# Patient Record
Sex: FEMALE | Race: WHITE | NOT HISPANIC OR LATINO | Employment: FULL TIME | URBAN - METROPOLITAN AREA
[De-identification: names, ages, dates, MRNs, and addresses within clinical notes are randomized per-mention and may not be internally consistent; named-entity substitution may affect disease eponyms.]

---

## 2017-01-05 ENCOUNTER — OFFICE VISIT (OUTPATIENT)
Dept: FAMILY MEDICINE CLINIC | Facility: CLINIC | Age: 37
End: 2017-01-05

## 2017-01-05 VITALS
BODY MASS INDEX: 24.48 KG/M2 | WEIGHT: 133 LBS | SYSTOLIC BLOOD PRESSURE: 144 MMHG | HEART RATE: 104 BPM | HEIGHT: 62 IN | RESPIRATION RATE: 20 BRPM | TEMPERATURE: 98.3 F | DIASTOLIC BLOOD PRESSURE: 99 MMHG

## 2017-01-05 DIAGNOSIS — F98.8 ADD (ATTENTION DEFICIT DISORDER): ICD-10-CM

## 2017-01-05 DIAGNOSIS — I10 BENIGN ESSENTIAL HYPERTENSION: ICD-10-CM

## 2017-01-05 DIAGNOSIS — E03.9 ACQUIRED HYPOTHYROIDISM: Primary | ICD-10-CM

## 2017-01-05 PROCEDURE — 99214 OFFICE O/P EST MOD 30 MIN: CPT | Performed by: FAMILY MEDICINE

## 2017-01-05 RX ORDER — LEVOTHYROXINE SODIUM 0.1 MG/1
100 TABLET ORAL DAILY
Qty: 90 TABLET | Refills: 1 | Status: SHIPPED | OUTPATIENT
Start: 2017-01-05 | End: 2017-04-20 | Stop reason: SDUPTHER

## 2017-01-05 RX ORDER — LOSARTAN POTASSIUM 25 MG/1
25 TABLET ORAL DAILY
Qty: 90 TABLET | Refills: 1 | Status: SHIPPED | OUTPATIENT
Start: 2017-01-05 | End: 2017-04-20 | Stop reason: SDUPTHER

## 2017-01-05 NOTE — MR AVS SNAPSHOT
Radha LOPEZ Rizwan   1/5/2017 1:00 PM   Office Visit    Provider:  Fadi Goodman MD   Department:  [facility]   Dept Phone:  [phone]                Your Full Care Plan              Today's Medication Changes          These changes are accurate as of: 1/5/17  3:21 PM.  If you have any questions, ask your nurse or doctor.               New Medication(s)Ordered:     losartan 25 MG tablet   Commonly known as:  COZAAR   Take 1 tablet by mouth Daily.   Started by:  Fadi Goodman MD         Medication(s)that have changed:     levothyroxine 100 MCG tablet   Commonly known as:  SYNTHROID   Take 1 tablet by mouth Daily.   What changed:    - medication strength  - how much to take  - additional instructions   Changed by:  Fadi Goodman MD         Stop taking medication(s)listed here:     HYDROcodone-acetaminophen 5-325 MG per tablet   Commonly known as:  NORCO   Stopped by:  Fadi Goodman MD           promethazine 25 MG suppository   Commonly known as:  PHENERGAN   Stopped by:  Fadi Goodman MD                Where to Get Your Medications      These medications were sent to [facility] [address] [place] [address] [address] AT [facility] - [phone]  - [phone] [place] [address] [facility] [address]     Phone:  [phone]     levothyroxine 100 MCG tablet    losartan 25 MG tablet         You can get these medications from any pharmacy     Bring a paper prescription for each of these medications     lisdexamfetamine 60 MG capsule                  Your Updated Medication List          This list is accurate as of: 1/5/17  3:21 PM.  Always use your most recent med list.                levothyroxine 100 MCG tablet   Commonly known as:  SYNTHROID   Take 1 tablet by mouth Daily.       lisdexamfetamine 60 MG capsule   Commonly known as:  VYVANSE   Take 1 capsule by mouth Every Morning.       losartan 25 MG tablet  "  Commonly known as:  COZAAR   Take 1 tablet by mouth Daily.               You Were Diagnosed With        Codes Comments    Acquired hypothyroidism    -  Primary ICD-10-CM: E03.9  ICD-9-CM: 244.9     ADD (attention deficit disorder)     ICD-10-CM: F98.8  ICD-9-CM: 314.00     Benign essential hypertension     ICD-10-CM: I10  ICD-9-CM: 401.1       Instructions     None    Patient Instructions History      [employer] Signup     Our records indicate that you have an active [employer] account.    You can view your After Visit Summary by going to [employer] and logging in with your [employer] username and password.  If you don't have a [employer] username and password but a parent or guardian has access to your record, the parent or guardian should login with their own [employer] username and password and access your record to view the After Visit Summary.    If you have questions, you can email [email or web address] or call [phone] to talk to our [employer] staff.  Remember, [employer] is NOT to be used for urgent needs.  For medical emergencies, dial 911.               Other Info from Your Visit           Allergies     Sulfa Antibiotics        Reason for Visit     ADD MED REFILL - CHRISTOPHE JMS    Hypothyroidism     Hypertension     LAB RESULTS           Vital Signs     Blood Pressure Pulse Temperature Respirations Height Weight    144/99 104 98.3 °F (36.8 °C) (Oral) 20 62\" (157.5 cm) 133 lb (60.3 kg)    Body Mass Index Smoking Status                24.33 kg/m2 Former Smoker          Problems and Diagnoses Noted     ADD (attention deficit disorder)    Benign essential hypertension    Underactive thyroid      "

## 2017-01-05 NOTE — PROGRESS NOTES
"Subjective   Radha sAtorga is a 36 y.o. female.     History of Present Illness     Chief Complaint:   Chief Complaint   Patient presents with   • ADD     MED REFILL - CHRISTOPHE BOWER   • Hypothyroidism   • Hypertension   • LAB RESULTS       Radha Astorga 36 y.o. female who presents today for Medical Management of the below listed issues and medication refills.  she has a history of   Patient Active Problem List   Diagnosis   • ADD (attention deficit disorder)   • Family history of breast cancer   • Benign essential hypertension   • Hypothyroidism   • Acute stress reaction   • Major depressive disorder, recurrent episode, moderate with anxious distress   .  Since the last visit, she has overall felt well.  she has been compliant with   Current Outpatient Prescriptions:   •  levothyroxine (SYNTHROID) 100 MCG tablet, Take 1 tablet by mouth Daily., Disp: 90 tablet, Rfl: 1  •  lisdexamfetamine (VYVANSE) 60 MG capsule, Take 1 capsule by mouth Every Morning., Disp: 30 capsule, Rfl: 0  •  losartan (COZAAR) 25 MG tablet, Take 1 tablet by mouth Daily., Disp: 90 tablet, Rfl: 1.  she denies medication side effects.    She has had resolution of her cough since stopping the Lisinopril yet needs to replace this now.    All of the chronic condition(s) listed above are stable w/o issues.    Visit Vitals   • /99   • Pulse 104   • Temp 98.3 °F (36.8 °C) (Oral)   • Resp 20   • Ht 62\" (157.5 cm)   • Wt 133 lb (60.3 kg)   • BMI 24.33 kg/m2       Results for orders placed or performed during the hospital encounter of 12/25/16   Comprehensive Metabolic Panel   Result Value Ref Range    Glucose 107 (H) 70 - 100 mg/dL    BUN 14 5 - 21 mg/dL    Creatinine 0.76 0.50 - 1.40 mg/dL    Sodium 140 135 - 145 mmol/L    Potassium 4.0 3.5 - 5.3 mmol/L    Chloride 100 98 - 110 mmol/L    CO2 27.0 24.0 - 31.0 mmol/L    Calcium 9.6 8.4 - 10.4 mg/dL    Total Protein 7.4 6.3 - 8.7 g/dL    Albumin 4.50 3.50 - 5.00 g/dL    ALT (SGPT) 29 0 - 54 U/L    " AST (SGOT) 20 7 - 45 U/L    Alkaline Phosphatase 65 24 - 120 U/L    Total Bilirubin 0.4 0.1 - 1.0 mg/dL    eGFR Non African Amer 85 >60 mL/min/1.73    Globulin 2.9 gm/dL    A/G Ratio 1.6 1.1 - 2.5 g/dL    BUN/Creatinine Ratio 18.4 7.0 - 25.0    Anion Gap 13.0 4.0 - 13.0 mmol/L   Urinalysis With / Culture If Indicated   Result Value Ref Range    Color, UA Yellow Yellow, Straw    Appearance, UA Clear Clear    pH, UA 7.0 5.0 - 8.0    Specific Gravity, UA 1.015 1.005 - 1.030    Glucose, UA Negative Negative    Ketones, UA Negative Negative    Bilirubin, UA Negative Negative    Blood, UA Moderate (2+) (A) Negative    Protein, UA Negative Negative    Leuk Esterase, UA Negative Negative    Nitrite, UA Negative Negative    Urobilinogen, UA 0.2 E.U./dL 0.2 - 1.0 E.U./dL   CBC Auto Differential   Result Value Ref Range    WBC 13.69 (H) 4.80 - 10.80 10*3/mm3    RBC 4.48 4.20 - 5.40 10*6/mm3    Hemoglobin 12.8 12.0 - 16.0 g/dL    Hematocrit 38.0 37.0 - 47.0 %    MCV 84.8 82.0 - 98.0 fL    MCH 28.6 28.0 - 32.0 pg    MCHC 33.7 33.0 - 36.0 g/dL    RDW 12.7 12.0 - 15.0 %    RDW-SD 38.9 (L) 40.0 - 54.0 fl    MPV 10.2 6.0 - 12.0 fL    Platelets 293 130 - 400 10*3/mm3    Neutrophil % 71.4 39.0 - 78.0 %    Lymphocyte % 18.8 15.0 - 45.0 %    Monocyte % 7.1 4.0 - 12.0 %    Eosinophil % 1.7 0.0 - 4.0 %    Basophil % 0.7 0.0 - 2.0 %    Immature Grans % 0.3 0.0 - 5.0 %    Neutrophils, Absolute 9.78 (H) 1.87 - 8.40 10*3/mm3    Lymphocytes, Absolute 2.58 0.72 - 4.86 10*3/mm3    Monocytes, Absolute 0.97 0.19 - 1.30 10*3/mm3    Eosinophils, Absolute 0.23 0.00 - 0.70 10*3/mm3    Basophils, Absolute 0.09 0.00 - 0.20 10*3/mm3    Immature Grans, Absolute 0.04 (H) 0.00 - 0.03 10*3/mm3   Urinalysis, Microscopic Only   Result Value Ref Range    RBC, UA 13-20 (A) None Seen /HPF    WBC, UA 3-5 (A) None Seen /HPF    Bacteria, UA None Seen None Seen /HPF    Squamous Epithelial Cells, UA 0-2 None Seen, 0-2 /HPF    Hyaline Casts, UA 0-2 None Seen /LPF     Methodology Automated Microscopy    POCT pregnancy, urine   Result Value Ref Range    HCG, Urine, QL Negative Negative    Lot Number [id number]     Internal Positive Control Positive     Internal Negative Control Negative    Light Blue Top   Result Value Ref Range    Extra Tube hold for add-on    Green Top (Gel)   Result Value Ref Range    Extra Tube Hold for add-ons.    Lavender Top   Result Value Ref Range    Extra Tube hold for add-on    Red Top   Result Value Ref Range    Extra Tube Hold for add-ons.          The following portions of the patient's history were reviewed and updated as appropriate: allergies, current medications, past family history, past medical history, past social history, past surgical history and problem list.    Review of Systems   Constitutional: Negative for activity change, chills, fatigue and fever.   HENT: Negative for ear pain, postnasal drip, rhinorrhea and sore throat.    Respiratory: Positive for cough. Negative for chest tightness, shortness of breath and wheezing.    Cardiovascular: Negative for chest pain and palpitations.   Gastrointestinal: Negative for abdominal pain and nausea.   Endocrine: Negative for cold intolerance.   Musculoskeletal: Negative for myalgias.   Skin: Negative for rash.   Neurological: Negative for headaches.   Psychiatric/Behavioral: Negative for behavioral problems and dysphoric mood.       Objective   Physical Exam   Constitutional: She appears well-developed and well-nourished.   Neck: Neck supple. No thyromegaly present.   Cardiovascular: Normal rate and regular rhythm.    No murmur heard.  Pulmonary/Chest: Effort normal and breath sounds normal.   Abdominal: Bowel sounds are normal.   Psychiatric: She has a normal mood and affect. Her behavior is normal.   Nursing note and vitals reviewed.    The patient has read and signed the [facility] Controlled Substance Contract.  I will continue to see patient for regular follow up appointments.  They are  well controlled on their medication.  CHRISTOPHE has been reviewed by me and is updated every 3 months. The patient is aware of the potential for addiction and dependence.      Assessment/Plan   Radha was seen today for add, hypothyroidism, hypertension and lab results.    Diagnoses and all orders for this visit:    Acquired hypothyroidism  -     levothyroxine (SYNTHROID) 100 MCG tablet; Take 1 tablet by mouth Daily.    ADD (attention deficit disorder)  -     lisdexamfetamine (VYVANSE) 60 MG capsule; Take 1 capsule by mouth Every Morning.    Benign essential hypertension  -     losartan (COZAAR) 25 MG tablet; Take 1 tablet by mouth Daily.

## 2017-02-09 DIAGNOSIS — F98.8 ADD (ATTENTION DEFICIT DISORDER): ICD-10-CM

## 2017-03-14 DIAGNOSIS — F98.8 ADD (ATTENTION DEFICIT DISORDER): ICD-10-CM

## 2017-03-21 ENCOUNTER — OFFICE VISIT (OUTPATIENT)
Dept: FAMILY MEDICINE CLINIC | Facility: CLINIC | Age: 37
End: 2017-03-21

## 2017-03-21 VITALS
TEMPERATURE: 98.7 F | WEIGHT: 132 LBS | RESPIRATION RATE: 16 BRPM | BODY MASS INDEX: 24.29 KG/M2 | SYSTOLIC BLOOD PRESSURE: 128 MMHG | DIASTOLIC BLOOD PRESSURE: 87 MMHG | HEIGHT: 62 IN | HEART RATE: 94 BPM

## 2017-03-21 DIAGNOSIS — F98.8 ADD (ATTENTION DEFICIT DISORDER): Primary | ICD-10-CM

## 2017-03-21 DIAGNOSIS — I10 BENIGN ESSENTIAL HYPERTENSION: ICD-10-CM

## 2017-03-21 PROCEDURE — 99213 OFFICE O/P EST LOW 20 MIN: CPT | Performed by: FAMILY MEDICINE

## 2017-03-21 NOTE — PROGRESS NOTES
"Subjective   Radha Astorga is a 36 y.o. female.     History of Present Illness     Chief Complaint:   Chief Complaint   Patient presents with   • Hypertension     MED REFILL - NON SMOKER - NO DAILY ASA  PHQ  2 PHYQ 9 DONE   - CHRISTOPHE BOWER   • Hypothyroidism   • ADD       Radha Astorga 36 y.o. female who presents today for Medical Management of the below listed issues and medication refills.  she has a history of   Patient Active Problem List   Diagnosis   • ADD (attention deficit disorder)   • Family history of breast cancer   • Benign essential hypertension   • Hypothyroidism   • Acute stress reaction   • Major depressive disorder, recurrent episode, moderate with anxious distress   .  Since the last visit, she has overall felt well.  she has been compliant with   Current Outpatient Prescriptions:   •  levothyroxine (SYNTHROID) 100 MCG tablet, Take 1 tablet by mouth Daily., Disp: 90 tablet, Rfl: 1  •  lisdexamfetamine (VYVANSE) 70 MG capsule, Take 1 capsule by mouth Every Morning., Disp: 30 capsule, Rfl: 0  •  losartan (COZAAR) 25 MG tablet, Take 1 tablet by mouth Daily., Disp: 90 tablet, Rfl: 1.  she denies medication side effects.    All of the chronic condition(s) listed above are stable w/o issues.    Visit Vitals   • /87   • Pulse 94   • Temp 98.7 °F (37.1 °C) (Oral)   • Resp 16   • Ht 62\" (157.5 cm)   • Wt 132 lb (59.9 kg)   • BMI 24.14 kg/m2       Results for orders placed or performed during the hospital encounter of 12/25/16   Comprehensive Metabolic Panel   Result Value Ref Range    Glucose 107 (H) 70 - 100 mg/dL    BUN 14 5 - 21 mg/dL    Creatinine 0.76 0.50 - 1.40 mg/dL    Sodium 140 135 - 145 mmol/L    Potassium 4.0 3.5 - 5.3 mmol/L    Chloride 100 98 - 110 mmol/L    CO2 27.0 24.0 - 31.0 mmol/L    Calcium 9.6 8.4 - 10.4 mg/dL    Total Protein 7.4 6.3 - 8.7 g/dL    Albumin 4.50 3.50 - 5.00 g/dL    ALT (SGPT) 29 0 - 54 U/L    AST (SGOT) 20 7 - 45 U/L    Alkaline Phosphatase 65 24 - 120 U/L    " Total Bilirubin 0.4 0.1 - 1.0 mg/dL    eGFR Non African Amer 85 >60 mL/min/1.73    Globulin 2.9 gm/dL    A/G Ratio 1.6 1.1 - 2.5 g/dL    BUN/Creatinine Ratio 18.4 7.0 - 25.0    Anion Gap 13.0 4.0 - 13.0 mmol/L   Urinalysis With / Culture If Indicated   Result Value Ref Range    Color, UA Yellow Yellow, Straw    Appearance, UA Clear Clear    pH, UA 7.0 5.0 - 8.0    Specific Gravity, UA 1.015 1.005 - 1.030    Glucose, UA Negative Negative    Ketones, UA Negative Negative    Bilirubin, UA Negative Negative    Blood, UA Moderate (2+) (A) Negative    Protein, UA Negative Negative    Leuk Esterase, UA Negative Negative    Nitrite, UA Negative Negative    Urobilinogen, UA 0.2 E.U./dL 0.2 - 1.0 E.U./dL   CBC Auto Differential   Result Value Ref Range    WBC 13.69 (H) 4.80 - 10.80 10*3/mm3    RBC 4.48 4.20 - 5.40 10*6/mm3    Hemoglobin 12.8 12.0 - 16.0 g/dL    Hematocrit 38.0 37.0 - 47.0 %    MCV 84.8 82.0 - 98.0 fL    MCH 28.6 28.0 - 32.0 pg    MCHC 33.7 33.0 - 36.0 g/dL    RDW 12.7 12.0 - 15.0 %    RDW-SD 38.9 (L) 40.0 - 54.0 fl    MPV 10.2 6.0 - 12.0 fL    Platelets 293 130 - 400 10*3/mm3    Neutrophil % 71.4 39.0 - 78.0 %    Lymphocyte % 18.8 15.0 - 45.0 %    Monocyte % 7.1 4.0 - 12.0 %    Eosinophil % 1.7 0.0 - 4.0 %    Basophil % 0.7 0.0 - 2.0 %    Immature Grans % 0.3 0.0 - 5.0 %    Neutrophils, Absolute 9.78 (H) 1.87 - 8.40 10*3/mm3    Lymphocytes, Absolute 2.58 0.72 - 4.86 10*3/mm3    Monocytes, Absolute 0.97 0.19 - 1.30 10*3/mm3    Eosinophils, Absolute 0.23 0.00 - 0.70 10*3/mm3    Basophils, Absolute 0.09 0.00 - 0.20 10*3/mm3    Immature Grans, Absolute 0.04 (H) 0.00 - 0.03 10*3/mm3   Urinalysis, Microscopic Only   Result Value Ref Range    RBC, UA 13-20 (A) None Seen /HPF    WBC, UA 3-5 (A) None Seen /HPF    Bacteria, UA None Seen None Seen /HPF    Squamous Epithelial Cells, UA 0-2 None Seen, 0-2 /HPF    Hyaline Casts, UA 0-2 None Seen /LPF    Methodology Automated Microscopy    POCT pregnancy, urine   Result  Value Ref Range    HCG, Urine, QL Negative Negative    Lot Number [id number]     Internal Positive Control Positive     Internal Negative Control Negative    Light Blue Top   Result Value Ref Range    Extra Tube hold for add-on    Green Top (Gel)   Result Value Ref Range    Extra Tube Hold for add-ons.    Lavender Top   Result Value Ref Range    Extra Tube hold for add-on    Red Top   Result Value Ref Range    Extra Tube Hold for add-ons.          The following portions of the patient's history were reviewed and updated as appropriate: allergies, current medications, past family history, past medical history, past social history, past surgical history and problem list.    Review of Systems   Constitutional: Negative for activity change, chills, fatigue and fever.   Respiratory: Negative for cough and chest tightness.    Cardiovascular: Negative for chest pain and palpitations.   Gastrointestinal: Negative for abdominal pain and nausea.   Endocrine: Negative for cold intolerance.   Psychiatric/Behavioral: Negative for behavioral problems and dysphoric mood.       Objective   Physical Exam   Constitutional: She appears well-developed and well-nourished.   Neck: Neck supple. No thyromegaly present.   Cardiovascular: Normal rate and regular rhythm.    No murmur heard.  Pulmonary/Chest: Effort normal and breath sounds normal.   Abdominal: Bowel sounds are normal.   Psychiatric: She has a normal mood and affect. Her behavior is normal.   Nursing note and vitals reviewed.    The patient has read and signed the [facility] Controlled Substance Contract.  I will continue to see patient for regular follow up appointments.  They are well controlled on their medication.  CHRISTOPHE has been reviewed by me and is updated every 3 months. The patient is aware of the potential for addiction and dependence.    Assessment/Plan   Radha was seen today for hypertension, hypothyroidism and add.    Diagnoses and all orders for this  visit:    ADD (attention deficit disorder)    Benign essential hypertension    Pt likes her higher dose of Vyvanse and is going to continue at current dose.

## 2017-04-10 DIAGNOSIS — F98.8 ADD (ATTENTION DEFICIT DISORDER): ICD-10-CM

## 2017-04-20 ENCOUNTER — OFFICE VISIT (OUTPATIENT)
Dept: FAMILY MEDICINE CLINIC | Facility: CLINIC | Age: 37
End: 2017-04-20

## 2017-04-20 VITALS
SYSTOLIC BLOOD PRESSURE: 117 MMHG | RESPIRATION RATE: 20 BRPM | TEMPERATURE: 98.9 F | HEART RATE: 116 BPM | WEIGHT: 128 LBS | DIASTOLIC BLOOD PRESSURE: 78 MMHG | HEIGHT: 62 IN | BODY MASS INDEX: 23.55 KG/M2

## 2017-04-20 DIAGNOSIS — E03.9 ACQUIRED HYPOTHYROIDISM: ICD-10-CM

## 2017-04-20 DIAGNOSIS — F33.1 MAJOR DEPRESSIVE DISORDER, RECURRENT EPISODE, MODERATE WITH ANXIOUS DISTRESS (HCC): Primary | ICD-10-CM

## 2017-04-20 DIAGNOSIS — F98.8 ADD (ATTENTION DEFICIT DISORDER): ICD-10-CM

## 2017-04-20 DIAGNOSIS — I10 BENIGN ESSENTIAL HYPERTENSION: ICD-10-CM

## 2017-04-20 PROCEDURE — 99214 OFFICE O/P EST MOD 30 MIN: CPT | Performed by: FAMILY MEDICINE

## 2017-04-20 RX ORDER — LEVOTHYROXINE SODIUM 0.1 MG/1
100 TABLET ORAL DAILY
Qty: 90 TABLET | Refills: 1 | Status: SHIPPED | OUTPATIENT
Start: 2017-04-20 | End: 2017-10-09 | Stop reason: SDUPTHER

## 2017-04-20 RX ORDER — LOSARTAN POTASSIUM 25 MG/1
25 TABLET ORAL DAILY
Qty: 90 TABLET | Refills: 1 | Status: SHIPPED | OUTPATIENT
Start: 2017-04-20 | End: 2017-10-09 | Stop reason: SDUPTHER

## 2017-04-20 RX ORDER — ESCITALOPRAM OXALATE 10 MG/1
10 TABLET ORAL DAILY
Qty: 30 TABLET | Refills: 5 | Status: SHIPPED | OUTPATIENT
Start: 2017-04-20 | End: 2017-07-12

## 2017-04-20 NOTE — PROGRESS NOTES
"Subjective   Radha Astorga is a 37 y.o. female.     History of Present Illness     Chief Complaint:   Chief Complaint   Patient presents with   • ADD     MED REFILL / CHRISTOPHE    • Hypothyroidism   • Hypertension       Radha Astorga 37 y.o. female who presents today for Medical Management of the below listed issues and medication refills.  she has a history of   Patient Active Problem List   Diagnosis   • ADD (attention deficit disorder)   • Family history of breast cancer   • Benign essential hypertension   • Hypothyroidism   • Acute stress reaction   • Major depressive disorder, recurrent episode, moderate with anxious distress   .  Since the last visit, she has had three deaths in the family and has been quite anxious and some depression and desires help with this.  she has been compliant with   Current Outpatient Prescriptions:   •  levothyroxine (SYNTHROID) 100 MCG tablet, Take 1 tablet by mouth Daily., Disp: 90 tablet, Rfl: 1  •  lisdexamfetamine (VYVANSE) 70 MG capsule, Take 1 capsule by mouth Every Morning., Disp: 30 capsule, Rfl: 0  •  losartan (COZAAR) 25 MG tablet, Take 1 tablet by mouth Daily., Disp: 90 tablet, Rfl: 1  •  escitalopram (LEXAPRO) 10 MG tablet, Take 1 tablet by mouth Daily., Disp: 30 tablet, Rfl: 5.  she denies medication side effects.    All of the chronic condition(s) listed above are stable w/o issues.    /78  Pulse 116  Temp 98.9 °F (37.2 °C)  Resp 20  Ht 62\" (157.5 cm)  Wt 128 lb (58.1 kg)  BMI 23.41 kg/m2    Results for orders placed or performed during the hospital encounter of 12/25/16   Comprehensive Metabolic Panel   Result Value Ref Range    Glucose 107 (H) 70 - 100 mg/dL    BUN 14 5 - 21 mg/dL    Creatinine 0.76 0.50 - 1.40 mg/dL    Sodium 140 135 - 145 mmol/L    Potassium 4.0 3.5 - 5.3 mmol/L    Chloride 100 98 - 110 mmol/L    CO2 27.0 24.0 - 31.0 mmol/L    Calcium 9.6 8.4 - 10.4 mg/dL    Total Protein 7.4 6.3 - 8.7 g/dL    Albumin 4.50 3.50 - 5.00 g/dL    ALT " (SGPT) 29 0 - 54 U/L    AST (SGOT) 20 7 - 45 U/L    Alkaline Phosphatase 65 24 - 120 U/L    Total Bilirubin 0.4 0.1 - 1.0 mg/dL    eGFR Non African Amer 85 >60 mL/min/1.73    Globulin 2.9 gm/dL    A/G Ratio 1.6 1.1 - 2.5 g/dL    BUN/Creatinine Ratio 18.4 7.0 - 25.0    Anion Gap 13.0 4.0 - 13.0 mmol/L   Urinalysis With / Culture If Indicated   Result Value Ref Range    Color, UA Yellow Yellow, Straw    Appearance, UA Clear Clear    pH, UA 7.0 5.0 - 8.0    Specific Gravity, UA 1.015 1.005 - 1.030    Glucose, UA Negative Negative    Ketones, UA Negative Negative    Bilirubin, UA Negative Negative    Blood, UA Moderate (2+) (A) Negative    Protein, UA Negative Negative    Leuk Esterase, UA Negative Negative    Nitrite, UA Negative Negative    Urobilinogen, UA 0.2 E.U./dL 0.2 - 1.0 E.U./dL   CBC Auto Differential   Result Value Ref Range    WBC 13.69 (H) 4.80 - 10.80 10*3/mm3    RBC 4.48 4.20 - 5.40 10*6/mm3    Hemoglobin 12.8 12.0 - 16.0 g/dL    Hematocrit 38.0 37.0 - 47.0 %    MCV 84.8 82.0 - 98.0 fL    MCH 28.6 28.0 - 32.0 pg    MCHC 33.7 33.0 - 36.0 g/dL    RDW 12.7 12.0 - 15.0 %    RDW-SD 38.9 (L) 40.0 - 54.0 fl    MPV 10.2 6.0 - 12.0 fL    Platelets 293 130 - 400 10*3/mm3    Neutrophil % 71.4 39.0 - 78.0 %    Lymphocyte % 18.8 15.0 - 45.0 %    Monocyte % 7.1 4.0 - 12.0 %    Eosinophil % 1.7 0.0 - 4.0 %    Basophil % 0.7 0.0 - 2.0 %    Immature Grans % 0.3 0.0 - 5.0 %    Neutrophils, Absolute 9.78 (H) 1.87 - 8.40 10*3/mm3    Lymphocytes, Absolute 2.58 0.72 - 4.86 10*3/mm3    Monocytes, Absolute 0.97 0.19 - 1.30 10*3/mm3    Eosinophils, Absolute 0.23 0.00 - 0.70 10*3/mm3    Basophils, Absolute 0.09 0.00 - 0.20 10*3/mm3    Immature Grans, Absolute 0.04 (H) 0.00 - 0.03 10*3/mm3   Urinalysis, Microscopic Only   Result Value Ref Range    RBC, UA 13-20 (A) None Seen /HPF    WBC, UA 3-5 (A) None Seen /HPF    Bacteria, UA None Seen None Seen /HPF    Squamous Epithelial Cells, UA 0-2 None Seen, 0-2 /HPF    Hyaline Casts, UA  0-2 None Seen /LPF    Methodology Automated Microscopy    POCT pregnancy, urine   Result Value Ref Range    HCG, Urine, QL Negative Negative    Lot Number [id number]     Internal Positive Control Positive     Internal Negative Control Negative    Light Blue Top   Result Value Ref Range    Extra Tube hold for add-on    Green Top (Gel)   Result Value Ref Range    Extra Tube Hold for add-ons.    Lavender Top   Result Value Ref Range    Extra Tube hold for add-on    Red Top   Result Value Ref Range    Extra Tube Hold for add-ons.          The following portions of the patient's history were reviewed and updated as appropriate: allergies, current medications, past family history, past medical history, past social history, past surgical history and problem list.    Review of Systems   Constitutional: Negative for activity change, chills, fatigue and fever.   Respiratory: Negative for cough and chest tightness.    Cardiovascular: Negative for chest pain and palpitations.   Gastrointestinal: Negative for abdominal pain and nausea.   Endocrine: Negative for cold intolerance.   Psychiatric/Behavioral: Negative for behavioral problems and dysphoric mood.       Objective   Physical Exam   Constitutional: She appears well-developed and well-nourished.   Neck: Neck supple. No thyromegaly present.   Cardiovascular: Normal rate and regular rhythm.    No murmur heard.  Pulmonary/Chest: Effort normal and breath sounds normal.   Abdominal: Bowel sounds are normal.   Psychiatric: She has a normal mood and affect. Her behavior is normal.   Nursing note and vitals reviewed.    The patient has read and signed the [facility] Controlled Substance Contract.  I will continue to see patient for regular follow up appointments.  They are well controlled on their medication.  CHRISTOPHE has been reviewed by me and is updated every 3 months. The patient is aware of the potential for addiction and dependence.    Assessment/Plan   Radha was seen  today for add, hypothyroidism and hypertension.    Diagnoses and all orders for this visit:    Major depressive disorder, recurrent episode, moderate with anxious distress  -     escitalopram (LEXAPRO) 10 MG tablet; Take 1 tablet by mouth Daily.    ADD (attention deficit disorder)  -     lisdexamfetamine (VYVANSE) 70 MG capsule; Take 1 capsule by mouth Every Morning.    Benign essential hypertension  -     losartan (COZAAR) 25 MG tablet; Take 1 tablet by mouth Daily.    Acquired hypothyroidism  -     levothyroxine (SYNTHROID) 100 MCG tablet; Take 1 tablet by mouth Daily.

## 2017-06-07 DIAGNOSIS — F98.8 ADD (ATTENTION DEFICIT DISORDER): ICD-10-CM

## 2017-06-07 NOTE — TELEPHONE ENCOUNTER
----- Message from Radha Astorga sent at 6/6/2017  5:14 PM EDT -----  Regarding: Prescription Question  Contact: [phone]  I need a refill on my Vyvanse, but I'd like to move back down to the 60mg please.       Thanks!  Radha Astorga  [phone]

## 2017-07-12 ENCOUNTER — OFFICE VISIT (OUTPATIENT)
Dept: FAMILY MEDICINE CLINIC | Facility: CLINIC | Age: 37
End: 2017-07-12

## 2017-07-12 VITALS
DIASTOLIC BLOOD PRESSURE: 84 MMHG | HEIGHT: 62 IN | RESPIRATION RATE: 16 BRPM | TEMPERATURE: 98 F | SYSTOLIC BLOOD PRESSURE: 123 MMHG | HEART RATE: 100 BPM | WEIGHT: 132 LBS | OXYGEN SATURATION: 98 % | BODY MASS INDEX: 24.29 KG/M2

## 2017-07-12 DIAGNOSIS — I10 BENIGN ESSENTIAL HYPERTENSION: Primary | ICD-10-CM

## 2017-07-12 DIAGNOSIS — F98.8 ADD (ATTENTION DEFICIT DISORDER): ICD-10-CM

## 2017-07-12 PROCEDURE — 99213 OFFICE O/P EST LOW 20 MIN: CPT | Performed by: FAMILY MEDICINE

## 2017-07-12 NOTE — PROGRESS NOTES
"Subjective   Radha Astorga is a 37 y.o. female.     History of Present Illness     Chief Complaint:   Chief Complaint   Patient presents with   • ADD   • Hypertension       Radha Astorga 37 y.o. female who presents today for Medical Management of the below listed issues and medication refills.  she has a history of   Patient Active Problem List   Diagnosis   • ADD (attention deficit disorder)   • Family history of breast cancer   • Benign essential hypertension   • Hypothyroidism   • Acute stress reaction   • Major depressive disorder, recurrent episode, moderate with anxious distress   .  Since the last visit, she has overall felt well.  she has been compliant with   Current Outpatient Prescriptions:   •  lisdexamfetamine (VYVANSE) 60 MG capsule, Take 1 capsule by mouth Every Morning., Disp: 30 capsule, Rfl: 0  •  levothyroxine (SYNTHROID) 100 MCG tablet, Take 1 tablet by mouth Daily., Disp: 90 tablet, Rfl: 1  •  losartan (COZAAR) 25 MG tablet, Take 1 tablet by mouth Daily., Disp: 90 tablet, Rfl: 1.  she denies medication side effects.    All of the chronic condition(s) listed above are stable w/o issues.    /84  Pulse 100  Temp 98 °F (36.7 °C) (Oral)   Resp 16  Ht 62\" (157.5 cm)  Wt 132 lb (59.9 kg)  SpO2 98%  BMI 24.14 kg/m2    Results for orders placed or performed during the hospital encounter of 12/25/16   Comprehensive Metabolic Panel   Result Value Ref Range    Glucose 107 (H) 70 - 100 mg/dL    BUN 14 5 - 21 mg/dL    Creatinine 0.76 0.50 - 1.40 mg/dL    Sodium 140 135 - 145 mmol/L    Potassium 4.0 3.5 - 5.3 mmol/L    Chloride 100 98 - 110 mmol/L    CO2 27.0 24.0 - 31.0 mmol/L    Calcium 9.6 8.4 - 10.4 mg/dL    Total Protein 7.4 6.3 - 8.7 g/dL    Albumin 4.50 3.50 - 5.00 g/dL    ALT (SGPT) 29 0 - 54 U/L    AST (SGOT) 20 7 - 45 U/L    Alkaline Phosphatase 65 24 - 120 U/L    Total Bilirubin 0.4 0.1 - 1.0 mg/dL    eGFR Non African Amer 85 >60 mL/min/1.73    Globulin 2.9 gm/dL    A/G Ratio " 1.6 1.1 - 2.5 g/dL    BUN/Creatinine Ratio 18.4 7.0 - 25.0    Anion Gap 13.0 4.0 - 13.0 mmol/L   Urinalysis With / Culture If Indicated   Result Value Ref Range    Color, UA Yellow Yellow, Straw    Appearance, UA Clear Clear    pH, UA 7.0 5.0 - 8.0    Specific Gravity, UA 1.015 1.005 - 1.030    Glucose, UA Negative Negative    Ketones, UA Negative Negative    Bilirubin, UA Negative Negative    Blood, UA Moderate (2+) (A) Negative    Protein, UA Negative Negative    Leuk Esterase, UA Negative Negative    Nitrite, UA Negative Negative    Urobilinogen, UA 0.2 E.U./dL 0.2 - 1.0 E.U./dL   CBC Auto Differential   Result Value Ref Range    WBC 13.69 (H) 4.80 - 10.80 10*3/mm3    RBC 4.48 4.20 - 5.40 10*6/mm3    Hemoglobin 12.8 12.0 - 16.0 g/dL    Hematocrit 38.0 37.0 - 47.0 %    MCV 84.8 82.0 - 98.0 fL    MCH 28.6 28.0 - 32.0 pg    MCHC 33.7 33.0 - 36.0 g/dL    RDW 12.7 12.0 - 15.0 %    RDW-SD 38.9 (L) 40.0 - 54.0 fl    MPV 10.2 6.0 - 12.0 fL    Platelets 293 130 - 400 10*3/mm3    Neutrophil % 71.4 39.0 - 78.0 %    Lymphocyte % 18.8 15.0 - 45.0 %    Monocyte % 7.1 4.0 - 12.0 %    Eosinophil % 1.7 0.0 - 4.0 %    Basophil % 0.7 0.0 - 2.0 %    Immature Grans % 0.3 0.0 - 5.0 %    Neutrophils, Absolute 9.78 (H) 1.87 - 8.40 10*3/mm3    Lymphocytes, Absolute 2.58 0.72 - 4.86 10*3/mm3    Monocytes, Absolute 0.97 0.19 - 1.30 10*3/mm3    Eosinophils, Absolute 0.23 0.00 - 0.70 10*3/mm3    Basophils, Absolute 0.09 0.00 - 0.20 10*3/mm3    Immature Grans, Absolute 0.04 (H) 0.00 - 0.03 10*3/mm3   Urinalysis, Microscopic Only   Result Value Ref Range    RBC, UA 13-20 (A) None Seen /HPF    WBC, UA 3-5 (A) None Seen /HPF    Bacteria, UA None Seen None Seen /HPF    Squamous Epithelial Cells, UA 0-2 None Seen, 0-2 /HPF    Hyaline Casts, UA 0-2 None Seen /LPF    Methodology Automated Microscopy    POCT pregnancy, urine   Result Value Ref Range    HCG, Urine, QL Negative Negative    Lot Number [id number]     Internal Positive Control Positive      Internal Negative Control Negative    Light Blue Top   Result Value Ref Range    Extra Tube hold for add-on    Green Top (Gel)   Result Value Ref Range    Extra Tube Hold for add-ons.    Lavender Top   Result Value Ref Range    Extra Tube hold for add-on    Red Top   Result Value Ref Range    Extra Tube Hold for add-ons.          The following portions of the patient's history were reviewed and updated as appropriate: allergies, current medications, past family history, past medical history, past social history, past surgical history and problem list.    Review of Systems   Constitutional: Negative for activity change, chills, fatigue and fever.   Respiratory: Negative for cough and chest tightness.    Cardiovascular: Negative for chest pain and palpitations.   Gastrointestinal: Negative for abdominal pain and nausea.   Endocrine: Negative for cold intolerance.   Psychiatric/Behavioral: Negative for behavioral problems and dysphoric mood.       Objective   Physical Exam   Constitutional: She appears well-developed and well-nourished.   Neck: Neck supple. No thyromegaly present.   Cardiovascular: Normal rate and regular rhythm.    No murmur heard.  Pulmonary/Chest: Effort normal and breath sounds normal.   Abdominal: Bowel sounds are normal.   Psychiatric: She has a normal mood and affect. Her behavior is normal.   Nursing note and vitals reviewed.    The patient has read and signed the [facility] Controlled Substance Contract.  I will continue to see patient for regular follow up appointments.  They are well controlled on their medication.  CHRISTOPHE has been reviewed by me and is updated every 3 months. The patient is aware of the potential for addiction and dependence.    Assessment/Plan   Radha was seen today for add and hypertension.    Diagnoses and all orders for this visit:    Benign essential hypertension    ADD (attention deficit disorder)  -     lisdexamfetamine (VYVANSE) 60 MG capsule; Take 1  capsule by mouth Every Morning.    Continue with good diet and exercise and Losartan for the BP.

## 2017-08-22 DIAGNOSIS — F98.8 ADD (ATTENTION DEFICIT DISORDER): ICD-10-CM

## 2017-09-26 DIAGNOSIS — F98.8 ADD (ATTENTION DEFICIT DISORDER): ICD-10-CM

## 2017-10-09 ENCOUNTER — OFFICE VISIT (OUTPATIENT)
Dept: FAMILY MEDICINE CLINIC | Facility: CLINIC | Age: 37
End: 2017-10-09

## 2017-10-09 VITALS
TEMPERATURE: 98.8 F | SYSTOLIC BLOOD PRESSURE: 136 MMHG | DIASTOLIC BLOOD PRESSURE: 89 MMHG | BODY MASS INDEX: 24.66 KG/M2 | RESPIRATION RATE: 16 BRPM | HEIGHT: 62 IN | HEART RATE: 84 BPM | WEIGHT: 134 LBS

## 2017-10-09 DIAGNOSIS — F98.8 ATTENTION DEFICIT DISORDER (ADD) WITHOUT HYPERACTIVITY: ICD-10-CM

## 2017-10-09 DIAGNOSIS — E03.9 ACQUIRED HYPOTHYROIDISM: ICD-10-CM

## 2017-10-09 DIAGNOSIS — I10 BENIGN ESSENTIAL HYPERTENSION: ICD-10-CM

## 2017-10-09 PROCEDURE — 99214 OFFICE O/P EST MOD 30 MIN: CPT | Performed by: FAMILY MEDICINE

## 2017-10-09 RX ORDER — LOSARTAN POTASSIUM 25 MG/1
25 TABLET ORAL DAILY
Qty: 90 TABLET | Refills: 1 | Status: SHIPPED | OUTPATIENT
Start: 2017-10-09 | End: 2018-02-26 | Stop reason: SINTOL

## 2017-10-09 RX ORDER — LEVOTHYROXINE SODIUM 0.1 MG/1
100 TABLET ORAL DAILY
Qty: 90 TABLET | Refills: 1 | Status: SHIPPED | OUTPATIENT
Start: 2017-10-09 | End: 2018-04-30 | Stop reason: DRUGHIGH

## 2017-10-09 NOTE — PROGRESS NOTES
"Subjective   Radha Astorga is a 37 y.o. female.     History of Present Illness     Chief Complaint:   Chief Complaint   Patient presents with   • ADD     med refill - tabitha [facility]sa udated today js   • Hypothyroidism   • Hypertension       Radha Astorga 37 y.o. female who presents today for Medical Management of the below listed issues and medication refills.  she has a problem list of   Patient Active Problem List   Diagnosis   • ADD (attention deficit disorder)   • Family history of breast cancer   • Benign essential hypertension   • Hypothyroidism   • Acute stress reaction   • Major depressive disorder, recurrent episode, moderate with anxious distress   .  Since the last visit, she has overall felt well.  she has been compliant with   Current Outpatient Prescriptions:   •  levothyroxine (SYNTHROID) 100 MCG tablet, Take 1 tablet by mouth Daily., Disp: 90 tablet, Rfl: 1  •  lisdexamfetamine (VYVANSE) 60 MG capsule, Take 1 capsule by mouth Every Morning., Disp: 30 capsule, Rfl: 0  •  losartan (COZAAR) 25 MG tablet, Take 1 tablet by mouth Daily., Disp: 90 tablet, Rfl: 1.  she denies medication side effects.    All of the chronic condition(s) listed above are stable w/o issues.    /89  Pulse 84  Temp 98.8 °F (37.1 °C) (Oral)   Resp 16  Ht 62\" (157.5 cm)  Wt 134 lb (60.8 kg)  BMI 24.51 kg/m2    Results for orders placed or performed during the hospital encounter of 12/25/16   Comprehensive Metabolic Panel   Result Value Ref Range    Glucose 107 (H) 70 - 100 mg/dL    BUN 14 5 - 21 mg/dL    Creatinine 0.76 0.50 - 1.40 mg/dL    Sodium 140 135 - 145 mmol/L    Potassium 4.0 3.5 - 5.3 mmol/L    Chloride 100 98 - 110 mmol/L    CO2 27.0 24.0 - 31.0 mmol/L    Calcium 9.6 8.4 - 10.4 mg/dL    Total Protein 7.4 6.3 - 8.7 g/dL    Albumin 4.50 3.50 - 5.00 g/dL    ALT (SGPT) 29 0 - 54 U/L    AST (SGOT) 20 7 - 45 U/L    Alkaline Phosphatase 65 24 - 120 U/L    Total Bilirubin 0.4 0.1 - 1.0 mg/dL    eGFR Non African " Amer 85 >60 mL/min/1.73    Globulin 2.9 gm/dL    A/G Ratio 1.6 1.1 - 2.5 g/dL    BUN/Creatinine Ratio 18.4 7.0 - 25.0    Anion Gap 13.0 4.0 - 13.0 mmol/L   Urinalysis With / Culture If Indicated   Result Value Ref Range    Color, UA Yellow Yellow, Straw    Appearance, UA Clear Clear    pH, UA 7.0 5.0 - 8.0    Specific Gravity, UA 1.015 1.005 - 1.030    Glucose, UA Negative Negative    Ketones, UA Negative Negative    Bilirubin, UA Negative Negative    Blood, UA Moderate (2+) (A) Negative    Protein, UA Negative Negative    Leuk Esterase, UA Negative Negative    Nitrite, UA Negative Negative    Urobilinogen, UA 0.2 E.U./dL 0.2 - 1.0 E.U./dL   CBC Auto Differential   Result Value Ref Range    WBC 13.69 (H) 4.80 - 10.80 10*3/mm3    RBC 4.48 4.20 - 5.40 10*6/mm3    Hemoglobin 12.8 12.0 - 16.0 g/dL    Hematocrit 38.0 37.0 - 47.0 %    MCV 84.8 82.0 - 98.0 fL    MCH 28.6 28.0 - 32.0 pg    MCHC 33.7 33.0 - 36.0 g/dL    RDW 12.7 12.0 - 15.0 %    RDW-SD 38.9 (L) 40.0 - 54.0 fl    MPV 10.2 6.0 - 12.0 fL    Platelets 293 130 - 400 10*3/mm3    Neutrophil % 71.4 39.0 - 78.0 %    Lymphocyte % 18.8 15.0 - 45.0 %    Monocyte % 7.1 4.0 - 12.0 %    Eosinophil % 1.7 0.0 - 4.0 %    Basophil % 0.7 0.0 - 2.0 %    Immature Grans % 0.3 0.0 - 5.0 %    Neutrophils, Absolute 9.78 (H) 1.87 - 8.40 10*3/mm3    Lymphocytes, Absolute 2.58 0.72 - 4.86 10*3/mm3    Monocytes, Absolute 0.97 0.19 - 1.30 10*3/mm3    Eosinophils, Absolute 0.23 0.00 - 0.70 10*3/mm3    Basophils, Absolute 0.09 0.00 - 0.20 10*3/mm3    Immature Grans, Absolute 0.04 (H) 0.00 - 0.03 10*3/mm3   Urinalysis, Microscopic Only   Result Value Ref Range    RBC, UA 13-20 (A) None Seen /HPF    WBC, UA 3-5 (A) None Seen /HPF    Bacteria, UA None Seen None Seen /HPF    Squamous Epithelial Cells, UA 0-2 None Seen, 0-2 /HPF    Hyaline Casts, UA 0-2 None Seen /LPF    Methodology Automated Microscopy    POCT pregnancy, urine   Result Value Ref Range    HCG, Urine, QL Negative Negative    Lot  Number [id number]     Internal Positive Control Positive     Internal Negative Control Negative    Light Blue Top   Result Value Ref Range    Extra Tube hold for add-on    Green Top (Gel)   Result Value Ref Range    Extra Tube Hold for add-ons.    Lavender Top   Result Value Ref Range    Extra Tube hold for add-on    Red Top   Result Value Ref Range    Extra Tube Hold for add-ons.          The following portions of the patient's history were reviewed and updated as appropriate: allergies, current medications, past family history, past medical history, past social history, past surgical history and problem list.    Review of Systems   Constitutional: Negative for activity change, chills, fatigue and fever.   Respiratory: Negative for cough and chest tightness.    Cardiovascular: Negative for chest pain and palpitations.   Gastrointestinal: Negative for abdominal pain and nausea.   Endocrine: Negative for cold intolerance.   Psychiatric/Behavioral: Negative for behavioral problems and dysphoric mood.       Objective   Physical Exam   Constitutional: She appears well-developed and well-nourished.   Neck: Neck supple. No thyromegaly present.   Cardiovascular: Normal rate and regular rhythm.    No murmur heard.  Pulmonary/Chest: Effort normal and breath sounds normal.   Abdominal: Bowel sounds are normal.   Psychiatric: She has a normal mood and affect. Her behavior is normal.   Nursing note and vitals reviewed.    The patient has read and signed the [facility] Controlled Substance Contract.  I will continue to see patient for regular follow up appointments.  They are well controlled on their medication.  CHRISTOPHE has been reviewed by me and is updated every 3 months. The patient is aware of the potential for addiction and dependence.    Assessment/Plan   Radha was seen today for add, hypothyroidism and hypertension.    Diagnoses and all orders for this visit:    Attention deficit disorder (ADD) without  hyperactivity  -     lisdexamfetamine (VYVANSE) 60 MG capsule; Take 1 capsule by mouth Every Morning.    Benign essential hypertension  -     losartan (COZAAR) 25 MG tablet; Take 1 tablet by mouth Daily.  -     Comprehensive metabolic panel  -     Lipid panel  -     CBC and Differential    Acquired hypothyroidism  -     levothyroxine (SYNTHROID) 100 MCG tablet; Take 1 tablet by mouth Daily.  -     T4, Free  -     TSH    Other orders  -     Cancel: TSH  -     Cancel: CBC & Differential  -     Cancel: Comprehensive Metabolic Panel  -     Cancel: Lipid Panel             d

## 2017-12-05 DIAGNOSIS — F98.8 ATTENTION DEFICIT DISORDER (ADD) WITHOUT HYPERACTIVITY: ICD-10-CM

## 2018-01-03 DIAGNOSIS — F98.8 ATTENTION DEFICIT DISORDER (ADD) WITHOUT HYPERACTIVITY: ICD-10-CM

## 2018-02-09 ENCOUNTER — OFFICE VISIT (OUTPATIENT)
Dept: FAMILY MEDICINE CLINIC | Facility: CLINIC | Age: 38
End: 2018-02-09

## 2018-02-09 VITALS
SYSTOLIC BLOOD PRESSURE: 145 MMHG | OXYGEN SATURATION: 100 % | HEIGHT: 62 IN | TEMPERATURE: 97 F | RESPIRATION RATE: 16 BRPM | WEIGHT: 132 LBS | HEART RATE: 91 BPM | BODY MASS INDEX: 24.29 KG/M2 | DIASTOLIC BLOOD PRESSURE: 102 MMHG

## 2018-02-09 DIAGNOSIS — F98.8 ATTENTION DEFICIT DISORDER (ADD) WITHOUT HYPERACTIVITY: ICD-10-CM

## 2018-02-09 DIAGNOSIS — M54.2 NECK PAIN: ICD-10-CM

## 2018-02-09 DIAGNOSIS — F98.8 ATTENTION DEFICIT DISORDER, UNSPECIFIED HYPERACTIVITY PRESENCE: ICD-10-CM

## 2018-02-09 DIAGNOSIS — I10 BENIGN ESSENTIAL HYPERTENSION: Primary | ICD-10-CM

## 2018-02-09 PROCEDURE — 99214 OFFICE O/P EST MOD 30 MIN: CPT | Performed by: NURSE PRACTITIONER

## 2018-02-09 NOTE — PROGRESS NOTES
Subjective   Radha Astorga is a 37 y.o. female.     History of Present Illness   Patient presents to office with CC of hypertension, headache and neck pain for a couple of weeks.  Patient was prescribed losartan 25 mg daily but has not been taking it as prescribed due to drowsiness.  She was not taking medication at all for months and then noticed elevated BP when she checked it at the pharmacy a few weeks ago. She started taking 1/2 tablet instead which still caused some drowsiness and did not decrease her blood pressure much.  She is currently still taking 1/2 tablet.  Patient does not have hx of headaches and believes it is attributed to high blood pressure.  She has had some aching in her neck for same duration and is unsure if this is related or due to prior neck injury years ago. Denies radiculopathy, weakness or limited ROM.     Patient is due for vyvanse. She has been on same dose for some time. Reports medication is working well. Denies side effects.      The following portions of the patient's history were reviewed and updated as appropriate: allergies, current medications, past family history, past medical history, past social history, past surgical history and problem list.    Review of Systems   Constitutional: Negative for unexpected weight change.   Eyes: Negative for visual disturbance.   Respiratory: Negative for shortness of breath.    Cardiovascular: Negative for chest pain, palpitations and leg swelling.   Musculoskeletal: Positive for neck pain. Negative for back pain.   Neurological: Positive for headaches. Negative for dizziness, facial asymmetry, weakness and light-headedness.   Psychiatric/Behavioral: Negative for behavioral problems.       Objective   Physical Exam   Constitutional: She is oriented to person, place, and time. She appears well-developed and well-nourished.   Neck: No spinous process tenderness present. Normal range of motion present.       Cardiovascular: Normal rate and  regular rhythm.    Pulmonary/Chest: Effort normal and breath sounds normal.   Neurological: She is alert and oriented to person, place, and time.   Psychiatric: She has a normal mood and affect. Judgment normal.   Nursing note and vitals reviewed.      Assessment/Plan   Radha was seen today for headache, neck pain and hypertension.    Diagnoses and all orders for this visit:    Benign essential hypertension    Neck pain    Attention deficit disorder, unspecified hyperactivity presence        Patient will take whole tablet of losartan at bedtime and f/u in 1-2 weeks for BP recheck.  She will  RX for vyvanse Monday.

## 2018-02-26 ENCOUNTER — OFFICE VISIT (OUTPATIENT)
Dept: FAMILY MEDICINE CLINIC | Facility: CLINIC | Age: 38
End: 2018-02-26

## 2018-02-26 VITALS
HEIGHT: 62 IN | TEMPERATURE: 99.3 F | SYSTOLIC BLOOD PRESSURE: 130 MMHG | RESPIRATION RATE: 20 BRPM | BODY MASS INDEX: 24.48 KG/M2 | HEART RATE: 108 BPM | DIASTOLIC BLOOD PRESSURE: 91 MMHG | WEIGHT: 133 LBS

## 2018-02-26 DIAGNOSIS — I10 BENIGN ESSENTIAL HYPERTENSION: Primary | ICD-10-CM

## 2018-02-26 DIAGNOSIS — F98.8 ATTENTION DEFICIT DISORDER, UNSPECIFIED HYPERACTIVITY PRESENCE: ICD-10-CM

## 2018-02-26 PROCEDURE — 99214 OFFICE O/P EST MOD 30 MIN: CPT | Performed by: FAMILY MEDICINE

## 2018-02-26 RX ORDER — RAMIPRIL 5 MG/1
5 CAPSULE ORAL DAILY
Qty: 30 CAPSULE | Refills: 5 | Status: SHIPPED | OUTPATIENT
Start: 2018-02-26 | End: 2018-06-13 | Stop reason: SDUPTHER

## 2018-02-26 NOTE — PROGRESS NOTES
"Subjective   Radha Astorga is a 37 y.o. female.     History of Present Illness     Chief Complaint:   Chief Complaint   Patient presents with   • Hypertension   • ADD       Radha Astorga 37 y.o. female who presents today for Medical Management of the below listed issues and medication refills.  she has a problem list of   Patient Active Problem List   Diagnosis   • ADD (attention deficit disorder)   • Family history of breast cancer   • Benign essential hypertension   • Hypothyroidism   • Acute stress reaction   • Major depressive disorder, recurrent episode, moderate with anxious distress   .  Since the last visit, she has not liked the Cozaar as much as the Lisinopril and reports some fatigue with that. She did have an ACEI cough specifically with the Lisinopril.  she has been compliant with   Current Outpatient Prescriptions:   •  levothyroxine (SYNTHROID) 100 MCG tablet, Take 1 tablet by mouth Daily., Disp: 90 tablet, Rfl: 1  •  lisdexamfetamine (VYVANSE) 60 MG capsule, Take 1 capsule by mouth Every Morning for 30 days Earliest Fill Date: 2/26/18, Disp: 30 capsule, Rfl: 0  •  [START ON 3/29/2018] lisdexamfetamine (VYVANSE) 60 MG capsule, Take 1 capsule by mouth Every Morning for 30 days Earliest Fill Date: 3/28/18, Disp: 30 capsule, Rfl: 0  •  [START ON 4/28/2018] lisdexamfetamine (VYVANSE) 60 MG capsule, Take 1 capsule by mouth Every Morning for 30 days Earliest Fill Date: 4/27/18, Disp: 30 capsule, Rfl: 0  •  ramipril (ALTACE) 5 MG capsule, Take 1 capsule by mouth Daily., Disp: 30 capsule, Rfl: 5.  she denies medication side effects.    All of the chronic condition(s) listed above are stable w/o issues.    /91  Pulse 108  Temp 99.3 °F (37.4 °C) (Oral)   Resp 20  Ht 157.5 cm (62\")  Wt 60.3 kg (133 lb)  BMI 24.33 kg/m2    Results for orders placed or performed during the hospital encounter of 12/25/16   Comprehensive Metabolic Panel   Result Value Ref Range    Glucose 107 (H) 70 - 100 mg/dL "    BUN 14 5 - 21 mg/dL    Creatinine 0.76 0.50 - 1.40 mg/dL    Sodium 140 135 - 145 mmol/L    Potassium 4.0 3.5 - 5.3 mmol/L    Chloride 100 98 - 110 mmol/L    CO2 27.0 24.0 - 31.0 mmol/L    Calcium 9.6 8.4 - 10.4 mg/dL    Total Protein 7.4 6.3 - 8.7 g/dL    Albumin 4.50 3.50 - 5.00 g/dL    ALT (SGPT) 29 0 - 54 U/L    AST (SGOT) 20 7 - 45 U/L    Alkaline Phosphatase 65 24 - 120 U/L    Total Bilirubin 0.4 0.1 - 1.0 mg/dL    eGFR Non African Amer 85 >60 mL/min/1.73    Globulin 2.9 gm/dL    A/G Ratio 1.6 1.1 - 2.5 g/dL    BUN/Creatinine Ratio 18.4 7.0 - 25.0    Anion Gap 13.0 4.0 - 13.0 mmol/L   Urinalysis With / Culture If Indicated   Result Value Ref Range    Color, UA Yellow Yellow, Straw    Appearance, UA Clear Clear    pH, UA 7.0 5.0 - 8.0    Specific Gravity, UA 1.015 1.005 - 1.030    Glucose, UA Negative Negative    Ketones, UA Negative Negative    Bilirubin, UA Negative Negative    Blood, UA Moderate (2+) (A) Negative    Protein, UA Negative Negative    Leuk Esterase, UA Negative Negative    Nitrite, UA Negative Negative    Urobilinogen, UA 0.2 E.U./dL 0.2 - 1.0 E.U./dL   CBC Auto Differential   Result Value Ref Range    WBC 13.69 (H) 4.80 - 10.80 10*3/mm3    RBC 4.48 4.20 - 5.40 10*6/mm3    Hemoglobin 12.8 12.0 - 16.0 g/dL    Hematocrit 38.0 37.0 - 47.0 %    MCV 84.8 82.0 - 98.0 fL    MCH 28.6 28.0 - 32.0 pg    MCHC 33.7 33.0 - 36.0 g/dL    RDW 12.7 12.0 - 15.0 %    RDW-SD 38.9 (L) 40.0 - 54.0 fl    MPV 10.2 6.0 - 12.0 fL    Platelets 293 130 - 400 10*3/mm3    Neutrophil % 71.4 39.0 - 78.0 %    Lymphocyte % 18.8 15.0 - 45.0 %    Monocyte % 7.1 4.0 - 12.0 %    Eosinophil % 1.7 0.0 - 4.0 %    Basophil % 0.7 0.0 - 2.0 %    Immature Grans % 0.3 0.0 - 5.0 %    Neutrophils, Absolute 9.78 (H) 1.87 - 8.40 10*3/mm3    Lymphocytes, Absolute 2.58 0.72 - 4.86 10*3/mm3    Monocytes, Absolute 0.97 0.19 - 1.30 10*3/mm3    Eosinophils, Absolute 0.23 0.00 - 0.70 10*3/mm3    Basophils, Absolute 0.09 0.00 - 0.20 10*3/mm3     Immature Grans, Absolute 0.04 (H) 0.00 - 0.03 10*3/mm3   Urinalysis, Microscopic Only   Result Value Ref Range    RBC, UA 13-20 (A) None Seen /HPF    WBC, UA 3-5 (A) None Seen /HPF    Bacteria, UA None Seen None Seen /HPF    Squamous Epithelial Cells, UA 0-2 None Seen, 0-2 /HPF    Hyaline Casts, UA 0-2 None Seen /LPF    Methodology Automated Microscopy    POCT pregnancy, urine   Result Value Ref Range    HCG, Urine, QL Negative Negative    Lot Number [id number]     Internal Positive Control Positive     Internal Negative Control Negative    Light Blue Top   Result Value Ref Range    Extra Tube hold for add-on    Green Top (Gel)   Result Value Ref Range    Extra Tube Hold for add-ons.    Lavender Top   Result Value Ref Range    Extra Tube hold for add-on    Red Top   Result Value Ref Range    Extra Tube Hold for add-ons.            The following portions of the patient's history were reviewed and updated as appropriate: allergies, current medications, past family history, past medical history, past social history, past surgical history and problem list.    Review of Systems   Constitutional: Negative for activity change, chills, fatigue and fever.   Respiratory: Negative for cough and chest tightness.    Cardiovascular: Negative for chest pain and palpitations.   Gastrointestinal: Negative for abdominal pain and nausea.   Endocrine: Negative for cold intolerance.   Psychiatric/Behavioral: Negative for behavioral problems and dysphoric mood.       Objective   Physical Exam   Constitutional: She appears well-developed and well-nourished.   Neck: Neck supple. No thyromegaly present.   Cardiovascular: Normal rate and regular rhythm.    No murmur heard.  Pulmonary/Chest: Effort normal and breath sounds normal.   Abdominal: Bowel sounds are normal.   Psychiatric: She has a normal mood and affect. Her behavior is normal.   Nursing note and vitals reviewed.    The patient has read and signed the [facility]  Substance Contract.  I will continue to see patient for regular follow up appointments.  They are well controlled on their medication.  CHRISTOPHE has been reviewed by me and is updated every 3 months. The patient is aware of the potential for addiction and dependence.    Assessment/Plan   Radha was seen today for hypertension and add.    Diagnoses and all orders for this visit:    Benign essential hypertension  Comments:  uncontrolled  Orders:  -     ramipril (ALTACE) 5 MG capsule; Take 1 capsule by mouth Daily.    Attention deficit disorder, unspecified hyperactivity presence  -     lisdexamfetamine (VYVANSE) 60 MG capsule; Take 1 capsule by mouth Every Morning for 30 days Earliest Fill Date: 2/26/18  -     lisdexamfetamine (VYVANSE) 60 MG capsule; Take 1 capsule by mouth Every Morning for 30 days Earliest Fill Date: 3/28/18  -     lisdexamfetamine (VYVANSE) 60 MG capsule; Take 1 capsule by mouth Every Morning for 30 days Earliest Fill Date: 4/27/18

## 2018-04-09 ENCOUNTER — TELEPHONE (OUTPATIENT)
Dept: FAMILY MEDICINE CLINIC | Facility: CLINIC | Age: 38
End: 2018-04-09

## 2018-04-09 DIAGNOSIS — F98.8 ATTENTION DEFICIT DISORDER, UNSPECIFIED HYPERACTIVITY PRESENCE: ICD-10-CM

## 2018-04-11 DIAGNOSIS — F98.8 ATTENTION DEFICIT DISORDER, UNSPECIFIED HYPERACTIVITY PRESENCE: ICD-10-CM

## 2018-04-21 DIAGNOSIS — E03.9 ACQUIRED HYPOTHYROIDISM: ICD-10-CM

## 2018-04-23 RX ORDER — LEVOTHYROXINE SODIUM 0.1 MG/1
100 TABLET ORAL DAILY
Qty: 90 TABLET | Refills: 0 | Status: SHIPPED | OUTPATIENT
Start: 2018-04-23 | End: 2018-04-30 | Stop reason: DRUGHIGH

## 2018-04-29 LAB
ALBUMIN SERPL-MCNC: 4.5 G/DL (ref 3.5–5.5)
ALBUMIN/GLOB SERPL: 2 {RATIO} (ref 1.2–2.2)
ALP SERPL-CCNC: 55 IU/L (ref 39–117)
ALT SERPL-CCNC: 12 IU/L (ref 0–32)
AST SERPL-CCNC: 11 IU/L (ref 0–40)
BASOPHILS # BLD AUTO: 0.1 X10E3/UL (ref 0–0.2)
BASOPHILS NFR BLD AUTO: 1 %
BILIRUB SERPL-MCNC: <0.2 MG/DL (ref 0–1.2)
BUN SERPL-MCNC: 12 MG/DL (ref 6–24)
BUN/CREAT SERPL: 14 (ref 9–23)
CALCIUM SERPL-MCNC: 9.7 MG/DL (ref 8.7–10.2)
CHLORIDE SERPL-SCNC: 103 MMOL/L (ref 96–106)
CHOLEST SERPL-MCNC: 207 MG/DL (ref 100–199)
CO2 SERPL-SCNC: 25 MMOL/L (ref 18–29)
CREAT SERPL-MCNC: 0.85 MG/DL (ref 0.57–1)
EOSINOPHIL # BLD AUTO: 0.2 X10E3/UL (ref 0–0.4)
EOSINOPHIL NFR BLD AUTO: 2 %
ERYTHROCYTE [DISTWIDTH] IN BLOOD BY AUTOMATED COUNT: 13.7 % (ref 12.3–15.4)
GFR SERPLBLD CREATININE-BSD FMLA CKD-EPI: 86 ML/MIN/1.73
GFR SERPLBLD CREATININE-BSD FMLA CKD-EPI: 99 ML/MIN/1.73
GLOBULIN SER CALC-MCNC: 2.2 G/DL (ref 1.5–4.5)
GLUCOSE SERPL-MCNC: 96 MG/DL (ref 65–99)
HCT VFR BLD AUTO: 41.1 % (ref 34–46.6)
HDLC SERPL-MCNC: 60 MG/DL
HGB BLD-MCNC: 13.9 G/DL (ref 11.1–15.9)
IMM GRANULOCYTES # BLD: 0 X10E3/UL (ref 0–0.1)
IMM GRANULOCYTES NFR BLD: 0 %
LDLC SERPL CALC-MCNC: 136 MG/DL (ref 0–99)
LYMPHOCYTES # BLD AUTO: 2.7 X10E3/UL (ref 0.7–3.1)
LYMPHOCYTES NFR BLD AUTO: 28 %
MCH RBC QN AUTO: 28.5 PG (ref 26.6–33)
MCHC RBC AUTO-ENTMCNC: 33.8 G/DL (ref 31.5–35.7)
MCV RBC AUTO: 84 FL (ref 79–97)
MONOCYTES # BLD AUTO: 0.7 X10E3/UL (ref 0.1–0.9)
MONOCYTES NFR BLD AUTO: 8 %
NEUTROPHILS # BLD AUTO: 5.9 X10E3/UL (ref 1.4–7)
NEUTROPHILS NFR BLD AUTO: 61 %
PLATELET # BLD AUTO: 290 X10E3/UL (ref 150–379)
POTASSIUM SERPL-SCNC: 4.7 MMOL/L (ref 3.5–5.2)
PROT SERPL-MCNC: 6.7 G/DL (ref 6–8.5)
RBC # BLD AUTO: 4.88 X10E6/UL (ref 3.77–5.28)
SODIUM SERPL-SCNC: 142 MMOL/L (ref 134–144)
T4 FREE SERPL-MCNC: 1.33 NG/DL (ref 0.82–1.77)
TRIGL SERPL-MCNC: 57 MG/DL (ref 0–149)
TSH SERPL DL<=0.005 MIU/L-ACNC: 15.38 UIU/ML (ref 0.45–4.5)
VLDLC SERPL CALC-MCNC: 11 MG/DL (ref 5–40)
WBC # BLD AUTO: 9.6 X10E3/UL (ref 3.4–10.8)

## 2018-04-30 ENCOUNTER — TELEPHONE (OUTPATIENT)
Dept: FAMILY MEDICINE CLINIC | Facility: CLINIC | Age: 38
End: 2018-04-30

## 2018-04-30 DIAGNOSIS — E03.9 ACQUIRED HYPOTHYROIDISM: Primary | ICD-10-CM

## 2018-04-30 RX ORDER — LEVOTHYROXINE SODIUM 112 UG/1
112 TABLET ORAL DAILY
Qty: 90 TABLET | Refills: 1 | Status: SHIPPED | OUTPATIENT
Start: 2018-04-30 | End: 2018-09-24 | Stop reason: SDUPTHER

## 2018-04-30 NOTE — TELEPHONE ENCOUNTER
----- Message from Fadi Goodman MD sent at 4/29/2018  1:36 PM EDT -----  Please call the patient regarding her abnormal result. INCREASE Synthroid TO 112mcg QD and recheck TSH, FT4, T3 in 6 weeks.

## 2018-05-09 DIAGNOSIS — F98.8 ATTENTION DEFICIT DISORDER, UNSPECIFIED HYPERACTIVITY PRESENCE: ICD-10-CM

## 2018-05-30 ENCOUNTER — RESULTS ENCOUNTER (OUTPATIENT)
Dept: FAMILY MEDICINE CLINIC | Facility: CLINIC | Age: 38
End: 2018-05-30

## 2018-05-30 DIAGNOSIS — E03.9 ACQUIRED HYPOTHYROIDISM: ICD-10-CM

## 2018-06-13 ENCOUNTER — OFFICE VISIT (OUTPATIENT)
Dept: FAMILY MEDICINE CLINIC | Facility: CLINIC | Age: 38
End: 2018-06-13

## 2018-06-13 VITALS
BODY MASS INDEX: 23.37 KG/M2 | TEMPERATURE: 99 F | SYSTOLIC BLOOD PRESSURE: 128 MMHG | RESPIRATION RATE: 16 BRPM | DIASTOLIC BLOOD PRESSURE: 89 MMHG | WEIGHT: 127 LBS | HEIGHT: 62 IN | HEART RATE: 94 BPM

## 2018-06-13 DIAGNOSIS — I10 BENIGN ESSENTIAL HYPERTENSION: ICD-10-CM

## 2018-06-13 DIAGNOSIS — E03.9 ACQUIRED HYPOTHYROIDISM: ICD-10-CM

## 2018-06-13 DIAGNOSIS — F90.0 ATTENTION DEFICIT HYPERACTIVITY DISORDER (ADHD), PREDOMINANTLY INATTENTIVE TYPE: Primary | ICD-10-CM

## 2018-06-13 PROCEDURE — 99214 OFFICE O/P EST MOD 30 MIN: CPT | Performed by: FAMILY MEDICINE

## 2018-06-13 RX ORDER — RAMIPRIL 5 MG/1
5 CAPSULE ORAL DAILY
Qty: 30 CAPSULE | Refills: 5 | Status: SHIPPED | OUTPATIENT
Start: 2018-06-13 | End: 2018-09-24 | Stop reason: SDUPTHER

## 2018-06-13 NOTE — PROGRESS NOTES
"Subjective   Radha Astorga is a 38 y.o. female.     History of Present Illness     Chief Complaint:   Chief Complaint   Patient presents with   • ADD     med refill - tabitha   • Hypertension   • Hypothyroidism       Radha Astorga 38 y.o. female who presents today for Medical Management of the below listed issues and medication refills.  she has a problem list of   Patient Active Problem List   Diagnosis   • Family history of breast cancer   • Benign essential hypertension   • Hypothyroidism   • Acute stress reaction   • Major depressive disorder, recurrent episode, moderate with anxious distress   • Attention deficit hyperactivity disorder (ADHD), predominantly inattentive type   .  Since the last visit, she has overall felt well. Her dose of Synthroid was increased in April and she is due to recheck this. she has been compliant with   Current Outpatient Prescriptions:   •  levothyroxine (SYNTHROID, LEVOTHROID) 112 MCG tablet, Take 1 tablet by mouth Daily., Disp: 90 tablet, Rfl: 1  •  lisdexamfetamine (VYVANSE) 60 MG capsule, Take 1 capsule by mouth Every Morning, Disp: 30 capsule, Rfl: 0  •  ramipril (ALTACE) 5 MG capsule, Take 1 capsule by mouth Daily., Disp: 30 capsule, Rfl: 5.  she denies medication side effects.    All of the chronic condition(s) listed above are stable w/o issues.    /89   Pulse 94   Temp 99 °F (37.2 °C) (Oral)   Resp 16   Ht 157.5 cm (62\")   Wt 57.6 kg (127 lb)   BMI 23.23 kg/m²     Results for orders placed or performed in visit on 10/09/17   T4, Free   Result Value Ref Range    Free T4 1.33 0.82 - 1.77 ng/dL   Comprehensive metabolic panel   Result Value Ref Range    Glucose 96 65 - 99 mg/dL    BUN 12 6 - 24 mg/dL    Creatinine 0.85 0.57 - 1.00 mg/dL    eGFR Non African Am 86 >59 mL/min/1.73    eGFR African Am 99 >59 mL/min/1.73    BUN/Creatinine Ratio 14 9 - 23    Sodium 142 134 - 144 mmol/L    Potassium 4.7 3.5 - 5.2 mmol/L    Chloride 103 96 - 106 mmol/L    Total CO2 " 25 18 - 29 mmol/L    Calcium 9.7 8.7 - 10.2 mg/dL    Total Protein 6.7 6.0 - 8.5 g/dL    Albumin 4.5 3.5 - 5.5 g/dL    Globulin 2.2 1.5 - 4.5 g/dL    A/G Ratio 2.0 1.2 - 2.2    Total Bilirubin <0.2 0.0 - 1.2 mg/dL    Alkaline Phosphatase 55 39 - 117 IU/L    AST (SGOT) 11 0 - 40 IU/L    ALT (SGPT) 12 0 - 32 IU/L   Lipid panel   Result Value Ref Range    Total Cholesterol 207 (H) 100 - 199 mg/dL    Triglycerides 57 0 - 149 mg/dL    HDL Cholesterol 60 >39 mg/dL    VLDL Cholesterol 11 5 - 40 mg/dL    LDL Cholesterol  136 (H) 0 - 99 mg/dL   TSH   Result Value Ref Range    TSH 15.380 (H) 0.450 - 4.500 uIU/mL   CBC and Differential   Result Value Ref Range    WBC 9.6 3.4 - 10.8 x10E3/uL    RBC 4.88 3.77 - 5.28 x10E6/uL    Hemoglobin 13.9 11.1 - 15.9 g/dL    Hematocrit 41.1 34.0 - 46.6 %    MCV 84 79 - 97 fL    MCH 28.5 26.6 - 33.0 pg    MCHC 33.8 31.5 - 35.7 g/dL    RDW 13.7 12.3 - 15.4 %    Platelets 290 150 - 379 x10E3/uL    Neutrophil Rel % 61 Not Estab. %    Lymphocyte Rel % 28 Not Estab. %    Monocyte Rel % 8 Not Estab. %    Eosinophil Rel % 2 Not Estab. %    Basophil Rel % 1 Not Estab. %    Neutrophils Absolute 5.9 1.4 - 7.0 x10E3/uL    Lymphocytes Absolute 2.7 0.7 - 3.1 x10E3/uL    Monocytes Absolute 0.7 0.1 - 0.9 x10E3/uL    Eosinophils Absolute 0.2 0.0 - 0.4 x10E3/uL    Basophils Absolute 0.1 0.0 - 0.2 x10E3/uL    Immature Granulocyte Rel % 0 Not Estab. %    Immature Grans Absolute 0.0 0.0 - 0.1 x10E3/uL           The following portions of the patient's history were reviewed and updated as appropriate: allergies, current medications, past family history, past medical history, past social history, past surgical history and problem list.    Review of Systems   Constitutional: Negative for activity change, chills, fatigue and fever.   Respiratory: Negative for cough and chest tightness.    Cardiovascular: Negative for chest pain and palpitations.   Gastrointestinal: Negative for abdominal pain and nausea.    Endocrine: Negative for cold intolerance.   Psychiatric/Behavioral: Negative for behavioral problems and dysphoric mood.       Objective   Physical Exam   Constitutional: She appears well-developed and well-nourished.   Neck: Neck supple. No thyromegaly present.   Cardiovascular: Normal rate and regular rhythm.    No murmur heard.  Pulmonary/Chest: Effort normal and breath sounds normal.   Abdominal: Bowel sounds are normal. There is no tenderness.   Neurological: She is alert.   Psychiatric: She has a normal mood and affect. Her behavior is normal.   Nursing note and vitals reviewed.  Labs ordered and pt to complete.    The patient has read and signed the [facility] Controlled Substance Contract.  I will continue to see patient for regular follow up appointments.  They are well controlled on their medication.  CHRISTOPHE has been reviewed by me and is updated every 3 months. The patient is aware of the potential for addiction and dependence.    Assessment/Plan   Radha was seen today for add, hypertension and hypothyroidism.    Diagnoses and all orders for this visit:    Attention deficit hyperactivity disorder (ADHD), predominantly inattentive type  -     lisdexamfetamine (VYVANSE) 60 MG capsule; Take 1 capsule by mouth Every Morning    Acquired hypothyroidism    Benign essential hypertension  -     ramipril (ALTACE) 5 MG capsule; Take 1 capsule by mouth Daily.

## 2018-06-14 LAB
T3FREE SERPL-MCNC: 3.4 PG/ML (ref 2–4.4)
T4 FREE SERPL-MCNC: 1.98 NG/DL (ref 0.93–1.7)
TSH SERPL DL<=0.005 MIU/L-ACNC: 0.57 MIU/ML (ref 0.27–4.2)

## 2018-07-16 DIAGNOSIS — F90.0 ATTENTION DEFICIT HYPERACTIVITY DISORDER (ADHD), PREDOMINANTLY INATTENTIVE TYPE: ICD-10-CM

## 2018-07-31 DIAGNOSIS — F90.0 ATTENTION DEFICIT HYPERACTIVITY DISORDER (ADHD), PREDOMINANTLY INATTENTIVE TYPE: ICD-10-CM

## 2018-09-24 ENCOUNTER — OFFICE VISIT (OUTPATIENT)
Dept: FAMILY MEDICINE CLINIC | Facility: CLINIC | Age: 38
End: 2018-09-24

## 2018-09-24 VITALS
DIASTOLIC BLOOD PRESSURE: 83 MMHG | HEIGHT: 62 IN | HEART RATE: 90 BPM | TEMPERATURE: 98 F | WEIGHT: 132 LBS | SYSTOLIC BLOOD PRESSURE: 119 MMHG | RESPIRATION RATE: 16 BRPM | BODY MASS INDEX: 24.29 KG/M2

## 2018-09-24 DIAGNOSIS — F90.0 ATTENTION DEFICIT HYPERACTIVITY DISORDER (ADHD), PREDOMINANTLY INATTENTIVE TYPE: Primary | ICD-10-CM

## 2018-09-24 DIAGNOSIS — Z12.31 ENCOUNTER FOR SCREENING MAMMOGRAM FOR BREAST CANCER: ICD-10-CM

## 2018-09-24 DIAGNOSIS — E03.9 ACQUIRED HYPOTHYROIDISM: ICD-10-CM

## 2018-09-24 DIAGNOSIS — I10 BENIGN ESSENTIAL HYPERTENSION: ICD-10-CM

## 2018-09-24 PROCEDURE — 99214 OFFICE O/P EST MOD 30 MIN: CPT | Performed by: FAMILY MEDICINE

## 2018-09-24 RX ORDER — LEVOTHYROXINE SODIUM 112 UG/1
112 TABLET ORAL DAILY
Qty: 90 TABLET | Refills: 1 | Status: SHIPPED | OUTPATIENT
Start: 2018-09-24 | End: 2019-03-07 | Stop reason: SDUPTHER

## 2018-09-24 RX ORDER — RAMIPRIL 5 MG/1
5 CAPSULE ORAL DAILY
Qty: 90 CAPSULE | Refills: 1 | Status: SHIPPED | OUTPATIENT
Start: 2018-09-24 | End: 2019-03-07 | Stop reason: SDUPTHER

## 2018-09-24 NOTE — PROGRESS NOTES
"Subjective   Radha Astorga is a 38 y.o. female.     History of Present Illness     Chief Complaint:   Chief Complaint   Patient presents with   • ADHD     med refill - tabitha    • Hypertension     mammogram due    • Hypothyroidism       Radha Astorga 38 y.o. female who presents today for Medical Management of the below listed issues and medication refills.  she has a problem list of   Patient Active Problem List   Diagnosis   • Family history of breast cancer   • Benign essential hypertension   • Hypothyroidism   • Acute stress reaction   • Major depressive disorder, recurrent episode, moderate with anxious distress (CMS/HCC)   • Attention deficit hyperactivity disorder (ADHD), predominantly inattentive type   .  Since the last visit, she has overall felt well.  she has been compliant with   Current Outpatient Prescriptions:   •  levothyroxine (SYNTHROID, LEVOTHROID) 112 MCG tablet, Take 1 tablet by mouth Daily., Disp: 90 tablet, Rfl: 1  •  lisdexamfetamine (VYVANSE) 60 MG capsule, Take 1 capsule by mouth Every Morning, Disp: 30 capsule, Rfl: 0  •  ramipril (ALTACE) 5 MG capsule, Take 1 capsule by mouth Daily., Disp: 90 capsule, Rfl: 1.  she denies medication side effects.    All of the chronic condition(s) listed above are stable w/o issues.    /83   Pulse 90   Temp 98 °F (36.7 °C) (Oral)   Resp 16   Ht 157.5 cm (62\")   Wt 59.9 kg (132 lb)   BMI 24.14 kg/m²     Results for orders placed or performed in visit on 05/30/18   TSH   Result Value Ref Range    TSH 0.572 0.270 - 4.200 mIU/mL   T4, free   Result Value Ref Range    Free T4 1.98 (H) 0.93 - 1.70 ng/dL   T3, Free   Result Value Ref Range    T3, Free 3.4 2.0 - 4.4 pg/mL           The following portions of the patient's history were reviewed and updated as appropriate: allergies, current medications, past family history, past medical history, past social history, past surgical history and problem list.    Review of Systems   Constitutional: " Negative for activity change, chills, fatigue and fever.   Respiratory: Negative for cough and chest tightness.    Cardiovascular: Negative for chest pain and palpitations.   Gastrointestinal: Negative for abdominal pain and nausea.   Endocrine: Negative for cold intolerance.   Psychiatric/Behavioral: Negative for behavioral problems and dysphoric mood.       Objective   Physical Exam   Constitutional: She appears well-developed and well-nourished.   Neck: Neck supple. No thyromegaly present.   Cardiovascular: Normal rate and regular rhythm.    No murmur heard.  Pulmonary/Chest: Effort normal and breath sounds normal.   Abdominal: Bowel sounds are normal. There is no tenderness.   Neurological: She is alert.   Psychiatric: She has a normal mood and affect. Her behavior is normal.   Nursing note and vitals reviewed.    The patient has read and signed the [facility] Controlled Substance Contract.  I will continue to see patient for regular follow up appointments.  They are well controlled on their medication.  CHRISTOPHE has been reviewed by me and is updated every 3 months. The patient is aware of the potential for addiction and dependence.    Assessment/Plan   Radha was seen today for adhd, hypertension and hypothyroidism.    Diagnoses and all orders for this visit:    Attention deficit hyperactivity disorder (ADHD), predominantly inattentive type  -     lisdexamfetamine (VYVANSE) 60 MG capsule; Take 1 capsule by mouth Every Morning    Acquired hypothyroidism  -     levothyroxine (SYNTHROID, LEVOTHROID) 112 MCG tablet; Take 1 tablet by mouth Daily.    Benign essential hypertension  -     ramipril (ALTACE) 5 MG capsule; Take 1 capsule by mouth Daily.    Encounter for screening mammogram for breast cancer  -     Mammo Screening Bilateral With CAD; Future

## 2018-10-18 ENCOUNTER — HOSPITAL ENCOUNTER (OUTPATIENT)
Dept: MAMMOGRAPHY | Facility: HOSPITAL | Age: 38
Discharge: HOME | End: 2018-10-18
Admitting: FAMILY MEDICINE

## 2018-10-18 DIAGNOSIS — Z12.31 ENCOUNTER FOR SCREENING MAMMOGRAM FOR BREAST CANCER: ICD-10-CM

## 2018-10-18 PROCEDURE — 77067 SCR MAMMO BI INCL CAD: CPT

## 2018-10-25 DIAGNOSIS — F90.0 ATTENTION DEFICIT HYPERACTIVITY DISORDER (ADHD), PREDOMINANTLY INATTENTIVE TYPE: ICD-10-CM

## 2018-11-26 DIAGNOSIS — F90.0 ATTENTION DEFICIT HYPERACTIVITY DISORDER (ADHD), PREDOMINANTLY INATTENTIVE TYPE: ICD-10-CM

## 2018-12-09 NOTE — TELEPHONE ENCOUNTER
PATIENT TOLD TO  RX AT  [facility]   Hyponatremia, initial improvement with IVF, suspected due to poor nutritional intake; urine lytes obtained during IVF administration  IVF DC, start fluid restriction, add ensure to diet  Nephrology consult, recommendations appreciated    Hypokalemia; likely renal loss with IVF, urine K 31  Improved, continue to monitor BMP  Nephrology recommendations appreciated

## 2018-12-20 ENCOUNTER — OFFICE VISIT (OUTPATIENT)
Dept: FAMILY MEDICINE CLINIC | Facility: CLINIC | Age: 38
End: 2018-12-20

## 2018-12-20 VITALS
DIASTOLIC BLOOD PRESSURE: 81 MMHG | TEMPERATURE: 98.5 F | HEART RATE: 114 BPM | SYSTOLIC BLOOD PRESSURE: 128 MMHG | HEIGHT: 62 IN | BODY MASS INDEX: 24.66 KG/M2 | WEIGHT: 134 LBS | RESPIRATION RATE: 20 BRPM

## 2018-12-20 DIAGNOSIS — F90.0 ATTENTION DEFICIT HYPERACTIVITY DISORDER (ADHD), PREDOMINANTLY INATTENTIVE TYPE: Primary | ICD-10-CM

## 2018-12-20 DIAGNOSIS — I10 BENIGN ESSENTIAL HYPERTENSION: ICD-10-CM

## 2018-12-20 PROCEDURE — 99213 OFFICE O/P EST LOW 20 MIN: CPT | Performed by: FAMILY MEDICINE

## 2018-12-20 NOTE — PROGRESS NOTES
"Subjective   Radha Astorga is a 38 y.o. female.     History of Present Illness     Chief Complaint:   Chief Complaint   Patient presents with   • ADHD     med refill - tabitha        Radha Astorga 38 y.o. female who presents today for Medical Management of the below listed issues and medication refills.  she has a problem list of   Patient Active Problem List   Diagnosis   • Family history of breast cancer   • Benign essential hypertension   • Hypothyroidism   • Acute stress reaction   • Major depressive disorder, recurrent episode, moderate with anxious distress (CMS/HCC)   • Attention deficit hyperactivity disorder (ADHD), predominantly inattentive type   .  Since the last visit, she has overall felt well.  she has been compliant with   Current Outpatient Medications:   •  lisdexamfetamine (VYVANSE) 60 MG capsule, Take 1 capsule by mouth Every Morning, Disp: 30 capsule, Rfl: 0  •  levothyroxine (SYNTHROID, LEVOTHROID) 112 MCG tablet, Take 1 tablet by mouth Daily., Disp: 90 tablet, Rfl: 1  •  ramipril (ALTACE) 5 MG capsule, Take 1 capsule by mouth Daily., Disp: 90 capsule, Rfl: 1.  she denies medication side effects.    All of the chronic condition(s) listed above are stable w/o issues.    /81   Pulse 114   Temp 98.5 °F (36.9 °C) (Oral)   Resp 20   Ht 157.5 cm (62\")   Wt 60.8 kg (134 lb)   BMI 24.51 kg/m²     Results for orders placed or performed in visit on 05/30/18   TSH   Result Value Ref Range    TSH 0.572 0.270 - 4.200 mIU/mL   T4, free   Result Value Ref Range    Free T4 1.98 (H) 0.93 - 1.70 ng/dL   T3, Free   Result Value Ref Range    T3, Free 3.4 2.0 - 4.4 pg/mL           The following portions of the patient's history were reviewed and updated as appropriate: allergies, current medications, past family history, past medical history, past social history, past surgical history and problem list.    Review of Systems   Constitutional: Negative for activity change, chills, fatigue and fever. "   Respiratory: Negative for cough and chest tightness.    Cardiovascular: Negative for chest pain and palpitations.   Gastrointestinal: Negative for abdominal pain and nausea.   Endocrine: Negative for cold intolerance.   Psychiatric/Behavioral: Negative for behavioral problems and dysphoric mood.       Objective   Physical Exam   Constitutional: She appears well-developed and well-nourished.   Neck: Neck supple. No thyromegaly present.   Cardiovascular: Normal rate and regular rhythm.   No murmur heard.  Pulmonary/Chest: Effort normal and breath sounds normal.   Abdominal: Bowel sounds are normal. There is no tenderness.   Neurological: She is alert.   Psychiatric: She has a normal mood and affect. Her behavior is normal.   Nursing note and vitals reviewed.    The patient has read and signed the [facility] Controlled Substance Contract.  I will continue to see patient for regular follow up appointments.  They are well controlled on their medication.  CHRSITOPHE has been reviewed by me and is updated every 3 months. The patient is aware of the potential for addiction and dependence.    Assessment/Plan   Radha was seen today for adhd.    Diagnoses and all orders for this visit:    Attention deficit hyperactivity disorder (ADHD), predominantly inattentive type  -     lisdexamfetamine (VYVANSE) 60 MG capsule; Take 1 capsule by mouth Every Morning    Benign essential hypertension    Pt to continue with current medication for BPs, as well as healthy diet and exercise.

## 2019-01-30 DIAGNOSIS — F90.0 ATTENTION DEFICIT HYPERACTIVITY DISORDER (ADHD), PREDOMINANTLY INATTENTIVE TYPE: ICD-10-CM

## 2019-03-07 ENCOUNTER — OFFICE VISIT (OUTPATIENT)
Dept: FAMILY MEDICINE CLINIC | Facility: CLINIC | Age: 39
End: 2019-03-07

## 2019-03-07 VITALS
BODY MASS INDEX: 24.84 KG/M2 | RESPIRATION RATE: 16 BRPM | WEIGHT: 135 LBS | HEIGHT: 62 IN | DIASTOLIC BLOOD PRESSURE: 94 MMHG | HEART RATE: 88 BPM | TEMPERATURE: 98.2 F | SYSTOLIC BLOOD PRESSURE: 128 MMHG

## 2019-03-07 DIAGNOSIS — F90.0 ATTENTION DEFICIT HYPERACTIVITY DISORDER (ADHD), PREDOMINANTLY INATTENTIVE TYPE: ICD-10-CM

## 2019-03-07 DIAGNOSIS — E03.9 ACQUIRED HYPOTHYROIDISM: ICD-10-CM

## 2019-03-07 DIAGNOSIS — I10 BENIGN ESSENTIAL HYPERTENSION: ICD-10-CM

## 2019-03-07 PROCEDURE — 99214 OFFICE O/P EST MOD 30 MIN: CPT | Performed by: FAMILY MEDICINE

## 2019-03-07 RX ORDER — RAMIPRIL 5 MG/1
5 CAPSULE ORAL DAILY
Qty: 90 CAPSULE | Refills: 1 | Status: SHIPPED | OUTPATIENT
Start: 2019-03-07 | End: 2019-09-30 | Stop reason: SDUPTHER

## 2019-03-07 RX ORDER — LEVOTHYROXINE SODIUM 112 UG/1
112 TABLET ORAL DAILY
Qty: 90 TABLET | Refills: 1 | Status: SHIPPED | OUTPATIENT
Start: 2019-03-07 | End: 2019-09-09 | Stop reason: DRUGHIGH

## 2019-03-07 NOTE — PROGRESS NOTES
"Subjective   Radha Astorga is a 38 y.o. female.     History of Present Illness     Chief Complaint:   Chief Complaint   Patient presents with   • ADHD     med refill - tabitha    • Hypertension   • Hypothyroidism   • Fatigue       Radha Astorga 38 y.o. female who presents today for Medical Management of the below listed issues and medication refills.  she has a problem list of   Patient Active Problem List   Diagnosis   • Family history of breast cancer   • Benign essential hypertension   • Hypothyroidism   • Acute stress reaction   • Major depressive disorder, recurrent episode, moderate with anxious distress (CMS/HCC)   • Attention deficit hyperactivity disorder (ADHD), predominantly inattentive type   .  Since the last visit, she has overall felt well.  she has been compliant with   Current Outpatient Medications:   •  levothyroxine (SYNTHROID, LEVOTHROID) 112 MCG tablet, Take 1 tablet by mouth Daily., Disp: 90 tablet, Rfl: 1  •  ramipril (ALTACE) 5 MG capsule, Take 1 capsule by mouth Daily., Disp: 90 capsule, Rfl: 1  •  lisdexamfetamine (VYVANSE) 50 MG capsule, Take 1 capsule by mouth Every Morning, Disp: 30 capsule, Rfl: 0.  she denies medication side effects.    All of the chronic condition(s) listed above are stable w/o issues.    /94   Pulse 88   Temp 98.2 °F (36.8 °C) (Oral)   Resp 16   Ht 157.5 cm (62\")   Wt 61.2 kg (135 lb)   BMI 24.69 kg/m²     Results for orders placed or performed in visit on 05/30/18   TSH   Result Value Ref Range    TSH 0.572 0.270 - 4.200 mIU/mL   T4, free   Result Value Ref Range    Free T4 1.98 (H) 0.93 - 1.70 ng/dL   T3, Free   Result Value Ref Range    T3, Free 3.4 2.0 - 4.4 pg/mL           The following portions of the patient's history were reviewed and updated as appropriate: allergies, current medications, past family history, past medical history, past social history, past surgical history and problem list.    Review of Systems   Constitutional: Negative " for activity change, chills, fatigue and fever.   Respiratory: Negative for cough and chest tightness.    Cardiovascular: Negative for chest pain and palpitations.   Gastrointestinal: Negative for abdominal pain and nausea.   Endocrine: Negative for cold intolerance.   Psychiatric/Behavioral: Negative for behavioral problems and dysphoric mood.       Objective   Physical Exam   Constitutional: She appears well-developed and well-nourished.   Neck: Neck supple. No thyromegaly present.   Cardiovascular: Normal rate and regular rhythm.   No murmur heard.  Pulmonary/Chest: Effort normal and breath sounds normal.   Abdominal: Bowel sounds are normal. There is no tenderness.   Neurological: She is alert.   Psychiatric: She has a normal mood and affect. Her behavior is normal.   Nursing note and vitals reviewed.    The patient has read and signed the [facility] Controlled Substance Contract.  I will continue to see patient for regular follow up appointments.  They are well controlled on their medication.  CHRISTOPHE has been reviewed by me and is updated every 3 months. The patient is aware of the potential for addiction and dependence.    Assessment/Plan   Radha was seen today for adhd, hypertension, hypothyroidism and fatigue.    Diagnoses and all orders for this visit:    Benign essential hypertension  -     ramipril (ALTACE) 5 MG capsule; Take 1 capsule by mouth Daily.  -     Comprehensive metabolic panel  -     Lipid panel  -     CBC and Differential    Acquired hypothyroidism  -     levothyroxine (SYNTHROID, LEVOTHROID) 112 MCG tablet; Take 1 tablet by mouth Daily.  -     TSH  -     T3  -     T4, Free    Attention deficit hyperactivity disorder (ADHD), predominantly inattentive type  -     lisdexamfetamine (VYVANSE) 50 MG capsule; Take 1 capsule by mouth Every Morning    Other orders  -     Cancel: lisdexamfetamine (VYVANSE) 60 MG capsule; Take 1 capsule by mouth Every Morning

## 2019-04-25 DIAGNOSIS — F90.0 ATTENTION DEFICIT HYPERACTIVITY DISORDER (ADHD), PREDOMINANTLY INATTENTIVE TYPE: ICD-10-CM

## 2019-06-10 DIAGNOSIS — F90.0 ATTENTION DEFICIT HYPERACTIVITY DISORDER (ADHD), PREDOMINANTLY INATTENTIVE TYPE: ICD-10-CM

## 2019-06-12 ENCOUNTER — OFFICE VISIT (OUTPATIENT)
Dept: FAMILY MEDICINE CLINIC | Facility: CLINIC | Age: 39
End: 2019-06-12

## 2019-06-12 VITALS
HEIGHT: 62 IN | RESPIRATION RATE: 18 BRPM | DIASTOLIC BLOOD PRESSURE: 91 MMHG | SYSTOLIC BLOOD PRESSURE: 134 MMHG | HEART RATE: 94 BPM | BODY MASS INDEX: 25.58 KG/M2 | TEMPERATURE: 98.6 F | WEIGHT: 139 LBS

## 2019-06-12 DIAGNOSIS — F90.0 ATTENTION DEFICIT HYPERACTIVITY DISORDER (ADHD), PREDOMINANTLY INATTENTIVE TYPE: Primary | ICD-10-CM

## 2019-06-12 DIAGNOSIS — F43.21 GRIEF REACTION: ICD-10-CM

## 2019-06-12 PROBLEM — F43.20 GRIEF REACTION: Status: ACTIVE | Noted: 2019-06-12

## 2019-06-12 PROCEDURE — 99213 OFFICE O/P EST LOW 20 MIN: CPT | Performed by: FAMILY MEDICINE

## 2019-06-12 RX ORDER — LORAZEPAM 0.5 MG/1
0.5 TABLET ORAL EVERY 8 HOURS PRN
Qty: 20 TABLET | Refills: 0 | Status: SHIPPED | OUTPATIENT
Start: 2019-06-12 | End: 2020-09-16 | Stop reason: SDUPTHER

## 2019-06-12 NOTE — PROGRESS NOTES
"Subjective   Radha Astorga is a 39 y.o. female.     History of Present Illness     Chief Complaint:   Chief Complaint   Patient presents with   • ADHD     med refill - tabitha        Radha Astorga 39 y.o. female who presents today for Medical Management of the below listed issues and medication refills.  she has a problem list of   Patient Active Problem List   Diagnosis   • Family history of breast cancer   • Benign essential hypertension   • Hypothyroidism   • Acute stress reaction   • Major depressive disorder, recurrent episode, moderate with anxious distress (CMS/HCC)   • Attention deficit hyperactivity disorder (ADHD), predominantly inattentive type   • Grief reaction   .  Since the last visit, she has overall felt well with her ADD medication yet is having occasional days where her sadness and anxiety from her mother's death will overwhelm her and cause increased nervousness and tearfulness. Had 20 of her mom's Ativan 0.5mg that has lasted until now (only rarely uses). Really helps her when needed.  she has been compliant with   Current Outpatient Medications:   •  lisdexamfetamine (VYVANSE) 50 MG capsule, Take 1 capsule by mouth Every Morning, Disp: 30 capsule, Rfl: 0  •  levothyroxine (SYNTHROID, LEVOTHROID) 112 MCG tablet, Take 1 tablet by mouth Daily., Disp: 90 tablet, Rfl: 1  •  LORazepam (ATIVAN) 0.5 MG tablet, Take 1 tablet by mouth Every 8 (Eight) Hours As Needed for Anxiety., Disp: 20 tablet, Rfl: 0  •  ramipril (ALTACE) 5 MG capsule, Take 1 capsule by mouth Daily., Disp: 90 capsule, Rfl: 1.  she denies medication side effects.    All of the chronic condition(s) listed above are stable w/o issues.    /91   Pulse 94   Temp 98.6 °F (37 °C) (Oral)   Resp 18   Ht 157.5 cm (62\")   Wt 63 kg (139 lb)   BMI 25.42 kg/m²     Results for orders placed or performed in visit on 05/30/18   TSH   Result Value Ref Range    TSH 0.572 0.270 - 4.200 mIU/mL   T4, free   Result Value Ref Range    Free " T4 1.98 (H) 0.93 - 1.70 ng/dL   T3, Free   Result Value Ref Range    T3, Free 3.4 2.0 - 4.4 pg/mL           The following portions of the patient's history were reviewed and updated as appropriate: allergies, current medications, past family history, past medical history, past social history, past surgical history and problem list.    Review of Systems   Constitutional: Negative for activity change, chills, fatigue and fever.   Respiratory: Negative for cough and chest tightness.    Cardiovascular: Negative for chest pain and palpitations.   Gastrointestinal: Negative for abdominal pain and nausea.   Endocrine: Negative for cold intolerance.   Psychiatric/Behavioral: Negative for behavioral problems and dysphoric mood.       Objective   Physical Exam   Constitutional: She appears well-developed and well-nourished.   Neck: Neck supple. No thyromegaly present.   Cardiovascular: Normal rate and regular rhythm.   No murmur heard.  Pulmonary/Chest: Effort normal and breath sounds normal.   Abdominal: Bowel sounds are normal. There is no tenderness.   Neurological: She is alert.   Psychiatric: She has a normal mood and affect. Her behavior is normal.   Nursing note and vitals reviewed.    The patient has read and signed the [facility] Controlled Substance Contract.  I will continue to see patient for regular follow up appointments.  They are well controlled on their medication.  CHRISTOPHE has been reviewed by me and is updated every 3 months. The patient is aware of the potential for addiction and dependence.    Assessment/Plan   Radha was seen today for adhd.    Diagnoses and all orders for this visit:    Attention deficit hyperactivity disorder (ADHD), predominantly inattentive type  -     lisdexamfetamine (VYVANSE) 50 MG capsule; Take 1 capsule by mouth Every Morning    Grief reaction  -     LORazepam (ATIVAN) 0.5 MG tablet; Take 1 tablet by mouth Every 8 (Eight) Hours As Needed for Anxiety.

## 2019-07-11 DIAGNOSIS — F90.0 ATTENTION DEFICIT HYPERACTIVITY DISORDER (ADHD), PREDOMINANTLY INATTENTIVE TYPE: ICD-10-CM

## 2019-08-14 DIAGNOSIS — F90.0 ATTENTION DEFICIT HYPERACTIVITY DISORDER (ADHD), PREDOMINANTLY INATTENTIVE TYPE: ICD-10-CM

## 2019-09-05 LAB
ALBUMIN SERPL-MCNC: 4.5 G/DL (ref 3.5–5.5)
ALBUMIN/GLOB SERPL: 1.7 {RATIO} (ref 1.2–2.2)
ALP SERPL-CCNC: 60 IU/L (ref 39–117)
ALT SERPL-CCNC: 14 IU/L (ref 0–32)
AST SERPL-CCNC: 12 IU/L (ref 0–40)
BASOPHILS # BLD AUTO: 0.1 X10E3/UL (ref 0–0.2)
BASOPHILS NFR BLD AUTO: 1 %
BILIRUB SERPL-MCNC: 0.3 MG/DL (ref 0–1.2)
BUN SERPL-MCNC: 15 MG/DL (ref 6–24)
BUN/CREAT SERPL: 16 (ref 9–23)
CALCIUM SERPL-MCNC: 9.6 MG/DL (ref 8.7–10.2)
CHLORIDE SERPL-SCNC: 104 MMOL/L (ref 96–106)
CHOLEST SERPL-MCNC: 217 MG/DL (ref 100–199)
CO2 SERPL-SCNC: 23 MMOL/L (ref 20–29)
CREAT SERPL-MCNC: 0.96 MG/DL (ref 0.57–1)
EOSINOPHIL # BLD AUTO: 0.2 X10E3/UL (ref 0–0.4)
EOSINOPHIL NFR BLD AUTO: 3 %
ERYTHROCYTE [DISTWIDTH] IN BLOOD BY AUTOMATED COUNT: 13.5 % (ref 12.3–15.4)
GLOBULIN SER CALC-MCNC: 2.7 G/DL (ref 1.5–4.5)
GLUCOSE SERPL-MCNC: 97 MG/DL (ref 65–99)
HCT VFR BLD AUTO: 41.4 % (ref 34–46.6)
HDLC SERPL-MCNC: 55 MG/DL
HGB BLD-MCNC: 13.9 G/DL (ref 11.1–15.9)
IMM GRANULOCYTES # BLD AUTO: 0 X10E3/UL (ref 0–0.1)
IMM GRANULOCYTES NFR BLD AUTO: 0 %
LDLC SERPL CALC-MCNC: 142 MG/DL (ref 0–99)
LYMPHOCYTES # BLD AUTO: 2.9 X10E3/UL (ref 0.7–3.1)
LYMPHOCYTES NFR BLD AUTO: 37 %
MCH RBC QN AUTO: 28.7 PG (ref 26.6–33)
MCHC RBC AUTO-ENTMCNC: 33.6 G/DL (ref 31.5–35.7)
MCV RBC AUTO: 85 FL (ref 79–97)
MONOCYTES # BLD AUTO: 0.7 X10E3/UL (ref 0.1–0.9)
MONOCYTES NFR BLD AUTO: 9 %
NEUTROPHILS # BLD AUTO: 3.9 X10E3/UL (ref 1.4–7)
NEUTROPHILS NFR BLD AUTO: 50 %
PLATELET # BLD AUTO: 292 X10E3/UL (ref 150–450)
POTASSIUM SERPL-SCNC: 4.6 MMOL/L (ref 3.5–5.2)
PROT SERPL-MCNC: 7.2 G/DL (ref 6–8.5)
RBC # BLD AUTO: 4.85 X10E6/UL (ref 3.77–5.28)
SODIUM SERPL-SCNC: 142 MMOL/L (ref 134–144)
T3 SERPL-MCNC: 92 NG/DL (ref 71–180)
T4 FREE SERPL-MCNC: 1.24 NG/DL (ref 0.82–1.77)
TRIGL SERPL-MCNC: 102 MG/DL (ref 0–149)
TSH SERPL DL<=0.005 MIU/L-ACNC: 10.23 UIU/ML (ref 0.45–4.5)
VLDLC SERPL CALC-MCNC: 20 MG/DL (ref 5–40)
WBC # BLD AUTO: 7.9 X10E3/UL (ref 3.4–10.8)

## 2019-09-09 DIAGNOSIS — E03.9 HYPOTHYROIDISM, UNSPECIFIED TYPE: Primary | ICD-10-CM

## 2019-09-09 RX ORDER — LEVOTHYROXINE SODIUM 0.12 MG/1
125 TABLET ORAL DAILY
Qty: 30 TABLET | Refills: 5 | Status: SHIPPED | OUTPATIENT
Start: 2019-09-09 | End: 2020-04-27 | Stop reason: SDUPTHER

## 2019-09-30 ENCOUNTER — OFFICE VISIT (OUTPATIENT)
Dept: FAMILY MEDICINE CLINIC | Facility: CLINIC | Age: 39
End: 2019-09-30

## 2019-09-30 VITALS
TEMPERATURE: 98.4 F | HEIGHT: 62 IN | WEIGHT: 142 LBS | BODY MASS INDEX: 26.13 KG/M2 | DIASTOLIC BLOOD PRESSURE: 85 MMHG | HEART RATE: 92 BPM | RESPIRATION RATE: 18 BRPM | SYSTOLIC BLOOD PRESSURE: 125 MMHG

## 2019-09-30 DIAGNOSIS — I10 BENIGN ESSENTIAL HYPERTENSION: ICD-10-CM

## 2019-09-30 DIAGNOSIS — F90.0 ATTENTION DEFICIT HYPERACTIVITY DISORDER (ADHD), PREDOMINANTLY INATTENTIVE TYPE: ICD-10-CM

## 2019-09-30 PROCEDURE — 99213 OFFICE O/P EST LOW 20 MIN: CPT | Performed by: FAMILY MEDICINE

## 2019-09-30 RX ORDER — RAMIPRIL 5 MG/1
5 CAPSULE ORAL DAILY
Qty: 90 CAPSULE | Refills: 1 | Status: SHIPPED | OUTPATIENT
Start: 2019-09-30 | End: 2020-01-13

## 2019-09-30 NOTE — PROGRESS NOTES
"Subjective   Radha Astorga is a 39 y.o. female.     History of Present Illness     Chief Complaint:   Chief Complaint   Patient presents with   • ADHD     med refill - tabitha    • Hypertension       Radha Astorga 39 y.o. female who presents today for Medical Management of the below listed issues and medication refills.  she has a problem list of   Patient Active Problem List   Diagnosis   • Family history of breast cancer   • Benign essential hypertension   • Hypothyroidism   • Acute stress reaction   • Major depressive disorder, recurrent episode, moderate with anxious distress (CMS/HCC)   • Attention deficit hyperactivity disorder (ADHD), predominantly inattentive type   • Grief reaction   .  Since the last visit, she has overall felt well.  she has been compliant with   Current Outpatient Medications:   •  lisdexamfetamine (VYVANSE) 50 MG capsule, Take 1 capsule by mouth Every Morning, Disp: 30 capsule, Rfl: 0  •  levothyroxine (SYNTHROID) 125 MCG tablet, Take 1 tablet by mouth Daily., Disp: 30 tablet, Rfl: 5  •  LORazepam (ATIVAN) 0.5 MG tablet, Take 1 tablet by mouth Every 8 (Eight) Hours As Needed for Anxiety., Disp: 20 tablet, Rfl: 0  •  ramipril (ALTACE) 5 MG capsule, Take 1 capsule by mouth Daily., Disp: 90 capsule, Rfl: 1.  she denies medication side effects.    All of the other chronic condition(s) listed above are stable w/o issues.    /85   Pulse 92   Temp 98.4 °F (36.9 °C) (Oral)   Resp 18   Ht 157.5 cm (62\")   Wt 64.4 kg (142 lb)   BMI 25.97 kg/m²     Results for orders placed or performed in visit on 03/07/19   Comprehensive metabolic panel   Result Value Ref Range    Glucose 97 65 - 99 mg/dL    BUN 15 6 - 24 mg/dL    Creatinine 0.96 0.57 - 1.00 mg/dL    eGFR Non African Am 74 >59 mL/min/1.73    eGFR African Am 85 >59 mL/min/1.73    BUN/Creatinine Ratio 16 9 - 23    Sodium 142 134 - 144 mmol/L    Potassium 4.6 3.5 - 5.2 mmol/L    Chloride 104 96 - 106 mmol/L    Total CO2 23 20 - " 29 mmol/L    Calcium 9.6 8.7 - 10.2 mg/dL    Total Protein 7.2 6.0 - 8.5 g/dL    Albumin 4.5 3.5 - 5.5 g/dL    Globulin 2.7 1.5 - 4.5 g/dL    A/G Ratio 1.7 1.2 - 2.2    Total Bilirubin 0.3 0.0 - 1.2 mg/dL    Alkaline Phosphatase 60 39 - 117 IU/L    AST (SGOT) 12 0 - 40 IU/L    ALT (SGPT) 14 0 - 32 IU/L   Lipid panel   Result Value Ref Range    Total Cholesterol 217 (H) 100 - 199 mg/dL    Triglycerides 102 0 - 149 mg/dL    HDL Cholesterol 55 >39 mg/dL    VLDL Cholesterol 20 5 - 40 mg/dL    LDL Cholesterol  142 (H) 0 - 99 mg/dL   TSH   Result Value Ref Range    TSH 10.230 (H) 0.450 - 4.500 uIU/mL   T3   Result Value Ref Range    T3, Total 92 71 - 180 ng/dL   T4, Free   Result Value Ref Range    Free T4 1.24 0.82 - 1.77 ng/dL   CBC and Differential   Result Value Ref Range    WBC 7.9 3.4 - 10.8 x10E3/uL    RBC 4.85 3.77 - 5.28 x10E6/uL    Hemoglobin 13.9 11.1 - 15.9 g/dL    Hematocrit 41.4 34.0 - 46.6 %    MCV 85 79 - 97 fL    MCH 28.7 26.6 - 33.0 pg    MCHC 33.6 31.5 - 35.7 g/dL    RDW 13.5 12.3 - 15.4 %    Platelets 292 150 - 450 x10E3/uL    Neutrophil Rel % 50 Not Estab. %    Lymphocyte Rel % 37 Not Estab. %    Monocyte Rel % 9 Not Estab. %    Eosinophil Rel % 3 Not Estab. %    Basophil Rel % 1 Not Estab. %    Neutrophils Absolute 3.9 1.4 - 7.0 x10E3/uL    Lymphocytes Absolute 2.9 0.7 - 3.1 x10E3/uL    Monocytes Absolute 0.7 0.1 - 0.9 x10E3/uL    Eosinophils Absolute 0.2 0.0 - 0.4 x10E3/uL    Basophils Absolute 0.1 0.0 - 0.2 x10E3/uL    Immature Granulocyte Rel % 0 Not Estab. %    Immature Grans Absolute 0.0 0.0 - 0.1 x10E3/uL           The following portions of the patient's history were reviewed and updated as appropriate: allergies, current medications, past family history, past medical history, past social history, past surgical history and problem list.    Review of Systems   Constitutional: Negative for activity change, chills, fatigue and fever.   Respiratory: Negative for cough and chest tightness.     Cardiovascular: Negative for chest pain and palpitations.   Gastrointestinal: Negative for abdominal pain and nausea.   Endocrine: Negative for cold intolerance.   Psychiatric/Behavioral: Negative for behavioral problems and dysphoric mood.       Objective   Physical Exam   Constitutional: She appears well-developed and well-nourished.   Neck: Neck supple. No thyromegaly present.   Cardiovascular: Normal rate and regular rhythm.   No murmur heard.  Pulmonary/Chest: Effort normal and breath sounds normal.   Abdominal: Bowel sounds are normal. There is no tenderness.   Neurological: She is alert.   Psychiatric: She has a normal mood and affect. Her behavior is normal.   Nursing note and vitals reviewed.    The patient has read and signed the [facility] Controlled Substance Contract.  I will continue to see patient for regular follow up appointments.  They are well controlled on their medication.  CHRISTOPHE has been reviewed by me and is updated every 3 months. The patient is aware of the potential for addiction and dependence.    Assessment/Plan   Radha was seen today for adhd and hypertension.    Diagnoses and all orders for this visit:    Attention deficit hyperactivity disorder (ADHD), predominantly inattentive type  -     lisdexamfetamine (VYVANSE) 50 MG capsule; Take 1 capsule by mouth Every Morning    Benign essential hypertension  -     ramipril (ALTACE) 5 MG capsule; Take 1 capsule by mouth Daily.

## 2019-10-02 DIAGNOSIS — I10 BENIGN ESSENTIAL HYPERTENSION: ICD-10-CM

## 2019-10-02 DIAGNOSIS — E03.9 ACQUIRED HYPOTHYROIDISM: ICD-10-CM

## 2019-10-02 RX ORDER — LEVOTHYROXINE SODIUM 0.1 MG/1
100 TABLET ORAL DAILY
Qty: 90 TABLET | Refills: 0 | OUTPATIENT
Start: 2019-10-02

## 2019-10-02 RX ORDER — LEVOTHYROXINE SODIUM 112 UG/1
112 TABLET ORAL DAILY
Qty: 90 TABLET | Refills: 0 | OUTPATIENT
Start: 2019-10-02

## 2019-10-02 RX ORDER — RAMIPRIL 5 MG/1
5 CAPSULE ORAL DAILY
Qty: 90 CAPSULE | Refills: 0 | Status: SHIPPED | OUTPATIENT
Start: 2019-10-02 | End: 2020-01-13

## 2019-10-23 ENCOUNTER — RESULTS ENCOUNTER (OUTPATIENT)
Dept: FAMILY MEDICINE CLINIC | Facility: CLINIC | Age: 39
End: 2019-10-23

## 2019-10-23 DIAGNOSIS — E03.9 HYPOTHYROIDISM, UNSPECIFIED TYPE: ICD-10-CM

## 2019-11-06 DIAGNOSIS — E03.9 ACQUIRED HYPOTHYROIDISM: ICD-10-CM

## 2019-11-06 RX ORDER — LEVOTHYROXINE SODIUM 112 UG/1
112 TABLET ORAL DAILY
Qty: 90 TABLET | Refills: 0 | Status: SHIPPED | OUTPATIENT
Start: 2019-11-06 | End: 2020-04-27 | Stop reason: SDUPTHER

## 2019-11-07 DIAGNOSIS — F90.0 ATTENTION DEFICIT HYPERACTIVITY DISORDER (ADHD), PREDOMINANTLY INATTENTIVE TYPE: ICD-10-CM

## 2019-11-26 ENCOUNTER — TRANSCRIBE ORDERS (OUTPATIENT)
Dept: ADMINISTRATIVE | Facility: HOSPITAL | Age: 39
End: 2019-11-26

## 2019-11-26 DIAGNOSIS — Z12.31 VISIT FOR SCREENING MAMMOGRAM: Primary | ICD-10-CM

## 2019-12-02 RX ORDER — VALACYCLOVIR HYDROCHLORIDE 1 G/1
1000 TABLET, FILM COATED ORAL 3 TIMES DAILY
Qty: 21 TABLET | Refills: 0 | Status: SHIPPED | OUTPATIENT
Start: 2019-12-02 | End: 2019-12-09

## 2019-12-09 DIAGNOSIS — F90.0 ATTENTION DEFICIT HYPERACTIVITY DISORDER (ADHD), PREDOMINANTLY INATTENTIVE TYPE: ICD-10-CM

## 2019-12-11 ENCOUNTER — HOSPITAL ENCOUNTER (OUTPATIENT)
Dept: MAMMOGRAPHY | Facility: HOSPITAL | Age: 39
Discharge: HOME | End: 2019-12-11
Admitting: FAMILY MEDICINE

## 2019-12-11 DIAGNOSIS — Z12.31 VISIT FOR SCREENING MAMMOGRAM: ICD-10-CM

## 2019-12-11 PROCEDURE — 77067 SCR MAMMO BI INCL CAD: CPT

## 2019-12-11 PROCEDURE — 77063 BREAST TOMOSYNTHESIS BI: CPT

## 2020-01-13 ENCOUNTER — OFFICE VISIT (OUTPATIENT)
Dept: FAMILY MEDICINE CLINIC | Facility: CLINIC | Age: 40
End: 2020-01-13

## 2020-01-13 VITALS
BODY MASS INDEX: 25.76 KG/M2 | DIASTOLIC BLOOD PRESSURE: 80 MMHG | TEMPERATURE: 98.3 F | OXYGEN SATURATION: 99 % | HEIGHT: 62 IN | SYSTOLIC BLOOD PRESSURE: 120 MMHG | WEIGHT: 140 LBS | HEART RATE: 102 BPM | RESPIRATION RATE: 16 BRPM

## 2020-01-13 DIAGNOSIS — F90.0 ATTENTION DEFICIT HYPERACTIVITY DISORDER (ADHD), PREDOMINANTLY INATTENTIVE TYPE: ICD-10-CM

## 2020-01-13 DIAGNOSIS — I10 ESSENTIAL HYPERTENSION: Primary | ICD-10-CM

## 2020-01-13 PROCEDURE — 99213 OFFICE O/P EST LOW 20 MIN: CPT | Performed by: NURSE PRACTITIONER

## 2020-01-13 RX ORDER — VALSARTAN 40 MG/1
40 TABLET ORAL DAILY
Qty: 30 TABLET | Refills: 0 | Status: CANCELLED | OUTPATIENT
Start: 2020-01-13

## 2020-01-13 RX ORDER — VALSARTAN 160 MG/1
160 TABLET ORAL DAILY
Qty: 30 TABLET | Refills: 0 | Status: SHIPPED | OUTPATIENT
Start: 2020-01-13 | End: 2020-02-04

## 2020-01-13 NOTE — PROGRESS NOTES
Subjective   Radha Astorga is a 39 y.o. female.     History of Present Illness   Radha Astorga 39 y.o. female who presents for ADD/ADHD follow up. They are well controlled on their current Rx.  No new problems with insomnia, tachycardia,or weight loss. The patient has read and signed the [facility] Controlled Substance Contract.  I will continue to see patient for regular follow up appointments and give the lowest effective dose.  They are well controlled on their medication at the lowest effective dose.  She has a previous history of a diagnosis of  ADD/ADHD.   CHRISTOPHE has been reviewed by me and is updated every 3 months. The patient is aware of the potential for addiction and dependence.  The Rx continues to help them concentrate, finish tasks in timely manor, and succeed.  She denies current suicidal and homicidal ideation.  Reviewed causes for potential of discontinuation of stimulant medication,  including but not limited to consideration for diversion, obtaining other controlled substances from other license practitioners, or  inappropriate office behavior.  Patient understands to use a controlled substance as prescribed by physician and to avoid improper use of controlled substances.  Patient will also verbalized understanding that prescriptions to be filled at the same pharmacy  unless office staff is made aware of this.  Patient understands they can be submitted to random urine drug screens and/or random pill counts on any request.  Patient understands they are not to receive early refills.  The patient must produce an official police report for any effort to replace controlled substances that are lost or stolen.  No use of illegal street drugs while receiving controlled substances from this prescribing provider.  The patient is to take medication as prescribed  and not deviate from the normal schedule without consultation from the provider.  Patient is not to share the medication with others or  to take medication with alcohol or other sedatives.  Use caution when driving or operating machinery.  Must always keep the prescription in the original container.  Patient is to store controlled substances in a locked cabinet or other secure storage unit that is cool, dry and out of sunlight.  Patient must immediately notify office if this controlled substances is  improperly taken by another individual.  Reviewed risk for physical dependence, tolerance and addiction.    There is no evidence of aberrant behavior or any red flags.  .       Patient reports cough for 3 weeks. Denies shortness of breath or wheezing.  Sometimes coughs up clear sputum but reports cough is mostly a tickle in her throat.  States she had cough with lisinopril in the past.  She tried losartan but changed to ramipril as BP was not well controlled.  Patient denies sinus pressure or congestion.   The following portions of the patient's history were reviewed and updated as appropriate: allergies, current medications, past family history, past medical history, past social history, past surgical history and problem list.    Review of Systems   Constitutional: Negative for unexpected weight change.   HENT: Negative for congestion, sinus pressure and sinus pain.    Respiratory: Positive for cough. Negative for chest tightness, shortness of breath and wheezing.    Cardiovascular: Negative for chest pain and palpitations.   Psychiatric/Behavioral: Negative for behavioral problems.       Objective   Physical Exam   Constitutional: She is oriented to person, place, and time. She appears well-developed and well-nourished.   Cardiovascular: Normal rate and regular rhythm.   Pulmonary/Chest: Effort normal and breath sounds normal.   Neurological: She is alert and oriented to person, place, and time.   Psychiatric: She has a normal mood and affect. Judgment normal.   Nursing note and vitals reviewed.      Assessment/Plan   Radha was seen today for add and  cough.    Diagnoses and all orders for this visit:    Essential hypertension  -     valsartan (DIOVAN) 160 MG tablet; Take 1 tablet by mouth Daily. For blood pressure    Attention deficit hyperactivity disorder (ADHD), predominantly inattentive type        Vyvanse refilled per Dr. Goodman. CHRISTOPHE reviewed.   Changed ramipril to equivalent dose of valsartan. Patient will f/u in 2 weeks for recheck.

## 2020-02-03 DIAGNOSIS — I10 ESSENTIAL HYPERTENSION: ICD-10-CM

## 2020-02-04 RX ORDER — VALSARTAN 160 MG/1
160 TABLET ORAL DAILY
Qty: 30 TABLET | Refills: 0 | Status: SHIPPED | OUTPATIENT
Start: 2020-02-04 | End: 2020-02-10 | Stop reason: SDUPTHER

## 2020-02-09 NOTE — PROGRESS NOTES
Subjective   Radha Astorga is a 39 y.o. female.     History of Present Illness     Chief Complaint:   Chief Complaint   Patient presents with   • ADHD     med refill = tabitha  - meds reviewed with pt today    • Hypertension     late   • left ear pain       Radha Astorga 39 y.o. female who presents today for Medical Management of the below listed issues and medication refills.  she has a problem list of   Patient Active Problem List   Diagnosis   • Family history of breast cancer   • Benign essential hypertension   • Hypothyroidism   • Acute stress reaction   • Major depressive disorder, recurrent episode, moderate with anxious distress (CMS/HCC)   • Attention deficit hyperactivity disorder (ADHD), predominantly inattentive type   • Grief reaction   .  Since the last visit, she has overall felt well and likes the new BP med. She has developed a 1 month h/o left ear pain x 1 month. [facility] Amoxil with a little relief but not fully. Mainly pressure sensation. No d/c.   she has been compliant with   Current Outpatient Medications:   •  lisdexamfetamine (VYVANSE) 50 MG capsule, Take 1 capsule by mouth Every Morning, Disp: 30 capsule, Rfl: 0  •  valsartan (DIOVAN) 160 MG tablet, Take 1 tablet by mouth Daily. For blood pressure, Disp: 30 tablet, Rfl: 5  •  cefdinir (OMNICEF) 300 MG capsule, Take 1 capsule by mouth 2 (Two) Times a Day for 10 days., Disp: 20 capsule, Rfl: 0  •  levothyroxine (SYNTHROID) 125 MCG tablet, Take 1 tablet by mouth Daily., Disp: 30 tablet, Rfl: 5  •  levothyroxine (SYNTHROID, LEVOTHROID) 112 MCG tablet, TAKE 1 TABLET BY MOUTH DAILY, Disp: 90 tablet, Rfl: 0  •  LORazepam (ATIVAN) 0.5 MG tablet, Take 1 tablet by mouth Every 8 (Eight) Hours As Needed for Anxiety., Disp: 20 tablet, Rfl: 0.  she denies medication side effects.    All of the other chronic condition(s) listed above are stable w/o issues.    /72   Pulse 106   Temp 98.5 °F (36.9 °C) (Oral)    "Resp 20   Ht 157.5 cm (62\")   Wt 67.1 kg (148 lb)   BMI 27.07 kg/m²     Results for orders placed or performed in visit on 03/07/19   Comprehensive metabolic panel   Result Value Ref Range    Glucose 97 65 - 99 mg/dL    BUN 15 6 - 24 mg/dL    Creatinine 0.96 0.57 - 1.00 mg/dL    eGFR Non African Am 74 >59 mL/min/1.73    eGFR African Am 85 >59 mL/min/1.73    BUN/Creatinine Ratio 16 9 - 23    Sodium 142 134 - 144 mmol/L    Potassium 4.6 3.5 - 5.2 mmol/L    Chloride 104 96 - 106 mmol/L    Total CO2 23 20 - 29 mmol/L    Calcium 9.6 8.7 - 10.2 mg/dL    Total Protein 7.2 6.0 - 8.5 g/dL    Albumin 4.5 3.5 - 5.5 g/dL    Globulin 2.7 1.5 - 4.5 g/dL    A/G Ratio 1.7 1.2 - 2.2    Total Bilirubin 0.3 0.0 - 1.2 mg/dL    Alkaline Phosphatase 60 39 - 117 IU/L    AST (SGOT) 12 0 - 40 IU/L    ALT (SGPT) 14 0 - 32 IU/L   Lipid panel   Result Value Ref Range    Total Cholesterol 217 (H) 100 - 199 mg/dL    Triglycerides 102 0 - 149 mg/dL    HDL Cholesterol 55 >39 mg/dL    VLDL Cholesterol 20 5 - 40 mg/dL    LDL Cholesterol  142 (H) 0 - 99 mg/dL   TSH   Result Value Ref Range    TSH 10.230 (H) 0.450 - 4.500 uIU/mL   T3   Result Value Ref Range    T3, Total 92 71 - 180 ng/dL   T4, Free   Result Value Ref Range    Free T4 1.24 0.82 - 1.77 ng/dL   CBC and Differential   Result Value Ref Range    WBC 7.9 3.4 - 10.8 x10E3/uL    RBC 4.85 3.77 - 5.28 x10E6/uL    Hemoglobin 13.9 11.1 - 15.9 g/dL    Hematocrit 41.4 34.0 - 46.6 %    MCV 85 79 - 97 fL    MCH 28.7 26.6 - 33.0 pg    MCHC 33.6 31.5 - 35.7 g/dL    RDW 13.5 12.3 - 15.4 %    Platelets 292 150 - 450 x10E3/uL    Neutrophil Rel % 50 Not Estab. %    Lymphocyte Rel % 37 Not Estab. %    Monocyte Rel % 9 Not Estab. %    Eosinophil Rel % 3 Not Estab. %    Basophil Rel % 1 Not Estab. %    Neutrophils Absolute 3.9 1.4 - 7.0 x10E3/uL    Lymphocytes Absolute 2.9 0.7 - 3.1 x10E3/uL    Monocytes Absolute 0.7 0.1 - 0.9 x10E3/uL    Eosinophils Absolute 0.2 0.0 - 0.4 x10E3/uL    Basophils Absolute " 0.1 0.0 - 0.2 x10E3/uL    Immature Granulocyte Rel % 0 Not Estab. %    Immature Grans Absolute 0.0 0.0 - 0.1 x10E3/uL           The following portions of the patient's history were reviewed and updated as appropriate: allergies, current medications, past family history, past medical history, past social history, past surgical history and problem list.    Review of Systems   Constitutional: Negative for activity change, chills, fatigue and fever.   HENT: Positive for ear pain and hearing loss. Negative for ear discharge, rhinorrhea and sore throat.    Respiratory: Negative for cough and chest tightness.    Cardiovascular: Negative for chest pain and palpitations.   Gastrointestinal: Negative for abdominal pain, diarrhea, nausea and vomiting.   Endocrine: Negative for cold intolerance.   Musculoskeletal: Negative for neck pain.   Skin: Negative for rash.   Neurological: Negative for headaches.   Psychiatric/Behavioral: Negative for behavioral problems and dysphoric mood.       Objective   Physical Exam   Constitutional: She appears well-developed and well-nourished.   HENT:   Right Ear: Tympanic membrane and ear canal normal.   Left Ear: Ear canal normal. Tympanic membrane is retracted.   Neck: Neck supple. No thyromegaly present.   Cardiovascular: Normal rate and regular rhythm.   No murmur heard.  Pulmonary/Chest: Effort normal and breath sounds normal.   Abdominal: Bowel sounds are normal. There is no tenderness.   Neurological: She is alert.   Psychiatric: She has a normal mood and affect. Her behavior is normal.   Nursing note and vitals reviewed.    The patient has read and signed the [facility] Controlled Substance Contract.  I will continue to see patient for regular follow up appointments.  They are well controlled on their medication.  CHRISTOPHE has been reviewed by me and is updated every 3 months. The patient is aware of the potential for addiction and dependence.    Assessment/Plan   Radha was seen  today for adhd, hypertension and left ear pain.    Diagnoses and all orders for this visit:    Essential hypertension  -     valsartan (DIOVAN) 160 MG tablet; Take 1 tablet by mouth Daily. For blood pressure    Attention deficit hyperactivity disorder (ADHD), predominantly inattentive type  -     lisdexamfetamine (VYVANSE) 50 MG capsule; Take 1 capsule by mouth Every Morning    Subacute otitis media, unspecified otitis media type  -     cefdinir (OMNICEF) 300 MG capsule; Take 1 capsule by mouth 2 (Two) Times a Day for 10 days.

## 2020-02-10 ENCOUNTER — OFFICE VISIT (OUTPATIENT)
Dept: FAMILY MEDICINE CLINIC | Facility: CLINIC | Age: 40
End: 2020-02-10

## 2020-02-10 VITALS
HEIGHT: 62 IN | DIASTOLIC BLOOD PRESSURE: 72 MMHG | BODY MASS INDEX: 27.23 KG/M2 | TEMPERATURE: 98.5 F | RESPIRATION RATE: 20 BRPM | WEIGHT: 148 LBS | HEART RATE: 106 BPM | SYSTOLIC BLOOD PRESSURE: 128 MMHG

## 2020-02-10 DIAGNOSIS — F90.0 ATTENTION DEFICIT HYPERACTIVITY DISORDER (ADHD), PREDOMINANTLY INATTENTIVE TYPE: ICD-10-CM

## 2020-02-10 DIAGNOSIS — I10 ESSENTIAL HYPERTENSION: Primary | ICD-10-CM

## 2020-02-10 DIAGNOSIS — H66.90 SUBACUTE OTITIS MEDIA, UNSPECIFIED OTITIS MEDIA TYPE: ICD-10-CM

## 2020-02-10 PROCEDURE — 99214 OFFICE O/P EST MOD 30 MIN: CPT | Performed by: FAMILY MEDICINE

## 2020-02-10 RX ORDER — VALSARTAN 160 MG/1
160 TABLET ORAL DAILY
Qty: 30 TABLET | Refills: 5 | Status: SHIPPED | OUTPATIENT
Start: 2020-02-10 | End: 2020-09-16 | Stop reason: SDUPTHER

## 2020-02-10 RX ORDER — CEFDINIR 300 MG/1
300 CAPSULE ORAL 2 TIMES DAILY
Qty: 20 CAPSULE | Refills: 0 | Status: SHIPPED | OUTPATIENT
Start: 2020-02-10 | End: 2020-02-20

## 2020-02-19 DIAGNOSIS — H66.90 SUBACUTE OTITIS MEDIA, UNSPECIFIED OTITIS MEDIA TYPE: Primary | ICD-10-CM

## 2020-03-02 DIAGNOSIS — I10 ESSENTIAL HYPERTENSION: ICD-10-CM

## 2020-03-02 RX ORDER — VALSARTAN 160 MG/1
160 TABLET ORAL DAILY
Qty: 30 TABLET | Refills: 5 | OUTPATIENT
Start: 2020-03-02

## 2020-03-17 DIAGNOSIS — F90.0 ATTENTION DEFICIT HYPERACTIVITY DISORDER (ADHD), PREDOMINANTLY INATTENTIVE TYPE: ICD-10-CM

## 2020-04-23 DIAGNOSIS — F43.21 GRIEF REACTION: ICD-10-CM

## 2020-04-23 DIAGNOSIS — F90.0 ATTENTION DEFICIT HYPERACTIVITY DISORDER (ADHD), PREDOMINANTLY INATTENTIVE TYPE: ICD-10-CM

## 2020-04-23 RX ORDER — LORAZEPAM 0.5 MG/1
0.5 TABLET ORAL EVERY 8 HOURS PRN
Qty: 20 TABLET | Refills: 0 | OUTPATIENT
Start: 2020-04-23

## 2020-04-27 ENCOUNTER — TELEMEDICINE (OUTPATIENT)
Dept: FAMILY MEDICINE CLINIC | Facility: CLINIC | Age: 40
End: 2020-04-27

## 2020-04-27 DIAGNOSIS — E03.9 HYPOTHYROIDISM, UNSPECIFIED TYPE: ICD-10-CM

## 2020-04-27 DIAGNOSIS — F90.0 ATTENTION DEFICIT HYPERACTIVITY DISORDER (ADHD), PREDOMINANTLY INATTENTIVE TYPE: ICD-10-CM

## 2020-04-27 DIAGNOSIS — F43.21 GRIEF REACTION: ICD-10-CM

## 2020-04-27 PROCEDURE — 99213 OFFICE O/P EST LOW 20 MIN: CPT | Performed by: FAMILY MEDICINE

## 2020-04-27 RX ORDER — LEVOTHYROXINE SODIUM 0.12 MG/1
125 TABLET ORAL DAILY
Qty: 30 TABLET | Refills: 5 | Status: SHIPPED | OUTPATIENT
Start: 2020-04-27 | End: 2020-09-16 | Stop reason: SDUPTHER

## 2020-04-27 RX ORDER — LORAZEPAM 0.5 MG/1
TABLET ORAL
Qty: 20 TABLET | OUTPATIENT
Start: 2020-04-27

## 2020-04-27 NOTE — PROGRESS NOTES
Subjective   Radha Astorga is a 40 y.o. female.     CC: Video Visit for Medical Management    History of Present Illness     Chief Complaint:   Chief Complaint   Patient presents with   • ADD   • Hypothyroidism       Radha Astorga 40 y.o. female who presents today for Medical Management of the below listed issues and medication refills.  she has a problem list of   Patient Active Problem List   Diagnosis   • Family history of breast cancer   • Benign essential hypertension   • Hypothyroidism   • Acute stress reaction   • Major depressive disorder, recurrent episode, moderate with anxious distress (CMS/HCC)   • Attention deficit hyperactivity disorder (ADHD), predominantly inattentive type   • Grief reaction   .  Since the last visit, she has overall felt well.  she has been compliant with   Current Outpatient Medications:   •  levothyroxine (Synthroid) 125 MCG tablet, Take 1 tablet by mouth Daily., Disp: 30 tablet, Rfl: 5  •  LORazepam (ATIVAN) 0.5 MG tablet, Take 1 tablet by mouth Every 8 (Eight) Hours As Needed for Anxiety., Disp: 20 tablet, Rfl: 0  •  valsartan (DIOVAN) 160 MG tablet, Take 1 tablet by mouth Daily. For blood pressure, Disp: 30 tablet, Rfl: 5  •  lisdexamfetamine (Vyvanse) 50 MG capsule, Take 1 capsule by mouth Every Morning, Disp: 30 capsule, Rfl: 0.  she denies medication side effects.    All of the other chronic condition(s) listed above are stable w/o issues.    There were no vitals taken for this visit.    Results for orders placed or performed in visit on 03/07/19   Comprehensive metabolic panel   Result Value Ref Range    Glucose 97 65 - 99 mg/dL    BUN 15 6 - 24 mg/dL    Creatinine 0.96 0.57 - 1.00 mg/dL    eGFR Non African Am 74 >59 mL/min/1.73    eGFR African Am 85 >59 mL/min/1.73    BUN/Creatinine Ratio 16 9 - 23    Sodium 142 134 - 144 mmol/L    Potassium 4.6 3.5 - 5.2 mmol/L    Chloride 104 96 - 106 mmol/L    Total CO2 23 20 - 29 mmol/L    Calcium 9.6 8.7 - 10.2 mg/dL    Total  Protein 7.2 6.0 - 8.5 g/dL    Albumin 4.5 3.5 - 5.5 g/dL    Globulin 2.7 1.5 - 4.5 g/dL    A/G Ratio 1.7 1.2 - 2.2    Total Bilirubin 0.3 0.0 - 1.2 mg/dL    Alkaline Phosphatase 60 39 - 117 IU/L    AST (SGOT) 12 0 - 40 IU/L    ALT (SGPT) 14 0 - 32 IU/L   Lipid panel   Result Value Ref Range    Total Cholesterol 217 (H) 100 - 199 mg/dL    Triglycerides 102 0 - 149 mg/dL    HDL Cholesterol 55 >39 mg/dL    VLDL Cholesterol 20 5 - 40 mg/dL    LDL Cholesterol  142 (H) 0 - 99 mg/dL   TSH   Result Value Ref Range    TSH 10.230 (H) 0.450 - 4.500 uIU/mL   T3   Result Value Ref Range    T3, Total 92 71 - 180 ng/dL   T4, Free   Result Value Ref Range    Free T4 1.24 0.82 - 1.77 ng/dL   CBC and Differential   Result Value Ref Range    WBC 7.9 3.4 - 10.8 x10E3/uL    RBC 4.85 3.77 - 5.28 x10E6/uL    Hemoglobin 13.9 11.1 - 15.9 g/dL    Hematocrit 41.4 34.0 - 46.6 %    MCV 85 79 - 97 fL    MCH 28.7 26.6 - 33.0 pg    MCHC 33.6 31.5 - 35.7 g/dL    RDW 13.5 12.3 - 15.4 %    Platelets 292 150 - 450 x10E3/uL    Neutrophil Rel % 50 Not Estab. %    Lymphocyte Rel % 37 Not Estab. %    Monocyte Rel % 9 Not Estab. %    Eosinophil Rel % 3 Not Estab. %    Basophil Rel % 1 Not Estab. %    Neutrophils Absolute 3.9 1.4 - 7.0 x10E3/uL    Lymphocytes Absolute 2.9 0.7 - 3.1 x10E3/uL    Monocytes Absolute 0.7 0.1 - 0.9 x10E3/uL    Eosinophils Absolute 0.2 0.0 - 0.4 x10E3/uL    Basophils Absolute 0.1 0.0 - 0.2 x10E3/uL    Immature Granulocyte Rel % 0 Not Estab. %    Immature Grans Absolute 0.0 0.0 - 0.1 x10E3/uL           The following portions of the patient's history were reviewed and updated as appropriate: allergies, current medications, past family history, past medical history, past social history, past surgical history and problem list.    Review of Systems   Constitutional: Negative for activity change, chills and fever.   Respiratory: Negative for cough.    Cardiovascular: Negative for chest pain.   Psychiatric/Behavioral: Negative for  dysphoric mood.       Objective   Physical Exam   Constitutional: She appears well-developed and well-nourished. No distress.   Psychiatric: She has a normal mood and affect. Her behavior is normal. Thought content normal.   Pt has thyroid labs she hasn't yet completed and is encouraged to do so.      Assessment/Plan   Radha was seen today for add and hypothyroidism.    Diagnoses and all orders for this visit:    Attention deficit hyperactivity disorder (ADHD), predominantly inattentive type  -     lisdexamfetamine (Vyvanse) 50 MG capsule; Take 1 capsule by mouth Every Morning    Hypothyroidism, unspecified type  -     levothyroxine (Synthroid) 125 MCG tablet; Take 1 tablet by mouth Daily.    Spent  15   minutes with chart and interview and consent for this encounter given by the patient.  You have chosen to receive care through a telehealth visit.  Do you consent to use a video/audio connection for your medical care today? Yes

## 2020-06-02 DIAGNOSIS — E03.9 HYPOTHYROIDISM, UNSPECIFIED TYPE: ICD-10-CM

## 2020-06-02 RX ORDER — LEVOTHYROXINE SODIUM 0.12 MG/1
125 TABLET ORAL DAILY
Qty: 30 TABLET | Refills: 5 | OUTPATIENT
Start: 2020-06-02

## 2020-06-22 DIAGNOSIS — F90.0 ATTENTION DEFICIT HYPERACTIVITY DISORDER (ADHD), PREDOMINANTLY INATTENTIVE TYPE: ICD-10-CM

## 2020-07-29 DIAGNOSIS — F90.0 ATTENTION DEFICIT HYPERACTIVITY DISORDER (ADHD), PREDOMINANTLY INATTENTIVE TYPE: ICD-10-CM

## 2020-09-08 DIAGNOSIS — F90.0 ATTENTION DEFICIT HYPERACTIVITY DISORDER (ADHD), PREDOMINANTLY INATTENTIVE TYPE: ICD-10-CM

## 2020-09-08 DIAGNOSIS — E03.9 HYPOTHYROIDISM, UNSPECIFIED TYPE: ICD-10-CM

## 2020-09-08 DIAGNOSIS — I10 ESSENTIAL HYPERTENSION: ICD-10-CM

## 2020-09-08 DIAGNOSIS — F43.21 GRIEF REACTION: ICD-10-CM

## 2020-09-08 RX ORDER — VALSARTAN 160 MG/1
160 TABLET ORAL DAILY
Qty: 30 TABLET | Refills: 5 | OUTPATIENT
Start: 2020-09-08

## 2020-09-08 RX ORDER — LEVOTHYROXINE SODIUM 0.12 MG/1
125 TABLET ORAL DAILY
Qty: 30 TABLET | Refills: 5 | OUTPATIENT
Start: 2020-09-08

## 2020-09-08 RX ORDER — LORAZEPAM 0.5 MG/1
0.5 TABLET ORAL EVERY 8 HOURS PRN
Qty: 20 TABLET | Refills: 0 | OUTPATIENT
Start: 2020-09-08

## 2020-09-12 DIAGNOSIS — F90.0 ATTENTION DEFICIT HYPERACTIVITY DISORDER (ADHD), PREDOMINANTLY INATTENTIVE TYPE: ICD-10-CM

## 2020-09-16 ENCOUNTER — TELEMEDICINE (OUTPATIENT)
Dept: FAMILY MEDICINE CLINIC | Facility: CLINIC | Age: 40
End: 2020-09-16

## 2020-09-16 DIAGNOSIS — F90.0 ATTENTION DEFICIT HYPERACTIVITY DISORDER (ADHD), PREDOMINANTLY INATTENTIVE TYPE: ICD-10-CM

## 2020-09-16 DIAGNOSIS — I10 ESSENTIAL HYPERTENSION: ICD-10-CM

## 2020-09-16 DIAGNOSIS — E03.9 ACQUIRED HYPOTHYROIDISM: ICD-10-CM

## 2020-09-16 DIAGNOSIS — F43.21 GRIEF REACTION: ICD-10-CM

## 2020-09-16 PROCEDURE — 99214 OFFICE O/P EST MOD 30 MIN: CPT | Performed by: FAMILY MEDICINE

## 2020-09-16 RX ORDER — LEVOTHYROXINE SODIUM 0.12 MG/1
125 TABLET ORAL DAILY
Qty: 30 TABLET | Refills: 5 | Status: SHIPPED | OUTPATIENT
Start: 2020-09-16 | End: 2021-01-09 | Stop reason: SDUPTHER

## 2020-09-16 RX ORDER — VALSARTAN 160 MG/1
160 TABLET ORAL DAILY
Qty: 30 TABLET | Refills: 5 | Status: SHIPPED | OUTPATIENT
Start: 2020-09-16 | End: 2021-04-13 | Stop reason: SDUPTHER

## 2020-09-16 RX ORDER — LORAZEPAM 0.5 MG/1
0.5 TABLET ORAL EVERY 8 HOURS PRN
Qty: 20 TABLET | Refills: 0 | Status: SHIPPED | OUTPATIENT
Start: 2020-09-16 | End: 2021-06-07 | Stop reason: SDUPTHER

## 2020-09-16 NOTE — PROGRESS NOTES
Subjective   Radha Astorga is a 40 y.o. female.     CC: Video Visit for Medical Management    History of Present Illness     Chief Complaint:   Chief Complaint   Patient presents with   • ADHD     med refill - [facility] - dox visit    • Hypertension     no labs - [facility]    • Hypothyroidism       Radha Astorga 40 y.o. female who presents today for Medical Management of the below listed issues and medication refills.  she has a problem list of   Patient Active Problem List   Diagnosis   • Family history of breast cancer   • Benign essential hypertension   • Hypothyroidism   • Acute stress reaction   • Major depressive disorder, recurrent episode, moderate with anxious distress (CMS/HCC)   • Attention deficit hyperactivity disorder (ADHD), predominantly inattentive type   • Grief reaction   .  Since the last visit, she has overall felt well.  she has been compliant with   Current Outpatient Medications:   •  levothyroxine (Synthroid) 125 MCG tablet, Take 1 tablet by mouth Daily., Disp: 30 tablet, Rfl: 5  •  lisdexamfetamine (Vyvanse) 50 MG capsule, Take 1 capsule by mouth Every Morning, Disp: 30 capsule, Rfl: 0  •  valsartan (DIOVAN) 160 MG tablet, Take 1 tablet by mouth Daily. For blood pressure, Disp: 30 tablet, Rfl: 5  •  LORazepam (Ativan) 0.5 MG tablet, Take 1 tablet by mouth Every 8 (Eight) Hours As Needed for Anxiety., Disp: 20 tablet, Rfl: 0.  she denies medication side effects.    All of the other chronic condition(s) listed above are stable w/o issues.    There were no vitals taken for this visit.    Results for orders placed or performed in visit on 03/07/19   Comprehensive metabolic panel    Specimen: Blood   Result Value Ref Range    Glucose 97 65 - 99 mg/dL    BUN 15 6 - 24 mg/dL    Creatinine 0.96 0.57 - 1.00 mg/dL    eGFR Non African Am 74 >59 mL/min/1.73    eGFR African Am 85 >59 mL/min/1.73    BUN/Creatinine Ratio 16 9 - 23    Sodium 142 134 - 144 mmol/L    Potassium 4.6 3.5 - 5.2 mmol/L     Chloride 104 96 - 106 mmol/L    Total CO2 23 20 - 29 mmol/L    Calcium 9.6 8.7 - 10.2 mg/dL    Total Protein 7.2 6.0 - 8.5 g/dL    Albumin 4.5 3.5 - 5.5 g/dL    Globulin 2.7 1.5 - 4.5 g/dL    A/G Ratio 1.7 1.2 - 2.2    Total Bilirubin 0.3 0.0 - 1.2 mg/dL    Alkaline Phosphatase 60 39 - 117 IU/L    AST (SGOT) 12 0 - 40 IU/L    ALT (SGPT) 14 0 - 32 IU/L   Lipid panel    Specimen: Blood   Result Value Ref Range    Total Cholesterol 217 (H) 100 - 199 mg/dL    Triglycerides 102 0 - 149 mg/dL    HDL Cholesterol 55 >39 mg/dL    VLDL Cholesterol 20 5 - 40 mg/dL    LDL Cholesterol  142 (H) 0 - 99 mg/dL   TSH    Specimen: Blood   Result Value Ref Range    TSH 10.230 (H) 0.450 - 4.500 uIU/mL   T3    Specimen: Blood   Result Value Ref Range    T3, Total 92 71 - 180 ng/dL   T4, Free    Specimen: Blood   Result Value Ref Range    Free T4 1.24 0.82 - 1.77 ng/dL   CBC and Differential    Specimen: Blood   Result Value Ref Range    WBC 7.9 3.4 - 10.8 x10E3/uL    RBC 4.85 3.77 - 5.28 x10E6/uL    Hemoglobin 13.9 11.1 - 15.9 g/dL    Hematocrit 41.4 34.0 - 46.6 %    MCV 85 79 - 97 fL    MCH 28.7 26.6 - 33.0 pg    MCHC 33.6 31.5 - 35.7 g/dL    RDW 13.5 12.3 - 15.4 %    Platelets 292 150 - 450 x10E3/uL    Neutrophil Rel % 50 Not Estab. %    Lymphocyte Rel % 37 Not Estab. %    Monocyte Rel % 9 Not Estab. %    Eosinophil Rel % 3 Not Estab. %    Basophil Rel % 1 Not Estab. %    Neutrophils Absolute 3.9 1.4 - 7.0 x10E3/uL    Lymphocytes Absolute 2.9 0.7 - 3.1 x10E3/uL    Monocytes Absolute 0.7 0.1 - 0.9 x10E3/uL    Eosinophils Absolute 0.2 0.0 - 0.4 x10E3/uL    Basophils Absolute 0.1 0.0 - 0.2 x10E3/uL    Immature Granulocyte Rel % 0 Not Estab. %    Immature Grans Absolute 0.0 0.0 - 0.1 x10E3/uL           The following portions of the patient's history were reviewed and updated as appropriate: allergies, current medications, past family history, past medical history, past social history, past surgical history and problem list.    Review of  Systems   Constitutional: Negative for activity change, chills and fever.   Respiratory: Negative for cough.    Cardiovascular: Negative for chest pain.   Psychiatric/Behavioral: Negative for dysphoric mood.       Objective   Physical Exam  Constitutional:       General: She is not in acute distress.     Appearance: She is well-developed.   Pulmonary:      Effort: Pulmonary effort is normal.   Neurological:      Mental Status: She is alert and oriented to person, place, and time.   Psychiatric:         Behavior: Behavior normal.         Thought Content: Thought content normal.         Assessment/Plan   Radha was seen today for adhd, hypertension and hypothyroidism.    Diagnoses and all orders for this visit:    Attention deficit hyperactivity disorder (ADHD), predominantly inattentive type  -     lisdexamfetamine (Vyvanse) 50 MG capsule; Take 1 capsule by mouth Every Morning    Essential hypertension  -     valsartan (DIOVAN) 160 MG tablet; Take 1 tablet by mouth Daily. For blood pressure  -     Comprehensive metabolic panel  -     Lipid panel  -     CBC and Differential    Acquired hypothyroidism  -     levothyroxine (Synthroid) 125 MCG tablet; Take 1 tablet by mouth Daily.  -     TSH  -     T4, Free  -     T3, Free    Grief reaction  -     LORazepam (Ativan) 0.5 MG tablet; Take 1 tablet by mouth Every 8 (Eight) Hours As Needed for Anxiety.    Spent  17   minutes with chart and interview and consent for this encounter given by the patient.  You have chosen to receive care through a telehealth visit.  Do you consent to use a video/audio connection for your medical care today? Yes

## 2020-11-24 DIAGNOSIS — F90.0 ATTENTION DEFICIT HYPERACTIVITY DISORDER (ADHD), PREDOMINANTLY INATTENTIVE TYPE: ICD-10-CM

## 2021-01-02 RX ORDER — VALSARTAN 160 MG/1
160 TABLET ORAL DAILY
Qty: 30 TABLET | Refills: 5 | Status: CANCELLED | OUTPATIENT
Start: 2021-01-02

## 2021-01-02 RX ORDER — LEVOTHYROXINE SODIUM 0.12 MG/1
125 TABLET ORAL DAILY
Qty: 30 TABLET | Refills: 5 | Status: CANCELLED | OUTPATIENT
Start: 2021-01-02

## 2021-01-02 NOTE — PROGRESS NOTES
Subjective   Radha Astorga is a 40 y.o. female.     History of Present Illness     Chief Complaint:   Chief Complaint   Patient presents with   • ADHD     LATE  med refill    • left hand numbness     ? carpal tunnel    • numbness left side of face       Radha Astorga 40 y.o. female who presents today for Medical Management of the below listed issues and medication refills.  she has a problem list of   Patient Active Problem List   Diagnosis   • Family history of breast cancer   • Benign essential hypertension   • Hypothyroidism   • Acute stress reaction   • Major depressive disorder, recurrent episode, moderate with anxious distress (CMS/HCC)   • Attention deficit hyperactivity disorder (ADHD), predominantly inattentive type   • Grief reaction   .  Since the last visit, she has overall felt well until last week when she had sx described by her on the pre-visit questionnaire:     Sudden numbness in face, left arm, and left hand. Could not open or close hand at all for a few hours.    Pt reports these sx of left hand/arm numbness, left 1-3 finger incoordination, and left lower facial numbness came on suddenly and lasted several hours, awakening the next day essentially back to normal. Pt reports no FH of clots or early strokes. 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      she has been compliant with   Current Outpatient Medications:   •  levothyroxine (Synthroid) 125 MCG tablet, Take 1 tablet by mouth Daily., Disp: 30 tablet, Rfl: 5  •  lisdexamfetamine (Vyvanse) 50 MG capsule, Take 1 capsule by mouth Every Morning, Disp: 30 capsule, Rfl: 0  •  LORazepam (Ativan) 0.5 MG tablet, Take 1 tablet by mouth Every 8 (Eight) Hours As Needed for Anxiety., Disp: 20 tablet, Rfl: 0  •  valsartan (DIOVAN) 160 MG tablet, Take 1 tablet by mouth Daily. For blood pressure, Disp: 30 tablet, Rfl: 5.  she denies medication side effects.    All of the other chronic  "condition(s) listed above are stable w/o issues.    /89   Pulse 86   Temp 96.7 °F (35.9 °C) (Oral)   Resp 16   Ht 157.5 cm (62\")   Wt 77.1 kg (170 lb)   BMI 31.09 kg/m²     Results for orders placed or performed in visit on 03/07/19   Comprehensive metabolic panel    Specimen: Blood   Result Value Ref Range    Glucose 97 65 - 99 mg/dL    BUN 15 6 - 24 mg/dL    Creatinine 0.96 0.57 - 1.00 mg/dL    eGFR Non African Am 74 >59 mL/min/1.73    eGFR African Am 85 >59 mL/min/1.73    BUN/Creatinine Ratio 16 9 - 23    Sodium 142 134 - 144 mmol/L    Potassium 4.6 3.5 - 5.2 mmol/L    Chloride 104 96 - 106 mmol/L    Total CO2 23 20 - 29 mmol/L    Calcium 9.6 8.7 - 10.2 mg/dL    Total Protein 7.2 6.0 - 8.5 g/dL    Albumin 4.5 3.5 - 5.5 g/dL    Globulin 2.7 1.5 - 4.5 g/dL    A/G Ratio 1.7 1.2 - 2.2    Total Bilirubin 0.3 0.0 - 1.2 mg/dL    Alkaline Phosphatase 60 39 - 117 IU/L    AST (SGOT) 12 0 - 40 IU/L    ALT (SGPT) 14 0 - 32 IU/L   Lipid panel    Specimen: Blood   Result Value Ref Range    Total Cholesterol 217 (H) 100 - 199 mg/dL    Triglycerides 102 0 - 149 mg/dL    HDL Cholesterol 55 >39 mg/dL    VLDL Cholesterol 20 5 - 40 mg/dL    LDL Cholesterol  142 (H) 0 - 99 mg/dL   TSH    Specimen: Blood   Result Value Ref Range    TSH 10.230 (H) 0.450 - 4.500 uIU/mL   T3    Specimen: Blood   Result Value Ref Range    T3, Total 92 71 - 180 ng/dL   T4, Free    Specimen: Blood   Result Value Ref Range    Free T4 1.24 0.82 - 1.77 ng/dL   CBC and Differential    Specimen: Blood   Result Value Ref Range    WBC 7.9 3.4 - 10.8 x10E3/uL    RBC 4.85 3.77 - 5.28 x10E6/uL    Hemoglobin 13.9 11.1 - 15.9 g/dL    Hematocrit 41.4 34.0 - 46.6 %    MCV 85 79 - 97 fL    MCH 28.7 26.6 - 33.0 pg    MCHC 33.6 31.5 - 35.7 g/dL    RDW 13.5 12.3 - 15.4 %    Platelets 292 150 - 450 x10E3/uL    Neutrophil Rel % 50 Not Estab. %    Lymphocyte Rel % 37 Not Estab. %    Monocyte Rel % 9 Not Estab. %    Eosinophil Rel % 3 Not Estab. %    Basophil Rel % 1 " Not Estab. %    Neutrophils Absolute 3.9 1.4 - 7.0 x10E3/uL    Lymphocytes Absolute 2.9 0.7 - 3.1 x10E3/uL    Monocytes Absolute 0.7 0.1 - 0.9 x10E3/uL    Eosinophils Absolute 0.2 0.0 - 0.4 x10E3/uL    Basophils Absolute 0.1 0.0 - 0.2 x10E3/uL    Immature Granulocyte Rel % 0 Not Estab. %    Immature Grans Absolute 0.0 0.0 - 0.1 x10E3/uL           The following portions of the patient's history were reviewed and updated as appropriate: allergies, current medications, past family history, past medical history, past social history, past surgical history and problem list.    Review of Systems   Constitutional: Negative for activity change, chills and fever.   Respiratory: Negative for cough.    Cardiovascular: Negative for chest pain.   Psychiatric/Behavioral: Negative for dysphoric mood.       Objective   Physical Exam  Constitutional:       General: She is not in acute distress.     Appearance: She is well-developed.   Cardiovascular:      Rate and Rhythm: Normal rate and regular rhythm.   Pulmonary:      Effort: Pulmonary effort is normal.      Breath sounds: Normal breath sounds.   Neurological:      Mental Status: She is alert and oriented to person, place, and time.      Cranial Nerves: Cranial nerves are intact.      Sensory: Sensation is intact.      Motor: Motor function is intact.      Coordination: Coordination is intact.   Psychiatric:         Behavior: Behavior normal.         Thought Content: Thought content normal.     Pt never completed the labs from 9/20 and is encouraged to do so.    The patient has read and signed the [facility] Controlled Substance Contract.  I will continue to see patient for regular follow up appointments.  They are well controlled on their medication.  CRHISTOPHE has been reviewed by me and is updated every 3 months. The patient is aware of the potential for addiction and dependence.    Assessment/Plan   Diagnoses and all orders for this visit:    1. Stroke-like symptoms  (Primary)  -     MRI Brain Without Contrast; Future  -     Ambulatory Referral to Neurology  -     Duplex Carotid Ultrasound CAR; Future  -     Ambulatory Referral to Cardiology    2. Attention deficit hyperactivity disorder (ADHD), predominantly inattentive type  -     lisdexamfetamine (Vyvanse) 50 MG capsule; Take 1 capsule by mouth Every Morning  Dispense: 30 capsule; Refill: 0    Other orders  -     Cancel: levothyroxine (Synthroid) 125 MCG tablet; Take 1 tablet by mouth Daily.  Dispense: 30 tablet; Refill: 5  -     Cancel: valsartan (DIOVAN) 160 MG tablet; Take 1 tablet by mouth Daily. For blood pressure  Dispense: 30 tablet; Refill: 5  -     Cancel: Adult Transthoracic Echo Complete W/ Cont if Necessary Per Protocol; Future

## 2021-01-04 ENCOUNTER — OFFICE VISIT (OUTPATIENT)
Dept: FAMILY MEDICINE CLINIC | Facility: CLINIC | Age: 41
End: 2021-01-04

## 2021-01-04 VITALS
TEMPERATURE: 96.7 F | DIASTOLIC BLOOD PRESSURE: 89 MMHG | WEIGHT: 170 LBS | HEIGHT: 62 IN | HEART RATE: 86 BPM | SYSTOLIC BLOOD PRESSURE: 138 MMHG | BODY MASS INDEX: 31.28 KG/M2 | RESPIRATION RATE: 16 BRPM

## 2021-01-04 DIAGNOSIS — F90.0 ATTENTION DEFICIT HYPERACTIVITY DISORDER (ADHD), PREDOMINANTLY INATTENTIVE TYPE: Chronic | ICD-10-CM

## 2021-01-04 DIAGNOSIS — R29.90 STROKE-LIKE SYMPTOMS: Primary | ICD-10-CM

## 2021-01-04 PROCEDURE — 99214 OFFICE O/P EST MOD 30 MIN: CPT | Performed by: FAMILY MEDICINE

## 2021-01-05 LAB
ALBUMIN SERPL-MCNC: 4.6 G/DL (ref 3.8–4.8)
ALBUMIN/GLOB SERPL: 1.6 {RATIO} (ref 1.2–2.2)
ALP SERPL-CCNC: 88 IU/L (ref 39–117)
ALT SERPL-CCNC: 16 IU/L (ref 0–32)
AST SERPL-CCNC: 19 IU/L (ref 0–40)
BASOPHILS # BLD AUTO: 0.1 X10E3/UL (ref 0–0.2)
BASOPHILS NFR BLD AUTO: 1 %
BILIRUB SERPL-MCNC: 0.3 MG/DL (ref 0–1.2)
BUN SERPL-MCNC: 14 MG/DL (ref 6–24)
BUN/CREAT SERPL: 16 (ref 9–23)
CALCIUM SERPL-MCNC: 9.7 MG/DL (ref 8.7–10.2)
CHLORIDE SERPL-SCNC: 103 MMOL/L (ref 96–106)
CHOLEST SERPL-MCNC: 244 MG/DL (ref 100–199)
CO2 SERPL-SCNC: 24 MMOL/L (ref 20–29)
CREAT SERPL-MCNC: 0.86 MG/DL (ref 0.57–1)
EOSINOPHIL # BLD AUTO: 0.2 X10E3/UL (ref 0–0.4)
EOSINOPHIL NFR BLD AUTO: 2 %
ERYTHROCYTE [DISTWIDTH] IN BLOOD BY AUTOMATED COUNT: 13 % (ref 11.7–15.4)
GLOBULIN SER CALC-MCNC: 2.8 G/DL (ref 1.5–4.5)
GLUCOSE SERPL-MCNC: 99 MG/DL (ref 65–99)
HCT VFR BLD AUTO: 38.3 % (ref 34–46.6)
HDLC SERPL-MCNC: 50 MG/DL
HGB BLD-MCNC: 12.5 G/DL (ref 11.1–15.9)
IMM GRANULOCYTES # BLD AUTO: 0 X10E3/UL (ref 0–0.1)
IMM GRANULOCYTES NFR BLD AUTO: 0 %
LDLC SERPL CALC-MCNC: 162 MG/DL (ref 0–99)
LYMPHOCYTES # BLD AUTO: 2.7 X10E3/UL (ref 0.7–3.1)
LYMPHOCYTES NFR BLD AUTO: 28 %
MCH RBC QN AUTO: 25.8 PG (ref 26.6–33)
MCHC RBC AUTO-ENTMCNC: 32.6 G/DL (ref 31.5–35.7)
MCV RBC AUTO: 79 FL (ref 79–97)
MONOCYTES # BLD AUTO: 0.7 X10E3/UL (ref 0.1–0.9)
MONOCYTES NFR BLD AUTO: 8 %
NEUTROPHILS # BLD AUTO: 5.9 X10E3/UL (ref 1.4–7)
NEUTROPHILS NFR BLD AUTO: 61 %
PLATELET # BLD AUTO: 362 X10E3/UL (ref 150–450)
POTASSIUM SERPL-SCNC: 4.6 MMOL/L (ref 3.5–5.2)
PROT SERPL-MCNC: 7.4 G/DL (ref 6–8.5)
RBC # BLD AUTO: 4.85 X10E6/UL (ref 3.77–5.28)
SODIUM SERPL-SCNC: 141 MMOL/L (ref 134–144)
T3FREE SERPL-MCNC: 3 PG/ML (ref 2–4.4)
T4 FREE SERPL-MCNC: 1.17 NG/DL (ref 0.82–1.77)
TRIGL SERPL-MCNC: 175 MG/DL (ref 0–149)
TSH SERPL DL<=0.005 MIU/L-ACNC: 14.3 UIU/ML (ref 0.45–4.5)
VLDLC SERPL CALC-MCNC: 32 MG/DL (ref 5–40)
WBC # BLD AUTO: 9.5 X10E3/UL (ref 3.4–10.8)

## 2021-01-09 DIAGNOSIS — E03.9 ACQUIRED HYPOTHYROIDISM: ICD-10-CM

## 2021-01-09 RX ORDER — LEVOTHYROXINE SODIUM 137 UG/1
137 TABLET ORAL DAILY
Qty: 30 TABLET | Refills: 3 | Status: SHIPPED | OUTPATIENT
Start: 2021-01-09 | End: 2021-02-18

## 2021-01-11 ENCOUNTER — TELEPHONE (OUTPATIENT)
Dept: FAMILY MEDICINE CLINIC | Facility: CLINIC | Age: 41
End: 2021-01-11

## 2021-01-11 DIAGNOSIS — F90.0 ATTENTION DEFICIT HYPERACTIVITY DISORDER (ADHD), PREDOMINANTLY INATTENTIVE TYPE: Chronic | ICD-10-CM

## 2021-01-11 NOTE — TELEPHONE ENCOUNTER
PT CALLED STATING HER INSURANCE WILL NO LONGER COVER HER lisdexamfetamine (Vyvanse) 50 MG capsule AT HER NORMAL PHARMACY.    SHE NEEDS IT RESENT TO [facility] #[id number] - [facility] [address] [place] [address] AT [facility] - [phone]  - [phone]   [phone]    CALLBACK NUMBER: [phone]

## 2021-01-12 NOTE — TELEPHONE ENCOUNTER
Patient states he is "pissed off at the world"  Patient reports being anxious, depressed and having SI  Patient had an irritable edge but was mostly appropriate with this writer  Patient is compliant with medication  Pt told

## 2021-01-19 NOTE — TELEPHONE ENCOUNTER
PT IS CALLING IN STATING THAT SHE GOT NOTIFICATION THAT HER PRESCRIPTION FOR VYVANSE  HAS BEEN DENIED.  PT WAS INSTRUCTED TO CONTACT OFFICE TO GET AN APPEAL STARTED TO STATE WHY ALTERNATIVES ARE NOT AN ACCEPTABLE OPTION FOR THIS MEDICATION.  PT WANTS TO BE CALLED BACK REGARDING THIS.  PT STATES THAT IT IS OK TO LEAVE A VOICE MAIL IF SHE DOES NOT ANSWER.      PT CALL BACK  [phone]  PHARMACY  [facility] [address]

## 2021-01-20 ENCOUNTER — PRIOR AUTHORIZATION (OUTPATIENT)
Dept: FAMILY MEDICINE CLINIC | Facility: CLINIC | Age: 41
End: 2021-01-20

## 2021-01-20 NOTE — TELEPHONE ENCOUNTER
Vyvanse PA Appeal was denied even with medically necessary note; states patient has to have documentation of trial and failure to 3 or alternatives even tho she has always been on Vyvanse; spoke with  for approval and states patient will have to pay out of pocket if medication can not be switched to alternatives of amphetamine-dextro; amphetamine-dextro xr; atomoxetine, dexmethylphenidate; dexmethylphenidate xr; methylphenidate; methylphenidate xr; please advise patient

## 2021-01-21 DIAGNOSIS — F90.0 ATTENTION DEFICIT HYPERACTIVITY DISORDER (ADHD), PREDOMINANTLY INATTENTIVE TYPE: Primary | ICD-10-CM

## 2021-01-21 RX ORDER — DEXTROAMPHETAMINE SACCHARATE, AMPHETAMINE ASPARTATE MONOHYDRATE, DEXTROAMPHETAMINE SULFATE AND AMPHETAMINE SULFATE 6.25; 6.25; 6.25; 6.25 MG/1; MG/1; MG/1; MG/1
25 CAPSULE, EXTENDED RELEASE ORAL EVERY MORNING
Qty: 30 CAPSULE | Refills: 0 | Status: SHIPPED | OUTPATIENT
Start: 2021-01-21 | End: 2021-02-03 | Stop reason: SDUPTHER

## 2021-01-22 ENCOUNTER — OFFICE VISIT (OUTPATIENT)
Dept: CARDIOLOGY | Facility: CLINIC | Age: 41
End: 2021-01-22

## 2021-01-22 VITALS
BODY MASS INDEX: 31.28 KG/M2 | OXYGEN SATURATION: 100 % | WEIGHT: 170 LBS | HEART RATE: 118 BPM | HEIGHT: 62 IN | SYSTOLIC BLOOD PRESSURE: 142 MMHG | DIASTOLIC BLOOD PRESSURE: 74 MMHG

## 2021-01-22 DIAGNOSIS — I34.0 MILD MITRAL INSUFFICIENCY: Primary | ICD-10-CM

## 2021-01-22 DIAGNOSIS — I10 BENIGN ESSENTIAL HYPERTENSION: ICD-10-CM

## 2021-01-22 PROCEDURE — 93000 ELECTROCARDIOGRAM COMPLETE: CPT | Performed by: INTERNAL MEDICINE

## 2021-01-22 PROCEDURE — 99244 OFF/OP CNSLTJ NEW/EST MOD 40: CPT | Performed by: INTERNAL MEDICINE

## 2021-01-22 NOTE — PROGRESS NOTES
Subjective:     Encounter Date:01/22/2021      Patient ID: Radha Astorga is a 40 y.o. female.    Dear Dr. Goodman thank you for referring this patient for evaluation of a neurologic event.  Chief Complaint: Neurologic event/fluttering  History of Present Illness  40-year-old very pleasant female who presents today for evaluation from a cardiovascular standpoint.  She said on 30 December she had an incident where her first 3 digits on her left hand went numb and she subsequently lost use of them.  It then seemed to drift it up her arm and in her face she had numbness on the left side of her mouth like she was recovering from a dental procedure with the utilization of Novocain.  She went and did several tests such as the arm test with no drifting she also looked in the mirror there was no drifting of her mouth eyes and her speech was just fine.  This actually persisted for a while.  She said actually it took several days for her hand to completely get back to normal but what was 1 observation I found interesting is that she said when she held her hand and arm up it seemed to help and seem to get her to recover.  Everything is completely resolved and she is back to normal.  Patient at the time did not check her blood pressure.  She did not report any fluttering at the time but said last night she did feel her heart fluttering.  She did note this was associated with her visit today or just something she noticed independently.    Review of Systems   Constitution: Positive for malaise/fatigue.   HENT: Positive for hearing loss.    Psychiatric/Behavioral: Positive for depression.   All other systems reviewed and are negative.        ECG 12 Lead    Date/Time: 1/22/2021 10:29 AM  Performed by: Michael Elam MD  Authorized by: Michael Elam MD   Comparison: compared with previous ECG from 10/6/2016  Similar to previous ECG  Rhythm: sinus rhythm    Clinical impression: non-specific ECG                Objective:     Vitals signs reviewed.   Constitutional:       Appearance: Well-developed.   Eyes:      Conjunctiva/sclera: Conjunctivae normal.   HENT:      Head: Normocephalic.   Neck:      Musculoskeletal: Normal range of motion.   Pulmonary:      Breath sounds: Normal breath sounds.   Cardiovascular:      Normal rate. Regular rhythm.   Edema:     Peripheral edema absent.   Abdominal:      General: Bowel sounds are normal.      Palpations: Abdomen is soft.   Musculoskeletal: Normal range of motion.   Skin:     General: Skin is warm and dry.   Neurological:      Mental Status: Alert and oriented to person, place, and time.   Psychiatric:         Behavior: Behavior normal.         Lab Review:       Assessment:          Diagnosis Plan   1. Mild mitral insufficiency  Adult Transthoracic Echo Complete W/ Cont if Necessary Per Protocol    Holter Monitor - 24 Hour   2. Benign essential hypertension            Plan:         1.  Neurologic event.  I am not quite sure what she is describing.  It really does not make sense to me so far as a vascular distribution.  The only thing I could think of was some type of atypical migraine however she never developed a headache either during before or after the event.  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  Patient was getting routine echoes every several years and has not had one for about 3 years.  In light of that I am going to set her up for an echocardiogram as well as a Holter monitor make sure she not have any type of arrhythmia or structural heart disease.  If both of these are unremarkable I did tell her if she has another event make sure she checks her blood pressure and her heart rate before and after these events.  I believe she is also set up for neurologic work-up which I absolutely agree with.

## 2021-02-02 ENCOUNTER — TELEPHONE (OUTPATIENT)
Dept: FAMILY MEDICINE CLINIC | Facility: CLINIC | Age: 41
End: 2021-02-02

## 2021-02-02 DIAGNOSIS — F90.0 ATTENTION DEFICIT HYPERACTIVITY DISORDER (ADHD), PREDOMINANTLY INATTENTIVE TYPE: ICD-10-CM

## 2021-02-02 RX ORDER — DEXTROAMPHETAMINE SACCHARATE, AMPHETAMINE ASPARTATE MONOHYDRATE, DEXTROAMPHETAMINE SULFATE AND AMPHETAMINE SULFATE 6.25; 6.25; 6.25; 6.25 MG/1; MG/1; MG/1; MG/1
25 CAPSULE, EXTENDED RELEASE ORAL EVERY MORNING
Qty: 30 CAPSULE | Refills: 0 | Status: CANCELLED | OUTPATIENT
Start: 2021-02-02

## 2021-02-02 NOTE — TELEPHONE ENCOUNTER
Caller: Radha Astorga    Relationship: Self    Best call back number: [phone]     Medication needed:   Requested Prescriptions     Pending Prescriptions Disp Refills   • amphetamine-dextroamphetamine XR (Adderall XR) 25 MG 24 hr capsule 30 capsule 0     Sig: Take 1 capsule by mouth Every Morning       When do you need the refill by: ASAP    What details did the patient provide when requesting the medication:[facility]AY[facility] DO NOT [facility] AN ORDER FOR IT     Does the patient have less than a 3 day supply:  [x] Yes  [] No    What is the patient's preferred pharmacy:      [id number] [address] AT [facility] - [phone] [facility] [phone]   [phone]

## 2021-02-03 ENCOUNTER — HOSPITAL ENCOUNTER (OUTPATIENT)
Dept: CARDIOLOGY | Facility: HOSPITAL | Age: 41
Discharge: HOME | End: 2021-02-03

## 2021-02-03 ENCOUNTER — HOSPITAL ENCOUNTER (OUTPATIENT)
Dept: MRI IMAGING | Facility: HOSPITAL | Age: 41
Discharge: HOME | End: 2021-02-03

## 2021-02-03 DIAGNOSIS — F90.0 ATTENTION DEFICIT HYPERACTIVITY DISORDER (ADHD), PREDOMINANTLY INATTENTIVE TYPE: ICD-10-CM

## 2021-02-03 DIAGNOSIS — R29.90 STROKE-LIKE SYMPTOMS: ICD-10-CM

## 2021-02-03 LAB
BH CV XLRA MEAS LEFT CCA RATIO VEL: -87.9 CM/SEC
BH CV XLRA MEAS LEFT DIST CCA EDV: -31.7 CM/SEC
BH CV XLRA MEAS LEFT DIST CCA PSV: -87.9 CM/SEC
BH CV XLRA MEAS LEFT DIST ICA EDV: -32.5 CM/SEC
BH CV XLRA MEAS LEFT DIST ICA PSV: -72.9 CM/SEC
BH CV XLRA MEAS LEFT ICA RATIO VEL: -94.4 CM/SEC
BH CV XLRA MEAS LEFT ICA/CCA RATIO: 1.1
BH CV XLRA MEAS LEFT MID ICA EDV: -44.6 CM/SEC
BH CV XLRA MEAS LEFT MID ICA PSV: -94.4 CM/SEC
BH CV XLRA MEAS LEFT PROX CCA EDV: 32.8 CM/SEC
BH CV XLRA MEAS LEFT PROX CCA PSV: 97.3 CM/SEC
BH CV XLRA MEAS LEFT PROX ECA EDV: -20.5 CM/SEC
BH CV XLRA MEAS LEFT PROX ECA PSV: -80.9 CM/SEC
BH CV XLRA MEAS LEFT PROX ICA EDV: -31.7 CM/SEC
BH CV XLRA MEAS LEFT PROX ICA PSV: -70.4 CM/SEC
BH CV XLRA MEAS LEFT PROX SCLA PSV: 108 CM/SEC
BH CV XLRA MEAS LEFT VERTEBRAL A EDV: 15.8 CM/SEC
BH CV XLRA MEAS LEFT VERTEBRAL A PSV: 35.1 CM/SEC
BH CV XLRA MEAS RIGHT CCA RATIO VEL: -82.7 CM/SEC
BH CV XLRA MEAS RIGHT DIST CCA EDV: -31.7 CM/SEC
BH CV XLRA MEAS RIGHT DIST CCA PSV: -82.7 CM/SEC
BH CV XLRA MEAS RIGHT DIST ICA EDV: -34.6 CM/SEC
BH CV XLRA MEAS RIGHT DIST ICA PSV: -72.7 CM/SEC
BH CV XLRA MEAS RIGHT ICA RATIO VEL: -80.9 CM/SEC
BH CV XLRA MEAS RIGHT ICA/CCA RATIO: 0.98
BH CV XLRA MEAS RIGHT MID ICA EDV: -32.8 CM/SEC
BH CV XLRA MEAS RIGHT MID ICA PSV: -79.7 CM/SEC
BH CV XLRA MEAS RIGHT PROX CCA EDV: 29.3 CM/SEC
BH CV XLRA MEAS RIGHT PROX CCA PSV: 84.4 CM/SEC
BH CV XLRA MEAS RIGHT PROX ECA EDV: -16.4 CM/SEC
BH CV XLRA MEAS RIGHT PROX ECA PSV: -89.7 CM/SEC
BH CV XLRA MEAS RIGHT PROX ICA EDV: -30.5 CM/SEC
BH CV XLRA MEAS RIGHT PROX ICA PSV: -80.9 CM/SEC
BH CV XLRA MEAS RIGHT PROX SCLA PSV: 144 CM/SEC
BH CV XLRA MEAS RIGHT VERTEBRAL A EDV: 18.9 CM/SEC
BH CV XLRA MEAS RIGHT VERTEBRAL A PSV: 66 CM/SEC
LEFT ARM BP: NORMAL MMHG
RIGHT ARM BP: NORMAL MMHG

## 2021-02-03 PROCEDURE — 82565 ASSAY OF CREATININE: CPT

## 2021-02-03 PROCEDURE — A9577 INJ MULTIHANCE: HCPCS | Performed by: FAMILY MEDICINE

## 2021-02-03 PROCEDURE — 70553 MRI BRAIN STEM W/O & W/DYE: CPT

## 2021-02-03 PROCEDURE — 70551 MRI BRAIN STEM W/O DYE: CPT

## 2021-02-03 PROCEDURE — 93880 EXTRACRANIAL BILAT STUDY: CPT

## 2021-02-03 PROCEDURE — 0 GADOBENATE DIMEGLUMINE 529 MG/ML SOLUTION: Performed by: FAMILY MEDICINE

## 2021-02-03 RX ORDER — DEXTROAMPHETAMINE SACCHARATE, AMPHETAMINE ASPARTATE MONOHYDRATE, DEXTROAMPHETAMINE SULFATE AND AMPHETAMINE SULFATE 6.25; 6.25; 6.25; 6.25 MG/1; MG/1; MG/1; MG/1
25 CAPSULE, EXTENDED RELEASE ORAL EVERY MORNING
Qty: 30 CAPSULE | Refills: 0 | Status: SHIPPED | OUTPATIENT
Start: 2021-02-03 | End: 2021-03-09 | Stop reason: SDUPTHER

## 2021-02-03 RX ADMIN — GADOBENATE DIMEGLUMINE 15 ML: 529 INJECTION, SOLUTION INTRAVENOUS at 10:42

## 2021-02-04 LAB — CREAT BLDA-MCNC: 0.8 MG/DL (ref 0.6–1.3)

## 2021-02-08 ENCOUNTER — LAB (OUTPATIENT)
Dept: LAB | Facility: HOSPITAL | Age: 41
End: 2021-02-08

## 2021-02-08 ENCOUNTER — OFFICE VISIT (OUTPATIENT)
Dept: NEUROLOGY | Facility: CLINIC | Age: 41
End: 2021-02-08

## 2021-02-08 VITALS
OXYGEN SATURATION: 98 % | HEIGHT: 62 IN | SYSTOLIC BLOOD PRESSURE: 118 MMHG | BODY MASS INDEX: 30 KG/M2 | DIASTOLIC BLOOD PRESSURE: 82 MMHG | HEART RATE: 100 BPM | WEIGHT: 163 LBS

## 2021-02-08 DIAGNOSIS — I63.9 CEREBRAL INFARCTION, UNSPECIFIED MECHANISM (HCC): ICD-10-CM

## 2021-02-08 DIAGNOSIS — I63.9 CEREBRAL INFARCTION, UNSPECIFIED MECHANISM (HCC): Primary | ICD-10-CM

## 2021-02-08 PROCEDURE — 81241 F5 GENE: CPT

## 2021-02-08 PROCEDURE — 36415 COLL VENOUS BLD VENIPUNCTURE: CPT

## 2021-02-08 PROCEDURE — 99205 OFFICE O/P NEW HI 60 MIN: CPT | Performed by: PSYCHIATRY & NEUROLOGY

## 2021-02-08 RX ORDER — ATORVASTATIN CALCIUM 40 MG/1
40 TABLET, FILM COATED ORAL DAILY
Qty: 30 TABLET | Refills: 11 | Status: SHIPPED | OUTPATIENT
Start: 2021-02-08 | End: 2021-08-11 | Stop reason: SDUPTHER

## 2021-02-08 RX ORDER — ASPIRIN 81 MG/1
81 TABLET ORAL DAILY
Qty: 30 TABLET | Refills: 11 | Status: SHIPPED | OUTPATIENT
Start: 2021-02-08 | End: 2021-08-06 | Stop reason: ALTCHOICE

## 2021-02-08 NOTE — ASSESSMENT & PLAN NOTE
40 year old woman with history of mitral regurgitation and possible myxomatous changes of mitral valve and family history of factor V leiden mutation with what appears to be a possible subacute stroke on brain MRI scan most recently on 2/3/21.  Her left sided symptoms of hand weakness and numbness of left hand and face on 12/30/20 could certainly correlate with the findings on brain MRI scan.  I will repeat the brain MRI scan with and without contrast in March for short term follow up as recommended by radiologist to be sure the findings do not correlate with neoplastic or demyelinating process.  I advised her to start taking daily aspirin and will also start her on atorvastatin 40mg daily.  I advised her to exercise and eat healthy.  I will check her for factor V leiden mutation as part of her stroke work up given family history and will also refer her to hematology for further evaluation.  Provided patient education information on stroke and extensively discussed symptoms with her and advised her to be taken to the ED with any new stroke like symptoms.  I will see her back in 3 months and sooner if needed for reevaluation following all of her testing.

## 2021-02-08 NOTE — PROGRESS NOTES
Chief Complaint  Stroke (Began 12-30, sudden numbness in left hand, arm and face, recently had MRI on 2-3.)    Subjective          Radha Astorga presents to [facility] for   HISTORY OF PRESENT ILLNESS:    Radha Astorga is a 40 year old right handed woman who presents to neurology clinic for initial evaluation and treatment of stroke like symptoms.  She reports her symptoms starting on 12/30/20 in the evening she was laying on the couch and she started noticing numbness in her left arm and moved to the left side of her mouth and face.  Her partner felt that maybe the left side of her face was drooping.  She took a low dose aspirin and then the numbness in her arm and face started to dissipate into the next day and by the middle of the day the following day her left hand was having a hard time moving.  She has not had any further stroke like symptoms since that time.  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  She is on Adderall for ADD.  She has not been taking an aspirin.  Her blood pressure looks good today.  She tells me she has made diet and activity changes.  She reports a history of factor V leiden mutation in her father's side of the family.  She had a brain MRI with and without contrast on 2/3/2021 which demonstrated T2 hyperintensities in the right frontal lobe which has diffusion weighted imaging correlation without ADC correlation concerning for a subacute infarction and due to contrast enhancement a neoplastic or demyelinating process could not be fully excluded and clinical correlation was recommended along with follow up MRI scan.  She had carotid doppler testing done which did not demonstrate significant stenosis.  She is also set up to have echocardiogram and Holter monitoring done as well as part of the work up.   Her fasting lipid profile demonstrated elevated  total cholesterol and LDL levels.      Past Medical History:   Diagnosis Date   • Acute stress reaction 11/17/2014   • ADD (attention deficit disorder) 05/26/2014   • ADHD (attention deficit hyperactivity disorder)    • Benign essential hypertension 01/14/2010   • Family history of breast cancer 03/11/2013   • H/O complete eye exam 01/01/2016   • Hypothyroidism    • Kidney stone    • Stroke-like symptoms         Family History   Problem Relation Age of Onset   • Breast cancer Mother    • Breast cancer Maternal Grandmother    • Breast cancer Paternal Grandmother    • Stroke Father         Social History     Socioeconomic History   • Marital status: Significant Other     Spouse name: Not on file   • Number of children: Not on file   • Years of education: Not on file   • Highest education level: Not on file   Tobacco Use   • Smoking status: Former Smoker   • Smokeless tobacco: Never Used   Substance and Sexual Activity   • Alcohol use: Yes     Comment: rarely   • Drug use: No   • Sexual activity: Yes     Partners: Male        Review of Systems   Constitutional: Negative for activity change, appetite change and fatigue.   HENT: Negative for hearing loss, trouble swallowing and voice change.    Eyes: Negative for blurred vision, double vision and pain.   Respiratory: Negative for cough, choking and shortness of breath.    Cardiovascular: Positive for chest pain (fluttering when she goes to sleep, RARE). Negative for palpitations and leg swelling.   Gastrointestinal: Negative for abdominal pain, nausea and vomiting.   Endocrine: Negative for cold intolerance, heat intolerance and polydipsia.   Genitourinary: Negative for decreased urine volume, urgency and urinary incontinence.   Musculoskeletal: Negative for back pain, gait problem and neck pain.   Skin: Negative for dry skin, rash and bruise.   Allergic/Immunologic: Negative for environmental allergies and food allergies.   Neurological: Positive for numbness. Negative  "for dizziness, tremors, seizures, syncope, facial asymmetry, speech difficulty, weakness, light-headedness, headache, memory problem and confusion.   Hematological: Negative for adenopathy. Does not bruise/bleed easily.   Psychiatric/Behavioral: Negative for agitation, behavioral problems, decreased concentration, dysphoric mood, hallucinations, self-injury, sleep disturbance, suicidal ideas, negative for hyperactivity, depressed mood and stress. The patient is not nervous/anxious.         Objective   Vital Signs:   /82 (BP Location: Right arm)   Pulse 100   Ht 157.5 cm (62\")   Wt 73.9 kg (163 lb)   SpO2 98%   BMI 29.81 kg/m²       PHYSICAL EXAM:    General   Mental Status - Alert. General Appearance - Well developed, Well groomed, Oriented and Cooperative. Orientation - Oriented X3.       Head and Neck  Head - normocephalic, atraumatic with no lesions or palpable masses.  Neck    Global Assessment - supple.       Eye   Sclera/Conjunctiva - Bilateral - Normal.    ENMT  Mouth and Throat   Oral Cavity/Oropharynx: Oropharynx - the soft palate,uvula and tongue are normal in appearance.    Chest and Lung Exam   Chest - lung clear to auscultation bilaterally.    Cardiovascular   Cardiovascular examination reveals  - normal heart sounds, regular rate and rhythm.    Neurologic   Mental Status: Speech - Normal. Cognitive function - appropriate fund of knowledge. No impairment of attention, Impairment of concentration, impairment of long term memory or impairment of short term memory.  Cranial Nerves:   II Optic: Visual acuity - Left - Normal. Right - Normal. Visual fields - Normal (to confrontation).  III Oculomotor: Pupillary constriction - Left - Normal. Right - Normal.  VII Facial: - Normal Bilaterally.  VIII Acoustic - Bilateral - Hearing normal and (Hearing tested by finger rub).   IX Glossopharyngeal / X Vagus - Normal.  XI Accessory: Trapezius - Bilateral - Normal. Sternocleidomastoid - Bilateral - " Normal.  XII Hypoglossal - Bilateral - Normal.  Eye Movements: - Normal Bilaterally.  Sensory:   Light Touch: Intact - Globally.   Motor:   Bulk and Contour: - Normal.  Tone: - Normal.  Tremor: Not present.  Strength: 5/5 normal muscle strength - All Muscles.   General Assessment of Reflexes: - deep tendon reflexes are more brisk on the left side compared to the right side. Coordination - No Impairment of finger-to-nose or Impairment of rapid alternating movements. Gait - Normal.       Result Review :                 Assessment and Plan    Problem List Items Addressed This Visit        Other    Cerebral infarction (CMS/HCC) - Primary    Current Assessment & Plan     40 year old woman with history of mitral regurgitation and possible myxomatous changes of mitral valve and family history of factor V leiden mutation with what appears to be a possible subacute stroke on brain MRI scan most recently on 2/3/21.  Her left sided symptoms of hand weakness and numbness of left hand and face on 12/30/20 could certainly correlate with the findings on brain MRI scan.  I will repeat the brain MRI scan with and without contrast in March for short term follow up as recommended by radiologist to be sure the findings do not correlate with neoplastic or demyelinating process.  I advised her to start taking daily aspirin and will also start her on atorvastatin 40mg daily.  I advised her to exercise and eat healthy.  I will check her for factor V leiden mutation as part of her stroke work up given family history and will also refer her to hematology for further evaluation.  Provided patient education information on stroke and extensively discussed symptoms with her and advised her to be taken to the ED with any new stroke like symptoms.  I will see her back in 3 months and sooner if needed for reevaluation following all of her testing.          Relevant Medications    aspirin 81 MG EC tablet    atorvastatin (Lipitor) 40 MG tablet     Other Relevant Orders    Factor 5 [facility]    Ambulatory Referral to Hematology    MRI Brain With & Without Contrast          I spent 60 minutes caring for Radha on this date of service. This time includes time spent by me in the following activities:preparing for the visit, reviewing tests, obtaining and/or reviewing a separately obtained history, performing a medically appropriate examination and/or evaluation , counseling and educating the patient/family/caregiver, ordering medications, tests, or procedures, documenting information in the medical record, independently interpreting results and communicating that information with the patient/family/caregiver and care coordination    Follow Up   Return in about 3 months (around 5/8/2021) for Recheck.  Patient was given instructions and counseling regarding her condition or for health maintenance advice. Please see specific information pulled into the AVS if appropriate.

## 2021-02-08 NOTE — PATIENT INSTRUCTIONS
Lower blood pressure by restricting salt in diet (less than 2400 mg/day), weight loss, increase fruits, vegetables, whole grains, and low-fat dairy products.    Consider the DASH diet or Mediterranean diet.  Increase fish and poultry intake.    Avoid sodas, sweets, and red meat.    Limit alcohol consumption.    Monitor and keep blood pressure, cholesterol and blood sugar at goal.    Avoid the use of tobacco and avoid secondhand smoke.    Engage in moderate to vigorous intensity aerobic exercise for about 40 minutes 3-4 times per week.  Consider lower intensity jose chi or yoga if necessary.    Take antiplatelet medication regularly.    If you snore, wake up unrefreshed or feel tired during the day, talk to your doctor as these may be signs of sleep apnea and a sleep study may be indicated.

## 2021-02-10 LAB — F5 GENE MUT ANL BLD/T: ABNORMAL

## 2021-02-11 ENCOUNTER — HOSPITAL ENCOUNTER (OUTPATIENT)
Dept: CARDIOLOGY | Facility: HOSPITAL | Age: 41
Discharge: HOME | End: 2021-02-11
Admitting: INTERNAL MEDICINE

## 2021-02-11 VITALS
OXYGEN SATURATION: 97 % | WEIGHT: 163 LBS | SYSTOLIC BLOOD PRESSURE: 118 MMHG | HEIGHT: 62 IN | DIASTOLIC BLOOD PRESSURE: 70 MMHG | BODY MASS INDEX: 30 KG/M2 | HEART RATE: 88 BPM

## 2021-02-11 DIAGNOSIS — I34.0 MILD MITRAL INSUFFICIENCY: ICD-10-CM

## 2021-02-11 PROCEDURE — 25010000002 PERFLUTREN (DEFINITY) 8.476 MG IN SODIUM CHLORIDE (PF) 0.9 % 10 ML INJECTION: Performed by: INTERNAL MEDICINE

## 2021-02-11 PROCEDURE — 93306 TTE W/DOPPLER COMPLETE: CPT

## 2021-02-11 PROCEDURE — 93306 TTE W/DOPPLER COMPLETE: CPT | Performed by: INTERNAL MEDICINE

## 2021-02-11 RX ADMIN — PERFLUTREN 1.5 ML: 6.52 INJECTION, SUSPENSION INTRAVENOUS at 09:35

## 2021-02-12 LAB
AORTIC ARCH: 1.8 CM
ASCENDING AORTA: 3.1 CM
BH CV ECHO MEAS - ACS: 1.5 CM
BH CV ECHO MEAS - AO ARCH DIAM (PROXIMAL TRANS.): 1.8 CM
BH CV ECHO MEAS - AO MAX PG (FULL): 4.2 MMHG
BH CV ECHO MEAS - AO MAX PG: 9.7 MMHG
BH CV ECHO MEAS - AO MEAN PG (FULL): 1.3 MMHG
BH CV ECHO MEAS - AO MEAN PG: 4.5 MMHG
BH CV ECHO MEAS - AO ROOT AREA (BSA CORRECTED): 1.6
BH CV ECHO MEAS - AO ROOT AREA: 6.4 CM^2
BH CV ECHO MEAS - AO ROOT DIAM: 2.9 CM
BH CV ECHO MEAS - AO V2 MAX: 156.1 CM/SEC
BH CV ECHO MEAS - AO V2 MEAN: 98 CM/SEC
BH CV ECHO MEAS - AO V2 VTI: 25.9 CM
BH CV ECHO MEAS - ASC AORTA: 3.1 CM
BH CV ECHO MEAS - AVA(I,A): 1.8 CM^2
BH CV ECHO MEAS - AVA(I,D): 1.8 CM^2
BH CV ECHO MEAS - AVA(V,A): 1.9 CM^2
BH CV ECHO MEAS - AVA(V,D): 1.9 CM^2
BH CV ECHO MEAS - BSA(HAYCOCK): 1.8 M^2
BH CV ECHO MEAS - BSA: 1.8 M^2
BH CV ECHO MEAS - BZI_BMI: 29.8 KILOGRAMS/M^2
BH CV ECHO MEAS - BZI_METRIC_HEIGHT: 157.5 CM
BH CV ECHO MEAS - BZI_METRIC_WEIGHT: 73.9 KG
BH CV ECHO MEAS - EDV(TEICH): 67.7 ML
BH CV ECHO MEAS - EF(CUBED): 75.6 %
BH CV ECHO MEAS - EF(MOD-BP): 64 %
BH CV ECHO MEAS - EF(TEICH): 68.2 %
BH CV ECHO MEAS - ESV(TEICH): 21.5 ML
BH CV ECHO MEAS - FS: 37.5 %
BH CV ECHO MEAS - IVS/LVPW: 1
BH CV ECHO MEAS - IVSD: 1.3 CM
BH CV ECHO MEAS - LAT PEAK E' VEL: 9.8 CM/SEC
BH CV ECHO MEAS - LV MASS(C)D: 188.3 GRAMS
BH CV ECHO MEAS - LV MASS(C)DI: 107.5 GRAMS/M^2
BH CV ECHO MEAS - LV MAX PG: 5.6 MMHG
BH CV ECHO MEAS - LV MEAN PG: 3.1 MMHG
BH CV ECHO MEAS - LV V1 MAX: 118.3 CM/SEC
BH CV ECHO MEAS - LV V1 MEAN: 87 CM/SEC
BH CV ECHO MEAS - LV V1 VTI: 17.9 CM
BH CV ECHO MEAS - LVIDD: 3.9 CM
BH CV ECHO MEAS - LVIDS: 2.5 CM
BH CV ECHO MEAS - LVOT AREA (M): 2.5 CM^2
BH CV ECHO MEAS - LVOT AREA: 2.5 CM^2
BH CV ECHO MEAS - LVOT DIAM: 1.8 CM
BH CV ECHO MEAS - LVPWD: 1.3 CM
BH CV ECHO MEAS - MED PEAK E' VEL: 7.6 CM/SEC
BH CV ECHO MEAS - MR MAX PG: 35.7 MMHG
BH CV ECHO MEAS - MR MAX VEL: 298.6 CM/SEC
BH CV ECHO MEAS - MV A DUR: 0.14 SEC
BH CV ECHO MEAS - MV A MAX VEL: 139.7 CM/SEC
BH CV ECHO MEAS - MV DEC SLOPE: 390.8 CM/SEC^2
BH CV ECHO MEAS - MV DEC TIME: 0.31 SEC
BH CV ECHO MEAS - MV E MAX VEL: 120 CM/SEC
BH CV ECHO MEAS - MV E/A: 0.86
BH CV ECHO MEAS - MV MAX PG: 9.1 MMHG
BH CV ECHO MEAS - MV MEAN PG: 4.5 MMHG
BH CV ECHO MEAS - MV P1/2T MAX VEL: 129 CM/SEC
BH CV ECHO MEAS - MV P1/2T: 96.6 MSEC
BH CV ECHO MEAS - MV V2 MAX: 150.7 CM/SEC
BH CV ECHO MEAS - MV V2 MEAN: 102.2 CM/SEC
BH CV ECHO MEAS - MV V2 VTI: 37 CM
BH CV ECHO MEAS - MVA P1/2T LCG: 1.7 CM^2
BH CV ECHO MEAS - MVA(P1/2T): 2.3 CM^2
BH CV ECHO MEAS - MVA(VTI): 1.2 CM^2
BH CV ECHO MEAS - PA ACC TIME: 0.1 SEC
BH CV ECHO MEAS - PA MAX PG (FULL): 2 MMHG
BH CV ECHO MEAS - PA MAX PG: 6.2 MMHG
BH CV ECHO MEAS - PA PR(ACCEL): 36.2 MMHG
BH CV ECHO MEAS - PA V2 MAX: 124.9 CM/SEC
BH CV ECHO MEAS - PULM A REVS DUR: 0.13 SEC
BH CV ECHO MEAS - PULM A REVS VEL: 36 CM/SEC
BH CV ECHO MEAS - PULM DIAS VEL: 54.8 CM/SEC
BH CV ECHO MEAS - PULM S/D: 0.86
BH CV ECHO MEAS - PULM SYS VEL: 47.1 CM/SEC
BH CV ECHO MEAS - PVA(V,A): 2.2 CM^2
BH CV ECHO MEAS - PVA(V,D): 2.2 CM^2
BH CV ECHO MEAS - QP/QS: 0.94
BH CV ECHO MEAS - RV MAX PG: 4.2 MMHG
BH CV ECHO MEAS - RV MEAN PG: 2.4 MMHG
BH CV ECHO MEAS - RV V1 MAX: 102.8 CM/SEC
BH CV ECHO MEAS - RV V1 MEAN: 73.6 CM/SEC
BH CV ECHO MEAS - RV V1 VTI: 16.1 CM
BH CV ECHO MEAS - RVOT AREA: 2.7 CM^2
BH CV ECHO MEAS - RVOT DIAM: 1.8 CM
BH CV ECHO MEAS - RVSP: 13 MMHG
BH CV ECHO MEAS - SI(AO): 95.2 ML/M^2
BH CV ECHO MEAS - SI(CUBED): 26.5 ML/M^2
BH CV ECHO MEAS - SI(LVOT): 26 ML/M^2
BH CV ECHO MEAS - SI(TEICH): 26.3 ML/M^2
BH CV ECHO MEAS - SUP REN AO DIAM: 1.6 CM
BH CV ECHO MEAS - SV(AO): 166.9 ML
BH CV ECHO MEAS - SV(CUBED): 46.4 ML
BH CV ECHO MEAS - SV(LVOT): 45.5 ML
BH CV ECHO MEAS - SV(RVOT): 42.9 ML
BH CV ECHO MEAS - SV(TEICH): 46.2 ML
BH CV ECHO MEAS - TAPSE (>1.6): 2.2 CM
BH CV ECHO MEAS - TR MAX VEL: 160.2 CM/SEC
BH CV ECHO MEASUREMENTS AVERAGE E/E' RATIO: 13.79
BH CV XLRA - RV BASE: 3 CM
BH CV XLRA - RV LENGTH: 6.7 CM
BH CV XLRA - RV MID: 2.3 CM
BH CV XLRA - TDI S': 12.9 CM/SEC
LEFT ATRIUM VOLUME INDEX: 25 ML/M2
MAXIMAL PREDICTED HEART RATE: 178 BPM
SINUS: 2.9 CM
STJ: 2.6 CM
STRESS TARGET HR: 151 BPM

## 2021-02-17 ENCOUNTER — LAB (OUTPATIENT)
Dept: LAB | Facility: HOSPITAL | Age: 41
End: 2021-02-17

## 2021-02-17 ENCOUNTER — CONSULT (OUTPATIENT)
Dept: ONCOLOGY | Facility: CLINIC | Age: 41
End: 2021-02-17

## 2021-02-17 VITALS
RESPIRATION RATE: 16 BRPM | BODY MASS INDEX: 29.76 KG/M2 | SYSTOLIC BLOOD PRESSURE: 129 MMHG | HEIGHT: 62 IN | DIASTOLIC BLOOD PRESSURE: 86 MMHG | HEART RATE: 92 BPM | TEMPERATURE: 98 F | WEIGHT: 161.7 LBS | OXYGEN SATURATION: 99 %

## 2021-02-17 DIAGNOSIS — I15.9 SECONDARY HYPERTENSION: ICD-10-CM

## 2021-02-17 DIAGNOSIS — I63.9 CEREBRAL INFARCTION, UNSPECIFIED MECHANISM (HCC): Primary | ICD-10-CM

## 2021-02-17 DIAGNOSIS — I63.9 CEREBRAL INFARCTION, UNSPECIFIED MECHANISM (HCC): ICD-10-CM

## 2021-02-17 DIAGNOSIS — D68.51 FACTOR 5 LEIDEN MUTATION, HETEROZYGOUS (HCC): ICD-10-CM

## 2021-02-17 DIAGNOSIS — Z12.31 ENCOUNTER FOR SCREENING MAMMOGRAM FOR MALIGNANT NEOPLASM OF BREAST: Primary | ICD-10-CM

## 2021-02-17 LAB
BASOPHILS # BLD AUTO: 0.08 10*3/MM3 (ref 0–0.2)
BASOPHILS NFR BLD AUTO: 1 % (ref 0–1.5)
DEPRECATED RDW RBC AUTO: 40.8 FL (ref 37–54)
EOSINOPHIL # BLD AUTO: 0.13 10*3/MM3 (ref 0–0.4)
EOSINOPHIL NFR BLD AUTO: 1.6 % (ref 0.3–6.2)
ERYTHROCYTE [DISTWIDTH] IN BLOOD BY AUTOMATED COUNT: 14.2 % (ref 12.3–15.4)
HCT VFR BLD AUTO: 38.4 % (ref 34–46.6)
HGB BLD-MCNC: 12.7 G/DL (ref 12–15.9)
IMM GRANULOCYTES # BLD AUTO: 0.02 10*3/MM3 (ref 0–0.05)
IMM GRANULOCYTES NFR BLD AUTO: 0.2 % (ref 0–0.5)
LYMPHOCYTES # BLD AUTO: 2.52 10*3/MM3 (ref 0.7–3.1)
LYMPHOCYTES NFR BLD AUTO: 31.3 % (ref 19.6–45.3)
MCH RBC QN AUTO: 26.1 PG (ref 26.6–33)
MCHC RBC AUTO-ENTMCNC: 33.1 G/DL (ref 31.5–35.7)
MCV RBC AUTO: 78.9 FL (ref 79–97)
MONOCYTES # BLD AUTO: 1.04 10*3/MM3 (ref 0.1–0.9)
MONOCYTES NFR BLD AUTO: 12.9 % (ref 5–12)
NEUTROPHILS NFR BLD AUTO: 4.26 10*3/MM3 (ref 1.7–7)
NEUTROPHILS NFR BLD AUTO: 53 % (ref 42.7–76)
NRBC BLD AUTO-RTO: 0 /100 WBC (ref 0–0.2)
PLATELET # BLD AUTO: 276 10*3/MM3 (ref 140–450)
PMV BLD AUTO: 10.6 FL (ref 6–12)
RBC # BLD AUTO: 4.87 10*6/MM3 (ref 3.77–5.28)
WBC # BLD AUTO: 8.05 10*3/MM3 (ref 3.4–10.8)

## 2021-02-17 PROCEDURE — 85303 CLOT INHIBIT PROT C ACTIVITY: CPT | Performed by: INTERNAL MEDICINE

## 2021-02-17 PROCEDURE — 99245 OFF/OP CONSLTJ NEW/EST HI 55: CPT | Performed by: INTERNAL MEDICINE

## 2021-02-17 PROCEDURE — 85306 CLOT INHIBIT PROT S FREE: CPT | Performed by: INTERNAL MEDICINE

## 2021-02-17 PROCEDURE — 85025 COMPLETE CBC W/AUTO DIFF WBC: CPT

## 2021-02-17 PROCEDURE — 36415 COLL VENOUS BLD VENIPUNCTURE: CPT

## 2021-02-17 PROCEDURE — 85300 ANTITHROMBIN III ACTIVITY: CPT | Performed by: INTERNAL MEDICINE

## 2021-02-17 PROCEDURE — 81240 F2 GENE: CPT | Performed by: INTERNAL MEDICINE

## 2021-02-17 RX ORDER — LEVOTHYROXINE SODIUM 0.12 MG/1
TABLET ORAL
COMMUNITY
Start: 2021-02-12 | End: 2021-02-18 | Stop reason: SDUPTHER

## 2021-02-17 NOTE — PROGRESS NOTES
Subjective     REASON FOR CONSULTATION:    1.  Heterozygous state for factor V Leiden    2.  New onset stroke    3.  Hypercholesterolemia    Provide an opinion on any further workup or treatment                             REQUESTING PHYSICIAN:      RECORDS OBTAINED:  Records of the patients history including those obtained from the referring provider were reviewed and summarized in detail.    HISTORY OF PRESENT ILLNESS:  The patient is a 40 y.o. year old female who is here for an opinion about the above issue.    History of Present Illness patient is a 40-year-old female who presented end of December with numbness and tingling in her left upper extremity and left face.  She went to see Dr. Goodman who then got concerned and referred to neurology.  She was referred to Dr. Freedman and talk to Dr. Donna guo for evaluation.  Patient underwent ultrasound of the carotids which did not show significant stenosis of the carotids.  She underwent 2D echocardiogram which showed a good ejection fraction of 64% with mild mitral valve prolapse and mild mitral stenosis.  She had a 24-hour Holter which was negative.  She then had also an MRI of the brain February 3, 2021 which showed areas of hyperintensity involving the subcortical white matter of the right frontal lobe superior laterally and posteriorly with the largest area measuring 8 9 mm.  There is also enhancement present.  The findings are consistent with a subacute infarct.  They could not differentiate if there is any underlying neoplastic process but a short-term follow-up was suggested in order to follow-up.  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    Patient currently got started on aspirin and factor V Leiden was obtained by neurology because patient's paternal aunt had factor V Leiden mutation and she was heterozygous.  Patient's testing also showed that she  was heterozygous.    Patient states her numbness and tingling is resolved completely on the left arm and face it just lasted for a 24 hours.  She was here referred here for neurology to see if there is any other cause for her underlying hypercoagulable state.  She has not had a complete hypercoagulable work-up.  Also discussed with her that an underlying malignancy could cause a hypercoagulable state and we would need to do a CT scan to rule that out.    Patient's mother has had breast cancer in her 50s but had pancreatic cancer at 68 and  from that.  Patient's dad had stroke at 63.  But he is alive at 74.  She has 1 brother who is 40 in good health.  Paternal aunt had breast cancer in her 40s.  Paternal grandfather had colon cancer in his late 80s.  Maternal uncle had throat cancer and another maternal uncle had skin cancer with metastasis to the lung.  And maternal grandmother had breast cancer.    Patient was to have mammogram done last year but because of COVID-19 it got postponed and she has not had it done yet.    Patient used to be a smoker half pack per day for 7 years but quit 10 years ago.  She has high cholesterol for which she is on treatment.    Past Medical History:   Diagnosis Date   • Acute stress reaction 2014   • ADD (attention deficit disorder) 2014   • ADHD (attention deficit hyperactivity disorder)    • Benign essential hypertension 2010   • Family history of breast cancer 2013   • H/O complete eye exam 2016   • Hypothyroidism    • Kidney stone    • Stroke-like symptoms         Past Surgical History:   Procedure Laterality Date   • BREAST BIOPSY     • MAMMO BILATERAL     • PAP SMEAR  2016        Current Outpatient Medications on File Prior to Visit   Medication Sig Dispense Refill   • amphetamine-dextroamphetamine XR (Adderall XR) 25 MG 24 hr capsule Take 1 capsule by mouth Every Morning 30 capsule 0   • aspirin 81 MG EC tablet Take 1 tablet by mouth Daily.  30 tablet 11   • atorvastatin (Lipitor) 40 MG tablet Take 1 tablet by mouth Daily. 30 tablet 11   • cholecalciferol (VITAMIN D3) 10 MCG (400 UNIT) tablet Take 400 Units by mouth Daily.     • levothyroxine (SYNTHROID, LEVOTHROID) 137 MCG tablet Take 1 tablet by mouth Daily. 30 tablet 3   • valsartan (DIOVAN) 160 MG tablet Take 1 tablet by mouth Daily. For blood pressure 30 tablet 5   • levothyroxine (SYNTHROID, LEVOTHROID) 125 MCG tablet      • LORazepam (Ativan) 0.5 MG tablet Take 1 tablet by mouth Every 8 (Eight) Hours As Needed for Anxiety. 20 tablet 0     No current facility-administered medications on file prior to visit.         ALLERGIES:    Allergies   Allergen Reactions   • Sulfa Antibiotics         Social History     Socioeconomic History   • Marital status: Significant Other     Spouse name: Not on file   • Number of children: Not on file   • Years of education: Not on file   • Highest education level: Not on file   Tobacco Use   • Smoking status: Former Smoker   • Smokeless tobacco: Never Used   Substance and Sexual Activity   • Alcohol use: Yes     Comment: rarely   • Drug use: No   • Sexual activity: Yes     Partners: Male        Family History   Problem Relation Age of Onset   • Breast cancer Mother    • Breast cancer Maternal Grandmother    • Breast cancer Paternal Grandmother    • Stroke Father         Review of Systems   Constitutional: Negative for appetite change, chills, diaphoresis, fatigue, fever and unexpected weight change.   HENT: Negative for hearing loss, sore throat and trouble swallowing.    Respiratory: Negative for cough, chest tightness, shortness of breath and wheezing.    Cardiovascular: Negative for chest pain, palpitations and leg swelling.   Gastrointestinal: Negative for abdominal distention, abdominal pain, constipation, diarrhea, nausea and vomiting.   Genitourinary: Negative for dysuria, frequency, hematuria and urgency.   Musculoskeletal: Negative for joint swelling.         "No muscle weakness.   Skin: Negative for rash and wound.   Neurological: Negative for seizures, syncope, speech difficulty, weakness, numbness and headaches.   Hematological: Negative for adenopathy. Does not bruise/bleed easily.   Psychiatric/Behavioral: Negative for behavioral problems, confusion and suicidal ideas.          Objective     Vitals:    02/17/21 1223   BP: 129/86   Pulse: 92   Resp: 16   Temp: 98 °F (36.7 °C)   TempSrc: Skin   SpO2: 99%   Weight: 73.3 kg (161 lb 11.2 oz)  Comment: new weight   Height: 158.5 cm (62.4\")  Comment: new height   PainSc: 0-No pain     No flowsheet data found.    Physical Exam    CONSTITUTIONAL:  Vital signs reviewed.    No distress, looks comfortable.  EYES:  Conjunctiva and lids unremarkable.  Extraocular eye movements intact.  EARS,NOSE,MOUTH,THROAT:  Ears and nose appear unremarkable.  Lips, teeth, gums appear unremarkable.  RESPIRATORY:  Normal respiratory effort.    CARDIOVASCULAR:   Regular rate rhythm  No significant lower extremity edema.  SKIN: No wounds.  No rashes.  MUSCULOSKELETAL/EXTREMITIES: No clubbing or cyanosis.  No apparent unilateral weakness.  NEURO: CN 2-12 appear intact. No focal neurological deficits noted.  PSYCHIATRIC:  Normal judgment and insight.  Normal mood and affect.      RECENT LABS:  Hematology WBC   Date Value Ref Range Status   02/17/2021 8.05 3.40 - 10.80 10*3/mm3 Final   01/04/2021 9.5 3.4 - 10.8 x10E3/uL Final     RBC   Date Value Ref Range Status   02/17/2021 4.87 3.77 - 5.28 10*6/mm3 Final   01/04/2021 4.85 3.77 - 5.28 x10E6/uL Final     Hemoglobin   Date Value Ref Range Status   02/17/2021 12.7 12.0 - 15.9 g/dL Final     Hematocrit   Date Value Ref Range Status   02/17/2021 38.4 34.0 - 46.6 % Final     Platelets   Date Value Ref Range Status   02/17/2021 276 140 - 450 10*3/mm3 Final          Duplex Carotid Ultrasound 02/03/21    Study Impression •    Right ICA Prox:  Imaging indicates atherosclerotic plaque.      •    Left ICA Prox: "  Imaging indicates 16-49% stenosis.         MRI EXAMINATION BRAIN WITH AND WITHOUT CONTRAST 02/03/21     IMPRESSION:  1.  Areas of T2 FLAIR hyperintensity are appreciated involving the  subcortical white matter of the right frontal lobe superolaterally and  posteriorly with the largest area measuring approximately 9 mm in  diameter. There is diffusion weighted hyperintensity but without signal  loss on the ADC map at this location. There is also enhancement present.  The findings are likely related to a subacute infarct. An underlying  neoplastic process cannot be entirely excluded but is thought to be  unlikely. The appearance of the T2 FLAIR hyperintensity is not typical  for an underlying demyelinating process. Clinical correlation as well as  a short-term follow-up MRI examination of the brain with and without  contrast is recommended.  2.  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    Assessment/Plan     1.  Right frontal stroke and patient presented in December 2020 with left-sided weakness tingling and numbness and also numbness in the face.  · Was referred to neurology and cardiology by Dr. Goodman  · Ultrasound of carotid does not show significant stenosis  · 24 Holter is negative  · 2D echocardiogram shows ejection fraction of 64% with mild mitral regurg and mitral stenosis  · Neurology obtain factor V Leiden given that patient's paternal aunt had's history of factor V Leiden and that was heterozygous for factor V Leiden  · Patient has been referred to us in order to evaluate any other underlying cause for hypercoagulable state  · We discussed about obtaining rest of the hypercoagulable work-up though doubtful it will be positive  · We will also obtain a CT scan of the chest abdomen pelvis in order to rule out underlying malignancy as a cause  · We will obtain mammogram as that has not been  done yet last year  · Continue aspirin as per neurology    2.  Family history of cancer: Patient's mother had breast cancer at age 50 and pancreatic cancer at age 68 and  from pancreatic cancer.  Patient's maternal grandmother had breast cancer in her 50s.  Her maternal uncle had skin cancer which went to the lung and another maternal uncle had throat cancer.  Maternal aunt had call colon polyps.  Patient's paternal aunt had breast cancer in her 40s.  And paternal grandfather with colon cancer in his 80s.  Paternal aunt also had factor V Leiden.    3.  High cholesterol for which she is on treatment    4.  Hypertension    5.  Elevated BMI, encourage diet and exercise    Plan  · Reviewed MRI of brain and echo, Holter,  · Patient will be scheduled for mammogram as she is overdue  · Will obtain CT scan of the chest abdomen pelvis to rule out any underlying malignancy as a cause for her recent stroke  · We will obtain hypercoagulable work-up today  · Reviewed the results of factor V Leiden which she is heterozygous  · Given that this is arterial clot I think it is reasonable to continue aspirin  · Discouraged smoking which she has quit 10 years ago  · Discussed with her that she should not sit in front of the computer for many hours and every 2 hours she needs to take a break and walk around and take enough fluids  · Continue treatment of her hypercholesterolemia    MD Dr. Maxine Ibanez Dr., Dr.

## 2021-02-18 DIAGNOSIS — E03.9 ACQUIRED HYPOTHYROIDISM: Primary | ICD-10-CM

## 2021-02-18 LAB
CARDIOLIPIN IGG SER IA-ACNC: <9 GPL U/ML (ref 0–14)
CARDIOLIPIN IGM SER IA-ACNC: 24 MPL U/ML (ref 0–12)
FACTOR II, DNA ANALYSIS: NORMAL

## 2021-02-18 RX ORDER — LEVOTHYROXINE SODIUM 0.12 MG/1
125 TABLET ORAL DAILY
Qty: 30 TABLET | Refills: 5 | Status: SHIPPED | OUTPATIENT
Start: 2021-02-18 | End: 2021-02-26 | Stop reason: SDUPTHER

## 2021-02-19 LAB
AT III PPP CHRO-ACNC: 107 % (ref 90–134)
LA 2 SCREEN W REFLEX-IMP: NORMAL
PROT C ACT/NOR PPP: 85 % (ref 86–163)
PROT S ACT/NOR PPP: 85 % (ref 70–127)
PROT S FREE PPP-ACNC: 109 % (ref 49–138)
SCREEN APTT: 35.2 SEC (ref 0–51.9)
SCREEN DRVVT: 33.6 SEC (ref 0–47)

## 2021-02-20 LAB
B2 GLYCOPROT1 IGA SER-ACNC: <9 GPI IGA UNITS (ref 0–25)
B2 GLYCOPROT1 IGG SER-ACNC: <9 GPI IGG UNITS (ref 0–20)
B2 GLYCOPROT1 IGM SER-ACNC: <9 GPI IGM UNITS (ref 0–32)

## 2021-02-22 ENCOUNTER — TELEPHONE (OUTPATIENT)
Dept: ONCOLOGY | Facility: CLINIC | Age: 41
End: 2021-02-22

## 2021-02-22 DIAGNOSIS — D68.51 FACTOR 5 LEIDEN MUTATION, HETEROZYGOUS (HCC): ICD-10-CM

## 2021-02-22 DIAGNOSIS — I63.9 CEREBRAL INFARCTION, UNSPECIFIED MECHANISM (HCC): Primary | ICD-10-CM

## 2021-02-22 NOTE — TELEPHONE ENCOUNTER
----- Message from Mary Silva, RN sent at 2/22/2021 12:56 PM EST -----  Regarding: RE: CT A/P AND CHEHST FOR 2/23 POSSIBLE CANCEL OF CHEST  Chest x-ray has been ordered.  Will you please let patient know.    Thank you,  Mary  ----- Message -----  From: Michelle Rubi  Sent: 2/22/2021  10:35 AM EST  To: Neena Beavers MD, Mary Silva, NICOLASA  Subject: FW: CT A/P AND CHEHST FOR 2/23 POSSIBLE CANC#    Hello, please see message below from Aye Kota about ct of chest. Mary, if ok please add chest xray so she can get done at the hospital. Thank you!  ----- Message -----  From: Vanessa Escudero APRN  Sent: 2/22/2021  10:32 AM EST  To: Michelle Rubi, #  Subject: RE: CT A/P AND CHEHST FOR 2/23 POSSIBLE CANC#    This is not approved. She must have a chest xray done first per her insurance policy. If there is nothing on CXR, then CT chest would not be warranted. They also want her to get her mammogram which is overdue. So bottom line, she can only get CT A/P for now.  ----- Message -----  From: Michelle Rubi  Sent: 2/22/2021  10:05 AM EST  [facility] [place] [facility]  Subject: FW: CT A/P AND CHEHST FOR 2/23 POSSIBLE CANC#    Please see very bottom message with PEER TO PEER info. DR BERNAL would like a PEER TO BE done, please let me know any questions or what you find out after PEER TO PEER. Thank you!!  ----- Message -----  From: Neena Beavers MD  Sent: 2/22/2021  10:01 AM EST  To: Michelle Rubi  Subject: RE: CT A/P AND CHEHST FOR 2/23 POSSIBLE CANC#    Can you please get Peer to peer done and see if the nurse practitioner can do it.  If not just have somebody call and have me get to the phone is that okay.  Thanks    She does need CT of the chest to rule out malignancy.    Neena Beavers MD     ----- Message -----  From: Michelle Rubi  Sent: 2/22/2021   9:51 AM EST  To: Neena Beavers MD, Mary Silva RN, #  Subject: FW: CT A/P AND CHEHST FOR 2/23 POSSIBLE CANC#    Good morning, please see  message below and let me know if you want to cancel the CT Chest or try and have approved w/ a PEER TO PEER? Thanks  ----- Message -----  From: Jennifer Maharaj  Sent: 2/22/2021   9:40 AM EST  To: David Singer, Michelle Rubi, #  Subject: CT A/P AND CHEHST FOR 2/23 POSSIBLE CANCEL O#    2/22 - Please notify [facility] that Radha Astorga  MRN [id number]  is scheduled for CT Chest, abd and pelvis tomorrow 2/23/2021.  The abd and pelvis have been authorized but the chest is pending due to the fact that there is no biopsy proven malignancy.  Does MD want to do peer to peer at [phone] today or do we want to cancel or reschedule the chest only.  Please advise

## 2021-02-22 NOTE — TELEPHONE ENCOUNTER
Call to let Radha know there is a chest x-ray ordered to be done while she is at hospital getting CT of abdomen/pelvis done tomorrow, 2/23/21.  Verbalized understanding.

## 2021-02-23 ENCOUNTER — HOSPITAL ENCOUNTER (OUTPATIENT)
Dept: GENERAL RADIOLOGY | Facility: HOSPITAL | Age: 41
Discharge: HOME | End: 2021-02-23

## 2021-02-23 ENCOUNTER — HOSPITAL ENCOUNTER (OUTPATIENT)
Dept: CT IMAGING | Facility: HOSPITAL | Age: 41
Discharge: HOME | End: 2021-02-23

## 2021-02-23 DIAGNOSIS — I63.9 CEREBRAL INFARCTION, UNSPECIFIED MECHANISM (HCC): ICD-10-CM

## 2021-02-23 DIAGNOSIS — D68.51 FACTOR 5 LEIDEN MUTATION, HETEROZYGOUS (HCC): ICD-10-CM

## 2021-02-23 DIAGNOSIS — Z12.31 ENCOUNTER FOR SCREENING MAMMOGRAM FOR MALIGNANT NEOPLASM OF BREAST: ICD-10-CM

## 2021-02-23 PROCEDURE — 71046 X-RAY EXAM CHEST 2 VIEWS: CPT

## 2021-02-23 PROCEDURE — 0 DIATRIZOATE MEGLUMINE & SODIUM PER 1 ML: Performed by: INTERNAL MEDICINE

## 2021-02-23 PROCEDURE — 74177 CT ABD & PELVIS W/CONTRAST: CPT

## 2021-02-23 PROCEDURE — 25010000002 IOPAMIDOL 61 % SOLUTION: Performed by: INTERNAL MEDICINE

## 2021-02-23 RX ADMIN — IOPAMIDOL 85 ML: 612 INJECTION, SOLUTION INTRAVENOUS at 09:14

## 2021-02-23 RX ADMIN — DIATRIZOATE MEGLUMINE AND DIATRIZOATE SODIUM 30 ML: 660; 100 LIQUID ORAL; RECTAL at 09:14

## 2021-02-24 DIAGNOSIS — I63.9 CEREBRAL INFARCTION, UNSPECIFIED MECHANISM (HCC): Primary | ICD-10-CM

## 2021-02-24 DIAGNOSIS — R93.89 ABNORMAL FINDING ON IMAGING: ICD-10-CM

## 2021-02-24 DIAGNOSIS — D68.51 FACTOR 5 LEIDEN MUTATION, HETEROZYGOUS (HCC): ICD-10-CM

## 2021-02-26 DIAGNOSIS — E03.9 ACQUIRED HYPOTHYROIDISM: ICD-10-CM

## 2021-02-26 RX ORDER — LEVOTHYROXINE SODIUM 137 UG/1
137 TABLET ORAL DAILY
Qty: 30 TABLET | Refills: 5 | Status: SHIPPED | OUTPATIENT
Start: 2021-02-26 | End: 2021-07-20 | Stop reason: SDUPTHER

## 2021-03-01 ENCOUNTER — HOSPITAL ENCOUNTER (OUTPATIENT)
Dept: ULTRASOUND IMAGING | Facility: HOSPITAL | Age: 41
Discharge: HOME | End: 2021-03-01
Admitting: INTERNAL MEDICINE

## 2021-03-01 DIAGNOSIS — R93.89 ABNORMAL FINDING ON IMAGING: ICD-10-CM

## 2021-03-01 DIAGNOSIS — I63.9 CEREBRAL INFARCTION, UNSPECIFIED MECHANISM (HCC): ICD-10-CM

## 2021-03-01 DIAGNOSIS — D68.51 FACTOR 5 LEIDEN MUTATION, HETEROZYGOUS (HCC): ICD-10-CM

## 2021-03-01 PROCEDURE — 76775 US EXAM ABDO BACK WALL LIM: CPT

## 2021-03-03 ENCOUNTER — OFFICE VISIT (OUTPATIENT)
Dept: ONCOLOGY | Facility: CLINIC | Age: 41
End: 2021-03-03

## 2021-03-03 ENCOUNTER — LAB (OUTPATIENT)
Dept: OTHER | Facility: HOSPITAL | Age: 41
End: 2021-03-03

## 2021-03-03 VITALS
HEIGHT: 62 IN | TEMPERATURE: 98.5 F | HEART RATE: 95 BPM | OXYGEN SATURATION: 98 % | BODY MASS INDEX: 29.06 KG/M2 | WEIGHT: 157.9 LBS | RESPIRATION RATE: 16 BRPM | DIASTOLIC BLOOD PRESSURE: 80 MMHG | SYSTOLIC BLOOD PRESSURE: 125 MMHG

## 2021-03-03 DIAGNOSIS — D68.51 FACTOR 5 LEIDEN MUTATION, HETEROZYGOUS (HCC): ICD-10-CM

## 2021-03-03 DIAGNOSIS — Z12.31 ENCOUNTER FOR SCREENING MAMMOGRAM FOR MALIGNANT NEOPLASM OF BREAST: ICD-10-CM

## 2021-03-03 DIAGNOSIS — I63.9 CEREBRAL INFARCTION, UNSPECIFIED MECHANISM (HCC): ICD-10-CM

## 2021-03-03 DIAGNOSIS — Z80.3 FAMILY HISTORY OF BREAST CANCER: Primary | ICD-10-CM

## 2021-03-03 LAB
ALBUMIN SERPL-MCNC: 4.3 G/DL (ref 3.5–5.2)
ALBUMIN/GLOB SERPL: 1.6 G/DL
ALP SERPL-CCNC: 70 U/L (ref 39–117)
ALT SERPL W P-5'-P-CCNC: 22 U/L (ref 1–33)
ANION GAP SERPL CALCULATED.3IONS-SCNC: 8.9 MMOL/L (ref 5–15)
AST SERPL-CCNC: 16 U/L (ref 1–32)
BASOPHILS # BLD AUTO: 0.05 10*3/MM3 (ref 0–0.2)
BASOPHILS NFR BLD AUTO: 0.8 % (ref 0–1.5)
BILIRUB SERPL-MCNC: 0.3 MG/DL (ref 0–1.2)
BUN SERPL-MCNC: 13 MG/DL (ref 6–20)
BUN/CREAT SERPL: 15.1 (ref 7–25)
CALCIUM SPEC-SCNC: 9.4 MG/DL (ref 8.6–10.5)
CHLORIDE SERPL-SCNC: 105 MMOL/L (ref 98–107)
CO2 SERPL-SCNC: 26.1 MMOL/L (ref 22–29)
CREAT SERPL-MCNC: 0.86 MG/DL (ref 0.57–1)
DEPRECATED RDW RBC AUTO: 41.8 FL (ref 37–54)
EOSINOPHIL # BLD AUTO: 0.16 10*3/MM3 (ref 0–0.4)
EOSINOPHIL NFR BLD AUTO: 2.6 % (ref 0.3–6.2)
ERYTHROCYTE [DISTWIDTH] IN BLOOD BY AUTOMATED COUNT: 14.2 % (ref 12.3–15.4)
GFR SERPL CREATININE-BSD FRML MDRD: 72 ML/MIN/1.73
GLOBULIN UR ELPH-MCNC: 2.7 GM/DL
GLUCOSE SERPL-MCNC: 110 MG/DL (ref 65–99)
HCT VFR BLD AUTO: 40.2 % (ref 34–46.6)
HGB BLD-MCNC: 12.4 G/DL (ref 12–15.9)
IMM GRANULOCYTES # BLD AUTO: 0.01 10*3/MM3 (ref 0–0.05)
IMM GRANULOCYTES NFR BLD AUTO: 0.2 % (ref 0–0.5)
LYMPHOCYTES # BLD AUTO: 2.21 10*3/MM3 (ref 0.7–3.1)
LYMPHOCYTES NFR BLD AUTO: 35.3 % (ref 19.6–45.3)
MCH RBC QN AUTO: 25.2 PG (ref 26.6–33)
MCHC RBC AUTO-ENTMCNC: 30.8 G/DL (ref 31.5–35.7)
MCV RBC AUTO: 81.5 FL (ref 79–97)
MONOCYTES # BLD AUTO: 0.62 10*3/MM3 (ref 0.1–0.9)
MONOCYTES NFR BLD AUTO: 9.9 % (ref 5–12)
NEUTROPHILS NFR BLD AUTO: 3.21 10*3/MM3 (ref 1.7–7)
NEUTROPHILS NFR BLD AUTO: 51.2 % (ref 42.7–76)
NRBC BLD AUTO-RTO: 0 /100 WBC (ref 0–0.2)
PLATELET # BLD AUTO: 348 10*3/MM3 (ref 140–450)
PMV BLD AUTO: 10.2 FL (ref 6–12)
POTASSIUM SERPL-SCNC: 4.2 MMOL/L (ref 3.5–5.2)
PROT SERPL-MCNC: 7 G/DL (ref 6–8.5)
RBC # BLD AUTO: 4.93 10*6/MM3 (ref 3.77–5.28)
SODIUM SERPL-SCNC: 140 MMOL/L (ref 136–145)
WBC # BLD AUTO: 6.26 10*3/MM3 (ref 3.4–10.8)

## 2021-03-03 PROCEDURE — 85025 COMPLETE CBC W/AUTO DIFF WBC: CPT | Performed by: INTERNAL MEDICINE

## 2021-03-03 PROCEDURE — 80053 COMPREHEN METABOLIC PANEL: CPT | Performed by: INTERNAL MEDICINE

## 2021-03-03 PROCEDURE — 99214 OFFICE O/P EST MOD 30 MIN: CPT | Performed by: INTERNAL MEDICINE

## 2021-03-03 PROCEDURE — 36415 COLL VENOUS BLD VENIPUNCTURE: CPT

## 2021-03-03 NOTE — PROGRESS NOTES
Subjective     REASON FOR follow-up:    1.  Heterozygous state for factor V Leiden    2.  New onset stroke on aspirin by neurology    3.  Hypercholesterolemia    4.  Hypercoagulable work-up done.  Antithrombin 109% protein S activity 85% protein S antigen 107%, protein C activity 85%.  Anticardiolipin antibody IgM 24%, antibeta-2 glycoprotein antibody negative, lupus anticoagulant not detected, prothrombin gene mutation negative.                                   History of Present Illness   Patient is a 40-year-old female with history of recent stroke in December when she presented with numbness of the face and left hand which lasted 8 hours on the face and 24 hours on the left arm.  She had not really gone to the ER.  She was seen by Dr. Hernandez neurologist who placed her on aspirin.  She had a complete stroke work-up with echocardiogram which was normal.  Holter monitor was normal without evidence of any atrial fibrillation.  Cardiology felt that there was no cardiac cause.  Ultrasound of the carotids did not show any stenosis.  But she had high cholesterol.  She is on treatment for her high cholesterol currently and also an aspirin.  As soon as she took aspirin her symptoms resolved within 3 days completely.  She has never had a DVT in the past.  She was referred by her neurologist to evaluate for further hypercoagulable work-up.  Her aunt had heterozygous state for factor V Leiden and when patient was evaluated she was found to have heterozygous state for factor V Leiden.    Rest of the hypercoagulable work-up has been completely negative as we discussed above.    Patient states she has absolutely no symptoms of headache, no evidence of numbness in the face or arms and no weakness.    We also schedule mammogram which is scheduled in April of this year as she missed it last year.  Patient is having repeat MRI of the brain end of April and is following up with neurology after that.      Hematologic  history:  patient is a 40-year-old female who presented end of December with numbness and tingling in her left upper extremity and left face.  She went to see Dr. Goodman who then got concerned and referred to neurology.  She was referred to Dr. Freedman and talk to Dr. Thompson neurology for evaluation.  Patient underwent ultrasound of the carotids which did not show significant stenosis of the carotids.  She underwent 2D echocardiogram which showed a good ejection fraction of 64% with mild mitral valve prolapse and mild mitral stenosis.  She had a 24-hour Holter which was negative.  She then had also an MRI of the brain February 3, 2021 which showed areas of hyperintensity involving the subcortical white matter of the right frontal lobe superior laterally and posteriorly with the largest area measuring 8 9 mm.  There is also enhancement present.  The findings are consistent with a subacute infarct.  They could not differentiate if there is any underlying neoplastic process but a short-term follow-up was suggested in order to follow-up.  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    Patient currently got started on aspirin and factor V Leiden was obtained by neurology because patient's paternal aunt had factor V Leiden mutation and she was heterozygous.  Patient's testing also showed that she was heterozygous.    Patient states her numbness and tingling is resolved completely on the left arm and face it just lasted for a 24 hours.  She was here referred here for neurology to see if there is any other cause for her underlying hypercoagulable state.  She has not had a complete hypercoagulable work-up.  Also discussed with her that an underlying malignancy could cause a hypercoagulable state and we would need to do a CT scan to rule that out.    Patient's mother has had breast cancer in her 50s but had pancreatic cancer at  68 and  from that.  Patient's dad had stroke at 63.  But he is alive at 74.  She has 1 brother who is 40 in good health.  Paternal aunt had breast cancer in her 40s.  Paternal grandfather had colon cancer in his late 80s.  Maternal uncle had throat cancer and another maternal uncle had skin cancer with metastasis to the lung.  And maternal grandmother had breast cancer.    Patient was to have mammogram done last year but because of COVID-19 it got postponed and she has not had it done yet.    Patient used to be a smoker half pack per day for 7 years but quit 10 years ago.  She has high cholesterol for which she is on treatment.    Past Medical History:   Diagnosis Date   • Acute stress reaction 2014   • ADD (attention deficit disorder) 2014   • ADHD (attention deficit hyperactivity disorder)    • Benign essential hypertension 2010   • Family history of breast cancer 2013   • H/O complete eye exam 2016   • Hypothyroidism    • Kidney stone    • Stroke-like symptoms         Past Surgical History:   Procedure Laterality Date   • BREAST BIOPSY     • MAMMO BILATERAL     • PAP SMEAR  2016        Current Outpatient Medications on File Prior to Visit   Medication Sig Dispense Refill   • amphetamine-dextroamphetamine XR (Adderall XR) 25 MG 24 hr capsule Take 1 capsule by mouth Every Morning 30 capsule 0   • aspirin 81 MG EC tablet Take 1 tablet by mouth Daily. 30 tablet 11   • atorvastatin (Lipitor) 40 MG tablet Take 1 tablet by mouth Daily. 30 tablet 11   • cholecalciferol (VITAMIN D3) 10 MCG (400 UNIT) tablet Take 400 Units by mouth Daily.     • levothyroxine (SYNTHROID, LEVOTHROID) 137 MCG tablet Take 1 tablet by mouth Daily. 30 tablet 5   • LORazepam (Ativan) 0.5 MG tablet Take 1 tablet by mouth Every 8 (Eight) Hours As Needed for Anxiety. 20 tablet 0   • valsartan (DIOVAN) 160 MG tablet Take 1 tablet by mouth Daily. For blood pressure 30 tablet 5     No current facility-administered  medications on file prior to visit.         ALLERGIES:    Allergies   Allergen Reactions   • Sulfa Antibiotics Unknown - Low Severity        Social History     Socioeconomic History   • Marital status: Significant Other     Spouse name: Not on file   • Number of children: Not on file   • Years of education: Not on file   • Highest education level: Not on file   Tobacco Use   • Smoking status: Former Smoker   • Smokeless tobacco: Never Used   Substance and Sexual Activity   • Alcohol use: Yes     Comment: rarely   • Drug use: No   • Sexual activity: Yes     Partners: Male        Family History   Problem Relation Age of Onset   • Breast cancer Mother    • Breast cancer Maternal Grandmother    • Breast cancer Paternal Grandmother    • Stroke Father         Review of Systems   Constitutional: Negative for appetite change, chills, diaphoresis, fatigue, fever and unexpected weight change.   HENT: Negative for hearing loss, sore throat and trouble swallowing.    Respiratory: Negative for cough, chest tightness, shortness of breath and wheezing.    Cardiovascular: Negative for chest pain, palpitations and leg swelling.   Gastrointestinal: Negative for abdominal distention, abdominal pain, constipation, diarrhea, nausea and vomiting.   Genitourinary: Negative for dysuria, frequency, hematuria and urgency.   Musculoskeletal: Negative for joint swelling.        No muscle weakness.   Skin: Negative for rash and wound.   Neurological: Negative for seizures, syncope, speech difficulty, weakness, numbness and headaches.   Hematological: Negative for adenopathy. Does not bruise/bleed easily.   Psychiatric/Behavioral: Negative for behavioral problems, confusion and suicidal ideas.      I have reviewed and confirmed the accuracy of the ROS as documented by the MA/LPN/RN Neena Beavers MD        Objective     Vitals:    03/03/21 0835   BP: 125/80   Pulse: 95   Resp: 16   Temp: 98.5 °F (36.9 °C)   TempSrc: Temporal   SpO2: 98%  "  Weight: 71.6 kg (157 lb 14.4 oz)   Height: 157.5 cm (62.01\")   PainSc: 0-No pain     No flowsheet data found.    Physical Exam    CONSTITUTIONAL:  Vital signs reviewed.    No distress, looks comfortable.  EYES:  Conjunctiva and lids unremarkable.  Extraocular eye movements intact.  EARS,NOSE,MOUTH,THROAT:  Ears and nose appear unremarkable.  Lips, teeth, gums appear unremarkable.  RESPIRATORY:  Normal respiratory effort.    CARDIOVASCULAR:   Regular rate rhythm  No significant lower extremity edema.  SKIN: No wounds.  No rashes.  MUSCULOSKELETAL/EXTREMITIES: No clubbing or cyanosis.  No apparent unilateral weakness.  NEURO: CN 2-12 appear intact. No focal neurological deficits noted.  PSYCHIATRIC:  Normal judgment and insight.  Normal mood and affect.  I have reexamined the patient and the results are consistent with the previously documented exam. Neena Beavers MD       RECENT LABS:  Hematology WBC   Date Value Ref Range Status   03/03/2021 6.26 3.40 - 10.80 10*3/mm3 Final   01/04/2021 9.5 3.4 - 10.8 x10E3/uL Final     RBC   Date Value Ref Range Status   03/03/2021 4.93 3.77 - 5.28 10*6/mm3 Final   01/04/2021 4.85 3.77 - 5.28 x10E6/uL Final     Hemoglobin   Date Value Ref Range Status   03/03/2021 12.4 12.0 - 15.9 g/dL Final     Hematocrit   Date Value Ref Range Status   03/03/2021 40.2 34.0 - 46.6 % Final     Platelets   Date Value Ref Range Status   03/03/2021 348 140 - 450 10*3/mm3 Final      Study Result    ULTRASOUND RENAL BILATERAL-     CLINICAL INFORMATION:  Follow-up hypodense nodule upper pole right  kidney.     COMPARISON: CT abdomen 02/23/2021.     FINDINGS: No cyst is demonstrated within the upper pole of the right  kidney, the hypodense nodule seen on CT likely represents an isoechoic  solid lesion. The right kidney is 10.1 cm in length. No calculus nor  obstructive uropathy.     The left kidney measures 11.1 cm in length. Along the mid pole of the  left kidney there is a hypoechoic nodule which " appears to have a fine  internal septae and measures 16 x 16 x 14 mm. This corresponds to the  nodule seen on CT. This has the appearance of a cyst.     Limited images of the urinary bladder demonstrate bilateral ureteral  jets.     CONCLUSION:  1. Left mid pole cyst reidentified similar to the previous CT.  2. No cyst was demonstrated at the site of the right upper pole renal  nodule described on CT. Suspect isoechoic solid nodule within the  cortex.  Six-month follow-up CT abdomen advised.     This report was finalized on 3/1/2021 3:16 PM by Dr. Tristian Parker M.D.            Duplex Carotid Ultrasound 02/03/21    Study Impression •    Right ICA Prox:  Imaging indicates atherosclerotic plaque.      •    Left ICA Prox:  Imaging indicates 16-49% stenosis.     CT ABDOMEN AND PELVIS WITH CONTRAST     HISTORY: Abdominal discomfort. Patient with history of recent stroke. To  rule out malignancy.     TECHNIQUE: Axial CT images of the abdomen and pelvis were obtained  following administration of intravenous contrast. The patient was given  oral contrast Coronal and sagittal reformats were obtained.     COMPARISON: None.     FINDINGS: The liver demonstrates normal attenuation. Small  hypoattenuating area adjacent to the falciform ligament may represent an  area of focal fatty infiltration. The spleen is normal in size and  attenuation. No focal splenic lesion. The gallbladder is unremarkable on  imaging. The pancreas is normal without ductal dilatation. Bilateral  adrenal glands are normal. Both kidneys are normal in size and  attenuation. A 1 cm hypoattenuating cortical lesion at the upper pole of  the right kidney is too small to accurately characterize. Similarly  there is a hypoattenuating rounded lesion at the upper pole of the left  kidney measuring up to 1.5 cm. This demonstrates attenuation less than  20 Hounsfield units and is favored to represent a cyst. No renal calculi  or hydronephrosis. The urinary bladder is  partially distended and  normal. The uterus is anteverted and demonstrate small fibroids within  the posterior myometrium. No abnormal adnexal mass is seen. The small  and large bowel loops demonstrate normal caliber. A predominantly fat  density lesion with peripheral hyperdense rim is seen adjacent to the  sigmoid colon on image 92 favored to represent sequela from prior fat  necrosis/epiploic appendagitis. There is no pathological retroperitoneal  lymphadenopathy. No ascites is seen. Small sclerotic focus seen within  the right ischium favored to represent a bone island. .     IMPRESSION:  1. 1.1 cm hypoattenuating cortical lesion at the upper pole of the right  kidney not well characterized on the current exam. Further evaluation  with renal sonogram is recommended.  2. Small uterine fibroids.     Radiation dose reduction techniques were utilized, including automated  exposure control and exposure modulation based on body size.     This report was finalized on 2/24/2021 11:06 AM by Dr. Godfrey Martinez M.D.       MRI EXAMINATION BRAIN WITH AND WITHOUT CONTRAST 02/03/21     IMPRESSION:  1.  Areas of T2 FLAIR hyperintensity are appreciated involving the  subcortical white matter of the right frontal lobe superolaterally and  posteriorly with the largest area measuring approximately 9 mm in  diameter. There is diffusion weighted hyperintensity but without signal  loss on the ADC map at this location. There is also enhancement present.  The findings are likely related to a subacute infarct. An underlying  neoplastic process cannot be entirely excluded but is thought to be  unlikely. The appearance of the T2 FLAIR hyperintensity is not typical  for an underlying demyelinating process. Clinical correlation as well as  a short-term follow-up MRI examination of the brain with and without  contrast is recommended.  2.  Venous malformation/developmental venous anomaly involving the head  of the caudate nucleus on the  right. These are considered to be  developmental variants.    Assessment/Plan     1.  Right frontal stroke and patient presented in 2020 with left-sided weakness tingling and numbness and also numbness in the face.  · Was referred to neurology and cardiology by Dr. Goodman  · Ultrasound of carotid does not show significant stenosis  · 24 Holter is negative  · 2D echocardiogram shows ejection fraction of 64% with mild mitral regurg and mitral stenosis  · Neurology obtain factor V Leiden given that patient's paternal aunt had's history of factor V Leiden and that was heterozygous for factor V Leiden  · Patient has been referred to us in order to evaluate any other underlying cause for hypercoagulable state  · We discussed about obtaining rest of the hypercoagulable work-up though doubtful it will be positive  · We will also obtain a CT scan of the chest abdomen pelvis in order to rule out underlying malignancy as a cause  · We will obtain mammogram as that has not been done yet last year  · Continue aspirin as per neurology.  Also patient on medications for her high cholesterol started after stroke currently asymptomatic  · Hypercoagulable work-up done which is negative except heterozygous state for factor V Leiden.  · Complete stroke work-up has been negative and since this is arterial stroke and she was not on aspirin prior to that and her symptoms have resolved I agree with continuing aspirin alone along with high cholesterol medications.  · At this time I do not feel the need to put her on anticoagulants like Eliquis or Coumadin as she has not failed aspirin and she has responded to aspirin given the risk of bleeding versus clotting.  She has never had a DVT or pulmonary embolism in the past.      2.  Family history of cancer: Patient's mother had breast cancer at age 50 and pancreatic cancer at age 68 and  from pancreatic cancer.  Patient's maternal grandmother had breast cancer in her 50s.  Her  maternal uncle had skin cancer which went to the lung and another maternal uncle had throat cancer.  Maternal aunt had call colon polyps.  Patient's paternal aunt had breast cancer in her 40s.  And paternal grandfather with colon cancer in his 80s.  Paternal aunt also had factor V Leiden.    3.  High cholesterol for which she is on treatment    4.  Hypertension    5.  Elevated BMI, encourage diet and exercise.  Patient is improving her diet and exercise after having had the stroke    Plan  · Reviewed hypercoagulable work-up and all of the work-up is negative except heterozygosity for factor V Leiden  · Given this was a arterial clot and patient responded to aspirin very well with resolution of complete symptoms best option is to continue aspirin  · Patient has never had DVT or pulmonary embolism in the past and I see no need for anticoagulation with Eliquis or Coumadin given the risk versus the benefit and also patient has not failed aspirin.    · Have left message for Dr. Hernandez neurologist to call me so we can discuss.  · So far cardiac work-up is negative and ultrasound carotid is negative.  · Discussed with her that she should not sit in front of the computer for many hours and every 2 hours she needs to take a break and walk around and take enough fluids  · Continue treatment of her hypercholesterolemia  · Await results of mammogram which is scheduled in April 2021  · Patient is scheduled for repeat MRI of the brain in end of April and we will see her mid-May to follow-up on that.    MD Dr. Maxine Ibanez Dr., Dr.

## 2021-03-08 ENCOUNTER — HOSPITAL ENCOUNTER (OUTPATIENT)
Dept: MRI IMAGING | Facility: HOSPITAL | Age: 41
Discharge: HOME | End: 2021-03-08
Admitting: PSYCHIATRY & NEUROLOGY

## 2021-03-08 DIAGNOSIS — I63.9 CEREBRAL INFARCTION, UNSPECIFIED MECHANISM (HCC): ICD-10-CM

## 2021-03-08 LAB — CREAT BLDA-MCNC: 0.7 MG/DL (ref 0.6–1.3)

## 2021-03-08 PROCEDURE — 82565 ASSAY OF CREATININE: CPT

## 2021-03-08 PROCEDURE — 70553 MRI BRAIN STEM W/O & W/DYE: CPT

## 2021-03-08 PROCEDURE — A9577 INJ MULTIHANCE: HCPCS | Performed by: PSYCHIATRY & NEUROLOGY

## 2021-03-08 PROCEDURE — 0 GADOBENATE DIMEGLUMINE 529 MG/ML SOLUTION: Performed by: PSYCHIATRY & NEUROLOGY

## 2021-03-08 RX ADMIN — GADOBENATE DIMEGLUMINE 14 ML: 529 INJECTION, SOLUTION INTRAVENOUS at 13:02

## 2021-03-09 ENCOUNTER — TRANSCRIBE ORDERS (OUTPATIENT)
Dept: ADMINISTRATIVE | Facility: HOSPITAL | Age: 41
End: 2021-03-09

## 2021-03-09 DIAGNOSIS — F90.0 ATTENTION DEFICIT HYPERACTIVITY DISORDER (ADHD), PREDOMINANTLY INATTENTIVE TYPE: ICD-10-CM

## 2021-03-09 DIAGNOSIS — I63.9 CEREBELLAR INFARCTION (HCC): Primary | ICD-10-CM

## 2021-03-09 RX ORDER — DEXTROAMPHETAMINE SACCHARATE, AMPHETAMINE ASPARTATE MONOHYDRATE, DEXTROAMPHETAMINE SULFATE AND AMPHETAMINE SULFATE 6.25; 6.25; 6.25; 6.25 MG/1; MG/1; MG/1; MG/1
25 CAPSULE, EXTENDED RELEASE ORAL EVERY MORNING
Qty: 30 CAPSULE | Refills: 0 | Status: SHIPPED | OUTPATIENT
Start: 2021-03-09 | End: 2021-04-14

## 2021-03-10 ENCOUNTER — HOSPITAL ENCOUNTER (OUTPATIENT)
Dept: MRI IMAGING | Facility: HOSPITAL | Age: 41
Discharge: HOME | End: 2021-03-10
Admitting: PSYCHIATRY & NEUROLOGY

## 2021-03-10 DIAGNOSIS — I63.9 CEREBELLAR INFARCTION (HCC): ICD-10-CM

## 2021-03-10 PROCEDURE — 0 GADOBENATE DIMEGLUMINE 529 MG/ML SOLUTION: Performed by: PSYCHIATRY & NEUROLOGY

## 2021-03-10 PROCEDURE — 82565 ASSAY OF CREATININE: CPT

## 2021-03-10 PROCEDURE — A9577 INJ MULTIHANCE: HCPCS | Performed by: PSYCHIATRY & NEUROLOGY

## 2021-03-10 RX ADMIN — GADOBENATE DIMEGLUMINE 15 ML: 529 INJECTION, SOLUTION INTRAVENOUS at 09:07

## 2021-03-11 LAB — CREAT BLDA-MCNC: 0.7 MG/DL (ref 0.6–1.3)

## 2021-03-17 ENCOUNTER — TELEPHONE (OUTPATIENT)
Dept: NEUROLOGY | Facility: CLINIC | Age: 41
End: 2021-03-17

## 2021-03-17 DIAGNOSIS — I63.9 CEREBRAL INFARCTION, UNSPECIFIED MECHANISM (HCC): Primary | ICD-10-CM

## 2021-03-17 NOTE — TELEPHONE ENCOUNTER
MRI with and w/o contrast ordered, just needs to be signed to be scheduled for 2-3 mo out. Thank you.

## 2021-04-02 ENCOUNTER — BULK ORDERING (OUTPATIENT)
Dept: CASE MANAGEMENT | Facility: OTHER | Age: 41
End: 2021-04-02

## 2021-04-02 ENCOUNTER — HOSPITAL ENCOUNTER (OUTPATIENT)
Dept: MAMMOGRAPHY | Facility: HOSPITAL | Age: 41
Discharge: HOME | End: 2021-04-02
Admitting: INTERNAL MEDICINE

## 2021-04-02 DIAGNOSIS — Z12.31 ENCOUNTER FOR SCREENING MAMMOGRAM FOR MALIGNANT NEOPLASM OF BREAST: ICD-10-CM

## 2021-04-02 DIAGNOSIS — Z23 IMMUNIZATION DUE: ICD-10-CM

## 2021-04-02 PROCEDURE — 77063 BREAST TOMOSYNTHESIS BI: CPT

## 2021-04-02 PROCEDURE — 77067 SCR MAMMO BI INCL CAD: CPT

## 2021-04-05 ENCOUNTER — TELEPHONE (OUTPATIENT)
Dept: ONCOLOGY | Facility: CLINIC | Age: 41
End: 2021-04-05

## 2021-04-05 DIAGNOSIS — R93.89 ABNORMAL FINDING ON IMAGING: ICD-10-CM

## 2021-04-05 DIAGNOSIS — Z80.3 FAMILY HISTORY OF BREAST CANCER: Primary | ICD-10-CM

## 2021-04-05 NOTE — PROGRESS NOTES
Left diagnostic mammogram with spot compression and left breast ultrasound re: abnormal screening mammogram V.O. Dr. Beavers.

## 2021-04-05 NOTE — TELEPHONE ENCOUNTER
----- Message from Mary Silva RN sent at 4/5/2021 11:29 AM EDT -----  Orders entered for left diagnostic mammogram and left breast ultrasound re: abnormal screening mammogram.    Patient is expecting your call to schedule.    Thank you,  Mary

## 2021-04-07 DIAGNOSIS — F90.0 ATTENTION DEFICIT HYPERACTIVITY DISORDER (ADHD), PREDOMINANTLY INATTENTIVE TYPE: ICD-10-CM

## 2021-04-07 RX ORDER — DEXTROAMPHETAMINE SACCHARATE, AMPHETAMINE ASPARTATE MONOHYDRATE, DEXTROAMPHETAMINE SULFATE AND AMPHETAMINE SULFATE 6.25; 6.25; 6.25; 6.25 MG/1; MG/1; MG/1; MG/1
25 CAPSULE, EXTENDED RELEASE ORAL EVERY MORNING
Qty: 30 CAPSULE | Refills: 0 | OUTPATIENT
Start: 2021-04-07

## 2021-04-09 ENCOUNTER — HOSPITAL ENCOUNTER (OUTPATIENT)
Dept: ULTRASOUND IMAGING | Facility: HOSPITAL | Age: 41
Discharge: HOME | End: 2021-04-09

## 2021-04-09 ENCOUNTER — HOSPITAL ENCOUNTER (OUTPATIENT)
Dept: MAMMOGRAPHY | Facility: HOSPITAL | Age: 41
Discharge: HOME | End: 2021-04-09

## 2021-04-09 DIAGNOSIS — R93.89 ABNORMAL FINDING ON IMAGING: ICD-10-CM

## 2021-04-09 DIAGNOSIS — Z80.3 FAMILY HISTORY OF BREAST CANCER: ICD-10-CM

## 2021-04-09 PROCEDURE — G0279 TOMOSYNTHESIS, MAMMO: HCPCS

## 2021-04-09 PROCEDURE — 77065 DX MAMMO INCL CAD UNI: CPT

## 2021-04-09 PROCEDURE — 76642 ULTRASOUND BREAST LIMITED: CPT

## 2021-04-11 ENCOUNTER — DOCUMENTATION (OUTPATIENT)
Dept: ONCOLOGY | Facility: CLINIC | Age: 41
End: 2021-04-11

## 2021-04-11 NOTE — PROGRESS NOTES
I reviewed the results of the mammogram.  She had an abnormality in the left breast diagnostic mammogram and an ultrasound-guided biopsy is recommended for the 0.8 cm lesion in the left breast at 6 o'clock position.  I have left a message on the patient's telephone and I have also notified Mary to schedule the biopsy and subsequently schedule her with me after the biopsy is done.  Neena Beavers MD

## 2021-04-12 ENCOUNTER — TELEPHONE (OUTPATIENT)
Dept: ONCOLOGY | Facility: CLINIC | Age: 41
End: 2021-04-12

## 2021-04-12 DIAGNOSIS — Z80.3 FAMILY HISTORY OF BREAST CANCER: Primary | ICD-10-CM

## 2021-04-12 DIAGNOSIS — R93.89 ABNORMAL FINDING ON IMAGING: ICD-10-CM

## 2021-04-12 DIAGNOSIS — N63.20 LEFT BREAST MASS: ICD-10-CM

## 2021-04-12 NOTE — TELEPHONE ENCOUNTER
----- Message from Mary Silva RN sent at 4/12/2021  8:11 AM EDT -----  Please schedule patient for ultrasound guided left breast biopsy.  If you let me know date and time, I will call the patient, as Dr. Beavers asked me to notify her.    Thank you,  Mary  ----- Message -----  From: Neena Beavers MD  Sent: 4/11/2021  11:46 AM EDT  To: NICOLASA Benson  I reviewed the mammogram left diagnostic as well as left ultrasound of the breast and patient has a 0.8 mm lesion in the left breast.  An ultrasound-guided biopsy of the left breast mass is recommended.    Can you please schedule her for left breast ultrasound-guided biopsy of the 0.8 mm mass at 6 o'clock position.  And she can follow with me when I come back.  Please notify the patient about the appointment.  I will give her a call about the abnormality.  Thanks  Neena Beavers MD

## 2021-04-13 RX ORDER — DEXTROAMPHETAMINE SACCHARATE, AMPHETAMINE ASPARTATE MONOHYDRATE, DEXTROAMPHETAMINE SULFATE AND AMPHETAMINE SULFATE 6.25; 6.25; 6.25; 6.25 MG/1; MG/1; MG/1; MG/1
25 CAPSULE, EXTENDED RELEASE ORAL EVERY MORNING
Qty: 30 CAPSULE | Refills: 0 | Status: CANCELLED | OUTPATIENT
Start: 2021-04-13

## 2021-04-13 NOTE — PROGRESS NOTES
Subjective   Radha Astorga is a 41 y.o. female.     CC: Video Visit for Medical Management    History of Present Illness     Chief Complaint:   Chief Complaint   Patient presents with   • Hyperlipidemia     video - med refill  - no labs    • ADHD       Radha Astorga 41 y.o. female who presents today for Medical Management of the below listed issues and medication refills.  she has a problem list of   Patient Active Problem List   Diagnosis   • Family history of breast cancer   • Benign essential hypertension   • Hypothyroidism   • Acute stress reaction   • Major depressive disorder, recurrent episode, moderate with anxious distress (CMS/HCC)   • Attention deficit hyperactivity disorder (ADHD), predominantly inattentive type   • Grief reaction   • Cerebral infarction (CMS/HCC)   • Factor 5 Leiden mutation, heterozygous (CMS/HCC)   • Secondary hypertension   .  Since the last visit, she has been seeing [facility] who are working up her prior stroke and a new breast lesion.  she has been compliant with   Current Outpatient Medications:   •  valsartan (DIOVAN) 160 MG tablet, Take 1 tablet by mouth Daily. For blood pressure, Disp: 30 tablet, Rfl: 5  •  amphetamine-dextroamphetamine XR (Adderall XR) 20 MG 24 hr capsule, Take 1 capsule by mouth Every Morning, Disp: 30 capsule, Rfl: 0  •  aspirin 81 MG EC tablet, Take 1 tablet by mouth Daily., Disp: 30 tablet, Rfl: 11  •  atorvastatin (Lipitor) 40 MG tablet, Take 1 tablet by mouth Daily., Disp: 30 tablet, Rfl: 11  •  cholecalciferol (VITAMIN D3) 10 MCG (400 UNIT) tablet, Take 400 Units by mouth Daily., Disp: , Rfl:   •  levothyroxine (SYNTHROID, LEVOTHROID) 137 MCG tablet, Take 1 tablet by mouth Daily., Disp: 30 tablet, Rfl: 5  •  LORazepam (Ativan) 0.5 MG tablet, Take 1 tablet by mouth Every 8 (Eight) Hours As Needed for Anxiety., Disp: 20 tablet, Rfl: 0.  she denies medication side effects.    All of the other chronic condition(s) listed above are stable  "w/o issues.    Ht 157.5 cm (62\")   Wt 68 kg (150 lb)   BMI 27.44 kg/m²     Results for orders placed or performed during the hospital encounter of 03/10/21   POC Creatinine    Specimen: Blood   Result Value Ref Range    Creatinine 0.70 0.60 - 1.30 mg/dL           The following portions of the patient's history were reviewed and updated as appropriate: allergies, current medications, past family history, past medical history, past social history, past surgical history and problem list.    Review of Systems   Constitutional: Negative for activity change, chills and fever.   Respiratory: Negative for cough.    Cardiovascular: Negative for chest pain.   Psychiatric/Behavioral: Negative for dysphoric mood.       Objective   Physical Exam  Constitutional:       General: She is not in acute distress.     Appearance: She is well-developed.   Pulmonary:      Effort: Pulmonary effort is normal.   Neurological:      Mental Status: She is alert and oriented to person, place, and time.   Psychiatric:         Behavior: Behavior normal.         Thought Content: Thought content normal.         Assessment/Plan   Diagnoses and all orders for this visit:    1. Attention deficit hyperactivity disorder (ADHD), predominantly inattentive type (Primary)  -     amphetamine-dextroamphetamine XR (Adderall XR) 20 MG 24 hr capsule; Take 1 capsule by mouth Every Morning  Dispense: 30 capsule; Refill: 0    2. Essential hypertension  -     valsartan (DIOVAN) 160 MG tablet; Take 1 tablet by mouth Daily. For blood pressure  Dispense: 30 tablet; Refill: 5    Other orders  -     Cancel: amphetamine-dextroamphetamine XR (Adderall XR) 25 MG 24 hr capsule; Take 1 capsule by mouth Every Morning  Dispense: 30 capsule; Refill: 0    Spent  14   minutes with chart and interview and consent for this encounter given by the patient.  You have chosen to receive care through a telehealth visit.  Do you consent to use a video/audio connection for your medical " care today? Yes

## 2021-04-14 ENCOUNTER — TELEMEDICINE (OUTPATIENT)
Dept: FAMILY MEDICINE CLINIC | Facility: CLINIC | Age: 41
End: 2021-04-14

## 2021-04-14 VITALS — HEIGHT: 62 IN | BODY MASS INDEX: 27.6 KG/M2 | WEIGHT: 150 LBS

## 2021-04-14 DIAGNOSIS — I10 ESSENTIAL HYPERTENSION: Chronic | ICD-10-CM

## 2021-04-14 DIAGNOSIS — F90.0 ATTENTION DEFICIT HYPERACTIVITY DISORDER (ADHD), PREDOMINANTLY INATTENTIVE TYPE: Primary | Chronic | ICD-10-CM

## 2021-04-14 PROCEDURE — 99214 OFFICE O/P EST MOD 30 MIN: CPT | Performed by: FAMILY MEDICINE

## 2021-04-14 RX ORDER — DEXTROAMPHETAMINE SACCHARATE, AMPHETAMINE ASPARTATE MONOHYDRATE, DEXTROAMPHETAMINE SULFATE AND AMPHETAMINE SULFATE 5; 5; 5; 5 MG/1; MG/1; MG/1; MG/1
20 CAPSULE, EXTENDED RELEASE ORAL EVERY MORNING
Qty: 30 CAPSULE | Refills: 0 | Status: SHIPPED | OUTPATIENT
Start: 2021-04-14 | End: 2021-05-06 | Stop reason: SDUPTHER

## 2021-04-14 RX ORDER — VALSARTAN 160 MG/1
160 TABLET ORAL DAILY
Qty: 30 TABLET | Refills: 5 | Status: SHIPPED | OUTPATIENT
Start: 2021-04-14 | End: 2021-07-20 | Stop reason: SDUPTHER

## 2021-04-19 ENCOUNTER — APPOINTMENT (OUTPATIENT)
Dept: ULTRASOUND IMAGING | Facility: HOSPITAL | Age: 41
End: 2021-04-19

## 2021-04-19 ENCOUNTER — APPOINTMENT (OUTPATIENT)
Dept: MAMMOGRAPHY | Facility: HOSPITAL | Age: 41
End: 2021-04-19

## 2021-04-26 ENCOUNTER — HOSPITAL ENCOUNTER (OUTPATIENT)
Dept: ULTRASOUND IMAGING | Facility: HOSPITAL | Age: 41
Discharge: HOME | End: 2021-04-26

## 2021-04-26 ENCOUNTER — HOSPITAL ENCOUNTER (OUTPATIENT)
Dept: MAMMOGRAPHY | Facility: HOSPITAL | Age: 41
Discharge: HOME | End: 2021-04-26

## 2021-04-26 VITALS
RESPIRATION RATE: 16 BRPM | DIASTOLIC BLOOD PRESSURE: 81 MMHG | HEART RATE: 84 BPM | BODY MASS INDEX: 27.6 KG/M2 | SYSTOLIC BLOOD PRESSURE: 121 MMHG | WEIGHT: 150 LBS | HEIGHT: 62 IN | TEMPERATURE: 98.2 F | OXYGEN SATURATION: 100 %

## 2021-04-26 DIAGNOSIS — Z98.890 STATUS POST BIOPSY: ICD-10-CM

## 2021-04-26 DIAGNOSIS — R93.89 ABNORMAL FINDING ON IMAGING: ICD-10-CM

## 2021-04-26 DIAGNOSIS — Z80.3 FAMILY HISTORY OF BREAST CANCER: ICD-10-CM

## 2021-04-26 DIAGNOSIS — N63.20 LEFT BREAST MASS: ICD-10-CM

## 2021-04-26 PROCEDURE — 88360 TUMOR IMMUNOHISTOCHEM/MANUAL: CPT | Performed by: INTERNAL MEDICINE

## 2021-04-26 PROCEDURE — 77065 DX MAMMO INCL CAD UNI: CPT

## 2021-04-26 PROCEDURE — A4648 IMPLANTABLE TISSUE MARKER: HCPCS

## 2021-04-26 PROCEDURE — 88342 IMHCHEM/IMCYTCHM 1ST ANTB: CPT | Performed by: INTERNAL MEDICINE

## 2021-04-26 PROCEDURE — 88341 IMHCHEM/IMCYTCHM EA ADD ANTB: CPT | Performed by: INTERNAL MEDICINE

## 2021-04-26 PROCEDURE — 25010000003 LIDOCAINE 1 % SOLUTION: Performed by: RADIOLOGY

## 2021-04-26 PROCEDURE — 88305 TISSUE EXAM BY PATHOLOGIST: CPT | Performed by: INTERNAL MEDICINE

## 2021-04-26 RX ORDER — LIDOCAINE HYDROCHLORIDE AND EPINEPHRINE 10; 10 MG/ML; UG/ML
10 INJECTION, SOLUTION INFILTRATION; PERINEURAL ONCE
Status: COMPLETED | OUTPATIENT
Start: 2021-04-26 | End: 2021-04-26

## 2021-04-26 RX ORDER — LIDOCAINE HYDROCHLORIDE 10 MG/ML
10 INJECTION, SOLUTION INFILTRATION; PERINEURAL ONCE
Status: COMPLETED | OUTPATIENT
Start: 2021-04-26 | End: 2021-04-26

## 2021-04-26 RX ADMIN — LIDOCAINE HYDROCHLORIDE 3 ML: 10 INJECTION, SOLUTION INFILTRATION; PERINEURAL at 11:04

## 2021-04-26 RX ADMIN — LIDOCAINE HYDROCHLORIDE,EPINEPHRINE BITARTRATE 9 ML: 10; .01 INJECTION, SOLUTION INFILTRATION; PERINEURAL at 11:04

## 2021-04-26 NOTE — H&P
Name: Radha Astorga ADMIT: 2021   : 1980  PCP: Fadi Goodman MD    MRN: [id number] LOS: 0 days   AGE/SEX: 41 y.o. female  ROOM: Room/bed info not found       Chief complaint L breast mass    Present Illness or Internal History:  Patient is a 41 y.o. female presents with L breast mass for US bx.     Past Surgical History:  Past Surgical History:   Procedure Laterality Date   • BREAST BIOPSY Left     Benign   • MAMMO BILATERAL     • PAP SMEAR         Past Medical History:  Past Medical History:   Diagnosis Date   • Acute stress reaction 2014   • ADD (attention deficit disorder) 2014   • ADHD (attention deficit hyperactivity disorder)    • Benign essential hypertension 2010   • Family history of breast cancer 2013   • H/O complete eye exam 2016   • Hypothyroidism    • Kidney stone    • Stroke-like symptoms        Home Medications:  (Not in a hospital admission)      Allergies:  Sulfa antibiotics    Family History:  Family History   Problem Relation Age of Onset   • Breast cancer Mother    • Lung cancer Maternal Grandmother    • Stroke Father    • Breast cancer Maternal Aunt    • Breast cancer Paternal Aunt        Social History:  Social History     Tobacco Use   • Smoking status: Former Smoker     Packs/day: 1.00     Years: 7.00     Pack years: 7.00     Types: Cigarettes   • Smokeless tobacco: Never Used   Substance Use Topics   • Alcohol use: Yes     Comment: rarely   • Drug use: No        Objective     Physical Exam:    No exam performed today,    Vital Signs  Temp:  [98.2 °F (36.8 °C)] 98.2 °F (36.8 °C)  Heart Rate:  [83] 83  Resp:  [16] 16  BP: (120)/(82) 120/82    Anticipated Surgical Procedure:  Left breast ultrasound guided core needle biopsy    The risks, benefits and alternatives of this procedure have been discussed with the patient or responsible party: Yes        Codi Machuca MD  21  10:32 EDT

## 2021-04-26 NOTE — NURSING NOTE
Biopsy site to left lower breast clear with Skin Affix dry and intact. No firmness or swelling noted at or around biopsy site. Denies pain. Ice pack with protective covering applied to biopsy site. Discharge instructions discussed with understanding voiced by patient. Copies provided to patient. No distress noted. To home via private vehicle.

## 2021-04-28 LAB
LAB AP CASE REPORT: NORMAL
LAB AP CLINICAL INFORMATION: NORMAL
LAB AP SPECIAL STAINS: NORMAL
LAB AP SYNOPTIC CHECKLIST: NORMAL
PATH REPORT.FINAL DX SPEC: NORMAL
PATH REPORT.GROSS SPEC: NORMAL

## 2021-05-05 ENCOUNTER — OFFICE VISIT (OUTPATIENT)
Dept: ONCOLOGY | Facility: CLINIC | Age: 41
End: 2021-05-05

## 2021-05-05 ENCOUNTER — HOSPITAL ENCOUNTER (OUTPATIENT)
Dept: MRI IMAGING | Facility: HOSPITAL | Age: 41
Discharge: HOME | End: 2021-05-05
Admitting: PSYCHIATRY & NEUROLOGY

## 2021-05-05 ENCOUNTER — LAB (OUTPATIENT)
Dept: OTHER | Facility: HOSPITAL | Age: 41
End: 2021-05-05

## 2021-05-05 ENCOUNTER — TELEPHONE (OUTPATIENT)
Dept: SURGERY | Facility: CLINIC | Age: 41
End: 2021-05-05

## 2021-05-05 VITALS
BODY MASS INDEX: 27.2 KG/M2 | HEART RATE: 99 BPM | WEIGHT: 147.8 LBS | OXYGEN SATURATION: 99 % | DIASTOLIC BLOOD PRESSURE: 85 MMHG | HEIGHT: 62 IN | TEMPERATURE: 97.5 F | SYSTOLIC BLOOD PRESSURE: 126 MMHG | RESPIRATION RATE: 16 BRPM

## 2021-05-05 DIAGNOSIS — D68.51 FACTOR 5 LEIDEN MUTATION, HETEROZYGOUS (HCC): ICD-10-CM

## 2021-05-05 DIAGNOSIS — I63.9 CEREBRAL INFARCTION, UNSPECIFIED MECHANISM (HCC): ICD-10-CM

## 2021-05-05 DIAGNOSIS — C50.912 INFILTRATING DUCTAL CARCINOMA OF LEFT BREAST (HCC): ICD-10-CM

## 2021-05-05 DIAGNOSIS — N63.0 BREAST MASS: ICD-10-CM

## 2021-05-05 DIAGNOSIS — Z80.3 FAMILY HISTORY OF BREAST CANCER: ICD-10-CM

## 2021-05-05 DIAGNOSIS — N63.0 BREAST MASS: Primary | ICD-10-CM

## 2021-05-05 DIAGNOSIS — N28.89 KIDNEY MASS: ICD-10-CM

## 2021-05-05 LAB
ALBUMIN SERPL-MCNC: 4.1 G/DL (ref 3.5–5.2)
ALBUMIN/GLOB SERPL: 1.5 G/DL
ALP SERPL-CCNC: 78 U/L (ref 39–117)
ALT SERPL W P-5'-P-CCNC: 17 U/L (ref 1–33)
ANION GAP SERPL CALCULATED.3IONS-SCNC: 8 MMOL/L (ref 5–15)
AST SERPL-CCNC: 16 U/L (ref 1–32)
BASOPHILS # BLD AUTO: 0.06 10*3/MM3 (ref 0–0.2)
BASOPHILS NFR BLD AUTO: 1 % (ref 0–1.5)
BILIRUB SERPL-MCNC: 0.4 MG/DL (ref 0–1.2)
BUN SERPL-MCNC: 10 MG/DL (ref 6–20)
BUN/CREAT SERPL: 11.6 (ref 7–25)
CALCIUM SPEC-SCNC: 9.4 MG/DL (ref 8.6–10.5)
CHLORIDE SERPL-SCNC: 103 MMOL/L (ref 98–107)
CO2 SERPL-SCNC: 28 MMOL/L (ref 22–29)
CREAT BLDA-MCNC: 0.8 MG/DL (ref 0.6–1.3)
CREAT SERPL-MCNC: 0.86 MG/DL (ref 0.57–1)
DEPRECATED RDW RBC AUTO: 38.5 FL (ref 37–54)
EOSINOPHIL # BLD AUTO: 0.16 10*3/MM3 (ref 0–0.4)
EOSINOPHIL NFR BLD AUTO: 2.5 % (ref 0.3–6.2)
ERYTHROCYTE [DISTWIDTH] IN BLOOD BY AUTOMATED COUNT: 13.1 % (ref 12.3–15.4)
GFR SERPL CREATININE-BSD FRML MDRD: 72 ML/MIN/1.73
GLOBULIN UR ELPH-MCNC: 2.8 GM/DL
GLUCOSE SERPL-MCNC: 102 MG/DL (ref 65–99)
HCT VFR BLD AUTO: 36.3 % (ref 34–46.6)
HGB BLD-MCNC: 11.3 G/DL (ref 12–15.9)
IMM GRANULOCYTES # BLD AUTO: 0.02 10*3/MM3 (ref 0–0.05)
IMM GRANULOCYTES NFR BLD AUTO: 0.3 % (ref 0–0.5)
LYMPHOCYTES # BLD AUTO: 2.55 10*3/MM3 (ref 0.7–3.1)
LYMPHOCYTES NFR BLD AUTO: 40.5 % (ref 19.6–45.3)
MCH RBC QN AUTO: 25.1 PG (ref 26.6–33)
MCHC RBC AUTO-ENTMCNC: 31.1 G/DL (ref 31.5–35.7)
MCV RBC AUTO: 80.7 FL (ref 79–97)
MONOCYTES # BLD AUTO: 0.69 10*3/MM3 (ref 0.1–0.9)
MONOCYTES NFR BLD AUTO: 11 % (ref 5–12)
NEUTROPHILS NFR BLD AUTO: 2.82 10*3/MM3 (ref 1.7–7)
NEUTROPHILS NFR BLD AUTO: 44.7 % (ref 42.7–76)
NRBC BLD AUTO-RTO: 0 /100 WBC (ref 0–0.2)
PLATELET # BLD AUTO: 377 10*3/MM3 (ref 140–450)
PMV BLD AUTO: 10.3 FL (ref 6–12)
POTASSIUM SERPL-SCNC: 3.8 MMOL/L (ref 3.5–5.2)
PROT SERPL-MCNC: 6.9 G/DL (ref 6–8.5)
RBC # BLD AUTO: 4.5 10*6/MM3 (ref 3.77–5.28)
SODIUM SERPL-SCNC: 139 MMOL/L (ref 136–145)
WBC # BLD AUTO: 6.3 10*3/MM3 (ref 3.4–10.8)

## 2021-05-05 PROCEDURE — 85025 COMPLETE CBC W/AUTO DIFF WBC: CPT | Performed by: INTERNAL MEDICINE

## 2021-05-05 PROCEDURE — 0 GADOBENATE DIMEGLUMINE 529 MG/ML SOLUTION: Performed by: PSYCHIATRY & NEUROLOGY

## 2021-05-05 PROCEDURE — 80053 COMPREHEN METABOLIC PANEL: CPT | Performed by: INTERNAL MEDICINE

## 2021-05-05 PROCEDURE — A9577 INJ MULTIHANCE: HCPCS | Performed by: PSYCHIATRY & NEUROLOGY

## 2021-05-05 PROCEDURE — 82565 ASSAY OF CREATININE: CPT

## 2021-05-05 PROCEDURE — 99215 OFFICE O/P EST HI 40 MIN: CPT | Performed by: INTERNAL MEDICINE

## 2021-05-05 PROCEDURE — 70553 MRI BRAIN STEM W/O & W/DYE: CPT

## 2021-05-05 PROCEDURE — 36415 COLL VENOUS BLD VENIPUNCTURE: CPT

## 2021-05-05 RX ADMIN — GADOBENATE DIMEGLUMINE 14 ML: 529 INJECTION, SOLUTION INTRAVENOUS at 12:12

## 2021-05-05 NOTE — PROGRESS NOTES
Subjective     REASON FOR follow-up:    1.  Heterozygous state for factor V Leiden    2.  New onset stroke on aspirin by neurology    3.  Hypercholesterolemia    4.  Hypercoagulable work-up done.  Antithrombin 109% protein S activity 85% protein S antigen 107%, protein C activity 85%.  Anticardiolipin antibody IgM 24%, antibeta-2 glycoprotein antibody negative, lupus anticoagulant not detected, prothrombin gene mutation negative.    5.  Screening mammogram showed abnormality in the left breast 6 o'clock position, 8 mm on ultrasound, ultrasound-guided left breast biopsy, 6 cm from the nipple showed evidence of invasive ductal carcinoma poorly differentiated grade 3, [place] score of 8 out of 9 ER negative, WI negative, HER-2/ese 3+ positive.    6.  CT scan February 24, 2021 showed focal area of fatty infiltration of the liver.  1 cm hypoattenuating cortical lesion in the upper pole of the right kidney.  Similarly nodule in the upper pole of the left kidney is 1.5 cm.  Further evaluation by ultrasound suggested  · Ultrasound March 1, 2021 shows solid lesion in the upper pole of the right kidney.  In the left kidney along the midpole of the left kidney is a nodule which is 16 x 16 x 14 mm which is thought to be a cyst.  A 6-month follow-up CT suggested                                   History of Present Illness   Patient is a 40-year-old female with history of recent stroke in December when she presented with numbness of the face and left hand which lasted 8 hours on the face and 24 hours on the left arm.  She had not really gone to the ER.  She was seen by Dr. Hernandez neurologist who placed her on aspirin.  She had a complete stroke work-up with echocardiogram which was normal.  Holter monitor was normal without evidence of any atrial fibrillation.  Cardiology felt that there was no cardiac cause.  Ultrasound of the carotids did not show any stenosis.  But she had high cholesterol.  She is on treatment for her  high cholesterol currently and also an aspirin.  As soon as she took aspirin her symptoms resolved within 3 days completely.  She has never had a DVT in the past.  She was referred by her neurologist to evaluate for further hypercoagulable work-up.  Her aunt had heterozygous state for factor V Leiden and when patient was evaluated she was found to have heterozygous state for factor V Leiden.    Rest of the hypercoagulable work-up has been completely negative as we discussed above.    Patient states she has absolutely no symptoms of headache, no evidence of numbness in the face or arms and no weakness.    We also schedule mammogram which is scheduled in April of this year as she missed it last year.  Patient is having repeat MRI of the brain end of April and is following up with neurology after that.    Interval history:  Patient is here for follow-up of her mammogram.  Patient had screening mammogram April 2, 2021:  NAD a focal asymmetry was present in the posterior one third retroareolar region of the left breast.  Further spot compression images and left breast ultrasound was suggested.  Right breast was negative    April 9, 2021: Left diagnostic mammogram showed an area of focal asymmetry in the posterior one third region aspect of the left breast her breast    Ultrasound showed an irregular 0.8 cm lesion in the left breast at the 6 o'clock position of the order of 6 cm from the nipple.  Ultrasound-guided left breast biopsy was recommended.    April 26, 2021: Ultrasound-guided biopsy of the left breast 6 o'clock position, 6 cm from the nipple showed    Invasive ductal carcinoma, poorly differentiated with a Lam[place] score grade 3 with a score of 8 out of 9 measuring at least 7 mm.  No definitive ductal carcinoma is in situ is identified.  ER 1% negative  MA 1% negative   HER-2/ese 3+ positive    There was no evidence of lymphadenopathy either in the mammogram of the ultrasound.  We suggested an MRI of the  breast.  Patient has strong family history of breast cancer    Family  history: Mother had breast cancer at age 57.  Maternal great aunt had breast cancer and paternal great aunt had breast cancer in her 40s.  Patient's mother had pancreatic cancer as well.  Paternal grandfather had prostate cancer.    Hematologic history:  patient is a 40-year-old female who presented end of December with numbness and tingling in her left upper extremity and left face.  She went to see Dr. Goodman who then got concerned and referred to neurology.  She was referred to Dr. Freedman and talk to Dr. Thompson neurology for evaluation.  Patient underwent ultrasound of the carotids which did not show significant stenosis of the carotids.  She underwent 2D echocardiogram which showed a good ejection fraction of 64% with mild mitral valve prolapse and mild mitral stenosis.  She had a 24-hour Holter which was negative.  She then had also an MRI of the brain February 3, 2021 which showed areas of hyperintensity involving the subcortical white matter of the right frontal lobe superior laterally and posteriorly with the largest area measuring 8 9 mm.  There is also enhancement present.  The findings are consistent with a subacute infarct.  They could not differentiate if there is any underlying neoplastic process but a short-term follow-up was suggested in order to follow-up.  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    Patient currently got started on aspirin and factor V Leiden was obtained by neurology because patient's paternal aunt had factor V Leiden mutation and she was heterozygous.  Patient's testing also showed that she was heterozygous.    Patient states her numbness and tingling is resolved completely on the left arm and face it just lasted for a 24 hours.  She was here referred here for neurology to see if there is any other cause for  her underlying hypercoagulable state.  She has not had a complete hypercoagulable work-up.  Also discussed with her that an underlying malignancy could cause a hypercoagulable state and we would need to do a CT scan to rule that out.    Patient's mother has had breast cancer in her 50s but had pancreatic cancer at 68 and  from that.  Patient's dad had stroke at 63.  But he is alive at 74.  She has 1 brother who is 40 in good health.  Paternal aunt had breast cancer in her 40s.  Paternal grandfather had colon cancer in his late 80s.  Maternal uncle had throat cancer and another maternal uncle had skin cancer with metastasis to the lung.  And maternal grandmother had breast cancer.    Patient was to have mammogram done last year but because of COVID-19 it got postponed and she has not had it done yet.    Patient used to be a smoker half pack per day for 7 years but quit 10 years ago.  She has high cholesterol for which she is on treatment.    Past Medical History:   Diagnosis Date   • Acute stress reaction 2014   • ADD (attention deficit disorder) 2014   • ADHD (attention deficit hyperactivity disorder)    • Benign essential hypertension 2010   • Family history of breast cancer 2013   • H/O complete eye exam 2016   • Hypothyroidism    • Kidney stone    • Stroke-like symptoms         Past Surgical History:   Procedure Laterality Date   • BREAST BIOPSY Left     Benign   • MAMMO BILATERAL     • PAP SMEAR  2016        Current Outpatient Medications on File Prior to Visit   Medication Sig Dispense Refill   • amphetamine-dextroamphetamine XR (Adderall XR) 20 MG 24 hr capsule Take 1 capsule by mouth Every Morning 30 capsule 0   • aspirin 81 MG EC tablet Take 1 tablet by mouth Daily. 30 tablet 11   • atorvastatin (Lipitor) 40 MG tablet Take 1 tablet by mouth Daily. 30 tablet 11   • cholecalciferol (VITAMIN D3) 10 MCG (400 UNIT) tablet Take 400 Units by mouth Daily.     • levothyroxine  (SYNTHROID, LEVOTHROID) 137 MCG tablet Take 1 tablet by mouth Daily. 30 tablet 5   • LORazepam (Ativan) 0.5 MG tablet Take 1 tablet by mouth Every 8 (Eight) Hours As Needed for Anxiety. 20 tablet 0   • valsartan (DIOVAN) 160 MG tablet Take 1 tablet by mouth Daily. For blood pressure 30 tablet 5     No current facility-administered medications on file prior to visit.        ALLERGIES:    Allergies   Allergen Reactions   • Sulfa Antibiotics Rash        Social History     Socioeconomic History   • Marital status: Significant Other     Spouse name: Not on file   • Number of children: Not on file   • Years of education: Not on file   • Highest education level: Not on file   Tobacco Use   • Smoking status: Former Smoker     Packs/day: 1.00     Years: 7.00     Pack years: 7.00     Types: Cigarettes   • Smokeless tobacco: Never Used   Substance and Sexual Activity   • Alcohol use: Yes     Comment: rarely   • Drug use: No   • Sexual activity: Yes     Partners: Male        Family History   Problem Relation Age of Onset   • Breast cancer Mother    • Lung cancer Maternal Grandmother    • Stroke Father    • Breast cancer Maternal Aunt    • Breast cancer Paternal Aunt         Review of Systems   Constitutional: Negative for appetite change, chills, diaphoresis, fatigue, fever and unexpected weight change.   HENT: Negative for hearing loss, sore throat and trouble swallowing.    Respiratory: Negative for cough, chest tightness, shortness of breath and wheezing.    Cardiovascular: Negative for chest pain, palpitations and leg swelling.   Gastrointestinal: Negative for abdominal distention, abdominal pain, constipation, diarrhea, nausea and vomiting.   Genitourinary: Negative for dysuria, frequency, hematuria and urgency.   Musculoskeletal: Negative for joint swelling.        No muscle weakness.   Skin: Negative for rash and wound.   Neurological: Negative for seizures, syncope, speech difficulty, weakness, numbness and headaches.  "  Hematological: Negative for adenopathy. Does not bruise/bleed easily.   Psychiatric/Behavioral: Negative for behavioral problems, confusion and suicidal ideas.   All other systems reviewed and are negative.     I have reviewed and confirmed the accuracy of the ROS as documented by the MA/LPN/RN Neena Beavers MD        Objective     Vitals:    05/05/21 0754   BP: 126/85   Pulse: 99   Resp: 16   Temp: 97.5 °F (36.4 °C)   TempSrc: Temporal   SpO2: 99%   Weight: 67 kg (147 lb 12.8 oz)   Height: 157.5 cm (62.01\")   PainSc: 0-No pain     Current Status 5/5/2021   ECOG score 0       Physical Exam  Chest:               BREAST: Right breast: No skin changes, no evidence of breast mass, no nipple discharge, no evidence of any right axillary adenopathy or right supraclavicular adenopathy  Left breast: There is a small biopsy site palpable.  It is difficult to appreciate the exact size.  Though it appears like post biopsy site thickening rather than a mass no evidence of any skin changes, and no evidence of left nipple discharge as well as no left axillary adenopathy or left supraclavicular adenopathy.        CONSTITUTIONAL:  Vital signs reviewed.    No distress, looks comfortable.  EYES:  Conjunctiva and lids unremarkable.  Extraocular eye movements intact.  HEENT within normal limits, no lymphadenopathy, no thyromegaly  RESPIRATORY:  Normal respiratory effort.  , no rales  or wheezing, clear   CARDIOVASCULAR:  Regular rate and rhythm, no murmur  No significant lower extremity edema  Abdomen: Soft nontender positive bowel sounds no hepatosplenomegaly.  SKIN: No wounds.  No rashes.  MUSCULOSKELETAL/EXTREMITIES: No clubbing or cyanosis.  No apparent unilateral weakness.  NEURO: CN 2-12 appear intact. No focal neurological deficits noted.  PSYCHIATRIC:  Normal judgment and insight.  Normal mood and affect.    RECENT LABS:  Hematology WBC   Date Value Ref Range Status   05/05/2021 6.30 3.40 - 10.80 10*3/mm3 Final   01/04/2021 " 9.5 3.4 - 10.8 x10E3/uL Final     RBC   Date Value Ref Range Status   05/05/2021 4.50 3.77 - 5.28 10*6/mm3 Final   01/04/2021 4.85 3.77 - 5.28 x10E6/uL Final     Hemoglobin   Date Value Ref Range Status   05/05/2021 11.3 (L) 12.0 - 15.9 g/dL Final     Hematocrit   Date Value Ref Range Status   05/05/2021 36.3 34.0 - 46.6 % Final     Platelets   Date Value Ref Range Status   05/05/2021 377 140 - 450 10*3/mm3 Final        Assessment/Plan     1.  Factor V Leiden heterozygosity with right frontal stroke and patient presented in December 2020 with left-sided weakness tingling and numbness and also numbness in the face.  · Was referred to neurology and cardiology by Dr. Goodman  · Ultrasound of carotid does not show significant stenosis  · 24 Holter is negative  · 2D echocardiogram shows ejection fraction of 64% with mild mitral regurg and mitral stenosis  · Neurology obtain factor V Leiden given that patient's paternal aunt had's history of factor V Leiden and that was heterozygous for factor V Leiden  · Patient has been referred to us in order to evaluate any other underlying cause for hypercoagulable state  · We discussed about obtaining rest of the hypercoagulable work-up though doubtful it will be positive  · We will also obtain a CT scan of the chest abdomen pelvis in order to rule out underlying malignancy as a cause  · We will obtain mammogram as that has not been done yet last year  · Continue aspirin as per neurology.  Also patient on medications for her high cholesterol started after stroke currently asymptomatic  · Hypercoagulable work-up done which is negative except heterozygous state for factor V Leiden.  · Complete stroke work-up has been negative and since this is arterial stroke and she was not on aspirin prior to that and her symptoms have resolved I agree with continuing aspirin alone along with high cholesterol medications.  · At this time I do not feel the need to put her on anticoagulants like  Eliquis or Coumadin as she has not failed aspirin and she has responded to aspirin given the risk of bleeding versus clotting.  She has never had a DVT or pulmonary embolism in the past.      2.  Family history of cancer: Patient's mother had breast cancer at age 50 and pancreatic cancer at age 68 and  from pancreatic cancer.  Patient's maternal grandmother had breast cancer in her 50s.  Her maternal uncle had skin cancer which went to the lung and another maternal uncle had throat cancer.  Maternal aunt had call colon polyps.  Patient's paternal aunt had breast cancer in her 40s.  And paternal grandfather with colon cancer in his 80s.  Paternal aunt also had factor V Leiden.    3.  Newly diagnosed invasive ductal carcinoma of the left breast  6 o'clock position, 8 mm on ultrasound, ultrasound-guided left breast biopsy, 6 cm from the nipple showed evidence of invasive ductal carcinoma poorly differentiated grade 3, [place] score of 8 out of 9 ER negative, AR negative, HER-2/ese 3+ positive.  · Screening mammogram had shown abnormality in the left breast 6 o'clock position and ultrasound guided biopsy of left breast showed invasive ductal carcinoma as above.    4.  Solid nodule in the right kidney on ultrasound, will schedule follow-up with urology    5.  Elevated BMI, encourage diet and exercise.  Patient is improving her diet and exercise after having had the stroke    Plan  · Reviewed screening mammogram, left diagnostic mammogram and left ultrasound.  · Reviewed ultrasound-guided biopsy of the left breast consistent with invasive ductal carcinoma, ER/AR negative, HER-2 positive  · Obtain MRI of the breast  · Given strong family history will obtain [employer] genetic test  · Will refer patient to Dr. Lashonda Euceda  · We will also refer patient to urology Dr. Clarence Shultz for evaluation of the  1 cm solid nodule in the right kidney  · Follow-up with me in 4 weeks with labs    45 minutes spent with the patient  face-to-face and 10 minutes in coordination of care and dictation    MD Dr. Maxine Ibanez Dr., Dr., Dr.

## 2021-05-05 NOTE — TELEPHONE ENCOUNTER
New patient appointment with Dr. Euceda is scheduled on 5/23/2021 @ 12:30pm.    Called and spoke to patient, patient expressed v/u of appointment times.    Sent patient a reminder letter in the mail.

## 2021-05-06 ENCOUNTER — TELEPHONE (OUTPATIENT)
Dept: NEUROLOGY | Facility: CLINIC | Age: 41
End: 2021-05-06

## 2021-05-06 DIAGNOSIS — F90.0 ATTENTION DEFICIT HYPERACTIVITY DISORDER (ADHD), PREDOMINANTLY INATTENTIVE TYPE: Chronic | ICD-10-CM

## 2021-05-06 RX ORDER — DEXTROAMPHETAMINE SACCHARATE, AMPHETAMINE ASPARTATE MONOHYDRATE, DEXTROAMPHETAMINE SULFATE AND AMPHETAMINE SULFATE 5; 5; 5; 5 MG/1; MG/1; MG/1; MG/1
20 CAPSULE, EXTENDED RELEASE ORAL EVERY MORNING
Qty: 30 CAPSULE | Refills: 0 | Status: SHIPPED | OUTPATIENT
Start: 2021-05-06 | End: 2021-06-07 | Stop reason: SDUPTHER

## 2021-05-06 NOTE — TELEPHONE ENCOUNTER
Called and LVM for the patient to call the office.    Patient returned my call and was given her results.  She was also reminded about her fup next week.    ----- Message from Nadiya Haynes MD sent at 5/6/2021 10:54 AM EDT -----  Findings do not appear to be related to neoplastic process or cancer.

## 2021-05-06 NOTE — TELEPHONE ENCOUNTER
Caller: Radha Astorga    Relationship: Self    Best call back number: [phone]    Medication needed:   Requested Prescriptions     Pending Prescriptions Disp Refills   • amphetamine-dextroamphetamine XR (Adderall XR) 20 MG 24 hr capsule 30 capsule 0     Sig: Take 1 capsule by mouth Every Morning       When do you need the refill by: TOMORROW    Does the patient have less than a 3 day supply:  [x] Yes  [] No    What is the patient's preferred pharmacy: [id number] [address] AT [facility] [phone] [facility] [phone]

## 2021-05-07 ENCOUNTER — TELEPHONE (OUTPATIENT)
Dept: ONCOLOGY | Facility: CLINIC | Age: 41
End: 2021-05-07

## 2021-05-07 ENCOUNTER — HOSPITAL ENCOUNTER (OUTPATIENT)
Dept: MRI IMAGING | Facility: HOSPITAL | Age: 41
Discharge: HOME | End: 2021-05-07
Admitting: INTERNAL MEDICINE

## 2021-05-07 DIAGNOSIS — N63.0 BREAST MASS: ICD-10-CM

## 2021-05-07 PROCEDURE — 25010000002 GADOTERIDOL PER 1 ML: Performed by: INTERNAL MEDICINE

## 2021-05-07 PROCEDURE — 77049 MRI BREAST C-+ W/CAD BI: CPT

## 2021-05-07 PROCEDURE — A9579 GAD-BASE MR CONTRAST NOS,1ML: HCPCS | Performed by: INTERNAL MEDICINE

## 2021-05-07 RX ADMIN — GADOTERIDOL 20 ML: 279.3 INJECTION, SOLUTION INTRAVENOUS at 16:16

## 2021-05-07 NOTE — TELEPHONE ENCOUNTER
----- Message from Neena Beavers MD sent at 5/5/2021  9:34 PM EDT -----  I have made a referral to urology.  I put the order already.  Can you please make sure this patient has appointment with urology and call her with the appointment Dr. Clarence Shultz.  Thanks  Neena Beavers MD

## 2021-05-10 ENCOUNTER — OFFICE VISIT (OUTPATIENT)
Dept: NEUROLOGY | Facility: CLINIC | Age: 41
End: 2021-05-10

## 2021-05-10 VITALS
SYSTOLIC BLOOD PRESSURE: 118 MMHG | HEART RATE: 99 BPM | BODY MASS INDEX: 27.6 KG/M2 | OXYGEN SATURATION: 100 % | WEIGHT: 150 LBS | HEIGHT: 62 IN | DIASTOLIC BLOOD PRESSURE: 62 MMHG

## 2021-05-10 DIAGNOSIS — I63.9 CEREBRAL INFARCTION, UNSPECIFIED MECHANISM (HCC): Primary | ICD-10-CM

## 2021-05-10 PROCEDURE — 99215 OFFICE O/P EST HI 40 MIN: CPT | Performed by: PSYCHIATRY & NEUROLOGY

## 2021-05-10 NOTE — ASSESSMENT & PLAN NOTE
41 year old woman with history of mitral regurgitation and possible myxomatous changes of mitral valve and factor V leiden heterozygous mutation with what appears to be a possible subacute stroke on brain MRI scan most recently which I reviewed the images on her visit today.  Her left sided symptoms of hand weakness and numbness of left hand and face on 12/30/20 could certainly correlate with the findings on brain MRI scan.  She was discovered to have breast cancer involving her left breast since her last visit.  Her brain MRI which I reviewed the images on her visit today was more consistent with stroke than neoplasm but also concerning for possible venous infarction as etiology of stroke though no definitive thrombosis noted on this evaluation.  I will order a MR Venogram for further evaluation.  She is currently taking taking daily aspirin and will also start her on atorvastatin 40mg daily.  She denies any further stroke like symptoms.  I will refer her to my colleague Dr. Johnson who is a stroke specialist for further management as he is also located closer to where she lives and to further review due to question of stroke vs neoplasm.  I advised her to exercise and eat healthy. She is being followed up and treated by hematology and oncology for further evaluation.  I advised her to discuss potential metastatic process though at this time this appears to be more of an infarction per radiology read.  Provided patient education information on stroke and extensively discussed symptoms with her and advised her to be taken to the ED with any new stroke like symptoms.  I will see her back in 3 months if she decides to return to myself for follow up and sooner if needed for reevaluation following all of her testing.

## 2021-05-10 NOTE — PROGRESS NOTES
Chief Complaint  Stroke (Began 12/30/2020, No symptoms or problems since her last visit 2/8/2021. Brain MRI 5/5/2021)    Subjective          Radha Astorga presents to [facility] NEUROLOGY for   HISTORY OF PRESENT ILLNESS:    Radha Astorga is a 41 year old woman who returns to neurology clinic for follow up of stroke.  She reports her symptoms starting on 12/30/20 in the evening she was laying on the couch and she started noticing numbness in her left arm and moved to the left side of her mouth and face.  Her partner felt that maybe the left side of her face was drooping.  She took a low dose aspirin and then the numbness in her arm and face started to dissipate into the next day and by the middle of the day the following day her left hand was having a hard time moving.  She has not had any further stroke like symptoms since that time.  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  She is on Adderall for ADD.  She has not been taking an aspirin.  Her blood pressure looks good today.  She tells me she has made diet and activity changes.  She reports a history of factor V leiden mutation in her father's side of the family and we did lab work which demonstrated that she is heterozygous factor V leiden mutation.  She had a brain MRI with and without contrast on 2/3/2021 which demonstrated T2 hyperintensities in the right frontal lobe which has diffusion weighted imaging correlation without ADC correlation concerning for a subacute infarction and due to contrast enhancement a neoplastic or demyelinating process could not be fully excluded and clinical correlation was recommended along with follow up MRI scan.  I obtained a more recent follow up brain MRI which I reviewed the images independently on her visit today and the radiologist felt that findings were more consistent with stroke and possible  concerns for venous infarction though no evidence of venous thrombosis was noted on examination.  She had carotid doppler testing done which did not demonstrate significant stenosis.  She is also set up to have echocardiogram and Holter monitoring done as well as part of the work up.   Her fasting lipid profile demonstrated elevated total cholesterol and LDL levels and she is on Lipitor which I started along with daily aspirin since her last visit.  Of note she was discovered to have breast cancer involving her left breast since her last visit which is being evaluated by oncology as well.  She denies any new or further stroke like symptoms.      Past Medical History:   Diagnosis Date   • Acute stress reaction 11/17/2014   • ADD (attention deficit disorder) 05/26/2014   • ADHD (attention deficit hyperactivity disorder)    • Benign essential hypertension 01/14/2010   • Family history of breast cancer 03/11/2013   • H/O complete eye exam 01/01/2016   • Hypothyroidism    • Infiltrating ductal carcinoma of left breast (CMS/HCC) 5/5/2021   • Kidney stone    • Stroke-like symptoms         Family History   Problem Relation Age of Onset   • Breast cancer Mother    • Lung cancer Maternal Grandmother    • Stroke Father    • Breast cancer Maternal Aunt    • Breast cancer Paternal Aunt         Social History     Socioeconomic History   • Marital status: Significant Other     Spouse name: Not on file   • Number of children: Not on file   • Years of education: Not on file   • Highest education level: Not on file   Tobacco Use   • Smoking status: Former Smoker     Packs/day: 1.00     Years: 7.00     Pack years: 7.00     Types: Cigarettes   • Smokeless tobacco: Never Used   Substance and Sexual Activity   • Alcohol use: Yes     Comment: rarely   • Drug use: No   • Sexual activity: Yes     Partners: Male        I have personally reviewed the ROS as stated below.     Review of Systems   Eyes: Negative for blurred vision, double vision  "and photophobia.   Respiratory: Negative for chest tightness.    Cardiovascular: Negative for chest pain.   Neurological: Negative for dizziness, headache and confusion.   Psychiatric/Behavioral: Negative for agitation, behavioral problems, decreased concentration and depressed mood.        Objective   Vital Signs:   /62   Pulse 99   Ht 157.5 cm (62.01\")   Wt 68 kg (150 lb)   SpO2 100%   BMI 27.43 kg/m²       PHYSICAL EXAM:    General   Mental Status - Alert. General Appearance - Well developed, Well groomed, Oriented and Cooperative. Orientation - Oriented X3.                            Head and Neck  Head - normocephalic, atraumatic with no lesions or palpable masses.  Neck                 Global Assessment - supple.                                         Eye   Sclera/Conjunctiva - Bilateral - Normal.     ENMT  Mouth and Throat   Oral Cavity/Oropharynx: Oropharynx - the soft palate,uvula and tongue are normal in appearance.     Chest and Lung Exam   Chest - lung clear to auscultation bilaterally.     Cardiovascular   Cardiovascular examination reveals  - normal heart sounds, regular rate and rhythm.     Neurologic   Mental Status: Speech - Normal. Cognitive function - appropriate fund of knowledge. No impairment of attention, Impairment of concentration, impairment of long term memory or impairment of short term memory.  Cranial Nerves:   II Optic: Visual acuity - Left - Normal. Right - Normal. Visual fields - Normal (to confrontation).  III Oculomotor: Pupillary constriction - Left - Normal. Right - Normal.  VII Facial: - Normal Bilaterally.  VIII Acoustic - Bilateral - Hearing normal and (Hearing tested by finger rub).   IX Glossopharyngeal / X Vagus - Normal.  XI Accessory: Trapezius - Bilateral - Normal. Sternocleidomastoid - Bilateral - Normal.  XII Hypoglossal - Bilateral - Normal.  Eye Movements: - Normal Bilaterally.  Sensory:   Light Touch: Intact - Globally.   Motor:   Bulk and Contour: - " Normal.  Tone: - Normal.  Tremor: Not present.  Strength: 5/5 normal muscle strength - All Muscles.                                                        General Assessment of Reflexes: - deep tendon reflexes are more brisk on the left side compared to the right side. Coordination - No Impairment of finger-to-nose or Impairment of rapid alternating movements. Gait - Normal.     Result Review :                 Assessment and Plan    Problem List Items Addressed This Visit        Neuro    Cerebral infarction (CMS/HCC) - Primary    Current Assessment & Plan     41 year old woman with history of mitral regurgitation and possible myxomatous changes of mitral valve and factor V leiden heterozygous mutation with what appears to be a possible subacute stroke on brain MRI scan most recently which I reviewed the images on her visit today.  Her left sided symptoms of hand weakness and numbness of left hand and face on 12/30/20 could certainly correlate with the findings on brain MRI scan.  She was discovered to have breast cancer involving her left breast since her last visit.  Her brain MRI which I reviewed the images on her visit today was more consistent with stroke than neoplasm but also concerning for possible venous infarction as etiology of stroke though no definitive thrombosis noted on this evaluation.  I will order a MR Venogram for further evaluation.  She is currently taking taking daily aspirin and will also start her on atorvastatin 40mg daily.  She denies any further stroke like symptoms.  I will refer her to my colleague Dr. Johnson who is a stroke specialist for further management as he is also located closer to where she lives and to further review due to question of stroke vs neoplasm.  I advised her to exercise and eat healthy. She is being followed up and treated by hematology and oncology for further evaluation.  I advised her to discuss potential metastatic process though at this time this appears to  be more of an infarction per radiology read.  Provided patient education information on stroke and extensively discussed symptoms with her and advised her to be taken to the ED with any new stroke like symptoms.  I will see her back in 3 months if she decides to return to myself for follow up and sooner if needed for reevaluation following all of her testing.          Relevant Orders    MRI Angiogram Venogram Head    Ambulatory Referral to Neurology          I spent 40 minutes caring for Radha on this date of service. This time includes time spent by me in the following activities:preparing for the visit, reviewing tests, obtaining and/or reviewing a separately obtained history, performing a medically appropriate examination and/or evaluation , counseling and educating the patient/family/caregiver, ordering medications, tests, or procedures, documenting information in the medical record, independently interpreting results and communicating that information with the patient/family/caregiver and care coordination    Follow Up   Return if symptoms worsen or fail to improve.  Patient was given instructions and counseling regarding her condition or for health maintenance advice. Please see specific information pulled into the AVS if appropriate.

## 2021-05-19 ENCOUNTER — TELEPHONE (OUTPATIENT)
Dept: ONCOLOGY | Facility: CLINIC | Age: 41
End: 2021-05-19

## 2021-05-19 NOTE — TELEPHONE ENCOUNTER
Called Ms. Astorga in response to message left about her [facility] not being approved by insurance.  After speaking with Cassie in office about the lab appeal process, she was able to research and figure out about the test being done and not approved.  Call to patient to let her know the testing had already been done and the insurance will be paying for the test, but her part may be as much as 136.00.  Patient verbalized understanding and appreciation for call.

## 2021-05-21 ENCOUNTER — HOSPITAL ENCOUNTER (OUTPATIENT)
Dept: SURGERY | Facility: HOSPITAL | Age: 41
Discharge: HOME | End: 2021-05-21
Admitting: SURGERY

## 2021-05-21 ENCOUNTER — TELEPHONE (OUTPATIENT)
Dept: ONCOLOGY | Facility: CLINIC | Age: 41
End: 2021-05-21

## 2021-05-21 ENCOUNTER — OFFICE VISIT (OUTPATIENT)
Dept: SURGERY | Facility: CLINIC | Age: 41
End: 2021-05-21

## 2021-05-21 VITALS
SYSTOLIC BLOOD PRESSURE: 110 MMHG | DIASTOLIC BLOOD PRESSURE: 75 MMHG | HEART RATE: 86 BPM | RESPIRATION RATE: 16 BRPM | WEIGHT: 150 LBS | OXYGEN SATURATION: 98 % | BODY MASS INDEX: 27.6 KG/M2 | HEIGHT: 62 IN

## 2021-05-21 DIAGNOSIS — C50.812 MALIGNANT NEOPLASM OF OVERLAPPING SITES OF LEFT BREAST IN FEMALE, ESTROGEN RECEPTOR NEGATIVE (HCC): Primary | ICD-10-CM

## 2021-05-21 DIAGNOSIS — C50.812 MALIGNANT NEOPLASM OF OVERLAPPING SITES OF LEFT BREAST IN FEMALE, ESTROGEN RECEPTOR NEGATIVE (HCC): ICD-10-CM

## 2021-05-21 DIAGNOSIS — Z17.1 MALIGNANT NEOPLASM OF OVERLAPPING SITES OF LEFT BREAST IN FEMALE, ESTROGEN RECEPTOR NEGATIVE (HCC): ICD-10-CM

## 2021-05-21 DIAGNOSIS — Z17.1 MALIGNANT NEOPLASM OF OVERLAPPING SITES OF LEFT BREAST IN FEMALE, ESTROGEN RECEPTOR NEGATIVE (HCC): Primary | ICD-10-CM

## 2021-05-21 DIAGNOSIS — Z80.3 FAMILY HISTORY OF BREAST CANCER: ICD-10-CM

## 2021-05-21 PROCEDURE — 99205 OFFICE O/P NEW HI 60 MIN: CPT | Performed by: SURGERY

## 2021-05-21 PROCEDURE — 99417 PROLNG OP E/M EACH 15 MIN: CPT | Performed by: SURGERY

## 2021-05-21 PROCEDURE — 88342 IMHCHEM/IMCYTCHM 1ST ANTB: CPT | Performed by: SURGERY

## 2021-05-21 PROCEDURE — 88360 TUMOR IMMUNOHISTOCHEM/MANUAL: CPT | Performed by: SURGERY

## 2021-05-21 PROCEDURE — 88341 IMHCHEM/IMCYTCHM EA ADD ANTB: CPT | Performed by: SURGERY

## 2021-05-21 PROCEDURE — 88305 TISSUE EXAM BY PATHOLOGIST: CPT | Performed by: SURGERY

## 2021-05-21 PROCEDURE — 19083 BX BREAST 1ST LESION US IMAG: CPT | Performed by: SURGERY

## 2021-05-21 NOTE — TELEPHONE ENCOUNTER
Caller: BROOKS    Relationship: DR. KLARISSA GONZALEZ'S OFFICE    Best call back number: [phone]    What form or medical record are you requesting: GENETIC TESTING    Who is requesting this form or medical record from you: DR. KLARISSA GONZALEZ    How would you like to receive the form or medical records (pick-up, mail, fax): FAX  If fax, what is the fax number: [phone]    Timeframe paperwork needed: STAT, PT IS IN THEIR OFFICE NOW    Additional notes: [facility] SHOULD HAVE FAXED THE RESULTS TO FAX# [phone] ABOUT 20-30 MINUTES AGO

## 2021-05-21 NOTE — PROGRESS NOTES
[facility]     Referring Provider: Neena Beavers MD     Chief complaint: Newly diagnosed breast cancer     HPI: Ms. Radha Astorga is a 40 yo woman, seen at the request of Dr. Neena Beavers, for a new diagnosis of left breast cancer. This was initially detected as an imaging abnormality on routine screening. Her work-up is detailed in the oncologic history below. Prior to the biopsy, she denies any breast lumps, pain, skin changes, or nipple discharge. She has a past history of a benign left breast percutaneous biopsy in 2013. She has a past history of a stroke in December 2020 without any residual neuro deficits. Cardiac work-up was negative, however hypercoagulable work-up revealed a factor V Leiden mutation. She has a family history of breast cancer in her mother (diagnosed at age 53) and a paternal aunt (diagnosed at age 55), as well as a paternal great aunt and her paternal great grandmother. Her paternal great aunt also had ovarian cancer and her mother had pancreatic cancer. She underwent expanded panel genetic testing at Dr. Beavers's office and she was negative for mutation.       Oncology/Hematology History Overview Note   12/11/2019, Screening MMG with Romeo ( [place]):  Scattered fibroglandular density. There are no findings to suggest malignancy.  BI-RADS 1: Negative.     Malignant neoplasm of overlapping sites of left breast in female, estrogen receptor negative (CMS/HCC)   4/1/2021 Initial Diagnosis    Malignant neoplasm of overlapping sites of left breast in female, estrogen receptor negative (CMS/HCC)     4/2/2021 Imaging    Screening MMG with Romeo ( [place]):  Scattered fibroglandular densities. In the posterior one-third of the left breast projecting in a retroareolar location on the CC images there is an area of focal asymmetry. I see no suspicious calcifications or areas of architectural distortion in either breast. There is no evidence for skin thickening or nipple  retraction.  BI-RADS 0: Incomplete.     4/9/2021 Imaging    Left Diagnostic [facility] with [facility] & [facility]  MMG:  With additional imaging there is persistence of the area of focal asymmetry in the posterior one-third inferior aspect of the left breast.  US:  At the 6 o'clock position on the order of 6 cm from the nipple there is a 0.8 cm irregular hypoechoic lesion. Mild posterior acoustic shadowing is noted.  BI-RADS 4: Suspicious.     4/26/2021 Biopsy    Left Breast, US[facility]opsy [facility]    1. Left Breast, 6:00, 6 cm FN, U/S-Guided Core Needle Biopsy for a Mass:                 A. INVASIVE DUCTAL CARCINOMA, Poorly differentiated; Center Histologic Grade III/III (tubule score = 3, nuclear score = 2, mitoses score = 3), measuring at least 7 mm.               B. No definitive ductal carcinoma in situ identified.   C. Negative for lymphovascular space invasion.    ER negative (<1%)  MT negative (<1%)  HER2 positive (IHC 3+)  Ki-67 55%     5/5/2021 Genetic Testing    [employer] Common Hereditary Cancers Panel (47 genes):    Negative     5/7/2021 Biopsy    Bilateral Breast MRI ([facility]):  There is an amorphous area of enhancement seen within the 6:00 position of the left breast, posterior depth measuring approximately 1.2 cm (series 9 image 91). Susceptibility artifact is present within this region consistent with recently newly diagnosed biopsy-proven malignancy. There is an additional area of more linear enhancement seen just anteriorly and laterally measuring up to 1.2 cm (series 9 image 91). This is seen approximately 5.6 cm from the nipple. Mixed washout kinetics are present. There is an additional punctate area of enhancement measuring only approximately 2 to 3 mm seen within the anterior lateral aspect of the left breast seen approximately 4.6 cm from the nipple (series 9 image 79) also demonstrating mixed washout  kinetics. No additional areas of enhancement identified. There is questionable  mildly prominent lymph nodes present within the left axillary region (series 9 image 34 and series 9 image 18) with mild cortical thickening present with benign-appearing fatty juan miguel present. This is seen both within the high and mid to low axilla. A mildly prominent posterior lymph node is also present within the mid axilla measuring up to 1 cm with cortical thickening present (series 9 image 27). Limited evaluation of the intrathoracic contents symmetric no acute process. A marker seen at the junction of the left breast and the left chest wall inferiorly (series 4 image 135) with no abnormalities  identified within this region. No abnormal fluid collections identified.  BI-RADS 6: Known malignancy.         Review of Systems   Constitutional: Negative for appetite change, chills, diaphoresis, fatigue, fever and unexpected weight change.        40 lb weight gain in 2020, but intentionally lost 20 lb since 1/2021   HENT:   Positive for hearing loss. Negative for lump/mass, mouth sores, nosebleeds, sore throat, tinnitus, trouble swallowing and voice change.    Eyes: Negative for eye problems and icterus.   Respiratory: Negative for chest tightness, cough, hemoptysis, shortness of breath and wheezing.    Cardiovascular: Negative for chest pain, leg swelling and palpitations.   Gastrointestinal: Negative for abdominal distention, abdominal pain, blood in stool, constipation, diarrhea, nausea, rectal pain and vomiting.   Endocrine: Negative for hot flashes.   Genitourinary: Negative for bladder incontinence, difficulty urinating, dyspareunia, dysuria, frequency, hematuria, menstrual problem, nocturia, pelvic pain, vaginal bleeding and vaginal discharge.    Musculoskeletal: Negative for arthralgias, back pain, flank pain, gait problem, myalgias, neck pain and neck stiffness.   Skin: Negative for itching, rash and wound.   Neurological: Negative for dizziness, extremity weakness, gait problem, headaches, light-headedness,  numbness, seizures and speech difficulty.        History of stroke without any residual defect   Hematological: Negative for adenopathy. Does not bruise/bleed easily.   Psychiatric/Behavioral: Positive for decreased concentration, depression and sleep disturbance. Negative for confusion and suicidal ideas. The patient is not nervous/anxious.    I personally reviewed and updated the ROS.      Medications:    Current Outpatient Medications:   •  amphetamine-dextroamphetamine XR (Adderall XR) 20 MG 24 hr capsule, Take 1 capsule by mouth Every Morning, Disp: 30 capsule, Rfl: 0  •  aspirin 81 MG EC tablet, Take 1 tablet by mouth Daily., Disp: 30 tablet, Rfl: 11  •  atorvastatin (Lipitor) 40 MG tablet, Take 1 tablet by mouth Daily., Disp: 30 tablet, Rfl: 11  •  Cholecalciferol (VITAMIN D3 PO), Take 2,000 Units by mouth. Takes 2000 twice a week, Disp: , Rfl:   •  levothyroxine (SYNTHROID, LEVOTHROID) 137 MCG tablet, Take 1 tablet by mouth Daily., Disp: 30 tablet, Rfl: 5  •  LORazepam (Ativan) 0.5 MG tablet, Take 1 tablet by mouth Every 8 (Eight) Hours As Needed for Anxiety., Disp: 20 tablet, Rfl: 0  •  valsartan (DIOVAN) 160 MG tablet, Take 1 tablet by mouth Daily. For blood pressure, Disp: 30 tablet, Rfl: 5      Allergies   Allergen Reactions   • Sulfa Antibiotics Rash       Past Medical History:   Diagnosis Date   • Acute stress reaction 11/17/2014   • ADD (attention deficit disorder) 05/26/2014   • ADHD (attention deficit hyperactivity disorder)    • Benign essential hypertension 01/14/2010   • Family history of breast cancer 03/11/2013   • H/O complete eye exam 01/01/2016   • Hearing loss    • Hypertension    • Hypothyroidism    • Infiltrating ductal carcinoma of left breast (CMS/HCC) 5/5/2021   • Kidney stone    • Stroke-like symptoms        Past Surgical History:   Procedure Laterality Date   • PAP SMEAR  2016       Family History   Problem Relation Age of Onset   • Breast cancer Mother 53   • Pancreatic cancer  Mother 68   • Lung cancer Maternal Grandmother    • Stroke Father    • Breast cancer Paternal Aunt 55   • Prostate cancer Maternal Grandfather    • Prostate cancer Paternal Grandfather    • Brain cancer Maternal Cousin 20   • Melanoma Maternal Uncle    • Ovarian cancer Other         Paternal great aunt    • Breast cancer Other    • Breast cancer Paternal Great-Grandmother 94       Social History     Socioeconomic History   • Marital status: Single     Spouse name: Not on file   • Number of children: 0   • Years of education: Not on file   • Highest education level: Not on file   Tobacco Use   • Smoking status: Former Smoker     Packs/day: 1.00     Years: 7.00     Pack years: 7.00     Types: Cigarettes     Start date:      Quit date:      Years since quittin.3   • Smokeless tobacco: Never Used   Substance and Sexual Activity   • Alcohol use: Yes     Comment: beer, <1 a week , 3 a month socal    • Drug use: No   • Sexual activity: Yes     Partners: Male        GYNECOLOGIC HISTORY:   . P: 0. AB: 0.  Last menstrual period: 2021  Age at menarche: 13-12  Age at first childbirth: n/a  Lactation/How long: n/a  Age at menopause: premenopausal  Total years of oral contraceptive use: 10  Total years of hormone replacement therapy: 0      PHYSICAL EXAMINATION:   Vitals:    21 1240   BP: 110/75   Pulse: 86   Resp: 16   SpO2: 98%     ECOG 0 - Asymptomatic  General: NAD, well appearing  Psych: a&o x 3, normal mood and affect  Eyes: EOMI, no scleral icterus  ENMT: neck supple without masses or thyromegaly, mucus membranes moist  Resp: normal effort, CTAB  CV: RRR, no murmurs, no edema  GI: soft, NT, ND  MSK: normal gait, normal ROM in bilateral shoulders  Lymph nodes: no cervical, supraclavicular or axillary lymphadenopathy  Breast: large size, grade 3 ptosis, right slightly larger than left (chronic)  Right: No visible abnormalities on inspection while seated, with arms raised or hands on hips. No  masses, skin changes, or nipple abnormalities.  Left: No visible abnormalities on inspection while seated, with arms raised or hands on hips. At 6:30, 5.5 cm FN, there is a 1 cm nodule/postbiopsy changes. No skin changes, or nipple abnormalities.    Left breast, in-office ultrasound: At 6:30, 5.5 cm FN, there is an irregular hypoechoic mass with biopsy clip visualized about 8 mm deep to the skin.       I have independently reviewed her imaging and here are my findings:   In the left inferior breast, there is an irregular mass which measures 8 mm on ultrasound and 12 mm on MRI. Slightly anterolateral to this mass on MRI, there is an additional 12 mm area of linear enhancement. Together these 2 areas measure about 3 cm on MRI. Further anterior and lateral in the right breast, there is a 3 mm area of focal enhancement on MRI. This is located at least 5 cm from the primary malignancy. There are a few prominent left axillary lymph nodes, however these are not overtly morphologically abnormal      Procedure: Percutaneous ultrasound-guided vacuum-assisted core biopsy of axillary lymph node  Indication: Newly diagnosed breast cancer with enlarged ipsilateral lymph nodes on MRI  Location: Left axilla  Consent: The risks, benefits, and alternatives to the procedure were discussed with the patient, who understood and wished to proceed. The risks described included, but were not limited to, bleeding, infection, pneumothorax, injury to the axillary vein.  Description of Left Axillary Ultrasound: After the patient was positioned supine on the procedure table with her arm raised over her head, I scanned her axilla using a 10MHz linear transducer. In the left lower axilla, there is an enlarged lymph node measuring about 2 cm, but with a preserved fatty hilum. This is amenable to ultrasound-guided biopsy.   Description of Percutaneous Biopsy:   I scanned her axilla using a 10MHz linear transducer and located the suspicious lymph  node as described above. The area was prepped with alcohol and draped in sterile fashion. I anesthetized the axillary skin at the site of anticipated incision with 1% lidocaine with epinephrine. I then anesthetized the deeper tissue with 1% lidocaine with epinephrine under ultrasound visualization and guidance. In total, 15 mL of local anesthetic was used. I then made a nicking incision with an 11 blade and inserted the 14 G Bard Marquee biopsy device from inferolateral to superomedial through the node under ultrasound guidance. I took 4 core samples of the lesion under direct visualization with ultrasound. I withdrew the biopsy device and placed a hydromark marker into the biopsy site. The marker was visualized within the node after placement. We held manual compression for 10 minutes and placed steri-strips over the skin incision.  Marker placed: Hydromark  Tolerance: The patient tolerated the procedure well.  Disposition: See below.      Assessment:  41 y.o. F with a new diagnosis of left breast: High grade, likely multifocal, invasive ductal carcinoma, ER/MD negative, Her2 positive; clinical T1(m)N0, anatomic stage IA, prognostic stage IA.      Discussion:  I had an extensive discussion with the patient about the nature of her breast cancer diagnosis. We reviewed the components of breast tissue including ducts and lobules. We reviewed her pathology report in detail. We reviewed breast cancer histology, including stage, grade, ER/MD receptors, HER2 receptors and how this applies to her diagnosis. We reviewed the basics of systemic and local/regional management of breast cancer.      We discussed that in her case, systemic treatment would involve targeted therapy and chemotherapy. I explained that chemotherapy can happen before or after surgery. In some cases, we give neoadjuvant chemotherapy so that we can observe the in vivo tumor response and downgrade surgical management. We discussed the enlarged lymph nodes  seen on breast MRI and a lymph node biopsy was performed today. The results of this will help guide the treatment plan. If the lymph node is positive for metastatic disease, then I would recommend neoadjuvant chemotherapy to downgrade the axilla. If it is negative, then neoadjuvant chemotherapy may result in overtreatment and I would lean towards recommending surgery first. She understands that either way she will require port placement.    We reviewed potential surgical treatments to include partial mastectomy, mastectomy, sentinel lymph node biopsy and axillary node dissection and discussed the rationale associated with each approach. Regarding radiation therapy, we discussed that radiation is indicated in all cases of breast conservation and in only limited circumstances following mastectomy. We discussed that the primary goal of adjuvant radiation is to decrease the likelihood of local recurrence.     We discussed the 2 additional areas seen in her left breast on MRI. I will set her up for MR-guided biopsies to determine the extent of disease. Even if these additional biopsy show cancer, I think she may still be a candidate for breast conservation because of her breast size. Although this would be a large volume lumpectomy and would require the assistance of plastic surgery for oncoplastic closure. She would prefer breast conservation if possible    We discussed the contralateral breast and the role of bilateral mastectomy, which is to reduce the risk of a second primary breast cancer. I explained that most breast cancer is not hereditary, however because of her family history, she qualified for genetic testing and this was negative for mutation. In this situation, I would not recommend a bilateral mastectomy, since her risk of a second primary cancer is relatively low.    We discussed axillary staging and I described the procedure for sentinel lymph node biopsy, however we will discuss this in more detail at  subsequent appointments.    Plan:  -F/u lymph node biopsy. I will call her with results and the plan for neoadjuvant chemotherapy versus surgery first.  -Left breast MR guided biopsy x2.  -Will eventually referred to plastic surgery.    Lashonda Euceda MD    I spent 115 minutes caring for Radha on this date of service. This time includes time spent by me in the following activities: preparing for the visit, performing a medically appropriate examination and/or evaluation, counseling and educating the patient/family/caregiver, ordering medications, tests, or procedures, documenting information in the medical record and independently interpreting results and communicating that information with the patient/family/caregiver.  This does not include the time spent by me performing any procedures.      CC:  MD Fadi Ibanez MD Dana Tisdale, CLAUDINE

## 2021-05-24 ENCOUNTER — PREP FOR SURGERY (OUTPATIENT)
Dept: OTHER | Facility: HOSPITAL | Age: 41
End: 2021-05-24

## 2021-05-24 ENCOUNTER — TELEPHONE (OUTPATIENT)
Dept: SURGERY | Facility: CLINIC | Age: 41
End: 2021-05-24

## 2021-05-24 DIAGNOSIS — R92.8 ABNORMAL FINDINGS ON DIAGNOSTIC IMAGING OF BREAST: Primary | ICD-10-CM

## 2021-05-24 LAB
LAB AP CASE REPORT: NORMAL
LAB AP SPECIAL STAINS: NORMAL
PATH REPORT.FINAL DX SPEC: NORMAL
PATH REPORT.GROSS SPEC: NORMAL
REF LAB TEST RESULTS: NORMAL

## 2021-05-24 NOTE — TELEPHONE ENCOUNTER
I called Ms. Astorga to let her know that the lymph node biopsy was negative. I also discussed her case on the phone with Dr. Beavers today and we both agree on the plan for surgery first. She will have her MR-guided biopsies on 6/2/21 and I will set her up to see plastic surgery to discuss oncoplastic reconstruction. I will see her back one more time to finalize the plan for surgery.     Lashonda Euceda MD

## 2021-05-25 ENCOUNTER — HOSPITAL ENCOUNTER (OUTPATIENT)
Dept: MRI IMAGING | Facility: HOSPITAL | Age: 41
Discharge: HOME | End: 2021-05-25
Admitting: PSYCHIATRY & NEUROLOGY

## 2021-05-25 ENCOUNTER — TRANSCRIBE ORDERS (OUTPATIENT)
Dept: SURGERY | Facility: CLINIC | Age: 41
End: 2021-05-25

## 2021-05-25 ENCOUNTER — TELEPHONE (OUTPATIENT)
Dept: ONCOLOGY | Facility: CLINIC | Age: 41
End: 2021-05-25

## 2021-05-25 DIAGNOSIS — Z17.1 MALIGNANT NEOPLASM OF OVERLAPPING SITES OF LEFT BREAST IN FEMALE, ESTROGEN RECEPTOR NEGATIVE (HCC): Primary | ICD-10-CM

## 2021-05-25 DIAGNOSIS — I63.9 CEREBRAL INFARCTION, UNSPECIFIED MECHANISM (HCC): ICD-10-CM

## 2021-05-25 DIAGNOSIS — C50.812 MALIGNANT NEOPLASM OF OVERLAPPING SITES OF LEFT BREAST IN FEMALE, ESTROGEN RECEPTOR NEGATIVE (HCC): Primary | ICD-10-CM

## 2021-05-25 PROCEDURE — 70544 MR ANGIOGRAPHY HEAD W/O DYE: CPT

## 2021-05-25 NOTE — TELEPHONE ENCOUNTER
Call to Radha, as Dr. Beavers had tried to reach her earlier today, to let her know the plan for her to have MRI guided biopsy of breast, and the plan would be to have surgery first and then chemotherapy.  Let her know that her case would be presented at breast conference on Friday as well.  Patient had appointment with Dr. Euceda last week, and she said that Dr. Euceda had gone through the treatment plan with her as well.  She verbalized understanding and appreciation for the call.

## 2021-05-26 ENCOUNTER — TELEPHONE (OUTPATIENT)
Dept: OTHER | Facility: HOSPITAL | Age: 41
End: 2021-05-26

## 2021-05-26 DIAGNOSIS — C50.812 MALIGNANT NEOPLASM OF OVERLAPPING SITES OF LEFT BREAST IN FEMALE, ESTROGEN RECEPTOR NEGATIVE (HCC): Primary | ICD-10-CM

## 2021-05-26 DIAGNOSIS — Z17.1 MALIGNANT NEOPLASM OF OVERLAPPING SITES OF LEFT BREAST IN FEMALE, ESTROGEN RECEPTOR NEGATIVE (HCC): Primary | ICD-10-CM

## 2021-05-26 NOTE — TELEPHONE ENCOUNTER
Referral received from Dr. Euceda's office. I called Ms. Astorga and introduced myself and navigational services. She stated the plan is to have a MRI guided biopsy and will know a better plan of care once that returns. She has a good understanding of her pathology and treatment options and no questions or concerns at this time.     She stated she has a good support system and has no financial or transportation needs at this time. She did state she struggles with sleeping at night and has had some difficulty with processing this diagnosis. We discussed that we have counseling services available through our [facility] and she was interested in an appointment. That referral has been made.     We discussed integrative therapies and other services at the [facility]. She verbalized interest in receiving a navigation folder outlining services. I verified her mailing address and will send out a navigation folder with the following information:     [facility],  [facility] Our [facility] Breast Cancer Support Group, Cancer and Restorative Exercise (CARE), [employer] Exercise program, Guide for the Newly Diagnosed, [facility], [facility], Massage Therapy, Reiki Therapy, [employer] [place], Cancer Nutrition, Cleaning for a Reason, and Survivorship Clinic.    She verbalized appreciation for navigational services and she has my contact information and will call with any questions that arise.

## 2021-05-27 ENCOUNTER — MDT ASSESSMENT (OUTPATIENT)
Dept: OTHER | Facility: HOSPITAL | Age: 41
End: 2021-05-27

## 2021-05-27 ENCOUNTER — TELEPHONE (OUTPATIENT)
Dept: SURGERY | Facility: CLINIC | Age: 41
End: 2021-05-27

## 2021-05-27 NOTE — TELEPHONE ENCOUNTER
F/u appointment with Dr. Euceda is scheduled on 6/16/2021 @ 3:00pm.    Plastics appointment with Dr. Baker is scheduled on 6/21/2021 @ 2:30pm.    Called and spoke to patient, patient expressed v/u of appointment times.

## 2021-05-28 ENCOUNTER — TELEPHONE (OUTPATIENT)
Dept: NEUROLOGY | Facility: CLINIC | Age: 41
End: 2021-05-28

## 2021-05-28 NOTE — TELEPHONE ENCOUNTER
I called and s/w with patient and gave her the results of her MRV. While I had patient on the phone she asked if she still needed to see Dr. Johnson in August.  She also wanted to let you know she will be starting chemo for breast cancer around that time.

## 2021-06-02 ENCOUNTER — HOSPITAL ENCOUNTER (OUTPATIENT)
Dept: MRI IMAGING | Facility: HOSPITAL | Age: 41
Discharge: HOME | End: 2021-06-02

## 2021-06-02 ENCOUNTER — APPOINTMENT (OUTPATIENT)
Dept: OTHER | Facility: HOSPITAL | Age: 41
End: 2021-06-02

## 2021-06-02 ENCOUNTER — HOSPITAL ENCOUNTER (OUTPATIENT)
Dept: MAMMOGRAPHY | Facility: HOSPITAL | Age: 41
Discharge: HOME | End: 2021-06-02

## 2021-06-02 VITALS
OXYGEN SATURATION: 100 % | BODY MASS INDEX: 25.76 KG/M2 | WEIGHT: 140 LBS | HEIGHT: 62 IN | HEART RATE: 66 BPM | SYSTOLIC BLOOD PRESSURE: 118 MMHG | TEMPERATURE: 98.4 F | DIASTOLIC BLOOD PRESSURE: 80 MMHG | RESPIRATION RATE: 16 BRPM

## 2021-06-02 DIAGNOSIS — N63.20 MASS OF BREAST, LEFT: ICD-10-CM

## 2021-06-02 DIAGNOSIS — R92.8 ABNORMAL FINDINGS ON DIAGNOSTIC IMAGING OF BREAST: ICD-10-CM

## 2021-06-02 LAB — CREAT BLDA-MCNC: 0.7 MG/DL (ref 0.6–1.3)

## 2021-06-02 PROCEDURE — 88360 TUMOR IMMUNOHISTOCHEM/MANUAL: CPT | Performed by: SURGERY

## 2021-06-02 PROCEDURE — 82565 ASSAY OF CREATININE: CPT

## 2021-06-02 PROCEDURE — 88305 TISSUE EXAM BY PATHOLOGIST: CPT | Performed by: SURGERY

## 2021-06-02 PROCEDURE — 0 GADOBENATE DIMEGLUMINE 529 MG/ML SOLUTION: Performed by: SURGERY

## 2021-06-02 PROCEDURE — A9577 INJ MULTIHANCE: HCPCS | Performed by: SURGERY

## 2021-06-02 PROCEDURE — 77065 DX MAMMO INCL CAD UNI: CPT

## 2021-06-02 RX ORDER — LIDOCAINE HYDROCHLORIDE 10 MG/ML
1.5 INJECTION, SOLUTION INFILTRATION; PERINEURAL ONCE
Status: DISCONTINUED | OUTPATIENT
Start: 2021-06-02 | End: 2021-06-03 | Stop reason: HOSPADM

## 2021-06-02 RX ADMIN — GADOBENATE DIMEGLUMINE 13 ML: 529 INJECTION, SOLUTION INTRAVENOUS at 10:24

## 2021-06-02 NOTE — NURSING NOTE
Biopsy sites to left lateral breast x2 (anteriolateral and posterolateral) clear with Dermabond dry and intact. No firmness or swelling noted at or around either biopsy site. Denies pain. Ice pack with protective covering applied to biopsy sites. Discharge instructions discussed with understanding voiced by patient. Copies provided to patient. No distress noted. To home via private vehicle.

## 2021-06-02 NOTE — H&P
Name: Radha Astorga ADMIT: 2021   : 1980  PCP: Fadi Goodman MD    MRN: [id number] LOS: 0 days   AGE/SEX: 41 y.o. female  ROOM: Room/bed info not found       Chief complaint L breast NME and enhancing focus    Present Illness or Internal History:  Patient is a 41 y.o. female presents with L breast NME and enhancing focus for 2 site MRI bx.     Past Surgical History:  Past Surgical History:   Procedure Laterality Date   • BREAST BIOPSY Left 2021    [facility]   • PAP SMEAR         Past Medical History:  Past Medical History:   Diagnosis Date   • Acute stress reaction 2014   • ADD (attention deficit disorder) 2014   • ADHD (attention deficit hyperactivity disorder)    • Benign essential hypertension 2010   • Family history of breast cancer 2013   • H/O complete eye exam 2016   • Hearing loss    • Hypertension    • Hypothyroidism    • Infiltrating ductal carcinoma of left breast (CMS/HCC) 2021    Left   • Kidney stone    • Stroke-like symptoms        Home Medications:  (Not in a hospital admission)      Allergies:  Sulfa antibiotics    Family History:  Family History   Problem Relation Age of Onset   • Breast cancer Mother 53   • Pancreatic cancer Mother 68   • Lung cancer Maternal Grandmother    • Stroke Father    • Breast cancer Paternal Aunt 55   • Prostate cancer Maternal Grandfather    • Prostate cancer Paternal Grandfather    • Brain cancer Maternal Cousin 20   • Melanoma Maternal Uncle    • Ovarian cancer Other         Paternal great aunt    • Breast cancer Other    • Breast cancer Paternal Great-Grandmother 94       Social History:  Social History     Tobacco Use   • Smoking status: Former Smoker     Packs/day: 1.00     Years: 7.00     Pack years: 7.00     Types: Cigarettes     Start date:      Quit date: 2009     Years since quittin.4   • Smokeless tobacco: Never Used   Substance Use Topics   • Alcohol use: Yes     Comment: beer, <1 a week  , 3 a month socal    • Drug use: No        Objective     Physical Exam:    No exam performed today,    Vital Signs  Temp:  [98.4 °F (36.9 °C)] 98.4 °F (36.9 °C)  Heart Rate:  [66-84] 66  Resp:  [16] 16  BP: (118-122)/(79-80) 118/80    Anticipated Surgical Procedure:  Left breast MRI guided core needle biopsy x 2    The risks, benefits and alternatives of this procedure have been discussed with the patient or responsible party: Yes        Codi Machuca MD  06/02/21  11:02 EDT

## 2021-06-03 ENCOUNTER — TELEPHONE (OUTPATIENT)
Dept: SURGERY | Facility: CLINIC | Age: 41
End: 2021-06-03

## 2021-06-03 ENCOUNTER — APPOINTMENT (OUTPATIENT)
Dept: MRI IMAGING | Facility: HOSPITAL | Age: 41
End: 2021-06-03

## 2021-06-03 NOTE — TELEPHONE ENCOUNTER
I called Ms. Astorga to discuss her the results of her MR biopsies, which showed an additional foci of cancer at 5:00, near the primary malignancy, and a small radial scar/papilloma farther away at 2:00.  I explained that the 2 foci of cancer will be excised in continuity (likely with SHAINA bracketing), and the separate area of radial scar will also be excised during her surgery, due to the low risk of upgrade (this will be a stereo needle loc).  We will discuss this all in more detail at her upcoming appointment.     Lashonda Euceda MD

## 2021-06-04 ENCOUNTER — PREP FOR SURGERY (OUTPATIENT)
Dept: OTHER | Facility: HOSPITAL | Age: 41
End: 2021-06-04

## 2021-06-04 ENCOUNTER — DOCUMENTATION (OUTPATIENT)
Dept: SURGERY | Facility: CLINIC | Age: 41
End: 2021-06-04

## 2021-06-04 DIAGNOSIS — C50.812 MALIGNANT NEOPLASM OF OVERLAPPING SITES OF LEFT BREAST IN FEMALE, ESTROGEN RECEPTOR NEGATIVE (HCC): Primary | ICD-10-CM

## 2021-06-04 DIAGNOSIS — Z17.1 MALIGNANT NEOPLASM OF OVERLAPPING SITES OF LEFT BREAST IN FEMALE, ESTROGEN RECEPTOR NEGATIVE (HCC): Primary | ICD-10-CM

## 2021-06-04 RX ORDER — DIAZEPAM 5 MG/1
10 TABLET ORAL ONCE
Status: CANCELLED | OUTPATIENT
Start: 2021-07-01 | End: 2021-06-04

## 2021-06-04 RX ORDER — HEPARIN SODIUM 5000 [USP'U]/ML
5000 INJECTION, SOLUTION INTRAVENOUS; SUBCUTANEOUS
Status: CANCELLED | OUTPATIENT
Start: 2021-07-01 | End: 2021-07-02

## 2021-06-04 NOTE — PROGRESS NOTES
I discussed her case with Dr. Machuca.  The plan is to place 2 SHAINA markers in the known sites of malignancy (bowtie clip and posterior U-shaped clip).  Then we will proceed with this stereo guided needle localization of the radial scar site on the day of surgery.    Lashonda Euceda MD

## 2021-06-07 ENCOUNTER — LAB (OUTPATIENT)
Dept: LAB | Facility: HOSPITAL | Age: 41
End: 2021-06-07

## 2021-06-07 ENCOUNTER — TRANSCRIBE ORDERS (OUTPATIENT)
Dept: SURGERY | Facility: CLINIC | Age: 41
End: 2021-06-07

## 2021-06-07 ENCOUNTER — OFFICE VISIT (OUTPATIENT)
Dept: ONCOLOGY | Facility: CLINIC | Age: 41
End: 2021-06-07

## 2021-06-07 VITALS
HEIGHT: 62 IN | HEART RATE: 85 BPM | TEMPERATURE: 97.7 F | DIASTOLIC BLOOD PRESSURE: 80 MMHG | OXYGEN SATURATION: 98 % | BODY MASS INDEX: 27.12 KG/M2 | RESPIRATION RATE: 16 BRPM | WEIGHT: 147.4 LBS | SYSTOLIC BLOOD PRESSURE: 114 MMHG

## 2021-06-07 DIAGNOSIS — F43.21 GRIEF REACTION: ICD-10-CM

## 2021-06-07 DIAGNOSIS — Z17.1 MALIGNANT NEOPLASM OF OVERLAPPING SITES OF LEFT BREAST IN FEMALE, ESTROGEN RECEPTOR NEGATIVE (HCC): ICD-10-CM

## 2021-06-07 DIAGNOSIS — C50.912 INFILTRATING DUCTAL CARCINOMA OF LEFT BREAST (HCC): ICD-10-CM

## 2021-06-07 DIAGNOSIS — F90.0 ATTENTION DEFICIT HYPERACTIVITY DISORDER (ADHD), PREDOMINANTLY INATTENTIVE TYPE: Chronic | ICD-10-CM

## 2021-06-07 DIAGNOSIS — N28.89 KIDNEY MASS: ICD-10-CM

## 2021-06-07 DIAGNOSIS — Z79.899 ENCOUNTER FOR LONG-TERM (CURRENT) USE OF HIGH-RISK MEDICATION: ICD-10-CM

## 2021-06-07 DIAGNOSIS — N63.0 BREAST MASS: Primary | ICD-10-CM

## 2021-06-07 DIAGNOSIS — Z86.79 HISTORY OF HEART MURMUR IN CHILDHOOD: ICD-10-CM

## 2021-06-07 DIAGNOSIS — N64.89 RADIAL SCAR OF LEFT BREAST: Primary | ICD-10-CM

## 2021-06-07 DIAGNOSIS — C50.812 MALIGNANT NEOPLASM OF OVERLAPPING SITES OF LEFT BREAST IN FEMALE, ESTROGEN RECEPTOR NEGATIVE (HCC): ICD-10-CM

## 2021-06-07 LAB
ALBUMIN SERPL-MCNC: 4 G/DL (ref 3.5–5.2)
ALBUMIN/GLOB SERPL: 1.5 G/DL (ref 1.1–2.4)
ALP SERPL-CCNC: 84 U/L (ref 38–116)
ALT SERPL W P-5'-P-CCNC: 23 U/L (ref 0–33)
ANION GAP SERPL CALCULATED.3IONS-SCNC: 7.6 MMOL/L (ref 5–15)
AST SERPL-CCNC: 17 U/L (ref 0–32)
BASOPHILS # BLD AUTO: 0.07 10*3/MM3 (ref 0–0.2)
BASOPHILS NFR BLD AUTO: 0.8 % (ref 0–1.5)
BILIRUB SERPL-MCNC: 0.2 MG/DL (ref 0.2–1.2)
BUN SERPL-MCNC: 15 MG/DL (ref 6–20)
BUN/CREAT SERPL: 19.5 (ref 7.3–30)
CALCIUM SPEC-SCNC: 9 MG/DL (ref 8.5–10.2)
CHLORIDE SERPL-SCNC: 106 MMOL/L (ref 98–107)
CO2 SERPL-SCNC: 24.4 MMOL/L (ref 22–29)
CREAT SERPL-MCNC: 0.77 MG/DL (ref 0.6–1.1)
DEPRECATED RDW RBC AUTO: 40.8 FL (ref 37–54)
EOSINOPHIL # BLD AUTO: 0.22 10*3/MM3 (ref 0–0.4)
EOSINOPHIL NFR BLD AUTO: 2.4 % (ref 0.3–6.2)
ERYTHROCYTE [DISTWIDTH] IN BLOOD BY AUTOMATED COUNT: 13.8 % (ref 12.3–15.4)
GFR SERPL CREATININE-BSD FRML MDRD: 82 ML/MIN/1.73
GLOBULIN UR ELPH-MCNC: 2.6 GM/DL (ref 1.8–3.5)
GLUCOSE SERPL-MCNC: 106 MG/DL (ref 74–124)
HCT VFR BLD AUTO: 36.1 % (ref 34–46.6)
HGB BLD-MCNC: 11.4 G/DL (ref 12–15.9)
IMM GRANULOCYTES # BLD AUTO: 0.03 10*3/MM3 (ref 0–0.05)
IMM GRANULOCYTES NFR BLD AUTO: 0.3 % (ref 0–0.5)
LYMPHOCYTES # BLD AUTO: 2.55 10*3/MM3 (ref 0.7–3.1)
LYMPHOCYTES NFR BLD AUTO: 28.1 % (ref 19.6–45.3)
MCH RBC QN AUTO: 25.9 PG (ref 26.6–33)
MCHC RBC AUTO-ENTMCNC: 31.6 G/DL (ref 31.5–35.7)
MCV RBC AUTO: 82 FL (ref 79–97)
MONOCYTES # BLD AUTO: 0.79 10*3/MM3 (ref 0.1–0.9)
MONOCYTES NFR BLD AUTO: 8.7 % (ref 5–12)
NEUTROPHILS NFR BLD AUTO: 5.42 10*3/MM3 (ref 1.7–7)
NEUTROPHILS NFR BLD AUTO: 59.7 % (ref 42.7–76)
NRBC BLD AUTO-RTO: 0 /100 WBC (ref 0–0.2)
PLATELET # BLD AUTO: 325 10*3/MM3 (ref 140–450)
PMV BLD AUTO: 9.5 FL (ref 6–12)
POTASSIUM SERPL-SCNC: 4.4 MMOL/L (ref 3.5–4.7)
PROT SERPL-MCNC: 6.6 G/DL (ref 6.3–8)
RBC # BLD AUTO: 4.4 10*6/MM3 (ref 3.77–5.28)
SODIUM SERPL-SCNC: 138 MMOL/L (ref 134–145)
WBC # BLD AUTO: 9.08 10*3/MM3 (ref 3.4–10.8)

## 2021-06-07 PROCEDURE — 36415 COLL VENOUS BLD VENIPUNCTURE: CPT

## 2021-06-07 PROCEDURE — 99215 OFFICE O/P EST HI 40 MIN: CPT | Performed by: INTERNAL MEDICINE

## 2021-06-07 PROCEDURE — 80053 COMPREHEN METABOLIC PANEL: CPT

## 2021-06-07 PROCEDURE — 85025 COMPLETE CBC W/AUTO DIFF WBC: CPT

## 2021-06-07 RX ORDER — LORAZEPAM 0.5 MG/1
0.5 TABLET ORAL EVERY 8 HOURS PRN
Qty: 20 TABLET | Refills: 0 | Status: SHIPPED | OUTPATIENT
Start: 2021-06-07 | End: 2021-07-20 | Stop reason: SDUPTHER

## 2021-06-07 RX ORDER — DEXTROAMPHETAMINE SACCHARATE, AMPHETAMINE ASPARTATE MONOHYDRATE, DEXTROAMPHETAMINE SULFATE AND AMPHETAMINE SULFATE 5; 5; 5; 5 MG/1; MG/1; MG/1; MG/1
20 CAPSULE, EXTENDED RELEASE ORAL EVERY MORNING
Qty: 30 CAPSULE | Refills: 0 | Status: SHIPPED | OUTPATIENT
Start: 2021-06-07 | End: 2021-07-20 | Stop reason: SDUPTHER

## 2021-06-07 NOTE — PROGRESS NOTES
Subjective     REASON FOR follow-up:    1.  Heterozygous state for factor V Leiden    2.  New onset stroke on aspirin by neurology    3.  Hypercholesterolemia    4.  Hypercoagulable work-up done.  Antithrombin 109% protein S activity 85% protein S antigen 107%, protein C activity 85%.  Anticardiolipin antibody IgM 24%, antibeta-2 glycoprotein antibody negative, lupus anticoagulant not detected, prothrombin gene mutation negative.    5.  Screening mammogram showed abnormality in the left breast 6 o'clock position, 8 mm on ultrasound, ultrasound-guided left breast biopsy, 6 cm from the nipple showed evidence of invasive ductal carcinoma poorly differentiated grade 3, [place] score of 8 out of 9 ER negative, FL negative, HER-2/ese 3+ positive.    6.  CT scan February 24, 2021 showed focal area of fatty infiltration of the liver.  1 cm hypoattenuating cortical lesion in the upper pole of the right kidney.  Similarly nodule in the upper pole of the left kidney is 1.5 cm.  Further evaluation by ultrasound suggested  · Ultrasound March 1, 2021 shows solid lesion in the upper pole of the right kidney.  In the left kidney along the midpole of the left kidney is a nodule which is 16 x 16 x 14 mm which is thought to be a cyst.  A 6-month follow-up CT suggested                                   History of Present Illness   Patient is a 40-year-old female with history of recent stroke in December when she presented with numbness of the face and left hand which lasted 8 hours on the face and 24 hours on the left arm.  She had not really gone to the ER.  She was seen by Dr. Hernandez neurologist who placed her on aspirin.  She had a complete stroke work-up with echocardiogram which was normal.  Holter monitor was normal without evidence of any atrial fibrillation.  Cardiology felt that there was no cardiac cause.  Ultrasound of the carotids did not show any stenosis.  But she had high cholesterol.  She is on treatment for her  high cholesterol currently and also an aspirin.  As soon as she took aspirin her symptoms resolved within 3 days completely.  She has never had a DVT in the past.  She was referred by her neurologist to evaluate for further hypercoagulable work-up.  Her aunt had heterozygous state for factor V Leiden and when patient was evaluated she was found to have heterozygous state for factor V Leiden.    Rest of the hypercoagulable work-up has been completely negative as we discussed above.    Patient states she has absolutely no symptoms of headache, no evidence of numbness in the face or arms and no weakness.    We also schedule mammogram which is scheduled in April of this year as she missed it last year.  Patient is having repeat MRI of the brain end of April and is following up with neurology after that.    Oncologic history:  Patient is here for follow-up of her mammogram.  Patient had screening mammogram April 2, 2021:  NAD a focal asymmetry was present in the posterior one third retroareolar region of the left breast.  Further spot compression images and left breast ultrasound was suggested.  Right breast was negative    April 9, 2021: Left diagnostic mammogram showed an area of focal asymmetry in the posterior one third region aspect of the left breast her breast    Ultrasound showed an irregular 0.8 cm lesion in the left breast at the 6 o'clock position of the order of 6 cm from the nipple.  Ultrasound-guided left breast biopsy was recommended.    April 26, 2021: Ultrasound-guided biopsy of the left breast 6 o'clock position, 6 cm from the nipple showed    Invasive ductal carcinoma, poorly differentiated with a Oziel score grade 3 with a score of 8 out of 9 measuring at least 7 mm.  No definitive ductal carcinoma is in situ is identified.  ER 1% negative  OK 1% negative   HER-2/ese 3+ positive    There was no evidence of lymphadenopathy either in the mammogram of the ultrasound.  We suggested an MRI of the  breast.  Patient has strong family history of breast cancer    Interval history  May 7, 2021: MRI of bilateral breast reviewed.  My interpretation is that there is area of enhancement in the 6 o'clock position of the left breast this is the biopsy-proven malignancy.  There is additional area of linear enhancement anteriorly and laterally measuring up to 1.2 cm this is approximately 5.6 cm from the nipple.  In addition there is a enhancement 2 to 3 mm in the anterolateral aspect of the lateral aspect of the left breast which is 4.6 cm from the nipple.  There is also mildly prominent posterior lymph node in the mid axilla which measures 1 cm with cortical thickening.  Findings are consistent with multifocal disease.  No contralateral disease or internal mammary adenopathy identified    May 20, 2021: Genetic test, [facility] genetic test shows 47 genes negative    May 21, 2021: I reviewed the biopsy of the lymph node in the left axilla which shows reactive lymph node negative for any carcinoma or lymphoma    June 2, 2021:Final Diagnosis  1. Left Breast, 5:00 o'clock, MRI-guided Biopsies for Linear Enhancement: INVASIVE MAMMARY CARCINOMA, NO  SPECIAL TYPE (INVASIVE DUCTAL CARCINOMA).  A. Largest contiguous focus in a core measures 4 mm.  B. No in-situ component identified.  C. Brisk lymphocytic response noted (see Comment).  D. No definitive lymphovascular nor perineural invasion identified.  2. Left Breast, 2:00 o'clock, MRI-guided Biopsy for Enhancement:  A. Benign breast parenchyma with radial scar and micropapillomas.  B. Usual ductal hyperplasia and columnar cell hyperplasia.  C. No atypical hyperplasia, in-situ nor invasive carcinoma identified    ER, NJ, HER-2 new and Ki-67 pending on this new biopsy      Patient has been seen by Dr. Lashonda Euceda with plans of doing surgery on July first 2021    Once patient undergoes surgery lumpectomy with sentinel lymph node biopsy we will give further recommendation about  treatment options.  Given that patient has multifocal disease and both of the lesions 2 lesions are 1.2 cm each, with it being a HER-2 positive tumor on the previous biopsy at 6:00 Dr. Euceda and myself discussed and patient preferred to undergo port placement at the same time of surgery.      Family  history: Mother had breast cancer at age 57.  Maternal great aunt had breast cancer and paternal great aunt had breast cancer in her 40s.  Patient's mother had pancreatic cancer as well.  Paternal grandfather had prostate cancer.    Hematologic history:  patient is a 40-year-old female who presented end of December with numbness and tingling in her left upper extremity and left face.  She went to see Dr. Goodman who then got concerned and referred to neurology.  She was referred to Dr. Freedman and talk to Dr. Thompson neurology for evaluation.  Patient underwent ultrasound of the carotids which did not show significant stenosis of the carotids.  She underwent 2D echocardiogram which showed a good ejection fraction of 64% with mild mitral valve prolapse and mild mitral stenosis.  She had a 24-hour Holter which was negative.  She then had also an MRI of the brain February 3, 2021 which showed areas of hyperintensity involving the subcortical white matter of the right frontal lobe superior laterally and posteriorly with the largest area measuring 8 9 mm.  There is also enhancement present.  The findings are consistent with a subacute infarct.  They could not differentiate if there is any underlying neoplastic process but a short-term follow-up was suggested in order to follow-up.  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    Patient currently got started on aspirin and factor V Leiden was obtained by neurology because patient's paternal aunt had factor V Leiden mutation and she was heterozygous.  Patient's testing also showed  that she was heterozygous.    Patient states her numbness and tingling is resolved completely on the left arm and face it just lasted for a 24 hours.  She was here referred here for neurology to see if there is any other cause for her underlying hypercoagulable state.  She has not had a complete hypercoagulable work-up.  Also discussed with her that an underlying malignancy could cause a hypercoagulable state and we would need to do a CT scan to rule that out.    Patient's mother has had breast cancer in her 50s but had pancreatic cancer at 68 and  from that.  Patient's dad had stroke at 63.  But he is alive at 74.  She has 1 brother who is 40 in good health.  Paternal aunt had breast cancer in her 40s.  Paternal grandfather had colon cancer in his late 80s.  Maternal uncle had throat cancer and another maternal uncle had skin cancer with metastasis to the lung.  And maternal grandmother had breast cancer.    Patient was to have mammogram done last year but because of COVID-19 it got postponed and she has not had it done yet.    Patient used to be a smoker half pack per day for 7 years but quit 10 years ago.  She has high cholesterol for which she is on treatment.    Past Medical History:   Diagnosis Date   • Acute stress reaction 2014   • ADD (attention deficit disorder) 2014   • ADHD (attention deficit hyperactivity disorder)    • Benign essential hypertension 2010   • Family history of breast cancer 2013   • H/O complete eye exam 2016   • Hearing loss    • Hypertension    • Hypothyroidism    • Infiltrating ductal carcinoma of left breast (CMS/HCC) 2021    Left   • Kidney stone    • Stroke-like symptoms         Past Surgical History:   Procedure Laterality Date   • BREAST BIOPSY Left 2021    [facility]   • PAP SMEAR  2016        Current Outpatient Medications on File Prior to Visit   Medication Sig Dispense Refill   • aspirin 81 MG EC tablet Take 1 tablet by mouth Daily. 30  tablet 11   • atorvastatin (Lipitor) 40 MG tablet Take 1 tablet by mouth Daily. 30 tablet 11   • Cholecalciferol (VITAMIN D3 PO) Take 2,000 Units by mouth. Takes 2000 twice a week     • levothyroxine (SYNTHROID, LEVOTHROID) 137 MCG tablet Take 1 tablet by mouth Daily. 30 tablet 5   • valsartan (DIOVAN) 160 MG tablet Take 1 tablet by mouth Daily. For blood pressure 30 tablet 5   • [DISCONTINUED] amphetamine-dextroamphetamine XR (Adderall XR) 20 MG 24 hr capsule Take 1 capsule by mouth Every Morning 30 capsule 0   • [DISCONTINUED] LORazepam (Ativan) 0.5 MG tablet Take 1 tablet by mouth Every 8 (Eight) Hours As Needed for Anxiety. 20 tablet 0     No current facility-administered medications on file prior to visit.        ALLERGIES:    Allergies   Allergen Reactions   • Sulfa Antibiotics Rash        Social History     Socioeconomic History   • Marital status: Single     Spouse name: Not on file   • Number of children: 0   • Years of education: Not on file   • Highest education level: Not on file   Tobacco Use   • Smoking status: Former Smoker     Packs/day: 1.00     Years: 7.00     Pack years: 7.00     Types: Cigarettes     Start date:      Quit date:      Years since quittin.4   • Smokeless tobacco: Never Used   Substance and Sexual Activity   • Alcohol use: Yes     Comment: beer, <1 a week , 3 a month socal    • Drug use: No   • Sexual activity: Yes     Partners: Male        Family History   Problem Relation Age of Onset   • Breast cancer Mother 53   • Pancreatic cancer Mother 68   • Lung cancer Maternal Grandmother    • Stroke Father    • Breast cancer Paternal Aunt 55   • Prostate cancer Maternal Grandfather    • Prostate cancer Paternal Grandfather    • Brain cancer Maternal Cousin 20   • Melanoma Maternal Uncle    • Ovarian cancer Other         Paternal great aunt    • Breast cancer Other    • Breast cancer Paternal Great-Grandmother 94        Review of Systems   Constitutional: Negative for appetite  "change, chills, diaphoresis, fatigue, fever and unexpected weight change.   HENT: Negative for hearing loss, sore throat and trouble swallowing.    Respiratory: Negative for cough, chest tightness, shortness of breath and wheezing.    Cardiovascular: Negative for chest pain, palpitations and leg swelling.   Gastrointestinal: Negative for abdominal distention, abdominal pain, constipation, diarrhea, nausea and vomiting.   Genitourinary: Negative for dysuria, frequency, hematuria and urgency.   Musculoskeletal: Negative for joint swelling.        No muscle weakness.   Skin: Negative for rash and wound.   Neurological: Negative for seizures, syncope, speech difficulty, weakness, numbness and headaches.   Hematological: Negative for adenopathy. Does not bruise/bleed easily.   Psychiatric/Behavioral: Negative for behavioral problems, confusion and suicidal ideas.   All other systems reviewed and are negative.     I have reviewed and confirmed the accuracy of the ROS as documented by the MA/LPN/RN Neena Beavers MD        Objective     Vitals:    06/07/21 0801   BP: 114/80   Pulse: 85   Resp: 16   Temp: 97.7 °F (36.5 °C)   TempSrc: Temporal   SpO2: 98%   Weight: 66.9 kg (147 lb 6.4 oz)   Height: 157.5 cm (62.01\")   PainSc: 0-No pain     Current Status 6/7/2021   ECOG score 0       Physical exam    CONSTITUTIONAL:  Vital signs reviewed.  Alert and oriented x3  No distress, looks comfortable.  EYES:  Conjunctiva and lids unremarkable.  Extraocular eye movements intact.  HEENT: No evidence of lymphadenopathy, no thyromegaly  RESPIRATORY:  Normal respiratory effort.  , no rales  or wheezing, clear   CARDIOVASCULAR:  Regular rate and rhythm, no murmur  No significant lower extremity edema.  Abdomen: Soft nontender positive bowel sounds no hepatosplenomegaly  SKIN: No wounds.  No rashes.  MUSCULOSKELETAL/EXTREMITIES: No clubbing or cyanosis.  No apparent unilateral weakness.  NEURO: CN 2-12 appear intact. No focal neurological " deficits noted.  PSYCHIATRIC:  Normal judgment and insight.  Normal mood and affect.      RECENT LABS:  Hematology WBC   Date Value Ref Range Status   06/07/2021 9.08 3.40 - 10.80 10*3/mm3 Final   01/04/2021 9.5 3.4 - 10.8 x10E3/uL Final     RBC   Date Value Ref Range Status   06/07/2021 4.40 3.77 - 5.28 10*6/mm3 Final   01/04/2021 4.85 3.77 - 5.28 x10E6/uL Final     Hemoglobin   Date Value Ref Range Status   06/07/2021 11.4 (L) 12.0 - 15.9 g/dL Final     Hematocrit   Date Value Ref Range Status   06/07/2021 36.1 34.0 - 46.6 % Final     Platelets   Date Value Ref Range Status   06/07/2021 325 140 - 450 10*3/mm3 Final        Assessment/Plan     1.  Factor V Leiden heterozygosity with right frontal stroke and patient presented in December 2020 with left-sided weakness tingling and numbness and also numbness in the face.  · Was referred to neurology and cardiology by Dr. Goodman  · Ultrasound of carotid does not show significant stenosis  · 24 Holter is negative  · 2D echocardiogram shows ejection fraction of 64% with mild mitral regurg and mitral stenosis  · Neurology obtain factor V Leiden given that patient's paternal aunt had's history of factor V Leiden and that was heterozygous for factor V Leiden  · Patient has been referred to us in order to evaluate any other underlying cause for hypercoagulable state  · We discussed about obtaining rest of the hypercoagulable work-up though doubtful it will be positive  · We will also obtain a CT scan of the chest abdomen pelvis in order to rule out underlying malignancy as a cause  · We will obtain mammogram as that has not been done yet last year  · Continue aspirin as per neurology.  Also patient on medications for her high cholesterol started after stroke currently asymptomatic  · Hypercoagulable work-up done which is negative except heterozygous state for factor V Leiden.  · Complete stroke work-up has been negative and since this is arterial stroke and she was not on  aspirin prior to that and her symptoms have resolved I agree with continuing aspirin alone along with high cholesterol medications.  · At this time I do not feel the need to put her on anticoagulants like Eliquis or Coumadin as she has not failed aspirin and she has responded to aspirin given the risk of bleeding versus clotting.  She has never had a DVT or pulmonary embolism in the past.  · MR venogram done on May 10, 2021 is negative.  Patient has been referred to the stroke neurologist Dr. Johnson  · I have left message for the neurologist to call me      2.  Family history of cancer: Patient's mother had breast cancer at age 50 and pancreatic cancer at age 68 and  from pancreatic cancer.  Patient's maternal grandmother had breast cancer in her 50s.  Her maternal uncle had skin cancer which went to the lung and another maternal uncle had throat cancer.  Maternal aunt had call colon polyps.  Patient's paternal aunt had breast cancer in her 40s.  And paternal grandfather with colon cancer in his 80s.  Paternal aunt also had factor V Leiden.    3.  Newly diagnosed invasive ductal carcinoma of the left breast  6 o'clock position, 8 mm on ultrasound, ultrasound-guided left breast biopsy, 6 cm from the nipple showed evidence of invasive ductal carcinoma poorly differentiated grade 3, [place] score of 8 out of 9 ER negative, MA negative, HER-2/ese 3+ positive.  · Screening mammogram had shown abnormality in the left breast 6 o'clock position and ultrasound guided biopsy of left breast showed invasive ductal carcinoma as above.  ER negative MA negative HER-2 3+ positive grade 3 tumor  · MRI of the breast showed multifocal disease in the left breast and left axillary lymph node 1 cm in diameter with cortical thickening  · Biopsy of the left axillary lymph node negative  · Biopsy of the 5 o'clock position 1.2 cm lesion, showed evidence of invasive ductal carcinoma, ER/MA and HER-2 pending on this.  · Genetic  testing has been negative  · Patient now is scheduled for surgery July 1, 2021 by Dr. Lashonda Euceda with plans to do lumpectomy with sentinel lymph node biopsy    4.  Solid nodule in the right kidney on ultrasound, will schedule follow-up with urology  · Patient was to follow-up with Dr. Shultz    5.  Elevated BMI, encourage diet and exercise.  Patient is improving her diet and exercise after having had the stroke    Plan  · I have reviewed and discussed in length the results of the MRI of the breast, biopsy results of the axillary lymph node and biopsy of the second lesion at 5 o'clock position which is consistent with invasive ductal carcinoma.  · I have discussed  with pathology today to obtain ER, AK and HER-2/ese  · Patient has been seen by Dr. Lashonda Euceda with plans to operate on her on July 1, 2020  · Patient preferred to get a port placed at the same time as she had HER-2 positive tumor and will  need chemotherapy  · I have briefly discussed chemotherapy with the patient as we would really need the final pathology after surgery to make a recommendation about chemotherapy.  · I will see her back July 15 to discuss results  · I will also refer patient to Dr. Beach  for evaluation as patient will be receiving Herceptin based chemotherapy        40 minutes spent with the patient face-to-face and 20  minutes in coordination of care and dictation, discussing with pathologist    MD Dr. Maxine Ibanez Dr., Dr., Dr.

## 2021-06-08 ENCOUNTER — TELEPHONE (OUTPATIENT)
Dept: SURGERY | Facility: CLINIC | Age: 41
End: 2021-06-08

## 2021-06-08 LAB
LAB AP CASE REPORT: NORMAL
LAB AP DIAGNOSIS COMMENT: NORMAL
PATH REPORT.ADDENDUM SPEC: NORMAL
PATH REPORT.FINAL DX SPEC: NORMAL
PATH REPORT.GROSS SPEC: NORMAL

## 2021-06-08 NOTE — TELEPHONE ENCOUNTER
Surgery is scheduled on 7/1/2021 @ 1:00pm, NL @ 11:00am, SI @ 12:00pm, patient arrival 9:00am.    SHAINA placement is scheduled @ [facility] on 6/24/2021 @ 10:30am, patient arrival 9:30am.    PAT is scheduled on 6/24/2021 @ 12:30pm.    Post-op appointment with Dr. Euceda is scheduled on 7/15/2021 @ 2:30pm.    Called and spoke to patient, patient expressed v/u of appointment times.    Sent patient a reminder letter in the mail.

## 2021-06-09 ENCOUNTER — APPOINTMENT (OUTPATIENT)
Dept: OTHER | Facility: HOSPITAL | Age: 41
End: 2021-06-09

## 2021-06-15 ENCOUNTER — HOSPITAL ENCOUNTER (OUTPATIENT)
Dept: CARDIOLOGY | Facility: HOSPITAL | Age: 41
Discharge: HOME | End: 2021-06-15
Admitting: NURSE PRACTITIONER

## 2021-06-15 ENCOUNTER — TELEPHONE (OUTPATIENT)
Dept: SURGERY | Facility: CLINIC | Age: 41
End: 2021-06-15

## 2021-06-15 ENCOUNTER — OFFICE VISIT (OUTPATIENT)
Dept: CARDIOLOGY | Facility: CLINIC | Age: 41
End: 2021-06-15

## 2021-06-15 VITALS
HEIGHT: 62 IN | OXYGEN SATURATION: 100 % | BODY MASS INDEX: 27.05 KG/M2 | SYSTOLIC BLOOD PRESSURE: 114 MMHG | DIASTOLIC BLOOD PRESSURE: 66 MMHG | WEIGHT: 147 LBS | HEART RATE: 89 BPM

## 2021-06-15 DIAGNOSIS — Z17.1 MALIGNANT NEOPLASM OF OVERLAPPING SITES OF LEFT BREAST IN FEMALE, ESTROGEN RECEPTOR NEGATIVE (HCC): ICD-10-CM

## 2021-06-15 DIAGNOSIS — Z79.899 ENCOUNTER FOR LONG-TERM (CURRENT) USE OF HIGH-RISK MEDICATION: ICD-10-CM

## 2021-06-15 DIAGNOSIS — Z79.899 ENCOUNTER FOR LONG-TERM (CURRENT) USE OF HIGH-RISK MEDICATION: Primary | ICD-10-CM

## 2021-06-15 DIAGNOSIS — I34.0 MILD MITRAL INSUFFICIENCY: ICD-10-CM

## 2021-06-15 DIAGNOSIS — C50.812 MALIGNANT NEOPLASM OF OVERLAPPING SITES OF LEFT BREAST IN FEMALE, ESTROGEN RECEPTOR NEGATIVE (HCC): ICD-10-CM

## 2021-06-15 PROCEDURE — 93356 MYOCRD STRAIN IMG SPCKL TRCK: CPT

## 2021-06-15 PROCEDURE — 93308 TTE F-UP OR LMTD: CPT | Performed by: INTERNAL MEDICINE

## 2021-06-15 PROCEDURE — 93356 MYOCRD STRAIN IMG SPCKL TRCK: CPT | Performed by: INTERNAL MEDICINE

## 2021-06-15 PROCEDURE — 93308 TTE F-UP OR LMTD: CPT

## 2021-06-15 PROCEDURE — 99214 OFFICE O/P EST MOD 30 MIN: CPT | Performed by: NURSE PRACTITIONER

## 2021-06-15 NOTE — TELEPHONE ENCOUNTER
[id number] - [address] AT [address] [facility] - [phone] [facility] [phone] FX

## 2021-06-15 NOTE — PROGRESS NOTES
"    CARDIOLOGY        Patient Name: Radha Astorga  :1980  Age: 41 y.o.  Primary Cardiologist: Michael Elam MD  Encounter Provider:  CLAUDINE Mosqueda    Date of Service: 06/15/21          CHIEF COMPLAINT / REASON FOR OFFICE VISIT     Follow-up (mild mitral infufficiency / breast mass)      HISTORY OF PRESENT ILLNESS       HPI  Radha Astorga is a 41 y.o. female who presents today for cardiac evaluation prior to starting chemotherapy.     Pt has a  history significant for breast cancer, hypertension, factor V Leiden, history of right frontal stroke.    Review of medical records reveals that patient was evaluated by oncology 2021.  Patient has a newly diagnosed invasive ductal carcinoma of the left breast.  Plans for lumpectomy.  Plans for Herceptin based chemotherapy.    Patient reports that from cardiovascular standpoint she has done well since last evaluation.  Reports that she is planning to initiate Herceptin based chemotherapy after her lumpectomy which is scheduled for 2021.  Reports that from cardiovascular standpoint she is asymptomatic.  She denies any chest discomfort, shortness of breath, dyspnea with exertion, palpitations, lightheadedness, lower extremity edema, fatigue.      The following portions of the patient's history were reviewed and updated as appropriate: allergies, current medications, past family history, past medical history, past social history, past surgical history and problem list.      VITAL SIGNS     Visit Vitals  /66 (BP Location: Left arm, Patient Position: Sitting, Cuff Size: Adult)   Pulse 89   Ht 157.5 cm (62\")   Wt 66.7 kg (147 lb)   SpO2 100%   BMI 26.89 kg/m²         Wt Readings from Last 3 Encounters:   06/15/21 66.7 kg (147 lb)   21 66.9 kg (147 lb 6.4 oz)   21 63.5 kg (140 lb)     Body mass index is 26.89 kg/m².      REVIEW OF SYSTEMS   Review of Systems   Constitutional: Negative for malaise/fatigue.   Cardiovascular: " Negative for chest pain and leg swelling.   Respiratory: Negative for shortness of breath.    Neurological: Negative for light-headedness.   All other systems reviewed and are negative.          PHYSICAL EXAMINATION     Vitals and nursing note reviewed.   Constitutional:       Appearance: Normal appearance. Well-developed.   Eyes:      Conjunctiva/sclera: Conjunctivae normal.   Neck:      Vascular: No carotid bruit.   Pulmonary:      Breath sounds: Normal breath sounds.   Cardiovascular:      Normal rate. Regular rhythm. Normal S1 with normal intensity. Normal S2 with normal intensity.      Murmurs: There is no murmur.      No gallop. No click. No rub.   Musculoskeletal: Normal range of motion. Skin:     General: Skin is warm and dry.   Neurological:      Mental Status: Alert and oriented to person, place, and time.      GCS: GCS eye subscore is 4. GCS verbal subscore is 5. GCS motor subscore is 6.   Psychiatric:         Speech: Speech normal.         Behavior: Behavior normal.         Thought Content: Thought content normal.         Judgment: Judgment normal.           REVIEWED DATA     Procedures    Cardiac Procedures:  1. Echocardiogram 2/11/2021.  LVEF 64%.  Diastolic function indeterminate.  LAE.  Mild mitral valve prolapse of the anterior mitral leaflet with trace to mild mitral valve regurgitation.  Mild mitral valve stenosis.  Calculated RVSP 13 mmHg.  2. 24-hour Holter 2/12/2021.  Relatively benign monitor study.  Total of 15 PVCs and 17 PACs.    Lipid Panel    Lipid Panel 1/4/21   Total Cholesterol 244 (A)   Triglycerides 175 (A)   HDL Cholesterol 50   VLDL Cholesterol 32   LDL Cholesterol  162 (A)   (A) Abnormal value                ASSESSMENT & PLAN      Diagnosis Plan   1. Encounter for long-term (current) use of high-risk medication  Adult Transthoracic Echo Limited W/ Cont if Necessary Per Protocol   2. Malignant neoplasm of overlapping sites of left breast in female, estrogen receptor negative  (CMS/[facility])  Adult Transthoracic Echo Limited W/ Cont if Necessary Per Protocol   3. Mild mitral insufficiency           SUMMARY/DISCUSSION  1. Left breast cancer.  Patient with newly diagnosed left breast cancer.  Scheduled for lumpectomy 7/1/2021.  Patient will be in need of Herceptin based chemotherapy.  She has been referred by oncology.  Patient had an echocardiogram in February 2021 which revealed preserved LVEF with mild mitral valve prolapse and mild mitral valve regurgitation.  She is currently asymptomatic.  Strain data was not obtained on this echocardiogram.  I recommended proceeding with a limited echocardiogram to collect string data.  2. Encounter for high risk medication  3. Mild mitral valve insufficiency.  Currently asymptomatic and denies dyspnea.  Mild on echocardiogram February 2021.    4. Oncology has placed referral for cardio oncology appointment.  This will be arranged by our scheduling department today.          MEDICATIONS         Discharge Medications          Accurate as of Amelia 15, 2021  1:44 PM. If you have any questions, ask your nurse or doctor.            Continue These Medications      Instructions Start Date   amphetamine-dextroamphetamine XR 20 MG 24 hr capsule  Commonly known as: Adderall XR   20 mg, Oral, Every Morning      aspirin 81 MG EC tablet   81 mg, Oral, Daily      atorvastatin 40 MG tablet  Commonly known as: Lipitor   40 mg, Oral, Daily      levothyroxine 137 MCG tablet  Commonly known as: SYNTHROID, LEVOTHROID   137 mcg, Oral, Daily      LORazepam 0.5 MG tablet  Commonly known as: Ativan   0.5 mg, Oral, Every 8 Hours PRN      valsartan 160 MG tablet  Commonly known as: DIOVAN   160 mg, Oral, Daily, For blood pressure      VITAMIN D3 PO   2,000 Units, Oral, Takes 2000 twice a week                 **Dragon Disclaimer:   Much of this encounter note is an electronic transcription/translation of spoken language to printed text. The electronic translation of spoken language  may permit erroneous, or at times, nonsensical words or phrases to be inadvertently transcribed. Although I have reviewed the note for such errors, some may still exist.

## 2021-06-16 ENCOUNTER — OFFICE VISIT (OUTPATIENT)
Dept: SURGERY | Facility: CLINIC | Age: 41
End: 2021-06-16

## 2021-06-16 VITALS
WEIGHT: 147 LBS | OXYGEN SATURATION: 98 % | DIASTOLIC BLOOD PRESSURE: 67 MMHG | RESPIRATION RATE: 16 BRPM | BODY MASS INDEX: 27.05 KG/M2 | SYSTOLIC BLOOD PRESSURE: 102 MMHG | HEIGHT: 62 IN | HEART RATE: 85 BPM

## 2021-06-16 DIAGNOSIS — C50.812 MALIGNANT NEOPLASM OF OVERLAPPING SITES OF LEFT BREAST IN FEMALE, ESTROGEN RECEPTOR NEGATIVE (HCC): Primary | ICD-10-CM

## 2021-06-16 DIAGNOSIS — Z17.1 MALIGNANT NEOPLASM OF OVERLAPPING SITES OF LEFT BREAST IN FEMALE, ESTROGEN RECEPTOR NEGATIVE (HCC): Primary | ICD-10-CM

## 2021-06-16 PROCEDURE — 99215 OFFICE O/P EST HI 40 MIN: CPT | Performed by: SURGERY

## 2021-06-16 NOTE — PROGRESS NOTES
"Date of Office Visit: 21  Encounter Provider: Camron Virk MD  Place of Service: [facility] CARDIOLOGY  Patient Name: Radha Astorga  :1980    Chief Complaint   Patient presents with   • Results     ECHO   • CARDIO ONOCOLOGY     CHEMO TREATMENT  RISK ASSESSMENT    :     HPI:     Ms. Astorga is 41 y.o. and presents today to establish care with cardio-oncology.    She was followed by cardiology as a child for a history of rheumatic fever and mild mitral regurgitation. She was originally seen in our office in 2021 after having an episode of right sided numbness; it's still unclear to me if she had a stroke or not. She is scheduled to see Dr Johnson in a few months. She has a history of hypertension and is on valsartan.    An echo in our office in 2021 reported \"mild mitral prolapse, trace-mild mitral regurgitation, and mild mitral stenosis.\"  I have reviewed the study and disagree. There is no MVP. There is trace MR.  The valve does have a mild rheumatic appearance, but opens well, and the mean inflow gradient is normal at 4mm Hg.    She has been diagnosed with breast cancer (ER/MD negative, HER2 +++) and will be undergoing lumpectomy/sentinel node biopsy on . She will be treated with trastuzumab after recovery, and may require additional agents depending on the final pathology.    She had a limited echo in our office that revealed normal GLS, -21%, and normal LVSF, EF 60-65%.    She denies any cardiac symptoms at this time.     Past Medical History:   Diagnosis Date   • Acute stress reaction 2014   • ADHD (attention deficit hyperactivity disorder)    • Factor 5 Leiden mutation, heterozygous (CMS/HCC) 2021   • Family history of breast cancer 2013   • H/O complete eye exam 2016   • Hearing loss    • History of rheumatic fever    • Hypertension    • Hypothyroidism    • Infiltrating ductal carcinoma of left breast (CMS/HCC) " 2021    Left   • Kidney stone    • Stroke-like symptoms        Past Surgical History:   Procedure Laterality Date   • BREAST BIOPSY Left 2021    [facility]   • PAP SMEAR  2016       Social History     Socioeconomic History   • Marital status: Single     Spouse name: Not on file   • Number of children: 0   • Years of education: Not on file   • Highest education level: Not on file   Tobacco Use   • Smoking status: Former Smoker     Packs/day: 1.00     Years: 7.00     Pack years: 7.00     Types: Cigarettes     Start date:      Quit date:      Years since quittin.4   • Smokeless tobacco: Never Used   Vaping Use   • Vaping Use: Never used   Substance and Sexual Activity   • Alcohol use: Yes     Comment: beer, <1 a week , 3 a month socal    • Drug use: No   • Sexual activity: Yes     Partners: Male       Family History   Problem Relation Age of Onset   • Breast cancer Mother 53   • Pancreatic cancer Mother 68   • Lung cancer Maternal Grandmother    • Stroke Father    • Breast cancer Paternal Aunt 55   • Prostate cancer Maternal Grandfather    • Prostate cancer Paternal Grandfather    • Brain cancer Maternal Cousin 20   • Melanoma Maternal Uncle    • Ovarian cancer Other         Paternal great aunt    • Breast cancer Other    • Breast cancer Paternal Great-Grandmother 94   • Hypertension Brother        Review of Systems   All other systems reviewed and are negative.      Allergies   Allergen Reactions   • Sulfa Antibiotics Rash         Current Outpatient Medications:   •  amphetamine-dextroamphetamine XR (Adderall XR) 20 MG 24 hr capsule, Take 1 capsule by mouth Every Morning, Disp: 30 capsule, Rfl: 0  •  aspirin 81 MG EC tablet, Take 1 tablet by mouth Daily., Disp: 30 tablet, Rfl: 11  •  atorvastatin (Lipitor) 40 MG tablet, Take 1 tablet by mouth Daily., Disp: 30 tablet, Rfl: 11  •  Cholecalciferol (VITAMIN D3 PO), Take 2,000 Units by mouth. Takes 2000 twice a week, Disp: , Rfl:   •  levothyroxine  "(SYNTHROID, LEVOTHROID) 137 MCG tablet, Take 1 tablet by mouth Daily., Disp: 30 tablet, Rfl: 5  •  LORazepam (Ativan) 0.5 MG tablet, Take 1 tablet by mouth Every 8 (Eight) Hours As Needed for Anxiety., Disp: 20 tablet, Rfl: 0  •  valsartan (DIOVAN) 160 MG tablet, Take 1 tablet by mouth Daily. For blood pressure, Disp: 30 tablet, Rfl: 5      Objective:     Vitals:    06/17/21 1023   BP: 108/64   BP Location: Left arm   Pulse: 85   Weight: 67.5 kg (148 lb 12.8 oz)   Height: 157.5 cm (62\")     Body mass index is 27.22 kg/m².    Vitals reviewed.   Constitutional:       Appearance: Healthy appearance. Well-developed and not in distress.   Eyes:      Conjunctiva/sclera: Conjunctivae normal.   HENT:      Head: Normocephalic.      Nose: Nose normal.         Comments: Masked  Neck:      Vascular: No JVD. JVD normal.      Lymphadenopathy: No cervical adenopathy.   Pulmonary:      Effort: Pulmonary effort is normal.      Breath sounds: Normal breath sounds.   Cardiovascular:      Normal rate. Regular rhythm.      Murmurs: There is no murmur.   Pulses:     Intact distal pulses.   Edema:     Peripheral edema absent.   Abdominal:      Palpations: Abdomen is soft.      Tenderness: There is no abdominal tenderness.   Musculoskeletal: Normal range of motion.      Cervical back: Normal range of motion. Skin:     General: Skin is warm and dry.      Findings: No rash.   Neurological:      General: No focal deficit present.      Mental Status: Alert, oriented to person, place, and time and oriented to person, place and time.      Cranial Nerves: No cranial nerve deficit.   Psychiatric:         Behavior: Behavior normal.         Thought Content: Thought content normal.         Judgment: Judgment normal.           ECG 12 Lead    Date/Time: 6/17/2021 10:35 AM  Performed by: Camron Virk MD  Authorized by: Camron Virk MD   Comparison: compared with previous ECG   Similar to previous ECG  Rhythm: sinus rhythm  Conduction: conduction " normal  ST Segments: ST segments normal  T Waves: T waves normal  QRS axis: normal  Other: no other findings    Clinical impression: normal ECG              Assessment:       Diagnosis Plan   1. Malignant neoplasm of overlapping sites of left breast in female, estrogen receptor negative (CMS/HCC)  ECG 12 Lead    Troponin    proBNP   2. Encounter for long-term (current) use of high-risk medication  ECG 12 Lead    Troponin    proBNP   3. Essential hypertension  ECG 12 Lead   4. Rheumatic mitral valve disease            Plan:       She has been diagnosed with ER/NH negative HER2 +++ breast cancer and will be undergoing lumpectomy. After she recovers from surgery, she will be treated with trastuzumab at a minimum; final pathology will be needed to know if any other agents will be used.    She has normal LVSF and GLS. She is already on valsartan and her BP is perfectly controlled. She does not have any other high risk cardiac conditions.     I will get a baseline proBNP/Tn when she is in PAT before surgery. I will follow up by phone with her two weeks after surgery; once we know her treatment plan, we will schedule an echocardiogram 3 months after initiation of therapy.    Sincerely,       Camron Virk MD

## 2021-06-16 NOTE — PROGRESS NOTES
RM:________    Referral Provider: Neena Beavers MD Wheeler, James, MD    NEW PATIENT/ CONSULT  PREVIOUS CARDIOLOGIST:   CARDIAC TESTING:     : 1980   AGE: 41 y.o.    2021  REASON FOR VISIT/  CC:      WT: ____________ BP: __________L __________R/ HR_______________    CHEST PAIN: _____________    SOA: ____________PALPS: __________      LIGHTHEADED: ___________ FATIGUE: _______________ EDEMA______________  ALLERGIES:  Sulfa antibiotics  SMOKING HISTORY  Social History     Tobacco Use   • Smoking status: Former Smoker     Packs/day: 1.00     Years: 7.00     Pack years: 7.00     Types: Cigarettes     Start date:      Quit date: 2009     Years since quittin.4   • Smokeless tobacco: Never Used   Substance Use Topics   • Alcohol use: Yes     Comment: beer, <1 a week , 3 a month socal    • Drug use: No     Single     CHILDREN: _______________________       CAFFEINE USE________  ALCOHOL _____________  OCCUPATION_____________  Past Surgical History:   Procedure Laterality Date   • BREAST BIOPSY Left 2021    [facility]   • PAP SMEAR  2016                FAMILY HISTORY  HEART DISEASE  CHF  DIABETES  CARDIAC ARREST  STROKE  CANCER  ANEURYSM                                                             H/O: MI_____   STROKE________   GOUT_____   ANEMIA______     CAROTID________ HIV____ CAD_______ HYPERCHOL _____    H/O: CHF _____   RF____ DM___ HTN_______PVD____THYROID DISEASE_______    PMH: GI ____   HEPATITIS ___ KIDNEY DISEASE ___ LUNG DISEASE _______     SLEEP APNEA ____ BLOOD CLOTS ____ DVT ____ VEIN STRIPPING ___________     CANCER _________________________________ CHEMO/ RADIATION__________

## 2021-06-16 NOTE — PROGRESS NOTES
[facility]     Referring Provider: Neena Beavers MD     Chief complaint: Preoperative planning     HPI:   5/21/21:  Ms. Radha Astorga is a 42 yo woman, seen at the request of Dr. Neena Beavers, for a new diagnosis of left breast cancer. This was initially detected as an imaging abnormality on routine screening. Her work-up is detailed in the oncologic history below. Prior to the biopsy, she denies any breast lumps, pain, skin changes, or nipple discharge. She has a past history of a benign left breast percutaneous biopsy in 2013. She has a past history of a stroke in December 2020 without any residual neuro deficits. Cardiac work-up was negative, however hypercoagulable work-up revealed a factor V Leiden mutation. She has a family history of breast cancer in her mother (diagnosed at age 53) and a paternal aunt (diagnosed at age 55), as well as a paternal great aunt and her paternal great grandmother. Her paternal great aunt also had ovarian cancer and her mother had pancreatic cancer. She underwent expanded panel genetic testing at Dr. Beavers's office and she was negative for mutation.    6/16/21, Interval History:  At her last visit, she underwent left axillary lymph node biopsy, which thankfully returned as benign. She underwent left breast MR-guided biopsy x2. The lesion at 5:00, near the primary malignancy, returned as a second foci of cancer, and the lesion at 2:00, returned as a radial scar (see pathology report details below). I have already briefly discussed these results with her over the phone and the plan still is to proceed with breast conservation. She has an appointment scheduled with plastic surgery next week.      Oncology/Hematology History Overview Note   12/11/2019, Screening MMG with Romeo ([facility]):  Scattered fibroglandular density. There are no findings to suggest malignancy.  BI-RADS 1: Negative.     Malignant neoplasm of overlapping sites of left breast in female, estrogen  receptor negative (CMS/HCC)   4/1/2021 Initial Diagnosis    Malignant neoplasm of overlapping sites of left breast in female, estrogen receptor negative (CMS/HCC)     4/2/2021 Imaging    Screening MMG with Romeo ( [place]):  Scattered fibroglandular densities. In the posterior one-third of the left breast projecting in a retroareolar location on the CC images there is an area of focal asymmetry. I see no suspicious calcifications or areas of architectural distortion in either breast. There is no evidence for skin thickening or nipple retraction.  BI-RADS 0: Incomplete.     4/9/2021 Imaging    Left Diagnostic MMG with Romeo & Left Breast US ( [facility]):  MMG:  With additional imaging there is persistence of the area of focal asymmetry in the posterior one-third inferior aspect of the left breast.  US:  At the 6 o'clock position on the order of 6 cm from the nipple there is a 0.8 cm irregular hypoechoic lesion. Mild posterior acoustic shadowing is noted.  BI-RADS 4: Suspicious.     4/26/2021 Biopsy    Left Breast, US-Guided Biopsy ( [facility]):    1. Left Breast, 6:00, 6 cm FN, U/S-Guided Core Needle Biopsy for a Mass:                 A. INVASIVE DUCTAL CARCINOMA, Poorly differentiated; Oziel Histologic Grade III/III (tubule score = 3, nuclear score = 2, mitoses score = 3), measuring at least 7 mm.               B. No definitive ductal carcinoma in situ identified.   C. Negative for lymphovascular space invasion.    ER negative (<1%)  [place] negative (<1%)  HER2 positive (IHC 3+)  Ki-67 55%     5/5/2021 Genetic Testing    [employer] Common Hereditary Cancers Panel (47 genes):    Negative     5/7/2021 Biopsy    Bilateral Breast MRI ([facility]):  There is an amorphous area of enhancement seen within the 6:00 position of the left breast, posterior depth measuring approximately 1.2 cm (series 9 image 91). Susceptibility artifact is present within this region consistent with recently newly diagnosed biopsy-proven malignancy. There  is an additional area of more linear enhancement seen just anteriorly and laterally measuring up to 1.2 cm (series 9 image 91). This is seen approximately 5.6 cm from the nipple. Mixed washout kinetics are present. There is an additional punctate area of enhancement measuring only approximately 2 to 3 mm seen within the anterior lateral aspect of the left breast seen approximately 4.6 cm from the nipple (series 9 image 79) also demonstrating mixed washout  kinetics. No additional areas of enhancement identified. There is questionable mildly prominent lymph nodes present within the left axillary region (series 9 image 34 and series 9 image 18) with mild cortical thickening present with benign-appearing fatty juan miguel present. This is seen both within the high and mid to low axilla. A mildly prominent posterior lymph node is also present within the mid axilla measuring up to 1 cm with cortical thickening present (series 9 image 27). Limited evaluation of the intrathoracic contents symmetric no acute process. A marker seen at the junction of the left breast and the left chest wall inferiorly (series 4 image 135) with no abnormalities  identified within this region. No abnormal fluid collections identified.  BI-RADS 6: Known malignancy.     5/21/2021 Biopsy    Left Axilla, US-Guided Biopsy ( [facility]):  1.  Lymph Node, Left Axilla, Core Biopsy:                 A.  Fragments of reactive lymph node; negative for lymphoma and carcinoma.      6/2/2021 Biopsy    Left Breast, MR-Guided Biopsy x 2 ( [facility]):    1. Left Breast, 5:00 o'clock, MRI-guided Biopsies for Linear Enhancement:   INVASIVE MAMMARY CARCINOMA, NO SPECIAL TYPE (INVASIVE DUCTAL CARCINOMA).               A. Largest contiguous focus in a core measures 4 mm.               B. No in-situ component identified.                C. Brisk lymphocytic response noted (see Comment).               D. No definitive lymphovascular nor perineural invasion identified.  -Bowtie clip.  Biopsy clips marking the 2 sites of biopsy-proven malignancy are  by 2.5 cm (bowtie clip and posteriorly located U-shaped clip).     ER negative (0%)  [place] negative (0%)  Her2+ (IHC 3+)  Ki-67 50%    2.  Left Breast, 2:00 o'clock, MRI-guided Biopsy for Enhancement:                A. Benign breast parenchyma with radial scar and micropapillomas.               B. Usual ductal hyperplasia and columnar cell hyperplasia.               C. No atypical hyperplasia, in-situ nor invasive carcinoma identified.  -U-shaped clip. Concordant.         Review of Systems:  See interval history.       Medications:    Current Outpatient Medications:   •  amphetamine-dextroamphetamine XR (Adderall XR) 20 MG 24 hr capsule, Take 1 capsule by mouth Every Morning, Disp: 30 capsule, Rfl: 0  •  aspirin 81 MG EC tablet, Take 1 tablet by mouth Daily., Disp: 30 tablet, Rfl: 11  •  atorvastatin (Lipitor) 40 MG tablet, Take 1 tablet by mouth Daily., Disp: 30 tablet, Rfl: 11  •  Cholecalciferol (VITAMIN D3 PO), Take 2,000 Units by mouth. Takes 2000 twice a week, Disp: , Rfl:   •  levothyroxine (SYNTHROID, LEVOTHROID) 137 MCG tablet, Take 1 tablet by mouth Daily., Disp: 30 tablet, Rfl: 5  •  LORazepam (Ativan) 0.5 MG tablet, Take 1 tablet by mouth Every 8 (Eight) Hours As Needed for Anxiety., Disp: 20 tablet, Rfl: 0  •  valsartan (DIOVAN) 160 MG tablet, Take 1 tablet by mouth Daily. For blood pressure, Disp: 30 tablet, Rfl: 5      Allergies   Allergen Reactions   • Sulfa Antibiotics Rash       Family History   Problem Relation Age of Onset   • Breast cancer Mother 53   • Pancreatic cancer Mother 68   • Lung cancer Maternal Grandmother    • Stroke Father    • Breast cancer Paternal Aunt 55   • Prostate cancer Maternal Grandfather    • Prostate cancer Paternal Grandfather    • Brain cancer Maternal Cousin 20   • Melanoma Maternal Uncle    • Ovarian cancer Other         Paternal great aunt    • Breast cancer Other    • Breast cancer Paternal  Great-Grandmother 94       PHYSICAL EXAMINATION:   Vitals:    06/16/21 1456   BP: 102/67   Pulse: 85   Resp: 16   SpO2: 98%     ECOG 0 - Asymptomatic  General: NAD, well appearing  Psych: a&o x 3, normal mood and affect  Eyes: EOMI, no scleral icterus  ENMT: neck supple without masses or thyromegaly, mucus membranes moist  MSK: normal gait, normal ROM in bilateral shoulders  Lymph nodes: no cervical, supraclavicular or axillary lymphadenopathy  Breast: large size, grade 3 ptosis, right slightly larger than left (chronic)  Right: deferred.  Left: No visible abnormalities on inspection while seated, with arms raised or hands on hips. At 6:30, 5.5 cm FN, there is a 1 cm nodule/postbiopsy changes. No skin changes, or nipple abnormalities.      I have independently reviewed her imaging and here are my findings:   In the left inferior breast, there is an irregular mass which measures 8 mm on ultrasound and 12 mm on MRI. This site is marked with a U-shaped clip. Slightly anterolateral to this mass on MRI, there is an additional 12 mm area of linear enhancement. This site is marked with a bowtie clip. Together these 2 areas measure about 3 cm on MRI. On postbiopsy mammogram, the biopsy clips marking the 2 sites of biopsy-proven malignancy are  by 2.5 cm (bowtie clip and posteriorly located U-shaped clip).  Further anterior and lateral in the right breast at 2:00, there is a 3 mm area of focal enhancement on MRI. This is located at least 5 cm from the primary malignancy. This site is marked with a U-shaped clip and represents the radial scar.      Assessment:  41 y.o. F with a diagnosis of left breast: High grade, multifocal, invasive ductal carcinoma, ER/NM negative, Her2 positive; clinical T1(m)N0, anatomic stage IA, prognostic stage IA.    -She also has a new diagnosis of a left breast radial scar.    Discussion:  We reviewed new pathology report and discussed the plan for surgery. She would still prefer breast  conservation. This will be a fairly large volume lumpectomy and will require the assistance of plastic surgery for oncoplastic closure. She would like to be a smaller breast size, and the cancer location at 6:00 is ideal, since this is where tissue is removed for a cosmetic reduction. She has an appointment scheduled with plastic surgery next week.    We discussed that lumpectomy would require preoperative localization, and in her case bracketing between 2 markers given the extent of disease. We also discussed the risk of positive margins and that she must have negative margins for lumpectomy to be an appropriate oncologic procedure. I will make every effort to obtain negative margins at her initial operation, but there is a 10-15% chance that she will require a second operation for re-excision, or possibly a total mastectomy. We will not know the margin status until after her final pathology has returned.     We discussed the new diagnosis of left breast radial scar. I explained that the finding of a radial scar is associated with an approximately 15% risk of identifying atypia, DCIS, or invasive carcinoma within the vicinity of the lesion. This cannot be recognized on the small volume of tissue obtained at percutaneous biopsy. Therefore excisional biopsy with more generous tissue sampling and pathologic analysis is recommended. This will be done at the same time as the partial mastectomy. We discussed that excisional biopsy would require preoperative wire-localization. We discussed that should the final pathology be upgraded, she would likely require an additional operation.     We discussed axillary staging. I described the procedure for sentinel lymph node biopsy in detail, including the preoperative injection of technetium sulfur colloid and intraoperative injection of lymphazurin blue dye. I explained that this is a mapping test and not a cancer test, that all of the lymph nodes containing these dyes will be  removed for complete testing by pathology, and that the results could impact the decision for adjuvant treatment or additional surgery.    I described additional risks and potential complications associated with surgery, including, but not limited to, port malfunction, pneumothorax, bleeding, infection, complications related to blue dye, lymphedema, deformity/poor cosmetic result, chronic pain, neurovascular injury, numbness, seroma, hematoma, deep venous thrombosis, skin flap necrosis, disease recurrence and the possibility of requiring additional surgery. We also discussed other treatment options including the option of not undergoing any surgical treatment and the risks associated with this including disease progression. She expressed an understanding of these factors and wished to proceed.    Plan:  -Port placement, left SHAINA-guided, bracketed, partial mastectomy and left breast needle-localized excisional biopsy and left axillary sentinel lymph node biopsy with oncoplastic closure.  -Plastic surgery referral.  -Continue follow-up with Dr. Beavers.    Lashonda Euceda MD    I spent 50 minutes caring for Radha on this date of service. This time includes time spent by me in the following activities: preparing for the visit, performing a medically appropriate examination and/or evaluation, counseling and educating the patient/family/caregiver, referring and communicating with other health care professionals, documenting information in the medical record and independently interpreting results and communicating that information with the patient/family/caregiver.    CC:  MD Fadi Ibanez MD Dana Tisdale, APRN Tathyana Fensterer, MD

## 2021-06-17 ENCOUNTER — OFFICE VISIT (OUTPATIENT)
Dept: CARDIOLOGY | Facility: CLINIC | Age: 41
End: 2021-06-17

## 2021-06-17 VITALS
SYSTOLIC BLOOD PRESSURE: 108 MMHG | WEIGHT: 148.8 LBS | DIASTOLIC BLOOD PRESSURE: 64 MMHG | HEART RATE: 85 BPM | BODY MASS INDEX: 27.38 KG/M2 | HEIGHT: 62 IN

## 2021-06-17 DIAGNOSIS — I10 ESSENTIAL HYPERTENSION: ICD-10-CM

## 2021-06-17 DIAGNOSIS — C50.812 MALIGNANT NEOPLASM OF OVERLAPPING SITES OF LEFT BREAST IN FEMALE, ESTROGEN RECEPTOR NEGATIVE (HCC): Primary | ICD-10-CM

## 2021-06-17 DIAGNOSIS — Z79.899 ENCOUNTER FOR LONG-TERM (CURRENT) USE OF HIGH-RISK MEDICATION: ICD-10-CM

## 2021-06-17 DIAGNOSIS — I05.9 RHEUMATIC MITRAL VALVE DISEASE: ICD-10-CM

## 2021-06-17 DIAGNOSIS — Z17.1 MALIGNANT NEOPLASM OF OVERLAPPING SITES OF LEFT BREAST IN FEMALE, ESTROGEN RECEPTOR NEGATIVE (HCC): Primary | ICD-10-CM

## 2021-06-17 PROBLEM — F43.21 GRIEF REACTION: Status: RESOLVED | Noted: 2019-06-12 | Resolved: 2021-06-17

## 2021-06-17 PROBLEM — F43.20 GRIEF REACTION: Status: RESOLVED | Noted: 2019-06-12 | Resolved: 2021-06-17

## 2021-06-17 LAB
BH CV ECHO MEAS - BSA(HAYCOCK): 1.7 M^2
BH CV ECHO MEAS - BSA: 1.7 M^2
BH CV ECHO MEAS - BZI_BMI: 26.9 KILOGRAMS/M^2
BH CV ECHO MEAS - BZI_METRIC_HEIGHT: 157.5 CM
BH CV ECHO MEAS - BZI_METRIC_WEIGHT: 66.7 KG
MAXIMAL PREDICTED HEART RATE: 177 BPM
STRESS TARGET HR: 150 BPM

## 2021-06-17 PROCEDURE — 93000 ELECTROCARDIOGRAM COMPLETE: CPT | Performed by: INTERNAL MEDICINE

## 2021-06-17 PROCEDURE — 99214 OFFICE O/P EST MOD 30 MIN: CPT | Performed by: INTERNAL MEDICINE

## 2021-06-18 ENCOUNTER — TRANSCRIBE ORDERS (OUTPATIENT)
Dept: PREADMISSION TESTING | Facility: HOSPITAL | Age: 41
End: 2021-06-18

## 2021-06-21 ENCOUNTER — DOCUMENTATION (OUTPATIENT)
Dept: SURGERY | Facility: HOSPITAL | Age: 41
End: 2021-06-21

## 2021-06-21 NOTE — PROGRESS NOTES
Initial Office Consultation  Name: Radha Astorga                  Date  2021  : 1980       Age 41    Referring Provider: Dr Euceda  Chief Complaint:   left breast cancer   Clinical History: Patient is a  40 yo lady, seen at the request of Dr Euceda  for a new diagnosed left breast cancer . She has history of IDC at 6 o’clock and papilloma on a radial scar. She is planned to have left lumpectomy  and comes today to be evaluated for reconstruction. She wears 36/38C/D and would like to be a small B.   PMH: previous stroke, high blood pressure, hypothyroidism, hypercholesterolemia, heterozygous factor V leiden mutation.   PSH: none  Medications: Adderall, aspirin, lipotor, vit D3, Synthroid, atiban, diovan.   Allergies: sulfa  FH:. Breast cancer, pancreatic cancer, cervical cancer, prostate cancer, lung cancer, skin cancer, brain cancer, parkinson’s, stroke, factor V  SH:. 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 She does not smoke or drink.   ROS: Reviewed and all system are negative except for use of glasses, hypoacusis, heart murmur, high blood pressure, history of kidney stones, blistering sunburn in past, history of stroke, anxiety disorder, thyroid problems.   Physical Examination:   Patient is awake, alert and oriented  Respiration is non elaborated  CV: non tachycardic  Abdomen is not distended  Breast: She has bilateral large breasts with no parenchymal abnormalities.  The distance between her sternal notch and her nipple is 31 cm on the right and 27.5 cm on the left.  The distance between her inframammary fold and her nipple is 9 cm on the right and 9.5 cm on the left.  The breast width is 17 on the right and 16 on the left.     Imagin/9/2021 Imaging     Left Diagnostic MMG with Romeo & Left Breast US ([facility]):  MMG:  With additional imaging there is persistence of the area of focal asymmetry in the posterior one-third inferior aspect of the left  breast.  US:  At the 6 o'clock position on the order of 6 cm from the nipple there is a 0.8 cm irregular hypoechoic lesion. Mild posterior acoustic shadowing is noted.  BI-RADS 4: Suspicious.       Pathology:  6/2/2021 Biopsy     Left [facility]     1. Left Breast, 5:00 o'clock, MRI-guided Biopsies for Linear Enhancement:   INVASIVE MAMMARY CARCINOMA, NO SPECIAL TYPE (INVASIVE DUCTAL CARCINOMA).               A. Largest contiguous focus in a core measures 4 mm.               B. No in-situ component identified.                C. Brisk lymphocytic response noted (see Comment).               D. No definitive lymphovascular nor perineural invasion identified.  -Bowtie clip. Biopsy clips marking the 2 sites of biopsy-proven malignancy are  by 2.5 cm (bowtie clip and posteriorly located U-shaped clip).     ER negative (0%)  CA negative (0%)  Her2+ (IHC 3+)  Ki-67 50%     2.  Left Breast, 2:00 o'clock, MRI-guided Biopsy for Enhancement:                A. Benign breast parenchyma with radial scar and micropapillomas.               B. Usual ductal hyperplasia and columnar cell hyperplasia.               C. No atypical hyperplasia, in-situ nor invasive carcinoma identified.  -U-shaped clip. Concordant.       Assessment and Plan:  yo lady with   Plan:   We had a long discussion with the patient and her family today regarding her reconstructive options.  These include no reconstruction, reconstruction at the time of mastectomy or lumpectomy, and finally delayed reconstruction.  We discussed specific types of reconstruction, including: tissue expander and/or implants, and autologous reconstruction with either pedicled or free flap.  We also covered the later stages of reconstruction, including nipple reconstruction and areola tattooing, fat grafting, and symmetry procedures if indicated or desired.      I described the typical sarah-operative course of each procedure, including the nature of  the procedure, hospital stay, necessity for drains, post-operative activity restriction, and postoperative visits (including those for tissue expansion).  We also discussed the time frame for reconstruction.  I shared information about saline vs. silicone implants, including their differences, risk of capsular contracture, rippling, or leak/rupture, and safety/monitoring issues.  I described Alloderm and its use for implant reconstruction. We discussed that radiation therapy, if she ultimately requires it, may alter the reconstructive options.        We reviewed surgical risks including, but not limited to, infection, bleeding, hematoma, seroma, pain, scarring, numbness, skin or nipple loss, wound healing problems, flap failures including partial or complete flap loss, asymmetry, need for revisions or further surgeries,  and risks associated with anesthesia.      Additional risks with autologous reconstruction include donor wound morbidities, such as hernias or bulges, wound healing problems, muscle weakness, partial or complete flap loss, and typical surgical risks as listed above.       Specifically on her case, I recommend reduction/ mastopexy of her breasts for better cosmetic result. She is aware her breast size will depend on the left side lumpectomy with the additional reduction by me. I will try to make the left a little larger than the right to anticipate the radiation effect. If she is unhappy with the final size, we can offer fat graft with or without implants after her radiation.     Consent:  bilateral breast reconstruction with reduction/mastopexy .     I spent 90 minutes explaining to patient and her  about her procedure and after care.     ICD codes:   Malignant neoplasm of overlapping sites of left breast in female, estrogen receptor negative (CMS/[facility])  - Primary     C50.812  Z17.1       CPT codes:   62498-38 reduction mammoplasty  Implants: none       Caridad Baker MD, PhD  NPI:  [id number]

## 2021-06-24 ENCOUNTER — PRE-ADMISSION TESTING (OUTPATIENT)
Dept: PREADMISSION TESTING | Facility: HOSPITAL | Age: 41
End: 2021-06-24

## 2021-06-24 ENCOUNTER — APPOINTMENT (OUTPATIENT)
Dept: PREADMISSION TESTING | Facility: HOSPITAL | Age: 41
End: 2021-06-24

## 2021-06-24 ENCOUNTER — HOSPITAL ENCOUNTER (OUTPATIENT)
Dept: MAMMOGRAPHY | Facility: HOSPITAL | Age: 41
Discharge: HOME | End: 2021-06-24

## 2021-06-24 ENCOUNTER — HOSPITAL ENCOUNTER (OUTPATIENT)
Dept: ULTRASOUND IMAGING | Facility: HOSPITAL | Age: 41
Discharge: HOME | End: 2021-06-24

## 2021-06-24 VITALS
DIASTOLIC BLOOD PRESSURE: 86 MMHG | SYSTOLIC BLOOD PRESSURE: 119 MMHG | OXYGEN SATURATION: 100 % | TEMPERATURE: 98.6 F | HEART RATE: 79 BPM | BODY MASS INDEX: 26.68 KG/M2 | WEIGHT: 145 LBS | RESPIRATION RATE: 16 BRPM | HEIGHT: 62 IN

## 2021-06-24 VITALS
SYSTOLIC BLOOD PRESSURE: 126 MMHG | HEIGHT: 62 IN | TEMPERATURE: 96.9 F | HEART RATE: 86 BPM | OXYGEN SATURATION: 99 % | WEIGHT: 147.2 LBS | BODY MASS INDEX: 27.09 KG/M2 | RESPIRATION RATE: 16 BRPM | DIASTOLIC BLOOD PRESSURE: 84 MMHG

## 2021-06-24 DIAGNOSIS — Z17.1 MALIGNANT NEOPLASM OF OVERLAPPING SITES OF LEFT BREAST IN FEMALE, ESTROGEN RECEPTOR NEGATIVE (HCC): ICD-10-CM

## 2021-06-24 DIAGNOSIS — Z79.899 ENCOUNTER FOR LONG-TERM (CURRENT) USE OF HIGH-RISK MEDICATION: ICD-10-CM

## 2021-06-24 DIAGNOSIS — C50.812 MALIGNANT NEOPLASM OF OVERLAPPING SITES OF LEFT BREAST IN FEMALE, ESTROGEN RECEPTOR NEGATIVE (HCC): ICD-10-CM

## 2021-06-24 LAB
ALBUMIN SERPL-MCNC: 4.2 G/DL (ref 3.5–5.2)
ALBUMIN/GLOB SERPL: 1.6 G/DL
ALP SERPL-CCNC: 79 U/L (ref 39–117)
ALT SERPL W P-5'-P-CCNC: 22 U/L (ref 1–33)
ANION GAP SERPL CALCULATED.3IONS-SCNC: 8.7 MMOL/L (ref 5–15)
AST SERPL-CCNC: 17 U/L (ref 1–32)
BILIRUB SERPL-MCNC: 0.2 MG/DL (ref 0–1.2)
BUN SERPL-MCNC: 10 MG/DL (ref 6–20)
BUN/CREAT SERPL: 13.9 (ref 7–25)
CALCIUM SPEC-SCNC: 9.2 MG/DL (ref 8.6–10.5)
CHLORIDE SERPL-SCNC: 106 MMOL/L (ref 98–107)
CO2 SERPL-SCNC: 25.3 MMOL/L (ref 22–29)
CREAT SERPL-MCNC: 0.72 MG/DL (ref 0.57–1)
DEPRECATED RDW RBC AUTO: 41.3 FL (ref 37–54)
ERYTHROCYTE [DISTWIDTH] IN BLOOD BY AUTOMATED COUNT: 14.3 % (ref 12.3–15.4)
GFR SERPL CREATININE-BSD FRML MDRD: 88 ML/MIN/1.73
GLOBULIN UR ELPH-MCNC: 2.6 GM/DL
GLUCOSE SERPL-MCNC: 90 MG/DL (ref 65–99)
HCT VFR BLD AUTO: 35 % (ref 34–46.6)
HGB BLD-MCNC: 11.4 G/DL (ref 12–15.9)
MCH RBC QN AUTO: 25.8 PG (ref 26.6–33)
MCHC RBC AUTO-ENTMCNC: 32.6 G/DL (ref 31.5–35.7)
MCV RBC AUTO: 79.2 FL (ref 79–97)
NT-PROBNP SERPL-MCNC: 48.7 PG/ML (ref 0–450)
PLATELET # BLD AUTO: 364 10*3/MM3 (ref 140–450)
PMV BLD AUTO: 9.9 FL (ref 6–12)
POTASSIUM SERPL-SCNC: 4.5 MMOL/L (ref 3.5–5.2)
PROT SERPL-MCNC: 6.8 G/DL (ref 6–8.5)
QT INTERVAL: 390 MS
RBC # BLD AUTO: 4.42 10*6/MM3 (ref 3.77–5.28)
SODIUM SERPL-SCNC: 140 MMOL/L (ref 136–145)
TROPONIN T SERPL-MCNC: <0.01 NG/ML (ref 0–0.03)
WBC # BLD AUTO: 6.79 10*3/MM3 (ref 3.4–10.8)

## 2021-06-24 PROCEDURE — G0279 TOMOSYNTHESIS, MAMMO: HCPCS

## 2021-06-24 PROCEDURE — 85027 COMPLETE CBC AUTOMATED: CPT

## 2021-06-24 PROCEDURE — 84484 ASSAY OF TROPONIN QUANT: CPT

## 2021-06-24 PROCEDURE — 77065 DX MAMMO INCL CAD UNI: CPT

## 2021-06-24 PROCEDURE — 36415 COLL VENOUS BLD VENIPUNCTURE: CPT

## 2021-06-24 PROCEDURE — A4648 IMPLANTABLE TISSUE MARKER: HCPCS

## 2021-06-24 PROCEDURE — 93005 ELECTROCARDIOGRAM TRACING: CPT

## 2021-06-24 PROCEDURE — 25010000003 LIDOCAINE 1 % SOLUTION: Performed by: RADIOLOGY

## 2021-06-24 PROCEDURE — 83880 ASSAY OF NATRIURETIC PEPTIDE: CPT

## 2021-06-24 PROCEDURE — 80053 COMPREHEN METABOLIC PANEL: CPT

## 2021-06-24 PROCEDURE — 93010 ELECTROCARDIOGRAM REPORT: CPT | Performed by: INTERNAL MEDICINE

## 2021-06-24 RX ORDER — LIDOCAINE HYDROCHLORIDE 10 MG/ML
10 INJECTION, SOLUTION INFILTRATION; PERINEURAL ONCE
Status: COMPLETED | OUTPATIENT
Start: 2021-06-24 | End: 2021-06-24

## 2021-06-24 RX ADMIN — LIDOCAINE HYDROCHLORIDE 10 ML: 10 INJECTION, SOLUTION INFILTRATION; PERINEURAL at 11:00

## 2021-06-24 NOTE — DISCHARGE INSTRUCTIONS
Preventing a Surgical Site Infection:  • For 2 to 3 days before surgery, avoid shaving with a razor because the razor can irritate skin and make it easier to develop an infection.    • Any areas of open skin can increase the risk of a post-operative wound infection by allowing bacteria to enter and travel throughout the body.  Notify your surgeon if you have any skin wounds / rashes even if it is not near the expected surgical site.  The area will need assessed to determine if surgery should be delayed until it is healed.  • The night prior to surgery shower using a fresh bar of anti-bacterial soap (such as Dial) and clean washcloth.  Sleep in a clean bed with clean clothing.  Do not allow pets to sleep with you.  • Shower on the morning of surgery using a fresh bar of anti-bacterial soap (such as Dial) and clean washcloth.  Dry with a clean towel and dress in clean clothing.  • Ask your surgeon if you will be receiving antibiotics prior to surgery.  • Make sure you, your family, and all healthcare providers clean their hands with soap and water or an alcohol based hand  before caring for you or your wound.    Day of surgery:  Your arrival time is approximately two hours before your scheduled surgery time.  Upon arrival, a Pre-op nurse and Anesthesiologist will review your health history, obtain vital signs, and answer questions you may have.  The only belongings needed at this time will be a list of your home medications and if applicable your C-PAP/BI-PAP machine.  A Pre-op nurse will start an IV and you may receive medication in preparation for surgery, including something to help you relax.     Please be aware that surgery does come with discomfort.  We want to make every effort to control your discomfort so please discuss any uncontrolled symptoms with your nurse.   Your doctor will most likely have prescribed pain medications.      If you are going home after surgery you will receive  individualized written care instructions before being discharged.  A responsible adult must drive you to and from the hospital on the day of your surgery and stay with you for 24 hours.  Discharge prescriptions can be filled by the hospital pharmacy during regular pharmacy hours.  If you are having surgery late in the day/evening your prescription may be e-prescribed to your pharmacy.  Please verify your pharmacy hours or chose a 24 hour pharmacy to avoid not having access to your prescription because your pharmacy has closed for the day.    If you are staying overnight following surgery, you will be transported to your hospital room following the recovery period.  [facility] has all private rooms.    If you have any questions please call Pre-Admission Testing at [phone].  Deductibles and co-payments are collected on the day of service. Please be prepared to pay the required co-pay, deductible or deposit on the day of service as defined by your plan.    Patient Education for Self-Quarantine Process    Following your COVID testing, we strongly recommend that you do not leave your home after you have been tested for COVID except to get medical care. This includes not going to work, school or to public areas.  If this is not possible for you to do please limit your activities to only required outings.  Be sure to wear a mask when you are with other people, practice social distancing and wash your hands frequently.      The following items provide additional details to keep you safe.  • Wash your hands with soap and water frequently for at least 20 seconds.   • Avoid touching your eyes, nose and mouth with unwashed hands.  • Do not share anything - utensils, towels, food from the same bowl.   • Have your own utensils, drinking glass, dishes, towels and bedding.   • Do not have visitors.   • Do use FaceTime to stay in touch with family and friends.  • You should stay in a specific room away from  others if possible.   • Stay at least 6 feet away from others in the home if you cannot have a dedicated room to yourself.   • Do not snuggle with your pet. While the CDC says there is no evidence that pets can spread COVID-19 or be infected from humans, it is probably best to avoid “petting, snuggling, being kissed or licked and sharing food (during self-quarantine)”, according to the CDC.   • Sanitize household surfaces daily. Include all high touch areas (door handles, light switches, phones, countertops, etc.)  • Do not share a bathroom with others, if possible.   • Wear a mask around others in your home if you are unable to stay in a separate room or 6 feet apart. If  you are unable to wear a mask, have your family member wear a mask if they must be within 6 feet of you.   Call your surgeon immediately if you experience any of the following symptoms:  • Sore Throat  • Shortness of Breath or difficulty breathing  • Cough  • Chills  • Body soreness or muscle pain  • Headache  • Fever  • New loss of taste or smell  • Do not arrive for your surgery ill.  Your procedure will need to be rescheduled to another time.  You will need to call your physician before the day of surgery to avoid any unnecessary exposure to hospital staff as well as other patients.          Take the following medications the morning of surgery: SYNTHROID      DRIVE THROUGH COVID TEST SCHEDULED FOR Tuesday June 29 AT 1:40pm    ARRIVAL TIME IS 0900 July 1    If you are on prescription narcotic pain medication to control your pain you may also take that medication the morning of surgery.    General Instructions:  • Do not eat solid food after midnight the night before surgery.  • You may drink clear liquids day of surgery but must stop at least one hour before your hospital arrival time.  • It is beneficial for you to have a clear drink that contains carbohydrates the day of surgery.  We suggest a 12 to 20 ounce bottle of Gatorade or Powerade for  non-diabetic patients or a 12 to 20 ounce bottle of G2 or Powerade Zero for diabetic patients. (Pediatric patients, are not advised to drink a 12 to 20 ounce carbohydrate drink)    Clear liquids are liquids you can see through.  Nothing red in color.     Plain water                               Sports drinks  Sodas                                   Gelatin (Jell-O)  Fruit juices without pulp such as white grape juice and apple juice  Popsicles that contain no fruit or yogurt  Tea or coffee (no cream or milk added)  Gatorade / Powerade  G2 / Powerade Zero    • Infants may have breast milk up to four hours before surgery.  • Infants drinking formula may drink formula up to six hours before surgery.   • Patients who avoid smoking, chewing tobacco and alcohol for 4 weeks prior to surgery have a reduced risk of post-operative complications.  Quit smoking as many days before surgery as you can.  • Do not smoke, use chewing tobacco or drink alcohol the day of surgery.   • If applicable bring your C-PAP/ BI-PAP machine.  • Bring any papers given to you in the doctor’s office.  • Wear clean comfortable clothes.  • Do not wear contact lenses, false eyelashes or make-up.  Bring a case for your glasses.   • Bring crutches or walker if applicable.  • Remove all piercings.  Leave jewelry and any other valuables at home.  • Hair extensions with metal clips must be removed prior to surgery.  • The Pre-Admission Testing nurse will instruct you to bring medications if unable to obtain an accurate list in Pre-Admission Testing.            Preventing a Surgical Site Infection:  • For 2 to 3 days before surgery, avoid shaving with a razor because the razor can irritate skin and make it easier to develop an infection.    • Any areas of open skin can increase the risk of a post-operative wound infection by allowing bacteria to enter and travel throughout the body.  Notify your surgeon if you have any skin wounds / rashes even if it is  not near the expected surgical site.  The area will need assessed to determine if surgery should be delayed until it is healed.  • The night prior to surgery shower using a fresh bar of anti-bacterial soap (such as Dial) and clean washcloth.  Sleep in a clean bed with clean clothing.  Do not allow pets to sleep with you.  • Shower on the morning of surgery using a fresh bar of anti-bacterial soap (such as Dial) and clean washcloth.  Dry with a clean towel and dress in clean clothing.  • Ask your surgeon if you will be receiving antibiotics prior to surgery.  • Make sure you, your family, and all healthcare providers clean their hands with soap and water or an alcohol based hand  before caring for you or your wound.    Day of surgery:  Your arrival time is approximately two hours before your scheduled surgery time.  Upon arrival, a Pre-op nurse and Anesthesiologist will review your health history, obtain vital signs, and answer questions you may have.  The only belongings needed at this time will be a list of your home medications and if applicable your C-PAP/BI-PAP machine.  A Pre-op nurse will start an IV and you may receive medication in preparation for surgery, including something to help you relax.     Please be aware that surgery does come with discomfort.  We want to make every effort to control your discomfort so please discuss any uncontrolled symptoms with your nurse.   Your doctor will most likely have prescribed pain medications.      If you are going home after surgery you will receive individualized written care instructions before being discharged.  A responsible adult must drive you to and from the hospital on the day of your surgery and stay with you for 24 hours.  Discharge prescriptions can be filled by the hospital pharmacy during regular pharmacy hours.  If you are having surgery late in the day/evening your prescription may be e-prescribed to your pharmacy.  Please verify your pharmacy  hours or chose a 24 hour pharmacy to avoid not having access to your prescription because your pharmacy has closed for the day.    If you are staying overnight following surgery, you will be transported to your hospital room following the recovery period.  [facility] has all private rooms.    If you have any questions please call Pre-Admission Testing at [phone].  Deductibles and co-payments are collected on the day of service. Please be prepared to pay the required co-pay, deductible or deposit on the day of service as defined by your plan.    Patient Education for Self-Quarantine Process    Following your COVID testing, we strongly recommend that you do not leave your home after you have been tested for COVID except to get medical care. This includes not going to work, school or to public areas.  If this is not possible for you to do please limit your activities to only required outings.  Be sure to wear a mask when you are with other people, practice social distancing and wash your hands frequently.      The following items provide additional details to keep you safe.  • Wash your hands with soap and water frequently for at least 20 seconds.   • Avoid touching your eyes, nose and mouth with unwashed hands.  • Do not share anything - utensils, towels, food from the same bowl.   • Have your own utensils, drinking glass, dishes, towels and bedding.   • Do not have visitors.   • Do use FaceTime to stay in touch with family and friends.  • You should stay in a specific room away from others if possible.   • Stay at least 6 feet away from others in the home if you cannot have a dedicated room to yourself.   • Do not snuggle with your pet. While the CDC says there is no evidence that pets can spread COVID-19 or be infected from humans, it is probably best to avoid “petting, snuggling, being kissed or licked and sharing food (during self-quarantine)”, according to the CDC.   • Sanitize household  surfaces daily. Include all high touch areas (door handles, light switches, phones, countertops, etc.)  • Do not share a bathroom with others, if possible.   • Wear a mask around others in your home if you are unable to stay in a separate room or 6 feet apart. If  you are unable to wear a mask, have your family member wear a mask if they must be within 6 feet of you.   Call your surgeon immediately if you experience any of the following symptoms:  • Sore Throat  • Shortness of Breath or difficulty breathing  • Cough  • Chills  • Body soreness or muscle pain  • Headache  • Fever  • New loss of taste or smell  • Do not arrive for your surgery ill.  Your procedure will need to be rescheduled to another time.  You will need to call your physician before the day of surgery to avoid any unnecessary exposure to hospital staff as well as other patients.

## 2021-06-24 NOTE — NURSING NOTE
VSS. Denies pain. Medical/surgical history and medications reviewed. No distress noted. Procedural education completed with patient's questions addressed. Goggles and mask in place by nurse with all interaction. Pt wore mask

## 2021-06-24 NOTE — H&P
Name: Radha Astorga ADMIT: 2021   : 1980  PCP: Fadi Goodman MD    MRN: [id number] LOS: 0 days   AGE/SEX: 41 y.o. female  ROOM: Room/bed info not found       Chief complaint 2 site bx proven L breast malignancy    Present Illness or Internal History:  Patient is a 41 y.o. female presents with 2 site bx proven L breast malignancy for 1-2 site Krystyna  preop LOC.     Past Surgical History:  Past Surgical History:   Procedure Laterality Date   • BREAST BIOPSY Left 2021    [facility]   • PAP SMEAR         Past Medical History:  Past Medical History:   Diagnosis Date   • Acute stress reaction 2014   • ADHD (attention deficit hyperactivity disorder)    • Factor 5 Leiden mutation, heterozygous (CMS/[facility]) 2021   • Family history of breast cancer 2013   • H/O complete eye exam 2016   • Hearing loss    • History of rheumatic fever    • Hypertension    • Hypothyroidism    • Infiltrating ductal carcinoma of left breast (CMS/HCC) 2021    Left   • Kidney stone    • Stroke-like symptoms        Home Medications:  (Not in a hospital admission)      Allergies:  Sulfa antibiotics    Family History:  Family History   Problem Relation Age of Onset   • Breast cancer Mother 53   • Pancreatic cancer Mother 68   • Lung cancer Maternal Grandmother    • Stroke Father    • Breast cancer Paternal Aunt 55   • Prostate cancer Maternal Grandfather    • Prostate cancer Paternal Grandfather    • Brain cancer Maternal Cousin 20   • Melanoma Maternal Uncle    • Ovarian cancer Other         Paternal great aunt    • Breast cancer Other    • Breast cancer Paternal Great-Grandmother 94   • Hypertension Brother        Social History:  Social History     Tobacco Use   • Smoking status: Former Smoker     Packs/day: 1.00     Years: 7.00     Pack years: 7.00     Types: Cigarettes     Start date:      Quit date: 2009     Years since quittin.4   • Smokeless tobacco: Never Used   Vaping Use   •  Vaping Use: Never used   Substance Use Topics   • Alcohol use: Yes     Comment: beer, <1 a week , 3 a month socal    • Drug use: No        Objective     Physical Exam:    No exam performed today,    Vital Signs  Temp:  [98.6 °F (37 °C)] 98.6 °F (37 °C)  Heart Rate:  [99] 99  Resp:  [16] 16  BP: (123)/(80) 123/80    Anticipated Surgical Procedure:  Left breast ultrasound guided preoperative Krystyna  localization x2    The risks, benefits and alternatives of this procedure have been discussed with the patient or responsible party: Yes        Codi Machuca MD  06/24/21  10:33 EDT

## 2021-06-24 NOTE — NURSING NOTE
Patient returned from SHAINA insertion procedure.  Bandaid dry and intact to insertion site at upper right breast. Bandaid also dry and intact over the radiologist's SHAINA location marking next to insertion site. No firmness of swelling noted. Patient aware john must remain on skin until surgery.

## 2021-06-25 ENCOUNTER — APPOINTMENT (OUTPATIENT)
Dept: OTHER | Facility: HOSPITAL | Age: 41
End: 2021-06-25

## 2021-06-28 ENCOUNTER — DOCUMENTATION (OUTPATIENT)
Dept: OTHER | Facility: HOSPITAL | Age: 41
End: 2021-06-28

## 2021-06-28 NOTE — PROGRESS NOTES
ONCOLOGY SOCIAL WORKER PSYCHOSOCIAL ASSESSMENT    Date:  6/28/2021      Reason for Visit:  Psychosocial Support/ community resources     Distress Screening Complete:  No   I.    PHYSICAL:     Diagnosis:   Patient Active Problem List   Diagnosis   • Family history of breast cancer   • Hypothyroidism   • Major depressive disorder, recurrent episode, moderate with anxious distress (CMS/HCC)   • Attention deficit hyperactivity disorder (ADHD), predominantly inattentive type   • Factor 5 Leiden mutation, heterozygous (CMS/HCC)   • Kidney mass   • Malignant neoplasm of overlapping sites of left breast in female, estrogen receptor negative (CMS/HCC)   • Encounter for long-term (current) use of high-risk medication   • Essential hypertension   • Rheumatic mitral valve disease      Date of Diagnosis: 6/2/21   Recurrent of same type:  No  Recurrence with New Primary:  No,    Date of Previous Diagnosis:    Treatment:  Surgery    Other:  Chemo      II.   FINANCIAL:  Employment Status:  Full Time  [employer] Benefits: No  Source of Income:  Employed  Other: partner's income   Transportation Issues:  No   Means of Transportation:  Private car   Prescription Drug Coverage:  Yes  Medications Unable to Afford:  Unknown at this time   Pharmacy Name/Location:    [id number] [address] AT [facility] [phone] [facility] [phone] [address] [address]  Phone: [phone] Fax: [phone]     III.  SOCIAL:    Marital Status:  Significant other   Significant Other:  Yes, Norma Young   Children under 18:  Yes - 16 yr old daughter.   Do the children know about the cancer diagnosis:  Yes  Does the patient have:  Living Will / Advanced Directive - NEEDS to complete   Home Situation: lives with Norma and 16 yr old.     Patient's Support System: partner, friends, her father     ADLs - Functioning Level at Home:  Independent  Able to (on own):  Walk,  Bathe, Dress and Cook  Current DME: none   Current Home Health: none     IV.    MENTAL:    Emotional Status:  Anxious  ANXIETY SCORE (DOMI-7):      Mental Status:  Alert and Oriented x 3.  Patient was very engaged in conversation.  Mood was anxious and sometimes tearful.   Any current psychiatric illness:  Yes, ADHD   History of psychiatric illness:  Yes   Family history of psychiatric illness:  N/A, did not assess   Current Psychiatrist: none   Current Therapist: none at this time. Has had therapists in the past.   Taking any psychiatric medications:  Yes, Adderall XR 20 mg   Suicidal Ideation:  No;    Homicidal Ideation:  No;   Coping Skills / Strengths: Is a part of the [employer]   DEPRESSION SCALE SCORE (PHQ-9):      OSW spoke to patient via Zoom for initial psychosocial assessment.  Explained role of OSW and services we can assist with.  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  She hope to do intermittent FMLA. And maintain her job.  Will be accessing ADA request to tele commute. Lumpectomy planned for July 7th.  She has help at home.  Did provide [employer] support grp along with support group resources for her significant other, Norma. Provided information on [employer] and massage therapy and Reiki therapy here at [facility].  OSW remains available if additional practical, home or emotional needs arise.      LOUIE Armstrong  06/28/21  11:12 EDT

## 2021-06-29 ENCOUNTER — LAB (OUTPATIENT)
Dept: LAB | Facility: HOSPITAL | Age: 41
End: 2021-06-29

## 2021-06-29 LAB — SARS-COV-2 ORF1AB RESP QL NAA+PROBE: NOT DETECTED

## 2021-06-29 PROCEDURE — U0004 COV-19 TEST NON-CDC HGH THRU: HCPCS

## 2021-06-29 PROCEDURE — C9803 HOPD COVID-19 SPEC COLLECT: HCPCS

## 2021-07-01 ENCOUNTER — ANESTHESIA EVENT (OUTPATIENT)
Dept: PERIOP | Facility: HOSPITAL | Age: 41
End: 2021-07-01

## 2021-07-01 ENCOUNTER — APPOINTMENT (OUTPATIENT)
Dept: GENERAL RADIOLOGY | Facility: HOSPITAL | Age: 41
End: 2021-07-01

## 2021-07-01 ENCOUNTER — HOSPITAL ENCOUNTER (OUTPATIENT)
Dept: MAMMOGRAPHY | Facility: HOSPITAL | Age: 41
Discharge: HOME | End: 2021-07-01

## 2021-07-01 ENCOUNTER — ANESTHESIA (OUTPATIENT)
Dept: PERIOP | Facility: HOSPITAL | Age: 41
End: 2021-07-01

## 2021-07-01 ENCOUNTER — HOSPITAL ENCOUNTER (OUTPATIENT)
Dept: NUCLEAR MEDICINE | Facility: HOSPITAL | Age: 41
Discharge: HOME | End: 2021-07-01

## 2021-07-01 ENCOUNTER — HOSPITAL ENCOUNTER (OUTPATIENT)
Facility: HOSPITAL | Age: 41
Setting detail: HOSPITAL OUTPATIENT SURGERY
Discharge: HOME | End: 2021-07-01
Attending: SURGERY | Admitting: SURGERY

## 2021-07-01 VITALS
WEIGHT: 146.5 LBS | RESPIRATION RATE: 18 BRPM | DIASTOLIC BLOOD PRESSURE: 85 MMHG | HEIGHT: 62 IN | HEART RATE: 80 BPM | SYSTOLIC BLOOD PRESSURE: 127 MMHG | OXYGEN SATURATION: 97 % | BODY MASS INDEX: 26.96 KG/M2 | TEMPERATURE: 98.2 F

## 2021-07-01 DIAGNOSIS — Z17.1 MALIGNANT NEOPLASM OF OVERLAPPING SITES OF LEFT BREAST IN FEMALE, ESTROGEN RECEPTOR NEGATIVE (HCC): ICD-10-CM

## 2021-07-01 DIAGNOSIS — N64.89 RADIAL SCAR OF LEFT BREAST: ICD-10-CM

## 2021-07-01 DIAGNOSIS — C50.812 MALIGNANT NEOPLASM OF OVERLAPPING SITES OF LEFT BREAST IN FEMALE, ESTROGEN RECEPTOR NEGATIVE (HCC): ICD-10-CM

## 2021-07-01 LAB
B-HCG UR QL: NEGATIVE
INTERNAL NEGATIVE CONTROL: NORMAL
INTERNAL POSITIVE CONTROL: NORMAL
Lab: NORMAL

## 2021-07-01 PROCEDURE — C9290 INJ, BUPIVACAINE LIPOSOME: HCPCS | Performed by: SURGERY

## 2021-07-01 PROCEDURE — 25010000002 CEFAZOLIN PER 500 MG: Performed by: NURSE ANESTHETIST, CERTIFIED REGISTERED

## 2021-07-01 PROCEDURE — C1889 IMPLANT/INSERT DEVICE, NOC: HCPCS | Performed by: SURGERY

## 2021-07-01 PROCEDURE — 0 TECHETIUM TC99M TILMANOCEPT: Performed by: SURGERY

## 2021-07-01 PROCEDURE — 88305 TISSUE EXAM BY PATHOLOGIST: CPT | Performed by: SURGERY

## 2021-07-01 PROCEDURE — 76000 FLUOROSCOPY <1 HR PHYS/QHP: CPT

## 2021-07-01 PROCEDURE — 38525 BIOPSY/REMOVAL LYMPH NODES: CPT | Performed by: SURGERY

## 2021-07-01 PROCEDURE — 25010000002 FENTANYL CITRATE (PF) 50 MCG/ML SOLUTION: Performed by: NURSE ANESTHETIST, CERTIFIED REGISTERED

## 2021-07-01 PROCEDURE — 38900 IO MAP OF SENT LYMPH NODE: CPT | Performed by: SURGERY

## 2021-07-01 PROCEDURE — 25010000002 NEOSTIGMINE 5 MG/10ML SOLUTION: Performed by: NURSE ANESTHETIST, CERTIFIED REGISTERED

## 2021-07-01 PROCEDURE — 88341 IMHCHEM/IMCYTCHM EA ADD ANTB: CPT | Performed by: SURGERY

## 2021-07-01 PROCEDURE — 81025 URINE PREGNANCY TEST: CPT | Performed by: ANESTHESIOLOGY

## 2021-07-01 PROCEDURE — 71045 X-RAY EXAM CHEST 1 VIEW: CPT

## 2021-07-01 PROCEDURE — 76937 US GUIDE VASCULAR ACCESS: CPT | Performed by: SURGERY

## 2021-07-01 PROCEDURE — 19301 PARTIAL MASTECTOMY: CPT | Performed by: SURGERY

## 2021-07-01 PROCEDURE — 77001 FLUOROGUIDE FOR VEIN DEVICE: CPT | Performed by: SURGERY

## 2021-07-01 PROCEDURE — 25010000003 CEFAZOLIN IN DEXTROSE 2-4 GM/100ML-% SOLUTION: Performed by: SURGERY

## 2021-07-01 PROCEDURE — 25010000002 DEXAMETHASONE PER 1 MG: Performed by: NURSE ANESTHETIST, CERTIFIED REGISTERED

## 2021-07-01 PROCEDURE — 25010000002 HEPARIN (PORCINE) PER 1000 UNITS: Performed by: SURGERY

## 2021-07-01 PROCEDURE — 25010000003 BUPIVACAINE LIPOSOME 1.3 % SUSPENSION 20 ML VIAL: Performed by: SURGERY

## 2021-07-01 PROCEDURE — 76098 X-RAY EXAM SURGICAL SPECIMEN: CPT

## 2021-07-01 PROCEDURE — 88307 TISSUE EXAM BY PATHOLOGIST: CPT | Performed by: SURGERY

## 2021-07-01 PROCEDURE — C1819 TISSUE LOCALIZATION-EXCISION: HCPCS

## 2021-07-01 PROCEDURE — 78195 LYMPH SYSTEM IMAGING: CPT | Performed by: SURGERY

## 2021-07-01 PROCEDURE — 38792 RA TRACER ID OF SENTINL NODE: CPT

## 2021-07-01 PROCEDURE — A9520 TC99 TILMANOCEPT DIAG 0.5MCI: HCPCS | Performed by: SURGERY

## 2021-07-01 PROCEDURE — 19125 EXCISION BREAST LESION: CPT | Performed by: SURGERY

## 2021-07-01 PROCEDURE — 25010000003 LIDOCAINE 1 % SOLUTION: Performed by: SURGERY

## 2021-07-01 PROCEDURE — 25010000002 PROPOFOL 10 MG/ML EMULSION: Performed by: NURSE ANESTHETIST, CERTIFIED REGISTERED

## 2021-07-01 PROCEDURE — 36561 INSERT TUNNELED CV CATH: CPT | Performed by: SURGERY

## 2021-07-01 PROCEDURE — 25010000002 ONDANSETRON PER 1 MG: Performed by: NURSE ANESTHETIST, CERTIFIED REGISTERED

## 2021-07-01 PROCEDURE — C1788 PORT, INDWELLING, IMP: HCPCS | Performed by: SURGERY

## 2021-07-01 DEVICE — LIGACLIP MCA MULTIPLE CLIP APPLIERS, 20 SMALL CLIPS
Type: IMPLANTABLE DEVICE | Site: BREAST | Status: FUNCTIONAL
Brand: LIGACLIP

## 2021-07-01 DEVICE — POWERPORT CLEARVUE IMPLANTABLE PORT WITH ATTACHABLE 8F POLYURETHANE OPEN-ENDED SINGLE-LUMEN VENOUS CATHETER INTERMEDIATE KIT
Type: IMPLANTABLE DEVICE | Site: CHEST | Status: FUNCTIONAL
Brand: POWERPORT CLEARVUE

## 2021-07-01 RX ORDER — DIPHENHYDRAMINE HYDROCHLORIDE 50 MG/ML
12.5 INJECTION INTRAMUSCULAR; INTRAVENOUS
Status: DISCONTINUED | OUTPATIENT
Start: 2021-07-01 | End: 2021-07-01 | Stop reason: HOSPADM

## 2021-07-01 RX ORDER — SODIUM CHLORIDE, SODIUM LACTATE, POTASSIUM CHLORIDE, CALCIUM CHLORIDE 600; 310; 30; 20 MG/100ML; MG/100ML; MG/100ML; MG/100ML
9 INJECTION, SOLUTION INTRAVENOUS CONTINUOUS
Status: DISCONTINUED | OUTPATIENT
Start: 2021-07-01 | End: 2021-07-01 | Stop reason: HOSPADM

## 2021-07-01 RX ORDER — FENTANYL CITRATE 50 UG/ML
50 INJECTION, SOLUTION INTRAMUSCULAR; INTRAVENOUS
Status: DISCONTINUED | OUTPATIENT
Start: 2021-07-01 | End: 2021-07-01 | Stop reason: HOSPADM

## 2021-07-01 RX ORDER — FAMOTIDINE 10 MG/ML
20 INJECTION, SOLUTION INTRAVENOUS ONCE
Status: COMPLETED | OUTPATIENT
Start: 2021-07-01 | End: 2021-07-01

## 2021-07-01 RX ORDER — MAGNESIUM HYDROXIDE 1200 MG/15ML
LIQUID ORAL AS NEEDED
Status: DISCONTINUED | OUTPATIENT
Start: 2021-07-01 | End: 2021-07-01 | Stop reason: HOSPADM

## 2021-07-01 RX ORDER — HEPARIN SODIUM 5000 [USP'U]/ML
5000 INJECTION, SOLUTION INTRAVENOUS; SUBCUTANEOUS
Status: COMPLETED | OUTPATIENT
Start: 2021-07-01 | End: 2021-07-01

## 2021-07-01 RX ORDER — PROMETHAZINE HYDROCHLORIDE 25 MG/1
25 TABLET ORAL ONCE AS NEEDED
Status: DISCONTINUED | OUTPATIENT
Start: 2021-07-01 | End: 2021-07-01 | Stop reason: HOSPADM

## 2021-07-01 RX ORDER — FENTANYL CITRATE 50 UG/ML
INJECTION, SOLUTION INTRAMUSCULAR; INTRAVENOUS AS NEEDED
Status: DISCONTINUED | OUTPATIENT
Start: 2021-07-01 | End: 2021-07-01 | Stop reason: SURG

## 2021-07-01 RX ORDER — ONDANSETRON 2 MG/ML
4 INJECTION INTRAMUSCULAR; INTRAVENOUS ONCE AS NEEDED
Status: DISCONTINUED | OUTPATIENT
Start: 2021-07-01 | End: 2021-07-01 | Stop reason: HOSPADM

## 2021-07-01 RX ORDER — ROCURONIUM BROMIDE 10 MG/ML
INJECTION, SOLUTION INTRAVENOUS AS NEEDED
Status: DISCONTINUED | OUTPATIENT
Start: 2021-07-01 | End: 2021-07-01 | Stop reason: SURG

## 2021-07-01 RX ORDER — DIAZEPAM 5 MG/1
10 TABLET ORAL ONCE
Status: COMPLETED | OUTPATIENT
Start: 2021-07-01 | End: 2021-07-01

## 2021-07-01 RX ORDER — KETAMINE HYDROCHLORIDE 10 MG/ML
INJECTION INTRAMUSCULAR; INTRAVENOUS AS NEEDED
Status: DISCONTINUED | OUTPATIENT
Start: 2021-07-01 | End: 2021-07-01 | Stop reason: SURG

## 2021-07-01 RX ORDER — PROPOFOL 10 MG/ML
VIAL (ML) INTRAVENOUS AS NEEDED
Status: DISCONTINUED | OUTPATIENT
Start: 2021-07-01 | End: 2021-07-01 | Stop reason: SURG

## 2021-07-01 RX ORDER — LABETALOL HYDROCHLORIDE 5 MG/ML
5 INJECTION, SOLUTION INTRAVENOUS
Status: DISCONTINUED | OUTPATIENT
Start: 2021-07-01 | End: 2021-07-01 | Stop reason: HOSPADM

## 2021-07-01 RX ORDER — MIDAZOLAM HYDROCHLORIDE 1 MG/ML
1 INJECTION INTRAMUSCULAR; INTRAVENOUS
Status: DISCONTINUED | OUTPATIENT
Start: 2021-07-01 | End: 2021-07-01 | Stop reason: HOSPADM

## 2021-07-01 RX ORDER — DIPHENHYDRAMINE HCL 25 MG
25 CAPSULE ORAL
Status: DISCONTINUED | OUTPATIENT
Start: 2021-07-01 | End: 2021-07-01 | Stop reason: HOSPADM

## 2021-07-01 RX ORDER — EPHEDRINE SULFATE 50 MG/ML
5 INJECTION, SOLUTION INTRAVENOUS ONCE AS NEEDED
Status: DISCONTINUED | OUTPATIENT
Start: 2021-07-01 | End: 2021-07-01 | Stop reason: HOSPADM

## 2021-07-01 RX ORDER — PROMETHAZINE HYDROCHLORIDE 25 MG/1
25 SUPPOSITORY RECTAL ONCE AS NEEDED
Status: DISCONTINUED | OUTPATIENT
Start: 2021-07-01 | End: 2021-07-01 | Stop reason: HOSPADM

## 2021-07-01 RX ORDER — SODIUM CHLORIDE 0.9 % (FLUSH) 0.9 %
10 SYRINGE (ML) INJECTION EVERY 12 HOURS SCHEDULED
Status: DISCONTINUED | OUTPATIENT
Start: 2021-07-01 | End: 2021-07-01 | Stop reason: HOSPADM

## 2021-07-01 RX ORDER — ONDANSETRON 2 MG/ML
INJECTION INTRAMUSCULAR; INTRAVENOUS AS NEEDED
Status: DISCONTINUED | OUTPATIENT
Start: 2021-07-01 | End: 2021-07-01 | Stop reason: SURG

## 2021-07-01 RX ORDER — LIDOCAINE HYDROCHLORIDE 20 MG/ML
INJECTION, SOLUTION INFILTRATION; PERINEURAL AS NEEDED
Status: DISCONTINUED | OUTPATIENT
Start: 2021-07-01 | End: 2021-07-01 | Stop reason: SURG

## 2021-07-01 RX ORDER — HYDROMORPHONE HYDROCHLORIDE 1 MG/ML
0.5 INJECTION, SOLUTION INTRAMUSCULAR; INTRAVENOUS; SUBCUTANEOUS
Status: DISCONTINUED | OUTPATIENT
Start: 2021-07-01 | End: 2021-07-01 | Stop reason: HOSPADM

## 2021-07-01 RX ORDER — BUPIVACAINE HYDROCHLORIDE AND EPINEPHRINE 2.5; 5 MG/ML; UG/ML
INJECTION, SOLUTION INFILTRATION; PERINEURAL AS NEEDED
Status: DISCONTINUED | OUTPATIENT
Start: 2021-07-01 | End: 2021-07-01 | Stop reason: HOSPADM

## 2021-07-01 RX ORDER — CEFAZOLIN SODIUM 2 G/100ML
2 INJECTION, SOLUTION INTRAVENOUS ONCE
Status: COMPLETED | OUTPATIENT
Start: 2021-07-01 | End: 2021-07-01

## 2021-07-01 RX ORDER — CEFAZOLIN SODIUM 500 MG/2.2ML
INJECTION, POWDER, FOR SOLUTION INTRAMUSCULAR; INTRAVENOUS AS NEEDED
Status: DISCONTINUED | OUTPATIENT
Start: 2021-07-01 | End: 2021-07-01 | Stop reason: SURG

## 2021-07-01 RX ORDER — NALOXONE HCL 0.4 MG/ML
0.2 VIAL (ML) INJECTION AS NEEDED
Status: DISCONTINUED | OUTPATIENT
Start: 2021-07-01 | End: 2021-07-01 | Stop reason: HOSPADM

## 2021-07-01 RX ORDER — LIDOCAINE HYDROCHLORIDE 10 MG/ML
3 INJECTION, SOLUTION INFILTRATION; PERINEURAL ONCE
Status: COMPLETED | OUTPATIENT
Start: 2021-07-01 | End: 2021-07-01

## 2021-07-01 RX ORDER — NEOSTIGMINE METHYLSULFATE 0.5 MG/ML
INJECTION, SOLUTION INTRAVENOUS AS NEEDED
Status: DISCONTINUED | OUTPATIENT
Start: 2021-07-01 | End: 2021-07-01 | Stop reason: SURG

## 2021-07-01 RX ORDER — GLYCOPYRROLATE 0.2 MG/ML
INJECTION INTRAMUSCULAR; INTRAVENOUS AS NEEDED
Status: DISCONTINUED | OUTPATIENT
Start: 2021-07-01 | End: 2021-07-01 | Stop reason: SURG

## 2021-07-01 RX ORDER — OXYCODONE AND ACETAMINOPHEN 7.5; 325 MG/1; MG/1
1 TABLET ORAL ONCE AS NEEDED
Status: COMPLETED | OUTPATIENT
Start: 2021-07-01 | End: 2021-07-01

## 2021-07-01 RX ORDER — ISOSULFAN BLUE 50 MG/5ML
INJECTION, SOLUTION SUBCUTANEOUS AS NEEDED
Status: DISCONTINUED | OUTPATIENT
Start: 2021-07-01 | End: 2021-07-01 | Stop reason: HOSPADM

## 2021-07-01 RX ORDER — DEXAMETHASONE SODIUM PHOSPHATE 10 MG/ML
INJECTION INTRAMUSCULAR; INTRAVENOUS AS NEEDED
Status: DISCONTINUED | OUTPATIENT
Start: 2021-07-01 | End: 2021-07-01 | Stop reason: SURG

## 2021-07-01 RX ORDER — SODIUM CHLORIDE 0.9 % (FLUSH) 0.9 %
10 SYRINGE (ML) INJECTION AS NEEDED
Status: DISCONTINUED | OUTPATIENT
Start: 2021-07-01 | End: 2021-07-01 | Stop reason: HOSPADM

## 2021-07-01 RX ADMIN — SODIUM CHLORIDE, POTASSIUM CHLORIDE, SODIUM LACTATE AND CALCIUM CHLORIDE: 600; 310; 30; 20 INJECTION, SOLUTION INTRAVENOUS at 17:30

## 2021-07-01 RX ADMIN — OXYCODONE AND ACETAMINOPHEN 1 TABLET: 7.5; 325 TABLET ORAL at 18:14

## 2021-07-01 RX ADMIN — TILMANOCEPT 1 DOSE: KIT at 11:34

## 2021-07-01 RX ADMIN — KETAMINE HYDROCHLORIDE 10 MG: 10 INJECTION INTRAMUSCULAR; INTRAVENOUS at 13:12

## 2021-07-01 RX ADMIN — PROPOFOL 25 MCG/KG/MIN: 10 INJECTION, EMULSION INTRAVENOUS at 13:20

## 2021-07-01 RX ADMIN — KETAMINE HYDROCHLORIDE 10 MG: 10 INJECTION INTRAMUSCULAR; INTRAVENOUS at 14:09

## 2021-07-01 RX ADMIN — ONDANSETRON 4 MG: 2 INJECTION INTRAMUSCULAR; INTRAVENOUS at 16:20

## 2021-07-01 RX ADMIN — LIDOCAINE HYDROCHLORIDE 3 ML: 10 INJECTION, SOLUTION INFILTRATION; PERINEURAL at 11:20

## 2021-07-01 RX ADMIN — PROPOFOL 150 MG: 10 INJECTION, EMULSION INTRAVENOUS at 13:12

## 2021-07-01 RX ADMIN — CEFAZOLIN 2 G: 225 INJECTION, POWDER, FOR SOLUTION INTRAMUSCULAR; INTRAVENOUS at 17:18

## 2021-07-01 RX ADMIN — ROCURONIUM BROMIDE 20 MG: 50 INJECTION INTRAVENOUS at 14:10

## 2021-07-01 RX ADMIN — CEFAZOLIN SODIUM 2 G: 2 INJECTION, SOLUTION INTRAVENOUS at 12:59

## 2021-07-01 RX ADMIN — FAMOTIDINE 20 MG: 10 INJECTION INTRAVENOUS at 12:28

## 2021-07-01 RX ADMIN — FENTANYL CITRATE 50 MCG: 50 INJECTION INTRAMUSCULAR; INTRAVENOUS at 14:13

## 2021-07-01 RX ADMIN — SODIUM CHLORIDE, POTASSIUM CHLORIDE, SODIUM LACTATE AND CALCIUM CHLORIDE 9 ML/HR: 600; 310; 30; 20 INJECTION, SOLUTION INTRAVENOUS at 12:29

## 2021-07-01 RX ADMIN — KETAMINE HYDROCHLORIDE 30 MG: 10 INJECTION INTRAMUSCULAR; INTRAVENOUS at 13:30

## 2021-07-01 RX ADMIN — LIDOCAINE HYDROCHLORIDE 100 MG: 20 INJECTION, SOLUTION INFILTRATION; PERINEURAL at 13:12

## 2021-07-01 RX ADMIN — NEOSTIGMINE METHYLSULFATE 3 MG: 0.5 INJECTION INTRAVENOUS at 17:19

## 2021-07-01 RX ADMIN — DEXAMETHASONE SODIUM PHOSPHATE 10 MG: 10 INJECTION INTRAMUSCULAR; INTRAVENOUS at 13:24

## 2021-07-01 RX ADMIN — HEPARIN SODIUM 5000 UNITS: 5000 INJECTION INTRAVENOUS; SUBCUTANEOUS at 12:59

## 2021-07-01 RX ADMIN — FENTANYL CITRATE 50 MCG: 50 INJECTION INTRAMUSCULAR; INTRAVENOUS at 13:12

## 2021-07-01 RX ADMIN — FENTANYL CITRATE 50 MCG: 0.05 INJECTION, SOLUTION INTRAMUSCULAR; INTRAVENOUS at 18:11

## 2021-07-01 RX ADMIN — DIAZEPAM 10 MG: 5 TABLET ORAL at 09:58

## 2021-07-01 RX ADMIN — ROCURONIUM BROMIDE 40 MG: 50 INJECTION INTRAVENOUS at 13:12

## 2021-07-01 RX ADMIN — GLYCOPYRROLATE 0.4 MG: 0.2 INJECTION INTRAMUSCULAR; INTRAVENOUS at 17:18

## 2021-07-01 RX ADMIN — SODIUM CHLORIDE, POTASSIUM CHLORIDE, SODIUM LACTATE AND CALCIUM CHLORIDE: 600; 310; 30; 20 INJECTION, SOLUTION INTRAVENOUS at 14:57

## 2021-07-01 NOTE — ANESTHESIA POSTPROCEDURE EVALUATION
"Patient: Radha Astorga    Procedure Summary     Date: 07/01/21 Room / Location: [facility] OR [address] [place] [address] MAIN OR    Anesthesia Start: 1306 Anesthesia Stop: 1735    Procedures:       right port placement, Left SHAINA-guided, bracketed, partial mastectomy and sentinel lymph node biopsy Left breast needle-localized excisional biopsy (N/A Breast)      RIGHT BREAST REDUCTION MASTOPEXY LEFT BREAST ONCOPLASTIC CLOSURE (Bilateral Chest) Diagnosis:       Malignant neoplasm of overlapping sites of left breast in female, estrogen receptor negative (CMS/HCC)      (Malignant neoplasm of overlapping sites of left breast in female, estrogen receptor negative (CMS/HCC) [C50.812, Z17.1])    Surgeons: Lashonda Euceda MD; Caridad Baker MD PhD Provider: Dank Arreola MD    Anesthesia Type: general ASA Status: 3          Anesthesia Type: general    Vitals  Vitals Value Taken Time   /85 07/01/21 1855   Temp 36.8 °C (98.2 °F) 07/01/21 1830   Pulse 72 07/01/21 1856   Resp 18 07/01/21 1840   SpO2 97 % 07/01/21 1856   Vitals shown include unvalidated device data.        Post Anesthesia Care and Evaluation    Patient location during evaluation: bedside  Patient participation: complete - patient participated  Level of consciousness: awake  Pain management: adequate  Airway patency: patent  Anesthetic complications: No anesthetic complications    Cardiovascular status: acceptable  Respiratory status: acceptable  Hydration status: acceptable    Comments: */85   Pulse 80   Temp 36.8 °C (98.2 °F)   Resp 18   Ht 157.5 cm (62\")   Wt 66.5 kg (146 lb 8 oz)   LMP 06/19/2021 (Approximate) Comment: (-) urine hcg 7/1/21  SpO2 97%   BMI 26.80 kg/m²         "

## 2021-07-01 NOTE — DISCHARGE INSTRUCTIONS
Ok for regular diet  Do not drive for next 2 weeks  Ok to shower but be aware you are a fall risk so have someone with you or place a chair in the shower. It is ok to wet the breast.   Strip drains q 8 hours and record drain output daily. Wash hands or wear gloves when manipulating drains.  Do not exercise for the next 2 weeks.    Oxycodone last given at 6pm.

## 2021-07-01 NOTE — PERIOPERATIVE NURSING NOTE
Pt very tearful and overwhelmed in pre-op. Stated that she is very nervous. Informed pt of plan for surgery at 1300, with needle-loc at 1100 & sentinal injection at 1200, and pt became more tearful. I asked pt if it would be helpful for her significant other, Norma, to be present now, instead of when pt would return from radiology (which would be about 1230 or so), and pt stated that this would be helpful. Pt appreciated offer and Norma arrived in preop room 25 with pt. Pt seemed much more at ease with the arrival of Norma.

## 2021-07-01 NOTE — ANESTHESIA PREPROCEDURE EVALUATION
Anesthesia Evaluation     Patient summary reviewed and Nursing notes reviewed   history of anesthetic complications: PONV  NPO Solid Status: > 6 hours  NPO Liquid Status: > 6 hours           Airway   Mallampati: II  TM distance: >3 FB  Neck ROM: full  no difficulty expected and No difficulty expected  Dental - normal exam     Pulmonary - negative pulmonary ROS and normal exam    breath sounds clear to auscultation  (-) rhonchi, decreased breath sounds, wheezes, rales, stridor  Cardiovascular - normal exam    NYHA Classification: I  Rhythm: regular  Rate: normal    (+) hypertension, valvular problems/murmurs MR,   (-) murmur, weak pulses, friction rub, systolic click, carotid bruits, JVD, peripheral edema      Neuro/Psych  (+) CVA, psychiatric history Depression and ADHD,     GI/Hepatic/Renal/Endo    (+)   thyroid problem hypothyroidism    Musculoskeletal (-) negative ROS    Abdominal  - normal exam    Abdomen: soft.   Substance History - negative use     OB/GYN negative ob/gyn ROS         Other      history of cancer active                    Anesthesia Plan    ASA 3     general     intravenous induction     Anesthetic plan, all risks, benefits, and alternatives have been provided, discussed and informed consent has been obtained with: patient.

## 2021-07-01 NOTE — ANESTHESIA PROCEDURE NOTES
Airway  Urgency: elective    Date/Time: 7/1/2021 1:14 PM  Airway not difficult    General Information and Staff    Patient location during procedure: OR  CRNA: María Kay CRNA    Indications and Patient Condition  Indications for airway management: airway protection    Preoxygenated: yes  Mask difficulty assessment: 1 - vent by mask    Final Airway Details  Final airway type: endotracheal airway      Successful airway: ETT  Cuffed: yes   Successful intubation technique: direct laryngoscopy  Endotracheal tube insertion site: oral  Blade: [place]  Blade size: 3  ETT size (mm): 7.0  Cormack-Lehane Classification: grade I - full view of glottis  Placement verified by: chest auscultation and capnometry   Measured from: lips  ETT/EBT  to lips (cm): 21  Number of attempts at approach: 1  Assessment: lips, teeth, and gum same as pre-op and atraumatic intubation    Additional Comments   ett cuff up at MOP

## 2021-07-01 NOTE — OP NOTE
Plastic Surgery Op Note    LEFT BREAST ONCOPLASTY AND RIGHT BREAST REDUCTION FOR IMMEDIATE RECONSTRUCTION    Radha Astorga  7/1/2021    Pre-op Diagnosis:   Malignant neoplasm of overlapping sites of left breast in female, estrogen receptor negative (CMS/HCC) [C50.812, Z17.1]    Post-Op Diagnosis Codes:     * Malignant neoplasm of overlapping sites of left breast in female, estrogen receptor negative (CMS/HCC) [C50.812, Z17.1]    Anesthesia: General    Staff:   Circulator: Ashly López RN; Norma Salas RN  Radiology Technologist: Su Moreira, RRT  Scrub Person: Liz Petty; Mitali Hwang    Surgeon: Dr Baker    Estimated Blood Loss: 50 mL    Specimens:   Order Name Source Comment Collection Info Order Time   TISSUE PATHOLOGY EXAM Breast, Left Specimen sent fresh for permanent Collected By: Lashonda Euceda MD 7/1/2021  2:27 PM     Release to patient   Immediate        TISSUE PATHOLOGY EXAM Breast, Left  Collected By: Caridad Baker MD PhD 7/1/2021  3:48 PM     Release to patient   Immediate              Drains:   [REMOVED] Urethral Catheter Non-latex 16 Fr. (Removed)       Findings:     Resection:   R: 300g reduction   L: 91 g (partial mastectomy)      Implants:   None    Complications:  none      Date: 7/1/2021  Time: 17:34 EDT      After risks and benefits were discussed with patient and inform consent was signed, patient was taken to the procedure room and positioned supine. She was then anesthetized using general anesthesia and a time out was performed confirming patient’s name, location of the surgery and procedure. The surgical team was introduced.   We then proceed with bilateral intercostal blocks with a total of 15 cc of exparel. A wise pattern was used for the  mastopexy and the landmarks were marked in the pre op area. The nipple was marked at 22 cm from the sternal notch at the meridian of the breasts.  I started on the left breast while Dr Euceda was  performing the port placement on the right.  Using a 38 mm cookie cutter, the nipple was marked and incised. I then delimitated the inferior margin for nipple perfusion. I then stopped and Dr Euceda proceeded with her procedure. Meanwhile, I started the same procedure on the right. After de epithelization of the sarah areolar area, the lower part of the breast was marked for resection and elevated from the chest wall. Then the nipple blood supply preservation was kept in the medial superior pole and the lateral was divided until the chest wall. The nipple was then rotated and re positioned to the marked area. I then temporarily closed and waited for the completion on the left. Once done, I repeated the steps on the left and checked form symmetry. After more tailoring was performed,  final hemostasis obtained, and the wound was irrigated and a tagging suture placed to approximate the tissues in the center of the breast. The breast tissue was cleared and the nipple appeared good. Then, the breast tissue was closed and the patient was placed in a near upright position, and symmetry appeared good. The wounds were then closed with interrupted 3-0 Vycril on the deep dermis and running intradermal 3-0 Monocryl on the skin, packing sutures and staples were removed as they were approached. The nipple was sutured with running intradermal 4-0 Monocryl. Vascularity appeared good throughout. The patient tolerated the procedure well. All counts were correct. Estimated blood loss was less than 100 mL, and she was sent to recovery room in good condition. No drains were used.     I was present for the entire procedure.   Caridad Bkaer MD PhD  07/01/21  17:34 EDT

## 2021-07-01 NOTE — OP NOTE
Operative Report    Patient Name:  Radha Astorga  YOB: 1980    Date of Surgery:  7/1/2021    Pre-op Diagnosis:   Malignant neoplasm of overlapping sites of left breast in female, estrogen receptor negative (CMS/HCC) [C50.812, Z17.1]       Post-Op Diagnosis:   Malignant neoplasm of overlapping sites of left breast in female, estrogen receptor negative (CMS/HCC) [C50.812, Z17.1]    Procedures:  1. Right port placement  2. Left SHAINA-guided, bracketed, partial mastectomy   3. Left breast needle-localized excisional biopsy  4. Left axillary sentinel lymph node biopsy       Staff:  Surgeon:  Lashonda Euceda MD       Anesthesia: General    Estimated Blood Loss: 20 mL    Implants:    Implant Name Type Inv. Item Serial No.  Lot No. LRB No. Used Action   CLIPAPPLR M/ ENDO LIGACLIP 9 3/8IN SM - [id number] Implant CLIPAPPLR M/ ENDO LIGACLIP 9 3/8IN SM  [employer]  [employer]  Left 1 Implanted   KT PORT CLEARVUE POWERPORT ISP W/8F POLY CATH - [id number] Implant KT PORT CLEARVUE POWERPORT ISP W/8F POLY CATH  [employer] [id number] Right 1 Implanted       Specimen:          Specimens     ID Source Type Tests Collected By Collected At Frozen?    A Breast, Left Tissue · TISSUE PATHOLOGY EXAM   Lashonda Euceda MD 7/1/21 1422 No    Description: left partial mastectomy (ink marks margins)    Comment: Specimen sent fresh for permanent     Breast, Left Tissue · TISSUE PATHOLOGY EXAM   Lashonda Euceda MD 7/1/21 1430 No    Description: additional superior, medial, and inferior margins (ink marks margins)    Comment: Specimen sent fresh for permanent    C Breast, Left Tissue · TISSUE PATHOLOGY EXAM   Lashonda Euceda MD 7/1/21 1440 No    Description: left breast excisional biopsy at 2:00 (ink marks margins)    Comment: Specimen sent fresh for permanent    D [place] Lymph Node Tissue · TISSUE PATHOLOGY EXAM   Lashonda Euceda MD 7/1/21 1504 No    Description:  left axilla sentinel node #1 (hot and blue, count 5500)    This specimen was not marked as sent.    E [place] Lymph Node Tissue · TISSUE PATHOLOGY EXAM   Lashonda Euceda MD 7/1/21 1507 No    Description: left axilla sentinel node #2 (hot not blue, count 900)    This specimen was not marked as sent.    F Breast, Left Tissue · TISSUE PATHOLOGY EXAM   Lashonda Euceda MD 7/1/21 1522 No    Description: true medial and inferior margins    Comment: Specimen sent fresh for permanent    G Breast, Left Tissue · TISSUE PATHOLOGY EXAM   Lashonda Euceda MD 7/1/21 1526 No    Description: true superior and lateral margins     Comment: Specimen sent fresh for permanent     H Breast, Left Tissue · TISSUE PATHOLOGY EXAM   Lashonda Euceda MD 7/1/21 1529 No    Description: superior pole    This specimen was not marked as sent.    I Breast, Right Tissue · TISSUE PATHOLOGY EXAM   Lashonda Euceda MD 7/1/21 1430 No    Description: right breast tissue    This specimen was not marked as sent.          Findings:   Successful right IJ port placement with tip at the cavoatrial junction.   Specimen radiograph #1 showed both clips and both SHAINA markers.  Specimen radiograph #2 showed the wire and clip in good position.    Complications: None     Indications: The patient is a 41 y.o. F with a diagnosis of left breast: High grade, multifocal, invasive ductal carcinoma, ER/WI negative, Her2 positive; clinical T1(m)N0, anatomic stage IA, prognostic stage IA. She also has a new diagnosis of a left breast radial scar. She is a good candidate for lumpectomy and she desires breast conservation. The lesions required preoperative localization. The multifocal cancer was bracketed with 2 SHAINA markers and the radial scar was localized with a needle. Because of the volume of tissue that will be excised relative to her breast volume, this case will be done in conjunction with Dr. Baker for oncoplastic closure. The risks and  benefits of surgery were discussed preoperatively and she understood and agreed to proceed.     Description of Procedure:  Several days preoperatively, the patient was taken to the radiology department and multifocal cancer was localized and bracketed with 2 SHAINA radiofrequency devices. On the day of surgery, the patient was taken to the radiology department and the radial scar was localized with a needle/wire. The patient then proceeded to the nuclear medicine suite and the periareolar skin was injected with Lymphoseek. I reviewed the post-localization mammograms to determine the placement of the SHAINA devices and needle in relationship to the clips. The patient was identified in the preoperative area and brought to the operating room and placed supine on the table. Patient verification was performed and general anesthesia was induced. The left breast was cleansed with an alcohol prep and 5 ml of lymphazurin blue was injected in the retroareolar area. She tolerated this well. The patient was prepped and draped in sterile fashion. A time out was performed and all were in agreement. I confirmed that the patient had received preoperative antibiotics and chemical prophylaxis.     I began with the port placement. The patient was placed in Trendelenburg position and I used ultrasound to identify the right internal jugular vein. The skin above this area was infiltrated with local anesthesia. A small stab incision was made at the site of needle entry. The right internal jugular vein was accessed with the needle on the first stick. The guidewire was advanced using a Seldinger technique with no resistance until cardiac ectopy occurred at which point the wire was pulled back until the ectopy resolved. I checked wire placement with ultrasound and visualized the wire within the vein. Wire location was then verified using fluoroscopy. A transverse incision was made on the right chest two finger breadths below the clavicle and an  anterior chest wall pocket was created. The port was placed inside with a snug fit and two 2-0 prolene stitches were placed from the chest wall to each side of the port. These were tagged with hemostats. The catheter was tunneled to the area of wire entry in the neck. The 2-0 prolene was then tied.     Fluoroscopy was used to verify wire placement and to tailor the length of the catheter. The patient was again placed in Trendelenburg position and I advanced the dilator and introducer over the guidewire under fluoroscopic guidance. The dilator and the wire were removed and the catheter was placed into the introducer, which was then peeled away. Fluoroscopy demonstrated good placement of the port with no kinks in the catheter. I accessed the port and it had excellent backflow and flushed easily. The chest incision was closed with interrupted 3-0 Vicryl deep dermal sutures and a running subcuticular 4-0 Monocryl suture. The neck incision was closed with a single interrupted 3-0 Vicryl deep dermal suture. Dermabond was applied to both wounds.     I then proceeded with the partial mastectomy. The SHAINA  probe was used to identify the area of strongest signal of the SHAINA devices. This location was marked, as well as the intended incision and area of resection. This was located at 6:00, within the mastopexy incision marked by Dr. Baker. I made an incision and carried this down through the dermis with sharp dissection. This included a square-shaped piece of skin overlying the lesion. I carried my dissection in a straight posterior direction, circumferentially around the lesions. Throughout the dissection, I used the SHAINA probe to verify that the dissection was proceeding in the correct planes. The posterior dissection extended to the pectoralis muscle. The specimen, including the pectoralis fascia was lifted off the underlying muscle and removed. The SHAINA probe was used to verify intact signal within the specimen.  The specimen was oriented with paint (red - superior, green - anterior, blue - inferior, yellow - medial, orange - lateral, black - posterior). Specimen radiograph showed the SHAINA markers and clips located near the medial side of the specimen. Given this, an additional  superior, medial, and inferior margin was taken as a single specimen. This was inked with the corresponding colors and ink marked the true margins.     I then proceeded with the excisional biopsy. I raised an anterior flap in an upper outer direction, towards 2:00. Dissection was then carried out superiorly, inferiorly, medially, and laterally. The wire was delivered into the wound. The posterior dissection was completed and the specimen removed. The specimen was oriented with paint (red - superior, green - anterior, blue - inferior, yellow - medial, orange - lateral, black - posterior). Specimen radiograph showed the wire and clip in good position. The wound was irrigated and hemostasis achieved. Marking clips were placed in the breast cavity.      I then proceeded with the sentinel node biopsy. The navigator gamma probe was used to find the area of increased uptake in the axilla. The skin was infiltrated with local anesthetic. A small incision was made below the hairbearing region of the axilla. Dissection was taken down through the fat and subcutaneous tissue until the clavipectoral fascia was reached. The fascia was incised and the axilla entered. The navigator gamma probe was used to identify the area of increased uptake. I dissected in the area of increased uptake, taking care to note any blue channels traveling in this direction. This node was carefully dissected out and removed, taking care to clip all lymphatic channels. The node was hot and blue. Ex vivo counts were 5500. An additional area of increased uptake was identified. This node was carefully dissected out and removed, taking care to clip all lymphatic channels. The node was hot, not  "blue. Ex vivo counts on SLN #2 measured 900. Background count was 10, there were no additional hot or blue nodes, and the node biopsy was terminated. The wound was irrigated and hemostasis achieved.    In the axilla, the clavipectoral fascia was reapproximated with interrupted 3-0 vicryl suture. The deep dermis of the axillary wound was closed with interrupted 3-0 vicryl suture. The skin was closed with 4-0 subcuticular running monocryl and covered with dermabond. While I was closing the axilla, Dr. Baker began the left breast oncoplastic closure. She excised additional tissue, surrounding my lumpectomy cavity. First she excised an additional medial and inferior margin as a single specimen. Then she excised an additional superior and lateral margin as a single specimen. These were labeled as \"true margins\" and inked with the corresponding colors. Counts were correct at the end of my portion of the case.     Lashonda Euceda MD     Date: 7/1/2021  Time: 15:45 EDT    "

## 2021-07-06 ENCOUNTER — TELEPHONE (OUTPATIENT)
Dept: SURGERY | Facility: CLINIC | Age: 41
End: 2021-07-06

## 2021-07-06 LAB
LAB AP CASE REPORT: NORMAL
LAB AP CASE REPORT: NORMAL
LAB AP CLINICAL INFORMATION: NORMAL
LAB AP CLINICAL INFORMATION: NORMAL
LAB AP DIAGNOSIS COMMENT: NORMAL
LAB AP DIAGNOSIS COMMENT: NORMAL
LAB AP SYNOPTIC CHECKLIST: NORMAL
LAB AP SYNOPTIC CHECKLIST: NORMAL
PATH REPORT.FINAL DX SPEC: NORMAL
PATH REPORT.FINAL DX SPEC: NORMAL
PATH REPORT.GROSS SPEC: NORMAL
PATH REPORT.GROSS SPEC: NORMAL

## 2021-07-06 NOTE — TELEPHONE ENCOUNTER
I called Ms. Astorga to discuss her pathology report. She is recovering well from surgery. I will see her at her scheduled follow-up appointment.     Lashonda Euceda MD

## 2021-07-07 ENCOUNTER — TELEPHONE (OUTPATIENT)
Dept: NEUROLOGY | Facility: CLINIC | Age: 41
End: 2021-07-07

## 2021-07-07 NOTE — TELEPHONE ENCOUNTER
Attempted to reach patient to notify her that her upcoming appt w/ Dr. Johnson has changed.  Left voicemail with new appt details, also mailing reminder.

## 2021-07-13 ENCOUNTER — TELEPHONE (OUTPATIENT)
Dept: ONCOLOGY | Facility: CLINIC | Age: 41
End: 2021-07-13

## 2021-07-13 NOTE — TELEPHONE ENCOUNTER
CALLED PT WITH HER APPT WITH DR YANG THAT SHE WOULD BE SEEING  AT 3:40 FOR LABS AND AT 4:00 WITH DR YANG

## 2021-07-13 NOTE — TELEPHONE ENCOUNTER
Caller:CHELY    Relationship: SELF    Best call back number: [phone]    What is the best time to reach you: ANYTIME    Who are you requesting to speak with (clinical staff, provider,  specific staff member): CLINICAL    Do you know the name of the person who called: CHELY    What was the call regarding:     CHELY WOULD LIKE TO SPEAK WITH CLAUDINE OR DR YANG IN REGARDS TO APPT ON THUR AND DISCUSSING HER PLAN FOR CHEMO THERAPY HAD SOME QUESTIONS OR CONCERNS NEEDS MORE INFORMATION    Do you require a callback: YES

## 2021-07-13 NOTE — TELEPHONE ENCOUNTER
PT STATES SHE RECEIVED A LETTER IN THE MAIL LAST WEEK THAT DUE TO A SCHEDULING CONFLICT HER APPT FOR THIS Thursday 7/15/21 HAS BEEN CHANGED FROM SEEING DR. YANG TO SEEING A NP.  PT. VOICES SHE IS NOT HAPPY WITH THIS DECISION AS SHE WANTS TO SEE DR. YANG ESPECIALLY SINCE IT IS GOING TO BE ABOUT A TREATMENT PLAN.   WILL SEND THIS MESSAGE TO THE APPT DESK TO SEE HOW BEST WE CAN ASSIST PT.  V/U.

## 2021-07-15 ENCOUNTER — TRANSCRIBE ORDERS (OUTPATIENT)
Dept: SURGERY | Facility: CLINIC | Age: 41
End: 2021-07-15

## 2021-07-15 ENCOUNTER — LAB (OUTPATIENT)
Dept: LAB | Facility: HOSPITAL | Age: 41
End: 2021-07-15

## 2021-07-15 ENCOUNTER — OFFICE VISIT (OUTPATIENT)
Dept: SURGERY | Facility: CLINIC | Age: 41
End: 2021-07-15

## 2021-07-15 ENCOUNTER — OFFICE VISIT (OUTPATIENT)
Dept: ONCOLOGY | Facility: CLINIC | Age: 41
End: 2021-07-15

## 2021-07-15 VITALS
DIASTOLIC BLOOD PRESSURE: 83 MMHG | WEIGHT: 147.5 LBS | BODY MASS INDEX: 27.14 KG/M2 | TEMPERATURE: 98.2 F | HEART RATE: 91 BPM | SYSTOLIC BLOOD PRESSURE: 128 MMHG | HEIGHT: 62 IN | RESPIRATION RATE: 16 BRPM | OXYGEN SATURATION: 97 %

## 2021-07-15 VITALS
DIASTOLIC BLOOD PRESSURE: 86 MMHG | OXYGEN SATURATION: 98 % | SYSTOLIC BLOOD PRESSURE: 120 MMHG | HEART RATE: 91 BPM | HEIGHT: 62 IN | BODY MASS INDEX: 27.31 KG/M2 | WEIGHT: 148.4 LBS

## 2021-07-15 DIAGNOSIS — C50.912 INFILTRATING DUCTAL CARCINOMA OF LEFT BREAST (HCC): Primary | ICD-10-CM

## 2021-07-15 DIAGNOSIS — N63.0 BREAST MASS: ICD-10-CM

## 2021-07-15 DIAGNOSIS — D68.51 FACTOR 5 LEIDEN MUTATION, HETEROZYGOUS (HCC): ICD-10-CM

## 2021-07-15 DIAGNOSIS — C50.812 MALIGNANT NEOPLASM OF OVERLAPPING SITES OF LEFT BREAST IN FEMALE, ESTROGEN RECEPTOR NEGATIVE (HCC): Primary | ICD-10-CM

## 2021-07-15 DIAGNOSIS — Z80.3 FAMILY HISTORY OF BREAST CANCER: ICD-10-CM

## 2021-07-15 DIAGNOSIS — Z17.1 MALIGNANT NEOPLASM OF OVERLAPPING SITES OF LEFT BREAST IN FEMALE, ESTROGEN RECEPTOR NEGATIVE (HCC): Primary | ICD-10-CM

## 2021-07-15 DIAGNOSIS — Z48.89 POSTOPERATIVE VISIT: Primary | ICD-10-CM

## 2021-07-15 DIAGNOSIS — N64.89 SEROMA OF BREAST: ICD-10-CM

## 2021-07-15 DIAGNOSIS — C50.812 MALIGNANT NEOPLASM OF OVERLAPPING SITES OF LEFT BREAST IN FEMALE, ESTROGEN RECEPTOR NEGATIVE (HCC): ICD-10-CM

## 2021-07-15 DIAGNOSIS — Z17.1 MALIGNANT NEOPLASM OF OVERLAPPING SITES OF LEFT BREAST IN FEMALE, ESTROGEN RECEPTOR NEGATIVE (HCC): ICD-10-CM

## 2021-07-15 LAB
ALBUMIN SERPL-MCNC: 4.1 G/DL (ref 3.5–5.2)
ALBUMIN/GLOB SERPL: 1.5 G/DL (ref 1.1–2.4)
ALP SERPL-CCNC: 84 U/L (ref 38–116)
ALT SERPL W P-5'-P-CCNC: 31 U/L (ref 0–33)
ANION GAP SERPL CALCULATED.3IONS-SCNC: 8.3 MMOL/L (ref 5–15)
AST SERPL-CCNC: 21 U/L (ref 0–32)
BASOPHILS # BLD AUTO: 0.09 10*3/MM3 (ref 0–0.2)
BASOPHILS NFR BLD AUTO: 0.9 % (ref 0–1.5)
BILIRUB SERPL-MCNC: 0.2 MG/DL (ref 0.2–1.2)
BUN SERPL-MCNC: 18 MG/DL (ref 6–20)
BUN/CREAT SERPL: 23.4 (ref 7.3–30)
CALCIUM SPEC-SCNC: 9.3 MG/DL (ref 8.5–10.2)
CHLORIDE SERPL-SCNC: 105 MMOL/L (ref 98–107)
CO2 SERPL-SCNC: 24.7 MMOL/L (ref 22–29)
CREAT SERPL-MCNC: 0.77 MG/DL (ref 0.6–1.1)
DEPRECATED RDW RBC AUTO: 45.7 FL (ref 37–54)
EOSINOPHIL # BLD AUTO: 0.17 10*3/MM3 (ref 0–0.4)
EOSINOPHIL NFR BLD AUTO: 1.6 % (ref 0.3–6.2)
ERYTHROCYTE [DISTWIDTH] IN BLOOD BY AUTOMATED COUNT: 15.6 % (ref 12.3–15.4)
GFR SERPL CREATININE-BSD FRML MDRD: 82 ML/MIN/1.73
GLOBULIN UR ELPH-MCNC: 2.8 GM/DL (ref 1.8–3.5)
GLUCOSE SERPL-MCNC: 88 MG/DL (ref 74–124)
HCT VFR BLD AUTO: 34.7 % (ref 34–46.6)
HGB BLD-MCNC: 10.9 G/DL (ref 12–15.9)
IMM GRANULOCYTES # BLD AUTO: 0.03 10*3/MM3 (ref 0–0.05)
IMM GRANULOCYTES NFR BLD AUTO: 0.3 % (ref 0–0.5)
LYMPHOCYTES # BLD AUTO: 2.77 10*3/MM3 (ref 0.7–3.1)
LYMPHOCYTES NFR BLD AUTO: 26.9 % (ref 19.6–45.3)
MCH RBC QN AUTO: 25.2 PG (ref 26.6–33)
MCHC RBC AUTO-ENTMCNC: 31.4 G/DL (ref 31.5–35.7)
MCV RBC AUTO: 80.3 FL (ref 79–97)
MONOCYTES # BLD AUTO: 0.87 10*3/MM3 (ref 0.1–0.9)
MONOCYTES NFR BLD AUTO: 8.4 % (ref 5–12)
NEUTROPHILS NFR BLD AUTO: 6.38 10*3/MM3 (ref 1.7–7)
NEUTROPHILS NFR BLD AUTO: 61.9 % (ref 42.7–76)
NRBC BLD AUTO-RTO: 0 /100 WBC (ref 0–0.2)
PLATELET # BLD AUTO: 317 10*3/MM3 (ref 140–450)
PMV BLD AUTO: 9.2 FL (ref 6–12)
POTASSIUM SERPL-SCNC: 4.2 MMOL/L (ref 3.5–4.7)
PROT SERPL-MCNC: 6.9 G/DL (ref 6.3–8)
RBC # BLD AUTO: 4.32 10*6/MM3 (ref 3.77–5.28)
SODIUM SERPL-SCNC: 138 MMOL/L (ref 134–145)
WBC # BLD AUTO: 10.31 10*3/MM3 (ref 3.4–10.8)

## 2021-07-15 PROCEDURE — 76942 ECHO GUIDE FOR BIOPSY: CPT | Performed by: SURGERY

## 2021-07-15 PROCEDURE — 85025 COMPLETE CBC W/AUTO DIFF WBC: CPT

## 2021-07-15 PROCEDURE — 80053 COMPREHEN METABOLIC PANEL: CPT

## 2021-07-15 PROCEDURE — 10160 PNXR ASPIR ABSC HMTMA BULLA: CPT | Performed by: SURGERY

## 2021-07-15 PROCEDURE — 99214 OFFICE O/P EST MOD 30 MIN: CPT | Performed by: INTERNAL MEDICINE

## 2021-07-15 PROCEDURE — 99024 POSTOP FOLLOW-UP VISIT: CPT | Performed by: SURGERY

## 2021-07-15 PROCEDURE — 36415 COLL VENOUS BLD VENIPUNCTURE: CPT

## 2021-07-15 PROCEDURE — 76642 ULTRASOUND BREAST LIMITED: CPT | Performed by: SURGERY

## 2021-07-15 NOTE — PROGRESS NOTES
Subjective     REASON FOR follow-up:    1.  Heterozygous state for factor V Leiden    2.  New onset stroke on aspirin by neurology    3.  Hypercholesterolemia    4.  Hypercoagulable work-up done.  Antithrombin 109% protein S activity 85% protein S antigen 107%, protein C activity 85%.  Anticardiolipin antibody IgM 24%, antibeta-2 glycoprotein antibody negative, lupus anticoagulant not detected, prothrombin gene mutation negative.    5.  Screening mammogram showed abnormality in the left breast 6 o'clock position, 8 mm on ultrasound, ultrasound-guided left breast biopsy, 6 cm from the nipple showed evidence of invasive ductal carcinoma poorly differentiated grade 3, Bigelow score of 8 out of 9 ER negative, IL negative, HER-2/ese 3+ positive.    6.  CT scan February 24, 2021 showed focal area of fatty infiltration of the liver.  1 cm hypoattenuating cortical lesion in the upper pole of the right kidney.  Similarly nodule in the upper pole of the left kidney is 1.5 cm.  Further evaluation by ultrasound suggested  · Ultrasound March 1, 2021 shows solid lesion in the upper pole of the right kidney.  In the left kidney along the midpole of the left kidney is a nodule which is 16 x 16 x 14 mm which is thought to be a cyst.  A 6-month follow-up CT suggested    6.  S/p left partial mastectomy with sentinel lymph node biopsy.  Tumor was present at 5:00, invasive ductal carcinoma, Bigelow score of 8, grade 3, greatest dimension was 12 mm x 8 x 4 mm.  Single focus of invasive carcinoma present.  There was extensive DCIS which is 30 mm x 8 x 2 mm, grade 3, no evidence of lymphovascular space invasion or dermal lymphatic invasion.  Margins were clear.  4 lymph nodes were negative.  It is a p T1cp N0, ER negative IL negative HER-2/ese 3+ with Ki-67 of 50%.                                     History of Present Illness   Patient is 41-year-old female with history of new onset stroke, with history of heterozygous state for  factor V Leiden, was found to have a screening mammogram of the left breast which had shown 8 mm lesion on ultrasound in the left breast which on biopsy was consistent with invasive ductal carcinoma poorly differentiated, grade 3 with a Oziel score of 8 out of 9.  She was ER negative NJ negative HER-2/ese +3+.    Patient had [facility] genetic test which was negative.    She has completed surgery July 1, 2021.  She underwent left partial mastectomy with left axillary sentinel lymph node biopsy and right port placement.  Clinically she was thought to have a high-grade multifocal invasive ductal carcinoma ER/NJ negative, NJ positive.  The lesions require preoperative localization.  The multifocal cancer was bracketed with 2 savvy markers and the radial scar was localized with a needle.  Because of the volume of tissue that will be excised related to the breast volume the case was done in conjunction with Dr. Zavala for oncoplastic closure.    She is 2 weeks from surgery.  She is healing up reasonably well.    On review of her pathology she had left partial mastectomy with sentinel lymph node biopsy.  Tumor was present at 5:00, invasive ductal carcinoma, [place] score of 8, grade 3, greatest dimension was 12 mm x 8 x 4 mm.  Single focus of invasive carcinoma present.  There was extensive DCIS which is 30 mm x 8 x 2 mm, grade 3, no evidence of lymphovascular space invasion or dermal lymphatic invasion.  Margins were clear.  4 lymph nodes were negative.  It is a p T1cp N0, ER negative NJ negative HER-2/ese 3+ with Ki-67 of 50%.    Patient is here to discuss further options of treatment.  Given small tumor T1c N0, she is a good candidate for weekly Taxol Herceptin.    Given that patient has heterozygous state for factor V Leiden and currently has port placed we will plan to start Eliquis 2.5 mg p.o. twice daily.  I have left a message for Dr. Craft in neurology to call me to discuss if patient needs to continue  aspirin or we can hold off since be starting Eliquis.          Oncologic history:  Patient is here for follow-up of her mammogram.  Patient had screening mammogram April 2, 2021:  NAD a focal asymmetry was present in the posterior one third retroareolar region of the left breast.  Further spot compression images and left breast ultrasound was suggested.  Right breast was negative    April 9, 2021: Left diagnostic mammogram showed an area of focal asymmetry in the posterior one third region aspect of the left breast her breast    Ultrasound showed an irregular 0.8 cm lesion in the left breast at the 6 o'clock position of the order of 6 cm from the nipple.  Ultrasound-guided left breast biopsy was recommended.    April 26, 2021: Ultrasound-guided biopsy of the left breast 6 o'clock position, 6 cm from the nipple showed    Invasive ductal carcinoma, poorly differentiated with a Holabird score grade 3 with a score of 8 out of 9 measuring at least 7 mm.  No definitive ductal carcinoma is in situ is identified.  ER 1% negative  AZ 1% negative   HER-2/ese 3+ positive    There was no evidence of lymphadenopathy either in the mammogram of the ultrasound.  We suggested an MRI of the breast.  Patient has strong family history of breast cancer    Interval history  May 7, 2021: MRI of bilateral breast reviewed.  My interpretation is that there is area of enhancement in the 6 o'clock position of the left breast this is the biopsy-proven malignancy.  There is additional area of linear enhancement anteriorly and laterally measuring up to 1.2 cm this is approximately 5.6 cm from the nipple.  In addition there is a enhancement 2 to 3 mm in the anterolateral aspect of the lateral aspect of the left breast which is 4.6 cm from the nipple.  There is also mildly prominent posterior lymph node in the mid axilla which measures 1 cm with cortical thickening.  Findings are consistent with multifocal disease.  No contralateral disease or  internal mammary adenopathy identified    May 20, 2021: Genetic test, [facility] genetic test shows 47 genes negative    May 21, 2021: I reviewed the biopsy of the lymph node in the left axilla which shows reactive lymph node negative for any carcinoma or lymphoma    June 2, 2021:Final Diagnosis  1. Left Breast, 5:00 o'clock, MRI-guided Biopsies for Linear Enhancement: INVASIVE MAMMARY CARCINOMA, NO  SPECIAL TYPE (INVASIVE DUCTAL CARCINOMA).  A. Largest contiguous focus in a core measures 4 mm.  B. No in-situ component identified.  C. Brisk lymphocytic response noted (see Comment).  D. No definitive lymphovascular nor perineural invasion identified.  2. Left Breast, 2:00 o'clock, MRI-guided Biopsy for Enhancement:  A. Benign breast parenchyma with radial scar and micropapillomas.  B. Usual ductal hyperplasia and columnar cell hyperplasia.  C. No atypical hyperplasia, in-situ nor invasive carcinoma identified    ER, LA, HER-2 new and Ki-67 pending on this new biopsy      Patient has been seen by Dr. Lashonda Euceda with plans of doing surgery on July first 2021    Once patient undergoes surgery lumpectomy with sentinel lymph node biopsy we will give further recommendation about treatment options.  Given that patient has multifocal disease and both of the lesions 2 lesions are 1.2 cm each, with it being a HER-2 positive tumor on the previous biopsy at 6:00 Dr. Euceda and myself discussed and patient preferred to undergo port placement at the same time of surgery.    Patient had [facility] genetic test which was negative.    She has completed surgery July 1, 2021.  She underwent left partial mastectomy with left axillary sentinel lymph node biopsy and right port placement.  Clinically she was thought to have a high-grade multifocal invasive ductal carcinoma ER/LA negative, LA positive.  The lesions require preoperative localization.  The multifocal cancer was bracketed with 2 savvy markers and the radial scar was localized  with a needle.  Because of the volume of tissue that will be excised related to the breast volume the case was done in conjunction with Dr. Zavala for oncoplastic closure.    She is 2 weeks from surgery.  She is healing up reasonably well.    On review of her pathology she had left partial mastectomy with sentinel lymph node biopsy.  Tumor was present at 5:00, invasive ductal carcinoma, [place] score of 8, grade 3, greatest dimension was 12 mm x 8 x 4 mm.  Single focus of invasive carcinoma present.  There was extensive DCIS which is 30 mm x 8 x 2 mm, grade 3, no evidence of lymphovascular space invasion or dermal lymphatic invasion.  Margins were clear.  4 lymph nodes were negative.  It is a p T1cp N0, ER negative OK negative HER-2/ese 3+ with Ki-67 of 50%.    Patient is here to discuss further options of treatment.  Given small tumor T1c N0, she is a good candidate for weekly Taxol Herceptin.    Given that patient has heterozygous state for factor V Leiden and currently has port placed we will plan to start Eliquis 2.5 mg p.o. twice daily.  I have left a message for Dr. Craft in neurology to call me to discuss if patient needs to continue aspirin or we can hold off since be starting Eliquis.            Family  history: Mother had breast cancer at age 57.  Maternal great aunt had breast cancer and paternal great aunt had breast cancer in her 40s.  Patient's mother had pancreatic cancer as well.  Paternal grandfather had prostate cancer.    Hematologic history:  patient is a 40-year-old female who presented end of December with numbness and tingling in her left upper extremity and left face.  She went to see Dr. Goodman who then got concerned and referred to neurology.  She was referred to Dr. Freedman and talk to Dr. Thompson neurology for evaluation.  Patient underwent ultrasound of the carotids which did not show significant stenosis of the carotids.  She underwent 2D echocardiogram which showed a good  ejection fraction of 64% with mild mitral valve prolapse and mild mitral stenosis.  She had a 24-hour Holter which was negative.  She then had also an MRI of the brain February 3, 2021 which showed areas of hyperintensity involving the subcortical white matter of the right frontal lobe superior laterally and posteriorly with the largest area measuring 8 9 mm.  There is also enhancement present.  The findings are consistent with a subacute infarct.  They could not differentiate if there is any underlying neoplastic process but a short-term follow-up was suggested in order to follow-up.  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    Patient currently got started on aspirin and factor V Leiden was obtained by neurology because patient's paternal aunt had factor V Leiden mutation and she was heterozygous.  Patient's testing also showed that she was heterozygous.    Patient states her numbness and tingling is resolved completely on the left arm and face it just lasted for a 24 hours.  She was here referred here for neurology to see if there is any other cause for her underlying hypercoagulable state.  She has not had a complete hypercoagulable work-up.  Also discussed with her that an underlying malignancy could cause a hypercoagulable state and we would need to do a CT scan to rule that out.    Patient's mother has had breast cancer in her 50s but had pancreatic cancer at 68 and  from that.  Patient's dad had stroke at 63.  But he is alive at 74.  She has 1 brother who is 40 in good health.  Paternal aunt had breast cancer in her 40s.  Paternal grandfather had colon cancer in his late 80s.  Maternal uncle had throat cancer and another maternal uncle had skin cancer with metastasis to the lung.  And maternal grandmother had breast cancer.    Patient was to have mammogram done last year but because of COVID-19 it got  postponed and she has not had it done yet.    Patient used to be a smoker half pack per day for 7 years but quit 10 years ago.  She has high cholesterol for which she is on treatment.    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   • History of rheumatic fever    • Hypertension    • Hypothyroidism    • Infiltrating ductal carcinoma of left breast (CMS/[facility]) 5/5/2021    Left   • Kidney stone    • Mitral valve insufficiency    • PONV (postoperative nausea and vomiting)    • Stroke (CMS/[facility]) 12/2020    HX OF   • Stroke-like symptoms         Past Surgical History:   Procedure Laterality Date   • BREAST BIOPSY Left 05/05/2021    IDC   • BREAST LUMPECTOMY WITH SENTINEL NODE BIOPSY N/A 7/1/2021    Procedure: right port placement, Left SHAINA-guided, bracketed, partial mastectomy and sentinel lymph node biopsy Left breast needle-localized excisional biopsy;  Surgeon: Lashonda Euceda MD;  Location: [facility];  Service: General;  Laterality: N/A;   • BREAST SURGERY Bilateral 7/1/2021    Procedure: RIGHT BREAST REDUCTION MASTOPEXY LEFT BREAST ONCOPLASTIC CLOSURE;  Surgeon: Caridad Baker MD PhD;  Location: [facility];  Service: Plastics;  Laterality: Bilateral;   • NO PAST SURGERIES     • PAP SMEAR  2016        Current Outpatient Medications on File Prior to Visit   Medication Sig Dispense Refill   • amphetamine-dextroamphetamine XR (Adderall XR) 20 MG 24 hr capsule Take 1 capsule by mouth Every Morning (Patient taking differently: Take 20 mg by mouth Every Morning HOLD FOR 48 HOURS  PRIOR TO SURGERY ) 30 capsule 0   • aspirin 81 MG EC tablet Take 1 tablet by mouth Daily. (Patient taking differently: Take 81 mg by mouth Daily. INSTRUCTED BY MD TO HOLD FOR TODAY'S PROCEDURE AS WELL AS SCHEDULED SURGERY) 30 tablet 11   • atorvastatin (Lipitor) 40 MG tablet Take 1 tablet by mouth Daily. 30 tablet 11   • Cholecalciferol (VITAMIN D3 PO) Take 2,000 Units by mouth. Takes 2000 twice a week;  SUNDAYS AND      • levothyroxine (SYNTHROID, LEVOTHROID) 137 MCG tablet Take 1 tablet by mouth Daily. 30 tablet 5   • LORazepam (Ativan) 0.5 MG tablet Take 1 tablet by mouth Every 8 (Eight) Hours As Needed for Anxiety. (Patient taking differently: Take 0.5 mg by mouth Every 8 (Eight) Hours As Needed for Anxiety. LAST TOOK MED 2 WEEKS AGO) 20 tablet 0   • valsartan (DIOVAN) 160 MG tablet Take 1 tablet by mouth Daily. For blood pressure (Patient taking differently: Take 160 mg by mouth Daily.) 30 tablet 5     No current facility-administered medications on file prior to visit.        ALLERGIES:    Allergies   Allergen Reactions   • Sulfa Antibiotics Rash        Social History     Socioeconomic History   • Marital status: Single     Spouse name: Not on file   • Number of children: 0   • Years of education: Not on file   • Highest education level: Not on file   Tobacco Use   • Smoking status: Former Smoker     Packs/day: 1.00     Years: 10.00     Pack years: 10.00     Types: Cigarettes     Start date:      Quit date:      Years since quittin.5   • Smokeless tobacco: Never Used   Vaping Use   • Vaping Use: Never used   Substance and Sexual Activity   • Alcohol use: Yes     Comment: RARELY   • Drug use: No   • Sexual activity: Defer     Partners: Male        Family History   Problem Relation Age of Onset   • Breast cancer Mother 53   • Pancreatic cancer Mother 68   • Lung cancer Maternal Grandmother    • Stroke Father    • Breast cancer Paternal Aunt 55   • Prostate cancer Maternal Grandfather    •  "Prostate cancer Paternal Grandfather    • Brain cancer Maternal Cousin 20   • Melanoma Maternal Uncle    • Ovarian cancer Other         Paternal great aunt    • Breast cancer Other    • Breast cancer Paternal Great-Grandmother 94   • Hypertension Brother    • Malig Hyperthermia Neg Hx         Review of Systems   Constitutional: Negative for appetite change, chills, diaphoresis, fatigue, fever and unexpected weight change.   HENT: Negative for hearing loss, sore throat and trouble swallowing.    Respiratory: Negative for cough, chest tightness, shortness of breath and wheezing.    Cardiovascular: Negative for chest pain, palpitations and leg swelling.   Gastrointestinal: Negative for abdominal distention, abdominal pain, constipation, diarrhea, nausea and vomiting.   Genitourinary: Negative for dysuria, frequency, hematuria and urgency.   Musculoskeletal: Negative for joint swelling.        No muscle weakness.   Skin: Negative for rash and wound.   Neurological: Negative for seizures, syncope, speech difficulty, weakness, numbness and headaches.   Hematological: Negative for adenopathy. Does not bruise/bleed easily.   Psychiatric/Behavioral: Positive for dysphoric mood (07/15/21-Unchanged) and sleep disturbance (07/15/21-Unchanged). Negative for behavioral problems, confusion and suicidal ideas.   All other systems reviewed and are negative.     I have reviewed and confirmed the accuracy of the ROS as documented by the MA/LPN/RN Neena Beavers MD        Objective     Vitals:    07/15/21 1542   BP: 128/83   Pulse: 91   Resp: 16   Temp: 98.2 °F (36.8 °C)   TempSrc: Temporal   SpO2: 97%   Weight: 66.9 kg (147 lb 8 oz)   Height: 157.5 cm (62.01\")   PainSc: 0-No pain     Current Status 7/15/2021   ECOG score 0       Physical exam      CONSTITUTIONAL:  Vital signs reviewed.  Alert and oriented x3  No distress, looks comfortable.  EYES:  Conjunctivae and lids unremarkable.  Extraocular eye movements intact.  HEENT: No " evidence of lymphadenopathy, no thyromegaly  S/p left partial mastectomy with sentinel lymph node biopsy, healing from surgery    RESPIRATORY:  Normal respiratory effort.  No rales  or wheezing, clear.   CARDIOVASCULAR:  Regular rate and rhythm, no murmur  No significant lower extremity edema.  Abdomen: Soft nontender positive bowel sounds no hepatosplenomegaly  SKIN: No wounds.  No rashes.  MUSCULOSKELETAL/EXTREMITIES: No clubbing or cyanosis.  No apparent unilateral weakness.  NEURO: CN 2-12 appear intact. No focal neurological deficits noted.  PSYCHIATRIC:  Normal judgment and insight.  Normal mood and affect.    RECENT LABS:  Hematology WBC   Date Value Ref Range Status   07/15/2021 10.31 3.40 - 10.80 10*3/mm3 Final   01/04/2021 9.5 3.4 - 10.8 x10E3/uL Final     RBC   Date Value Ref Range Status   07/15/2021 4.32 3.77 - 5.28 10*6/mm3 Final   01/04/2021 4.85 3.77 - 5.28 x10E6/uL Final     Hemoglobin   Date Value Ref Range Status   07/15/2021 10.9 (L) 12.0 - 15.9 g/dL Final     Hematocrit   Date Value Ref Range Status   07/15/2021 34.7 34.0 - 46.6 % Final     Platelets   Date Value Ref Range Status   07/15/2021 317 140 - 450 10*3/mm3 Final        Assessment/Plan       * [id number], ER negative MA negative HER-2/ese 3+ with Ki-67 of 50%.  S/p left partial mastectomy with sentinel lymph node biopsy.  Tumor was present at 5:00, invasive ductal carcinoma, Oziel score of 8, grade 3, greatest dimension was 12 mm x 8 x 4 mm.  Single focus of invasive carcinoma present.  There was extensive DCIS which is 30 mm x 8 x 2 mm, grade 3, no evidence of lymphovascular space invasion or dermal lymphatic invasion.  Margins were clear.  4 lymph nodes were negative.  It is a p T1cp N0, ER negative MA negative HER-2/ese 3+ with Ki-67 of 50%.  · Patient is s/p port placement  · Discussed in length with patient that given a small tumor she is eligible for weekly Taxol Herceptin.  Weekly Taxol x12 weeks along with weekly Herceptin  followed by 1 year of Herceptin.  · 2D echo done which showed ejection fraction of 61-65% and strain pattern of -20.8.  · Patient also seen by Dr. Virk cardiology and Dr. Virk is planning to repeat echocardiogram in 3 months after initiation of therapy.      *  Factor V Leiden heterozygosity with right frontal stroke and patient presented in December 2020 with left-sided weakness tingling and numbness and also numbness in the face.  · Was referred to neurology and cardiology by Dr. Goodman  · Ultrasound of carotid does not show significant stenosis  · 24 Holter is negative  · 2D echocardiogram shows ejection fraction of 64% with mild mitral regurg and mitral stenosis  · Neurology obtain factor V Leiden given that patient's paternal aunt had's history of factor V Leiden and that was heterozygous for factor V Leiden  · Patient has been referred to us in order to evaluate any other underlying cause for hypercoagulable state  · We discussed about obtaining rest of the hypercoagulable work-up though doubtful it will be positive  · We will also obtain a CT scan of the chest abdomen pelvis in order to rule out underlying malignancy as a cause  · We will obtain mammogram as that has not been done yet last year  · Continue aspirin as per neurology.  Also patient on medications for her high cholesterol started after stroke currently asymptomatic  · Hypercoagulable work-up done which is negative except heterozygous state for factor V Leiden.  · Complete stroke work-up has been negative and since this is arterial stroke and she was not on aspirin prior to that and her symptoms have resolved I agree with continuing aspirin alone along with high cholesterol medications.  · At this time I do not feel the need to put her on anticoagulants like Eliquis or Coumadin as she has not failed aspirin and she has responded to aspirin given the risk of bleeding versus clotting.  She has never had a DVT or pulmonary embolism in the  past.  · MR venogram done on May 10, 2021 is negative.  Patient has been referred to the stroke neurologist Dr. Johnson  · I have left message for the neurologist to call me  · We will discuss with neurology Dr. Johnson to see if we need to hold off aspirin as patient will be starting on prophylactic Eliquis 2.5 mg or 5 mg p.o. twice daily given the fact that she has had a port placed.      * Family history of cancer: Patient's mother had breast cancer at age 50 and pancreatic cancer at age 68 and  from pancreatic cancer.  Patient's maternal grandmother had breast cancer in her 50s.  Her maternal uncle had skin cancer which went to the lung and another maternal uncle had throat cancer.  Maternal aunt had call colon polyps.  Patient's paternal aunt had breast cancer in her 40s.  And paternal grandfather with colon cancer in his 80s.  Paternal aunt also had factor V Leiden.  · Genetic testing is negative    * Solid nodule in the right kidney on ultrasound, will schedule follow-up with urology  · Patient was to follow-up with Dr. Shultz  · Will need records from urology    *  Elevated BMI, encourage diet and exercise.  Patient is improving her diet and exercise after having had the stroke    Plan  · S/p surgery, left partial mastectomy with sentinel lymph node  · Reviewed the pathology in length and discussed with patient  · Since patient has 12 x 8 x 4 mm tumor we could treat her with weekly Taxol Herceptin followed by Herceptin for 1 year  · I have reviewed her echocardiogram and her echo is normal  · She is s/p port placement by Dr. Euceda.  · We will plan to bring her back in 3 weeks 2021 with plans of starting chemotherapy  · She will require chemo education for weekly Taxol Herceptin.  · We will schedule with chemo nurse in 2 weeks for chemo education  · Following completion of chemotherapy she will need referral to radiation oncology      40 minutes spent with the patient face-to-face and 20   minutes in coordination of care and dictation, discussing with pathologist    MD Dr. Maxine Ibanez Dr., Dr., Dr.

## 2021-07-15 NOTE — PROGRESS NOTES
[facility]     Referring Provider: Neena Beavers MD     Chief complaint: Postoperative visit      HPI:   5/21/21:  Ms. Radha Astorga is a 40 yo woman, seen at the request of Dr. Neena Beavers, for a new diagnosis of left breast cancer. This was initially detected as an imaging abnormality on routine screening. Her work-up is detailed in the oncologic history below. Prior to the biopsy, she denies any breast lumps, pain, skin changes, or nipple discharge. She has a past history of a benign left breast percutaneous biopsy in 2013. She has a past history of a stroke in December 2020 without any residual neuro deficits. Cardiac work-up was negative, however hypercoagulable work-up revealed a factor V Leiden mutation. She has a family history of breast cancer in her mother (diagnosed at age 53) and a paternal aunt (diagnosed at age 55), as well as a paternal great aunt and her paternal great grandmother. Her paternal great aunt also had ovarian cancer and her mother had pancreatic cancer. She underwent expanded panel genetic testing at Dr. Beavers's office and she was negative for mutation.    6/16/21:  At her last visit, she underwent left axillary lymph node biopsy, which thankfully returned as benign. She underwent left breast MR-guided biopsy x2. The lesion at 5:00, near the primary malignancy, returned as a second foci of cancer, and the lesion at 2:00, returned as a radial scar (see pathology report details below). I have already briefly discussed these results with her over the phone and the plan still is to proceed with breast conservation. She has an appointment scheduled with plastic surgery next week.    7/15/21, Interval History:  She underwent port placement, left partial mastectomy and excisional biopsy and SLNB with oncoplastic closure and right reduction for symmetry on 7/1/21. See surgery & pathology details below in oncologic history. She is complaining of a full sensation and some pain  in her left armpit.      Oncology/Hematology History Overview Note   12/11/2019, Screening MMG with [facility] ( [facility]):  Scattered fibroglandular density. There are no findings to suggest malignancy.  BI-RADS 1: Negative.     Malignant neoplasm of overlapping sites of left breast in female, estrogen receptor negative (CMS/HCC)   4/1/2021 Initial Diagnosis    Malignant neoplasm of overlapping sites of left breast in female, estrogen receptor negative (CMS/HCC)     4/2/2021 Imaging    Screening MMG with [facility] ( [facility]):  Scattered fibroglandular densities. In the posterior one-third of the left breast projecting in a retroareolar location on the CC images there is an area of focal asymmetry. I see no suspicious calcifications or areas of architectural distortion in either breast. There is no evidence for skin thickening or nipple retraction.  BI-RADS 0: Incomplete.     4/9/2021 Imaging    Left Diagnostic MMG with [facility] & Left Breast US ( [facility]):  MMG:  With additional imaging there is persistence of the area of focal asymmetry in the posterior one-third inferior aspect of the left breast.  US:  At the 6 o'clock position on the order of 6 cm from the nipple there is a 0.8 cm irregular hypoechoic lesion. Mild posterior acoustic shadowing is noted.  BI-RADS 4: Suspicious.     4/26/2021 Biopsy    Left Breast, US-Guided Biopsy ( [facility]):    1. Left Breast, 6:00, 6 cm FN, U/S-Guided Core Needle Biopsy for a Mass:                 A. INVASIVE DUCTAL CARCINOMA, Poorly differentiated; Oziel Histologic Grade III/III (tubule score = 3, nuclear score = 2, mitoses score = 3), measuring at least 7 mm.               B. No definitive ductal carcinoma in situ identified.   C. Negative for lymphovascular space invasion.    ER negative (<1%)  AL negative (<1%)  HER2 positive (IHC 3+)  Ki-67 55%     5/5/2021 Genetic Testing    [employer] Common Hereditary Cancers Panel (47 genes):    Negative     5/7/2021 Biopsy    Bilateral Breast MRI (  [facility]):  There is an amorphous area of enhancement seen within the 6:00 position of the left breast, posterior depth measuring approximately 1.2 cm (series 9 image 91). Susceptibility artifact is present within this region consistent with recently newly diagnosed biopsy-proven malignancy. There is an additional area of more linear enhancement seen just anteriorly and laterally measuring up to 1.2 cm (series 9 image 91). This is seen approximately 5.6 cm from the nipple. Mixed washout kinetics are present. There is an additional punctate area of enhancement measuring only approximately 2 to 3 mm seen within the anterior lateral aspect of the left breast seen approximately 4.6 cm from the nipple (series 9 image 79) also demonstrating mixed washout  kinetics. No additional areas of enhancement identified. There is questionable mildly prominent lymph nodes present within the left axillary region (series 9 image 34 and series 9 image 18) with mild cortical thickening present with benign-appearing fatty juan miguel present. This is seen both within the high and mid to low axilla. A mildly prominent posterior lymph node is also present within the mid axilla measuring up to 1 cm with cortical thickening present (series 9 image 27). Limited evaluation of the intrathoracic contents symmetric no acute process. A marker seen at the junction of the left breast and the left chest wall inferiorly (series 4 image 135) with no abnormalities  identified within this region. No abnormal fluid collections identified.  BI-RADS 6: Known malignancy.     5/21/2021 Biopsy    Left Axilla, US-Guided Biopsy ( [facility]):  1.  Lymph Node, Left Axilla, Core Biopsy:                 A.  Fragments of reactive lymph node; negative for lymphoma and carcinoma.      6/2/2021 Biopsy    Left Breast, MR-Guided Biopsy x 2 ( [facility]):    1. Left Breast, 5:00 o'clock, MRI-guided Biopsies for Linear Enhancement:   INVASIVE MAMMARY CARCINOMA, NO SPECIAL TYPE (INVASIVE  DUCTAL CARCINOMA).               A. Largest contiguous focus in a core measures 4 mm.               B. No in-situ component identified.                C. Brisk lymphocytic response noted (see Comment).               D. No definitive lymphovascular nor perineural invasion identified.  -Bowtie clip. Biopsy clips marking the 2 sites of biopsy-proven malignancy are  by 2.5 cm (bowtie clip and posteriorly located U-shaped clip).     ER negative (0%)  NC negative (0%)  Her2+ (IHC 3+)  Ki-67 50%    2.  Left Breast, 2:00 o'clock, MRI-guided Biopsy for Enhancement:                A. Benign breast parenchyma with radial scar and micropapillomas.               B. Usual ductal hyperplasia and columnar cell hyperplasia.               C. No atypical hyperplasia, in-situ nor invasive carcinoma identified.  -U-shaped clip. Concordant.     7/1/2021 Surgery    Port placement, left SHAINA-guided, bracketed, partial mastectomy and left breast needle-localized excisional biopsy and sentinel lymph node biopsy with oncoplastic closure and right reduction for symmetry    1. Left Breast, Partial Mastectomy:               A. Invasive ductal carcinoma:                            1. Invasive carcinoma measures 12 mm x 8 mm x 4 mm.                            2. Overall Dickinson grade III (tubular score = 3, nuclear score = 2, mitotic score = 3).                                                 3. No definitive lymphovascular invasion identified.               B. Associated scattered ductal carcinoma in situ (DCIS):                            1. DCIS spans an area estimated at  30 mm x 8 mm x 2 mm.                            2. High grade solid and comedo DCIS.                            3. Rare microcalcification present in DCIS.                  C. All margins are negative for invasive carcinoma.                    Carcinoma measures 6 mm from the closest (Inferior) margin of excision.                     All other margins measure at  least 8 mm from invasive carcinoma including:                            Anterior margin = 18 mm                            Posterior margin = 24 mm                            Superior margin = 8 mm                            Inferior margin = 6 mm                             Lateral margin = 12 mm                            Medial margin = 20 mm                  D. All margins are negative for in ductal situ carcinoma (DCIS).                    DCIS measures 1.5 mm from the closest (Superior) margin of excision.                    All other margins  measure at least 2.5 mm from DCIS including:                            Anterior margin = 18  mm                            Posterior margin = 15  mm                            Superior margin = 1.5  mm                            Inferior margin =  6 mm                             Lateral margin = 3  mm                            Medial margin = 2.5 mm                  E.  Multiple biopsy site changes are identified (x2) and multiple metallic clips (x4) retrieved.               F.  No Pagetoid involvement of skin by malignancy identified.               G. Non-neoplastic breast tissue with fibrocystic change, sclerosing adenosis, columnar cell change, micropapilloma and focal fibroadenomatoid change. Rare microcalcification present in benign breast tissue.               H. Previous Biomarkers: Estrogen receptors: Negative, Progesterone receptors: Negative,                    HER/2-ese: Positive (score 3+) and Ki-67 = 50% (see [id number]).       2.  Left Breast, Additional Superior, Medial and Inferior Margins:                 A.  No in situ nor infiltrating carcinoma identified.               B.  New margins are negative for malignancy by an additional 15 mm.      3.  Left Breast at 2  o'clock, Needle Localization, Excisional Biopsy:                A.  Benign breast tissue with changes suggesting radial scar with usual ductal hyperplasia,                      sclerosing  adenosis and fibrocystic change.                 B.  No in situ nor infiltrating carcinoma identified.               C.  Biopsy site changes are identified and metallic clip retrieved.                D.  All margins are viable and negative for neoplasm/malignancy.                       4.  Left Breast, True Medial and Inferior Margins:                 A.  No in situ nor infiltrating carcinoma identified.                B.  New margins are negative for malignancy by an additional 20 mm.     5.  Left Breast, True Superior and Lateral Margins:                A.  No in situ nor infiltrating carcinoma identified.                B.  New margins are negative for malignancy by an additional 40 mm.     6.  Left Breast, True Inferior Margin:                A.  No in situ nor infiltrating carcinoma identified.                B.  Benign skin and breast tissue.  C.  New margin is negative for malignancy by an additional 15 mm.     7.  Right Breast Tissue, Augmentation:                 A.  Benign skin and breast tissue (312 grams).                B.  No in situ nor infiltrating carcinoma identified.      8.  Left Axilla, [place] Lymph Node #1 (Hot, Blue, Count 5,500).                A.  Three lymph nodes negative for malignancy by routine staining (0/3).   B.  Includes one (largest) lymph node with focal fibrosis suggesting previous biopsy effect.      9.  Left Axilla, [place] Lymph Node, #2 (Hot, Not Blue, Count 900):                A.  One lymph node negative for metastatic carcinoma by routine staining (0/1).     10.  Left Breast, Superior Pole:                 A.  No in situ nor infiltrating carcinoma identified.                B.  Superior margin is negative for malignancy by an additional 20 mm.         Review of Systems:  See interval history.       Medications:    Current Outpatient Medications:   •  amphetamine-dextroamphetamine XR (Adderall XR) 20 MG 24 hr capsule, Take 1 capsule by mouth Every Morning (Patient taking  differently: Take 20 mg by mouth Every Morning HOLD FOR 48 HOURS PRIOR TO SURGERY ), Disp: 30 capsule, Rfl: 0  •  aspirin 81 MG EC tablet, Take 1 tablet by mouth Daily. (Patient taking differently: Take 81 mg by mouth Daily. INSTRUCTED BY MD TO HOLD FOR TODAY'S PROCEDURE AS WELL AS SCHEDULED SURGERY), Disp: 30 tablet, Rfl: 11  •  atorvastatin (Lipitor) 40 MG tablet, Take 1 tablet by mouth Daily., Disp: 30 tablet, Rfl: 11  •  Cholecalciferol (VITAMIN D3 PO), Take 2,000 Units by mouth. Takes 2000 twice a week;  SUNDAYS AND WEDNESDAYS, Disp: , Rfl:   •  levothyroxine (SYNTHROID, LEVOTHROID) 137 MCG tablet, Take 1 tablet by mouth Daily., Disp: 30 tablet, Rfl: 5  •  LORazepam (Ativan) 0.5 MG tablet, Take 1 tablet by mouth Every 8 (Eight) Hours As Needed for Anxiety. (Patient taking differently: Take 0.5 mg by mouth Every 8 (Eight) Hours As Needed for Anxiety. LAST TOOK MED 2 WEEKS AGO), Disp: 20 tablet, Rfl: 0  •  valsartan (DIOVAN) 160 MG tablet, Take 1 tablet by mouth Daily. For blood pressure (Patient taking differently: Take 160 mg by mouth Daily.), Disp: 30 tablet, Rfl: 5      Allergies   Allergen Reactions   • Sulfa Antibiotics Rash       Family History   Problem Relation Age of Onset   • Breast cancer Mother 53   • Pancreatic cancer Mother 68   • Lung cancer Maternal Grandmother    • Stroke Father    • Breast cancer Paternal Aunt 55   • Prostate cancer Maternal Grandfather    • Prostate cancer Paternal Grandfather    • Brain cancer Maternal Cousin 20   • Melanoma Maternal Uncle    • Ovarian cancer Other         Paternal great aunt    • Breast cancer Other    • Breast cancer Paternal Great-Grandmother 94   • Hypertension Brother    • Malig Hyperthermia Neg Hx        PHYSICAL EXAMINATION:   Vitals:    07/15/21 1256   BP: 120/86   Pulse: 91   SpO2: 98%     ECOG 0 - Asymptomatic  General: NAD, well appearing  Psych: a&o x 3, normal mood and affect  Eyes: EOMI, no scleral icterus  ENMT: neck supple without masses or  thyromegaly, mucus membranes moist  MSK: normal gait, normal ROM in bilateral shoulders  Lymph nodes: Well-healing left axillary incision with mild, expected postoperative fullness.  Breast:   Right: Well-healing mastopexy incisions. Flaps and NAC healthy.  Left: Well-healing mastopexy incisions. Flaps and NAC healthy.      Procedure: Diagnostic Ultrasound, Left Breast, Limited  Indication: Left axilla swelling/pain  Description of procedure: Using a 10MHz linear array transducer, I scanned the left axilla over the targeted area.  Findings: There is an anechoic fluid collection with some internal echoes and posterior enhancement.  Impression: Left axillary fluid collection consistent with postoperative seroma. This correlates with exam findings. This is amenable to ultrasound-guided aspiration.  Plan: US-guided aspiration.      Procedure: Seroma Aspiration  Indication: Symptomatic seroma  Location: Left axilla  Consent: The risks, benefits, and alternatives to the procedure were discussed with the patient, who understood and wished to proceed. The risks described included, but were not limited to, bleeding, infection, pneumothorax, and seroma recurrence requiring repeat aspiration or placement of seroma catheter.  Description of Procedure: After the patient was positioned supine on the procedure table, I located the seroma using ultrasound as described above. I prepped and draped the axilla skin in sterile fashion. I anesthetized the axilla skin with 1% lidocaine with epinephrine. I then anesthetized the underlying subcutaneous tissue with 1% lidocaine with epinephrine under ultrasound visualization and guidance. I then inserted an 18 G needle (attached to a syringe) into the seroma under ultrasound guidance and aspirated the seroma fluid until the seroma completely disappeared. In total 30 mL of fluid was aspirated and it was typical straw colored seroma fluid. The fluid was discarded. Manual compression was held  for 5 minutes and a bandaid applied. Her surgical bra was put back on.  Marker placed: none  Tolerance: The patient tolerated the procedure well.  Disposition: See plan below.      Assessment:  41 y.o. F with a diagnosis of left breast: High grade, invasive ductal carcinoma, ER/OK negative, Her2 positive. She underwent port placement, left partial mastectomy and excisional biopsy and SLNB with oncoplastic closure and right reduction for symmetry on 7/1/21, pT1cN0, anatomic stage IA, prognostic stage IA.      Plan:  -Small seroma aspirated today. This should not require repeat aspiration. Restrict left arm activity for the next few days.  -Continue follow-up with Dr. Beavers.  -Continue follow-up with Dr. Baker.  -OT/LE clinic evaluation in 2 weeks.  -Follow-up in 3 months for clinical exam.  -She was instructed to call sooner with any questions, concerns or changes on BSE.    Lashonda Euceda MD      CC:  MD Fadi Iabnez MD Dana Tisdale, APRN

## 2021-07-19 DIAGNOSIS — Z51.11 CHEMOTHERAPY MANAGEMENT, ENCOUNTER FOR: Primary | ICD-10-CM

## 2021-07-20 VITALS — HEIGHT: 62 IN | BODY MASS INDEX: 26.97 KG/M2

## 2021-07-20 NOTE — PROGRESS NOTES
Subjective   Radah Astorga is a 41 y.o. female.     CC: Video Visit for Medical Management    History of Present Illness     Chief Complaint:   Chief Complaint   Patient presents with   • ADHD     video - med refill due     • Hypertension   • Hypothyroidism       Radha Astorga 41 y.o. female who presents today for Medical Management of the below listed issues and medication refills.  she has a problem list of   Patient Active Problem List   Diagnosis   • Family history of breast cancer   • Hypothyroidism   • Major depressive disorder, recurrent episode, moderate with anxious distress (CMS/HCC)   • Attention deficit hyperactivity disorder (ADHD), predominantly inattentive type   • Factor 5 Leiden mutation, heterozygous (CMS/HCC)   • Kidney mass   • Malignant neoplasm of overlapping sites of left breast in female, estrogen receptor negative (CMS/HCC)   • Encounter for long-term (current) use of high-risk medication   • Essential hypertension   • Rheumatic mitral valve disease   .  Since the last visit, she has been undergoing treatment for breast cancer.  she has been compliant with   Current Outpatient Medications:   •  amphetamine-dextroamphetamine XR (Adderall XR) 20 MG 24 hr capsule, Take 1 capsule by mouth Every Morning, Disp: 30 capsule, Rfl: 0  •  levothyroxine (SYNTHROID, LEVOTHROID) 137 MCG tablet, Take 1 tablet by mouth Daily., Disp: 30 tablet, Rfl: 5  •  LORazepam (Ativan) 0.5 MG tablet, Take 1 tablet by mouth Every 8 (Eight) Hours As Needed for Anxiety., Disp: 30 tablet, Rfl: 0  •  valsartan (DIOVAN) 160 MG tablet, Take 1 tablet by mouth Daily., Disp: 30 tablet, Rfl: 5  •  aspirin 81 MG EC tablet, Take 1 tablet by mouth Daily. (Patient taking differently: Take 81 mg by mouth Daily. INSTRUCTED BY MD TO HOLD FOR TODAY'S PROCEDURE AS WELL AS SCHEDULED SURGERY), Disp: 30 tablet, Rfl: 11  •  atorvastatin (Lipitor) 40 MG tablet, Take 1 tablet by mouth Daily., Disp: 30 tablet, Rfl: 11  •  Cholecalciferol  "(VITAMIN D3 PO), Take 2,000 Units by mouth. Takes 2000 twice a week;  SUNDAYS AND WEDNESDAYS, Disp: , Rfl: .  she denies medication side effects.    All of the other chronic condition(s) listed above are stable w/o issues.    Ht 157.5 cm (62.01\")   BMI 26.97 kg/m²     Results for orders placed or performed in visit on 07/15/21   Comprehensive Metabolic Panel    Specimen: Blood   Result Value Ref Range    Glucose 88 74 - 124 mg/dL    BUN 18 6 - 20 mg/dL    Creatinine 0.77 0.60 - 1.10 mg/dL    Sodium 138 134 - 145 mmol/L    Potassium 4.2 3.5 - 4.7 mmol/L    Chloride 105 98 - 107 mmol/L    CO2 24.7 22.0 - 29.0 mmol/L    Calcium 9.3 8.5 - 10.2 mg/dL    Total Protein 6.9 6.3 - 8.0 g/dL    Albumin 4.10 3.50 - 5.20 g/dL    ALT (SGPT) 31 0 - 33 U/L    AST (SGOT) 21 0 - 32 U/L    Alkaline Phosphatase 84 38 - 116 U/L    Total Bilirubin 0.2 0.2 - 1.2 mg/dL    eGFR Non African Amer 82 >60 mL/min/1.73    Globulin 2.8 1.8 - 3.5 gm/dL    A/G Ratio 1.5 1.1 - 2.4 g/dL    BUN/Creatinine Ratio 23.4 7.3 - 30.0    Anion Gap 8.3 5.0 - 15.0 mmol/L   CBC Auto Differential    Specimen: Blood   Result Value Ref Range    WBC 10.31 3.40 - 10.80 10*3/mm3    RBC 4.32 3.77 - 5.28 10*6/mm3    Hemoglobin 10.9 (L) 12.0 - 15.9 g/dL    Hematocrit 34.7 34.0 - 46.6 %    MCV 80.3 79.0 - 97.0 fL    MCH 25.2 (L) 26.6 - 33.0 pg    MCHC 31.4 (L) 31.5 - 35.7 g/dL    RDW 15.6 (H) 12.3 - 15.4 %    RDW-SD 45.7 37.0 - 54.0 fl    MPV 9.2 6.0 - 12.0 fL    Platelets 317 140 - 450 10*3/mm3    Neutrophil % 61.9 42.7 - 76.0 %    Lymphocyte % 26.9 19.6 - 45.3 %    Monocyte % 8.4 5.0 - 12.0 %    Eosinophil % 1.6 0.3 - 6.2 %    Basophil % 0.9 0.0 - 1.5 %    Immature Grans % 0.3 0.0 - 0.5 %    Neutrophils, Absolute 6.38 1.70 - 7.00 10*3/mm3    Lymphocytes, Absolute 2.77 0.70 - 3.10 10*3/mm3    Monocytes, Absolute 0.87 0.10 - 0.90 10*3/mm3    Eosinophils, Absolute 0.17 0.00 - 0.40 10*3/mm3    Basophils, Absolute 0.09 0.00 - 0.20 10*3/mm3    Immature Grans, Absolute 0.03 " 0.00 - 0.05 10*3/mm3    nRBC 0.0 0.0 - 0.2 /100 WBC           The following portions of the patient's history were reviewed and updated as appropriate: allergies, current medications, past family history, past medical history, past social history, past surgical history and problem list.    Review of Systems   Constitutional: Negative for activity change, chills and fever.   Respiratory: Negative for cough.    Cardiovascular: Negative for chest pain.   Psychiatric/Behavioral: Negative for dysphoric mood.       Objective   Physical Exam  Constitutional:       General: She is not in acute distress.     Appearance: She is well-developed.   Pulmonary:      Effort: Pulmonary effort is normal.   Neurological:      Mental Status: She is alert and oriented to person, place, and time.   Psychiatric:         Behavior: Behavior normal.         Thought Content: Thought content normal.     Labs from her specialists reviewed by me at today's visit,.    The patient has read and signed the [facility] Controlled Substance Contract.  I will continue to see patient for regular follow up appointments.  They are well controlled on their medication.  CHRISTOPHE has been reviewed by me and is updated every 3 months. The patient is aware of the potential for addiction and dependence.    Assessment/Plan   Diagnoses and all orders for this visit:    1. Attention deficit hyperactivity disorder (ADHD), predominantly inattentive type (Primary)  -     amphetamine-dextroamphetamine XR (Adderall XR) 20 MG 24 hr capsule; Take 1 capsule by mouth Every Morning  Dispense: 30 capsule; Refill: 0    2. Acquired hypothyroidism  -     levothyroxine (SYNTHROID, LEVOTHROID) 137 MCG tablet; Take 1 tablet by mouth Daily.  Dispense: 30 tablet; Refill: 5    3. Grief reaction  -     LORazepam (Ativan) 0.5 MG tablet; Take 1 tablet by mouth Every 8 (Eight) Hours As Needed for Anxiety.  Dispense: 30 tablet; Refill: 0    4. Essential hypertension  -     valsartan  (DIOVAN) 160 MG tablet; Take 1 tablet by mouth Daily.  Dispense: 30 tablet; Refill: 5    Spent  14   minutes with chart and interview and consent for this encounter given by the patient.  You have chosen to receive care through a telehealth visit.  Do you consent to use a video/audio connection for your medical care today? Yes

## 2021-07-21 ENCOUNTER — MDT ASSESSMENT (OUTPATIENT)
Dept: OTHER | Facility: HOSPITAL | Age: 41
End: 2021-07-21

## 2021-07-21 ENCOUNTER — TELEMEDICINE (OUTPATIENT)
Dept: FAMILY MEDICINE CLINIC | Facility: CLINIC | Age: 41
End: 2021-07-21

## 2021-07-21 DIAGNOSIS — F90.0 ATTENTION DEFICIT HYPERACTIVITY DISORDER (ADHD), PREDOMINANTLY INATTENTIVE TYPE: Primary | Chronic | ICD-10-CM

## 2021-07-21 DIAGNOSIS — I10 ESSENTIAL HYPERTENSION: Chronic | ICD-10-CM

## 2021-07-21 DIAGNOSIS — E03.9 ACQUIRED HYPOTHYROIDISM: Chronic | ICD-10-CM

## 2021-07-21 DIAGNOSIS — F43.21 GRIEF REACTION: Chronic | ICD-10-CM

## 2021-07-21 PROCEDURE — 99214 OFFICE O/P EST MOD 30 MIN: CPT | Performed by: FAMILY MEDICINE

## 2021-07-21 RX ORDER — LORAZEPAM 0.5 MG/1
0.5 TABLET ORAL EVERY 8 HOURS PRN
Qty: 30 TABLET | Refills: 0 | Status: SHIPPED | OUTPATIENT
Start: 2021-07-21 | End: 2021-12-03

## 2021-07-21 RX ORDER — VALSARTAN 160 MG/1
160 TABLET ORAL DAILY
Qty: 30 TABLET | Refills: 5 | Status: SHIPPED | OUTPATIENT
Start: 2021-07-21 | End: 2022-01-04 | Stop reason: SDUPTHER

## 2021-07-21 RX ORDER — LEVOTHYROXINE SODIUM 137 UG/1
137 TABLET ORAL DAILY
Qty: 30 TABLET | Refills: 5 | Status: SHIPPED | OUTPATIENT
Start: 2021-07-21 | End: 2021-08-06

## 2021-07-21 RX ORDER — DEXTROAMPHETAMINE SACCHARATE, AMPHETAMINE ASPARTATE MONOHYDRATE, DEXTROAMPHETAMINE SULFATE AND AMPHETAMINE SULFATE 5; 5; 5; 5 MG/1; MG/1; MG/1; MG/1
20 CAPSULE, EXTENDED RELEASE ORAL EVERY MORNING
Qty: 30 CAPSULE | Refills: 0 | Status: SHIPPED | OUTPATIENT
Start: 2021-07-21 | End: 2021-08-20 | Stop reason: SDUPTHER

## 2021-07-21 NOTE — PROGRESS NOTES
MULTIDISCIPLINARY TREATMENT PLANNING CONFERENCE  DATE: 7/23/2021       SPECIALTY: Breast Conference   PRESENTER: Neena Beavers MD   SITE: Left Breast        Please discuss clinical/working stage, TNM, Stage Group, National Treatment Guidelines, and Prognostic Indicators.       CONFERENCE SUMMARY:  Patient received weekly Taxol with weekly Herceptin x12 followed by Herceptin total for 1 year                          AJCC STAGE: T1c N0 M0, ER negative [place] negative HER-2 positive        REFERRAL SUPPORT   Psychosocial Assessment:  []                Clinical Trials:  []                Genetic Testing:  []                Geriatric Assessment:  []                Smoking Cessation:  []                Nutrition Assessment:  []                Social Work Evaluation/Barriers to Care:  []                Behavioral Oncology Evaluation:  []                Palliative Care:  []        EVIDENCE BASED NATIONAL TREATMENT GUIDELINES:  []                   SOCIAL HISTORY:   reports that she quit smoking about 12 years ago. Her smoking use included cigarettes. She started smoking about 23 years ago. She has a 10.00 pack-year smoking history. She has never used smokeless tobacco. She reports current alcohol use. She reports that she does not use drugs.                  PAST MEDICAL HISTORY:   has a past medical history of Acute stress reaction (11/17/2014), ADD (attention deficit disorder), ADHD (attention deficit hyperactivity disorder), Anxiety and depression, Factor 5 Leiden mutation, heterozygous (CMS/[facility]) (2/17/2021), Family history of breast cancer (03/11/2013), Fracture, cervical vertebra (CMS/[facility]) (1997), H/O complete eye exam (01/01/2016), Hearing loss, Hearing loss of both ears, History of rheumatic fever, Hypertension, Hypothyroidism, Infiltrating ductal carcinoma of left breast (CMS/[facility]) (5/5/2021), Kidney stone, Mitral valve insufficiency, PONV (postoperative nausea and vomiting), Stroke (CMS/[facility]) (12/2020), and Stroke-like  symptoms.                  PAST SURGICAL HISTORY:  @                 IMAGING:  MRI Brain With & Without Contrast    Result Date: 5/6/2021  Continuing decreased T2 FLAIR hyperintensity and enhancement involving the subcortical white matter of the right frontal lobe posteriorly. Assuming that the patient has not been treated with radiation therapy or chemotherapy, this argues strongly against a neoplastic process. The findings may be secondary to a vascular insult, potentially a venous infarct. No convincing cortical venous thrombus or thrombosis is appreciated on the current examination. Clinical correlation is suggested.  This report was finalized on 5/6/2021 10:19 AM by Dr. Pernell Lomeli M.D.      Mammo Diagnostic Left With CAD    Result Date: 4/29/2021   1. Ultrasound-guided core needle biopsy of a 0.8 cm mass at 6:00, 6 cm from the nipple in the left breast, marked with a U-shaped clip. Pathology is malignant and concordant with the imaging assessment. Surgical consultation is recommended.  This report was finalized on 4/29/2021 7:02 AM by Dr. Codi Machuca M.D.      XR Chest 1 View    Result Date: 7/1/2021   Right IJ approach Mediport catheter terminates at the level of the venoatrial junction. Otherwise, there is no active disease in the chest.  This report was finalized on 7/1/2021 7:50 PM by Dr. Antione Fuchs M.D.      MRI Breast Bilateral With & Without Contrast    Result Date: 5/11/2021  IMPRESSION :  1. Heterogeneous enhancement corresponding to the recently biopsied malignancy seen within the inferior posterior aspect of the left breast within the 6:00 position. There is a linear area of enhancement is seen just anteriorly and laterally which is also suspicious for malignancy. Additional punctate focus is seen anteriorly and laterally within the 3:00 position of the left breast which is also suspicious for malignancy. Findings are consistent with multifocal disease. 2. Mildly prominent lymph nodes  present within the left axilla with mild cortical thickening with benign-appearing fatty juan miguel which are suspicious for metastatic disease. No contralateral disease or internal mammary adenopathy identified.   ASSESSMENT: BI-RADS: Category 6-known biopsy-proven malignancy-appropriate action should be taken.  BI-RADS category Key: Category 0-incomplete-needs additional imaging and/or prior images for comparison. Category 1-negative. Category 2-benign findings. Category 3-probably benign-short interval follow-up suggested. Category 4-suspicious abnormality-biopsy should be considered. Category 5-highly suggestive for malignancy-appropriate action should be taken. Category 6-known biopsy-proven malignancy-appropriate action should be taken.   Electronically Signed By-Natasha Hall MD On:5/11/2021 11:25 AM This report was finalized on [id number] by  Natasha Hall MD.    US Device Placement Breast Without Biopsy 1st    Result Date: 6/24/2021  Ultrasound-guided preoperative localization of 2 areas of biopsy-proven left breast malignancy marked with U-shaped and bowtie clips in the posterior left breast. Correlation with final histopathology is recommended. An additional more anteriorly located U-shaped clip in the left breast marks a site of biopsy-proven radial scar, for which surgical management is again recommended.   This report was finalized on 6/24/2021 11:42 AM by Dr. Codi Machuca M.D.      US Device Placement Breast Without Biopsy Each Additional    Result Date: 6/24/2021  Ultrasound-guided preoperative localization of 2 areas of biopsy-proven left breast malignancy marked with U-shaped and bowtie clips in the posterior left breast. Correlation with final histopathology is recommended. An additional more anteriorly located U-shaped clip in the left breast marks a site of biopsy-proven radial scar, for which surgical management is again recommended.   This report was finalized on 6/24/2021 11:42 AM by Dr. Kincaid  Brigette CERDA      Mammo Diagnostic Digital Tomosynthesis Left With CAD    Result Date: 6/24/2021  Ultrasound-guided preoperative localization of 2 areas of biopsy-proven left breast malignancy marked with U-shaped and bowtie clips in the posterior left breast. Correlation with final histopathology is recommended. An additional more anteriorly located U-shaped clip in the left breast marks a site of biopsy-proven radial scar, for which surgical management is again recommended.   This report was finalized on 6/24/2021 11:42 AM by Dr. Codi Machuca M.D.      MRI Angiogram Venogram Head    Result Date: 5/27/2021  Impression: Normal cerebral MRV. Signer Name: Isabelle Robison MD  Signed: 5/27/2021 4:28 PM  Workstation Name: [id number]  [facility]    US Guided Breast Biopsy With & Without Device initial    Result Date: 4/29/2021   1. Ultrasound-guided core needle biopsy of a 0.8 cm mass at 6:00, 6 cm from the nipple in the left breast, marked with a U-shaped clip. Pathology is malignant and concordant with the imaging assessment. Surgical consultation is recommended.  This report was finalized on 4/29/2021 7:02 AM by Dr. Codi Machuca M.D.                      SURGICAL PROCEDURE / PATHOLOGY:      4/26/2021: [id number] ([facility])     Final Diagnosis  1. Left Breast, 6:00, 6 cm FN, U/S-Guided Core Needle Biopsy for a Mass:  A. INVASIVE DUCTAL CARCINOMA, Poorly differentiated; Oziel Histologic Grade III/III  (tubule score = 3, nuclear score = 2, mitoses score = 3), measuring at least 7 mm.  B. No definitive ductal carcinoma in situ identified.  C. Negative for lymphovascular space invasion.  D. See Biomarker Template.      5/21/2021: [id number] ([facility])    Final Diagnosis  1. Lymph Node, Left Axilla, Core Biopsy:  A. Fragments of reactive lymph node; negative for lymphoma and carcinoma.      6/2/2021: [id number] ([facility])    Final Diagnosis  1. Left Breast, 5:00 o'clock, MRI-guided Biopsies for Linear  Enhancement: INVASIVE MAMMARY CARCINOMA, NO  SPECIAL TYPE (INVASIVE DUCTAL CARCINOMA).  A. Largest contiguous focus in a core measures 4 mm.  B. No in-situ component identified.  C. Brisk lymphocytic response noted (see Comment).  D. No definitive lymphovascular nor perineural invasion identified.  2. Left Breast, 2:00 o'clock, MRI-guided Biopsy for Enhancement:  A. Benign breast parenchyma with radial scar and micropapillomas.  B. Usual ductal hyperplasia and columnar cell hyperplasia.  C. No atypical hyperplasia, in-situ nor invasive carcinoma identified.      7/1/2021: [id number] ([facility])    Final Diagnosis  1. Left Breast, Partial Mastectomy:  A. Invasive ductal carcinoma:  1. Invasive carcinoma measures 12 mm x 8 mm x 4 mm.  2. Overall Oziel grade III (tubular score = 3, nuclear score = 2, mitotic score = 3).  3. No definitive lymphovascular invasion identified.  B. Associated scattered ductal carcinoma in situ (DCIS):  1. DCIS spans an area estimated at 30 mm x 8 mm x 2 mm.  2. High grade solid and comedo DCIS.  3. Rare microcalcification present in DCIS.  C. All margins are negative for invasive carcinoma.  Carcinoma measures 6 mm from the closest (Inferior) margin of excision.  All other margins measure at least 8 mm from invasive carcinoma including:  Anterior margin = 18 mm  Posterior margin = 24 mm  Superior margin = 8 mm  Inferior margin = 6 mm  Lateral margin = 12 mm  Medial margin = 20 mm  D. All margins are negative for in ductal situ carcinoma (DCIS).  DCIS measures 1.5 mm from the closest (Superior) margin of excision.  All other margins measure at least 2.5 mm from DCIS including:  Anterior margin = 18 mm  Posterior margin = 15 mm  Superior margin = 1.5 mm  Inferior margin = 6 mm  Lateral margin = 3 mm  Medial margin = 2.5 mm  E. Multiple biopsy site changes are identified (x2) and multiple metallic clips (x4) retrieved.  F. No Pagetoid involvement of skin by malignancy identified.  G.  Non-neoplastic breast tissue with fibrocystic change, sclerosing adenosis, columnar cell change,  micropapilloma and focal fibroadenomatoid change. Rare microcalcification present in benign breast tissue.  H. Previous Biomarkers: Estrogen receptors: Negative, Progesterone receptors: Negative,  HER/2-ese: Positive (score 3+) and Ki-67 = 50% (see [id number]).  jat/rigom  2. Left Breast, Additional Superior, Medial and Inferior Margins:  A. No in situ nor infiltrating carcinoma identified.  B. New margins are negative for malignancy by an additional 15 mm.  3. Left Breast at 2 o'clock, Needle Localization, Excisional Biopsy:  A. Benign breast tissue with changes suggesting radial scar with usual ductal hyperplasia,  sclerosing adenosis and fibrocystic change.  B. No in situ nor infiltrating carcinoma identified.  C. Biopsy site changes are identified and metallic clip retrieved.  D. All margins are viable and negative for neoplasm/malignancy.  4. Left Breast, True Medial and Inferior Margins:  A. No in situ nor infiltrating carcinoma identified.  B. New margins are negative for malignancy by an additional 20 mm.  5. Left Breast, True Superior and Lateral Margins:  A. No in situ nor infiltrating carcinoma identified.  B. New margins are negative for malignancy by an additional 40 mm.  6. Left Breast, True Inferior Margin:  A. No in situ nor infiltrating carcinoma identified.  B. Benign skin and breast tissue.  C. New margin is negative for malignancy by an additional 15 mm.  7. Right Breast Tissue, Augmentation:  A. Benign skin and breast tissue (312 grams).  B. No in situ nor infiltrating carcinoma identified.  8. Left Axilla, [place] Lymph Node #1 (Hot, Blue, Count 5,500).  A. Three lymph nodes negative for malignancy by routine staining (0/3).  B. Includes one (largest) lymph node with focal fibrosis suggesting previous biopsy effect.  9. Left Axilla, [place] Lymph Node, #2 (Hot, Not Blue, Count 900):  A. One lymph  node negative for metastatic carcinoma by routine staining (0/1).  10. Left Breast, Superior Pole:  A. No in situ nor infiltrating carcinoma identified.  B. Superior margin is negative for malignancy by an additional 20 mm.                  Labs & Biomarkers:      4/26/2021:     ER/DE: 0%, HER-2: Score 3+ by IHC, Ki-67: 55%    6/2/2021:    ER/DE: 0%, HER-2/ese (+) Score 3+ by IHC, Ki-67:50%

## 2021-07-22 ENCOUNTER — TELEPHONE (OUTPATIENT)
Dept: CARDIOLOGY | Facility: CLINIC | Age: 41
End: 2021-07-22

## 2021-07-22 NOTE — TELEPHONE ENCOUNTER
Pt has a dental appt with Dr. Narinder Vaughn and they would like know if pt requires pre medicated prior to the appt.  Please advise.

## 2021-07-27 ENCOUNTER — TELEPHONE (OUTPATIENT)
Dept: NEUROLOGY | Facility: CLINIC | Age: 41
End: 2021-07-27

## 2021-07-27 ENCOUNTER — TELEPHONE (OUTPATIENT)
Dept: ONCOLOGY | Facility: CLINIC | Age: 41
End: 2021-07-27

## 2021-07-27 NOTE — TELEPHONE ENCOUNTER
Caller: Radha Astorga    Relationship: Self    Best call back number: [phone]    What is the best time to reach you: ASAP    Who are you requesting to speak with: DR. YANG'S NURSE    Do you know the name of the person who called: RADHA    What was the call regarding:  PART A QUESTION 2 & PART C QUESTION 10, WERE NOT COMPLETED ON HER [facility] PAPERWORK & SHE IS INQUIRING IF SHE CAN COMPLETE OR DOES SHE NEED TO BRING IT BACK IN.  HER CHEMO HAS BEEN MOVED UP BY TWO WEEKS & SHE NEEDS TO GET THIS PAPERWORK TURNED IN ASAP.    Do you require a callback: YES, PLEASE

## 2021-07-27 NOTE — TELEPHONE ENCOUNTER
Call back to Radha to let her know this RN has spoken with person who does [place] paperwork and assistance given to answer questions appropriately.  Radha verbalized understanding and thanks.

## 2021-07-29 ENCOUNTER — TELEMEDICINE (OUTPATIENT)
Dept: ONCOLOGY | Facility: CLINIC | Age: 41
End: 2021-07-29

## 2021-07-29 DIAGNOSIS — D64.9 ANEMIA, UNSPECIFIED TYPE: Primary | ICD-10-CM

## 2021-07-29 DIAGNOSIS — C50.912 INFILTRATING DUCTAL CARCINOMA OF LEFT BREAST (HCC): ICD-10-CM

## 2021-07-29 DIAGNOSIS — C50.812 MALIGNANT NEOPLASM OF OVERLAPPING SITES OF LEFT BREAST IN FEMALE, ESTROGEN RECEPTOR NEGATIVE (HCC): ICD-10-CM

## 2021-07-29 DIAGNOSIS — Z17.1 MALIGNANT NEOPLASM OF OVERLAPPING SITES OF LEFT BREAST IN FEMALE, ESTROGEN RECEPTOR NEGATIVE (HCC): ICD-10-CM

## 2021-07-29 PROCEDURE — 99417 PROLNG OP E/M EACH 15 MIN: CPT | Performed by: NURSE PRACTITIONER

## 2021-07-29 PROCEDURE — 99215 OFFICE O/P EST HI 40 MIN: CPT | Performed by: NURSE PRACTITIONER

## 2021-07-29 RX ORDER — ONDANSETRON HYDROCHLORIDE 8 MG/1
8 TABLET, FILM COATED ORAL 3 TIMES DAILY PRN
Qty: 30 TABLET | Refills: 5 | Status: SHIPPED | OUTPATIENT
Start: 2021-07-29 | End: 2021-08-18 | Stop reason: SDUPTHER

## 2021-07-29 NOTE — PROGRESS NOTES
[facility] Hematology/Oncology Treatment Plan Summary    Name: Radha Astorga  [id number]  MD: Dr. Beavers    Diagnosis:     ICD-10-CM ICD-9-CM   1. Anemia, unspecified type  D64.9 285.9   2. Infiltrating ductal carcinoma of left breast (CMS/HCC)  C50.912 174.9     Stage: I      Goal of chemotherapy: adjuvant    Treatment Medication(s):   1. Taxol  2. Herceptin    Frequency: weekly x 12 then every 3 weeks to complete 1 year    Number of cycles:as above    Starting on: 8/4/2021    Items for home use: Senokot-S (for constipation), Tylenol (for fever and/or pain) and Thermometer    Rx written for: [x] Nausea    [] Pre-Chemo   ondansetron 8 mg by mouth every 8 hours as needed for nausea    Notes: Discussed purchasing gel gloves and socks for hand and foot cooling.        Completing Provider: CLAUDINE Rodríguez           Date/time: 07/29/2021      Please note: You will be seen by a provider frequently with your treatment plan. This plan may change depending on many factors, if so, this will be discussed with you by your physician.  Last update 12/2020.

## 2021-07-29 NOTE — PROGRESS NOTES
____________________PATIENT EDUCATION____________________    PATIENT EDUCATION:  Today I met with the patient to discuss the chemotherapy regimen recommended for treatment of his/her disease.  The patient was given explanation of treatment premed side effects including office policy that prohibits patients to drive if sedating medications are administered, MD explanation given regarding benefits, side effects, toxicities and goals of treatment.  The patient received a Chemotherapy/Biotherapy Plan Summary including diagnosis and explanation of specific treatment plan.    SIDE EFFECTS:  Common side effects were discussed with the patient.  Discussion included where applicable hair loss/discoloration, anemia/fatigue, infection/chills/fever, appetite, bleeding risk/precautions, constipation, diarrhea, mouth sores, taste alteration, loss of appetite,nausea/vomiting, peripheral neuropathy, skin/nail changes, rash, muscle aches/weakness, photosensitivity, weight gain/loss, hearing loss, dizziness, menopausal symptoms, menstrual irregularity, sterility, high blood pressure, heart damage, liver damage, lung damage, kidney damage, DVT/PE risk, fluid retention, pleural/pericardial effusion, somnolence, electrolyte/LFT imbalance, vein exercises and/or the possible need for vascular access/port placement.  The patient was advised that although uncommon, leakage of an infused medication from the vein or venous access device (port) may lead to skin breakdown and/or other tissue damage.  The patient was advised that he/she may have pain, bleeding, and/or bruising from the insertion of a needle in their vein or venous access device (port).  The patient was further advised that, in spite of proper technique, infection with redness and irritation may rarely occur at the site where the needle was inserted.  The patient was advised that if complications occur, additional medical treatment is available.    Discussion also included side  effects specific to drugs in the treatment plan, specifically Taxol and Herceptin    Reproductive risks were discussed, including appropriate use of birth control and protection during sexual relations.    Physical Exam  Constitutional:       Appearance: Normal appearance.   Eyes:      Extraocular Movements: Extraocular movements intact.      Conjunctiva/sclera: Conjunctivae normal.   Pulmonary:      Effort: Pulmonary effort is normal. No respiratory distress.   Neurological:      General: No focal deficit present.      Mental Status: She is alert and oriented to person, place, and time.   Psychiatric:         Mood and Affect: Mood normal.         Behavior: Behavior normal.           Survivorship referral placed if appropriate YES    A total of 75 minutes were spent with the patient, with 100% of time spent in education and counseling.A discussion was had regarding the [employer] Scalp Cooling System.  The patient was given an educational folder on [employer] and briefly discussed the financial disclosures as well as the home care receommedations.  The patient is not interested in this at this time.  We also discussed hand and foot cooling and she is given recommendations on mitts and socks to purchase.  Prescription for ondansetron was sent to her pharmacy.    Patient also reports her hemoglobin has been low recently.  She is increased her dietary intake of iron.  We will add B12, folate, iron profile, and ferritin to her next labs.    CLAUDINE Rodríguez

## 2021-07-30 ENCOUNTER — TELEPHONE (OUTPATIENT)
Dept: ONCOLOGY | Facility: CLINIC | Age: 41
End: 2021-07-30

## 2021-08-04 ENCOUNTER — INFUSION (OUTPATIENT)
Dept: ONCOLOGY | Facility: HOSPITAL | Age: 41
End: 2021-08-04

## 2021-08-04 ENCOUNTER — OFFICE VISIT (OUTPATIENT)
Dept: ONCOLOGY | Facility: CLINIC | Age: 41
End: 2021-08-04

## 2021-08-04 VITALS
HEART RATE: 91 BPM | BODY MASS INDEX: 27.4 KG/M2 | RESPIRATION RATE: 16 BRPM | OXYGEN SATURATION: 98 % | SYSTOLIC BLOOD PRESSURE: 127 MMHG | DIASTOLIC BLOOD PRESSURE: 86 MMHG | HEIGHT: 62 IN | WEIGHT: 148.9 LBS | TEMPERATURE: 98.4 F

## 2021-08-04 VITALS — DIASTOLIC BLOOD PRESSURE: 77 MMHG | HEART RATE: 73 BPM | SYSTOLIC BLOOD PRESSURE: 112 MMHG

## 2021-08-04 DIAGNOSIS — Z17.1 MALIGNANT NEOPLASM OF OVERLAPPING SITES OF LEFT BREAST IN FEMALE, ESTROGEN RECEPTOR NEGATIVE (HCC): Primary | ICD-10-CM

## 2021-08-04 DIAGNOSIS — D64.9 ANEMIA, UNSPECIFIED TYPE: ICD-10-CM

## 2021-08-04 DIAGNOSIS — Z86.73 HISTORY OF STROKE: ICD-10-CM

## 2021-08-04 DIAGNOSIS — D68.51 FACTOR 5 LEIDEN MUTATION, HETEROZYGOUS (HCC): ICD-10-CM

## 2021-08-04 DIAGNOSIS — D50.9 IRON DEFICIENCY ANEMIA, UNSPECIFIED IRON DEFICIENCY ANEMIA TYPE: Primary | ICD-10-CM

## 2021-08-04 DIAGNOSIS — C50.812 MALIGNANT NEOPLASM OF OVERLAPPING SITES OF LEFT BREAST IN FEMALE, ESTROGEN RECEPTOR NEGATIVE (HCC): Primary | ICD-10-CM

## 2021-08-04 DIAGNOSIS — Z17.1 MALIGNANT NEOPLASM OF OVERLAPPING SITES OF LEFT BREAST IN FEMALE, ESTROGEN RECEPTOR NEGATIVE (HCC): ICD-10-CM

## 2021-08-04 DIAGNOSIS — C50.912 INFILTRATING DUCTAL CARCINOMA OF LEFT BREAST (HCC): Primary | ICD-10-CM

## 2021-08-04 DIAGNOSIS — C50.812 MALIGNANT NEOPLASM OF OVERLAPPING SITES OF LEFT BREAST IN FEMALE, ESTROGEN RECEPTOR NEGATIVE (HCC): ICD-10-CM

## 2021-08-04 PROBLEM — Z45.2 ENCOUNTER FOR ADJUSTMENT OR MANAGEMENT OF VASCULAR ACCESS DEVICE: Status: ACTIVE | Noted: 2021-08-04

## 2021-08-04 LAB
ALBUMIN SERPL-MCNC: 4 G/DL (ref 3.5–5.2)
ALBUMIN/GLOB SERPL: 1.7 G/DL (ref 1.1–2.4)
ALP SERPL-CCNC: 84 U/L (ref 38–116)
ALT SERPL W P-5'-P-CCNC: 18 U/L (ref 0–33)
ANION GAP SERPL CALCULATED.3IONS-SCNC: 8.3 MMOL/L (ref 5–15)
AST SERPL-CCNC: 18 U/L (ref 0–32)
BASOPHILS # BLD AUTO: 0.06 10*3/MM3 (ref 0–0.2)
BASOPHILS NFR BLD AUTO: 0.6 % (ref 0–1.5)
BILIRUB SERPL-MCNC: 0.2 MG/DL (ref 0.2–1.2)
BUN SERPL-MCNC: 11 MG/DL (ref 6–20)
BUN/CREAT SERPL: 15.9 (ref 7.3–30)
CALCIUM SPEC-SCNC: 9.2 MG/DL (ref 8.5–10.2)
CHLORIDE SERPL-SCNC: 104 MMOL/L (ref 98–107)
CO2 SERPL-SCNC: 25.7 MMOL/L (ref 22–29)
CREAT SERPL-MCNC: 0.69 MG/DL (ref 0.6–1.1)
DEPRECATED RDW RBC AUTO: 46.2 FL (ref 37–54)
EOSINOPHIL # BLD AUTO: 0.14 10*3/MM3 (ref 0–0.4)
EOSINOPHIL NFR BLD AUTO: 1.5 % (ref 0.3–6.2)
ERYTHROCYTE [DISTWIDTH] IN BLOOD BY AUTOMATED COUNT: 15.9 % (ref 12.3–15.4)
FERRITIN SERPL-MCNC: 8.6 NG/ML (ref 11–207)
FOLATE SERPL-MCNC: 14.7 NG/ML (ref 4.78–24.2)
GFR SERPL CREATININE-BSD FRML MDRD: 93 ML/MIN/1.73
GLOBULIN UR ELPH-MCNC: 2.3 GM/DL (ref 1.8–3.5)
GLUCOSE SERPL-MCNC: 89 MG/DL (ref 74–124)
HCG INTACT+B SERPL-ACNC: <0.5 MIU/ML
HCT VFR BLD AUTO: 34.3 % (ref 34–46.6)
HGB BLD-MCNC: 10.7 G/DL (ref 12–15.9)
IMM GRANULOCYTES # BLD AUTO: 0.02 10*3/MM3 (ref 0–0.05)
IMM GRANULOCYTES NFR BLD AUTO: 0.2 % (ref 0–0.5)
IRON 24H UR-MRATE: 30 MCG/DL (ref 37–145)
IRON SATN MFR SERPL: 7 % (ref 14–48)
LYMPHOCYTES # BLD AUTO: 2.17 10*3/MM3 (ref 0.7–3.1)
LYMPHOCYTES NFR BLD AUTO: 23.4 % (ref 19.6–45.3)
MCH RBC QN AUTO: 25 PG (ref 26.6–33)
MCHC RBC AUTO-ENTMCNC: 31.2 G/DL (ref 31.5–35.7)
MCV RBC AUTO: 80.1 FL (ref 79–97)
MONOCYTES # BLD AUTO: 0.91 10*3/MM3 (ref 0.1–0.9)
MONOCYTES NFR BLD AUTO: 9.8 % (ref 5–12)
NEUTROPHILS NFR BLD AUTO: 5.96 10*3/MM3 (ref 1.7–7)
NEUTROPHILS NFR BLD AUTO: 64.5 % (ref 42.7–76)
NRBC BLD AUTO-RTO: 0 /100 WBC (ref 0–0.2)
PLATELET # BLD AUTO: 259 10*3/MM3 (ref 140–450)
PMV BLD AUTO: 9.6 FL (ref 6–12)
POTASSIUM SERPL-SCNC: 4.2 MMOL/L (ref 3.5–4.7)
PROT SERPL-MCNC: 6.3 G/DL (ref 6.3–8)
RBC # BLD AUTO: 4.28 10*6/MM3 (ref 3.77–5.28)
SODIUM SERPL-SCNC: 138 MMOL/L (ref 134–145)
TIBC SERPL-MCNC: 421 MCG/DL (ref 249–505)
TRANSFERRIN SERPL-MCNC: 301 MG/DL (ref 200–360)
VIT B12 BLD-MCNC: 393 PG/ML (ref 211–946)
WBC # BLD AUTO: 9.26 10*3/MM3 (ref 3.4–10.8)

## 2021-08-04 PROCEDURE — 25010000002 TRASTUZUMAB-ANNS 420 MG RECONSTITUTED SOLUTION 1 EACH VIAL: Performed by: INTERNAL MEDICINE

## 2021-08-04 PROCEDURE — 25010000002 DEXAMETHASONE PER 1 MG: Performed by: INTERNAL MEDICINE

## 2021-08-04 PROCEDURE — 82607 VITAMIN B-12: CPT | Performed by: NURSE PRACTITIONER

## 2021-08-04 PROCEDURE — 96375 TX/PRO/DX INJ NEW DRUG ADDON: CPT

## 2021-08-04 PROCEDURE — 84466 ASSAY OF TRANSFERRIN: CPT | Performed by: NURSE PRACTITIONER

## 2021-08-04 PROCEDURE — 85025 COMPLETE CBC W/AUTO DIFF WBC: CPT

## 2021-08-04 PROCEDURE — 82746 ASSAY OF FOLIC ACID SERUM: CPT | Performed by: NURSE PRACTITIONER

## 2021-08-04 PROCEDURE — 96413 CHEMO IV INFUSION 1 HR: CPT

## 2021-08-04 PROCEDURE — 82728 ASSAY OF FERRITIN: CPT

## 2021-08-04 PROCEDURE — 80053 COMPREHEN METABOLIC PANEL: CPT

## 2021-08-04 PROCEDURE — 99215 OFFICE O/P EST HI 40 MIN: CPT | Performed by: INTERNAL MEDICINE

## 2021-08-04 PROCEDURE — 84702 CHORIONIC GONADOTROPIN TEST: CPT | Performed by: NURSE PRACTITIONER

## 2021-08-04 PROCEDURE — 25010000002 DIPHENHYDRAMINE PER 50 MG: Performed by: INTERNAL MEDICINE

## 2021-08-04 PROCEDURE — 96417 CHEMO IV INFUS EACH ADDL SEQ: CPT

## 2021-08-04 PROCEDURE — 25010000002 PACLITAXEL PER 1 MG: Performed by: INTERNAL MEDICINE

## 2021-08-04 PROCEDURE — 83540 ASSAY OF IRON: CPT | Performed by: NURSE PRACTITIONER

## 2021-08-04 RX ORDER — FAMOTIDINE 10 MG/ML
20 INJECTION, SOLUTION INTRAVENOUS AS NEEDED
Status: CANCELLED | OUTPATIENT
Start: 2021-08-04

## 2021-08-04 RX ORDER — SODIUM CHLORIDE 0.9 % (FLUSH) 0.9 %
10 SYRINGE (ML) INJECTION AS NEEDED
Status: CANCELLED | OUTPATIENT
Start: 2021-08-04

## 2021-08-04 RX ORDER — FAMOTIDINE 10 MG/ML
20 INJECTION, SOLUTION INTRAVENOUS ONCE
Status: CANCELLED | OUTPATIENT
Start: 2021-08-04

## 2021-08-04 RX ORDER — LANOLIN ALCOHOL/MO/W.PET/CERES
1000 CREAM (GRAM) TOPICAL DAILY
COMMUNITY
End: 2021-08-04

## 2021-08-04 RX ORDER — SODIUM CHLORIDE 9 MG/ML
250 INJECTION, SOLUTION INTRAVENOUS ONCE
Status: CANCELLED | OUTPATIENT
Start: 2021-08-04

## 2021-08-04 RX ORDER — HEPARIN SODIUM (PORCINE) LOCK FLUSH IV SOLN 100 UNIT/ML 100 UNIT/ML
500 SOLUTION INTRAVENOUS AS NEEDED
Status: CANCELLED | OUTPATIENT
Start: 2021-08-04

## 2021-08-04 RX ORDER — DIPHENHYDRAMINE HYDROCHLORIDE 50 MG/ML
50 INJECTION INTRAMUSCULAR; INTRAVENOUS AS NEEDED
Status: CANCELLED | OUTPATIENT
Start: 2021-08-04

## 2021-08-04 RX ORDER — SODIUM CHLORIDE 9 MG/ML
250 INJECTION, SOLUTION INTRAVENOUS ONCE
Status: COMPLETED | OUTPATIENT
Start: 2021-08-04 | End: 2021-08-04

## 2021-08-04 RX ORDER — LIDOCAINE AND PRILOCAINE 25; 25 MG/G; MG/G
CREAM TOPICAL AS NEEDED
Qty: 1 EACH | Refills: 2 | Status: SHIPPED | OUTPATIENT
Start: 2021-08-04 | End: 2023-03-20

## 2021-08-04 RX ORDER — FAMOTIDINE 10 MG/ML
20 INJECTION, SOLUTION INTRAVENOUS ONCE
Status: COMPLETED | OUTPATIENT
Start: 2021-08-04 | End: 2021-08-04

## 2021-08-04 RX ORDER — FERROUS SULFATE 325(65) MG
325 TABLET ORAL 2 TIMES DAILY
Qty: 60 TABLET | Refills: 3 | Status: SHIPPED | OUTPATIENT
Start: 2021-08-04 | End: 2021-09-08

## 2021-08-04 RX ADMIN — FAMOTIDINE 20 MG: 10 INJECTION INTRAVENOUS at 11:09

## 2021-08-04 RX ADMIN — SODIUM CHLORIDE 250 ML: 9 INJECTION, SOLUTION INTRAVENOUS at 11:09

## 2021-08-04 RX ADMIN — DIPHENHYDRAMINE HYDROCHLORIDE 50 MG: 50 INJECTION INTRAMUSCULAR; INTRAVENOUS at 11:09

## 2021-08-04 RX ADMIN — PACLITAXEL 135 MG: 6 INJECTION, SOLUTION INTRAVENOUS at 12:15

## 2021-08-04 RX ADMIN — TRASTUZUMAB-ANNS 270 MG: 420 INJECTION, POWDER, LYOPHILIZED, FOR SOLUTION INTRAVENOUS at 13:15

## 2021-08-04 RX ADMIN — DEXAMETHASONE SODIUM PHOSPHATE 12 MG: 10 INJECTION INTRAMUSCULAR; INTRAVENOUS at 11:27

## 2021-08-04 NOTE — PROGRESS NOTES
Subjective     REASON FOR follow-up:    1.  Heterozygous state for factor V Leiden    2.  New onset stroke on aspirin by neurology    3.  Hypercholesterolemia    4.  Hypercoagulable work-up done.  Antithrombin 109% protein S activity 85% protein S antigen 107%, protein C activity 85%.  Anticardiolipin antibody IgM 24%, antibeta-2 glycoprotein antibody negative, lupus anticoagulant not detected, prothrombin gene mutation negative.    5.  Screening mammogram showed abnormality in the left breast 6 o'clock position, 8 mm on ultrasound, ultrasound-guided left breast biopsy, 6 cm from the nipple showed evidence of invasive ductal carcinoma poorly differentiated grade 3, Henderson score of 8 out of 9 ER negative, VA negative, HER-2/ese 3+ positive.    6.  CT scan February 24, 2021 showed focal area of fatty infiltration of the liver.  1 cm hypoattenuating cortical lesion in the upper pole of the right kidney.  Similarly nodule in the upper pole of the left kidney is 1.5 cm.  Further evaluation by ultrasound suggested  · Ultrasound March 1, 2021 shows solid lesion in the upper pole of the right kidney.  In the left kidney along the midpole of the left kidney is a nodule which is 16 x 16 x 14 mm which is thought to be a cyst.  A 6-month follow-up CT suggested    6.  S/p left partial mastectomy with sentinel lymph node biopsy.  Tumor was present at 5:00, invasive ductal carcinoma, Henderson score of 8, grade 3, greatest dimension was 12 mm x 8 x 4 mm.  Single focus of invasive carcinoma present.  There was extensive DCIS which is 30 mm x 8 x 2 mm, grade 3, no evidence of lymphovascular space invasion or dermal lymphatic invasion.  Margins were clear.  4 lymph nodes were negative.  It is a p T1cp N0, ER negative VA negative HER-2/ese 3+ with Ki-67 of 50%.  · [employer] genetic test negative for 47 genes                                     History of Present Illness   Patient is a 41-year-old female with pT1cp N0 ER negative  TX negative HER-2 positive left breast cancer s/p lumpectomy and currently healed up well.  She is here to start cycle 1 of weekly Taxol Herceptin.  The plan was to treat her with weekly Taxol Herceptin x12 weeks followed by every 3 weeks Herceptin for 1 year.    She has had chemo education already.  Her echocardiogram shows ejection fraction of 61-65% and strain pattern of -20.8.  She has already had port placement.    She has had history of factor V Leiden heterozygosity without evidence of any DVT in the past.  She also has had history of small stroke and was placed on aspirin by neurology    Today I discussed with Dr. Craft neurology and since she has a port and has factor V Leiden heterozygosity we will need to place her on Eliquis 2.5 mg twice a day throughout chemotherapy.  Dr. Craft agreed to stop aspirin.            Oncologic history:  Patient is here for follow-up of her mammogram.  Patient had screening mammogram April 2, 2021:  NAD a focal asymmetry was present in the posterior one third retroareolar region of the left breast.  Further spot compression images and left breast ultrasound was suggested.  Right breast was negative    April 9, 2021: Left diagnostic mammogram showed an area of focal asymmetry in the posterior one third region aspect of the left breast her breast    Ultrasound showed an irregular 0.8 cm lesion in the left breast at the 6 o'clock position of the order of 6 cm from the nipple.  Ultrasound-guided left breast biopsy was recommended.    April 26, 2021: Ultrasound-guided biopsy of the left breast 6 o'clock position, 6 cm from the nipple showed    Invasive ductal carcinoma, poorly differentiated with a Oziel score grade 3 with a score of 8 out of 9 measuring at least 7 mm.  No definitive ductal carcinoma is in situ is identified.  ER 1% negative  TX 1% negative   HER-2/ese 3+ positive    There was no evidence of lymphadenopathy either in the mammogram of the ultrasound.  We  suggested an MRI of the breast.  Patient has strong family history of breast cancer    Interval history  May 7, 2021: MRI of bilateral breast reviewed.  My interpretation is that there is area of enhancement in the 6 o'clock position of the left breast this is the biopsy-proven malignancy.  There is additional area of linear enhancement anteriorly and laterally measuring up to 1.2 cm this is approximately 5.6 cm from the nipple.  In addition there is a enhancement 2 to 3 mm in the anterolateral aspect of the lateral aspect of the left breast which is 4.6 cm from the nipple.  There is also mildly prominent posterior lymph node in the mid axilla which measures 1 cm with cortical thickening.  Findings are consistent with multifocal disease.  No contralateral disease or internal mammary adenopathy identified    May 20, 2021: Genetic test, [employer] genetic test shows 47 genes negative    May 21, 2021: I reviewed the biopsy of the lymph node in the left axilla which shows reactive lymph node negative for any carcinoma or lymphoma    June 2, 2021:Final Diagnosis  1. Left Breast, 5:00 o'clock, MRI-guided Biopsies for Linear Enhancement: INVASIVE MAMMARY CARCINOMA, NO  SPECIAL TYPE (INVASIVE DUCTAL CARCINOMA).  A. Largest contiguous focus in a core measures 4 mm.  B. No in-situ component identified.  C. Brisk lymphocytic response noted (see Comment).  D. No definitive lymphovascular nor perineural invasion identified.  2. Left Breast, 2:00 o'clock, MRI-guided Biopsy for Enhancement:  A. Benign breast parenchyma with radial scar and micropapillomas.  B. Usual ductal hyperplasia and columnar cell hyperplasia.  C. No atypical hyperplasia, in-situ nor invasive carcinoma identified    ER, KS, HER-2 new and Ki-67 pending on this new biopsy      Patient has been seen by Dr. Lashonda Euceda with plans of doing surgery on July first 2021    Once patient undergoes surgery lumpectomy with sentinel lymph node biopsy we will give further  recommendation about treatment options.  Given that patient has multifocal disease and both of the lesions 2 lesions are 1.2 cm each, with it being a HER-2 positive tumor on the previous biopsy at 6:00 Dr. Euceda and myself discussed and patient preferred to undergo port placement at the same time of surgery.    Patient had [employer] genetic test which was negative.    She has completed surgery July 1, 2021.  She underwent left partial mastectomy with left axillary sentinel lymph node biopsy and right port placement.  Clinically she was thought to have a high-grade multifocal invasive ductal carcinoma ER/TN negative, TN positive.  The lesions require preoperative localization.  The multifocal cancer was bracketed with 2 savvy markers and the radial scar was localized with a needle.  Because of the volume of tissue that will be excised related to the breast volume the case was done in conjunction with Dr. Zavala for oncoplastic closure.    She is 2 weeks from surgery.  She is healing up reasonably well.    On review of her pathology she had left partial mastectomy with sentinel lymph node biopsy.  Tumor was present at 5:00, invasive ductal carcinoma, [place] score of 8, grade 3, greatest dimension was 12 mm x 8 x 4 mm.  Single focus of invasive carcinoma present.  There was extensive DCIS which is 30 mm x 8 x 2 mm, grade 3, no evidence of lymphovascular space invasion or dermal lymphatic invasion.  Margins were clear.  4 lymph nodes were negative.  It is a p T1cp N0, ER negative TN negative HER-2/ese 3+ with Ki-67 of 50%.    Patient is here to discuss further options of treatment.  Given small tumor T1c N0, she is a good candidate for weekly Taxol Herceptin.    Given that patient has heterozygous state for factor V Leiden and currently has port placed we will plan to start Eliquis 2.5 mg p.o. twice daily.  I have left a message for Dr. Craft in neurology to call me to discuss if patient needs to continue aspirin  or we can hold off since be starting Eliquis.      Genetic test negative    August 4, 2021: Weekly Taxol Herceptin x12 weeks followed by Herceptin x1 year.  Cycle 1 today          Hematologic history:  patient is a 40-year-old female who presented end of December with numbness and tingling in her left upper extremity and left face.  She went to see Dr. Goodman who then got concerned and referred to neurology.  She was referred to Dr. Freedman and talk to Dr. Donna guo for evaluation.  Patient underwent ultrasound of the carotids which did not show significant stenosis of the carotids.  She underwent 2D echocardiogram which showed a good ejection fraction of 64% with mild mitral valve prolapse and mild mitral stenosis.  She had a 24-hour Holter which was negative.  She then had also an MRI of the brain February 3, 2021 which showed areas of hyperintensity involving the subcortical white matter of the right frontal lobe superior laterally and posteriorly with the largest area measuring 8 9 mm.  There is also enhancement present.  The findings are consistent with a subacute infarct.  They could not differentiate if there is any underlying neoplastic process but a short-term follow-up was suggested in order to follow-up.  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    Patient currently got started on aspirin and factor V Leiden was obtained by neurology because patient's paternal aunt had factor V Leiden mutation and she was heterozygous.  Patient's testing also showed that she was heterozygous.    Patient states her numbness and tingling is resolved completely on the left arm and face it just lasted for a 24 hours.  She was here referred here for neurology to see if there is any other cause for her underlying hypercoagulable state.  She has not had a complete hypercoagulable work-up.  Also discussed with her that an  underlying malignancy could cause a hypercoagulable state and we would need to do a CT scan to rule that out.    Patient's mother has had breast cancer in her 50s but had pancreatic cancer at 68 and  from that.  Patient's dad had stroke at 63.  But he is alive at 74.  She has 1 brother who is 40 in good health.  Paternal aunt had breast cancer in her 40s.  Paternal grandfather had colon cancer in his late 80s.  Maternal uncle had throat cancer and another maternal uncle had skin cancer with metastasis to the lung.  And maternal grandmother had breast cancer.    Patient was to have mammogram done last year but because of COVID-19 it got postponed and she has not had it done yet.    Patient used to be a smoker half pack per day for 7 years but quit 10 years ago.  She has high cholesterol for which she is on treatment.    Past Medical History:   Diagnosis Date   • Acute stress reaction 2014   • ADD (attention deficit disorder)    • ADHD (attention deficit hyperactivity disorder)    • Anxiety and depression    • Factor 5 Leiden mutation, heterozygous (CMS/[facility]) 2021   • Family history of breast cancer 2013   • Fracture, cervical vertebra (CMS/[facility]) 1997    C4   • H/O complete eye exam 2016   • Hearing loss    • 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   • History of rheumatic fever    • Hypertension    • Hypothyroidism    • Infiltrating ductal carcinoma of left breast (CMS/[facility]) 2021    Left   • Kidney stone    • Mitral valve insufficiency    • PONV (postoperative nausea and vomiting)    • Stroke (CMS/[facility]) 2020    HX OF   • Stroke-like symptoms         Past Surgical History:   Procedure Laterality Date   • BREAST BIOPSY Left 2021    IDC   • BREAST LUMPECTOMY WITH SENTINEL NODE BIOPSY N/A 2021    Procedure: right port placement, Left SHAINA-guided, bracketed, partial mastectomy and sentinel lymph node biopsy Left breast needle-localized excisional biopsy;  Surgeon:  Lashonda Euceda MD;  Location: [facility];  Service: General;  Laterality: N/A;   • BREAST SURGERY Bilateral 7/1/2021    Procedure: RIGHT BREAST REDUCTION MASTOPEXY LEFT BREAST ONCOPLASTIC CLOSURE;  Surgeon: Caridad Baker MD PhD;  Location: [facility];  Service: Plastics;  Laterality: Bilateral;   • NO PAST SURGERIES     • PAP SMEAR  2016        Current Outpatient Medications on File Prior to Visit   Medication Sig Dispense Refill   • amphetamine-dextroamphetamine XR (Adderall XR) 20 MG 24 hr capsule Take 1 capsule by mouth Every Morning 30 capsule 0   • aspirin 81 MG EC tablet Take 1 tablet by mouth Daily. (Patient taking differently: Take 81 mg by mouth Daily. INSTRUCTED BY MD TO HOLD FOR TODAY'S PROCEDURE AS WELL AS SCHEDULED SURGERY) 30 tablet 11   • atorvastatin (Lipitor) 40 MG tablet Take 1 tablet by mouth Daily. 30 tablet 11   • Cholecalciferol (VITAMIN D3 PO) Take 2,000 Units by mouth. Takes 2000 twice a week;  SUNDAYS AND WEDNESDAYS     • Cobalamin Combinations (B12 FOLATE PO) Take  by mouth. 400 mg folate 400 mg b12     • levothyroxine (SYNTHROID, LEVOTHROID) 137 MCG tablet Take 1 tablet by mouth Daily. 30 tablet 5   • LORazepam (Ativan) 0.5 MG tablet Take 1 tablet by mouth Every 8 (Eight) Hours As Needed for Anxiety. 30 tablet 0   • ondansetron (ZOFRAN) 8 MG tablet Take 1 tablet by mouth 3 (Three) Times a Day As Needed for Nausea or Vomiting. 30 tablet 5   • Pyridoxine HCl (B-6 PO) Take 50 mg by mouth Daily.     • valsartan (DIOVAN) 160 MG tablet Take 1 tablet by mouth Daily. 30 tablet 5   • [DISCONTINUED] vitamin B-12 (CYANOCOBALAMIN) 1000 MCG tablet Take 1,000 mcg by mouth Daily.       No current facility-administered medications on file prior to visit.        ALLERGIES:    Allergies   Allergen Reactions   • Sulfa Antibiotics Rash        Social History     Socioeconomic History   • Marital status: Significant Other     Spouse name: Not on file   • Number of children: 0   • Years of  education: Not on file   • Highest education level: Not on file   Tobacco Use   • Smoking status: Former Smoker     Packs/day: 1.00     Years: 10.00     Pack years: 10.00     Types: Cigarettes     Start date:      Quit date:      Years since quittin.5   • Smokeless tobacco: Never Used   Vaping Use   • Vaping Use: Never used   Substance and Sexual Activity   • Alcohol use: Yes     Comment: RARELY   • Drug use: No   • Sexual activity: Defer     Partners: Male        Family History   Problem Relation Age of Onset   • Breast cancer Mother 53   • Pancreatic cancer Mother 68   • Lung cancer Maternal Grandmother    • Stroke Father    • Breast cancer Paternal Aunt 55   • Prostate cancer Maternal Grandfather    • Prostate cancer Paternal Grandfather    • Brain cancer Maternal Cousin 20   • Melanoma Maternal Uncle    • Ovarian cancer Other         Paternal great aunt    • Breast cancer Other    • Breast cancer Paternal Great-Grandmother 94   • Hypertension Brother    • Malig Hyperthermia Neg Hx       Family  history: Mother had breast cancer at age 57.  Maternal great aunt had breast cancer and paternal great aunt had breast cancer in her 40s.  Patient's mother had pancreatic cancer as well.  Paternal grandfather had prostate cancer.      Review of Systems   Constitutional: Negative for appetite change, chills, diaphoresis, fatigue, fever and unexpected weight change.   HENT: Negative for hearing loss, sore throat and trouble swallowing.    Respiratory: Negative for cough, chest tightness, shortness of breath and wheezing.    Cardiovascular: Negative for chest pain, palpitations and leg swelling.   Gastrointestinal: Negative for abdominal distention, abdominal pain, constipation, diarrhea, nausea and vomiting.   Genitourinary: Negative for dysuria, frequency, hematuria and urgency.   Musculoskeletal: Negative for joint swelling.        No muscle weakness.   Skin: Negative for rash and wound.   Neurological:  "Negative for seizures, syncope, speech difficulty, weakness, numbness and headaches.   Hematological: Negative for adenopathy. Does not bruise/bleed easily.   Psychiatric/Behavioral: Positive for dysphoric mood (08/04/21-Unchanged) and sleep disturbance (08/04/21-Unchanged). Negative for behavioral problems, confusion and suicidal ideas.   All other systems reviewed and are negative.     I have reviewed and confirmed the accuracy of the ROS as documented by the MA/LPN/RN Neena Beavers MD        Objective     Vitals:    08/04/21 0935   BP: 127/86   Pulse: 91   Resp: 16   Temp: 98.4 °F (36.9 °C)   TempSrc: Temporal   SpO2: 98%   Weight: 67.5 kg (148 lb 14.4 oz)   Height: 157.5 cm (62.01\")   PainSc: 0-No pain     Current Status 8/4/2021   ECOG score 0       Physical exam      CONSTITUTIONAL:  Vital signs reviewed.  Alert and oriented x3  No distress, looks comfortable.  EYES:  Conjunctivae and lids unremarkable.  Extraocular eye movements intact.  HEENT: No evidence of lymphadenopathy, no thyromegaly  RESPIRATORY:  Normal respiratory effort.  No rales  or wheezing, clear.   CARDIOVASCULAR:  Regular rate and rhythm, no murmur  No significant lower extremity edema.  Abdomen: Soft nontender positive bowel sounds no hepatosplenomegaly  SKIN: No wounds.  No rashes.  MUSCULOSKELETAL/EXTREMITIES: No clubbing or cyanosis.  No apparent unilateral weakness.  NEURO: CN 2-12 appear intact. No focal neurological deficits noted.  PSYCHIATRIC:  Normal judgment and insight.  Normal mood and affect.        RECENT LABS:  Hematology WBC   Date Value Ref Range Status   08/04/2021 9.26 3.40 - 10.80 10*3/mm3 Final   01/04/2021 9.5 3.4 - 10.8 x10E3/uL Final     RBC   Date Value Ref Range Status   08/04/2021 4.28 3.77 - 5.28 10*6/mm3 Final   01/04/2021 4.85 3.77 - 5.28 x10E6/uL Final     Hemoglobin   Date Value Ref Range Status   08/04/2021 10.7 (L) 12.0 - 15.9 g/dL Final     Hematocrit   Date Value Ref Range Status   08/04/2021 34.3 34.0 - " 46.6 % Final     Platelets   Date Value Ref Range Status   08/04/2021 259 140 - 450 10*3/mm3 Final        Assessment/Plan       * [id number], ER negative MO negative HER-2/ese 3+ with Ki-67 of 50%.  S/p left partial mastectomy with sentinel lymph node biopsy.  Tumor was present at 5:00, invasive ductal carcinoma, Oziel score of 8, grade 3, greatest dimension was 12 mm x 8 x 4 mm.  Single focus of invasive carcinoma present.  There was extensive DCIS which is 30 mm x 8 x 2 mm, grade 3, no evidence of lymphovascular space invasion or dermal lymphatic invasion.  Margins were clear.  4 lymph nodes were negative.  It is a p T1cp N0, ER negative MO negative HER-2/ese 3+ with Ki-67 of 50%.  · Patient is s/p port placement  · Discussed in length with patient that given a small tumor she is eligible for weekly Taxol Herceptin.  Weekly Taxol x12 weeks along with weekly Herceptin followed by 1 year of Herceptin.  · Discussed patient in the breast cancer conference  · 2D echo done which showed ejection fraction of 61-65% and strain pattern of -20.8.  · Patient also seen by Dr. Virk cardiology and Dr. Virk is planning to repeat echocardiogram in 3 months after initiation of therapy.  · August 4, 2021 cycle 1 weekly Taxol Herceptin      *  Factor V Leiden heterozygosity with right frontal stroke and patient presented in December 2020 with left-sided weakness tingling and numbness and also numbness in the face.  · Was referred to neurology and cardiology by Dr. Goodman  · Ultrasound of carotid does not show significant stenosis  · 24 Holter is negative  · 2D echocardiogram shows ejection fraction of 64% with mild mitral regurg and mitral stenosis  · Neurology obtain factor V Leiden given that patient's paternal aunt had's history of factor V Leiden and that was heterozygous for factor V Leiden  · Patient has been referred to us in order to evaluate any other underlying cause for hypercoagulable state  · We discussed about  obtaining rest of the hypercoagulable work-up though doubtful it will be positive  · We will also obtain a CT scan of the chest abdomen pelvis in order to rule out underlying malignancy as a cause  · We will obtain mammogram as that has not been done yet last year  · Continue aspirin as per neurology.  Also patient on medications for her high cholesterol started after stroke currently asymptomatic  · Hypercoagulable work-up done which is negative except heterozygous state for factor V Leiden.  · Complete stroke work-up has been negative and since this is arterial stroke and she was not on aspirin prior to that and her symptoms have resolved I agree with continuing aspirin alone along with high cholesterol medications.  · At this time I do not feel the need to put her on anticoagulants like Eliquis or Coumadin as she has not failed aspirin and she has responded to aspirin given the risk of bleeding versus clotting.  She has never had a DVT or pulmonary embolism in the past.  · MR venogram done on May 10, 2021 is negative.  Patient has been referred to the stroke neurologist Dr. Johnson  · I have left message for the neurologist to call me  · We will discuss with neurology Dr. Johnson to see if we need to hold off aspirin as patient will be starting on prophylactic Eliquis 2.5 mg or 5 mg p.o. twice daily given the fact that she has had a port placed.  · Discussed with neurology, will start prophylactic Eliquis 2.5 mg twice a day and hence discontinue aspirin      * Family history of cancer: Patient's mother had breast cancer at age 50 and pancreatic cancer at age 68 and  from pancreatic cancer.  Patient's maternal grandmother had breast cancer in her 50s.  Her maternal uncle had skin cancer which went to the lung and another maternal uncle had throat cancer.  Maternal aunt had call colon polyps.  Patient's paternal aunt had breast cancer in her 40s.  And paternal grandfather with colon cancer in his 80s.   Paternal aunt also had factor V Leiden.  · Genetic testing is negative    * Solid nodule in the right kidney on ultrasound, will schedule follow-up with urology  · Patient was to follow-up with Dr. Shultz  · Will need records from urology    *  Elevated BMI, encourage diet and exercise.  Patient is improving her diet and exercise after having had the stroke    Plan  · Patient underwent chemo education  · Cycle 1 weekly Taxol/Herceptin today  · Follow-up once a week for Taxol Herceptin x4 weeks  · Nurse practitioner to see her in 2 weeks for toxicity check  · Follow-up with me in 4 weeks with labs    I spent 40 minutes caring for Radha on this date of service. This time includes time spent by me in the following activities: preparing for the visit, reviewing tests, obtaining and/or reviewing a separately obtained history, performing a medically appropriate examination and/or evaluation, counseling and educating the patient/family/caregiver, ordering medications, tests, or procedures and referring and communicating with other health care professionals`1    MD Dr. Maxine Ibanez Dr., Dr., Dr.

## 2021-08-05 ENCOUNTER — DOCUMENTATION (OUTPATIENT)
Dept: ONCOLOGY | Facility: CLINIC | Age: 41
End: 2021-08-05

## 2021-08-05 NOTE — PROGRESS NOTES
Fax rec from [facility] stating pts plan prefers Xarelto or Warfarin. Pt was prescribed Eliquis yesterday by Dr Beavers.    I have submitted a PA to Rebekah ([facility] for her plan) through covermymeds.    Waiting for a decision on Eliquis.

## 2021-08-06 ENCOUNTER — DOCUMENTATION (OUTPATIENT)
Dept: ONCOLOGY | Facility: CLINIC | Age: 41
End: 2021-08-06

## 2021-08-06 DIAGNOSIS — Z86.73 HISTORY OF STROKE: ICD-10-CM

## 2021-08-06 DIAGNOSIS — D68.51 FACTOR 5 LEIDEN MUTATION, HETEROZYGOUS (HCC): ICD-10-CM

## 2021-08-06 DIAGNOSIS — C50.812 MALIGNANT NEOPLASM OF OVERLAPPING SITES OF LEFT BREAST IN FEMALE, ESTROGEN RECEPTOR NEGATIVE (HCC): Primary | ICD-10-CM

## 2021-08-06 DIAGNOSIS — E03.9 ACQUIRED HYPOTHYROIDISM: Primary | ICD-10-CM

## 2021-08-06 DIAGNOSIS — Z17.1 MALIGNANT NEOPLASM OF OVERLAPPING SITES OF LEFT BREAST IN FEMALE, ESTROGEN RECEPTOR NEGATIVE (HCC): Primary | ICD-10-CM

## 2021-08-06 RX ORDER — LEVOTHYROXINE SODIUM 0.15 MG/1
150 TABLET ORAL DAILY
Qty: 90 TABLET | Refills: 1 | Status: SHIPPED | OUTPATIENT
Start: 2021-08-06 | End: 2021-10-20 | Stop reason: DRUGHIGH

## 2021-08-06 NOTE — PROGRESS NOTES
Pts insurance has denied the Eliquis 2.5 mg. They want her to try and fail Warfarin and Xarelto.    Mary Silva, Clinical RN notified. We spoke briefly about this pt yesterday afternoon.

## 2021-08-06 NOTE — TELEPHONE ENCOUNTER
Call to Ms. Astorga to let her know that since insurance would not approve the Eliquis, prescription being sent for her to start Xarelto 15 mg BID for 21 days, and after that she will take 20 mg daily and prescription sent for both, but she should not be able to get the 20 mg dose until after 21 days.  Also let her know the aspirin is to be discontinued when Xarelto is started.  Patient verbalized understanding of all instructions.  Patient states she is having some constipation from the iron pills.  She was encouraged to try stool softeners, but to let us know if unable to tolerate.  Patient verbalized understanding and agreement.

## 2021-08-10 ENCOUNTER — HOSPITAL ENCOUNTER (OUTPATIENT)
Dept: OCCUPATIONAL THERAPY | Facility: HOSPITAL | Age: 41
Setting detail: THERAPIES SERIES
Discharge: HOME | End: 2021-08-10

## 2021-08-10 DIAGNOSIS — Z91.89 AT RISK FOR LYMPHEDEMA: Primary | ICD-10-CM

## 2021-08-10 DIAGNOSIS — Z17.1 MALIGNANT NEOPLASM OF OVERLAPPING SITES OF LEFT BREAST IN FEMALE, ESTROGEN RECEPTOR NEGATIVE (HCC): ICD-10-CM

## 2021-08-10 DIAGNOSIS — C50.812 MALIGNANT NEOPLASM OF OVERLAPPING SITES OF LEFT BREAST IN FEMALE, ESTROGEN RECEPTOR NEGATIVE (HCC): ICD-10-CM

## 2021-08-10 PROCEDURE — 97535 SELF CARE MNGMENT TRAINING: CPT

## 2021-08-10 PROCEDURE — 97166 OT EVAL MOD COMPLEX 45 MIN: CPT

## 2021-08-10 NOTE — THERAPY EVALUATION
Outpatient Occupational Therapy Lymphedema Initial Evaluation  [facility]     Patient Name: Radha Astorga  : 1980  MRN: [id number]  Today's Date: 8/10/2021      Visit Date: 08/10/2021  Patient and therapist both masked. Therapist with face shield and gloves.  Patient Active Problem List   Diagnosis   • Family history of breast cancer   • Hypothyroidism   • Major depressive disorder, recurrent episode, moderate with anxious distress (CMS/[facility])   • Attention deficit hyperactivity disorder (ADHD), predominantly inattentive type   • Factor 5 Leiden mutation, heterozygous (CMS/[facility])   • Kidney mass   • Malignant neoplasm of overlapping sites of left breast in female, estrogen receptor negative (CMS/[facility])   • Encounter for long-term (current) use of high-risk medication   • Essential hypertension   • Rheumatic mitral valve disease   • Encounter for adjustment or management of vascular access device        Past Medical History:   Diagnosis Date   • Acute stress reaction 2014   • ADD (attention deficit disorder)    • ADHD (attention deficit hyperactivity disorder)    • Anxiety and depression    • Factor 5 Leiden mutation, heterozygous (CMS/[facility]) 2021   • Family history of breast cancer 2013   • Fracture, cervical vertebra (CMS/[facility]) 1997    C4   • H/O complete eye exam 2016   • Hearing loss    • 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   • History of rheumatic fever    • Hypertension    • Hypothyroidism    • Infiltrating ductal carcinoma of left breast (CMS/[facility]) 2021    Left   • Kidney stone    • Mitral valve insufficiency    • PONV (postoperative nausea and vomiting)    • Stroke (CMS/[facility]) 2020    HX OF   • Stroke-like symptoms         Past Surgical History:   Procedure Laterality Date   • BREAST BIOPSY Left 2021    IDC   • BREAST LUMPECTOMY WITH SENTINEL NODE BIOPSY N/A 2021    Procedure: right port placement, Left SHAINA-guided, bracketed, partial mastectomy  and sentinel lymph node biopsy Left breast needle-localized excisional biopsy;  Surgeon: Lashonda Euceda MD;  Location: [facility];  Service: General;  Laterality: N/A;   • BREAST SURGERY Bilateral 7/1/2021    Procedure: RIGHT BREAST REDUCTION MASTOPEXY LEFT BREAST ONCOPLASTIC CLOSURE;  Surgeon: Caridad Baker MD PhD;  Location: [facility];  Service: Plastics;  Laterality: Bilateral;   • NO PAST SURGERIES     • PAP SMEAR  2016         Visit Dx:     ICD-10-CM ICD-9-CM   1. At risk for lymphedema  Z91.89 V49.89   2. Malignant neoplasm of overlapping sites of left breast in female, estrogen receptor negative (CMS/[facility])  C50.812 174.8    Z17.1 V86.1       Patient History     Row Name 08/10/21 1600 08/09/21 0810          History    Chief Complaint  Pain;Other 1 (comment) ROM  -RE  Other 1 (comment)  -RE (r) patient (t)     Hx Chief Complaint Comment 1   --  Developed a “cord” early last week, after breast cancer surgery 7/1/21  -RE (r) patient (t)     Type of Pain  Upper Extremity / Arm  -RE  Upper Extremity / Arm  -RE (r) patient (t)     Date Current Problem(s) Began  07/01/21  -RE  08/05/21  -RE (r) patient (t)     Brief Description of Current Complaint  patient presents fofr post op visit. She is c/o cording in her left arm.   -RE  Four left lymph nodes removed for breast cancer on July 1st.  Last Thursday I noticed tight line of pain and limited reach down my left arm.  There is a visible “cord” in left armpit and full extension is difficult.  I’ve learned this isn’t uncommon.  -RE (r) patient (t)     Patient/Caregiver Goals  Return to prior level of function;Improve mobility  -RE  Return to prior level of function;Improve mobility;Decrease swelling  -RE (r) patient (t)     Hand Dominance  --  right-handed  -RE (r) patient (t)     Occupation/sports/leisure activities  --  Desk job, softball asst , dragon boat paddling, yoga  -RE (r) patient (t)     Patient seeing anyone else for problem(s)?   --  No  -RE (r) patient (t)     Are you or can you be pregnant  No  -RE  No  -RE (r) patient (t)        Pain     Pain Location  Arm  -RE  --     Pain at Present  4  -RE  --        Fall Risk Assessment    Any falls in the past year:  --  No  -RE (r) patient (t)        Services    Prior Rehab/Home Health Experiences  --  No  -RE (r) patient (t)     Are you currently receiving Home Health services  No  -RE  No  -RE (r) patient (t)     Do you plan to receive Home Health services in the near future  --  No  -RE (r) patient (t)        Daily Activities    Primary Language  English  -RE  English  -RE (r) patient (t)     Are you able to read  Yes  -RE  Yes  -RE (r) patient (t)     Are you able to write  Yes  -RE  Yes  -RE (r) patient (t)     How does patient learn best?  Listening;Demonstration;Pictures/Video  -RE  Listening;Demonstration;Pictures/Video  -RE (r) patient (t)     Patient is concerned about/has problems with  Performing home management (household chores, shopping, care of dependents);Performing job responsibilities/community activities (work, school,;Reaching over head;Repetitive movements of the hand, arm, shoulder  -RE  --     Does patient have problems with the following?  Anxiety  -RE  --     Barriers to learning  None  -RE  --     Pt Participated in POC and Goals  Yes  -RE  --        Safety    Are you being hurt, hit, or frightened by anyone at home or in your life?  No  -RE  No  -RE (r) patient (t)     Are you being neglected by a caregiver  No  -RE  No  -RE (r) patient (t)     Have you had any of the following issues with  Depression;Anxiety  -RE  Depression;Anxiety  -RE (r) patient (t)       User Key  (r) = Recorded By, (t) = Taken By, (c) = Cosigned By    Initials Name Provider Type    RE Julia Leslie, MAYITOR Occupational Therapist    patient Radha Astorga --          Lymphedema     Row Name 08/10/21 0800             Subjective Pain    Able to rate subjective pain?  yes  -RE      Pre-Treatment Pain  Level  4 with movement No pain at rest  -RE      Subjective Pain Comment  concerned about cording  -RE         Lymphedema Assessment    Lymphedema Classification  LUE:;at risk/stage 0  -RE      Lymph Nodes Removed #  4  -RE      Positive Lymph Nodes #  0  -RE      Chemo Received  yes  -RE      Radiation Therapy Received  -- planned  -RE      Infections or Cellulitis?  no  -RE      Lymphedema Precautions  other (comment) Factor V with history of small cva  -RE         Posture/Observations    Posture- WNL  Posture is WNL  -RE         General ROM    LT Upper Ext  Lt Shoulder ABduction;Lt Shoulder Flexion  -RE         Left Upper Ext    Lt Shoulder Abduction AROM  118  -RE      Lt Shoulder Flexion AROM  115  -RE         Lymphedema Edema Assessment    Edema Assessment Comment  no L UE palpable edema  -RE         Skin Changes/Observations    Skin Observations Comment  healing surgical incisions, seroma in axilla, cording noted in upper arm  -RE         Lymphedema Sensation    Lymphedema Sensation Reports  RUE:;LUE:;normal  -RE        User Key  (r) = Recorded By, (t) = Taken By, (c) = Cosigned By    Initials Name Provider Type    Julia Rowe, OTR Occupational Therapist                  Therapy Education  Education Details: Discussed lymphedema precautions and gave written information. Explained purpose of Bioimpedance testing including recommended frequency. Gave written information and explained it is not covered by most insurance. Discussed poc.Access Code: [email or web address]: [email or web address]/Date: 08/10/2021Prepared by: Julia EucedafExercisesSupine Shoulder Flexion Extension Full Range AROM - 1 x daily - 7 x weekly - 3 sets - 10 repsSupine Shoulder Abduction AROM - 1 x daily - 7 x weekly - 3 sets - 10 reps  Given: HEP, Symptoms/condition management  Program: New  How Provided: Verbal, Demonstration, Written  Provided to: Patient  Level of Understanding: Teach back education performed, Verbalized  49192  - OT Self Care/Mgmt Minutes: 15        OT Goals     Row Name 08/10/21 1600          OT Short Term Goals    STG Date to Achieve  08/24/21  -RE     STG 1  Pt will demonstrate understanding of use of compression sleeve for edema prevention, exercise and air travel.   -RE     STG 1 Progress  New  -RE     STG 2  Patient will demonstrate proper awareness of “What is Lymphedema?” and “Healthy Habits” for improved prevention, management, care of symptoms and ease of transition to self-care of condition.   -RE     STG 2 Progress  New  -RE     STG 3  Patient independent and compliant with initial home exercise program focused on range of motion,and Flexibility.  -RE     STG 3 Progress  New  -RE     STG 4  Patient to demonstrate increased left shoulder flexion to 140 to improve functional UE use and to restore pre operative AROM per patient perception.  -RE     STG 4 Progress  New  -RE     STG 5  Patient to demonstrate increased left shoulder abduction to 140 to improve functional UE use and to restore preoperative AROM per patient perception.  -RE     STG 5 Progress  New  -RE        Long Term Goals    LTG Date to Achieve  09/07/21  -RE     LTG 1  Patient to demonstrate increased left shoulder flexion to 160 to improve functional UE use and to restore pre operative AROM per patient perception.  -RE     LTG 1 Progress  New  -RE     LTG 2  Patient to demonstrate increased left shoulder abduction to 160 to improve functional UE use and to restore preoperative AROM per patient perception.  -RE     LTG 2 Progress  New  -RE     LTG 3  Patient will participate in bioimpedance scans every 3-6 months as a method of early detection of lymphedema to allow for early intervention.  -RE     LTG 3 Progress  New  -RE     LTG 4   Patient's bioimpedance score to remain below 6.5 for decreased risk of stage II lymphedema.  -RE     LTG 4 Progress  New  -RE        Time Calculation    OT Goal Re-Cert Due Date  11/10/21  -RE       User Key  (r) =  Recorded By, (t) = Taken By, (c) = Cosigned By    Initials Name Provider Type    RE Julia Leslie OTR Occupational Therapist          OT Assessment/Plan     Row Name 08/10/21 [facility]          OT Assessment    Functional Limitations  Performance in leisure activities;Limitation in home management;Performance in self-care ADL  -RE     Impairments  Impaired flexibility;Impaired lymphatic circulation;Pain;Range of motion  -RE     Assessment Comments  Radha Astorga is a 41 y.o. female recently diagnosed with Left Breast Cancer, presents to therapy in evolving condition, s/p Left Partial Mastectomywith Sunny Side Node Biopsy with immediate reconstruction with Oncoplastic Closure with contralateral Reduction for Symmetry .  Radha Astorga is at increased risk of post Lumpectomy lymphedema syndrome Left Upper Extremity due to Radiation therapy Breast surgery Lymph Node Removal . She presents with post-operative decreased range of motion, flexibility, strength, function and increased pain as well as cording.  Currently, negative s/s of lymphedema or infection. Patient has been educated on bioimpedance and may complete testing at a later date.  Radha Astorga will benefit from skilled formal Breast Care Occupational Therapy at this time to address listed dysfunctions.  -RE     Please refer to paper survey for additional self-reported information  Yes  -RE     OT Diagnosis  at risk for lymphedema, decreased L shoulder AROM  -RE     OT Rehab Potential  Good  -RE     Patient/caregiver participated in establishment of treatment plan and goals  Yes  -RE     Patient would benefit from skilled therapy intervention  Yes  -RE        OT Plan    OT Frequency  2x/week  -RE     Predicted Duration of Therapy Intervention (OT)  2-4 weeks  -RE     Planned CPT's?  OT EVAL MOD COMPLEXITY: 87041;OT THER PROC EA 15 MIN: 94636OP;OT SELF CARE/MGMT/TRAIN 15 MIN: 14227;OT MANUAL THERAPY EA 15 MIN: 30943;OT BIS XTRACELL FLUID ANALYSIS: 12030   -RE     Planned Therapy Interventions (Optional Details)  home exercise program;manual therapy techniques;patient/family education;ROM (Range of Motion);stretching;strengthening;other (see comments)  -RE     OT Plan Comments  -- bioimpedance  -RE       User Key  (r) = Recorded By, (t) = Taken By, (c) = Cosigned By    Initials Name Provider Type    Julia Rowe OTR Occupational Therapist          Outcome Measure Options: Quick DASH  Quick DASH  Open a tight or new jar.: No Difficulty  Do heavy household chores (e.g., wash walls, wash floors): Mild Difficulty  Carry a shopping bag or briefcase: No Difficulty  Wash your back: No Difficulty  Use a knife to cut food: No Difficulty  Recreational activities in which you take some force or impact through your arm, should or hand (e.g. golf, hammering, tennis, etc.): Moderate Difficulty  During the past week, to what extent has your arm, shoulder, or hand problem interfered with your normal social activites with family, friends, neighbors or groups?: Slightly  During the past week, were you limited in your work or other regular daily activities as a result of your arm, shoulder or hand problem?: Very limited  Arm, Shoulder, or hand pain: Moderate  Tingling (pins and needles) in your arm, shoulder, or hand: None  During the past week, how much difficulty have you had sleeping because of the pain in your arm, shoulder or hand?: No difficulty  Number of Questions Answered: 11  Quick DASH Score: 20.45         Time Calculation:   OT Start Time: 0815  OT Stop Time: 0900  OT Time Calculation (min): 45 min  Total Timed Code Minutes- OT: 15 minute(s)  Timed Charges  00583 - OT Self Care/Mgmt Minutes: 15  Untimed Charges  OT Eval/Re-eval Minutes: 30  Total Minutes  Timed Charges Total Minutes: 15  Untimed Charges Total Minutes: 30   Total Minutes: 45     Therapy Charges for Today     Code Description Service Date Service Provider Modifiers Qty    [id number]  OT SELF  CARE/MGMT/TRAIN EA 15 MIN 8/10/2021 Julia Leslie OTR GO 1    [id number] HC OT EVAL MOD COMPLEXITY 2 8/10/2021 Julia Leslie OTR GO 1                    VI Ulloa  8/10/2021

## 2021-08-11 ENCOUNTER — INFUSION (OUTPATIENT)
Dept: ONCOLOGY | Facility: HOSPITAL | Age: 41
End: 2021-08-11

## 2021-08-11 ENCOUNTER — OFFICE VISIT (OUTPATIENT)
Dept: NEUROLOGY | Facility: CLINIC | Age: 41
End: 2021-08-11

## 2021-08-11 VITALS
WEIGHT: 145 LBS | SYSTOLIC BLOOD PRESSURE: 110 MMHG | BODY MASS INDEX: 26.68 KG/M2 | DIASTOLIC BLOOD PRESSURE: 68 MMHG | HEART RATE: 101 BPM | OXYGEN SATURATION: 98 % | HEIGHT: 62 IN

## 2021-08-11 VITALS
DIASTOLIC BLOOD PRESSURE: 78 MMHG | HEIGHT: 62 IN | TEMPERATURE: 97.1 F | BODY MASS INDEX: 26.68 KG/M2 | OXYGEN SATURATION: 100 % | HEART RATE: 95 BPM | RESPIRATION RATE: 18 BRPM | WEIGHT: 145 LBS | SYSTOLIC BLOOD PRESSURE: 115 MMHG

## 2021-08-11 DIAGNOSIS — Z45.2 ENCOUNTER FOR ADJUSTMENT OR MANAGEMENT OF VASCULAR ACCESS DEVICE: ICD-10-CM

## 2021-08-11 DIAGNOSIS — Z17.1 MALIGNANT NEOPLASM OF OVERLAPPING SITES OF LEFT BREAST IN FEMALE, ESTROGEN RECEPTOR NEGATIVE (HCC): ICD-10-CM

## 2021-08-11 DIAGNOSIS — I63.9 CEREBRAL INFARCTION, UNSPECIFIED MECHANISM (HCC): ICD-10-CM

## 2021-08-11 DIAGNOSIS — Z17.1 MALIGNANT NEOPLASM OF OVERLAPPING SITES OF LEFT BREAST IN FEMALE, ESTROGEN RECEPTOR NEGATIVE (HCC): Primary | ICD-10-CM

## 2021-08-11 DIAGNOSIS — C50.812 MALIGNANT NEOPLASM OF OVERLAPPING SITES OF LEFT BREAST IN FEMALE, ESTROGEN RECEPTOR NEGATIVE (HCC): Primary | ICD-10-CM

## 2021-08-11 DIAGNOSIS — I63.81 CEREBROVASCULAR ACCIDENT (CVA) DUE TO OCCLUSION OF SMALL ARTERY (HCC): ICD-10-CM

## 2021-08-11 DIAGNOSIS — E78.2 MIXED HYPERLIPIDEMIA: ICD-10-CM

## 2021-08-11 DIAGNOSIS — C50.812 MALIGNANT NEOPLASM OF OVERLAPPING SITES OF LEFT BREAST IN FEMALE, ESTROGEN RECEPTOR NEGATIVE (HCC): ICD-10-CM

## 2021-08-11 DIAGNOSIS — I63.81 CEREBROVASCULAR ACCIDENT (CVA) DUE TO OCCLUSION OF SMALL ARTERY (HCC): Primary | ICD-10-CM

## 2021-08-11 LAB
ALBUMIN SERPL-MCNC: 3.9 G/DL (ref 3.5–5.2)
ALBUMIN/GLOB SERPL: 1.4 G/DL
ALP SERPL-CCNC: 80 U/L (ref 39–117)
ALT SERPL W P-5'-P-CCNC: 23 U/L (ref 1–33)
ANION GAP SERPL CALCULATED.3IONS-SCNC: 8.3 MMOL/L (ref 5–15)
AST SERPL-CCNC: 19 U/L (ref 1–32)
BASOPHILS # BLD AUTO: 0.08 10*3/MM3 (ref 0–0.2)
BASOPHILS NFR BLD AUTO: 1 % (ref 0–1.5)
BILIRUB SERPL-MCNC: 0.2 MG/DL (ref 0–1.2)
BUN SERPL-MCNC: 10 MG/DL (ref 6–20)
BUN/CREAT SERPL: 13.2 (ref 7–25)
CALCIUM SPEC-SCNC: 9.1 MG/DL (ref 8.6–10.5)
CHLORIDE SERPL-SCNC: 105 MMOL/L (ref 98–107)
CHOLEST SERPL-MCNC: 110 MG/DL (ref 0–200)
CO2 SERPL-SCNC: 22.7 MMOL/L (ref 22–29)
CREAT SERPL-MCNC: 0.76 MG/DL (ref 0.57–1)
DEPRECATED RDW RBC AUTO: 45.5 FL (ref 37–54)
EOSINOPHIL # BLD AUTO: 0.17 10*3/MM3 (ref 0–0.4)
EOSINOPHIL NFR BLD AUTO: 2.2 % (ref 0.3–6.2)
ERYTHROCYTE [DISTWIDTH] IN BLOOD BY AUTOMATED COUNT: 15.9 % (ref 12.3–15.4)
GFR SERPL CREATININE-BSD FRML MDRD: 83 ML/MIN/1.73
GLOBULIN UR ELPH-MCNC: 2.7 GM/DL
GLUCOSE SERPL-MCNC: 91 MG/DL (ref 65–99)
HBA1C MFR BLD: 5.5 % (ref 4.8–5.6)
HCT VFR BLD AUTO: 33.1 % (ref 34–46.6)
HDLC SERPL-MCNC: 46 MG/DL (ref 40–60)
HGB BLD-MCNC: 10.2 G/DL (ref 12–15.9)
IMM GRANULOCYTES # BLD AUTO: 0.04 10*3/MM3 (ref 0–0.05)
IMM GRANULOCYTES NFR BLD AUTO: 0.5 % (ref 0–0.5)
LDLC SERPL CALC-MCNC: 46 MG/DL (ref 0–100)
LDLC/HDLC SERPL: 0.99 {RATIO}
LYMPHOCYTES # BLD AUTO: 2.48 10*3/MM3 (ref 0.7–3.1)
LYMPHOCYTES NFR BLD AUTO: 31.5 % (ref 19.6–45.3)
MCH RBC QN AUTO: 24.5 PG (ref 26.6–33)
MCHC RBC AUTO-ENTMCNC: 30.8 G/DL (ref 31.5–35.7)
MCV RBC AUTO: 79.4 FL (ref 79–97)
MONOCYTES # BLD AUTO: 0.42 10*3/MM3 (ref 0.1–0.9)
MONOCYTES NFR BLD AUTO: 5.3 % (ref 5–12)
NEUTROPHILS NFR BLD AUTO: 4.68 10*3/MM3 (ref 1.7–7)
NEUTROPHILS NFR BLD AUTO: 59.5 % (ref 42.7–76)
NRBC BLD AUTO-RTO: 0 /100 WBC (ref 0–0.2)
PLATELET # BLD AUTO: 308 10*3/MM3 (ref 140–450)
PMV BLD AUTO: 10.4 FL (ref 6–12)
POTASSIUM SERPL-SCNC: 4.2 MMOL/L (ref 3.5–5.2)
PROT SERPL-MCNC: 6.6 G/DL (ref 6–8.5)
RBC # BLD AUTO: 4.17 10*6/MM3 (ref 3.77–5.28)
SODIUM SERPL-SCNC: 136 MMOL/L (ref 136–145)
TRIGL SERPL-MCNC: 93 MG/DL (ref 0–150)
VLDLC SERPL-MCNC: 18 MG/DL (ref 5–40)
WBC # BLD AUTO: 7.87 10*3/MM3 (ref 3.4–10.8)

## 2021-08-11 PROCEDURE — 96375 TX/PRO/DX INJ NEW DRUG ADDON: CPT

## 2021-08-11 PROCEDURE — 96413 CHEMO IV INFUSION 1 HR: CPT

## 2021-08-11 PROCEDURE — 25010000002 DEXAMETHASONE SODIUM PHOSPHATE 100 MG/10ML SOLUTION: Performed by: NURSE PRACTITIONER

## 2021-08-11 PROCEDURE — 80061 LIPID PANEL: CPT | Performed by: PSYCHIATRY & NEUROLOGY

## 2021-08-11 PROCEDURE — 25010000002 HEPARIN LOCK FLUSH PER 10 UNITS: Performed by: INTERNAL MEDICINE

## 2021-08-11 PROCEDURE — 25010000002 DIPHENHYDRAMINE PER 50 MG: Performed by: NURSE PRACTITIONER

## 2021-08-11 PROCEDURE — 99214 OFFICE O/P EST MOD 30 MIN: CPT | Performed by: PSYCHIATRY & NEUROLOGY

## 2021-08-11 PROCEDURE — 25010000002 PACLITAXEL PER 1 MG: Performed by: NURSE PRACTITIONER

## 2021-08-11 PROCEDURE — 80053 COMPREHEN METABOLIC PANEL: CPT | Performed by: NURSE PRACTITIONER

## 2021-08-11 PROCEDURE — 85025 COMPLETE CBC W/AUTO DIFF WBC: CPT | Performed by: NURSE PRACTITIONER

## 2021-08-11 PROCEDURE — 96417 CHEMO IV INFUS EACH ADDL SEQ: CPT

## 2021-08-11 PROCEDURE — 83036 HEMOGLOBIN GLYCOSYLATED A1C: CPT | Performed by: PSYCHIATRY & NEUROLOGY

## 2021-08-11 PROCEDURE — 25010000002 TRASTUZUMAB-ANNS 420 MG RECONSTITUTED SOLUTION 1 EACH VIAL: Performed by: NURSE PRACTITIONER

## 2021-08-11 RX ORDER — SODIUM CHLORIDE 0.9 % (FLUSH) 0.9 %
10 SYRINGE (ML) INJECTION AS NEEDED
Status: CANCELLED | OUTPATIENT
Start: 2021-08-11

## 2021-08-11 RX ORDER — FAMOTIDINE 10 MG/ML
20 INJECTION, SOLUTION INTRAVENOUS AS NEEDED
Status: CANCELLED | OUTPATIENT
Start: 2021-08-11

## 2021-08-11 RX ORDER — ATORVASTATIN CALCIUM 10 MG/1
10 TABLET, FILM COATED ORAL DAILY
Qty: 30 TABLET | Refills: 11 | Status: SHIPPED | OUTPATIENT
Start: 2021-08-11 | End: 2022-06-29

## 2021-08-11 RX ORDER — FAMOTIDINE 10 MG/ML
20 INJECTION, SOLUTION INTRAVENOUS ONCE
Status: COMPLETED | OUTPATIENT
Start: 2021-08-11 | End: 2021-08-11

## 2021-08-11 RX ORDER — HEPARIN SODIUM (PORCINE) LOCK FLUSH IV SOLN 100 UNIT/ML 100 UNIT/ML
500 SOLUTION INTRAVENOUS AS NEEDED
Status: CANCELLED | OUTPATIENT
Start: 2021-08-11

## 2021-08-11 RX ORDER — HEPARIN SODIUM (PORCINE) LOCK FLUSH IV SOLN 100 UNIT/ML 100 UNIT/ML
500 SOLUTION INTRAVENOUS AS NEEDED
Status: DISCONTINUED | OUTPATIENT
Start: 2021-08-11 | End: 2022-08-10

## 2021-08-11 RX ORDER — SODIUM CHLORIDE 9 MG/ML
250 INJECTION, SOLUTION INTRAVENOUS ONCE
Status: COMPLETED | OUTPATIENT
Start: 2021-08-11 | End: 2021-08-11

## 2021-08-11 RX ORDER — SODIUM CHLORIDE 0.9 % (FLUSH) 0.9 %
10 SYRINGE (ML) INJECTION AS NEEDED
Status: DISCONTINUED | OUTPATIENT
Start: 2021-08-11 | End: 2022-08-10

## 2021-08-11 RX ORDER — DIPHENHYDRAMINE HYDROCHLORIDE 50 MG/ML
50 INJECTION INTRAMUSCULAR; INTRAVENOUS AS NEEDED
Status: CANCELLED | OUTPATIENT
Start: 2021-08-11

## 2021-08-11 RX ADMIN — SODIUM CHLORIDE 250 ML: 9 INJECTION, SOLUTION INTRAVENOUS at 14:11

## 2021-08-11 RX ADMIN — DIPHENHYDRAMINE HYDROCHLORIDE 25 MG: 50 INJECTION INTRAMUSCULAR; INTRAVENOUS at 14:21

## 2021-08-11 RX ADMIN — SODIUM CHLORIDE, PRESERVATIVE FREE 10 ML: 5 INJECTION INTRAVENOUS at 17:18

## 2021-08-11 RX ADMIN — DEXAMETHASONE SODIUM PHOSPHATE 12 MG: 10 INJECTION, SOLUTION INTRAMUSCULAR; INTRAVENOUS at 14:41

## 2021-08-11 RX ADMIN — TRASTUZUMAB-ANNS 130 MG: 420 INJECTION, POWDER, LYOPHILIZED, FOR SOLUTION INTRAVENOUS at 16:40

## 2021-08-11 RX ADMIN — FAMOTIDINE 20 MG: 10 INJECTION INTRAVENOUS at 14:16

## 2021-08-11 RX ADMIN — PACLITAXEL 135 MG: 6 INJECTION, SOLUTION INTRAVENOUS at 15:21

## 2021-08-11 RX ADMIN — Medication 500 UNITS: at 17:18

## 2021-08-11 NOTE — PROGRESS NOTES
"DOS: 2021  NAME: Radha Astorga   : 1980  PCP: Fadi Goodman MD  Chief Complaint   Patient presents with   • cerebral infarction       Chief complaint: stroke follow up  Subjective: Patient is new to me today.  This is a 41-year-old female with a history of hypertension and hyperlipidemia who was originally seen by my colleague Dr. Haynes earlier this year for a right subcortical stroke.  She had an episode of left hemisensory loss at the end of December of last year.  Subsequent MRI showed a small right subcortical stroke as well as a right caudate developmental venous anomaly.  Bilateral carotid duplex was negative.  2D echo was normal and Holter monitor was normal as well.  She had a follow-up MRI which was stable and ultimately underwent an MR venogram as well which was essentially normal.  She was referred to Dr. Beavers with the [facility] group for a hypercoagulable work-up.  Hypercoagulable studies were negative but mammogram showed an malignancy in the left breast for which he ultimately underwent surgery and is undergoing treatment with Herceptin.  She was started on Xarelto due to history of factor V Leiden heterozygosity and breast cancer with the specific concern of risk for thrombosis of a port that needs to be placed.  I discussed this with Dr. Beavers and recommended aspirin be stopped and that she take Xarelto alone.  Patient denies any subsequent unilateral weakness or numbness or vision changes.  She has had 2 separate episodes of dizziness.  These have both been in the setting of heat exposure and have occurred while standing.  They were transient and there were no other associated symptoms.  Of note the patient has lost significant weight over the past year and has had some low blood pressure readings.    Objective:  Vital signs: /68 (BP Location: Left arm, Patient Position: Sitting, Cuff Size: Adult)   Pulse 101   Ht 157.5 cm (62.01\")   Wt 65.8 kg (145 lb)   SpO2 98%   " BMI 26.51 kg/m²    Exam:  General: Vitals reviewed  Mental status: Oriented, memory, concentration intact, language intact, no neglect  Cranial nerves: Fundus exam normal, pupils 3 mm, reactive, extract movements intact, no facial droop, palate symmetric  Motor: 5/5 throughout, normal tone  Coordination: No dysmetria  Sensory: Intact to light touch and vibration all 4 extremities  Reflexes: 2+ throughout  Gait: Normal station, no ataxia    ROS    Laboratory results:  Lab Results   Component Value Date    LDL 46 08/11/2021     (H) 01/04/2021     (H) 09/04/2019         Lab 08/11/21  1322   HEMOGLOBIN A1C 5.50        Review of labs: LDL was 162 in January of this year.  Repeat lipid panel and hemoglobin A1c ordered    Review and interpretation of imaging: I personally reviewed her prior MRI of the brain which shows a small right subcortical stroke and a caudate developmental venous anomaly.  Repeat MRI was stable and MR venogram is negative.    Workup to date: Right subcortical stroke due to small vessel disease versus hypercoagulable state from breast cancer    MRI: Right subcortical stroke, caudate DVA  MR venogram: Negative  Bilateral carotid duplex negative  2D echo: EF 61 to 65%  Holter monitor no evidence of atrial fibrillation    Diagnoses:  Stroke due to small vessel occlusion  Heterozygosity for factor V Leiden  Breast cancer, stage I    Assessment/comments: Question is whether her small stroke was due to small vessel disease from hypertension hyperlipidemia or potentially related to relative hypercoagulable state from her at that time undiagnosed malignancy.  I have no real way to determine this for sure.  I agree with Xarelto in the short-term.  I think the long-term benefit is less clear and I will anticipate discussing this with Dr. Beavers in the future depending on how things go with her cancer treatment.  With regard to her dizzy spells this sounds like orthostatic hypotension.  She  noticed that she was relatively tachycardic during 1 of these spells which would support that.  I think that her blood pressure medication may need to be reduced slightly due to her weight loss.  I asked her to monitor her blood pressure regularly and to keep track of future spells.    Plan:  1. Xarelto 20mg  2. Check repeat lipid panel to see whether or not Lipitor 40 could be reduced  3.  Monitor blood pressure daily, may need to consider reduction in her Diovan for what sounds like intermittent orthostatic hypotension  4. Check hemoglobin A1C  5. Follow up with me in 6 months    I spent a total of 35 minutes on this clinical encounter including chart/records review, review of laboratory data, personal review of imaging studies, patient counseling, and care coordination.    Addendum: repeat LDL 46. Will reduce Lipitor to 10mg.

## 2021-08-18 ENCOUNTER — INFUSION (OUTPATIENT)
Dept: ONCOLOGY | Facility: HOSPITAL | Age: 41
End: 2021-08-18

## 2021-08-18 ENCOUNTER — OFFICE VISIT (OUTPATIENT)
Dept: ONCOLOGY | Facility: CLINIC | Age: 41
End: 2021-08-18

## 2021-08-18 VITALS
SYSTOLIC BLOOD PRESSURE: 127 MMHG | WEIGHT: 146.3 LBS | OXYGEN SATURATION: 95 % | RESPIRATION RATE: 18 BRPM | BODY MASS INDEX: 26.92 KG/M2 | HEART RATE: 89 BPM | DIASTOLIC BLOOD PRESSURE: 84 MMHG | HEIGHT: 62 IN | TEMPERATURE: 98.2 F

## 2021-08-18 VITALS — DIASTOLIC BLOOD PRESSURE: 86 MMHG | HEART RATE: 105 BPM | SYSTOLIC BLOOD PRESSURE: 131 MMHG

## 2021-08-18 DIAGNOSIS — C50.812 MALIGNANT NEOPLASM OF OVERLAPPING SITES OF LEFT BREAST IN FEMALE, ESTROGEN RECEPTOR NEGATIVE (HCC): Primary | ICD-10-CM

## 2021-08-18 DIAGNOSIS — Z79.899 ENCOUNTER FOR LONG-TERM (CURRENT) USE OF HIGH-RISK MEDICATION: ICD-10-CM

## 2021-08-18 DIAGNOSIS — Z79.899 HIGH RISK MEDICATION USE: ICD-10-CM

## 2021-08-18 DIAGNOSIS — Z17.1 MALIGNANT NEOPLASM OF OVERLAPPING SITES OF LEFT BREAST IN FEMALE, ESTROGEN RECEPTOR NEGATIVE (HCC): Primary | ICD-10-CM

## 2021-08-18 DIAGNOSIS — K21.9 GASTROESOPHAGEAL REFLUX DISEASE WITHOUT ESOPHAGITIS: ICD-10-CM

## 2021-08-18 DIAGNOSIS — C50.812 MALIGNANT NEOPLASM OF OVERLAPPING SITES OF LEFT BREAST IN FEMALE, ESTROGEN RECEPTOR NEGATIVE (HCC): ICD-10-CM

## 2021-08-18 DIAGNOSIS — Z17.1 MALIGNANT NEOPLASM OF OVERLAPPING SITES OF LEFT BREAST IN FEMALE, ESTROGEN RECEPTOR NEGATIVE (HCC): ICD-10-CM

## 2021-08-18 DIAGNOSIS — Z45.2 ENCOUNTER FOR ADJUSTMENT OR MANAGEMENT OF VASCULAR ACCESS DEVICE: ICD-10-CM

## 2021-08-18 LAB
ALBUMIN SERPL-MCNC: 4 G/DL (ref 3.5–5.2)
ALBUMIN/GLOB SERPL: 1.6 G/DL (ref 1.1–2.4)
ALP SERPL-CCNC: 78 U/L (ref 38–116)
ALT SERPL W P-5'-P-CCNC: 25 U/L (ref 0–33)
ANION GAP SERPL CALCULATED.3IONS-SCNC: 10.3 MMOL/L (ref 5–15)
AST SERPL-CCNC: 19 U/L (ref 0–32)
BASOPHILS # BLD AUTO: 0.07 10*3/MM3 (ref 0–0.2)
BASOPHILS NFR BLD AUTO: 1.1 % (ref 0–1.5)
BILIRUB SERPL-MCNC: 0.3 MG/DL (ref 0.2–1.2)
BUN SERPL-MCNC: 13 MG/DL (ref 6–20)
BUN/CREAT SERPL: 17.1 (ref 7.3–30)
CALCIUM SPEC-SCNC: 9 MG/DL (ref 8.5–10.2)
CHLORIDE SERPL-SCNC: 104 MMOL/L (ref 98–107)
CO2 SERPL-SCNC: 23.7 MMOL/L (ref 22–29)
CREAT SERPL-MCNC: 0.76 MG/DL (ref 0.6–1.1)
DEPRECATED RDW RBC AUTO: 46 FL (ref 37–54)
EOSINOPHIL # BLD AUTO: 0.18 10*3/MM3 (ref 0–0.4)
EOSINOPHIL NFR BLD AUTO: 2.8 % (ref 0.3–6.2)
ERYTHROCYTE [DISTWIDTH] IN BLOOD BY AUTOMATED COUNT: 16.1 % (ref 12.3–15.4)
GFR SERPL CREATININE-BSD FRML MDRD: 83 ML/MIN/1.73
GLOBULIN UR ELPH-MCNC: 2.5 GM/DL (ref 1.8–3.5)
GLUCOSE SERPL-MCNC: 82 MG/DL (ref 74–124)
HCT VFR BLD AUTO: 33.3 % (ref 34–46.6)
HGB BLD-MCNC: 10.5 G/DL (ref 12–15.9)
IMM GRANULOCYTES # BLD AUTO: 0.03 10*3/MM3 (ref 0–0.05)
IMM GRANULOCYTES NFR BLD AUTO: 0.5 % (ref 0–0.5)
LYMPHOCYTES # BLD AUTO: 2.3 10*3/MM3 (ref 0.7–3.1)
LYMPHOCYTES NFR BLD AUTO: 35.8 % (ref 19.6–45.3)
MCH RBC QN AUTO: 25.1 PG (ref 26.6–33)
MCHC RBC AUTO-ENTMCNC: 31.5 G/DL (ref 31.5–35.7)
MCV RBC AUTO: 79.7 FL (ref 79–97)
MONOCYTES # BLD AUTO: 0.46 10*3/MM3 (ref 0.1–0.9)
MONOCYTES NFR BLD AUTO: 7.2 % (ref 5–12)
NEUTROPHILS NFR BLD AUTO: 3.38 10*3/MM3 (ref 1.7–7)
NEUTROPHILS NFR BLD AUTO: 52.6 % (ref 42.7–76)
NRBC BLD AUTO-RTO: 0 /100 WBC (ref 0–0.2)
PLATELET # BLD AUTO: 348 10*3/MM3 (ref 140–450)
PMV BLD AUTO: 9.2 FL (ref 6–12)
POTASSIUM SERPL-SCNC: 4.2 MMOL/L (ref 3.5–4.7)
PROT SERPL-MCNC: 6.5 G/DL (ref 6.3–8)
RBC # BLD AUTO: 4.18 10*6/MM3 (ref 3.77–5.28)
SODIUM SERPL-SCNC: 138 MMOL/L (ref 134–145)
WBC # BLD AUTO: 6.42 10*3/MM3 (ref 3.4–10.8)

## 2021-08-18 PROCEDURE — 85025 COMPLETE CBC W/AUTO DIFF WBC: CPT

## 2021-08-18 PROCEDURE — 99214 OFFICE O/P EST MOD 30 MIN: CPT | Performed by: NURSE PRACTITIONER

## 2021-08-18 PROCEDURE — 96417 CHEMO IV INFUS EACH ADDL SEQ: CPT

## 2021-08-18 PROCEDURE — 25010000002 HEPARIN LOCK FLUSH PER 10 UNITS: Performed by: INTERNAL MEDICINE

## 2021-08-18 PROCEDURE — 96413 CHEMO IV INFUSION 1 HR: CPT

## 2021-08-18 PROCEDURE — 25010000002 DIPHENHYDRAMINE PER 50 MG: Performed by: NURSE PRACTITIONER

## 2021-08-18 PROCEDURE — 25010000002 PACLITAXEL PER 1 MG: Performed by: NURSE PRACTITIONER

## 2021-08-18 PROCEDURE — 96375 TX/PRO/DX INJ NEW DRUG ADDON: CPT

## 2021-08-18 PROCEDURE — 80053 COMPREHEN METABOLIC PANEL: CPT

## 2021-08-18 PROCEDURE — 25010000002 DEXAMETHASONE SODIUM PHOSPHATE 100 MG/10ML SOLUTION: Performed by: NURSE PRACTITIONER

## 2021-08-18 PROCEDURE — 25010000002 TRASTUZUMAB-ANNS 420 MG RECONSTITUTED SOLUTION 1 EACH VIAL: Performed by: NURSE PRACTITIONER

## 2021-08-18 RX ORDER — FAMOTIDINE 10 MG/ML
20 INJECTION, SOLUTION INTRAVENOUS ONCE
Status: CANCELLED | OUTPATIENT
Start: 2021-08-18

## 2021-08-18 RX ORDER — HEPARIN SODIUM (PORCINE) LOCK FLUSH IV SOLN 100 UNIT/ML 100 UNIT/ML
500 SOLUTION INTRAVENOUS AS NEEDED
Status: DISCONTINUED | OUTPATIENT
Start: 2021-08-18 | End: 2021-08-18 | Stop reason: HOSPADM

## 2021-08-18 RX ORDER — FAMOTIDINE 10 MG/ML
20 INJECTION, SOLUTION INTRAVENOUS AS NEEDED
Status: CANCELLED | OUTPATIENT
Start: 2021-08-18

## 2021-08-18 RX ORDER — DIPHENHYDRAMINE HYDROCHLORIDE 50 MG/ML
50 INJECTION INTRAMUSCULAR; INTRAVENOUS AS NEEDED
Status: CANCELLED | OUTPATIENT
Start: 2021-08-18

## 2021-08-18 RX ORDER — SODIUM CHLORIDE 0.9 % (FLUSH) 0.9 %
10 SYRINGE (ML) INJECTION AS NEEDED
Status: CANCELLED | OUTPATIENT
Start: 2021-08-18

## 2021-08-18 RX ORDER — SODIUM CHLORIDE 9 MG/ML
250 INJECTION, SOLUTION INTRAVENOUS ONCE
Status: CANCELLED | OUTPATIENT
Start: 2021-08-18

## 2021-08-18 RX ORDER — SODIUM CHLORIDE 9 MG/ML
250 INJECTION, SOLUTION INTRAVENOUS ONCE
Status: COMPLETED | OUTPATIENT
Start: 2021-08-18 | End: 2021-08-18

## 2021-08-18 RX ORDER — FAMOTIDINE 20 MG/1
20 TABLET, FILM COATED ORAL DAILY
Qty: 30 TABLET | Refills: 3 | Status: SHIPPED | OUTPATIENT
Start: 2021-08-18 | End: 2021-12-21

## 2021-08-18 RX ORDER — SODIUM CHLORIDE 0.9 % (FLUSH) 0.9 %
10 SYRINGE (ML) INJECTION AS NEEDED
Status: DISCONTINUED | OUTPATIENT
Start: 2021-08-18 | End: 2021-08-18 | Stop reason: HOSPADM

## 2021-08-18 RX ORDER — HEPARIN SODIUM (PORCINE) LOCK FLUSH IV SOLN 100 UNIT/ML 100 UNIT/ML
500 SOLUTION INTRAVENOUS AS NEEDED
Status: CANCELLED | OUTPATIENT
Start: 2021-08-18

## 2021-08-18 RX ORDER — FAMOTIDINE 10 MG/ML
20 INJECTION, SOLUTION INTRAVENOUS ONCE
Status: COMPLETED | OUTPATIENT
Start: 2021-08-18 | End: 2021-08-18

## 2021-08-18 RX ORDER — ONDANSETRON HYDROCHLORIDE 8 MG/1
8 TABLET, FILM COATED ORAL 3 TIMES DAILY PRN
Qty: 30 TABLET | Refills: 5 | Status: SHIPPED | OUTPATIENT
Start: 2021-08-18 | End: 2021-09-29

## 2021-08-18 RX ADMIN — DEXAMETHASONE SODIUM PHOSPHATE 12 MG: 10 INJECTION, SOLUTION INTRAMUSCULAR; INTRAVENOUS at 15:06

## 2021-08-18 RX ADMIN — SODIUM CHLORIDE 250 ML: 9 INJECTION, SOLUTION INTRAVENOUS at 14:31

## 2021-08-18 RX ADMIN — TRASTUZUMAB-ANNS 130 MG: 420 INJECTION, POWDER, LYOPHILIZED, FOR SOLUTION INTRAVENOUS at 15:40

## 2021-08-18 RX ADMIN — PACLITAXEL 135 MG: 6 INJECTION, SOLUTION INTRAVENOUS at 16:25

## 2021-08-18 RX ADMIN — FAMOTIDINE 20 MG: 10 INJECTION INTRAVENOUS at 14:40

## 2021-08-18 RX ADMIN — Medication 500 UNITS: at 17:28

## 2021-08-18 RX ADMIN — DIPHENHYDRAMINE HYDROCHLORIDE 25 MG: 50 INJECTION, SOLUTION INTRAMUSCULAR; INTRAVENOUS at 14:41

## 2021-08-18 NOTE — PROGRESS NOTES
Subjective     REASON FOR follow-up:    1.  Heterozygous state for factor V Leiden    2.  New onset stroke on aspirin by neurology    3.  Hypercholesterolemia    4.  Hypercoagulable work-up done.  Antithrombin 109% protein S activity 85% protein S antigen 107%, protein C activity 85%.  Anticardiolipin antibody IgM 24%, antibeta-2 glycoprotein antibody negative, lupus anticoagulant not detected, prothrombin gene mutation negative.    5.  Screening mammogram showed abnormality in the left breast 6 o'clock position, 8 mm on ultrasound, ultrasound-guided left breast biopsy, 6 cm from the nipple showed evidence of invasive ductal carcinoma poorly differentiated grade 3, Thompson score of 8 out of 9 ER negative, OK negative, HER-2/ese 3+ positive.    6.  CT scan February 24, 2021 showed focal area of fatty infiltration of the liver.  1 cm hypoattenuating cortical lesion in the upper pole of the right kidney.  Similarly nodule in the upper pole of the left kidney is 1.5 cm.  Further evaluation by ultrasound suggested  · Ultrasound March 1, 2021 shows solid lesion in the upper pole of the right kidney.  In the left kidney along the midpole of the left kidney is a nodule which is 16 x 16 x 14 mm which is thought to be a cyst.  A 6-month follow-up CT suggested    6.  S/p left partial mastectomy with sentinel lymph node biopsy.  Tumor was present at 5:00, invasive ductal carcinoma, Thompson score of 8, grade 3, greatest dimension was 12 mm x 8 x 4 mm.  Single focus of invasive carcinoma present.  There was extensive DCIS which is 30 mm x 8 x 2 mm, grade 3, no evidence of lymphovascular space invasion or dermal lymphatic invasion.  Margins were clear.  4 lymph nodes were negative.  It is a p T1cp N0, ER negative OK negative HER-2/ese 3+ with Ki-67 of 50%.  · [facility] genetic test negative for 47 genes                                 History of Present Illness   Patient is a 41 y.o. female with pT1cp N0 ER negative OK  negative HER-2 positive left breast cancer s/p lumpectomy and reduction on the right simultaneously.  Patient initiated adjuvant Taxol/Herceptin and is due today for week 3.  She is beginning to lose her hair and is asking to get a prescription for a wig if it would help with possible coverage.  Patient is also struggling with new reflux in relation to chemo.  She has been taking a lot of Tums to try and help this.  We discussed taking Pepcid once daily for improvement with this.    Patient did follow-up with Dr. Johnson, neurology.  Patient mentions talking with him about having some increased dizzy spells when outside in the heat.  He thought it could be her blood pressure though her blood pressure has notably been very stable around 120/80 on valsartan.  We discussed she could even be having some hot flashes in relation to hormone fluctuations as she is premenopausal but now on chemotherapy.  Patient reports that she is currently having a menstrual cycle but we discussed this could change on chemo.  She denies any dizziness currently.    Patient continues on recently initiated Xarelto at loading dose of 15 mg twice a day x3 weeks to then be switched to 20 mg daily per protocol.  She is doing this because of Mediport in place and known factor V Leiden heterozygosity.    She denies other new concerns today.    Oncologic history:  Patient is here for follow-up of her mammogram.  Patient had screening mammogram April 2, 2021:  NAD a focal asymmetry was present in the posterior one third retroareolar region of the left breast.  Further spot compression images and left breast ultrasound was suggested.  Right breast was negative    April 9, 2021: Left diagnostic mammogram showed an area of focal asymmetry in the posterior one third region aspect of the left breast her breast    Ultrasound showed an irregular 0.8 cm lesion in the left breast at the 6 o'clock position of the order of 6 cm from the nipple.   Ultrasound-guided left breast biopsy was recommended.    April 26, 2021: Ultrasound-guided biopsy of the left breast 6 o'clock position, 6 cm from the nipple showed    Invasive ductal carcinoma, poorly differentiated with a Oziel score grade 3 with a score of 8 out of 9 measuring at least 7 mm.  No definitive ductal carcinoma is in situ is identified.  ER 1% negative  HI 1% negative   HER-2/ese 3+ positive    There was no evidence of lymphadenopathy either in the mammogram of the ultrasound.  We suggested an MRI of the breast.  Patient has strong family history of breast cancer    Interval history  May 7, 2021: MRI of bilateral breast reviewed.  My interpretation is that there is area of enhancement in the 6 o'clock position of the left breast this is the biopsy-proven malignancy.  There is additional area of linear enhancement anteriorly and laterally measuring up to 1.2 cm this is approximately 5.6 cm from the nipple.  In addition there is a enhancement 2 to 3 mm in the anterolateral aspect of the lateral aspect of the left breast which is 4.6 cm from the nipple.  There is also mildly prominent posterior lymph node in the mid axilla which measures 1 cm with cortical thickening.  Findings are consistent with multifocal disease.  No contralateral disease or internal mammary adenopathy identified    May 20, 2021: Genetic test, [facility] genetic test shows 47 genes negative    May 21, 2021: I reviewed the biopsy of the lymph node in the left axilla which shows reactive lymph node negative for any carcinoma or lymphoma    June 2, 2021:Final Diagnosis  1. Left Breast, 5:00 o'clock, MRI-guided Biopsies for Linear Enhancement: INVASIVE MAMMARY CARCINOMA, NO  SPECIAL TYPE (INVASIVE DUCTAL CARCINOMA).  A. Largest contiguous focus in a core measures 4 mm.  B. No in-situ component identified.  C. Brisk lymphocytic response noted (see Comment).  D. No definitive lymphovascular nor perineural invasion identified.  2. Left  Breast, 2:00 o'clock, MRI-guided Biopsy for Enhancement:  A. Benign breast parenchyma with radial scar and micropapillomas.  B. Usual ductal hyperplasia and columnar cell hyperplasia.  C. No atypical hyperplasia, in-situ nor invasive carcinoma identified    ER, ME, HER-2 new and Ki-67 pending on this new biopsy      Patient has been seen by Dr. Lashonda Euceda with plans of doing surgery on July first 2021    Once patient undergoes surgery lumpectomy with sentinel lymph node biopsy we will give further recommendation about treatment options.  Given that patient has multifocal disease and both of the lesions 2 lesions are 1.2 cm each, with it being a HER-2 positive tumor on the previous biopsy at 6:00 Dr. Euceda and myself discussed and patient preferred to undergo port placement at the same time of surgery.    Patient had [employer] genetic test which was negative.    She has completed surgery July 1, 2021.  She underwent left partial mastectomy with left axillary sentinel lymph node biopsy and right port placement.  Clinically she was thought to have a high-grade multifocal invasive ductal carcinoma ER/ME negative, ME positive.  The lesions require preoperative localization.  The multifocal cancer was bracketed with 2 savvy markers and the radial scar was localized with a needle.  Because of the volume of tissue that will be excised related to the breast volume the case was done in conjunction with Dr. Zavala for oncoplastic closure.    She is 2 weeks from surgery.  She is healing up reasonably well.    On review of her pathology she had left partial mastectomy with sentinel lymph node biopsy.  Tumor was present at 5:00, invasive ductal carcinoma, Oziel score of 8, grade 3, greatest dimension was 12 mm x 8 x 4 mm.  Single focus of invasive carcinoma present.  There was extensive DCIS which is 30 mm x 8 x 2 mm, grade 3, no evidence of lymphovascular space invasion or dermal lymphatic invasion.  Margins were  clear.  4 lymph nodes were negative.  It is a p T1cp N0, ER negative MN negative HER-2/ese 3+ with Ki-67 of 50%.    Patient is here to discuss further options of treatment.  Given small tumor T1c N0, she is a good candidate for weekly Taxol Herceptin.    Given that patient has heterozygous state for factor V Leiden and currently has port placed we will plan to start Eliquis 2.5 mg p.o. twice daily.  I have left a message for Dr. Craft in neurology to call me to discuss if patient needs to continue aspirin or we can hold off since be starting Eliquis.      Genetic test negative    August 4, 2021: Weekly Taxol Herceptin x12 weeks followed by Herceptin x1 year.  Cycle 1 today          Hematologic history:  patient is a 40-year-old female who presented end of December with numbness and tingling in her left upper extremity and left face.  She went to see Dr. Goodman who then got concerned and referred to neurology.  She was referred to Dr. Freedman and talk to Dr. Thompson neurology for evaluation.  Patient underwent ultrasound of the carotids which did not show significant stenosis of the carotids.  She underwent 2D echocardiogram which showed a good ejection fraction of 64% with mild mitral valve prolapse and mild mitral stenosis.  She had a 24-hour Holter which was negative.  She then had also an MRI of the brain February 3, 2021 which showed areas of hyperintensity involving the subcortical white matter of the right frontal lobe superior laterally and posteriorly with the largest area measuring 8 9 mm.  There is also enhancement present.  The findings are consistent with a subacute infarct.  They could not differentiate if there is any underlying neoplastic process but a short-term follow-up was suggested in order to follow-up.  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    Patient currently got started on aspirin and  factor V Leiden was obtained by neurology because patient's paternal aunt had factor V Leiden mutation and she was heterozygous.  Patient's testing also showed that she was heterozygous.    Patient states her numbness and tingling is resolved completely on the left arm and face it just lasted for a 24 hours.  She was here referred here for neurology to see if there is any other cause for her underlying hypercoagulable state.  She has not had a complete hypercoagulable work-up.  Also discussed with her that an underlying malignancy could cause a hypercoagulable state and we would need to do a CT scan to rule that out.    Patient's mother has had breast cancer in her 50s but had pancreatic cancer at 68 and  from that.  Patient's dad had stroke at 63.  But he is alive at 74.  She has 1 brother who is 40 in good health.  Paternal aunt had breast cancer in her 40s.  Paternal grandfather had colon cancer in his late 80s.  Maternal uncle had throat cancer and another maternal uncle had skin cancer with metastasis to the lung.  And maternal grandmother had breast cancer.    Patient was to have mammogram done last year but because of COVID-19 it got postponed and she has not had it done yet.    Patient used to be a smoker half pack per day for 7 years but quit 10 years ago.  She has high cholesterol for which she is on treatment.    Past Medical History:   Diagnosis Date   • Acute stress reaction 2014   • ADD (attention deficit disorder)    • ADHD (attention deficit hyperactivity disorder)    • Anxiety and depression    • CTS (carpal tunnel syndrome)    • Factor 5 Leiden mutation, heterozygous (CMS/[facility]) 2021   • Family history of breast cancer 2013   • Fracture, cervical vertebra (CMS/[facility]) 1997    C4   • H/O complete eye exam 2016   • Hearing loss    • 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   • History of rheumatic fever    • Hypertension    • Hypothyroidism    • Infiltrating  ductal carcinoma of left breast (CMS/HCC) 5/5/2021    Left   • Kidney stone    • Mitral valve insufficiency    • PONV (postoperative nausea and vomiting)    • Stroke (CMS/HCC) 12/2020    HX OF   • Stroke-like symptoms         Past Surgical History:   Procedure Laterality Date   • BREAST BIOPSY Left 05/05/2021    ID[facility]   • BREAST LUMPECTOMY WITH SENTINEL NODE BIOPSY N/A 7/1/2021    Procedure: right port placement, Left SHAINA-guided, bracketed, partial mastectomy and sentinel lymph node biopsy Left breast needle-localized excisional biopsy;  Surgeon: Lashonda Euceda MD;  Location: [facility];  Service: General;  Laterality: N/A;   • BREAST SURGERY Bilateral 7/1/2021    Procedure: RIGHT BREAST REDUCTION MASTOPEXY LEFT BREAST ONCOPLASTIC CLOSURE;  Surgeon: Caridad Baker MD PhD;  Location: [facility];  Service: Plastics;  Laterality: Bilateral;   • NO PAST SURGERIES     • PAP SMEAR  2016        Current Outpatient Medications on File Prior to Visit   Medication Sig Dispense Refill   • amphetamine-dextroamphetamine XR (Adderall XR) 20 MG 24 hr capsule Take 1 capsule by mouth Every Morning 30 capsule 0   • atorvastatin (Lipitor) 10 MG tablet Take 1 tablet by mouth Daily. 30 tablet 11   • Cholecalciferol (VITAMIN D3 PO) Take 2,000 Units by mouth. Takes 2000 twice a week;  SUNDAYS AND WEDNESDAYS     • Cobalamin Combinations (B12 FOLATE PO) Take  by mouth. 400 mg folate 400 mg b12     • ferrous sulfate 325 (65 FE) MG tablet Take 1 tablet by mouth 2 (two) times a day. 60 tablet 3   • levothyroxine (Synthroid) 150 MCG tablet Take 1 tablet by mouth Daily. 90 tablet 1   • lidocaine-prilocaine (EMLA) 2.5-2.5 % cream Apply  topically to the appropriate area as directed As Needed for Mild Pain . 1 each 2   • LORazepam (Ativan) 0.5 MG tablet Take 1 tablet by mouth Every 8 (Eight) Hours As Needed for Anxiety. 30 tablet 0   • Pyridoxine HCl (B-6 PO) Take 50 mg by mouth Daily.     • rivaroxaban (XARELTO) 15 MG tablet  Take 1 tablet by mouth 2 (Two) Times a Day With Meals. 42 tablet 0   • rivaroxaban (XARELTO) 20 MG tablet Take 1 tablet by mouth Daily. 30 tablet 3   • valsartan (DIOVAN) 160 MG tablet Take 1 tablet by mouth Daily. 30 tablet 5   • [DISCONTINUED] ASPIRIN 81 PO Take 81 mg by mouth Daily.     • [DISCONTINUED] ondansetron (ZOFRAN) 8 MG tablet Take 1 tablet by mouth 3 (Three) Times a Day As Needed for Nausea or Vomiting. 30 tablet 5     Current Facility-Administered Medications on File Prior to Visit   Medication Dose Route Frequency Provider Last Rate Last Admin   • heparin injection 500 Units  500 Units Intravenous PRN Neena Beavers MD   500 Units at 21 1718   • sodium chloride 0.9 % flush 10 mL  10 mL Intravenous PRN Neena Beavers MD   10 mL at 21        ALLERGIES:    Allergies   Allergen Reactions   • Sulfa Antibiotics Rash        Social History     Socioeconomic History   • Marital status: Significant Other     Spouse name: Not on file   • Number of children: 0   • Years of education: Not on file   • Highest education level: Not on file   Tobacco Use   • Smoking status: Former Smoker     Packs/day: 1.00     Years: 10.00     Pack years: 10.00     Types: Cigarettes     Start date:      Quit date:      Years since quittin.6   • Smokeless tobacco: Never Used   Vaping Use   • Vaping Use: Never used   Substance and Sexual Activity   • Alcohol use: Yes     Comment: RARELY   • Drug use: No   • Sexual activity: Defer     Partners: Male        Family History   Problem Relation Age of Onset   • Breast cancer Mother 53   • Pancreatic cancer Mother 68   • Lung cancer Maternal Grandmother    • Stroke Father    • Breast cancer Paternal Aunt 55   • Prostate cancer Maternal Grandfather    • Prostate cancer Paternal Grandfather    • Brain cancer Maternal Cousin 20   • Melanoma Maternal Uncle    • Ovarian cancer Other         Paternal great aunt    • Breast cancer Other    • Breast cancer Paternal  "Great-Grandmother 94   • Hypertension Brother    • Malig Hyperthermia Neg Hx       Family  history: Mother had breast cancer at age 57.  Maternal great aunt had breast cancer and paternal great aunt had breast cancer in her 40s.  Patient's mother had pancreatic cancer as well.  Paternal grandfather had prostate cancer.      Review of Systems   Constitutional: Negative for appetite change, chills, diaphoresis, fatigue, fever and unexpected weight change.   HENT: Negative for hearing loss, sore throat and trouble swallowing.    Respiratory: Negative for cough, chest tightness, shortness of breath and wheezing.    Cardiovascular: Negative for chest pain, palpitations and leg swelling.   Gastrointestinal: Positive for abdominal pain (reflux - see HPI). Negative for abdominal distention, constipation, diarrhea, nausea and vomiting.   Genitourinary: Negative for dysuria, frequency, hematuria and urgency.   Musculoskeletal: Negative for joint swelling.        No muscle weakness.   Skin: Negative for rash and wound.   Neurological: Negative for seizures, syncope, speech difficulty, weakness, numbness and headaches.   Hematological: Negative for adenopathy. Does not bruise/bleed easily.   Psychiatric/Behavioral: Positive for dysphoric mood (stable). Negative for behavioral problems, confusion, sleep disturbance and suicidal ideas.   All other systems reviewed and are negative.     I have reviewed and confirmed the accuracy of the ROS as documented by the MA/LPN/RN Vanessa Escudero, CLAUDINE        Objective     Vitals:    08/18/21 1331   BP: 127/84   Pulse: 89   Resp: 18   Temp: 98.2 °F (36.8 °C)   TempSrc: Temporal   SpO2: 95%   Weight: 66.4 kg (146 lb 4.8 oz)   Height: 157.5 cm (62.01\")   PainSc: 0-No pain     Current Status 8/18/2021   ECOG score 0       Physical exam  CONSTITUTIONAL:  Vital signs reviewed.  Alert and oriented x3  No distress, looks comfortable.  EYES:  Conjunctivae and lids unremarkable.  Extraocular " eye movements intact.  HEENT: No evidence of lymphadenopathy, no thyromegaly  RESPIRATORY:  Normal respiratory effort.  No rales  or wheezing, clear.   CARDIOVASCULAR:  Regular rate and rhythm, no murmur  No significant lower extremity edema.  Abdomen: Soft nontender positive bowel sounds no hepatosplenomegaly  SKIN: No wounds.  No rashes.  MUSCULOSKELETAL/EXTREMITIES: No clubbing or cyanosis.  No apparent unilateral weakness.  NEURO: CN 2-12 appear intact. No focal neurological deficits noted.  PSYCHIATRIC:  Normal judgment and insight.  Normal mood and affect.    I have reexamined the patient and the results are consistent with the previously documented exam. Vanessa Escudero, APRN         RECENT LABS:  Results from last 7 days   Lab Units 08/18/21  1318   WBC 10*3/mm3 6.42   NEUTROS ABS 10*3/mm3 3.38   HEMOGLOBIN g/dL 10.5*   HEMATOCRIT % 33.3*   PLATELETS 10*3/mm3 348     Results from last 7 days   Lab Units 08/18/21  1318   SODIUM mmol/L 138   POTASSIUM mmol/L 4.2   CHLORIDE mmol/L 104   CO2 mmol/L 23.7   BUN mg/dL 13   CREATININE mg/dL 0.76   CALCIUM mg/dL 9.0   ALBUMIN g/dL 4.00   BILIRUBIN mg/dL 0.3   ALK PHOS U/L 78   ALT (SGPT) U/L 25   AST (SGOT) U/L 19   GLUCOSE mg/dL 82             Assessment/Plan       * [id number], ER negative SC negative HER-2/ese 3+ with Ki-67 of 50%.  S/p left partial mastectomy with sentinel lymph node biopsy.  Tumor was present at 5:00, invasive ductal carcinoma, Otto score of 8, grade 3, greatest dimension was 12 mm x 8 x 4 mm.  Single focus of invasive carcinoma present.  There was extensive DCIS which is 30 mm x 8 x 2 mm, grade 3, no evidence of lymphovascular space invasion or dermal lymphatic invasion.  Margins were clear.  4 lymph nodes were negative.  It is a p T1cp N0, ER negative SC negative HER-2/ese 3+ with Ki-67 of 50%.  · Patient is s/p port placement  · Discussed in length with patient that given a small tumor she is eligible for weekly Taxol Herceptin.   Weekly Taxol x12 weeks along with weekly Herceptin followed by 1 year of Herceptin.  · Discussed patient in the breast cancer conference  · 2D echo done which showed ejection fraction of 61-65% and strain pattern of -20.8.  · Patient also seen by Dr. Virk cardiology and Dr. Virk is planning to repeat echocardiogram in 3 months after initiation of therapy.  · 2021 initiating weekly Taxol Herceptin    *  Factor V Leiden heterozygosity with right frontal stroke and patient presented in 2020 with left-sided weakness tingling and numbness and also numbness in the face.  · Was referred to neurology and cardiology by Dr. Goodman  · Ultrasound of carotid does not show significant stenosis  · 24 Holter is negative  · 2D echocardiogram shows ejection fraction of 64% with mild mitral regurg and mitral stenosis  · Neurology obtain factor V Leiden given that patient's paternal aunt had's history of factor V Leiden and that was heterozygous for factor V Leiden  · Continue aspirin as per neurology.  Also patient on medications for her high cholesterol started after stroke currently asymptomatic  · Hypercoagulable work-up done which is negative except heterozygous state for factor V Leiden.  · Complete stroke work-up has been negative and since this is arterial stroke and she was not on aspirin prior to that and her symptoms have resolved I agree with continuing aspirin alone along with high cholesterol medications.  · MR venogram done on May 10, 2021 is negative.  Patient has been referred to the stroke neurologist Dr. Johnson  · Patient diagnosed with breast cancer with Mediport placed.  Per discussion with neurology, patient initiated on prophylactic Xarelto and aspirin discontinued.    * Family history of cancer: Patient's mother had breast cancer at age 50 and pancreatic cancer at age 68 and  from pancreatic cancer.  Patient's maternal grandmother had breast cancer in her 50s.  Her maternal uncle had skin  cancer which went to the lung and another maternal uncle had throat cancer.  Maternal aunt had call colon polyps.  Patient's paternal aunt had breast cancer in her 40s.  And paternal grandfather with colon cancer in his 80s.  Paternal aunt also had factor V Leiden.  · Genetic testing is negative    * Solid nodule in the right kidney on ultrasound, will schedule follow-up with urology  · Patient was to follow-up with Dr. Shultz  · Will need records from urology    *  Elevated BMI, encourage diet and exercise.  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  Patient has been requiring frequent Tums.  Recommended she begin Pepcid 20 mg daily.      Plan  · Proceed with week 3 of Taxol/Herceptin  · Begin Pepcid 20 mg daily.  Patient preferring prescription sent in, done.  · Prescription provided to obtain wig.  · Continue Xarelto as prescribed.  · Patient will return in 1 week as scheduled for follow-up with Dr. Beavers.    This patient is on drug therapy requiring intensive monitoring for toxicity.         CLAUDINE Reddy Dr., Dr., Dr., Dr.

## 2021-08-19 ENCOUNTER — APPOINTMENT (OUTPATIENT)
Dept: OCCUPATIONAL THERAPY | Facility: HOSPITAL | Age: 41
End: 2021-08-19

## 2021-08-20 DIAGNOSIS — F90.0 ATTENTION DEFICIT HYPERACTIVITY DISORDER (ADHD), PREDOMINANTLY INATTENTIVE TYPE: Chronic | ICD-10-CM

## 2021-08-20 RX ORDER — DEXTROAMPHETAMINE SACCHARATE, AMPHETAMINE ASPARTATE MONOHYDRATE, DEXTROAMPHETAMINE SULFATE AND AMPHETAMINE SULFATE 5; 5; 5; 5 MG/1; MG/1; MG/1; MG/1
20 CAPSULE, EXTENDED RELEASE ORAL EVERY MORNING
Qty: 30 CAPSULE | Refills: 0 | Status: SHIPPED | OUTPATIENT
Start: 2021-08-20 | End: 2021-09-17 | Stop reason: SDUPTHER

## 2021-08-24 ENCOUNTER — HOSPITAL ENCOUNTER (OUTPATIENT)
Dept: OCCUPATIONAL THERAPY | Facility: HOSPITAL | Age: 41
Setting detail: THERAPIES SERIES
Discharge: HOME | End: 2021-08-24

## 2021-08-24 DIAGNOSIS — Z91.89 AT RISK FOR LYMPHEDEMA: Primary | ICD-10-CM

## 2021-08-24 PROCEDURE — 97140 MANUAL THERAPY 1/> REGIONS: CPT

## 2021-08-24 NOTE — THERAPY TREATMENT NOTE
Outpatient Occupational Therapy Lymphedema Treatment Note  [facility]     Patient Name: Radha Astorga  : 1980  MRN: [id number]  Today's Date: 2021      Visit Date: 2021  Patient and therapist both masked. Therapist with face shield and gloves.  Patient Active Problem List   Diagnosis   • Family history of breast cancer   • Hypothyroidism   • Major depressive disorder, recurrent episode, moderate with anxious distress (CMS/[facility])   • Attention deficit hyperactivity disorder (ADHD), predominantly inattentive type   • Factor 5 Leiden mutation, heterozygous (CMS/[facility])   • Kidney mass   • Malignant neoplasm of overlapping sites of left breast in female, estrogen receptor negative (CMS/[facility])   • Encounter for long-term (current) use of high-risk medication   • Essential hypertension   • Rheumatic mitral valve disease   • Encounter for adjustment or management of vascular access device        Past Medical History:   Diagnosis Date   • Acute stress reaction 2014   • ADD (attention deficit disorder)    • ADHD (attention deficit hyperactivity disorder)    • Anxiety and depression    • CTS (carpal tunnel syndrome)    • Factor 5 Leiden mutation, heterozygous (CMS/HCC) 2021   • Family history of breast cancer 2013   • Fracture, cervical vertebra (CMS/[facility]) 1997    C4   • H/O complete eye exam 2016   • Hearing loss    • 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   • History of rheumatic fever    • Hypertension    • Hypothyroidism    • Infiltrating ductal carcinoma of left breast (CMS/HCC) 2021    Left   • Kidney stone    • Mitral valve insufficiency    • PONV (postoperative nausea and vomiting)    • Stroke (CMS/[facility]) 2020    HX OF   • Stroke-like symptoms         Past Surgical History:   Procedure Laterality Date   • BREAST BIOPSY Left 2021    IDC   • BREAST LUMPECTOMY WITH SENTINEL NODE BIOPSY N/A 2021    Procedure: right port placement, Left SHAINA-guided,  bracketed, partial mastectomy and sentinel lymph node biopsy Left breast needle-localized excisional biopsy;  Surgeon: Lashonda Euceda MD;  Location: [facility];  Service: General;  Laterality: N/A;   • BREAST SURGERY Bilateral 7/1/2021    Procedure: RIGHT BREAST REDUCTION MASTOPEXY LEFT BREAST ONCOPLASTIC CLOSURE;  Surgeon: Caridad Baker MD PhD;  Location: [facility];  Service: Plastics;  Laterality: Bilateral;   • NO PAST SURGERIES     • PAP SMEAR  2016         Visit Dx:      ICD-10-CM ICD-9-CM   1. At risk for lymphedema  Z91.89 V49.89       Lymphedema     Row Name 08/24/21 1400             Subjective Pain    Able to rate subjective pain?  yes  -RE      Pre-Treatment Pain Level  4  -RE      Subjective Pain Comment  following manual techniques pain was less widespread  -RE        User Key  (r) = Recorded By, (t) = Taken By, (c) = Cosigned By    Initials Name Provider Type    Julia Rowe OTR Occupational Therapist                  OT Assessment/Plan     Row Name 08/24/21 1451          OT Assessment    Assessment Comments  Patient reports improved mobility and less widespread arm pain. Tolerated treatment well.  -RE        OT Plan    OT Plan Comments  continue treatment for cording  -RE       User Key  (r) = Recorded By, (t) = Taken By, (c) = Cosigned By    Initials Name Provider Type    Julia Rowe OTR Occupational Therapist                 Manual Rx (last 36 hours)      Manual Treatments     Row Name 08/24/21 1453 08/24/21 1300          Total Minutes    39742 - OT Manual Therapy Minutes  45  -RE  --        Manual Rx 1    Manual Rx 1 Location  --  c and s sargent over corded area  -RE        Manual Rx 2    Manual Rx 2 Location  --  gentle manual traction to corded area  -RE        Manual Rx 3    Manual Rx 3 Location  --  MLD to axilla and upper arm  -RE       User Key  (r) = Recorded By, (t) = Taken By, (c) = Cosigned By    Initials Name Provider Type    Julia Rowe OTR  Occupational Therapist            Therapy Education  Given: HEP  Program: Reinforced  How Provided: Verbal, Demonstration  Provided to: Patient  Level of Understanding: Teach back education performed, Verbalized                Time Calculation:   OT Start Time: 1300  OT Stop Time: 1345  OT Time Calculation (min): 45 min  Timed Charges  27396 - OT Manual Therapy Minutes: 45  Total Minutes  Timed Charges Total Minutes: 45   Total Minutes: 45     Therapy Charges for Today     Code Description Service Date Service Provider Modifiers Qty    [id number] HC OT MANUAL THERAPY EA 15 MIN 8/24/2021 Julia Leslie OTR GO 3                      VI Ulloa  8/24/2021

## 2021-08-25 ENCOUNTER — OFFICE VISIT (OUTPATIENT)
Dept: ONCOLOGY | Facility: CLINIC | Age: 41
End: 2021-08-25

## 2021-08-25 ENCOUNTER — INFUSION (OUTPATIENT)
Dept: ONCOLOGY | Facility: HOSPITAL | Age: 41
End: 2021-08-25

## 2021-08-25 VITALS
RESPIRATION RATE: 16 BRPM | HEART RATE: 97 BPM | HEIGHT: 62 IN | DIASTOLIC BLOOD PRESSURE: 79 MMHG | BODY MASS INDEX: 27.42 KG/M2 | SYSTOLIC BLOOD PRESSURE: 127 MMHG | WEIGHT: 149 LBS | TEMPERATURE: 98 F | OXYGEN SATURATION: 100 %

## 2021-08-25 VITALS — SYSTOLIC BLOOD PRESSURE: 144 MMHG | HEART RATE: 88 BPM | DIASTOLIC BLOOD PRESSURE: 90 MMHG

## 2021-08-25 DIAGNOSIS — I63.81 CEREBROVASCULAR ACCIDENT (CVA) DUE TO OCCLUSION OF SMALL ARTERY (HCC): ICD-10-CM

## 2021-08-25 DIAGNOSIS — D68.51 FACTOR 5 LEIDEN MUTATION, HETEROZYGOUS (HCC): ICD-10-CM

## 2021-08-25 DIAGNOSIS — Z17.1 MALIGNANT NEOPLASM OF OVERLAPPING SITES OF LEFT BREAST IN FEMALE, ESTROGEN RECEPTOR NEGATIVE (HCC): Primary | ICD-10-CM

## 2021-08-25 DIAGNOSIS — Z17.1 MALIGNANT NEOPLASM OF OVERLAPPING SITES OF LEFT BREAST IN FEMALE, ESTROGEN RECEPTOR NEGATIVE (HCC): ICD-10-CM

## 2021-08-25 DIAGNOSIS — K59.03 DRUG-INDUCED CONSTIPATION: Primary | ICD-10-CM

## 2021-08-25 DIAGNOSIS — C50.812 MALIGNANT NEOPLASM OF OVERLAPPING SITES OF LEFT BREAST IN FEMALE, ESTROGEN RECEPTOR NEGATIVE (HCC): Primary | ICD-10-CM

## 2021-08-25 DIAGNOSIS — Z45.2 ENCOUNTER FOR ADJUSTMENT OR MANAGEMENT OF VASCULAR ACCESS DEVICE: ICD-10-CM

## 2021-08-25 DIAGNOSIS — D64.9 ANEMIA, UNSPECIFIED TYPE: ICD-10-CM

## 2021-08-25 DIAGNOSIS — C50.812 MALIGNANT NEOPLASM OF OVERLAPPING SITES OF LEFT BREAST IN FEMALE, ESTROGEN RECEPTOR NEGATIVE (HCC): ICD-10-CM

## 2021-08-25 PROBLEM — D50.9 IRON DEFICIENCY ANEMIA: Status: ACTIVE | Noted: 2021-08-25

## 2021-08-25 LAB
ALBUMIN SERPL-MCNC: 3.8 G/DL (ref 3.5–5.2)
ALBUMIN/GLOB SERPL: 1.5 G/DL
ALP SERPL-CCNC: 73 U/L (ref 39–117)
ALT SERPL W P-5'-P-CCNC: 27 U/L (ref 1–33)
ANION GAP SERPL CALCULATED.3IONS-SCNC: 9.3 MMOL/L (ref 5–15)
AST SERPL-CCNC: 19 U/L (ref 1–32)
BASOPHILS # BLD AUTO: 0.08 10*3/MM3 (ref 0–0.2)
BASOPHILS NFR BLD AUTO: 1.2 % (ref 0–1.5)
BILIRUB SERPL-MCNC: 0.2 MG/DL (ref 0–1.2)
BUN SERPL-MCNC: 17 MG/DL (ref 6–20)
BUN/CREAT SERPL: 22.1 (ref 7–25)
CALCIUM SPEC-SCNC: 9.1 MG/DL (ref 8.6–10.5)
CHLORIDE SERPL-SCNC: 104 MMOL/L (ref 98–107)
CO2 SERPL-SCNC: 23.7 MMOL/L (ref 22–29)
CREAT SERPL-MCNC: 0.77 MG/DL (ref 0.57–1)
DEPRECATED RDW RBC AUTO: 50.6 FL (ref 37–54)
EOSINOPHIL # BLD AUTO: 0.21 10*3/MM3 (ref 0–0.4)
EOSINOPHIL NFR BLD AUTO: 3.2 % (ref 0.3–6.2)
ERYTHROCYTE [DISTWIDTH] IN BLOOD BY AUTOMATED COUNT: 17.1 % (ref 12.3–15.4)
GFR SERPL CREATININE-BSD FRML MDRD: 82 ML/MIN/1.73
GLOBULIN UR ELPH-MCNC: 2.5 GM/DL
GLUCOSE SERPL-MCNC: 131 MG/DL (ref 65–99)
HCT VFR BLD AUTO: 31.5 % (ref 34–46.6)
HGB BLD-MCNC: 9.7 G/DL (ref 12–15.9)
IMM GRANULOCYTES # BLD AUTO: 0.05 10*3/MM3 (ref 0–0.05)
IMM GRANULOCYTES NFR BLD AUTO: 0.8 % (ref 0–0.5)
LYMPHOCYTES # BLD AUTO: 2.08 10*3/MM3 (ref 0.7–3.1)
LYMPHOCYTES NFR BLD AUTO: 31.7 % (ref 19.6–45.3)
MCH RBC QN AUTO: 25.4 PG (ref 26.6–33)
MCHC RBC AUTO-ENTMCNC: 30.8 G/DL (ref 31.5–35.7)
MCV RBC AUTO: 82.5 FL (ref 79–97)
MONOCYTES # BLD AUTO: 0.44 10*3/MM3 (ref 0.1–0.9)
MONOCYTES NFR BLD AUTO: 6.7 % (ref 5–12)
NEUTROPHILS NFR BLD AUTO: 3.7 10*3/MM3 (ref 1.7–7)
NEUTROPHILS NFR BLD AUTO: 56.4 % (ref 42.7–76)
NRBC BLD AUTO-RTO: 0 /100 WBC (ref 0–0.2)
PLATELET # BLD AUTO: 357 10*3/MM3 (ref 140–450)
PMV BLD AUTO: 9.3 FL (ref 6–12)
POTASSIUM SERPL-SCNC: 4 MMOL/L (ref 3.5–5.2)
PROT SERPL-MCNC: 6.3 G/DL (ref 6–8.5)
RBC # BLD AUTO: 3.82 10*6/MM3 (ref 3.77–5.28)
SODIUM SERPL-SCNC: 137 MMOL/L (ref 136–145)
WBC # BLD AUTO: 6.56 10*3/MM3 (ref 3.4–10.8)

## 2021-08-25 PROCEDURE — 99215 OFFICE O/P EST HI 40 MIN: CPT | Performed by: INTERNAL MEDICINE

## 2021-08-25 PROCEDURE — 25010000002 DEXAMETHASONE SODIUM PHOSPHATE 100 MG/10ML SOLUTION: Performed by: INTERNAL MEDICINE

## 2021-08-25 PROCEDURE — 80053 COMPREHEN METABOLIC PANEL: CPT | Performed by: INTERNAL MEDICINE

## 2021-08-25 PROCEDURE — 96413 CHEMO IV INFUSION 1 HR: CPT

## 2021-08-25 PROCEDURE — 25010000002 PACLITAXEL PER 1 MG: Performed by: INTERNAL MEDICINE

## 2021-08-25 PROCEDURE — 85025 COMPLETE CBC W/AUTO DIFF WBC: CPT | Performed by: INTERNAL MEDICINE

## 2021-08-25 PROCEDURE — 25010000002 HEPARIN LOCK FLUSH PER 10 UNITS: Performed by: INTERNAL MEDICINE

## 2021-08-25 PROCEDURE — 96375 TX/PRO/DX INJ NEW DRUG ADDON: CPT

## 2021-08-25 PROCEDURE — 25010000002 TRASTUZUMAB-ANNS 420 MG RECONSTITUTED SOLUTION 1 EACH VIAL: Performed by: INTERNAL MEDICINE

## 2021-08-25 PROCEDURE — 96417 CHEMO IV INFUS EACH ADDL SEQ: CPT

## 2021-08-25 PROCEDURE — 25010000002 DIPHENHYDRAMINE PER 50 MG: Performed by: INTERNAL MEDICINE

## 2021-08-25 RX ORDER — SODIUM CHLORIDE 9 MG/ML
250 INJECTION, SOLUTION INTRAVENOUS ONCE
Status: CANCELLED | OUTPATIENT
Start: 2021-08-25

## 2021-08-25 RX ORDER — HEPARIN SODIUM (PORCINE) LOCK FLUSH IV SOLN 100 UNIT/ML 100 UNIT/ML
500 SOLUTION INTRAVENOUS AS NEEDED
Status: DISCONTINUED | OUTPATIENT
Start: 2021-08-25 | End: 2021-08-25 | Stop reason: HOSPADM

## 2021-08-25 RX ORDER — DIPHENHYDRAMINE HYDROCHLORIDE 50 MG/ML
50 INJECTION INTRAMUSCULAR; INTRAVENOUS AS NEEDED
Status: CANCELLED | OUTPATIENT
Start: 2021-08-25

## 2021-08-25 RX ORDER — HEPARIN SODIUM (PORCINE) LOCK FLUSH IV SOLN 100 UNIT/ML 100 UNIT/ML
500 SOLUTION INTRAVENOUS AS NEEDED
Status: CANCELLED | OUTPATIENT
Start: 2021-08-25

## 2021-08-25 RX ORDER — SODIUM CHLORIDE 0.9 % (FLUSH) 0.9 %
10 SYRINGE (ML) INJECTION AS NEEDED
Status: CANCELLED | OUTPATIENT
Start: 2021-08-25

## 2021-08-25 RX ORDER — FAMOTIDINE 10 MG/ML
20 INJECTION, SOLUTION INTRAVENOUS ONCE
Status: COMPLETED | OUTPATIENT
Start: 2021-08-25 | End: 2021-08-25

## 2021-08-25 RX ORDER — SENNA PLUS 8.6 MG/1
1 TABLET ORAL 2 TIMES DAILY
Qty: 60 TABLET | Refills: 2 | Status: SHIPPED | OUTPATIENT
Start: 2021-08-25 | End: 2021-09-08

## 2021-08-25 RX ORDER — SODIUM CHLORIDE 0.9 % (FLUSH) 0.9 %
10 SYRINGE (ML) INJECTION AS NEEDED
Status: DISCONTINUED | OUTPATIENT
Start: 2021-08-25 | End: 2021-08-25 | Stop reason: HOSPADM

## 2021-08-25 RX ORDER — FAMOTIDINE 10 MG/ML
20 INJECTION, SOLUTION INTRAVENOUS AS NEEDED
Status: CANCELLED | OUTPATIENT
Start: 2021-08-25

## 2021-08-25 RX ORDER — FAMOTIDINE 10 MG/ML
20 INJECTION, SOLUTION INTRAVENOUS ONCE
Status: CANCELLED | OUTPATIENT
Start: 2021-08-25

## 2021-08-25 RX ORDER — SODIUM CHLORIDE 9 MG/ML
250 INJECTION, SOLUTION INTRAVENOUS ONCE
Status: COMPLETED | OUTPATIENT
Start: 2021-08-25 | End: 2021-08-25

## 2021-08-25 RX ADMIN — Medication 500 UNITS: at 12:16

## 2021-08-25 RX ADMIN — DEXAMETHASONE SODIUM PHOSPHATE 12 MG: 10 INJECTION, SOLUTION INTRAMUSCULAR; INTRAVENOUS at 09:40

## 2021-08-25 RX ADMIN — PACLITAXEL 135 MG: 6 INJECTION, SOLUTION INTRAVENOUS at 10:37

## 2021-08-25 RX ADMIN — SODIUM CHLORIDE, PRESERVATIVE FREE 10 ML: 5 INJECTION INTRAVENOUS at 12:15

## 2021-08-25 RX ADMIN — FAMOTIDINE 20 MG: 10 INJECTION INTRAVENOUS at 09:23

## 2021-08-25 RX ADMIN — TRASTUZUMAB-ANNS 130 MG: 420 INJECTION, POWDER, LYOPHILIZED, FOR SOLUTION INTRAVENOUS at 11:41

## 2021-08-25 RX ADMIN — SODIUM CHLORIDE 250 ML: 9 INJECTION, SOLUTION INTRAVENOUS at 09:23

## 2021-08-25 RX ADMIN — DIPHENHYDRAMINE HYDROCHLORIDE 25 MG: 50 INJECTION INTRAMUSCULAR; INTRAVENOUS at 09:25

## 2021-08-25 NOTE — PROGRESS NOTES
Subjective     REASON FOR follow-up:    1.  Heterozygous state for factor V Leiden    2.  New onset stroke on aspirin by neurology    3.  Hypercholesterolemia    4.  Hypercoagulable work-up done.  Antithrombin 109% protein S activity 85% protein S antigen 107%, protein C activity 85%.  Anticardiolipin antibody IgM 24%, antibeta-2 glycoprotein antibody negative, lupus anticoagulant not detected, prothrombin gene mutation negative.    5.  Screening mammogram showed abnormality in the left breast 6 o'clock position, 8 mm on ultrasound, ultrasound-guided left breast biopsy, 6 cm from the nipple showed evidence of invasive ductal carcinoma poorly differentiated grade 3, Ludlow score of 8 out of 9 ER negative, FL negative, HER-2/ese 3+ positive.    6.  CT scan February 24, 2021 showed focal area of fatty infiltration of the liver.  1 cm hypoattenuating cortical lesion in the upper pole of the right kidney.  Similarly nodule in the upper pole of the left kidney is 1.5 cm.  Further evaluation by ultrasound suggested  · Ultrasound March 1, 2021 shows solid lesion in the upper pole of the right kidney.  In the left kidney along the midpole of the left kidney is a nodule which is 16 x 16 x 14 mm which is thought to be a cyst.  A 6-month follow-up CT suggested    6.  S/p left partial mastectomy with sentinel lymph node biopsy.  Tumor was present at 5:00, invasive ductal carcinoma, Ludlow score of 8, grade 3, greatest dimension was 12 mm x 8 x 4 mm.  Single focus of invasive carcinoma present.  There was extensive DCIS which is 30 mm x 8 x 2 mm, grade 3, no evidence of lymphovascular space invasion or dermal lymphatic invasion.  Margins were clear.  4 lymph nodes were negative.  It is a p T1cp N0, ER negative FL negative HER-2/ese 3+ with Ki-67 of 50%.  · [employer] genetic test negative for 47 genes    7.recently initiated Xarelto at loading dose of 15 mg twice a day x3 weeks to then be switched to 20 mg daily per  protocol.  She is doing this because of Mediport in place and known factor V Leiden heterozygosity.                                   History of Present Illness   Patient is a 41 y.o. female with pT1cp N0 ER negative IA negative HER-2 positive left breast cancer s/p lumpectomy patient is on weekly Taxol Herceptin.  This is her fourth dose.  She is tolerating well except constipation.  But she is on ferrous sulfate because her ferritin was low and that is likely causing constipation.  She states the Dulcolax makes her nauseated.  We discussed about stopping the Dulcolax and stopping the ferrous sulfate as we can give her IV Venofer which does not cause as much constipation.    She does not have neuropathy.  She with last treatment she got Herceptin first and then Taxol.  So I discussed with our pharmacy and I am unsure why that happened.  We will check with the nurse at [facility].  She will receive Taxol first prior to Herceptin.  She also complains of headache which could be a body ache related to the Taxol.  It is on the frontal area and sharp pain which lasts few days.  We discussed about considering taking Tylenol for that.    She had one episode of nosebleed which was a little worse but pinching the nose helped.  I discussed about holding putting ice if that happens again.  She was started on Xarelto because she has history of factor V heterozygosity, history of stroke and currently has a port.                Oncologic history:  Patient is here for follow-up of her mammogram.  Patient had screening mammogram April 2, 2021:  NAD a focal asymmetry was present in the posterior one third retroareolar region of the left breast.  Further spot compression images and left breast ultrasound was suggested.  Right breast was negative    April 9, 2021: Left diagnostic mammogram showed an area of focal asymmetry in the posterior one third region aspect of the left breast her breast    Ultrasound showed an irregular 0.8 cm  lesion in the left breast at the 6 o'clock position of the order of 6 cm from the nipple.  Ultrasound-guided left breast biopsy was recommended.    April 26, 2021: Ultrasound-guided biopsy of the left breast 6 o'clock position, 6 cm from the nipple showed    Invasive ductal carcinoma, poorly differentiated with a Oziel score grade 3 with a score of 8 out of 9 measuring at least 7 mm.  No definitive ductal carcinoma is in situ is identified.  ER 1% negative  PA 1% negative   HER-2/ese 3+ positive    There was no evidence of lymphadenopathy either in the mammogram of the ultrasound.  We suggested an MRI of the breast.  Patient has strong family history of breast cancer    Interval history  May 7, 2021: MRI of bilateral breast reviewed.  My interpretation is that there is area of enhancement in the 6 o'clock position of the left breast this is the biopsy-proven malignancy.  There is additional area of linear enhancement anteriorly and laterally measuring up to 1.2 cm this is approximately 5.6 cm from the nipple.  In addition there is a enhancement 2 to 3 mm in the anterolateral aspect of the lateral aspect of the left breast which is 4.6 cm from the nipple.  There is also mildly prominent posterior lymph node in the mid axilla which measures 1 cm with cortical thickening.  Findings are consistent with multifocal disease.  No contralateral disease or internal mammary adenopathy identified    May 20, 2021: Genetic test, [employer] genetic test shows 47 genes negative    May 21, 2021: I reviewed the biopsy of the lymph node in the left axilla which shows reactive lymph node negative for any carcinoma or lymphoma    June 2, 2021:Final Diagnosis  1. Left Breast, 5:00 o'clock, MRI-guided Biopsies for Linear Enhancement: INVASIVE MAMMARY CARCINOMA, NO  SPECIAL TYPE (INVASIVE DUCTAL CARCINOMA).  A. Largest contiguous focus in a core measures 4 mm.  B. No in-situ component identified.  C. Brisk lymphocytic response noted (see  Comment).  D. No definitive lymphovascular nor perineural invasion identified.  2. Left Breast, 2:00 o'clock, MRI-guided Biopsy for Enhancement:  A. Benign breast parenchyma with radial scar and micropapillomas.  B. Usual ductal hyperplasia and columnar cell hyperplasia.  C. No atypical hyperplasia, in-situ nor invasive carcinoma identified    ER, NH, HER-2 new and Ki-67 pending on this new biopsy      Patient has been seen by Dr. Lashonda Euceda with plans of doing surgery on July first 2021    Once patient undergoes surgery lumpectomy with sentinel lymph node biopsy we will give further recommendation about treatment options.  Given that patient has multifocal disease and both of the lesions 2 lesions are 1.2 cm each, with it being a HER-2 positive tumor on the previous biopsy at 6:00 Dr. Euceda and myself discussed and patient preferred to undergo port placement at the same time of surgery.    Patient had [facility] genetic test which was negative.    She has completed surgery July 1, 2021.  She underwent left partial mastectomy with left axillary sentinel lymph node biopsy and right port placement.  Clinically she was thought to have a high-grade multifocal invasive ductal carcinoma ER/NH negative, NH positive.  The lesions require preoperative localization.  The multifocal cancer was bracketed with 2 savvy markers and the radial scar was localized with a needle.  Because of the volume of tissue that will be excised related to the breast volume the case was done in conjunction with Dr. Zavala for oncoplastic closure.    She is 2 weeks from surgery.  She is healing up reasonably well.    On review of her pathology she had left partial mastectomy with sentinel lymph node biopsy.  Tumor was present at 5:00, invasive ductal carcinoma, Oziel score of 8, grade 3, greatest dimension was 12 mm x 8 x 4 mm.  Single focus of invasive carcinoma present.  There was extensive DCIS which is 30 mm x 8 x 2 mm, grade 3, no  evidence of lymphovascular space invasion or dermal lymphatic invasion.  Margins were clear.  4 lymph nodes were negative.  It is a p T1cp N0, ER negative CO negative HER-2/ese 3+ with Ki-67 of 50%.    Patient is here to discuss further options of treatment.  Given small tumor T1c N0, she is a good candidate for weekly Taxol Herceptin.    Given that patient has heterozygous state for factor V Leiden and currently has port placed we will plan to start Eliquis 2.5 mg p.o. twice daily.  I have left a message for Dr. Craft in neurology to call me to discuss if patient needs to continue aspirin or we can hold off since be starting Eliquis.      Genetic test negative    August 4, 2021: Weekly Taxol Herceptin x12 weeks followed by Herceptin x1 year.  Cycle 1 today          Hematologic history:  patient is a 40-year-old female who presented end of December with numbness and tingling in her left upper extremity and left face.  She went to see Dr. Goodman who then got concerned and referred to neurology.  She was referred to Dr. Freedamn and talk to Dr. Thompson neurology for evaluation.  Patient underwent ultrasound of the carotids which did not show significant stenosis of the carotids.  She underwent 2D echocardiogram which showed a good ejection fraction of 64% with mild mitral valve prolapse and mild mitral stenosis.  She had a 24-hour Holter which was negative.  She then had also an MRI of the brain February 3, 2021 which showed areas of hyperintensity involving the subcortical white matter of the right frontal lobe superior laterally and posteriorly with the largest area measuring 8 9 mm.  There is also enhancement present.  The findings are consistent with a subacute infarct.  They could not differentiate if there is any underlying neoplastic process but a short-term follow-up was suggested in order to follow-up.  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    Patient currently got started on aspirin and factor V Leiden was obtained by neurology because patient's paternal aunt had factor V Leiden mutation and she was heterozygous.  Patient's testing also showed that she was heterozygous.    Patient states her numbness and tingling is resolved completely on the left arm and face it just lasted for a 24 hours.  She was here referred here for neurology to see if there is any other cause for her underlying hypercoagulable state.  She has not had a complete hypercoagulable work-up.  Also discussed with her that an underlying malignancy could cause a hypercoagulable state and we would need to do a CT scan to rule that out.    Patient's mother has had breast cancer in her 50s but had pancreatic cancer at 68 and  from that.  Patient's dad had stroke at 63.  But he is alive at 74.  She has 1 brother who is 40 in good health.  Paternal aunt had breast cancer in her 40s.  Paternal grandfather had colon cancer in his late 80s.  Maternal uncle had throat cancer and another maternal uncle had skin cancer with metastasis to the lung.  And maternal grandmother had breast cancer.    Patient was to have mammogram done last year but because of COVID-19 it got postponed and she has not had it done yet.    Patient used to be a smoker half pack per day for 7 years but quit 10 years ago.  She has high cholesterol for which she is on treatment.    Past Medical History:   Diagnosis Date   • Acute stress reaction 2014   • ADD (attention deficit disorder)    • ADHD (attention deficit hyperactivity disorder)    • Anxiety and depression    • CTS (carpal tunnel syndrome)    • Factor 5 Leiden mutation, heterozygous (CMS/[facility]) 2021   • Family history of breast cancer 2013   • Fracture, cervical vertebra (CMS/[facility]) 1997    C4   • H/O complete eye exam 2016   • Hearing loss    • 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   •  History of rheumatic fever    • Hypertension    • Hypothyroidism    • Infiltrating ductal carcinoma of left breast (CMS/HCC) 5/5/2021    Left   • Kidney stone    • Mitral valve insufficiency    • PONV (postoperative nausea and vomiting)    • Stroke (CMS/HCC) 12/2020    HX OF   • Stroke-like symptoms         Past Surgical History:   Procedure Laterality Date   • BREAST BIOPSY Left 05/05/2021    IDC   • BREAST LUMPECTOMY WITH SENTINEL NODE BIOPSY N/A 7/1/2021    Procedure: right port placement, Left SHAINA-guided, bracketed, partial mastectomy and sentinel lymph node biopsy Left breast needle-localized excisional biopsy;  Surgeon: Lashonda Euceda MD;  Location: [facility];  Service: General;  Laterality: N/A;   • BREAST SURGERY Bilateral 7/1/2021    Procedure: RIGHT BREAST REDUCTION MASTOPEXY LEFT BREAST ONCOPLASTIC CLOSURE;  Surgeon: Caridad Baker MD PhD;  Location: [facility];  Service: Plastics;  Laterality: Bilateral;   • NO PAST SURGERIES     • PAP SMEAR  2016        Current Outpatient Medications on File Prior to Visit   Medication Sig Dispense Refill   • amphetamine-dextroamphetamine XR (Adderall XR) 20 MG 24 hr capsule Take 1 capsule by mouth Every Morning 30 capsule 0   • atorvastatin (Lipitor) 10 MG tablet Take 1 tablet by mouth Daily. 30 tablet 11   • Cholecalciferol (VITAMIN D3 PO) Take 2,000 Units by mouth. Takes 2000 twice a week;  SUNDAYS AND WEDNESDAYS     • Cobalamin Combinations (B12 FOLATE PO) Take  by mouth. 400 mg folate 400 mg b12     • famotidine (PEPCID) 20 MG tablet Take 1 tablet by mouth Daily. 30 tablet 3   • ferrous sulfate 325 (65 FE) MG tablet Take 1 tablet by mouth 2 (two) times a day. 60 tablet 3   • levothyroxine (Synthroid) 150 MCG tablet Take 1 tablet by mouth Daily. 90 tablet 1   • lidocaine-prilocaine (EMLA) 2.5-2.5 % cream Apply  topically to the appropriate area as directed As Needed for Mild Pain . 1 each 2   • LORazepam (Ativan) 0.5 MG tablet Take 1 tablet  by mouth Every 8 (Eight) Hours As Needed for Anxiety. 30 tablet 0   • ondansetron (ZOFRAN) 8 MG tablet Take 1 tablet by mouth 3 (Three) Times a Day As Needed for Nausea or Vomiting. 30 tablet 5   • Pyridoxine HCl (B-6 PO) Take 50 mg by mouth Daily.     • rivaroxaban (XARELTO) 15 MG tablet Take 1 tablet by mouth 2 (Two) Times a Day With Meals. 42 tablet 0   • rivaroxaban (XARELTO) 20 MG tablet Take 1 tablet by mouth Daily. 30 tablet 3   • valsartan (DIOVAN) 160 MG tablet Take 1 tablet by mouth Daily. 30 tablet 5     Current Facility-Administered Medications on File Prior to Visit   Medication Dose Route Frequency Provider Last Rate Last Admin   • heparin injection 500 Units  500 Units Intravenous PRN Neena Beavers MD   500 Units at 21 1718   • sodium chloride 0.9 % flush 10 mL  10 mL Intravenous PRN Neena Beavers MD   10 mL at 21        ALLERGIES:    Allergies   Allergen Reactions   • Sulfa Antibiotics Rash        Social History     Socioeconomic History   • Marital status: Significant Other     Spouse name: Not on file   • Number of children: 0   • Years of education: Not on file   • Highest education level: Not on file   Tobacco Use   • Smoking status: Former Smoker     Packs/day: 1.00     Years: 10.00     Pack years: 10.00     Types: Cigarettes     Start date:      Quit date:      Years since quittin.6   • Smokeless tobacco: Never Used   Vaping Use   • Vaping Use: Never used   Substance and Sexual Activity   • Alcohol use: Yes     Comment: RARELY   • Drug use: No   • Sexual activity: Defer     Partners: Male        Family History   Problem Relation Age of Onset   • Breast cancer Mother 53   • Pancreatic cancer Mother 68   • Lung cancer Maternal Grandmother    • Stroke Father    • Breast cancer Paternal Aunt 55   • Prostate cancer Maternal Grandfather    • Prostate cancer Paternal Grandfather    • Brain cancer Maternal Cousin 20   • Melanoma Maternal Uncle    • Ovarian cancer  "Other         Paternal great aunt    • Breast cancer Other    • Breast cancer Paternal Great-Grandmother 94   • Hypertension Brother    • Malig Hyperthermia Neg Hx       Family  history: Mother had breast cancer at age 57.  Maternal great aunt had breast cancer and paternal great aunt had breast cancer in her 40s.  Patient's mother had pancreatic cancer as well.  Paternal grandfather had prostate cancer.      Review of Systems   Constitutional: Negative for appetite change, chills, diaphoresis, fatigue, fever and unexpected weight change.   HENT: Negative for hearing loss, sore throat and trouble swallowing.    Respiratory: Negative for cough, chest tightness, shortness of breath and wheezing.    Cardiovascular: Negative for chest pain, palpitations and leg swelling.   Gastrointestinal: Positive for abdominal pain (reflux - see HPI  Lt side abd-08/25/21-Unchanged) and constipation (08/25/21). Negative for abdominal distention, diarrhea, nausea and vomiting.   Genitourinary: Negative for dysuria, frequency, hematuria and urgency.   Musculoskeletal: Negative for joint swelling.        No muscle weakness.   Skin: Negative for rash and wound.   Neurological: Positive for headaches (08/25/21 Daily x 1.5 weeks). Negative for seizures, syncope, speech difficulty, weakness and numbness.   Hematological: Negative for adenopathy. Does not bruise/bleed easily.   Psychiatric/Behavioral: Positive for dysphoric mood (stable 08/25/21-Improved). Negative for behavioral problems, confusion, sleep disturbance and suicidal ideas.   All other systems reviewed and are negative.     I have reviewed and confirmed the accuracy of the ROS as documented by the MA/LPN/RN Neena Beavers MD        Objective     Vitals:    08/25/21 0744   BP: 127/79   Pulse: 97   Resp: 16   Temp: 98 °F (36.7 °C)   TempSrc: Temporal   SpO2: 100%   Weight: 67.6 kg (149 lb)   Height: 157.5 cm (62.01\")   PainSc: 0-No pain     Current Status 8/25/2021   ECOG score 0 "       Physical exam    CONSTITUTIONAL:  Vital signs reviewed.  No distress, looks comfortable.  EYES:  Conjunctivae and lids unremarkable.  PERRLA  EARS,NOSE,MOUTH,THROAT:  Ears and nose appear unremarkable.  Lips, teeth, gums appear unremarkable.  Port area looks clean, there is no lymphedema  RESPIRATORY:  Normal respiratory effort.  Lungs clear to auscultation bilaterally.  CARDIOVASCULAR:  Normal S1, S2.  No murmurs rubs or gallops.  No significant lower extremity edema.  GASTROINTESTINAL: Abdomen appears unremarkable.  Nontender.  No hepatomegaly.  No splenomegaly.  LYMPHATIC:  No cervical, supraclavicular, axillary lymphadenopathy.  SKIN:  Warm.  No rashes.  PSYCHIATRIC:  Normal judgment and insight.  Normal mood and affect.      RECENT LABS:  Results from last 7 days   Lab Units 08/25/21  0744 08/18/21  1318   WBC 10*3/mm3 6.56 6.42   NEUTROS ABS 10*3/mm3 3.70 3.38   HEMOGLOBIN g/dL 9.7* 10.5*   HEMATOCRIT % 31.5* 33.3*   PLATELETS 10*3/mm3 357 348     Results from last 7 days   Lab Units 08/25/21  0743 08/18/21  1318   SODIUM mmol/L 137 138   POTASSIUM mmol/L 4.0 4.2   CHLORIDE mmol/L 104 104   CO2 mmol/L 23.7 23.7   BUN mg/dL 17 13   CREATININE mg/dL 0.77 0.76   CALCIUM mg/dL 9.1 9.0   ALBUMIN g/dL 3.80 4.00   BILIRUBIN mg/dL 0.2 0.3   ALK PHOS U/L 73 78   ALT (SGPT) U/L 27 25   AST (SGOT) U/L 19 19   GLUCOSE mg/dL 131* 82             Assessment/Plan       * [id number], ER negative NV negative HER-2/ese 3+ with Ki-67 of 50%.  S/p left partial mastectomy with sentinel lymph node biopsy.  Tumor was present at 5:00, invasive ductal carcinoma, Oziel score of 8, grade 3, greatest dimension was 12 mm x 8 x 4 mm.  Single focus of invasive carcinoma present.  There was extensive DCIS which is 30 mm x 8 x 2 mm, grade 3, no evidence of lymphovascular space invasion or dermal lymphatic invasion.  Margins were clear.  4 lymph nodes were negative.  It is a p T1cp N0, ER negative NV negative HER-2/ese 3+ with Ki-67 of  50%.  · Patient is s/p port placement  · Discussed in length with patient that given a small tumor she is eligible for weekly Taxol Herceptin.  Weekly Taxol x12 weeks along with weekly Herceptin followed by 1 year of Herceptin.  · Discussed patient in the breast cancer conference  · 2D echo done which showed ejection fraction of 61-65% and strain pattern of -20.8.  · Patient also seen by Dr. Virk cardiology and Dr. Virk is planning to repeat echocardiogram in 3 months after initiation of therapy.  · 8/4/2021 initiating weekly Taxol Herceptin  · No neuropathy related to Taxol.  Her next echocardiogram due November 4, 2021.  · She follows up with cardiology Dr. Camron Virk.    *  Factor V Leiden heterozygosity with right frontal stroke and patient presented in December 2020 with left-sided weakness tingling and numbness and also numbness in the face.  · Was referred to neurology and cardiology by Dr. Goodman  · Ultrasound of carotid does not show significant stenosis  · 24 Holter is negative  · 2D echocardiogram shows ejection fraction of 64% with mild mitral regurg and mitral stenosis  · Neurology obtain factor V Leiden given that patient's paternal aunt had's history of factor V Leiden and that was heterozygous for factor V Leiden  · Continue aspirin as per neurology.  Also patient on medications for her high cholesterol started after stroke currently asymptomatic  · Hypercoagulable work-up done which is negative except heterozygous state for factor V Leiden.  · Complete stroke work-up has been negative and since this is arterial stroke and she was not on aspirin prior to that and her symptoms have resolved I agree with continuing aspirin alone along with high cholesterol medications.  · MR venogram done on May 10, 2021 is negative.  Patient has been referred to the stroke neurologist Dr. Johnson  · Patient diagnosed with breast cancer with Mediport placed.  Per discussion with neurology, patient initiated on  prophylactic Xarelto and aspirin discontinued.  · Patient had one nosebleed which lasted 10 minutes but with pinching the nose it helped.  But she is switching to 20 mg daily from tomorrow.  We discussed about placing ice and notifying us if her nosebleeds are significant.  But it resolved.  It was likely secondary to dry air    * Family history of cancer: Patient's mother had breast cancer at age 50 and pancreatic cancer at age 68 and  from pancreatic cancer.  Patient's maternal grandmother had breast cancer in her 50s.  Her maternal uncle had skin cancer which went to the lung and another maternal uncle had throat cancer.  Maternal aunt had call colon polyps.  Patient's paternal aunt had breast cancer in her 40s.  And paternal grandfather with colon cancer in his 80s.  Paternal aunt also had factor V Leiden.  · Genetic testing is negative    * Solid nodule in the right kidney on ultrasound, will schedule follow-up with urology  · Patient was to follow-up with Dr. Shultz  · Will need records from urology    *  Elevated BMI, encourage diet and exercise.  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  Patient has been requiring frequent Tums.  Recommended she begin Pepcid 20 mg daily.      *Constipation likely secondary to ferrous sulfate.  Patient was iron deficient.  · We will switch to IV Venofer.    *Headache likely related to body ache because of Taxol    Plan  · Proceed with week 4 of Taxol/Herceptin  · Continue Pepcid 20 mg daily.  Patient preferring prescription sent in, done.  · Continue Xarelto as prescribed.  · Patient to notify us if she has severe nosebleeds  · Will discontinue ferrous sulfate  · Start IV Venofer once a week x4 weeks starting next week because of iron deficiency  · Will place patient on Senokot 1 p.o. twice daily as needed as well as MiraLAX as needed  · Next echo due 2021  · Follow-up once a week for weekly  Taxol Herceptin and IV Venofer x4  · Follow-up with nurse practitioner in 2 weeks and follow-up with me in 4 weeks    This patient is on drug therapy requiring intensive monitoring for toxicity.     I spent 40 minutes caring for [unfilled]  on this date of service. This time includes time spent by me in the following activities: preparing for the visit, reviewing tests, performing a medically appropriate examination and/or evaluation, counseling and educating the patient/family/caregiver, ordering medications, tests, or procedures, documenting information in the medical record, independently interpreting results and communicating that information with the patient/family/caregiver and care coordination.       MD Dr. Maxine Ibanez Dr., Dr., Dr.

## 2021-08-26 ENCOUNTER — HOSPITAL ENCOUNTER (OUTPATIENT)
Dept: OCCUPATIONAL THERAPY | Facility: HOSPITAL | Age: 41
Setting detail: THERAPIES SERIES
Discharge: HOME | End: 2021-08-26

## 2021-08-26 ENCOUNTER — HOSPITAL ENCOUNTER (OUTPATIENT)
Dept: OCCUPATIONAL THERAPY | Facility: HOSPITAL | Age: 41
Discharge: HOME | End: 2021-08-26
Admitting: SURGERY

## 2021-08-26 DIAGNOSIS — Z91.89 AT RISK FOR LYMPHEDEMA: Primary | ICD-10-CM

## 2021-08-26 DIAGNOSIS — Z17.1 MALIGNANT NEOPLASM OF OVERLAPPING SITES OF LEFT BREAST IN FEMALE, ESTROGEN RECEPTOR NEGATIVE (HCC): ICD-10-CM

## 2021-08-26 DIAGNOSIS — C50.812 MALIGNANT NEOPLASM OF OVERLAPPING SITES OF LEFT BREAST IN FEMALE, ESTROGEN RECEPTOR NEGATIVE (HCC): ICD-10-CM

## 2021-08-26 PROCEDURE — 97140 MANUAL THERAPY 1/> REGIONS: CPT

## 2021-08-26 PROCEDURE — 93702 BIS XTRACELL FLUID ANALYSIS: CPT

## 2021-08-26 NOTE — THERAPY TREATMENT NOTE
Outpatient Occupational Therapy Lymphedema Treatment Note  [facility]     Patient Name: Radha Astorga  : 1980  MRN: [id number]  Today's Date: 2021      Visit Date: 2021  Patient and therapist both masked. Therapist with face shield and gloves.  Patient Active Problem List   Diagnosis   • Family history of breast cancer   • Hypothyroidism   • Major depressive disorder, recurrent episode, moderate with anxious distress (CMS/[facility])   • Attention deficit hyperactivity disorder (ADHD), predominantly inattentive type   • Factor 5 Leiden mutation, heterozygous (CMS/HCC)   • Kidney mass   • Malignant neoplasm of overlapping sites of left breast in female, estrogen receptor negative (CMS/HCC)   • Encounter for long-term (current) use of high-risk medication   • Essential hypertension   • Rheumatic mitral valve disease   • Encounter for adjustment or management of vascular access device   • Iron deficiency anemia        Past Medical History:   Diagnosis Date   • Acute stress reaction 2014   • ADD (attention deficit disorder)    • ADHD (attention deficit hyperactivity disorder)    • Anxiety and depression    • CTS (carpal tunnel syndrome)    • Factor 5 Leiden mutation, heterozygous (CMS/HCC) 2021   • Family history of breast cancer 2013   • Fracture, cervical vertebra (CMS/[facility]) 1997    C4   • H/O complete eye exam 2016   • Hearing loss    • 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   • History of rheumatic fever    • Hypertension    • Hypothyroidism    • Infiltrating ductal carcinoma of left breast (CMS/HCC) 2021    Left   • Kidney stone    • Mitral valve insufficiency    • PONV (postoperative nausea and vomiting)    • Stroke (CMS/[facility]) 2020    HX OF   • Stroke-like symptoms         Past Surgical History:   Procedure Laterality Date   • BREAST BIOPSY Left 2021    IDC   • BREAST LUMPECTOMY WITH SENTINEL NODE BIOPSY N/A 2021    Procedure: right port  placement, Left SHAINA-guided, bracketed, partial mastectomy and sentinel lymph node biopsy Left breast needle-localized excisional biopsy;  Surgeon: Lashonda Euceda MD;  Location: [facility];  Service: General;  Laterality: N/A;   • BREAST SURGERY Bilateral 7/1/2021    Procedure: RIGHT BREAST REDUCTION MASTOPEXY LEFT BREAST ONCOPLASTIC CLOSURE;  Surgeon: Caridad Baker MD PhD;  Location: [facility];  Service: Plastics;  Laterality: Bilateral;   • NO PAST SURGERIES     • PAP SMEAR  2016         Visit Dx:      ICD-10-CM ICD-9-CM   1. At risk for lymphedema  Z91.89 V49.89   2. Malignant neoplasm of overlapping sites of left breast in female, estrogen receptor negative (CMS/[facility])  C50.812 174.8    Z17.1 V86.1       Lymphedema     Row Name 08/26/21 1700             Subjective Pain    Able to rate subjective pain?  yes  -RE      Pre-Treatment Pain Level  4  -RE         L-Dex Bioimpedence Screening    L-Dex Measurement Extremity  LUE  -RE      L-Dex Patient Position  Standing  -RE      L-Dex UE Dominate Side  Right  -RE      L-Dex UE At Risk Side  Left  -RE      L-Dex UE Pre Surgical Value  No  -RE      L-Dex UE Score  3.5  -RE      L-Dex UE Baseline Score  3.5  -RE      L-Dex UE Value Change  0  -RE      L-Dex UE Comment  WNL  -RE        User Key  (r) = Recorded By, (t) = Taken By, (c) = Cosigned By    Initials Name Provider Type    Julia Rowe OTR Occupational Therapist                                                 Time Calculation:   OT Start Time: 1500  OT Stop Time: 1515  OT Time Calculation (min): 15 min  Untimed Charges  96601 - OT Bioimpedence Rx Minutes: 15  Total Minutes  Untimed Charges Total Minutes: 15   Total Minutes: 15     Therapy Charges for Today     Code Description Service Date Service Provider Modifiers Qty    [id number] HC OT BIS XTRACELL FLUID ANALYSIS 8/26/2021 Julia Leslie OTR GO 1                      VI Ulloa  8/26/2021

## 2021-08-26 NOTE — THERAPY TREATMENT NOTE
Outpatient Occupational Therapy Lymphedema Treatment Note  [facility]     Patient Name: Radha Astorga  : 1980  MRN: [id number]  Today's Date: 2021      Visit Date: 2021  Patient and therapist both masked. Therapist with face shield and gloves.  Patient Active Problem List   Diagnosis   • Family history of breast cancer   • Hypothyroidism   • Major depressive disorder, recurrent episode, moderate with anxious distress (CMS/[facility])   • Attention deficit hyperactivity disorder (ADHD), predominantly inattentive type   • Factor 5 Leiden mutation, heterozygous (CMS/HCC)   • Kidney mass   • Malignant neoplasm of overlapping sites of left breast in female, estrogen receptor negative (CMS/HCC)   • Encounter for long-term (current) use of high-risk medication   • Essential hypertension   • Rheumatic mitral valve disease   • Encounter for adjustment or management of vascular access device   • Iron deficiency anemia        Past Medical History:   Diagnosis Date   • Acute stress reaction 2014   • ADD (attention deficit disorder)    • ADHD (attention deficit hyperactivity disorder)    • Anxiety and depression    • CTS (carpal tunnel syndrome)    • Factor 5 Leiden mutation, heterozygous (CMS/HCC) 2021   • Family history of breast cancer 2013   • Fracture, cervical vertebra (CMS/[facility]) 1997    C4   • H/O complete eye exam 2016   • Hearing loss    • 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   • History of rheumatic fever    • Hypertension    • Hypothyroidism    • Infiltrating ductal carcinoma of left breast (CMS/HCC) 2021    Left   • Kidney stone    • Mitral valve insufficiency    • PONV (postoperative nausea and vomiting)    • Stroke (CMS/[facility]) 2020    HX OF   • Stroke-like symptoms         Past Surgical History:   Procedure Laterality Date   • BREAST BIOPSY Left 2021    IDC   • BREAST LUMPECTOMY WITH SENTINEL NODE BIOPSY N/A 2021    Procedure: right port  placement, Left SHAINA-guided, bracketed, partial mastectomy and sentinel lymph node biopsy Left breast needle-localized excisional biopsy;  Surgeon: Lashonda Euceda MD;  Location: [facility];  Service: General;  Laterality: N/A;   • BREAST SURGERY Bilateral 7/1/2021    Procedure: RIGHT BREAST REDUCTION MASTOPEXY LEFT BREAST ONCOPLASTIC CLOSURE;  Surgeon: Caridad Baker MD PhD;  Location: [facility];  Service: Plastics;  Laterality: Bilateral;   • NO PAST SURGERIES     • PAP SMEAR  2016         Visit Dx:      ICD-10-CM ICD-9-CM   1. At risk for lymphedema  Z91.89 V49.89   2. Malignant neoplasm of overlapping sites of left breast in female, estrogen receptor negative (CMS/[facility])  C50.812 174.8    Z17.1 V86.1       Lymphedema     Row Name 08/26/21 1400             Subjective Pain    Able to rate subjective pain?  yes  -RE      Pre-Treatment Pain Level  4  -RE         Left Upper Ext    Lt Shoulder Abduction AROM  125  -RE      Lt Shoulder Flexion AROM  148  -RE         L-Dex Bioimpedence Screening    L-Dex Measurement Extremity  LUE  -RE      L-Dex Patient Position  Standing  -RE      L-Dex UE Dominate Side  Right  -RE      L-Dex UE At Risk Side  Left  -RE      L-Dex UE Pre Surgical Value  No  -RE      L-Dex UE Score  3.5  -RE      L-Dex UE Baseline Score  3.5  -RE      L-Dex UE Value Change  0  -RE      L-Dex UE Comment  WNL  -RE        User Key  (r) = Recorded By, (t) = Taken By, (c) = Cosigned By    Initials Name Provider Type    RE Julia Leslie OTR Occupational Therapist                  OT Assessment/Plan     Row Name 08/26/21 3260          OT Assessment    Assessment Comments  Cording is improving slowly with decreased Pain overall and a decreased painful area. L-dex value today was 3.5 which is WNL. Plan to retest in 3 months or just prior to radiation whichever comes first.  -RE        OT Plan    OT Plan Comments  continue  -RE       User Key  (r) = Recorded By, (t) = Taken By, (c) = Cosigned  By    Initials Name Provider Type    Julia Rowe OTR Occupational Therapist                 Manual Rx (last 36 hours)      Manual Treatments     Row Name 08/26/21 1735 08/26/21 1700          Total Minutes    96430 - OT Manual Therapy Minutes  30  -RE  --        Manual Rx 1    Manual Rx 1 Location  --  c and s sargent over corded area  -RE        Manual Rx 2    Manual Rx 2 Location  --  gentle manual traction to corded area  -RE       User Key  (r) = Recorded By, (t) = Taken By, (c) = Cosigned By    Initials Name Provider Type    Julia Rowe OTR Occupational Therapist            Therapy Education  Education Details: Discussed patient's current L-Dex value of 3.5 and recommended sleeve wear during air travel and UE exercise and during waking hours while undergoing radiation and for 4-6 weeks post radiation.  Discussed timetable for bioimpedance. Revised HEP to include AROM against gravity.  Given: Symptoms/condition management, HEP  Program: New, Reinforced  How Provided: Verbal, Demonstration, Written  Provided to: Patient  Level of Understanding: Teach back education performed, Verbalized                Time Calculation:   OT Start Time: 1430  OT Stop Time: 1500  OT Time Calculation (min): 30 min  Total Timed Code Minutes- OT: 30 minute(s)  Timed Charges  79389 - OT Manual Therapy Minutes: 30  Total Minutes  Timed Charges Total Minutes: 30   Total Minutes: 30     Therapy Charges for Today     Code Description Service Date Service Provider Modifiers Qty    [id number]  OT MANUAL THERAPY EA 15 MIN 8/26/2021 Julia Leslie OTR GO 2                      VI Ulloa  8/26/2021

## 2021-08-30 RX ORDER — DIPHENHYDRAMINE HYDROCHLORIDE 50 MG/ML
50 INJECTION INTRAMUSCULAR; INTRAVENOUS AS NEEDED
Status: CANCELLED | OUTPATIENT
Start: 2021-09-01

## 2021-08-30 RX ORDER — FAMOTIDINE 10 MG/ML
20 INJECTION, SOLUTION INTRAVENOUS AS NEEDED
Status: CANCELLED | OUTPATIENT
Start: 2021-09-01

## 2021-08-30 RX ORDER — FAMOTIDINE 10 MG/ML
20 INJECTION, SOLUTION INTRAVENOUS ONCE
Status: CANCELLED | OUTPATIENT
Start: 2021-09-01

## 2021-08-30 RX ORDER — SODIUM CHLORIDE 9 MG/ML
250 INJECTION, SOLUTION INTRAVENOUS ONCE
Status: CANCELLED | OUTPATIENT
Start: 2021-09-01

## 2021-08-31 ENCOUNTER — HOSPITAL ENCOUNTER (OUTPATIENT)
Dept: OCCUPATIONAL THERAPY | Facility: HOSPITAL | Age: 41
Setting detail: THERAPIES SERIES
Discharge: HOME | End: 2021-08-31

## 2021-08-31 DIAGNOSIS — Z91.89 AT RISK FOR LYMPHEDEMA: Primary | ICD-10-CM

## 2021-08-31 DIAGNOSIS — M79.621 AXILLARY PAIN, RIGHT: ICD-10-CM

## 2021-08-31 PROCEDURE — 97140 MANUAL THERAPY 1/> REGIONS: CPT

## 2021-09-01 ENCOUNTER — INFUSION (OUTPATIENT)
Dept: ONCOLOGY | Facility: HOSPITAL | Age: 41
End: 2021-09-01

## 2021-09-01 ENCOUNTER — APPOINTMENT (OUTPATIENT)
Dept: ONCOLOGY | Facility: HOSPITAL | Age: 41
End: 2021-09-01

## 2021-09-01 VITALS
RESPIRATION RATE: 18 BRPM | HEART RATE: 114 BPM | HEIGHT: 62 IN | OXYGEN SATURATION: 100 % | WEIGHT: 145.2 LBS | SYSTOLIC BLOOD PRESSURE: 113 MMHG | TEMPERATURE: 97.1 F | DIASTOLIC BLOOD PRESSURE: 78 MMHG | BODY MASS INDEX: 26.72 KG/M2

## 2021-09-01 DIAGNOSIS — Z17.1 MALIGNANT NEOPLASM OF OVERLAPPING SITES OF LEFT BREAST IN FEMALE, ESTROGEN RECEPTOR NEGATIVE (HCC): Primary | ICD-10-CM

## 2021-09-01 DIAGNOSIS — Z45.2 ENCOUNTER FOR ADJUSTMENT OR MANAGEMENT OF VASCULAR ACCESS DEVICE: ICD-10-CM

## 2021-09-01 DIAGNOSIS — D50.8 OTHER IRON DEFICIENCY ANEMIA: ICD-10-CM

## 2021-09-01 DIAGNOSIS — C50.812 MALIGNANT NEOPLASM OF OVERLAPPING SITES OF LEFT BREAST IN FEMALE, ESTROGEN RECEPTOR NEGATIVE (HCC): Primary | ICD-10-CM

## 2021-09-01 LAB
ALBUMIN SERPL-MCNC: 4.1 G/DL (ref 3.5–5.2)
ALBUMIN/GLOB SERPL: 1.7 G/DL
ALP SERPL-CCNC: 77 U/L (ref 39–117)
ALT SERPL W P-5'-P-CCNC: 30 U/L (ref 1–33)
ANION GAP SERPL CALCULATED.3IONS-SCNC: 7.9 MMOL/L (ref 5–15)
AST SERPL-CCNC: 15 U/L (ref 1–32)
BASOPHILS # BLD AUTO: 0.08 10*3/MM3 (ref 0–0.2)
BASOPHILS NFR BLD AUTO: 1 % (ref 0–1.5)
BILIRUB SERPL-MCNC: 0.4 MG/DL (ref 0–1.2)
BUN SERPL-MCNC: 15 MG/DL (ref 6–20)
BUN/CREAT SERPL: 18.5 (ref 7–25)
CALCIUM SPEC-SCNC: 9 MG/DL (ref 8.6–10.5)
CHLORIDE SERPL-SCNC: 105 MMOL/L (ref 98–107)
CO2 SERPL-SCNC: 24.1 MMOL/L (ref 22–29)
CREAT SERPL-MCNC: 0.81 MG/DL (ref 0.57–1)
DEPRECATED RDW RBC AUTO: 50.6 FL (ref 37–54)
EOSINOPHIL # BLD AUTO: 0.17 10*3/MM3 (ref 0–0.4)
EOSINOPHIL NFR BLD AUTO: 2.1 % (ref 0.3–6.2)
ERYTHROCYTE [DISTWIDTH] IN BLOOD BY AUTOMATED COUNT: 17.1 % (ref 12.3–15.4)
GFR SERPL CREATININE-BSD FRML MDRD: 77 ML/MIN/1.73
GLOBULIN UR ELPH-MCNC: 2.4 GM/DL
GLUCOSE SERPL-MCNC: 135 MG/DL (ref 65–99)
HCT VFR BLD AUTO: 32.1 % (ref 34–46.6)
HGB BLD-MCNC: 9.9 G/DL (ref 12–15.9)
IMM GRANULOCYTES # BLD AUTO: 0.05 10*3/MM3 (ref 0–0.05)
IMM GRANULOCYTES NFR BLD AUTO: 0.6 % (ref 0–0.5)
LYMPHOCYTES # BLD AUTO: 1.57 10*3/MM3 (ref 0.7–3.1)
LYMPHOCYTES NFR BLD AUTO: 19.2 % (ref 19.6–45.3)
MCH RBC QN AUTO: 25.6 PG (ref 26.6–33)
MCHC RBC AUTO-ENTMCNC: 30.8 G/DL (ref 31.5–35.7)
MCV RBC AUTO: 82.9 FL (ref 79–97)
MONOCYTES # BLD AUTO: 0.74 10*3/MM3 (ref 0.1–0.9)
MONOCYTES NFR BLD AUTO: 9.1 % (ref 5–12)
NEUTROPHILS NFR BLD AUTO: 5.56 10*3/MM3 (ref 1.7–7)
NEUTROPHILS NFR BLD AUTO: 68 % (ref 42.7–76)
NRBC BLD AUTO-RTO: 0 /100 WBC (ref 0–0.2)
PLATELET # BLD AUTO: 345 10*3/MM3 (ref 140–450)
PMV BLD AUTO: 9.7 FL (ref 6–12)
POTASSIUM SERPL-SCNC: 4 MMOL/L (ref 3.5–5.2)
PROT SERPL-MCNC: 6.5 G/DL (ref 6–8.5)
RBC # BLD AUTO: 3.87 10*6/MM3 (ref 3.77–5.28)
SODIUM SERPL-SCNC: 137 MMOL/L (ref 136–145)
WBC # BLD AUTO: 8.17 10*3/MM3 (ref 3.4–10.8)

## 2021-09-01 PROCEDURE — 25010000002 DEXAMETHASONE SODIUM PHOSPHATE 100 MG/10ML SOLUTION: Performed by: NURSE PRACTITIONER

## 2021-09-01 PROCEDURE — 25010000002 HEPARIN LOCK FLUSH PER 10 UNITS: Performed by: INTERNAL MEDICINE

## 2021-09-01 PROCEDURE — 96417 CHEMO IV INFUS EACH ADDL SEQ: CPT

## 2021-09-01 PROCEDURE — 96366 THER/PROPH/DIAG IV INF ADDON: CPT

## 2021-09-01 PROCEDURE — 25010000002 ALTEPLASE 2 MG RECONSTITUTED SOLUTION: Performed by: NURSE PRACTITIONER

## 2021-09-01 PROCEDURE — 96367 TX/PROPH/DG ADDL SEQ IV INF: CPT

## 2021-09-01 PROCEDURE — 25010000002 DIPHENHYDRAMINE PER 50 MG: Performed by: NURSE PRACTITIONER

## 2021-09-01 PROCEDURE — 25010000002 IRON SUCROSE PER 1 MG: Performed by: NURSE PRACTITIONER

## 2021-09-01 PROCEDURE — 80053 COMPREHEN METABOLIC PANEL: CPT | Performed by: INTERNAL MEDICINE

## 2021-09-01 PROCEDURE — 36593 DECLOT VASCULAR DEVICE: CPT

## 2021-09-01 PROCEDURE — 25010000002 PACLITAXEL PER 1 MG: Performed by: NURSE PRACTITIONER

## 2021-09-01 PROCEDURE — 96375 TX/PRO/DX INJ NEW DRUG ADDON: CPT

## 2021-09-01 PROCEDURE — 96413 CHEMO IV INFUSION 1 HR: CPT

## 2021-09-01 PROCEDURE — 85025 COMPLETE CBC W/AUTO DIFF WBC: CPT | Performed by: INTERNAL MEDICINE

## 2021-09-01 PROCEDURE — 36592 COLLECT BLOOD FROM PICC: CPT

## 2021-09-01 PROCEDURE — 25010000002 TRASTUZUMAB-ANNS 420 MG RECONSTITUTED SOLUTION 1 EACH VIAL: Performed by: NURSE PRACTITIONER

## 2021-09-01 RX ORDER — SODIUM CHLORIDE 9 MG/ML
250 INJECTION, SOLUTION INTRAVENOUS ONCE
Status: COMPLETED | OUTPATIENT
Start: 2021-09-01 | End: 2021-09-01

## 2021-09-01 RX ORDER — SODIUM CHLORIDE 0.9 % (FLUSH) 0.9 %
10 SYRINGE (ML) INJECTION AS NEEDED
Status: CANCELLED | OUTPATIENT
Start: 2021-09-01

## 2021-09-01 RX ORDER — SODIUM CHLORIDE 0.9 % (FLUSH) 0.9 %
10 SYRINGE (ML) INJECTION AS NEEDED
Status: DISCONTINUED | OUTPATIENT
Start: 2021-09-01 | End: 2021-09-01 | Stop reason: HOSPADM

## 2021-09-01 RX ORDER — HEPARIN SODIUM (PORCINE) LOCK FLUSH IV SOLN 100 UNIT/ML 100 UNIT/ML
500 SOLUTION INTRAVENOUS AS NEEDED
Status: CANCELLED | OUTPATIENT
Start: 2021-09-01

## 2021-09-01 RX ORDER — HEPARIN SODIUM (PORCINE) LOCK FLUSH IV SOLN 100 UNIT/ML 100 UNIT/ML
500 SOLUTION INTRAVENOUS AS NEEDED
Status: DISCONTINUED | OUTPATIENT
Start: 2021-09-01 | End: 2021-09-01 | Stop reason: HOSPADM

## 2021-09-01 RX ORDER — FAMOTIDINE 10 MG/ML
20 INJECTION, SOLUTION INTRAVENOUS ONCE
Status: COMPLETED | OUTPATIENT
Start: 2021-09-01 | End: 2021-09-01

## 2021-09-01 RX ADMIN — DIPHENHYDRAMINE HYDROCHLORIDE 25 MG: 50 INJECTION INTRAMUSCULAR; INTRAVENOUS at 09:44

## 2021-09-01 RX ADMIN — PACLITAXEL 135 MG: 6 INJECTION, SOLUTION INTRAVENOUS at 11:01

## 2021-09-01 RX ADMIN — TRASTUZUMAB-ANNS 130 MG: 420 INJECTION, POWDER, LYOPHILIZED, FOR SOLUTION INTRAVENOUS at 11:57

## 2021-09-01 RX ADMIN — Medication 500 UNITS: at 12:33

## 2021-09-01 RX ADMIN — SODIUM CHLORIDE, PRESERVATIVE FREE 10 ML: 5 INJECTION INTRAVENOUS at 12:32

## 2021-09-01 RX ADMIN — FAMOTIDINE 20 MG: 10 INJECTION INTRAVENOUS at 09:40

## 2021-09-01 RX ADMIN — SODIUM CHLORIDE 250 ML: 9 INJECTION, SOLUTION INTRAVENOUS at 09:44

## 2021-09-01 RX ADMIN — DEXAMETHASONE SODIUM PHOSPHATE 12 MG: 10 INJECTION, SOLUTION INTRAMUSCULAR; INTRAVENOUS at 10:00

## 2021-09-01 RX ADMIN — ALTEPLASE: 2.2 INJECTION, POWDER, LYOPHILIZED, FOR SOLUTION INTRAVENOUS at 08:36

## 2021-09-01 RX ADMIN — SODIUM CHLORIDE 250 ML: 900 INJECTION, SOLUTION INTRAVENOUS at 09:31

## 2021-09-01 RX ADMIN — IRON SUCROSE 200 MG: 20 INJECTION, SOLUTION INTRAVENOUS at 10:22

## 2021-09-01 NOTE — NURSING NOTE
Port accessed but no blood return. Several saline flushes.  Activase instilled and blood return noted after about 45 min 3ml removed in addition to a 10 ml waste.  Labs drawn.

## 2021-09-02 ENCOUNTER — APPOINTMENT (OUTPATIENT)
Dept: OCCUPATIONAL THERAPY | Facility: HOSPITAL | Age: 41
End: 2021-09-02

## 2021-09-04 NOTE — PROGRESS NOTES
Subjective     REASON FOR follow-up:    1.  Heterozygous state for factor V Leiden    2.  New onset stroke on aspirin by neurology    3.  Hypercholesterolemia    4.  Hypercoagulable work-up done.  Antithrombin 109% protein S activity 85% protein S antigen 107%, protein C activity 85%.  Anticardiolipin antibody IgM 24%, antibeta-2 glycoprotein antibody negative, lupus anticoagulant not detected, prothrombin gene mutation negative.    5.  Screening mammogram showed abnormality in the left breast 6 o'clock position, 8 mm on ultrasound, ultrasound-guided left breast biopsy, 6 cm from the nipple showed evidence of invasive ductal carcinoma poorly differentiated grade 3, Sharon score of 8 out of 9 ER negative, KS negative, HER-2/ese 3+ positive.    6.  CT scan February 24, 2021 showed focal area of fatty infiltration of the liver.  1 cm hypoattenuating cortical lesion in the upper pole of the right kidney.  Similarly nodule in the upper pole of the left kidney is 1.5 cm.  Further evaluation by ultrasound suggested  · Ultrasound March 1, 2021 shows solid lesion in the upper pole of the right kidney.  In the left kidney along the midpole of the left kidney is a nodule which is 16 x 16 x 14 mm which is thought to be a cyst.  A 6-month follow-up CT suggested    6.  S/p left partial mastectomy with sentinel lymph node biopsy.  Tumor was present at 5:00, invasive ductal carcinoma, Sharon score of 8, grade 3, greatest dimension was 12 mm x 8 x 4 mm.  Single focus of invasive carcinoma present.  There was extensive DCIS which is 30 mm x 8 x 2 mm, grade 3, no evidence of lymphovascular space invasion or dermal lymphatic invasion.  Margins were clear.  4 lymph nodes were negative.  It is a p T1cp N0, ER negative KS negative HER-2/ese 3+ with Ki-67 of 50%.  · [employer] genetic test negative for 47 genes    7.recently initiated Xarelto at loading dose of 15 mg twice a day x3 weeks to then be switched to 20 mg daily per  protocol.  She is doing this because of Mediport in place and known factor V Leiden heterozygosity.        History of Present Illness   Patient is a 41 y.o. female with pT1cp N0 ER negative [place] negative HER-2 positive left breast cancer s/p lumpectomy patient is on weekly Taxol Herceptin.  She returns today for cycle #6 weekly Taxol Herceptin.  She is also receiving weekly IV Venofer x4, due today for week #2.  She reports feeling well today.  She is eating and drinking adequately, and has added 1 protein shake daily to her regimen.  Since discontinuing oral iron, she has not experienced any nausea.  Also since discontinuing oral iron, her bowels have been begin to move regularly without the use of Senokot-S or MiraLAX.  She denies fever or chills.  She denies signs or symptoms of peripheral neuropathy.    She does report experiencing nosebleeds several times during the week.  Nosebleeds occur when she has an itch in her nose, after itching her nose it will bleed for approximately 3 to 5 minutes.  She feels this could be related to dry air.    Oncologic history:  Patient is here for follow-up of her mammogram.  Patient had screening mammogram April 2, 2021:  NAD a focal asymmetry was present in the posterior one third retroareolar region of the left breast.  Further spot compression images and left breast ultrasound was suggested.  Right breast was negative    April 9, 2021: Left diagnostic mammogram showed an area of focal asymmetry in the posterior one third region aspect of the left breast her breast    Ultrasound showed an irregular 0.8 cm lesion in the left breast at the 6 o'clock position of the order of 6 cm from the nipple.  Ultrasound-guided left breast biopsy was recommended.    April 26, 2021: Ultrasound-guided biopsy of the left breast 6 o'clock position, 6 cm from the nipple showed    Invasive ductal carcinoma, poorly differentiated with a Grayson score grade 3 with a score of 8 out of 9 measuring at  least 7 mm.  No definitive ductal carcinoma is in situ is identified.  ER 1% negative  DC 1% negative   HER-2/ese 3+ positive    There was no evidence of lymphadenopathy either in the mammogram of the ultrasound.  We suggested an MRI of the breast.  Patient has strong family history of breast cancer    Interval history  May 7, 2021: MRI of bilateral breast reviewed.  My interpretation is that there is area of enhancement in the 6 o'clock position of the left breast this is the biopsy-proven malignancy.  There is additional area of linear enhancement anteriorly and laterally measuring up to 1.2 cm this is approximately 5.6 cm from the nipple.  In addition there is a enhancement 2 to 3 mm in the anterolateral aspect of the lateral aspect of the left breast which is 4.6 cm from the nipple.  There is also mildly prominent posterior lymph node in the mid axilla which measures 1 cm with cortical thickening.  Findings are consistent with multifocal disease.  No contralateral disease or internal mammary adenopathy identified    May 20, 2021: Genetic test, [employer] genetic test shows 47 genes negative    May 21, 2021: I reviewed the biopsy of the lymph node in the left axilla which shows reactive lymph node negative for any carcinoma or lymphoma    June 2, 2021:Final Diagnosis  1. Left Breast, 5:00 o'clock, MRI-guided Biopsies for Linear Enhancement: INVASIVE MAMMARY CARCINOMA, NO  SPECIAL TYPE (INVASIVE DUCTAL CARCINOMA).  A. Largest contiguous focus in a core measures 4 mm.  B. No in-situ component identified.  C. Brisk lymphocytic response noted (see Comment).  D. No definitive lymphovascular nor perineural invasion identified.  2. Left Breast, 2:00 o'clock, MRI-guided Biopsy for Enhancement:  A. Benign breast parenchyma with radial scar and micropapillomas.  B. Usual ductal hyperplasia and columnar cell hyperplasia.  C. No atypical hyperplasia, in-situ nor invasive carcinoma identified    ER, DC, HER-2 new and Ki-67  pending on this new biopsy      Patient has been seen by Dr. Lashonda Euceda with plans of doing surgery on July first 2021    Once patient undergoes surgery lumpectomy with sentinel lymph node biopsy we will give further recommendation about treatment options.  Given that patient has multifocal disease and both of the lesions 2 lesions are 1.2 cm each, with it being a HER-2 positive tumor on the previous biopsy at 6:00 Dr. Euceda and myself discussed and patient preferred to undergo port placement at the same time of surgery.    Patient had [employer] genetic test which was negative.    She has completed surgery July 1, 2021.  She underwent left partial mastectomy with left axillary sentinel lymph node biopsy and right port placement.  Clinically she was thought to have a high-grade multifocal invasive ductal carcinoma ER/UT negative, UT positive.  The lesions require preoperative localization.  The multifocal cancer was bracketed with 2 savvy markers and the radial scar was localized with a needle.  Because of the volume of tissue that will be excised related to the breast volume the case was done in conjunction with Dr. Zavala for oncoplastic closure.    She is 2 weeks from surgery.  She is healing up reasonably well.    On review of her pathology she had left partial mastectomy with sentinel lymph node biopsy.  Tumor was present at 5:00, invasive ductal carcinoma, Oziel score of 8, grade 3, greatest dimension was 12 mm x 8 x 4 mm.  Single focus of invasive carcinoma present.  There was extensive DCIS which is 30 mm x 8 x 2 mm, grade 3, no evidence of lymphovascular space invasion or dermal lymphatic invasion.  Margins were clear.  4 lymph nodes were negative.  It is a p T1cp N0, ER negative UT negative HER-2/ese 3+ with Ki-67 of 50%.    Patient is here to discuss further options of treatment.  Given small tumor T1c N0, she is a good candidate for weekly Taxol Herceptin.    Given that patient has  heterozygous state for factor V Leiden and currently has port placed we will plan to start Eliquis 2.5 mg p.o. twice daily.  I have left a message for Dr. Craft in neurology to call me to discuss if patient needs to continue aspirin or we can hold off since be starting Eliquis.      Genetic test negative    August 4, 2021: Weekly Taxol Herceptin x12 weeks followed by Herceptin x1 year.  Cycle 1 today          Hematologic history:  patient is a 40-year-old female who presented end of December with numbness and tingling in her left upper extremity and left face.  She went to see Dr. Goodman who then got concerned and referred to neurology.  She was referred to Dr. Freedman and talk to Dr. Donna guo for evaluation.  Patient underwent ultrasound of the carotids which did not show significant stenosis of the carotids.  She underwent 2D echocardiogram which showed a good ejection fraction of 64% with mild mitral valve prolapse and mild mitral stenosis.  She had a 24-hour Holter which was negative.  She then had also an MRI of the brain February 3, 2021 which showed areas of hyperintensity involving the subcortical white matter of the right frontal lobe superior laterally and posteriorly with the largest area measuring 8 9 mm.  There is also enhancement present.  The findings are consistent with a subacute infarct.  They could not differentiate if there is any underlying neoplastic process but a short-term follow-up was suggested in order to follow-up.  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    Patient currently got started on aspirin and factor V Leiden was obtained by neurology because patient's paternal aunt had factor V Leiden mutation and she was heterozygous.  Patient's testing also showed that she was heterozygous.    Patient states her numbness and tingling is resolved completely on the left arm and face it  just lasted for a 24 hours.  She was here referred here for neurology to see if there is any other cause for her underlying hypercoagulable state.  She has not had a complete hypercoagulable work-up.  Also discussed with her that an underlying malignancy could cause a hypercoagulable state and we would need to do a CT scan to rule that out.    Patient's mother has had breast cancer in her 50s but had pancreatic cancer at 68 and  from that.  Patient's dad had stroke at 63.  But he is alive at 74.  She has 1 brother who is 40 in good health.  Paternal aunt had breast cancer in her 40s.  Paternal grandfather had colon cancer in his late 80s.  Maternal uncle had throat cancer and another maternal uncle had skin cancer with metastasis to the lung.  And maternal grandmother had breast cancer.    Patient was to have mammogram done last year but because of COVID-19 it got postponed and she has not had it done yet.    Patient used to be a smoker half pack per day for 7 years but quit 10 years ago.  She has high cholesterol for which she is on treatment.    Past Medical History:   Diagnosis Date   • Acute stress reaction 2014   • ADD (attention deficit disorder)    • ADHD (attention deficit hyperactivity disorder)    • Anxiety and depression    • CTS (carpal tunnel syndrome)    • Factor 5 Leiden mutation, heterozygous (CMS/[facility]) 2021   • Family history of breast cancer 2013   • Fracture, cervical vertebra (CMS/[facility]) 1997    C4   • H/O complete eye exam 2016   • Hearing loss    • 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   • History of rheumatic fever    • Hypertension    • Hypothyroidism    • Infiltrating ductal carcinoma of left breast (CMS/[facility]) 2021    Left   • Kidney stone    • Mitral valve insufficiency    • PONV (postoperative nausea and vomiting)    • Stroke (CMS/[facility]) 2020    HX OF   • Stroke-like symptoms         Past Surgical History:   Procedure Laterality Date   • BREAST  BIOPSY Left 05/05/2021    IDC   • BREAST LUMPECTOMY WITH SENTINEL NODE BIOPSY N/A 7/1/2021    Procedure: right port placement, Left SHAINA-guided, bracketed, partial mastectomy and sentinel lymph node biopsy Left breast needle-localized excisional biopsy;  Surgeon: Lashonda Euceda MD;  Location: [facility];  Service: General;  Laterality: N/A;   • BREAST SURGERY Bilateral 7/1/2021    Procedure: RIGHT BREAST REDUCTION MASTOPEXY LEFT BREAST ONCOPLASTIC CLOSURE;  Surgeon: Caridad Baker MD PhD;  Location: [facility];  Service: Plastics;  Laterality: Bilateral;   • NO PAST SURGERIES     • PAP SMEAR  2016        Current Outpatient Medications on File Prior to Visit   Medication Sig Dispense Refill   • amphetamine-dextroamphetamine XR (Adderall XR) 20 MG 24 hr capsule Take 1 capsule by mouth Every Morning 30 capsule 0   • atorvastatin (Lipitor) 10 MG tablet Take 1 tablet by mouth Daily. 30 tablet 11   • Cholecalciferol (VITAMIN D3 PO) Take 2,000 Units by mouth. Takes 2000 twice a week;  SUNDAYS AND WEDNESDAYS     • Cobalamin Combinations (B12 FOLATE PO) Take  by mouth. 400 mg folate 400 mg b12     • famotidine (PEPCID) 20 MG tablet Take 1 tablet by mouth Daily. 30 tablet 3   • levothyroxine (Synthroid) 150 MCG tablet Take 1 tablet by mouth Daily. 90 tablet 1   • lidocaine-prilocaine (EMLA) 2.5-2.5 % cream Apply  topically to the appropriate area as directed As Needed for Mild Pain . 1 each 2   • LORazepam (Ativan) 0.5 MG tablet Take 1 tablet by mouth Every 8 (Eight) Hours As Needed for Anxiety. 30 tablet 0   • ondansetron (ZOFRAN) 8 MG tablet Take 1 tablet by mouth 3 (Three) Times a Day As Needed for Nausea or Vomiting. 30 tablet 5   • Pyridoxine HCl (B-6 PO) Take 50 mg by mouth Daily.     • rivaroxaban (XARELTO) 20 MG tablet Take 1 tablet by mouth Daily. 30 tablet 3   • valsartan (DIOVAN) 160 MG tablet Take 1 tablet by mouth Daily. 30 tablet 5   • [DISCONTINUED] ferrous sulfate 325 (65 FE) MG tablet  Take 1 tablet by mouth 2 (two) times a day. 60 tablet 3   • [DISCONTINUED] rivaroxaban (XARELTO) 15 MG tablet Take 1 tablet by mouth 2 (Two) Times a Day With Meals. 42 tablet 0   • [DISCONTINUED] senna (Senokot) 8.6 MG tablet Take 1 tablet by mouth 2 (Two) Times a Day. 60 tablet 2     Current Facility-Administered Medications on File Prior to Visit   Medication Dose Route Frequency Provider Last Rate Last Admin   • heparin injection 500 Units  500 Units Intravenous PRN Neena Beavers MD   500 Units at 21 1718   • sodium chloride 0.9 % flush 10 mL  10 mL Intravenous PRN Neena Beavers MD   10 mL at 21        ALLERGIES:    Allergies   Allergen Reactions   • Sulfa Antibiotics Rash        Social History     Socioeconomic History   • Marital status: Significant Other     Spouse name: Not on file   • Number of children: 0   • Years of education: Not on file   • Highest education level: Not on file   Tobacco Use   • Smoking status: Former Smoker     Packs/day: 1.00     Years: 10.00     Pack years: 10.00     Types: Cigarettes     Start date:      Quit date:      Years since quittin.6   • Smokeless tobacco: Never Used   Vaping Use   • Vaping Use: Never used   Substance and Sexual Activity   • Alcohol use: Yes     Comment: RARELY   • Drug use: No   • Sexual activity: Defer     Partners: Male        Family History   Problem Relation Age of Onset   • Breast cancer Mother 53   • Pancreatic cancer Mother 68   • Lung cancer Maternal Grandmother    • Stroke Father    • Breast cancer Paternal Aunt 55   • Prostate cancer Maternal Grandfather    • Prostate cancer Paternal Grandfather    • Brain cancer Maternal Cousin 20   • Melanoma Maternal Uncle    • Ovarian cancer Other         Paternal great aunt    • Breast cancer Other    • Breast cancer Paternal Great-Grandmother 94   • Hypertension Brother    • Malig Hyperthermia Neg Hx       Family  history: Mother had breast cancer at age 57.  Maternal great  "aunt had breast cancer and paternal great aunt had breast cancer in her 40s.  Patient's mother had pancreatic cancer as well.  Paternal grandfather had prostate cancer.    Review of Systems   Constitutional: Negative for appetite change, chills, diaphoresis, fatigue, fever and unexpected weight change.   HENT: Negative for hearing loss, sore throat and trouble swallowing.    Respiratory: Negative for cough, chest tightness, shortness of breath and wheezing.    Cardiovascular: Negative for chest pain, palpitations and leg swelling.   Gastrointestinal: Negative for abdominal distention, abdominal pain, constipation, diarrhea, nausea and vomiting.   Genitourinary: Negative for dysuria, frequency, hematuria and urgency.   Musculoskeletal: Negative for joint swelling.        No muscle weakness.   Skin: Negative for rash and wound.   Neurological: Negative for seizures, syncope, speech difficulty, weakness, numbness and headaches.   Hematological: Negative for adenopathy. Does not bruise/bleed easily.   Psychiatric/Behavioral: Positive for dysphoric mood (stable). Negative for behavioral problems, confusion, sleep disturbance and suicidal ideas.   All other systems reviewed and are negative.     I have reviewed and confirmed the accuracy of the ROS as documented by the MA/LPN/RN CLAUDINE Sloan    Objective     Vitals:    09/08/21 0837   BP: 123/84   Pulse: 92   Resp: 16   Temp: 97.3 °F (36.3 °C)   TempSrc: Temporal   SpO2: 99%   Weight: 66.3 kg (146 lb 3.2 oz)   Height: 157.5 cm (62.01\")   PainSc: 0-No pain     Current Status 9/8/2021   ECOG score 0     Physical exam  CONSTITUTIONAL:  Vital signs reviewed.  No distress, looks comfortable.  EYES:  Conjunctivae and lids unremarkable.  PERRLA  EARS,NOSE,MOUTH,THROAT:  Ears and nose appear unremarkable.  Lips, teeth, gums appear unremarkable.  Port area looks clean, there is no lymphedema  RESPIRATORY:  Normal respiratory effort.  Lungs clear to auscultation " bilaterally.  CARDIOVASCULAR:  Normal S1, S2.  No murmurs rubs or gallops.  No significant lower extremity edema.  GASTROINTESTINAL: Abdomen appears unremarkable.  Nontender.  No hepatomegaly.  No splenomegaly.  LYMPHATIC:  No cervical, supraclavicular, axillary lymphadenopathy.  SKIN:  Warm.  No rashes.  PSYCHIATRIC:  Normal judgment and insight.  Normal mood and affect.    RECENT LABS:  Results from last 7 days   Lab Units 09/08/21  0832 09/01/21  0918   WBC 10*3/mm3 6.51 8.17   NEUTROS ABS 10*3/mm3 3.64 5.56   HEMOGLOBIN g/dL 9.8* 9.9*   HEMATOCRIT % 32.1* 32.1*   PLATELETS 10*3/mm3 376 345     Results from last 7 days   Lab Units 09/01/21  0918   SODIUM mmol/L 137   POTASSIUM mmol/L 4.0   CHLORIDE mmol/L 105   CO2 mmol/L 24.1   BUN mg/dL 15   CREATININE mg/dL 0.81   CALCIUM mg/dL 9.0   ALBUMIN g/dL 4.10   BILIRUBIN mg/dL 0.4   ALK PHOS U/L 77   ALT (SGPT) U/L 30   AST (SGOT) U/L 15   GLUCOSE mg/dL 135*         Assessment/Plan       * [id number], ER negative [place] negative HER-2/ese 3+ with Ki-67 of 50%.  S/p left partial mastectomy with sentinel lymph node biopsy.  Tumor was present at 5:00, invasive ductal carcinoma, Oziel score of 8, grade 3, greatest dimension was 12 mm x 8 x 4 mm.  Single focus of invasive carcinoma present.  There was extensive DCIS which is 30 mm x 8 x 2 mm, grade 3, no evidence of lymphovascular space invasion or dermal lymphatic invasion.  Margins were clear.  4 lymph nodes were negative.  It is a p T1cp N0, ER negative NJ negative HER-2/ese 3+ with Ki-67 of 50%.  · Patient is s/p port placement  · Discussed in length with patient that given a small tumor she is eligible for weekly Taxol Herceptin.  Weekly Taxol x12 weeks along with weekly Herceptin followed by 1 year of Herceptin.  · Discussed patient in the breast cancer conference  · 2D echo done which showed ejection fraction of 61-65% and strain pattern of -20.8.  · Patient also seen by Dr. Virk cardiology and Dr. Virk is planning to  repeat echocardiogram in 3 months after initiation of therapy.  · 8/4/2021 initiating weekly Taxol Herceptin  · 9/8/2021, proceed with week #6 Taxol and Herceptin.  Patient continues to tolerate treatment well. No signs of peripheral neuropathy.  · Her next echocardiogram due November 4, 2021, She follows up with cardiology Dr. Camron Virk.    *  Factor V Leiden heterozygosity with right frontal stroke and patient presented in December 2020 with left-sided weakness tingling and numbness and also numbness in the face.  · Was referred to neurology and cardiology by Dr. Goodman  · Ultrasound of carotid does not show significant stenosis  · 24 Holter is negative  · 2D echocardiogram shows ejection fraction of 64% with mild mitral regurg and mitral stenosis  · Neurology obtain factor V Leiden given that patient's paternal aunt had's history of factor V Leiden and that was heterozygous for factor V Leiden  · Continue aspirin as per neurology.  Also patient on medications for her high cholesterol started after stroke currently asymptomatic  · Hypercoagulable work-up done which is negative except heterozygous state for factor V Leiden.  · Complete stroke work-up has been negative and since this is arterial stroke and she was not on aspirin prior to that and her symptoms have resolved I agree with continuing aspirin alone along with high cholesterol medications.  · MR venogram done on May 10, 2021 is negative.  Patient has been referred to the stroke neurologist Dr. Johnson  · Patient diagnosed with breast cancer with Mediport placed.  Per discussion with neurology, patient initiated on prophylactic Xarelto and aspirin discontinued.  · Patient had one nosebleed which lasted 10 minutes but with pinching the nose it helped.  But she is switching to 20 mg daily from tomorrow.  We discussed about placing ice and notifying us if her nosebleeds are significant.  But it resolved.  It was likely secondary to dry air  · 9/7/2021,  patient continues to experience nosebleeds.  Reports these occur when her nose itches, and after scratching her nose it begins to bleed.  She feels this may be related to dry air, so I have asked her to start using saline nasal spray to help moisten her nose.  If she experiences nosebleeds that do not stop, I asked her to notify our office.    * Family history of cancer: Patient's mother had breast cancer at age 50 and pancreatic cancer at age 68 and  from pancreatic cancer.  Patient's maternal grandmother had breast cancer in her 50s.  Her maternal uncle had skin cancer which went to the lung and another maternal uncle had throat cancer.  Maternal aunt had call colon polyps.  Patient's paternal aunt had breast cancer in her 40s.  And paternal grandfather with colon cancer in his 80s.  Paternal aunt also had factor V Leiden.  · Genetic testing is negative    * Solid nodule in the right kidney on ultrasound, will schedule follow-up with urology  · Patient was to follow-up with Dr. Shultz  · Will need records from urology    *  Elevated BMI, encourage diet and exercise.  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  Patient has been requiring frequent Tums.  Recommended she begin Pepcid 20 mg daily.    · Stable, continue Pepcid 20 mg daily.    *Constipation likely secondary to ferrous sulfate.  Patient was iron deficient.  · We will switch to IV Venofer.  · 2021, proceed with week #2 IV Venofer today. Hemoglobin today stable at 9.8.    *Headache likely related to body ache because of Taxol  · No complaints of headache today.    Plan  · Proceed with week #6 Taxol and Herceptin.  · Proceed with week #2 IV Venofer.  She will receive this weekly x4 due to iron deficiency anemia and intolerance to oral iron with constipation and nausea.  · Continue Pepcid 20 mg daily.    · Continue Xarelto as prescribed.  · Next echo due 2021  · Follow-up  once a week for weekly Taxol Herceptin and IV Venofer x4  · Return in 2 weeks for MD follow-up and week #8 Taxol and Herceptin with week #4 IV Venofer.    This patient is on drug therapy requiring intensive monitoring for toxicity.     Nessa Howe, APRN  09/08/2021     Dr. Maxine Haynes

## 2021-09-07 ENCOUNTER — HOSPITAL ENCOUNTER (OUTPATIENT)
Dept: CARDIOLOGY | Facility: HOSPITAL | Age: 41
Discharge: HOME | End: 2021-09-07
Admitting: INTERNAL MEDICINE

## 2021-09-07 ENCOUNTER — HOSPITAL ENCOUNTER (OUTPATIENT)
Dept: OCCUPATIONAL THERAPY | Facility: HOSPITAL | Age: 41
Setting detail: THERAPIES SERIES
Discharge: HOME | End: 2021-09-07

## 2021-09-07 DIAGNOSIS — Z17.1 MALIGNANT NEOPLASM OF OVERLAPPING SITES OF LEFT BREAST IN FEMALE, ESTROGEN RECEPTOR NEGATIVE (HCC): ICD-10-CM

## 2021-09-07 DIAGNOSIS — D68.51 FACTOR 5 LEIDEN MUTATION, HETEROZYGOUS (HCC): ICD-10-CM

## 2021-09-07 DIAGNOSIS — C50.812 MALIGNANT NEOPLASM OF OVERLAPPING SITES OF LEFT BREAST IN FEMALE, ESTROGEN RECEPTOR NEGATIVE (HCC): Primary | ICD-10-CM

## 2021-09-07 DIAGNOSIS — C50.812 MALIGNANT NEOPLASM OF OVERLAPPING SITES OF LEFT BREAST IN FEMALE, ESTROGEN RECEPTOR NEGATIVE (HCC): ICD-10-CM

## 2021-09-07 DIAGNOSIS — M79.89 SWELLING OF EXTREMITY, LEFT: ICD-10-CM

## 2021-09-07 DIAGNOSIS — Z91.89 AT RISK FOR LYMPHEDEMA: Primary | ICD-10-CM

## 2021-09-07 DIAGNOSIS — M79.621 AXILLARY PAIN, RIGHT: ICD-10-CM

## 2021-09-07 DIAGNOSIS — Z17.1 MALIGNANT NEOPLASM OF OVERLAPPING SITES OF LEFT BREAST IN FEMALE, ESTROGEN RECEPTOR NEGATIVE (HCC): Primary | ICD-10-CM

## 2021-09-07 LAB
BH CV UPPER VENOUS LEFT AXILLARY AUGMENT: NORMAL
BH CV UPPER VENOUS LEFT AXILLARY COMPETENT: NORMAL
BH CV UPPER VENOUS LEFT AXILLARY COMPRESS: NORMAL
BH CV UPPER VENOUS LEFT AXILLARY PHASIC: NORMAL
BH CV UPPER VENOUS LEFT AXILLARY SPONT: NORMAL
BH CV UPPER VENOUS LEFT BASILIC FOREARM COMPRESS: NORMAL
BH CV UPPER VENOUS LEFT BASILIC UPPER COMPRESS: NORMAL
BH CV UPPER VENOUS LEFT BRACHIAL COMPRESS: NORMAL
BH CV UPPER VENOUS LEFT CEPHALIC FOREARM COMPRESS: NORMAL
BH CV UPPER VENOUS LEFT CEPHALIC UPPER COMPRESS: NORMAL
BH CV UPPER VENOUS LEFT INTERNAL JUGULAR AUGMENT: NORMAL
BH CV UPPER VENOUS LEFT INTERNAL JUGULAR COMPETENT: NORMAL
BH CV UPPER VENOUS LEFT INTERNAL JUGULAR COMPRESS: NORMAL
BH CV UPPER VENOUS LEFT INTERNAL JUGULAR PHASIC: NORMAL
BH CV UPPER VENOUS LEFT INTERNAL JUGULAR SPONT: NORMAL
BH CV UPPER VENOUS LEFT RADIAL COMPRESS: NORMAL
BH CV UPPER VENOUS LEFT SUBCLAVIAN AUGMENT: NORMAL
BH CV UPPER VENOUS LEFT SUBCLAVIAN COMPETENT: NORMAL
BH CV UPPER VENOUS LEFT SUBCLAVIAN COMPRESS: NORMAL
BH CV UPPER VENOUS LEFT SUBCLAVIAN PHASIC: NORMAL
BH CV UPPER VENOUS LEFT SUBCLAVIAN SPONT: NORMAL
BH CV UPPER VENOUS LEFT ULNAR COMPRESS: NORMAL
BH CV UPPER VENOUS RIGHT INTERNAL JUGULAR AUGMENT: NORMAL
BH CV UPPER VENOUS RIGHT INTERNAL JUGULAR COMPETENT: NORMAL
BH CV UPPER VENOUS RIGHT INTERNAL JUGULAR COMPRESS: NORMAL
BH CV UPPER VENOUS RIGHT INTERNAL JUGULAR PHASIC: NORMAL
BH CV UPPER VENOUS RIGHT INTERNAL JUGULAR SPONT: NORMAL
BH CV UPPER VENOUS RIGHT SUBCLAVIAN AUGMENT: NORMAL
BH CV UPPER VENOUS RIGHT SUBCLAVIAN COMPETENT: NORMAL
BH CV UPPER VENOUS RIGHT SUBCLAVIAN COMPRESS: NORMAL
BH CV UPPER VENOUS RIGHT SUBCLAVIAN PHASIC: NORMAL
BH CV UPPER VENOUS RIGHT SUBCLAVIAN SPONT: NORMAL
MAXIMAL PREDICTED HEART RATE: 177 BPM
STRESS TARGET HR: 150 BPM

## 2021-09-07 PROCEDURE — 97535 SELF CARE MNGMENT TRAINING: CPT

## 2021-09-07 PROCEDURE — 93971 EXTREMITY STUDY: CPT

## 2021-09-07 NOTE — THERAPY TREATMENT NOTE
Outpatient Occupational Therapy Lymphedema Treatment Note  [facility]     Patient Name: Radha Astorga  : 1980  MRN: [id number]  Today's Date: 2021      Visit Date: 2021  Patient and therapist both masked. Therapist with face shield and gloves.  Patient Active Problem List   Diagnosis   • Family history of breast cancer   • Hypothyroidism   • Major depressive disorder, recurrent episode, moderate with anxious distress (CMS/[facility])   • Attention deficit hyperactivity disorder (ADHD), predominantly inattentive type   • Factor 5 Leiden mutation, heterozygous (CMS/HCC)   • Kidney mass   • Malignant neoplasm of overlapping sites of left breast in female, estrogen receptor negative (CMS/HCC)   • Encounter for long-term (current) use of high-risk medication   • Essential hypertension   • Rheumatic mitral valve disease   • Encounter for adjustment or management of vascular access device   • Iron deficiency anemia        Past Medical History:   Diagnosis Date   • Acute stress reaction 2014   • ADD (attention deficit disorder)    • ADHD (attention deficit hyperactivity disorder)    • Anxiety and depression    • CTS (carpal tunnel syndrome)    • Factor 5 Leiden mutation, heterozygous (CMS/HCC) 2021   • Family history of breast cancer 2013   • Fracture, cervical vertebra (CMS/[facility]) 1997    C4   • H/O complete eye exam 2016   • Hearing loss    • 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   • History of rheumatic fever    • Hypertension    • Hypothyroidism    • Infiltrating ductal carcinoma of left breast (CMS/HCC) 2021    Left   • Kidney stone    • Mitral valve insufficiency    • PONV (postoperative nausea and vomiting)    • Stroke (CMS/[facility]) 2020    HX OF   • Stroke-like symptoms         Past Surgical History:   Procedure Laterality Date   • BREAST BIOPSY Left 2021    IDC   • BREAST LUMPECTOMY WITH SENTINEL NODE BIOPSY N/A 2021    Procedure: right port  placement, Left SHAINA-guided, bracketed, partial mastectomy and sentinel lymph node biopsy Left breast needle-localized excisional biopsy;  Surgeon: Lashonda Euceda MD;  Location: [facility];  Service: General;  Laterality: N/A;   • BREAST SURGERY Bilateral 7/1/2021    Procedure: RIGHT BREAST REDUCTION MASTOPEXY LEFT BREAST ONCOPLASTIC CLOSURE;  Surgeon: Caridad Baker MD PhD;  Location: [facility];  Service: Plastics;  Laterality: Bilateral;   • NO PAST SURGERIES     • PAP SMEAR  2016         Visit Dx:      ICD-10-CM ICD-9-CM   1. At risk for lymphedema  Z91.89 V49.89   2. Axillary pain, right  M79.621 729.5   3. Malignant neoplasm of overlapping sites of left breast in female, estrogen receptor negative (CMS/[facility])  C50.812 174.8    Z17.1 V86.1       Lymphedema     Row Name 09/07/21 1300             Subjective Pain    Able to rate subjective pain?  yes  -RE      Pre-Treatment Pain Level  0  -RE      Post-Treatment Pain Level  0  -RE      Subjective Pain Comment  occasional hand pain  -RE         Lymphedema Edema Assessment    Edema Assessment Comment  pitting edema noted in the L forearm.  -RE         L-Dex Bioimpedence Screening    L-Dex Measurement Extremity  LUE  -RE      L-Dex Patient Position  Standing  -RE      L-Dex UE Dominate Side  Right  -RE      L-Dex UE At Risk Side  Left  -RE      L-Dex UE Pre Surgical Value  No  -RE      L-Dex UE Score  12.7  -RE      L-Dex UE Baseline Score  3.5  -RE      L-Dex UE Value Change  9.2  -RE      L-Dex UE Comment  outside normal limits  -RE        User Key  (r) = Recorded By, (t) = Taken By, (c) = Cosigned By    Initials Name Provider Type    RE Julia Leslie OTR Occupational Therapist                  OT Assessment/Plan     Row Name 09/07/21 1721          OT Assessment    Assessment Comments  patient presented today and c/o occasional hand pain after exercising. Bioimpedance was abnormal. Pitting edemma noted in forearm. contacted [facility] and spoke with  Mary She will check with Dr. CASAS and call pt.  -RE        OT Plan    OT Plan Comments  Follow up 9/9  -RE       User Key  (r) = Recorded By, (t) = Taken By, (c) = Cosigned By    Initials Name Provider Type    Julia Rowe OTR Occupational Therapist                    Therapy Education  Education Details: [facility] will call pt regarding medical followup  of swelling.  Given: Edema management  Program: New  How Provided: Verbal  Provided to: Patient  Level of Understanding: Teach back education performed, Verbalized  10655 - OT Self Care/Mgmt Minutes: 45                Time Calculation:   OT Start Time: 1330  OT Stop Time: 1415  OT Time Calculation (min): 45 min  Timed Charges  78595 - OT Self Care/Mgmt Minutes: 45  Total Minutes  Timed Charges Total Minutes: 45   Total Minutes: 45     Therapy Charges for Today     Code Description Service Date Service Provider Modifiers Qty    [id number]  OT SELF CARE/MGMT/TRAIN EA 15 MIN 9/7/2021 Julia Leslie OTR GO 3                      VI Ulloa  9/7/2021

## 2021-09-08 ENCOUNTER — INFUSION (OUTPATIENT)
Dept: ONCOLOGY | Facility: HOSPITAL | Age: 41
End: 2021-09-08

## 2021-09-08 ENCOUNTER — OFFICE VISIT (OUTPATIENT)
Dept: ONCOLOGY | Facility: CLINIC | Age: 41
End: 2021-09-08

## 2021-09-08 VITALS
HEART RATE: 92 BPM | WEIGHT: 146.2 LBS | SYSTOLIC BLOOD PRESSURE: 123 MMHG | TEMPERATURE: 97.3 F | RESPIRATION RATE: 16 BRPM | OXYGEN SATURATION: 99 % | BODY MASS INDEX: 26.91 KG/M2 | DIASTOLIC BLOOD PRESSURE: 84 MMHG | HEIGHT: 62 IN

## 2021-09-08 VITALS — SYSTOLIC BLOOD PRESSURE: 129 MMHG | DIASTOLIC BLOOD PRESSURE: 87 MMHG | HEART RATE: 101 BPM

## 2021-09-08 DIAGNOSIS — D50.8 OTHER IRON DEFICIENCY ANEMIA: ICD-10-CM

## 2021-09-08 DIAGNOSIS — C50.812 MALIGNANT NEOPLASM OF OVERLAPPING SITES OF LEFT BREAST IN FEMALE, ESTROGEN RECEPTOR NEGATIVE (HCC): Primary | ICD-10-CM

## 2021-09-08 DIAGNOSIS — I63.81 CEREBROVASCULAR ACCIDENT (CVA) DUE TO OCCLUSION OF SMALL ARTERY (HCC): ICD-10-CM

## 2021-09-08 DIAGNOSIS — Z17.1 MALIGNANT NEOPLASM OF OVERLAPPING SITES OF LEFT BREAST IN FEMALE, ESTROGEN RECEPTOR NEGATIVE (HCC): ICD-10-CM

## 2021-09-08 DIAGNOSIS — D68.51 FACTOR 5 LEIDEN MUTATION, HETEROZYGOUS (HCC): ICD-10-CM

## 2021-09-08 DIAGNOSIS — Z79.899 HIGH RISK MEDICATION USE: ICD-10-CM

## 2021-09-08 DIAGNOSIS — C50.812 MALIGNANT NEOPLASM OF OVERLAPPING SITES OF LEFT BREAST IN FEMALE, ESTROGEN RECEPTOR NEGATIVE (HCC): ICD-10-CM

## 2021-09-08 DIAGNOSIS — Z17.1 MALIGNANT NEOPLASM OF OVERLAPPING SITES OF LEFT BREAST IN FEMALE, ESTROGEN RECEPTOR NEGATIVE (HCC): Primary | ICD-10-CM

## 2021-09-08 DIAGNOSIS — K21.9 GASTROESOPHAGEAL REFLUX DISEASE WITHOUT ESOPHAGITIS: ICD-10-CM

## 2021-09-08 DIAGNOSIS — Z45.2 ENCOUNTER FOR ADJUSTMENT OR MANAGEMENT OF VASCULAR ACCESS DEVICE: ICD-10-CM

## 2021-09-08 LAB
ALBUMIN SERPL-MCNC: 4.1 G/DL (ref 3.5–5.2)
ALBUMIN/GLOB SERPL: 1.5 G/DL
ALP SERPL-CCNC: 79 U/L (ref 39–117)
ALT SERPL W P-5'-P-CCNC: 45 U/L (ref 1–33)
ANION GAP SERPL CALCULATED.3IONS-SCNC: 8.7 MMOL/L (ref 5–15)
AST SERPL-CCNC: 32 U/L (ref 1–32)
BASOPHILS # BLD AUTO: 0.07 10*3/MM3 (ref 0–0.2)
BASOPHILS NFR BLD AUTO: 1.1 % (ref 0–1.5)
BILIRUB SERPL-MCNC: <0.2 MG/DL (ref 0–1.2)
BUN SERPL-MCNC: 18 MG/DL (ref 6–20)
BUN/CREAT SERPL: 23.7 (ref 7–25)
CALCIUM SPEC-SCNC: 9.2 MG/DL (ref 8.6–10.5)
CHLORIDE SERPL-SCNC: 105 MMOL/L (ref 98–107)
CO2 SERPL-SCNC: 25.3 MMOL/L (ref 22–29)
CREAT SERPL-MCNC: 0.76 MG/DL (ref 0.57–1)
DEPRECATED RDW RBC AUTO: 52 FL (ref 37–54)
EOSINOPHIL # BLD AUTO: 0.23 10*3/MM3 (ref 0–0.4)
EOSINOPHIL NFR BLD AUTO: 3.5 % (ref 0.3–6.2)
ERYTHROCYTE [DISTWIDTH] IN BLOOD BY AUTOMATED COUNT: 16.9 % (ref 12.3–15.4)
GFR SERPL CREATININE-BSD FRML MDRD: 83 ML/MIN/1.73
GLOBULIN UR ELPH-MCNC: 2.7 GM/DL
GLUCOSE SERPL-MCNC: 110 MG/DL (ref 65–99)
HCT VFR BLD AUTO: 32.1 % (ref 34–46.6)
HGB BLD-MCNC: 9.8 G/DL (ref 12–15.9)
IMM GRANULOCYTES # BLD AUTO: 0.05 10*3/MM3 (ref 0–0.05)
IMM GRANULOCYTES NFR BLD AUTO: 0.8 % (ref 0–0.5)
LYMPHOCYTES # BLD AUTO: 1.91 10*3/MM3 (ref 0.7–3.1)
LYMPHOCYTES NFR BLD AUTO: 29.3 % (ref 19.6–45.3)
MCH RBC QN AUTO: 25.7 PG (ref 26.6–33)
MCHC RBC AUTO-ENTMCNC: 30.5 G/DL (ref 31.5–35.7)
MCV RBC AUTO: 84 FL (ref 79–97)
MONOCYTES # BLD AUTO: 0.61 10*3/MM3 (ref 0.1–0.9)
MONOCYTES NFR BLD AUTO: 9.4 % (ref 5–12)
NEUTROPHILS NFR BLD AUTO: 3.64 10*3/MM3 (ref 1.7–7)
NEUTROPHILS NFR BLD AUTO: 55.9 % (ref 42.7–76)
NRBC BLD AUTO-RTO: 0 /100 WBC (ref 0–0.2)
PLATELET # BLD AUTO: 376 10*3/MM3 (ref 140–450)
PMV BLD AUTO: 9.6 FL (ref 6–12)
POTASSIUM SERPL-SCNC: 4.3 MMOL/L (ref 3.5–5.2)
PROT SERPL-MCNC: 6.8 G/DL (ref 6–8.5)
RBC # BLD AUTO: 3.82 10*6/MM3 (ref 3.77–5.28)
SODIUM SERPL-SCNC: 139 MMOL/L (ref 136–145)
WBC # BLD AUTO: 6.51 10*3/MM3 (ref 3.4–10.8)

## 2021-09-08 PROCEDURE — 25010000002 DEXAMETHASONE SODIUM PHOSPHATE 100 MG/10ML SOLUTION: Performed by: NURSE PRACTITIONER

## 2021-09-08 PROCEDURE — 85025 COMPLETE CBC W/AUTO DIFF WBC: CPT | Performed by: INTERNAL MEDICINE

## 2021-09-08 PROCEDURE — 84481 FREE ASSAY (FT-3): CPT | Performed by: FAMILY MEDICINE

## 2021-09-08 PROCEDURE — 80053 COMPREHEN METABOLIC PANEL: CPT | Performed by: INTERNAL MEDICINE

## 2021-09-08 PROCEDURE — 25010000002 IRON SUCROSE PER 1 MG: Performed by: NURSE PRACTITIONER

## 2021-09-08 PROCEDURE — 84439 ASSAY OF FREE THYROXINE: CPT | Performed by: FAMILY MEDICINE

## 2021-09-08 PROCEDURE — 96417 CHEMO IV INFUS EACH ADDL SEQ: CPT

## 2021-09-08 PROCEDURE — 96366 THER/PROPH/DIAG IV INF ADDON: CPT

## 2021-09-08 PROCEDURE — 25010000002 HEPARIN LOCK FLUSH PER 10 UNITS: Performed by: INTERNAL MEDICINE

## 2021-09-08 PROCEDURE — 99214 OFFICE O/P EST MOD 30 MIN: CPT | Performed by: NURSE PRACTITIONER

## 2021-09-08 PROCEDURE — 25010000002 PACLITAXEL PER 1 MG: Performed by: NURSE PRACTITIONER

## 2021-09-08 PROCEDURE — 25010000002 DIPHENHYDRAMINE PER 50 MG: Performed by: NURSE PRACTITIONER

## 2021-09-08 PROCEDURE — 96413 CHEMO IV INFUSION 1 HR: CPT

## 2021-09-08 PROCEDURE — 25010000002 TRASTUZUMAB-ANNS 420 MG RECONSTITUTED SOLUTION 1 EACH VIAL: Performed by: NURSE PRACTITIONER

## 2021-09-08 PROCEDURE — 96367 TX/PROPH/DG ADDL SEQ IV INF: CPT

## 2021-09-08 PROCEDURE — 96375 TX/PRO/DX INJ NEW DRUG ADDON: CPT

## 2021-09-08 PROCEDURE — 84443 ASSAY THYROID STIM HORMONE: CPT | Performed by: FAMILY MEDICINE

## 2021-09-08 RX ORDER — FAMOTIDINE 10 MG/ML
20 INJECTION, SOLUTION INTRAVENOUS ONCE
Status: CANCELLED | OUTPATIENT
Start: 2021-09-08

## 2021-09-08 RX ORDER — SODIUM CHLORIDE 0.9 % (FLUSH) 0.9 %
10 SYRINGE (ML) INJECTION AS NEEDED
Status: CANCELLED | OUTPATIENT
Start: 2021-09-08

## 2021-09-08 RX ORDER — SODIUM CHLORIDE 9 MG/ML
250 INJECTION, SOLUTION INTRAVENOUS ONCE
Status: COMPLETED | OUTPATIENT
Start: 2021-09-08 | End: 2021-09-08

## 2021-09-08 RX ORDER — DIPHENHYDRAMINE HYDROCHLORIDE 50 MG/ML
50 INJECTION INTRAMUSCULAR; INTRAVENOUS AS NEEDED
Status: CANCELLED | OUTPATIENT
Start: 2021-09-08

## 2021-09-08 RX ORDER — SODIUM CHLORIDE 9 MG/ML
250 INJECTION, SOLUTION INTRAVENOUS ONCE
Status: CANCELLED | OUTPATIENT
Start: 2021-09-08

## 2021-09-08 RX ORDER — HEPARIN SODIUM (PORCINE) LOCK FLUSH IV SOLN 100 UNIT/ML 100 UNIT/ML
500 SOLUTION INTRAVENOUS AS NEEDED
Status: DISCONTINUED | OUTPATIENT
Start: 2021-09-08 | End: 2021-09-08 | Stop reason: HOSPADM

## 2021-09-08 RX ORDER — SODIUM CHLORIDE 0.9 % (FLUSH) 0.9 %
10 SYRINGE (ML) INJECTION AS NEEDED
Status: DISCONTINUED | OUTPATIENT
Start: 2021-09-08 | End: 2021-09-08 | Stop reason: HOSPADM

## 2021-09-08 RX ORDER — FAMOTIDINE 10 MG/ML
20 INJECTION, SOLUTION INTRAVENOUS AS NEEDED
Status: CANCELLED | OUTPATIENT
Start: 2021-09-08

## 2021-09-08 RX ORDER — FAMOTIDINE 10 MG/ML
20 INJECTION, SOLUTION INTRAVENOUS ONCE
Status: COMPLETED | OUTPATIENT
Start: 2021-09-08 | End: 2021-09-08

## 2021-09-08 RX ORDER — HEPARIN SODIUM (PORCINE) LOCK FLUSH IV SOLN 100 UNIT/ML 100 UNIT/ML
500 SOLUTION INTRAVENOUS AS NEEDED
Status: CANCELLED | OUTPATIENT
Start: 2021-09-08

## 2021-09-08 RX ADMIN — PACLITAXEL 135 MG: 6 INJECTION, SOLUTION INTRAVENOUS at 10:41

## 2021-09-08 RX ADMIN — SODIUM CHLORIDE 250 ML: 9 INJECTION, SOLUTION INTRAVENOUS at 09:35

## 2021-09-08 RX ADMIN — TRASTUZUMAB-ANNS 130 MG: 420 INJECTION, POWDER, LYOPHILIZED, FOR SOLUTION INTRAVENOUS at 11:56

## 2021-09-08 RX ADMIN — FAMOTIDINE 20 MG: 10 INJECTION INTRAVENOUS at 09:35

## 2021-09-08 RX ADMIN — SODIUM CHLORIDE 300 MG: 900 INJECTION, SOLUTION INTRAVENOUS at 12:30

## 2021-09-08 RX ADMIN — DEXAMETHASONE SODIUM PHOSPHATE 12 MG: 10 INJECTION, SOLUTION INTRAMUSCULAR; INTRAVENOUS at 09:55

## 2021-09-08 RX ADMIN — Medication 500 UNITS: at 14:38

## 2021-09-08 RX ADMIN — SODIUM CHLORIDE 250 ML: 9 INJECTION, SOLUTION INTRAVENOUS at 11:56

## 2021-09-08 RX ADMIN — SODIUM CHLORIDE, PRESERVATIVE FREE 10 ML: 5 INJECTION INTRAVENOUS at 14:38

## 2021-09-08 RX ADMIN — DIPHENHYDRAMINE HYDROCHLORIDE 25 MG: 50 INJECTION INTRAMUSCULAR; INTRAVENOUS at 09:36

## 2021-09-09 ENCOUNTER — HOSPITAL ENCOUNTER (OUTPATIENT)
Dept: OCCUPATIONAL THERAPY | Facility: HOSPITAL | Age: 41
Setting detail: THERAPIES SERIES
Discharge: HOME | End: 2021-09-09

## 2021-09-09 DIAGNOSIS — M79.621 AXILLARY PAIN, RIGHT: ICD-10-CM

## 2021-09-09 DIAGNOSIS — Z91.89 AT RISK FOR LYMPHEDEMA: Primary | ICD-10-CM

## 2021-09-09 PROCEDURE — 97140 MANUAL THERAPY 1/> REGIONS: CPT

## 2021-09-09 NOTE — THERAPY PROGRESS REPORT/RE-CERT
Outpatient Occupational Therapy Lymphedema Progress Note  [facility]     Patient Name: Radha Astorga  : 1980  MRN: [id number]  Today's Date: 2021      Visit Date: 2021  Patient and therapist both masked. Therapist with face shield and gloves.  Patient Active Problem List   Diagnosis   • Family history of breast cancer   • Hypothyroidism   • Major depressive disorder, recurrent episode, moderate with anxious distress (CMS/[facility])   • Attention deficit hyperactivity disorder (ADHD), predominantly inattentive type   • Factor 5 Leiden mutation, heterozygous (CMS/HCC)   • Kidney mass   • Malignant neoplasm of overlapping sites of left breast in female, estrogen receptor negative (CMS/HCC)   • Encounter for long-term (current) use of high-risk medication   • Essential hypertension   • Rheumatic mitral valve disease   • Encounter for adjustment or management of vascular access device   • Iron deficiency anemia        Past Medical History:   Diagnosis Date   • Acute stress reaction 2014   • ADD (attention deficit disorder)    • ADHD (attention deficit hyperactivity disorder)    • Anxiety and depression    • CTS (carpal tunnel syndrome)    • Factor 5 Leiden mutation, heterozygous (CMS/HCC) 2021   • Family history of breast cancer 2013   • Fracture, cervical vertebra (CMS/[facility]) 1997    C4   • H/O complete eye exam 2016   • Hearing loss    • 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   • History of rheumatic fever    • Hypertension    • Hypothyroidism    • Infiltrating ductal carcinoma of left breast (CMS/HCC) 2021    Left   • Kidney stone    • Mitral valve insufficiency    • PONV (postoperative nausea and vomiting)    • Stroke (CMS/[facility]) 2020    HX OF   • Stroke-like symptoms         Past Surgical History:   Procedure Laterality Date   • BREAST BIOPSY Left 2021    IDC   • BREAST LUMPECTOMY WITH SENTINEL NODE BIOPSY N/A 2021    Procedure: right port  placement, Left SHAINA-guided, bracketed, partial mastectomy and sentinel lymph node biopsy Left breast needle-localized excisional biopsy;  Surgeon: Lashonda Euceda MD;  Location: [facility];  Service: General;  Laterality: N/A;   • BREAST SURGERY Bilateral 7/1/2021    Procedure: RIGHT BREAST REDUCTION MASTOPEXY LEFT BREAST ONCOPLASTIC CLOSURE;  Surgeon: Caridad Baker MD PhD;  Location: [facility];  Service: Plastics;  Laterality: Bilateral;   • NO PAST SURGERIES     • PAP SMEAR  2016         Visit Dx:      ICD-10-CM ICD-9-CM   1. At risk for lymphedema  Z91.89 V49.89   2. Axillary pain, right  M79.621 729.5       Lymphedema     Row Name 09/09/21 1600             Subjective Pain    Able to rate subjective pain?  yes  -RE      Pre-Treatment Pain Level  0  -RE      Post-Treatment Pain Level  0  -RE      Subjective Pain Comment  pain with tension on cords but not at rest  -RE         Subjective Comments    Subjective Comments  Dopplere was negative  -RE         Lymphedema Measurements    Measurement Type(s)  Circumferential  -RE      Circumferential Areas  Upper extremities  -RE         BUE Circumferential (cm)    Measurement Location 1  Axilla  -RE      Left 1  31.5 cm  -RE      Right 1  31 cm  -RE      Measurement Location 2  15 cm above elbow  -RE      Left 2  30.5 cm  -RE      Right 2  29.5 cm  -RE      Measurement Location 3  10 cm above elbow  -RE      Left 3  27.5 cm  -RE      Right 3  26.3 cm  -RE      Measurement Location 4  5 cm above elbow  -RE      Left 4  25 cm  -RE      Right 4  24 cm  -RE      Measurement Location 5  elbow  -RE      Left 5  23 cm  -RE      Right 5  22.5 cm  -RE      Measurement Location 6  5 cm below elbow  -RE      Left 6  23.8 cm  -RE      Right 6  23.5 cm  -RE      Measurement Location 7  10 cm below elbow  -RE      Left 7  21 cm  -RE      Right 7  21.5 cm  -RE      Measurement Location 8  15 cm below elbow  -RE      Left 8  18 cm  -RE      Right 8  17.5 cm  -RE       Measurement Location 9  20 cm below elbow  -RE      Left 9  14.5 cm  -RE      Right 9  14.5 cm  -RE      Measurement Location 10  Wrist  -RE      Left 10  14 cm  -RE      Right 10  14.3 cm  -RE      Measurement Location 11  Mid palm  -RE      Left 11  17.5 cm  -RE      Right 11  17.8 cm  -RE      LUE Circumferential Total  246.3 cm  -RE      RUE Circumferential Total  242.4 cm  -RE         Manual Lymphatic Drainage    Manual Lymphatic Drainage  initial sequence;opened regional lymph nodes  -RE      Initial Sequence  short neck  -RE      Opened Regional Lymph Nodes  axillary  -RE      Axillary  left  -RE      Manual Therapy  c adn s strokes to corded areas  -RE         Compression/Skin Care    Compression/Skin Care Comments  await compression garments  -RE        User Key  (r) = Recorded By, (t) = Taken By, (c) = Cosigned By    Initials Name Provider Type    RE Julia Leslie OTR Occupational Therapist                  OT Assessment/Plan     Row Name 09/09/21 1648          OT Assessment    Impairments  Edema;Impaired lymphatic circulation;Pain;Range of motion  -RE     Assessment Comments  Edema slightly improved today with 1-2+ pitting. Circumferential measurements indicate mild edema which did soften and decrease with MLD. Cords still present but painis decreasing and AROm is improving.  -RE     OT Diagnosis  at riskk for lymphedema, axillary pain decreased L shoulder AROM  -RE     OT Rehab Potential  Good  -RE     Patient/caregiver participated in establishment of treatment plan and goals  Yes  -RE     Patient would benefit from skilled therapy intervention  Yes  -RE        OT Plan    OT Frequency  2x/week;3x/week  -RE     Predicted Duration of Therapy Intervention (OT)  4 weeks  -RE     Planned CPT's?  OT THER PROC EA 15 MIN: [id number];OT SELF CARE/MGMT/TRAIN 15 MIN: 10958;OT MANUAL THERAPY EA 15 MIN: 33547;OT BIS XTRACELL FLUID ANALYSIS: 67099  -RE     OT Plan Comments  begin bandaging if sleeve is delayed  beyond monday  -RE       User Key  (r) = Recorded By, (t) = Taken By, (c) = Cosigned By    Initials Name Provider Type    Julia Rowe OTR Occupational Therapist                 Manual Rx (last 36 hours)      Manual Treatments     Row Name 09/09/21 1652             Total Minutes    44299 - OT Manual Therapy Minutes  45  -RE        User Key  (r) = Recorded By, (t) = Taken By, (c) = Cosigned By    Initials Name Provider Type    Julia Rowe OTR Occupational Therapist        OT Goals     Row Name 09/09/21 1600          OT Short Term Goals    STG Date to Achieve  09/23/21  -RE     STG 1  Pt will demonstrate understanding of use of compression sleeve for edema prevention, exercise and air travel.   -RE     STG 1 Progress  Ongoing  -RE     STG 2  Patient will demonstrate proper awareness of “What is Lymphedema?” and “Healthy Habits” for improved prevention, management, care of symptoms and ease of transition to self-care of condition.   -RE     STG 2 Progress  Ongoing  -RE     STG 3  Patient independent and compliant with initial home exercise program focused on range of motion,and Flexibility.  -RE     STG 3 Progress  Met  -RE     STG 4  Patient to demonstrate increased left shoulder flexion to 140 to improve functional UE use and to restore pre operative AROM per patient perception.  -RE     STG 4 Progress  Ongoing  -RE     STG 5  Patient to demonstrate increased left shoulder abduction to 140 to improve functional UE use and to restore preoperative AROM per patient perception.  -RE     STG 5 Progress  Ongoing  -RE        Long Term Goals    LTG Date to Achieve  10/07/21  -RE     LTG 1  Patient to demonstrate increased left shoulder flexion to 160 to improve functional UE use and to restore pre operative AROM per patient perception.  -RE     LTG 1 Progress  Ongoing  -RE     LTG 2  Patient to demonstrate increased left shoulder abduction to 160 to improve functional UE use and to restore preoperative AROM per  patient perception.  -RE     LTG 2 Progress  Ongoing  -RE     LTG 3  Patient will participate in bioimpedance scans every 3-6 months as a method of early detection of lymphedema to allow for early intervention.  -RE     LTG 3 Progress  Met;Ongoing  -RE     LTG 4   Patient's bioimpedance score to remain below 6.5 for decreased risk of stage II lymphedema.  -RE     LTG 4 Progress  Not Met;Ongoing  -RE        Time Calculation    OT Goal Re-Cert Due Date  11/10/21  -RE       User Key  (r) = Recorded By, (t) = Taken By, (c) = Cosigned By    Initials Name Provider Type    Julia Rowe OTR Occupational Therapist          Therapy Education  Given: Edema management, Symptoms/condition management  Program: New  How Provided: Verbal  Provided to: Patient  Level of Understanding: Teach back education performed, Verbalized                Time Calculation:   OT Start Time: 1500  OT Stop Time: 1545  OT Time Calculation (min): 45 min  Total Timed Code Minutes- OT: 45 minute(s)  Timed Charges  61769 - OT Manual Therapy Minutes: 45  Total Minutes  Timed Charges Total Minutes: 45   Total Minutes: 45     Therapy Charges for Today     Code Description Service Date Service Provider Modifiers Qty    95689634050 HC OT MANUAL THERAPY EA 15 MIN 9/9/2021 Julia Leslie OTR GO 3                      VI Ulloa  9/9/2021

## 2021-09-13 ENCOUNTER — HOSPITAL ENCOUNTER (OUTPATIENT)
Dept: OCCUPATIONAL THERAPY | Facility: HOSPITAL | Age: 41
Setting detail: THERAPIES SERIES
Discharge: HOME | End: 2021-09-13

## 2021-09-13 DIAGNOSIS — M79.621 AXILLARY PAIN, RIGHT: ICD-10-CM

## 2021-09-13 DIAGNOSIS — C50.812 MALIGNANT NEOPLASM OF OVERLAPPING SITES OF LEFT BREAST IN FEMALE, ESTROGEN RECEPTOR NEGATIVE (HCC): ICD-10-CM

## 2021-09-13 DIAGNOSIS — Z17.1 MALIGNANT NEOPLASM OF OVERLAPPING SITES OF LEFT BREAST IN FEMALE, ESTROGEN RECEPTOR NEGATIVE (HCC): ICD-10-CM

## 2021-09-13 DIAGNOSIS — Z91.89 AT RISK FOR LYMPHEDEMA: Primary | ICD-10-CM

## 2021-09-13 PROCEDURE — 97140 MANUAL THERAPY 1/> REGIONS: CPT

## 2021-09-13 NOTE — THERAPY TREATMENT NOTE
Outpatient Occupational Therapy Lymphedema Treatment Note  [facility]     Patient Name: Radha Astorga  : 1980  MRN: [id number]  Today's Date: 2021      Visit Date: 2021  Patient and therapist both masked. Therapist with face shield and gloves.  Patient Active Problem List   Diagnosis   • Family history of breast cancer   • Hypothyroidism   • Major depressive disorder, recurrent episode, moderate with anxious distress (CMS/[facility])   • Attention deficit hyperactivity disorder (ADHD), predominantly inattentive type   • Factor 5 Leiden mutation, heterozygous (CMS/HCC)   • Kidney mass   • Malignant neoplasm of overlapping sites of left breast in female, estrogen receptor negative (CMS/HCC)   • Encounter for long-term (current) use of high-risk medication   • Essential hypertension   • Rheumatic mitral valve disease   • Encounter for adjustment or management of vascular access device   • Iron deficiency anemia        Past Medical History:   Diagnosis Date   • Acute stress reaction 2014   • ADD (attention deficit disorder)    • ADHD (attention deficit hyperactivity disorder)    • Anxiety and depression    • CTS (carpal tunnel syndrome)    • Factor 5 Leiden mutation, heterozygous (CMS/HCC) 2021   • Family history of breast cancer 2013   • Fracture, cervical vertebra (CMS/[facility]) 1997    C4   • H/O complete eye exam 2016   • Hearing loss    • 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   • History of rheumatic fever    • Hypertension    • Hypothyroidism    • Infiltrating ductal carcinoma of left breast (CMS/HCC) 2021    Left   • Kidney stone    • Mitral valve insufficiency    • PONV (postoperative nausea and vomiting)    • Stroke (CMS/[facility]) 2020    HX OF   • Stroke-like symptoms         Past Surgical History:   Procedure Laterality Date   • BREAST BIOPSY Left 2021    IDC   • BREAST LUMPECTOMY WITH SENTINEL NODE BIOPSY N/A 2021    Procedure: right port  placement, Left SHAINA-guided, bracketed, partial mastectomy and sentinel lymph node biopsy Left breast needle-localized excisional biopsy;  Surgeon: Lashonda Euceda MD;  Location: [facility];  Service: General;  Laterality: N/A;   • BREAST SURGERY Bilateral 7/1/2021    Procedure: RIGHT BREAST REDUCTION MASTOPEXY LEFT BREAST ONCOPLASTIC CLOSURE;  Surgeon: Caridad Baker MD PhD;  Location: [facility];  Service: Plastics;  Laterality: Bilateral;   • NO PAST SURGERIES     • PAP SMEAR  2016         Visit Dx:      ICD-10-CM ICD-9-CM   1. At risk for lymphedema  Z91.89 V49.89   2. Axillary pain, right  M79.621 729.5   3. Malignant neoplasm of overlapping sites of left breast in female, estrogen receptor negative (CMS/[facility])  C50.812 174.8    Z17.1 V86.1       Lymphedema     Row Name 09/13/21 1100             Subjective Pain    Able to rate subjective pain?  yes  -RE      Pre-Treatment Pain Level  3  -RE      Post-Treatment Pain Level  3  -RE      Subjective Pain Comment  from cords  -RE         Manual Lymphatic Drainage    Manual Lymphatic Drainage  initial sequence;opened regional lymph nodes;opened anastamoses;extremity treatment  -RE      Initial Sequence  short neck  -RE      Opened Regional Lymph Nodes  axillary;inguinal  -RE      Axillary  right;left  -RE      Inguinal  left  -RE      Opened Anastamoses  anterior axillo-axillary;posterior axillo-axillary;axillo-inguinal  -RE      Axillo-Inguinal  left  -RE      Extremity Treatment  MLD to full limb  -RE      Manual Therapy  c and s strokes to coreded area in axilla  -RE         Compression/Skin Care    Compression/Skin Care  skin care;wrapping location;bandaging  -RE      Skin Care  lotion applied  -RE      Wrapping Location  upper extremity  -RE      Wrapping Location UE  left:;hand to axilla  -RE      Bandage Layers  cotton liner;padding/fluff layer;short-stretch bandages (comment size/quantity)  -RE      Bandaging Comments  Tg7, 1 1/2 Artiflex,  1-6cma dn 1-8 cm Rosidal K  -RE      Bandaging Technique  circumferential/spiral;light compression  -RE        User Key  (r) = Recorded By, (t) = Taken By, (c) = Cosigned By    Initials Name Provider Type    Julia Rowe OTR Occupational Therapist                  OT Assessment/Plan     Row Name 09/13/21 1543          OT Assessment    Assessment Comments  Edema is improving but she is having some hand pain. Since her sleeve is not here yet remedios bandage and try to determine if the hand apin is related to the edema. continuw  -RE        OT Plan    OT Plan Comments  continue  -RE       User Key  (r) = Recorded By, (t) = Taken By, (c) = Cosigned By    Initials Name Provider Type    Julia Rowe OTR Occupational Therapist                 Manual Rx (last 36 hours)      Manual Treatments     Row Name 09/13/21 1545             Total Minutes    51143 - OT Manual Therapy Minutes  45  -RE        User Key  (r) = Recorded By, (t) = Taken By, (c) = Cosigned By    Initials Name Provider Type    Julia Rowe OTR Occupational Therapist            Therapy Education  Given: Bandaging/dressing change, Edema management  Program: New  How Provided: Verbal, Demonstration  Provided to: Patient  Level of Understanding: Teach back education performed, Verbalized                Time Calculation:   OT Start Time: 1115  OT Stop Time: 1200  OT Time Calculation (min): 45 min  Total Timed Code Minutes- OT: 45 minute(s)  Timed Charges  32498 - OT Manual Therapy Minutes: 45  Total Minutes  Timed Charges Total Minutes: 45   Total Minutes: 45     Therapy Charges for Today     Code Description Service Date Service Provider Modifiers Qty    [id number]  OT MANUAL THERAPY EA 15 MIN 9/13/2021 Julia Leslie OTR GO 3                      VI Ulloa  9/13/2021

## 2021-09-14 ENCOUNTER — HOSPITAL ENCOUNTER (OUTPATIENT)
Dept: OCCUPATIONAL THERAPY | Facility: HOSPITAL | Age: 41
Setting detail: THERAPIES SERIES
Discharge: HOME | End: 2021-09-14

## 2021-09-14 DIAGNOSIS — M79.621 AXILLARY PAIN, RIGHT: ICD-10-CM

## 2021-09-14 DIAGNOSIS — Z91.89 AT RISK FOR LYMPHEDEMA: Primary | ICD-10-CM

## 2021-09-14 PROCEDURE — 97535 SELF CARE MNGMENT TRAINING: CPT

## 2021-09-14 PROCEDURE — 97140 MANUAL THERAPY 1/> REGIONS: CPT

## 2021-09-14 NOTE — THERAPY TREATMENT NOTE
Outpatient Occupational Therapy Lymphedema Treatment Note [facility]     Patient Name: Radha Astorga  : 1980  MRN: [id number]  Today's Date: 2021      Visit Date: 2021  Patient and therapist both masked. Therapist with face shield and gloves.  Patient Active Problem List   Diagnosis   • Family history of breast cancer   • Hypothyroidism   • Major depressive disorder, recurrent episode, moderate with anxious distress (CMS/[facility])   • Attention deficit hyperactivity disorder (ADHD), predominantly inattentive type   • Factor 5 Leiden mutation, heterozygous (CMS/HCC)   • Kidney mass   • Malignant neoplasm of overlapping sites of left breast in female, estrogen receptor negative (CMS/HCC)   • Encounter for long-term (current) use of high-risk medication   • Essential hypertension   • Rheumatic mitral valve disease   • Encounter for adjustment or management of vascular access device   • Iron deficiency anemia        Past Medical History:   Diagnosis Date   • Acute stress reaction 2014   • ADD (attention deficit disorder)    • ADHD (attention deficit hyperactivity disorder)    • Anxiety and depression    • CTS (carpal tunnel syndrome)    • Factor 5 Leiden mutation, heterozygous (CMS/HCC) 2021   • Family history of breast cancer 2013   • Fracture, cervical vertebra (CMS/[facility]) 1997    C4   • H/O complete eye exam 2016   • Hearing loss    • 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   • History of rheumatic fever    • Hypertension    • Hypothyroidism    • Infiltrating ductal carcinoma of left breast (CMS/HCC) 2021    Left   • Kidney stone    • Mitral valve insufficiency    • PONV (postoperative nausea and vomiting)    • Stroke (CMS/[facility]) 2020    HX OF   • Stroke-like symptoms         Past Surgical History:   Procedure Laterality Date   • BREAST BIOPSY Left 2021    IDC   • BREAST LUMPECTOMY WITH SENTINEL NODE BIOPSY N/A 2021    Procedure: right port  placement, Left SHAINA-guided, bracketed, partial mastectomy and sentinel lymph node biopsy Left breast needle-localized excisional biopsy;  Surgeon: Lashonda Euceda MD;  Location: [facility];  Service: General;  Laterality: N/A;   • BREAST SURGERY Bilateral 7/1/2021    Procedure: RIGHT BREAST REDUCTION MASTOPEXY LEFT BREAST ONCOPLASTIC CLOSURE;  Surgeon: Caridad Baker MD PhD;  Location: [facility];  Service: Plastics;  Laterality: Bilateral;   • NO PAST SURGERIES     • PAP SMEAR  2016         Visit Dx:      ICD-10-CM ICD-9-CM   1. At risk for lymphedema  Z91.89 V49.89   2. Axillary pain, right  M79.621 729.5       Lymphedema     Row Name 09/14/21 1500             Subjective Pain    Able to rate subjective pain?  yes  -RE      Pre-Treatment Pain Level  2  -RE      Post-Treatment Pain Level  2  -RE         Manual Lymphatic Drainage    Manual Lymphatic Drainage  initial sequence;opened regional lymph nodes;opened anastamoses;extremity treatment  -RE      Initial Sequence  short neck  -RE      Opened Regional Lymph Nodes  axillary;inguinal  -RE      Axillary  right;left  -RE      Inguinal  left  -RE      Opened Anastamoses  anterior axillo-axillary;posterior axillo-axillary;axillo-inguinal  -RE      Axillo-Inguinal  left  -RE      Extremity Treatment  MLD to full limb  -RE      Manual Therapy  c and s strokes to coreded area in axilla  -RE         Compression/Skin Care    Compression/Skin Care  skin care;wrapping location;bandaging  -RE      Skin Care  lotion applied  -RE      Wrapping Location  upper extremity  -RE      Wrapping Location UE  left:;hand to axilla  -RE      Bandage Layers  cotton liner;padding/fluff layer;short-stretch bandages (comment size/quantity)  -RE      Bandaging Comments  Tg7, 1 1/2 Artiflex, 1-6cma dn 1-8 cm Rosidal K  -RE      Bandaging Technique  circumferential/spiral;light compression  -RE      Compression/Skin Care Comments  measured for fiorella lite sleve adn gauntlet  which is available at the  pharmacy. Pt can purchase adn use while we wait for harmony to be ordered through [employer].  -RE        User Key  (r) = Recorded By, (t) = Taken By, (c) = Cosigned By    Initials Name Provider Type    Julia Rowe OTR Occupational Therapist                  OT Assessment/Plan     Row Name 09/14/21 1655          OT Assessment    Assessment Comments  minimal palpable edema today. Cording still palpable adnn painful in axilla and upper arm. Pt reported relief following manual techniques and was very pleased.  -RE        OT Plan    OT Plan Comments  continue  -RE       User Key  (r) = Recorded By, (t) = Taken By, (c) = Cosigned By    Initials Name Provider Type    Julia Rowe OTR Occupational Therapist                 Manual Rx (last 36 hours)      Manual Treatments     Row Name 09/14/21 1657             Total Minutes    69561 - OT Manual Therapy Minutes  45  -RE        User Key  (r) = Recorded By, (t) = Taken By, (c) = Cosigned By    Initials Name Provider Type    Julia Rowe OTR Occupational Therapist            Therapy Education  Given: Bandaging/dressing change  Program: New  How Provided: Verbal, Demonstration, Written  Provided to: Patient  Level of Understanding: Teach back education performed, Verbalized  12631 - OT Self Care/Mgmt Minutes: 10                Time Calculation:   OT Start Time: 1500  OT Stop Time: 1555  OT Time Calculation (min): 55 min  Total Timed Code Minutes- OT: 55 minute(s)  Timed Charges  32086 - OT Manual Therapy Minutes: 45  71692 - OT Self Care/Mgmt Minutes: 10  Total Minutes  Timed Charges Total Minutes: 55   Total Minutes: 55     Therapy Charges for Today     Code Description Service Date Service Provider Modifiers Qty    99281439593 HC OT MANUAL THERAPY EA 15 MIN 9/14/2021 Julia Leslie OTR GO 3    44193290724 HC OT SELF CARE/MGMT/TRAIN EA 15 MIN 9/14/2021 Julia Leslie OTR GO 1                      VI Ulloa  9/14/2021

## 2021-09-15 ENCOUNTER — INFUSION (OUTPATIENT)
Dept: ONCOLOGY | Facility: HOSPITAL | Age: 41
End: 2021-09-15

## 2021-09-15 VITALS
HEART RATE: 103 BPM | WEIGHT: 146.6 LBS | TEMPERATURE: 97.8 F | OXYGEN SATURATION: 99 % | BODY MASS INDEX: 26.81 KG/M2 | DIASTOLIC BLOOD PRESSURE: 78 MMHG | SYSTOLIC BLOOD PRESSURE: 121 MMHG

## 2021-09-15 DIAGNOSIS — C50.812 MALIGNANT NEOPLASM OF OVERLAPPING SITES OF LEFT BREAST IN FEMALE, ESTROGEN RECEPTOR NEGATIVE (HCC): Primary | ICD-10-CM

## 2021-09-15 DIAGNOSIS — D50.8 OTHER IRON DEFICIENCY ANEMIA: ICD-10-CM

## 2021-09-15 DIAGNOSIS — Z17.1 MALIGNANT NEOPLASM OF OVERLAPPING SITES OF LEFT BREAST IN FEMALE, ESTROGEN RECEPTOR NEGATIVE (HCC): Primary | ICD-10-CM

## 2021-09-15 LAB
ALBUMIN SERPL-MCNC: 4 G/DL (ref 3.5–5.2)
ALBUMIN/GLOB SERPL: 1.8 G/DL (ref 1.1–2.4)
ALP SERPL-CCNC: 79 U/L (ref 38–116)
ALT SERPL W P-5'-P-CCNC: 50 U/L (ref 0–33)
ANION GAP SERPL CALCULATED.3IONS-SCNC: 10.8 MMOL/L (ref 5–15)
AST SERPL-CCNC: 28 U/L (ref 0–32)
BASOPHILS # BLD AUTO: 0.09 10*3/MM3 (ref 0–0.2)
BASOPHILS NFR BLD AUTO: 1.5 % (ref 0–1.5)
BILIRUB SERPL-MCNC: 0.2 MG/DL (ref 0.2–1.2)
BUN SERPL-MCNC: 15 MG/DL (ref 6–20)
BUN/CREAT SERPL: 19.5 (ref 7.3–30)
CALCIUM SPEC-SCNC: 9.2 MG/DL (ref 8.5–10.2)
CHLORIDE SERPL-SCNC: 107 MMOL/L (ref 98–107)
CO2 SERPL-SCNC: 22.2 MMOL/L (ref 22–29)
CREAT SERPL-MCNC: 0.77 MG/DL (ref 0.6–1.1)
DEPRECATED RDW RBC AUTO: 50.3 FL (ref 37–54)
EOSINOPHIL # BLD AUTO: 0.26 10*3/MM3 (ref 0–0.4)
EOSINOPHIL NFR BLD AUTO: 4.4 % (ref 0.3–6.2)
ERYTHROCYTE [DISTWIDTH] IN BLOOD BY AUTOMATED COUNT: 16.8 % (ref 12.3–15.4)
GFR SERPL CREATININE-BSD FRML MDRD: 82 ML/MIN/1.73
GLOBULIN UR ELPH-MCNC: 2.2 GM/DL (ref 1.8–3.5)
GLUCOSE SERPL-MCNC: 117 MG/DL (ref 74–124)
HCT VFR BLD AUTO: 31.8 % (ref 34–46.6)
HGB BLD-MCNC: 10.2 G/DL (ref 12–15.9)
IMM GRANULOCYTES # BLD AUTO: 0.06 10*3/MM3 (ref 0–0.05)
IMM GRANULOCYTES NFR BLD AUTO: 1 % (ref 0–0.5)
LYMPHOCYTES # BLD AUTO: 1.9 10*3/MM3 (ref 0.7–3.1)
LYMPHOCYTES NFR BLD AUTO: 32.4 % (ref 19.6–45.3)
MCH RBC QN AUTO: 26.8 PG (ref 26.6–33)
MCHC RBC AUTO-ENTMCNC: 32.1 G/DL (ref 31.5–35.7)
MCV RBC AUTO: 83.5 FL (ref 79–97)
MONOCYTES # BLD AUTO: 0.6 10*3/MM3 (ref 0.1–0.9)
MONOCYTES NFR BLD AUTO: 10.2 % (ref 5–12)
NEUTROPHILS NFR BLD AUTO: 2.96 10*3/MM3 (ref 1.7–7)
NEUTROPHILS NFR BLD AUTO: 50.5 % (ref 42.7–76)
NRBC BLD AUTO-RTO: 0 /100 WBC (ref 0–0.2)
PLATELET # BLD AUTO: 292 10*3/MM3 (ref 140–450)
PMV BLD AUTO: 9 FL (ref 6–12)
POTASSIUM SERPL-SCNC: 4.4 MMOL/L (ref 3.5–4.7)
PROT SERPL-MCNC: 6.2 G/DL (ref 6.3–8)
RBC # BLD AUTO: 3.81 10*6/MM3 (ref 3.77–5.28)
SODIUM SERPL-SCNC: 140 MMOL/L (ref 134–145)
WBC # BLD AUTO: 5.87 10*3/MM3 (ref 3.4–10.8)

## 2021-09-15 PROCEDURE — 96367 TX/PROPH/DG ADDL SEQ IV INF: CPT

## 2021-09-15 PROCEDURE — 85025 COMPLETE CBC W/AUTO DIFF WBC: CPT

## 2021-09-15 PROCEDURE — 25010000002 DEXAMETHASONE SODIUM PHOSPHATE 100 MG/10ML SOLUTION: Performed by: NURSE PRACTITIONER

## 2021-09-15 PROCEDURE — 96413 CHEMO IV INFUSION 1 HR: CPT

## 2021-09-15 PROCEDURE — 96415 CHEMO IV INFUSION ADDL HR: CPT

## 2021-09-15 PROCEDURE — 25010000002 PACLITAXEL PER 1 MG: Performed by: NURSE PRACTITIONER

## 2021-09-15 PROCEDURE — 96417 CHEMO IV INFUS EACH ADDL SEQ: CPT

## 2021-09-15 PROCEDURE — 80053 COMPREHEN METABOLIC PANEL: CPT

## 2021-09-15 PROCEDURE — 25010000002 IRON SUCROSE PER 1 MG: Performed by: INTERNAL MEDICINE

## 2021-09-15 PROCEDURE — 96375 TX/PRO/DX INJ NEW DRUG ADDON: CPT

## 2021-09-15 PROCEDURE — 25010000002 TRASTUZUMAB-ANNS 420 MG RECONSTITUTED SOLUTION 1 EACH VIAL: Performed by: NURSE PRACTITIONER

## 2021-09-15 PROCEDURE — 25010000002 DIPHENHYDRAMINE PER 50 MG: Performed by: NURSE PRACTITIONER

## 2021-09-15 RX ORDER — SODIUM CHLORIDE 9 MG/ML
250 INJECTION, SOLUTION INTRAVENOUS ONCE
Status: COMPLETED | OUTPATIENT
Start: 2021-09-15 | End: 2021-09-15

## 2021-09-15 RX ORDER — FAMOTIDINE 10 MG/ML
20 INJECTION, SOLUTION INTRAVENOUS ONCE
Status: COMPLETED | OUTPATIENT
Start: 2021-09-15 | End: 2021-09-15

## 2021-09-15 RX ORDER — FAMOTIDINE 10 MG/ML
20 INJECTION, SOLUTION INTRAVENOUS AS NEEDED
Status: CANCELLED | OUTPATIENT
Start: 2021-09-15

## 2021-09-15 RX ORDER — DIPHENHYDRAMINE HYDROCHLORIDE 50 MG/ML
50 INJECTION INTRAMUSCULAR; INTRAVENOUS AS NEEDED
Status: CANCELLED | OUTPATIENT
Start: 2021-09-15

## 2021-09-15 RX ADMIN — FAMOTIDINE 20 MG: 10 INJECTION INTRAVENOUS at 09:13

## 2021-09-15 RX ADMIN — DIPHENHYDRAMINE HYDROCHLORIDE 25 MG: 50 INJECTION, SOLUTION INTRAMUSCULAR; INTRAVENOUS at 09:29

## 2021-09-15 RX ADMIN — TRASTUZUMAB-ANNS 130 MG: 420 INJECTION, POWDER, LYOPHILIZED, FOR SOLUTION INTRAVENOUS at 11:16

## 2021-09-15 RX ADMIN — SODIUM CHLORIDE 250 ML: 9 INJECTION, SOLUTION INTRAVENOUS at 09:13

## 2021-09-15 RX ADMIN — SODIUM CHLORIDE 250 ML: 9 INJECTION, SOLUTION INTRAVENOUS at 11:49

## 2021-09-15 RX ADMIN — PACLITAXEL 135 MG: 6 INJECTION, SOLUTION INTRAVENOUS at 10:08

## 2021-09-15 RX ADMIN — SODIUM CHLORIDE 300 MG: 900 INJECTION, SOLUTION INTRAVENOUS at 11:49

## 2021-09-15 RX ADMIN — DEXAMETHASONE SODIUM PHOSPHATE 12 MG: 10 INJECTION, SOLUTION INTRAMUSCULAR; INTRAVENOUS at 09:13

## 2021-09-17 DIAGNOSIS — F90.0 ATTENTION DEFICIT HYPERACTIVITY DISORDER (ADHD), PREDOMINANTLY INATTENTIVE TYPE: Chronic | ICD-10-CM

## 2021-09-17 RX ORDER — DEXTROAMPHETAMINE SACCHARATE, AMPHETAMINE ASPARTATE MONOHYDRATE, DEXTROAMPHETAMINE SULFATE AND AMPHETAMINE SULFATE 5; 5; 5; 5 MG/1; MG/1; MG/1; MG/1
20 CAPSULE, EXTENDED RELEASE ORAL EVERY MORNING
Qty: 30 CAPSULE | Refills: 0 | Status: SHIPPED | OUTPATIENT
Start: 2021-09-17 | End: 2021-11-02

## 2021-09-22 ENCOUNTER — OFFICE VISIT (OUTPATIENT)
Dept: ONCOLOGY | Facility: CLINIC | Age: 41
End: 2021-09-22

## 2021-09-22 ENCOUNTER — INFUSION (OUTPATIENT)
Dept: ONCOLOGY | Facility: HOSPITAL | Age: 41
End: 2021-09-22

## 2021-09-22 ENCOUNTER — TELEPHONE (OUTPATIENT)
Dept: CARDIOLOGY | Facility: CLINIC | Age: 41
End: 2021-09-22

## 2021-09-22 VITALS — DIASTOLIC BLOOD PRESSURE: 84 MMHG | HEART RATE: 105 BPM | SYSTOLIC BLOOD PRESSURE: 133 MMHG

## 2021-09-22 VITALS
DIASTOLIC BLOOD PRESSURE: 88 MMHG | TEMPERATURE: 97.7 F | OXYGEN SATURATION: 99 % | HEART RATE: 107 BPM | SYSTOLIC BLOOD PRESSURE: 134 MMHG | BODY MASS INDEX: 26.96 KG/M2 | RESPIRATION RATE: 16 BRPM | WEIGHT: 146.5 LBS | HEIGHT: 62 IN

## 2021-09-22 DIAGNOSIS — Z17.1 MALIGNANT NEOPLASM OF OVERLAPPING SITES OF LEFT BREAST IN FEMALE, ESTROGEN RECEPTOR NEGATIVE (HCC): ICD-10-CM

## 2021-09-22 DIAGNOSIS — Z17.1 MALIGNANT NEOPLASM OF OVERLAPPING SITES OF LEFT BREAST IN FEMALE, ESTROGEN RECEPTOR NEGATIVE (HCC): Primary | ICD-10-CM

## 2021-09-22 DIAGNOSIS — D50.8 OTHER IRON DEFICIENCY ANEMIA: ICD-10-CM

## 2021-09-22 DIAGNOSIS — Z45.2 ENCOUNTER FOR ADJUSTMENT OR MANAGEMENT OF VASCULAR ACCESS DEVICE: ICD-10-CM

## 2021-09-22 DIAGNOSIS — C50.812 MALIGNANT NEOPLASM OF OVERLAPPING SITES OF LEFT BREAST IN FEMALE, ESTROGEN RECEPTOR NEGATIVE (HCC): Primary | ICD-10-CM

## 2021-09-22 DIAGNOSIS — C50.812 MALIGNANT NEOPLASM OF OVERLAPPING SITES OF LEFT BREAST IN FEMALE, ESTROGEN RECEPTOR NEGATIVE (HCC): ICD-10-CM

## 2021-09-22 DIAGNOSIS — Z79.899 HIGH RISK MEDICATION USE: ICD-10-CM

## 2021-09-22 LAB
ALBUMIN SERPL-MCNC: 4 G/DL (ref 3.5–5.2)
ALBUMIN/GLOB SERPL: 1.6 G/DL
ALP SERPL-CCNC: 88 U/L (ref 39–117)
ALT SERPL W P-5'-P-CCNC: 99 U/L (ref 1–33)
ANION GAP SERPL CALCULATED.3IONS-SCNC: 7.5 MMOL/L (ref 5–15)
AST SERPL-CCNC: 40 U/L (ref 1–32)
BASOPHILS # BLD AUTO: 0.09 10*3/MM3 (ref 0–0.2)
BASOPHILS NFR BLD AUTO: 1.3 % (ref 0–1.5)
BILIRUB SERPL-MCNC: 0.2 MG/DL (ref 0–1.2)
BUN SERPL-MCNC: 16 MG/DL (ref 6–20)
BUN/CREAT SERPL: 20.3 (ref 7–25)
CALCIUM SPEC-SCNC: 9.4 MG/DL (ref 8.6–10.5)
CHLORIDE SERPL-SCNC: 103 MMOL/L (ref 98–107)
CO2 SERPL-SCNC: 25.5 MMOL/L (ref 22–29)
CREAT SERPL-MCNC: 0.79 MG/DL (ref 0.57–1)
DEPRECATED RDW RBC AUTO: 52.5 FL (ref 37–54)
EOSINOPHIL # BLD AUTO: 0.23 10*3/MM3 (ref 0–0.4)
EOSINOPHIL NFR BLD AUTO: 3.3 % (ref 0.3–6.2)
ERYTHROCYTE [DISTWIDTH] IN BLOOD BY AUTOMATED COUNT: 17.1 % (ref 12.3–15.4)
FERRITIN SERPL-MCNC: 416.3 NG/ML (ref 13–150)
GFR SERPL CREATININE-BSD FRML MDRD: 79 ML/MIN/1.73
GLOBULIN UR ELPH-MCNC: 2.5 GM/DL
GLUCOSE SERPL-MCNC: 105 MG/DL (ref 65–99)
HCT VFR BLD AUTO: 31.5 % (ref 34–46.6)
HGB BLD-MCNC: 9.8 G/DL (ref 12–15.9)
IMM GRANULOCYTES # BLD AUTO: 0.06 10*3/MM3 (ref 0–0.05)
IMM GRANULOCYTES NFR BLD AUTO: 0.9 % (ref 0–0.5)
IRON 24H UR-MRATE: 33 MCG/DL (ref 37–145)
IRON SATN MFR SERPL: 9 % (ref 20–50)
LYMPHOCYTES # BLD AUTO: 1.67 10*3/MM3 (ref 0.7–3.1)
LYMPHOCYTES NFR BLD AUTO: 23.9 % (ref 19.6–45.3)
MCH RBC QN AUTO: 26.5 PG (ref 26.6–33)
MCHC RBC AUTO-ENTMCNC: 31.1 G/DL (ref 31.5–35.7)
MCV RBC AUTO: 85.1 FL (ref 79–97)
MONOCYTES # BLD AUTO: 0.63 10*3/MM3 (ref 0.1–0.9)
MONOCYTES NFR BLD AUTO: 9 % (ref 5–12)
NEUTROPHILS NFR BLD AUTO: 4.32 10*3/MM3 (ref 1.7–7)
NEUTROPHILS NFR BLD AUTO: 61.6 % (ref 42.7–76)
NRBC BLD AUTO-RTO: 0 /100 WBC (ref 0–0.2)
PLATELET # BLD AUTO: 340 10*3/MM3 (ref 140–450)
PMV BLD AUTO: 9.6 FL (ref 6–12)
POTASSIUM SERPL-SCNC: 4.4 MMOL/L (ref 3.5–5.2)
PROT SERPL-MCNC: 6.5 G/DL (ref 6–8.5)
RBC # BLD AUTO: 3.7 10*6/MM3 (ref 3.77–5.28)
SODIUM SERPL-SCNC: 136 MMOL/L (ref 136–145)
TIBC SERPL-MCNC: 367 MCG/DL (ref 298–536)
TRANSFERRIN SERPL-MCNC: 246 MG/DL (ref 200–360)
WBC # BLD AUTO: 7 10*3/MM3 (ref 3.4–10.8)

## 2021-09-22 PROCEDURE — 80053 COMPREHEN METABOLIC PANEL: CPT | Performed by: INTERNAL MEDICINE

## 2021-09-22 PROCEDURE — 96413 CHEMO IV INFUSION 1 HR: CPT

## 2021-09-22 PROCEDURE — 82728 ASSAY OF FERRITIN: CPT | Performed by: INTERNAL MEDICINE

## 2021-09-22 PROCEDURE — 25010000002 DIPHENHYDRAMINE PER 50 MG: Performed by: INTERNAL MEDICINE

## 2021-09-22 PROCEDURE — 25010000002 HEPARIN LOCK FLUSH PER 10 UNITS: Performed by: INTERNAL MEDICINE

## 2021-09-22 PROCEDURE — 96375 TX/PRO/DX INJ NEW DRUG ADDON: CPT

## 2021-09-22 PROCEDURE — 25010000002 DEXAMETHASONE SODIUM PHOSPHATE 100 MG/10ML SOLUTION: Performed by: INTERNAL MEDICINE

## 2021-09-22 PROCEDURE — 83540 ASSAY OF IRON: CPT | Performed by: INTERNAL MEDICINE

## 2021-09-22 PROCEDURE — 99214 OFFICE O/P EST MOD 30 MIN: CPT | Performed by: INTERNAL MEDICINE

## 2021-09-22 PROCEDURE — 96366 THER/PROPH/DIAG IV INF ADDON: CPT

## 2021-09-22 PROCEDURE — 84466 ASSAY OF TRANSFERRIN: CPT | Performed by: INTERNAL MEDICINE

## 2021-09-22 PROCEDURE — 25010000002 IRON SUCROSE PER 1 MG: Performed by: INTERNAL MEDICINE

## 2021-09-22 PROCEDURE — 96367 TX/PROPH/DG ADDL SEQ IV INF: CPT

## 2021-09-22 PROCEDURE — 25010000002 TRASTUZUMAB-ANNS 420 MG RECONSTITUTED SOLUTION 1 EACH VIAL: Performed by: INTERNAL MEDICINE

## 2021-09-22 PROCEDURE — 85025 COMPLETE CBC W/AUTO DIFF WBC: CPT | Performed by: INTERNAL MEDICINE

## 2021-09-22 PROCEDURE — 25010000002 PACLITAXEL PER 1 MG: Performed by: INTERNAL MEDICINE

## 2021-09-22 PROCEDURE — 96417 CHEMO IV INFUS EACH ADDL SEQ: CPT

## 2021-09-22 RX ORDER — SODIUM CHLORIDE 9 MG/ML
250 INJECTION, SOLUTION INTRAVENOUS ONCE
Status: COMPLETED | OUTPATIENT
Start: 2021-09-22 | End: 2021-09-22

## 2021-09-22 RX ORDER — SODIUM CHLORIDE 9 MG/ML
250 INJECTION, SOLUTION INTRAVENOUS ONCE
Status: CANCELLED | OUTPATIENT
Start: 2021-09-22

## 2021-09-22 RX ORDER — SODIUM CHLORIDE 0.9 % (FLUSH) 0.9 %
10 SYRINGE (ML) INJECTION AS NEEDED
Status: DISCONTINUED | OUTPATIENT
Start: 2021-09-22 | End: 2021-09-22 | Stop reason: HOSPADM

## 2021-09-22 RX ORDER — FAMOTIDINE 10 MG/ML
20 INJECTION, SOLUTION INTRAVENOUS ONCE
Status: COMPLETED | OUTPATIENT
Start: 2021-09-22 | End: 2021-09-22

## 2021-09-22 RX ORDER — HEPARIN SODIUM (PORCINE) LOCK FLUSH IV SOLN 100 UNIT/ML 100 UNIT/ML
500 SOLUTION INTRAVENOUS AS NEEDED
Status: DISCONTINUED | OUTPATIENT
Start: 2021-09-22 | End: 2021-09-22 | Stop reason: HOSPADM

## 2021-09-22 RX ORDER — DIPHENHYDRAMINE HYDROCHLORIDE 50 MG/ML
50 INJECTION INTRAMUSCULAR; INTRAVENOUS AS NEEDED
Status: CANCELLED | OUTPATIENT
Start: 2021-09-22

## 2021-09-22 RX ORDER — HEPARIN SODIUM (PORCINE) LOCK FLUSH IV SOLN 100 UNIT/ML 100 UNIT/ML
500 SOLUTION INTRAVENOUS AS NEEDED
Status: CANCELLED | OUTPATIENT
Start: 2021-09-22

## 2021-09-22 RX ORDER — FAMOTIDINE 10 MG/ML
20 INJECTION, SOLUTION INTRAVENOUS AS NEEDED
Status: CANCELLED | OUTPATIENT
Start: 2021-09-22

## 2021-09-22 RX ORDER — SODIUM CHLORIDE 0.9 % (FLUSH) 0.9 %
10 SYRINGE (ML) INJECTION AS NEEDED
Status: CANCELLED | OUTPATIENT
Start: 2021-09-22

## 2021-09-22 RX ORDER — FAMOTIDINE 10 MG/ML
20 INJECTION, SOLUTION INTRAVENOUS ONCE
Status: CANCELLED | OUTPATIENT
Start: 2021-09-22

## 2021-09-22 RX ADMIN — PACLITAXEL 135 MG: 6 INJECTION, SOLUTION INTRAVENOUS at 13:03

## 2021-09-22 RX ADMIN — FAMOTIDINE 20 MG: 10 INJECTION INTRAVENOUS at 10:48

## 2021-09-22 RX ADMIN — Medication 500 UNITS: at 14:44

## 2021-09-22 RX ADMIN — SODIUM CHLORIDE 250 ML: 9 INJECTION, SOLUTION INTRAVENOUS at 10:48

## 2021-09-22 RX ADMIN — DIPHENHYDRAMINE HYDROCHLORIDE 25 MG: 50 INJECTION INTRAMUSCULAR; INTRAVENOUS at 11:06

## 2021-09-22 RX ADMIN — SODIUM CHLORIDE, PRESERVATIVE FREE 10 ML: 5 INJECTION INTRAVENOUS at 14:43

## 2021-09-22 RX ADMIN — SODIUM CHLORIDE 300 MG: 900 INJECTION, SOLUTION INTRAVENOUS at 11:22

## 2021-09-22 RX ADMIN — DEXAMETHASONE SODIUM PHOSPHATE 12 MG: 10 INJECTION, SOLUTION INTRAMUSCULAR; INTRAVENOUS at 10:50

## 2021-09-22 RX ADMIN — TRASTUZUMAB-ANNS 130 MG: 420 INJECTION, POWDER, LYOPHILIZED, FOR SOLUTION INTRAVENOUS at 14:11

## 2021-09-22 NOTE — TELEPHONE ENCOUNTER
Dr. Beavers would like to speak with you regarding this patient.    Cell: [phone]    Thank you,  Jennifer Mcdonnell RN  [facility]  Triage

## 2021-09-22 NOTE — TELEPHONE ENCOUNTER
I s/w her. We clarified date of patient's next echo. Her trastuzumab started 8/4, thus, next echo date of 11/4 is correct.

## 2021-09-22 NOTE — PROGRESS NOTES
Subjective     REASON FOR follow-up:    1.  Heterozygous state for factor V Leiden    2.  New onset stroke on aspirin by neurology    3.  Hypercholesterolemia    4.  Hypercoagulable work-up done.  Antithrombin 109% protein S activity 85% protein S antigen 107%, protein C activity 85%.  Anticardiolipin antibody IgM 24%, antibeta-2 glycoprotein antibody negative, lupus anticoagulant not detected, prothrombin gene mutation negative.    5.  Screening mammogram showed abnormality in the left breast 6 o'clock position, 8 mm on ultrasound, ultrasound-guided left breast biopsy, 6 cm from the nipple showed evidence of invasive ductal carcinoma poorly differentiated grade 3, [place] score of 8 out of 9 ER negative, CT negative, HER-2/ese 3+ positive.    6.  CT scan February 24, 2021 showed focal area of fatty infiltration of the liver.  1 cm hypoattenuating cortical lesion in the upper pole of the right kidney.  Similarly nodule in the upper pole of the left kidney is 1.5 cm.  Further evaluation by ultrasound suggested  · Ultrasound March 1, 2021 shows solid lesion in the upper pole of the right kidney.  In the left kidney along the midpole of the left kidney is a nodule which is 16 x 16 x 14 mm which is thought to be a cyst.  A 6-month follow-up CT suggested    6.  S/p left partial mastectomy with sentinel lymph node biopsy.  Tumor was present at 5:00, invasive ductal carcinoma, [place] score of 8, grade 3, greatest dimension was 12 mm x 8 x 4 mm.  Single focus of invasive carcinoma present.  There was extensive DCIS which is 30 mm x 8 x 2 mm, grade 3, no evidence of lymphovascular space invasion or dermal lymphatic invasion.  Margins were clear.  4 lymph nodes were negative.  It is a p T1cp N0, ER negative CT negative HER-2/ese 3+ with Ki-67 of 50%.  · [employer] genetic test negative for 47 genes    7.recently initiated Xarelto at loading dose of 15 mg twice a day x3 weeks to then be switched to 20 mg daily per  protocol.  She is doing this because of Mediport in place and known factor V Leiden heterozygosity.        History of Present Illness   Patient is a 41 y.o. female with pT1cp N0 ER negative MA negative HER-2 positive left breast cancer s/p lumpectomy patient is on weekly Taxol Herceptin.    Patient is here for cycle 8 weekly Taxol Herceptin.  After completion of Taxol Herceptin she will continue Herceptin for 1 year.  She will need referral to radiation oncology.  She is due for echocardiogram 2021-11-04 and follows up with Dr. Camron Virk in cardiology.  She has had nausea only once in a while and she takes as needed Zofran for that.  She does not have any neuropathy so far.  She is tolerating Taxol Herceptin very well.    Oncologic history:  Patient is here for follow-up of her mammogram.  Patient had screening mammogram April 2, 2021:  NAD a focal asymmetry was present in the posterior one third retroareolar region of the left breast.  Further spot compression images and left breast ultrasound was suggested.  Right breast was negative    April 9, 2021: Left diagnostic mammogram showed an area of focal asymmetry in the posterior one third region aspect of the left breast her breast    Ultrasound showed an irregular 0.8 cm lesion in the left breast at the 6 o'clock position of the order of 6 cm from the nipple.  Ultrasound-guided left breast biopsy was recommended.    April 26, 2021: Ultrasound-guided biopsy of the left breast 6 o'clock position, 6 cm from the nipple showed    Invasive ductal carcinoma, poorly differentiated with a Cyrus score grade 3 with a score of 8 out of 9 measuring at least 7 mm.  No definitive ductal carcinoma is in situ is identified.  ER 1% negative  MA 1% negative   HER-2/ese 3+ positive    There was no evidence of lymphadenopathy either in the mammogram of the ultrasound.  We suggested an MRI of the breast.  Patient has strong family history of breast cancer      May 7, 2021: MRI of  bilateral breast reviewed.  My interpretation is that there is area of enhancement in the 6 o'clock position of the left breast this is the biopsy-proven malignancy.  There is additional area of linear enhancement anteriorly and laterally measuring up to 1.2 cm this is approximately 5.6 cm from the nipple.  In addition there is a enhancement 2 to 3 mm in the anterolateral aspect of the lateral aspect of the left breast which is 4.6 cm from the nipple.  There is also mildly prominent posterior lymph node in the mid axilla which measures 1 cm with cortical thickening.  Findings are consistent with multifocal disease.  No contralateral disease or internal mammary adenopathy identified    May 20, 2021: Genetic test, [facility] genetic test shows 47 genes negative    May 21, 2021: I reviewed the biopsy of the lymph node in the left axilla which shows reactive lymph node negative for any carcinoma or lymphoma    June 2, 2021:Final Diagnosis  1. Left Breast, 5:00 o'clock, MRI-guided Biopsies for Linear Enhancement: INVASIVE MAMMARY CARCINOMA, NO  SPECIAL TYPE (INVASIVE DUCTAL CARCINOMA).  A. Largest contiguous focus in a core measures 4 mm.  B. No in-situ component identified.  C. Brisk lymphocytic response noted (see Comment).  D. No definitive lymphovascular nor perineural invasion identified.  2. Left Breast, 2:00 o'clock, MRI-guided Biopsy for Enhancement:  A. Benign breast parenchyma with radial scar and micropapillomas.  B. Usual ductal hyperplasia and columnar cell hyperplasia.  C. No atypical hyperplasia, in-situ nor invasive carcinoma identified    ER, DC, HER-2 new and Ki-67 pending on this new biopsy      Patient has been seen by Dr. Lashonda Euceda with plans of doing surgery on July first 2021    Once patient undergoes surgery lumpectomy with sentinel lymph node biopsy we will give further recommendation about treatment options.  Given that patient has multifocal disease and both of the lesions 2 lesions are 1.2  cm each, with it being a HER-2 positive tumor on the previous biopsy at 6:00 Dr. Euceda and myself discussed and patient preferred to undergo port placement at the same time of surgery.    Patient had [employer] genetic test which was negative.    She has completed surgery July 1, 2021.  She underwent left partial mastectomy with left axillary sentinel lymph node biopsy and right port placement.  Clinically she was thought to have a high-grade multifocal invasive ductal carcinoma ER/WY negative, WY positive.  The lesions require preoperative localization.  The multifocal cancer was bracketed with 2 savvy markers and the radial scar was localized with a needle.  Because of the volume of tissue that will be excised related to the breast volume the case was done in conjunction with Dr. Zavala for oncoplastic closure.    She is 2 weeks from surgery.  She is healing up reasonably well.    On review of her pathology she had left partial mastectomy with sentinel lymph node biopsy.  Tumor was present at 5:00, invasive ductal carcinoma, [place] score of 8, grade 3, greatest dimension was 12 mm x 8 x 4 mm.  Single focus of invasive carcinoma present.  There was extensive DCIS which is 30 mm x 8 x 2 mm, grade 3, no evidence of lymphovascular space invasion or dermal lymphatic invasion.  Margins were clear.  4 lymph nodes were negative.  It is a p T1cp N0, ER negative WY negative HER-2/ese 3+ with Ki-67 of 50%.    Patient is here to discuss further options of treatment.  Given small tumor T1c N0, she is a good candidate for weekly Taxol Herceptin.    Given that patient has heterozygous state for factor V Leiden and currently has port placed we will plan to start Eliquis 2.5 mg p.o. twice daily.  I have left a message for Dr. Craft in neurology to call me to discuss if patient needs to continue aspirin or we can hold off since be starting Eliquis.      Genetic test negative    August 4, 2021: Weekly Taxol Herceptin x12 weeks  followed by Herceptin x1 year.  Cycle 1 today          Hematologic history:  patient is a 40-year-old female who presented end of December with numbness and tingling in her left upper extremity and left face.  She went to see Dr. Goodman who then got concerned and referred to neurology.  She was referred to Dr. Freedman and talk to Dr. Donna guo for evaluation.  Patient underwent ultrasound of the carotids which did not show significant stenosis of the carotids.  She underwent 2D echocardiogram which showed a good ejection fraction of 64% with mild mitral valve prolapse and mild mitral stenosis.  She had a 24-hour Holter which was negative.  She then had also an MRI of the brain February 3, 2021 which showed areas of hyperintensity involving the subcortical white matter of the right frontal lobe superior laterally and posteriorly with the largest area measuring 8 9 mm.  There is also enhancement present.  The findings are consistent with a subacute infarct.  They could not differentiate if there is any underlying neoplastic process but a short-term follow-up was suggested in order to follow-up.  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    Patient currently got started on aspirin and factor V Leiden was obtained by neurology because patient's paternal aunt had factor V Leiden mutation and she was heterozygous.  Patient's testing also showed that she was heterozygous.    Patient states her numbness and tingling is resolved completely on the left arm and face it just lasted for a 24 hours.  She was here referred here for neurology to see if there is any other cause for her underlying hypercoagulable state.  She has not had a complete hypercoagulable work-up.  Also discussed with her that an underlying malignancy could cause a hypercoagulable state and we would need to do a CT scan to rule that out.    Patient's  mother has had breast cancer in her 50s but had pancreatic cancer at 68 and  from that.  Patient's dad had stroke at 63.  But he is alive at 74.  She has 1 brother who is 40 in good health.  Paternal aunt had breast cancer in her 40s.  Paternal grandfather had colon cancer in his late 80s.  Maternal uncle had throat cancer and another maternal uncle had skin cancer with metastasis to the lung.  And maternal grandmother had breast cancer.    Patient was to have mammogram done last year but because of COVID-19 it got postponed and she has not had it done yet.    Patient used to be a smoker half pack per day for 7 years but quit 10 years ago.  She has high cholesterol for which she is on treatment.    Past Medical History:   Diagnosis Date   • Acute stress reaction 2014   • ADD (attention deficit disorder)    • ADHD (attention deficit hyperactivity disorder)    • Anxiety and depression    • CTS (carpal tunnel syndrome)    • Factor 5 Leiden mutation, heterozygous (CMS/[facility]) 2021   • Family history of breast cancer 2013   • Fracture, cervical vertebra (CMS/[facility]) 1997    C4   • H/O complete eye exam 2016   • Hearing loss    • 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   • History of rheumatic fever    • Hypertension    • Hypothyroidism    • Infiltrating ductal carcinoma of left breast (CMS/[facility]) 2021    Left   • Kidney stone    • Mitral valve insufficiency    • PONV (postoperative nausea and vomiting)    • Stroke (CMS/[facility]) 2020    HX OF   • Stroke-like symptoms         Past Surgical History:   Procedure Laterality Date   • BREAST BIOPSY Left 2021    IDC   • BREAST LUMPECTOMY WITH SENTINEL NODE BIOPSY N/A 2021    Procedure: right port placement, Left SHAINA-guided, bracketed, partial mastectomy and sentinel lymph node biopsy Left breast needle-localized excisional biopsy;  Surgeon: Lashonda Euceda MD;  Location: [facility] OR;  Service: General;  Laterality: N/A;    • BREAST SURGERY Bilateral 7/1/2021    Procedure: RIGHT BREAST REDUCTION MASTOPEXY LEFT BREAST ONCOPLASTIC CLOSURE;  Surgeon: Caridad Baker MD PhD;  Location: [facility];  Service: Plastics;  Laterality: Bilateral;   • NO PAST SURGERIES     • PAP SMEAR  2016        Current Outpatient Medications on File Prior to Visit   Medication Sig Dispense Refill   • amphetamine-dextroamphetamine XR (Adderall XR) 20 MG 24 hr capsule Take 1 capsule by mouth Every Morning 30 capsule 0   • atorvastatin (Lipitor) 10 MG tablet Take 1 tablet by mouth Daily. 30 tablet 11   • Cholecalciferol (VITAMIN D3 PO) Take 2,000 Units by mouth. Takes 2000 twice a week;  SUNDAYS AND WEDNESDAYS     • famotidine (PEPCID) 20 MG tablet Take 1 tablet by mouth Daily. 30 tablet 3   • levothyroxine (Synthroid) 150 MCG tablet Take 1 tablet by mouth Daily. 90 tablet 1   • lidocaine-prilocaine (EMLA) 2.5-2.5 % cream Apply  topically to the appropriate area as directed As Needed for Mild Pain . 1 each 2   • LORazepam (Ativan) 0.5 MG tablet Take 1 tablet by mouth Every 8 (Eight) Hours As Needed for Anxiety. 30 tablet 0   • ondansetron (ZOFRAN) 8 MG tablet Take 1 tablet by mouth 3 (Three) Times a Day As Needed for Nausea or Vomiting. 30 tablet 5   • rivaroxaban (XARELTO) 20 MG tablet Take 1 tablet by mouth Daily. 30 tablet 3   • valsartan (DIOVAN) 160 MG tablet Take 1 tablet by mouth Daily. 30 tablet 5   • [DISCONTINUED] Cobalamin Combinations (B12 FOLATE PO) Take  by mouth. 400 mg folate 400 mg b12     • [DISCONTINUED] Pyridoxine HCl (B-6 PO) Take 50 mg by mouth Daily.       Current Facility-Administered Medications on File Prior to Visit   Medication Dose Route Frequency Provider Last Rate Last Admin   • heparin injection 500 Units  500 Units Intravenous PRN Neena Beavers MD   500 Units at 08/11/21 1718   • sodium chloride 0.9 % flush 10 mL  10 mL Intravenous PRN Neena Beavers MD   10 mL at 08/11/21 1718        ALLERGIES:    Allergies    Allergen Reactions   • Sulfa Antibiotics Rash        Social History     Socioeconomic History   • Marital status: Significant Other     Spouse name: Not on file   • Number of children: 0   • Years of education: Not on file   • Highest education level: Not on file   Tobacco Use   • Smoking status: Former Smoker     Packs/day: 1.00     Years: 10.00     Pack years: 10.00     Types: Cigarettes     Start date:      Quit date:      Years since quittin.7   • Smokeless tobacco: Never Used   Vaping Use   • Vaping Use: Never used   Substance and Sexual Activity   • Alcohol use: Yes     Comment: RARELY   • Drug use: No   • Sexual activity: Defer     Partners: Male        Family History   Problem Relation Age of Onset   • Breast cancer Mother 53   • Pancreatic cancer Mother 68   • Lung cancer Maternal Grandmother    • Stroke Father    • Breast cancer Paternal Aunt 55   • Prostate cancer Maternal Grandfather    • Prostate cancer Paternal Grandfather    • Brain cancer Maternal Cousin 20   • Melanoma Maternal Uncle    • Ovarian cancer Other         Paternal great aunt    • Breast cancer Other    • Breast cancer Paternal Great-Grandmother 94   • Hypertension Brother    • Malig Hyperthermia Neg Hx       Family  history: Mother had breast cancer at age 57.  Maternal great aunt had breast cancer and paternal great aunt had breast cancer in her 40s.  Patient's mother had pancreatic cancer as well.  Paternal grandfather had prostate cancer.    Review of Systems   Constitutional: Negative for appetite change, chills, diaphoresis, fatigue, fever and unexpected weight change.   HENT: Negative for hearing loss, sore throat and trouble swallowing.    Respiratory: Negative for cough, chest tightness, shortness of breath and wheezing.    Cardiovascular: Negative for chest pain, palpitations and leg swelling.   Gastrointestinal: Negative for abdominal distention, abdominal pain, constipation, diarrhea, nausea and vomiting.  "  Genitourinary: Negative for dysuria, frequency, hematuria and urgency.   Musculoskeletal: Negative for joint swelling.        No muscle weakness.   Skin: Negative for rash and wound.   Neurological: Positive for headaches (09/22/21-Unchanged). Negative for seizures, syncope, speech difficulty, weakness and numbness.   Hematological: Negative for adenopathy. Does not bruise/bleed easily.   Psychiatric/Behavioral: Positive for dysphoric mood (stable 09/22/21-Unchanged) and sleep disturbance (09/22/21-Unchanged). Negative for behavioral problems, confusion and suicidal ideas.   All other systems reviewed and are negative.     I have reviewed and confirmed the accuracy of the ROS as documented by the MA/LPN/RN Neena Beavers MD    Objective     Vitals:    09/22/21 0921   BP: 134/88   Pulse: 107   Resp: 16   Temp: 97.7 °F (36.5 °C)   TempSrc: Temporal   SpO2: 99%   Weight: 66.5 kg (146 lb 8 oz)   Height: 157.5 cm (62.01\")   PainSc:   3   PainLoc: Head  Comment: Lt hand     Current Status 9/22/2021   ECOG score 0     Physical exam    CONSTITUTIONAL:  Vital signs reviewed.  No distress, looks comfortable.  EYES:  Conjunctivae and lids unremarkable.  PERRLA.  RESPIRATORY:  Normal respiratory effort.  Lungs clear to auscultation bilaterally.  CARDIOVASCULAR:  Normal S1, S2.  No murmurs rubs or gallops.  No significant lower extremity edema.  GASTROINTESTINAL: Abdomen appears unremarkable.  Nontender.  No hepatomegaly.  No splenomegaly.  LYMPHATIC:  No cervical, supraclavicular, axillary lymphadenopathy.  SKIN:  Warm.  No rashes.  PSYCHIATRIC:  Normal judgment and insight.  Normal mood and affect.        RECENT LABS:  Results from last 7 days   Lab Units 09/22/21  0936   WBC 10*3/mm3 7.00   NEUTROS ABS 10*3/mm3 4.32   HEMOGLOBIN g/dL 9.8*   HEMATOCRIT % 31.5*   PLATELETS 10*3/mm3 340     Results from last 7 days   Lab Units 09/22/21  0936   SODIUM mmol/L 136   POTASSIUM mmol/L 4.4   CHLORIDE mmol/L 103   CO2 mmol/L 25.5 "   BUN mg/dL 16   CREATININE mg/dL 0.79   CALCIUM mg/dL 9.4   ALBUMIN g/dL 4.00   BILIRUBIN mg/dL 0.2   ALK PHOS U/L 88   ALT (SGPT) U/L 99*   AST (SGOT) U/L 40*   GLUCOSE mg/dL 105*   FERRITIN ng/mL 416.30*   IRON mcg/dL 33*   TIBC mcg/dL 367         Assessment/Plan       * bH2ekS1, ER negative TN negative HER-2/ese 3+ with Ki-67 of 50%.  S/p left partial mastectomy with sentinel lymph node biopsy.  Tumor was present at 5:00, invasive ductal carcinoma, [place] score of 8, grade 3, greatest dimension was 12 mm x 8 x 4 mm.  Single focus of invasive carcinoma present.  There was extensive DCIS which is 30 mm x 8 x 2 mm, grade 3, no evidence of lymphovascular space invasion or dermal lymphatic invasion.  Margins were clear.  4 lymph nodes were negative.  It is a p T1cp N0, ER negative TN negative HER-2/ese 3+ with Ki-67 of 50%.  · Patient is s/p port placement  · Discussed in length with patient that given a small tumor she is eligible for weekly Taxol Herceptin.  Weekly Taxol x12 weeks along with weekly Herceptin followed by 1 year of Herceptin.  · Discussed patient in the breast cancer conference  · 2D echo done which showed ejection fraction of 61-65% and strain pattern of -20.8.  · Patient also seen by Dr. Virk cardiology and Dr. Virk is planning to repeat echocardiogram in 3 months after initiation of therapy.  · 8/4/2021 initiating weekly Taxol Herceptin  · 9/8/2021, proceed with week #6 Taxol and Herceptin.  Patient continues to tolerate treatment well. No signs of peripheral neuropathy.  · Her next echocardiogram due November 4, 2021, She follows up with cardiology Dr. Camron Virk.  · September 22, patient here for cycle 8 Taxol Herceptin and tolerating well    *  Factor V Leiden heterozygosity with right frontal stroke and patient presented in December 2020 with left-sided weakness tingling and numbness and also numbness in the face.  · Was referred to neurology and cardiology by Dr. Goodman  · Ultrasound of  carotid does not show significant stenosis  · 24 Holter is negative  · 2D echocardiogram shows ejection fraction of 64% with mild mitral regurg and mitral stenosis  · Neurology obtain factor V Leiden given that patient's paternal aunt had's history of factor V Leiden and that was heterozygous for factor V Leiden  · Continue aspirin as per neurology.  Also patient on medications for her high cholesterol started after stroke currently asymptomatic  · Hypercoagulable work-up done which is negative except heterozygous state for factor V Leiden.  · Complete stroke work-up has been negative and since this is arterial stroke and she was not on aspirin prior to that and her symptoms have resolved I agree with continuing aspirin alone along with high cholesterol medications.  · MR venogram done on May 10, 2021 is negative.  Patient has been referred to the stroke neurologist Dr. Johnson  · Patient diagnosed with breast cancer with Mediport placed.  Per discussion with neurology, patient initiated on prophylactic Xarelto and aspirin discontinued.  · Patient had one nosebleed which lasted 10 minutes but with pinching the nose it helped.  But she is switching to 20 mg daily from tomorrow.  We discussed about placing ice and notifying us if her nosebleeds are significant.  But it resolved.  It was likely secondary to dry air  · 2021, patient continues to experience nosebleeds.  Reports these occur when her nose itches, and after scratching her nose it begins to bleed.  She feels this may be related to dry air, so I have asked her to start using saline nasal spray to help moisten her nose.  If she experiences nosebleeds that do not stop, I asked her to notify our office.    * Family history of cancer: Patient's mother had breast cancer at age 50 and pancreatic cancer at age 68 and  from pancreatic cancer.  Patient's maternal grandmother had breast cancer in her 50s.  Her maternal uncle had skin cancer which went to the  lung and another maternal uncle had throat cancer.  Maternal aunt had call colon polyps.  Patient's paternal aunt had breast cancer in her 40s.  And paternal grandfather with colon cancer in his 80s.  Paternal aunt also had factor V Leiden.  · Genetic testing is negative    * Solid nodule in the right kidney on ultrasound, will schedule follow-up with urology  · Patient was to follow-up with Dr. Shultz  · Will need records from urology    *  Elevated BMI, encourage diet and exercise.  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  Patient has been requiring frequent Tums.  Recommended she begin Pepcid 20 mg daily.    · Stable, continue Pepcid 20 mg daily.    *Constipation likely secondary to ferrous sulfate.  Patient was iron deficient.  · We will switch to IV Venofer.  · 9/7/2021, proceed with week #2 IV Venofer today. Hemoglobin today stable at 9.8.    *Headache likely related to body ache because of Taxol  No complaints of headache today.    *Elevated liver function tests, total bilirubin still normal AST ALT slightly elevated.  Patient did take Tylenol but not too much.  No dose adjustments required at the present time.    *Iron deficiency anemia, patient's percent saturation still low at 9% s/p full dose of IV Venofer.        Plan  · Proceed with week #8 Taxol and Herceptin.  · Proceed with week #2 IV Venofer.  She will receive this weekly x4 due to iron deficiency anemia and intolerance to oral iron with constipation and nausea.  · Continue Pepcid 20 mg daily.    · Continue Xarelto as prescribed.  · Next echo due November 4, 2021  · We will get approval for IV Venofer x3 more doses given her percent saturation was 9%  · Follow-up in 1 week in 2 weeks for weekly Taxol Herceptin and follow-up with me on October 13 for weekly Taxol Herceptin.  · Patient will see nurse practitioner in 1 week and 3 weeks with me at which time we will schedule her for  appointment with radiation oncology.    This patient is on drug therapy requiring intensive monitoring for toxicity.     Neena Beavers MD  09/08/2021     Dr. Maxine Haynes

## 2021-09-23 ENCOUNTER — APPOINTMENT (OUTPATIENT)
Dept: CARDIOLOGY | Facility: HOSPITAL | Age: 41
End: 2021-09-23

## 2021-09-24 ENCOUNTER — TELEPHONE (OUTPATIENT)
Dept: ONCOLOGY | Facility: CLINIC | Age: 41
End: 2021-09-24

## 2021-09-24 NOTE — TELEPHONE ENCOUNTER
Caller: Radha Astorga    Relationship to patient: Self    Best call back number: [phone]    Chief complaint: RESCHEDULE    Type of visit: INFUSION    Requested date: 10/6 - 8AM    If rescheduling, when is the original appointment: 10/6 - 9:30    Additional notes: PLEASE RESCHEDULE APPT

## 2021-09-27 ENCOUNTER — HOSPITAL ENCOUNTER (OUTPATIENT)
Dept: OCCUPATIONAL THERAPY | Facility: HOSPITAL | Age: 41
Setting detail: THERAPIES SERIES
Discharge: HOME | End: 2021-09-27

## 2021-09-27 DIAGNOSIS — Z17.1 MALIGNANT NEOPLASM OF OVERLAPPING SITES OF LEFT BREAST IN FEMALE, ESTROGEN RECEPTOR NEGATIVE (HCC): ICD-10-CM

## 2021-09-27 DIAGNOSIS — Z91.89 AT RISK FOR LYMPHEDEMA: Primary | ICD-10-CM

## 2021-09-27 DIAGNOSIS — C50.812 MALIGNANT NEOPLASM OF OVERLAPPING SITES OF LEFT BREAST IN FEMALE, ESTROGEN RECEPTOR NEGATIVE (HCC): ICD-10-CM

## 2021-09-27 DIAGNOSIS — M79.621 AXILLARY PAIN, RIGHT: ICD-10-CM

## 2021-09-27 PROCEDURE — 97140 MANUAL THERAPY 1/> REGIONS: CPT

## 2021-09-27 PROCEDURE — 97535 SELF CARE MNGMENT TRAINING: CPT

## 2021-09-27 NOTE — THERAPY TREATMENT NOTE
Outpatient Occupational Therapy Lymphedema Treatment Note  [facility]     Patient Name: Radha Astorga  : 1980  MRN: [id number]  Today's Date: 2021      Visit Date: 2021  Patient and therapist both masked. Therapist with face shield and gloves.  Patient Active Problem List   Diagnosis   • Family history of breast cancer   • Hypothyroidism   • Major depressive disorder, recurrent episode, moderate with anxious distress (CMS/HCC)   • Attention deficit hyperactivity disorder (ADHD), predominantly inattentive type   • Factor 5 Leiden mutation, heterozygous (CMS/HCC)   • Kidney mass   • Malignant neoplasm of overlapping sites of left breast in female, estrogen receptor negative (CMS/HCC)   • High risk medication use   • Essential hypertension   • Rheumatic mitral valve disease   • Encounter for adjustment or management of vascular access device   • Iron deficiency anemia        Past Medical History:   Diagnosis Date   • Acute stress reaction 2014   • ADD (attention deficit disorder)    • ADHD (attention deficit hyperactivity disorder)    • Anxiety and depression    • CTS (carpal tunnel syndrome)    • Factor 5 Leiden mutation, heterozygous (CMS/HCC) 2021   • Family history of breast cancer 2013   • Fracture, cervical vertebra (CMS/[facility]) 1997    C4   • H/O complete eye exam 2016   • Hearing loss    • 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   • History of rheumatic fever    • Hypertension    • Hypothyroidism    • Infiltrating ductal carcinoma of left breast (CMS/HCC) 2021    Left   • Kidney stone    • Mitral valve insufficiency    • PONV (postoperative nausea and vomiting)    • Stroke (CMS/[facility]) 2020    HX OF   • Stroke-like symptoms         Past Surgical History:   Procedure Laterality Date   • BREAST BIOPSY Left 2021    IDC   • BREAST LUMPECTOMY WITH SENTINEL NODE BIOPSY N/A 2021    Procedure: right port placement, Left SHAINA-guided, bracketed,  partial mastectomy and sentinel lymph node biopsy Left breast needle-localized excisional biopsy;  Surgeon: Lashonda Euceda MD;  Location: [facility];  Service: General;  Laterality: N/A;   • BREAST SURGERY Bilateral 7/1/2021    Procedure: RIGHT BREAST REDUCTION MASTOPEXY LEFT BREAST ONCOPLASTIC CLOSURE;  Surgeon: Caridad Baker MD PhD;  Location: [facility];  Service: Plastics;  Laterality: Bilateral;   • NO PAST SURGERIES     • PAP SMEAR  2016         Visit Dx:      ICD-10-CM ICD-9-CM   1. At risk for lymphedema  Z91.89 V49.89   2. Axillary pain, right  M79.621 729.5   3. Malignant neoplasm of overlapping sites of left breast in female, estrogen receptor negative (HCC)  C50.812 174.8    Z17.1 V86.1       Lymphedema     Row Name 09/27/21 1500             Subjective Pain    Able to rate subjective pain?  yes  -RE      Pre-Treatment Pain Level  0  -RE      Post-Treatment Pain Level  0  -RE         Subjective Comments    Subjective Comments  tired from chemo  -RE         General ROM    GENERAL ROM COMMENTS  UE ROM is WFL. L is stiff but able to acheive symmetrical ROM with R  -RE         Manual Lymphatic Drainage    Manual Lymphatic Drainage  initial sequence;opened regional lymph nodes;opened anastamoses;extremity treatment  -RE      Initial Sequence  short neck  -RE      Opened Regional Lymph Nodes  axillary;inguinal  -RE      Axillary  right;left  -RE      Inguinal  left  -RE      Opened Anastamoses  anterior axillo-axillary;posterior axillo-axillary;axillo-inguinal  -RE      Axillo-Inguinal  left  -RE      Extremity Treatment  MLD to full limb  -RE      Manual Therapy  c and s strokes to corded areas  -RE         Compression/Skin Care    Compression/Skin Care Comments  pt wore compresion sleeve and gauntlet  -RE         L-Dex Bioimpedence Screening    L-Dex Measurement Extremity  LUE  -RE      L-Dex Patient Position  Standing  -RE      L-Dex UE Dominate Side  Right  -RE      L-Dex UE At Risk  Side  Left  -RE      L-Dex UE Pre Surgical Value  No  -RE      L-Dex UE Score  8.6  -RE      L-Dex UE Baseline Score  3.5  -RE      L-Dex UE Value Change  5.1  -RE      L-Dex UE Comment  elevated but WNL  -RE        User Key  (r) = Recorded By, (t) = Taken By, (c) = Cosigned By    Initials Name Provider Type    Julia Rowe, OTR Occupational Therapist                  OT Assessment/Plan     Row Name 09/27/21 1616          OT Assessment    Assessment Comments  bioimpedance L-dex value has decreasd to high normal. AROM is WFL with mild stiffness/tightness in the axilla. Cording is palpable but not painful. Progressing well..  -RE        OT Plan    OT Plan Comments  continue  -RE       User Key  (r) = Recorded By, (t) = Taken By, (c) = Cosigned By    Initials Name Provider Type    Julia Rowe, OTR Occupational Therapist                 Manual Rx (last 36 hours)      Manual Treatments     Row Name 09/27/21 1620             Total Minutes    85471 - OT Manual Therapy Minutes  30  -RE        User Key  (r) = Recorded By, (t) = Taken By, (c) = Cosigned By    Initials Name Provider Type    Julia Rowe, OTR Occupational Therapist        OT Goals     Row Name 09/27/21 1600          OT Short Term Goals    STG Date to Achieve  10/07/21  -RE     STG 1  Pt will demonstrate understanding of use of compression sleeve for edema prevention, exercise and air travel.   -RE     STG 1 Progress  Met  -RE     STG 2  Patient will demonstrate proper awareness of “What is Lymphedema?” and “Healthy Habits” for improved prevention, management, care of symptoms and ease of transition to self-care of condition.   -RE     STG 2 Progress  Met  -RE     STG 3  Patient independent and compliant with initial home exercise program focused on range of motion,and Flexibility.  -RE     STG 3 Progress  Met  -RE     STG 4  Patient to demonstrate increased left shoulder flexion to 140 to improve functional UE use and to restore pre operative AROM  per patient perception.  -RE     STG 4 Progress  Met  -RE     STG 5  Patient to demonstrate increased left shoulder abduction to 140 to improve functional UE use and to restore preoperative AROM per patient perception.  -RE     STG 5 Progress  Met  -RE        Long Term Goals    LTG Date to Achieve  10/07/21  -RE     LTG 1  Patient to demonstrate increased left shoulder flexion to 160 to improve functional UE use and to restore pre operative AROM per patient perception.  -RE     LTG 1 Progress  Met  -RE     LTG 2  Patient to demonstrate increased left shoulder abduction to 160 to improve functional UE use and to restore preoperative AROM per patient perception.  -RE     LTG 2 Progress  Met  -RE     LTG 3  Patient will participate in bioimpedance scans every 3-6 months as a method of early detection of lymphedema to allow for early intervention.  -RE     LTG 3 Progress  Met;Ongoing  -RE     LTG 4   Patient's bioimpedance score to remain below 6.5 for decreased risk of stage II lymphedema.  -RE     LTG 4 Progress  Not Met;Ongoing  -RE     LTG 4 Progress Comments  decreasing but not to 6.5  -RE        Time Calculation    OT Goal Re-Cert Due Date  11/10/21  -RE       User Key  (r) = Recorded By, (t) = Taken By, (c) = Cosigned By    Initials Name Provider Type    Julia Rowe OTR Occupational Therapist          Therapy Education  Education Details: bandage at night as able  Given: Edema management, Symptoms/condition management  Program: Reinforced  How Provided: Verbal  Provided to: Patient  Level of Understanding: Teach back education performed, Verbalized  01170 - OT Self Care/Mgmt Minutes: 15                Time Calculation:   OT Start Time: 1445  OT Stop Time: 1530  OT Time Calculation (min): 45 min  Total Timed Code Minutes- OT: 45 minute(s)  Timed Charges  71184 - OT Manual Therapy Minutes: 30  73585 - OT Self Care/Mgmt Minutes: 15  Total Minutes  Timed Charges Total Minutes: 45   Total Minutes: 45     Therapy  Charges for Today     Code Description Service Date Service Provider Modifiers Qty    29477643984 HC OT MANUAL THERAPY EA 15 MIN 9/27/2021 Julia Leslie OTR GO 2    35583911015 HC OT SELF CARE/MGMT/TRAIN EA 15 MIN 9/27/2021 Julia Leslie OTR GO 1                      VI Ulloa  9/27/2021

## 2021-09-28 ENCOUNTER — HOSPITAL ENCOUNTER (OUTPATIENT)
Dept: OCCUPATIONAL THERAPY | Facility: HOSPITAL | Age: 41
Setting detail: THERAPIES SERIES
Discharge: HOME | End: 2021-09-28

## 2021-09-28 DIAGNOSIS — Z91.89 AT RISK FOR LYMPHEDEMA: Primary | ICD-10-CM

## 2021-09-28 DIAGNOSIS — Z17.1 MALIGNANT NEOPLASM OF OVERLAPPING SITES OF LEFT BREAST IN FEMALE, ESTROGEN RECEPTOR NEGATIVE (HCC): ICD-10-CM

## 2021-09-28 DIAGNOSIS — C50.812 MALIGNANT NEOPLASM OF OVERLAPPING SITES OF LEFT BREAST IN FEMALE, ESTROGEN RECEPTOR NEGATIVE (HCC): ICD-10-CM

## 2021-09-28 PROCEDURE — 97140 MANUAL THERAPY 1/> REGIONS: CPT

## 2021-09-28 NOTE — PROGRESS NOTES
Subjective     REASON FOR follow-up:    1.  Heterozygous state for factor V Leiden    2.  New onset stroke on aspirin by neurology    3.  Hypercholesterolemia    4.  Hypercoagulable work-up done.  Antithrombin 109% protein S activity 85% protein S antigen 107%, protein C activity 85%.  Anticardiolipin antibody IgM 24%, antibeta-2 glycoprotein antibody negative, lupus anticoagulant not detected, prothrombin gene mutation negative.    5.  Screening mammogram showed abnormality in the left breast 6 o'clock position, 8 mm on ultrasound, ultrasound-guided left breast biopsy, 6 cm from the nipple showed evidence of invasive ductal carcinoma poorly differentiated grade 3, Fritch score of 8 out of 9 ER negative, MD negative, HER-2/ese 3+ positive.    6.  CT scan February 24, 2021 showed focal area of fatty infiltration of the liver.  1 cm hypoattenuating cortical lesion in the upper pole of the right kidney.  Similarly nodule in the upper pole of the left kidney is 1.5 cm.  Further evaluation by ultrasound suggested  · Ultrasound March 1, 2021 shows solid lesion in the upper pole of the right kidney.  In the left kidney along the midpole of the left kidney is a nodule which is 16 x 16 x 14 mm which is thought to be a cyst.  A 6-month follow-up CT suggested    6.  S/p left partial mastectomy with sentinel lymph node biopsy.  Tumor was present at 5:00, invasive ductal carcinoma, Fritch score of 8, grade 3, greatest dimension was 12 mm x 8 x 4 mm.  Single focus of invasive carcinoma present.  There was extensive DCIS which is 30 mm x 8 x 2 mm, grade 3, no evidence of lymphovascular space invasion or dermal lymphatic invasion.  Margins were clear.  4 lymph nodes were negative.  It is a p T1cp N0, ER negative MD negative HER-2/ese 3+ with Ki-67 of 50%.  · [employer] genetic test negative for 47 genes    7.recently initiated Xarelto at loading dose of 15 mg twice a day x3 weeks to then be switched to 20 mg daily per  "protocol.  She is doing this because of Mediport in place and known factor V Leiden heterozygosity.        History of Present Illness   Patient is a 41 y.o. female with pT1cp N0 ER negative NC negative HER-2 positive left breast cancer s/p lumpectomy patient is on weekly Taxol Herceptin.    Today for evaluation prior to week #9 Taxol Herceptin.  She will also receive her fifth dose of Venofer today.    Reports she is starting to experience more side effects after her last cycle of chemotherapy.  Fatigue has worsened.  She is struggling with decreased appetite, though trying to counteract this by drinking 2 protein drinks daily in addition to small meals.  She is experiencing worsening nausea, approximately 1-2 episodes daily without vomiting.  She is utilizing Zofran for this, however would prefer disintegrating tablets.  She is also having difficulties with insomnia, and has tried utilizing Benadryl but feels this makes her groggy the next morning.      She also complains of a worsening \" glass shattering\" headache that occurs on the right side of her head, occurring 3-4 times daily.  This headache lasts less than a minute, then resolves.  This started approximately 2 weeks ago and has continued to occur more frequently since then.  She denies dizziness or vision changes.  Her bowels are moving regularly.  She denies signs or symptoms of peripheral neuropathy.  She denies fever or chills.    Oncologic history:  Patient is here for follow-up of her mammogram.  Patient had screening mammogram April 2, 2021:  NAD a focal asymmetry was present in the posterior one third retroareolar region of the left breast.  Further spot compression images and left breast ultrasound was suggested.  Right breast was negative    April 9, 2021: Left diagnostic mammogram showed an area of focal asymmetry in the posterior one third region aspect of the left breast her breast    Ultrasound showed an irregular 0.8 cm lesion in the left " breast at the 6 o'clock position of the order of 6 cm from the nipple.  Ultrasound-guided left breast biopsy was recommended.    April 26, 2021: Ultrasound-guided biopsy of the left breast 6 o'clock position, 6 cm from the nipple showed    Invasive ductal carcinoma, poorly differentiated with a Conyngham score grade 3 with a score of 8 out of 9 measuring at least 7 mm.  No definitive ductal carcinoma is in situ is identified.  ER 1% negative  CA 1% negative   HER-2/ese 3+ positive    There was no evidence of lymphadenopathy either in the mammogram of the ultrasound.  We suggested an MRI of the breast.  Patient has strong family history of breast cancer      May 7, 2021: MRI of bilateral breast reviewed.  My interpretation is that there is area of enhancement in the 6 o'clock position of the left breast this is the biopsy-proven malignancy.  There is additional area of linear enhancement anteriorly and laterally measuring up to 1.2 cm this is approximately 5.6 cm from the nipple.  In addition there is a enhancement 2 to 3 mm in the anterolateral aspect of the lateral aspect of the left breast which is 4.6 cm from the nipple.  There is also mildly prominent posterior lymph node in the mid axilla which measures 1 cm with cortical thickening.  Findings are consistent with multifocal disease.  No contralateral disease or internal mammary adenopathy identified    May 20, 2021: Genetic test, [employer] genetic test shows 47 genes negative    May 21, 2021: I reviewed the biopsy of the lymph node in the left axilla which shows reactive lymph node negative for any carcinoma or lymphoma    June 2, 2021:Final Diagnosis  1. Left Breast, 5:00 o'clock, MRI-guided Biopsies for Linear Enhancement: INVASIVE MAMMARY CARCINOMA, NO  SPECIAL TYPE (INVASIVE DUCTAL CARCINOMA).  A. Largest contiguous focus in a core measures 4 mm.  B. No in-situ component identified.  C. Brisk lymphocytic response noted (see Comment).  D. No definitive  lymphovascular nor perineural invasion identified.  2. Left Breast, 2:00 o'clock, MRI-guided Biopsy for Enhancement:  A. Benign breast parenchyma with radial scar and micropapillomas.  B. Usual ductal hyperplasia and columnar cell hyperplasia.  C. No atypical hyperplasia, in-situ nor invasive carcinoma identified    ER, AK, HER-2 new and Ki-67 pending on this new biopsy      Patient has been seen by Dr. Lashonda Euceda with plans of doing surgery on July first 2021    Once patient undergoes surgery lumpectomy with sentinel lymph node biopsy we will give further recommendation about treatment options.  Given that patient has multifocal disease and both of the lesions 2 lesions are 1.2 cm each, with it being a HER-2 positive tumor on the previous biopsy at 6:00 Dr. Euceda and myself discussed and patient preferred to undergo port placement at the same time of surgery.    Patient had [employer] genetic test which was negative.    She has completed surgery July 1, 2021.  She underwent left partial mastectomy with left axillary sentinel lymph node biopsy and right port placement.  Clinically she was thought to have a high-grade multifocal invasive ductal carcinoma ER/AK negative, AK positive.  The lesions require preoperative localization.  The multifocal cancer was bracketed with 2 savvy markers and the radial scar was localized with a needle.  Because of the volume of tissue that will be excised related to the breast volume the case was done in conjunction with Dr. Zavala for oncoplastic closure.    She is 2 weeks from surgery.  She is healing up reasonably well.    On review of her pathology she had left partial mastectomy with sentinel lymph node biopsy.  Tumor was present at 5:00, invasive ductal carcinoma, Oziel score of 8, grade 3, greatest dimension was 12 mm x 8 x 4 mm.  Single focus of invasive carcinoma present.  There was extensive DCIS which is 30 mm x 8 x 2 mm, grade 3, no evidence of lymphovascular  space invasion or dermal lymphatic invasion.  Margins were clear.  4 lymph nodes were negative.  It is a p T1cp N0, ER negative ME negative HER-2/ese 3+ with Ki-67 of 50%.    Patient is here to discuss further options of treatment.  Given small tumor T1c N0, she is a good candidate for weekly Taxol Herceptin.    Given that patient has heterozygous state for factor V Leiden and currently has port placed we will plan to start Eliquis 2.5 mg p.o. twice daily.  I have left a message for Dr. Craft in neurology to call me to discuss if patient needs to continue aspirin or we can hold off since be starting Eliquis.      Genetic test negative    August 4, 2021: Weekly Taxol Herceptin x12 weeks followed by Herceptin x1 year.  Cycle 1 today    Hematologic history:  patient is a 40-year-old female who presented end of December with numbness and tingling in her left upper extremity and left face.  She went to see Dr. Goodman who then got concerned and referred to neurology.  She was referred to Dr. Freedman and talk to Dr. Thompson neurology for evaluation.  Patient underwent ultrasound of the carotids which did not show significant stenosis of the carotids.  She underwent 2D echocardiogram which showed a good ejection fraction of 64% with mild mitral valve prolapse and mild mitral stenosis.  She had a 24-hour Holter which was negative.  She then had also an MRI of the brain February 3, 2021 which showed areas of hyperintensity involving the subcortical white matter of the right frontal lobe superior laterally and posteriorly with the largest area measuring 8 9 mm.  There is also enhancement present.  The findings are consistent with a subacute infarct.  They could not differentiate if there is any underlying neoplastic process but a short-term follow-up was suggested in order to follow-up.  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    Patient currently got started on aspirin and factor V Leiden was obtained by neurology because patient's paternal aunt had factor V Leiden mutation and she was heterozygous.  Patient's testing also showed that she was heterozygous.    Patient states her numbness and tingling is resolved completely on the left arm and face it just lasted for a 24 hours.  She was here referred here for neurology to see if there is any other cause for her underlying hypercoagulable state.  She has not had a complete hypercoagulable work-up.  Also discussed with her that an underlying malignancy could cause a hypercoagulable state and we would need to do a CT scan to rule that out.    Patient's mother has had breast cancer in her 50s but had pancreatic cancer at 68 and  from that.  Patient's dad had stroke at 63.  But he is alive at 74.  She has 1 brother who is 40 in good health.  Paternal aunt had breast cancer in her 40s.  Paternal grandfather had colon cancer in his late 80s.  Maternal uncle had throat cancer and another maternal uncle had skin cancer with metastasis to the lung.  And maternal grandmother had breast cancer.    Patient was to have mammogram done last year but because of COVID-19 it got postponed and she has not had it done yet.    Patient used to be a smoker half pack per day for 7 years but quit 10 years ago.  She has high cholesterol for which she is on treatment.    Past Medical History:   Diagnosis Date   • Acute stress reaction 2014   • ADD (attention deficit disorder)    • ADHD (attention deficit hyperactivity disorder)    • Anxiety and depression    • CTS (carpal tunnel syndrome)    • Factor 5 Leiden mutation, heterozygous (CMS/[facility]) 2021   • Family history of breast cancer 2013   • Fracture, cervical vertebra (CMS/[facility]) 1997    C4   • H/O complete eye exam 2016   • Hearing loss    • 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   • History of rheumatic fever    •  Hypertension    • Hypothyroidism    • Infiltrating ductal carcinoma of left breast (CMS/HCC) 5/5/2021    Left   • Kidney stone    • Mitral valve insufficiency    • PONV (postoperative nausea and vomiting)    • Stroke (CMS/HCC) 12/2020    HX OF   • Stroke-like symptoms         Past Surgical History:   Procedure Laterality Date   • BREAST BIOPSY Left 05/05/2021    IDC   • BREAST LUMPECTOMY WITH SENTINEL NODE BIOPSY N/A 7/1/2021    Procedure: right port placement, Left SHAINA-guided, bracketed, partial mastectomy and sentinel lymph node biopsy Left breast needle-localized excisional biopsy;  Surgeon: Lashonda Euceda MD;  Location: [facility];  Service: General;  Laterality: N/A;   • BREAST SURGERY Bilateral 7/1/2021    Procedure: RIGHT BREAST REDUCTION MASTOPEXY LEFT BREAST ONCOPLASTIC CLOSURE;  Surgeon: Caridad Baker MD PhD;  Location: [facility];  Service: Plastics;  Laterality: Bilateral;   • NO PAST SURGERIES     • PAP SMEAR  2016        Current Outpatient Medications on File Prior to Visit   Medication Sig Dispense Refill   • amphetamine-dextroamphetamine XR (Adderall XR) 20 MG 24 hr capsule Take 1 capsule by mouth Every Morning 30 capsule 0   • atorvastatin (Lipitor) 10 MG tablet Take 1 tablet by mouth Daily. 30 tablet 11   • Cholecalciferol (VITAMIN D3 PO) Take 2,000 Units by mouth. Takes 2000 twice a week;  SUNDAYS AND WEDNESDAYS     • famotidine (PEPCID) 20 MG tablet Take 1 tablet by mouth Daily. 30 tablet 3   • levothyroxine (Synthroid) 150 MCG tablet Take 1 tablet by mouth Daily. 90 tablet 1   • lidocaine-prilocaine (EMLA) 2.5-2.5 % cream Apply  topically to the appropriate area as directed As Needed for Mild Pain . 1 each 2   • LORazepam (Ativan) 0.5 MG tablet Take 1 tablet by mouth Every 8 (Eight) Hours As Needed for Anxiety. 30 tablet 0   • rivaroxaban (XARELTO) 20 MG tablet Take 1 tablet by mouth Daily. 30 tablet 3   • valsartan (DIOVAN) 160 MG tablet Take 1 tablet by mouth Daily. 30  tablet 5   • [DISCONTINUED] ondansetron (ZOFRAN) 8 MG tablet Take 1 tablet by mouth 3 (Three) Times a Day As Needed for Nausea or Vomiting. 30 tablet 5     Current Facility-Administered Medications on File Prior to Visit   Medication Dose Route Frequency Provider Last Rate Last Admin   • heparin injection 500 Units  500 Units Intravenous PRN Neena Beavers MD   500 Units at 21 1718   • sodium chloride 0.9 % flush 10 mL  10 mL Intravenous PRN Neena Beavers MD   10 mL at 21 1718        ALLERGIES:    Allergies   Allergen Reactions   • Sulfa Antibiotics Rash        Social History     Socioeconomic History   • Marital status: Significant Other     Spouse name: Not on file   • Number of children: 0   • Years of education: Not on file   • Highest education level: Not on file   Tobacco Use   • Smoking status: Former Smoker     Packs/day: 1.00     Years: 10.00     Pack years: 10.00     Types: Cigarettes     Start date:      Quit date:      Years since quittin.7   • Smokeless tobacco: Never Used   Vaping Use   • Vaping Use: Never used   Substance and Sexual Activity   • Alcohol use: Yes     Comment: RARELY   • Drug use: No   • Sexual activity: Defer     Partners: Male        Family History   Problem Relation Age of Onset   • Breast cancer Mother 53   • Pancreatic cancer Mother 68   • Lung cancer Maternal Grandmother    • Stroke Father    • Breast cancer Paternal Aunt 55   • Prostate cancer Maternal Grandfather    • Prostate cancer Paternal Grandfather    • Brain cancer Maternal Cousin 20   • Melanoma Maternal Uncle    • Ovarian cancer Other         Paternal great aunt    • Breast cancer Other    • Breast cancer Paternal Great-Grandmother 94   • Hypertension Brother    • Malig Hyperthermia Neg Hx       Family  history: Mother had breast cancer at age 57.  Maternal great aunt had breast cancer and paternal great aunt had breast cancer in her 40s.  Patient's mother had pancreatic cancer as well.   "Paternal grandfather had prostate cancer.  Review of Systems   Constitutional: Positive for fatigue. Negative for appetite change, chills, diaphoresis, fever and unexpected weight change.   HENT: Negative for hearing loss, sore throat and trouble swallowing.    Respiratory: Negative for cough, chest tightness, shortness of breath and wheezing.    Cardiovascular: Negative for chest pain, palpitations and leg swelling.   Gastrointestinal: Negative for abdominal distention, abdominal pain, constipation, diarrhea, nausea and vomiting.   Genitourinary: Negative for dysuria, frequency, hematuria and urgency.   Musculoskeletal: Negative for joint swelling.        No muscle weakness.   Skin: Negative for rash and wound.   Neurological: Positive for headaches (09/22/21-worsening- see HPI). Negative for seizures, syncope, speech difficulty, weakness and numbness.   Hematological: Negative for adenopathy. Does not bruise/bleed easily.   Psychiatric/Behavioral: Positive for dysphoric mood (stable 09/22/21-Unchanged) and sleep disturbance (09/22/21-worsening). Negative for behavioral problems, confusion and suicidal ideas.   All other systems reviewed and are negative.     I have reviewed and confirmed the accuracy of the ROS as documented by the MA/LPN/RN CLAUDINE Sloan    Objective     Vitals:    09/29/21 0914   BP: 121/78   Pulse: 112   Resp: 16   Temp: 97.1 °F (36.2 °C)   TempSrc: Temporal   SpO2: 97%   Weight: 65.5 kg (144 lb 6.4 oz)   Height: 157.5 cm (62.01\")   PainSc: 0-No pain     Current Status 9/29/2021   ECOG score 0   Physical exam  CONSTITUTIONAL:  Vital signs reviewed.  No distress, looks comfortable.  EYES:  Conjunctivae and lids unremarkable.  PERRLA.  RESPIRATORY:  Normal respiratory effort.  Lungs clear to auscultation bilaterally.  CARDIOVASCULAR:  Normal S1, S2.  No murmurs rubs or gallops.  No significant lower extremity edema.  GASTROINTESTINAL: Abdomen appears unremarkable.  Nontender.  No " hepatomegaly.  No splenomegaly.  LYMPHATIC:  No cervical, supraclavicular, axillary lymphadenopathy.  SKIN:  Warm.  No rashes.  PSYCHIATRIC:  Normal judgment and insight.  Normal mood and affect.    RECENT LABS:  Results from last 7 days   Lab Units 09/29/21  0926   WBC 10*3/mm3 7.55   NEUTROS ABS 10*3/mm3 5.22   HEMOGLOBIN g/dL 10.1*   HEMATOCRIT % 31.7*   PLATELETS 10*3/mm3 332     Results from last 7 days   Lab Units 09/29/21  0926   SODIUM mmol/L 138   POTASSIUM mmol/L 4.0   CHLORIDE mmol/L 104   CO2 mmol/L 25.1   BUN mg/dL 13   CREATININE mg/dL 0.75   CALCIUM mg/dL 9.3   ALBUMIN g/dL 4.00   BILIRUBIN mg/dL 0.2   ALK PHOS U/L 93   ALT (SGPT) U/L 63*   AST (SGOT) U/L 29   GLUCOSE mg/dL 140*         Assessment/Plan       * [id number], ER negative UT negative HER-2/ese 3+ with Ki-67 of 50%.  S/p left partial mastectomy with sentinel lymph node biopsy.  Tumor was present at 5:00, invasive ductal carcinoma, Comanche score of 8, grade 3, greatest dimension was 12 mm x 8 x 4 mm.  Single focus of invasive carcinoma present.  There was extensive DCIS which is 30 mm x 8 x 2 mm, grade 3, no evidence of lymphovascular space invasion or dermal lymphatic invasion.  Margins were clear.  4 lymph nodes were negative.  It is a p T1cp N0, ER negative UT negative HER-2/ese 3+ with Ki-67 of 50%.  · Patient is s/p port placement  · Discussed in length with patient that given a small tumor she is eligible for weekly Taxol Herceptin.  Weekly Taxol x12 weeks along with weekly Herceptin followed by 1 year of Herceptin.  · Discussed patient in the breast cancer conference  · 2D echo done which showed ejection fraction of 61-65% and strain pattern of -20.8.  · Patient also seen by Dr. Virk cardiology and Dr. Virk is planning to repeat echocardiogram in 3 months after initiation of therapy.  · 8/4/2021 initiating weekly Taxol Herceptin  · 9/8/2021, proceed with week #6 Taxol and Herceptin.  Patient continues to tolerate treatment well. No  signs of peripheral neuropathy.  · Her next echocardiogram due November 4, 2021, She follows up with cardiology Dr. Camron Virk.  · 9/28/2021, proceed with week #9 taxol/herceptin.  She does feel her nausea has worsened after her last cycle of chemotherapy.  She prefers the disintegrating Zofran, this will be called to her pharmacy today.    *  Factor V Leiden heterozygosity with right frontal stroke and patient presented in December 2020 with left-sided weakness tingling and numbness and also numbness in the face.  · Was referred to neurology and cardiology by Dr. Goodman  · Ultrasound of carotid does not show significant stenosis  · 24 Holter is negative  · 2D echocardiogram shows ejection fraction of 64% with mild mitral regurg and mitral stenosis  · Neurology obtain factor V Leiden given that patient's paternal aunt had's history of factor V Leiden and that was heterozygous for factor V Leiden  · Continue aspirin as per neurology.  Also patient on medications for her high cholesterol started after stroke currently asymptomatic  · Hypercoagulable work-up done which is negative except heterozygous state for factor V Leiden.  · Complete stroke work-up has been negative and since this is arterial stroke and she was not on aspirin prior to that and her symptoms have resolved I agree with continuing aspirin alone along with high cholesterol medications.  · MR venogram done on May 10, 2021 is negative.  Patient has been referred to the stroke neurologist Dr. Johnson  · Patient diagnosed with breast cancer with Mediport placed.  Per discussion with neurology, patient initiated on prophylactic Xarelto and aspirin discontinued.  · Patient had one nosebleed which lasted 10 minutes but with pinching the nose it helped.  But she is switching to 20 mg daily from tomorrow.  We discussed about placing ice and notifying us if her nosebleeds are significant.  But it resolved.  It was likely secondary to dry air  · 9/7/2021,  "patient continues to experience nosebleeds.  Reports these occur when her nose itches, and after scratching her nose it begins to bleed.  She feels this may be related to dry air, so I have asked her to start using saline nasal spray to help moisten her nose.  If she experiences nosebleeds that do not stop, I asked her to notify our office.    * Family history of cancer: Patient's mother had breast cancer at age 50 and pancreatic cancer at age 68 and  from pancreatic cancer.  Patient's maternal grandmother had breast cancer in her 50s.  Her maternal uncle had skin cancer which went to the lung and another maternal uncle had throat cancer.  Maternal aunt had call colon polyps.  Patient's paternal aunt had breast cancer in her 40s.  And paternal grandfather with colon cancer in his 80s.  Paternal aunt also had factor V Leiden.  · Genetic testing is negative    * Solid nodule in the right kidney on ultrasound, will schedule follow-up with urology  · Patient was to follow-up with Dr. Shultz  · Will need records from urology    *  Elevated BMI, encourage diet and exercise.  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  Patient has been requiring frequent Tums.  Recommended she begin Pepcid 20 mg daily.    · Stable, continue Pepcid 20 mg daily.    *Constipation likely secondary to ferrous sulfate.  Patient was iron deficient.  · We will switch to IV Venofer.    *Headache likely related to body ache because of Taxol  · 2021, patient discusses concern about worsening headaches.  Describing them as \"glass breaking\".  Started about 2 weeks ago, and progressively worsened.  Occurring 3-4 times daily now, and lasting less than 1 minute with each episode.  This occurs in the same place, right upper skull.  Denies vision changes or dizziness.  Will order stat MRI of the brain for further evaluation.  The patient follows with a neurologist, and is going to " notify them of her symptoms and the plan for MRI.    *Elevated liver function tests, total bilirubin still normal AST ALT slightly elevated.  Patient did take Tylenol but not too much.  No dose adjustments required at the present time.    *Iron deficiency anemia, patient's percent saturation still low at 9% s/p full dose of IV Venofer.  · 9/29/2021, patient will receive dose #5 Venofer today.  Hemoglobin today 10.1.    *Insomnia  · 9/29/2021, patient reports difficulty sleeping.  She is experiencing this every night, not just the nights of treatment when she receives IV dexamethasone.  She has attempted taking Benadryl, however felt groggy following this.  She is going to attempt melatonin to see if this improves her sleep.    Plan   · Proceed with week #9 Taxol and Herceptin.  · Stat MRI of the brain due to worsening severity of headaches.  Patient will also discuss this with her neurologist.  · Proceed with week #5 IV Venofer.  She is receiving an additional 3 doses due to iron saturation of 9% following first four doses.  · Start Zofran disintegrating tablet 8 mg 3 times daily as needed.  Prescription sent to pharmacy today.  · Continue Pepcid 20 mg daily.    · Continue Xarelto as prescribed.  · Next echo due November 4, 2021  · Return in 1 week for #10 Herceptin/Taxol and #6 Venofer.  · Return in 2 weeks for MD follow up and week #11 Herceptin/Taxol and #7 Vonfer.  · Radiation Oncology consult scheduled for 10/6/2021.    This patient is on drug therapy requiring intensive monitoring for toxicity.     I spent 45 minutes caring for Radha on this date of service. This time includes time spent by me in the following activities: preparing for the visit, reviewing tests, obtaining and/or reviewing a separately obtained history, performing a medically appropriate examination and/or evaluation, counseling and educating the patient/family/caregiver, ordering medications, tests, or procedures, documenting information in  the medical record and care coordination.     Nessa Howe, CLAUDINE  09/08/2021     Dr. Maxine Haynes

## 2021-09-28 NOTE — THERAPY TREATMENT NOTE
Outpatient Occupational Therapy Lymphedema Treatment Note  [facility]     Patient Name: Radha Astorga  : 1980  MRN: [id number]  Today's Date: 2021      Visit Date: 2021  Patient and therapist both masked. Therapist with face shield and gloves.  Patient Active Problem List   Diagnosis   • Family history of breast cancer   • Hypothyroidism   • Major depressive disorder, recurrent episode, moderate with anxious distress (CMS/[facility])   • Attention deficit hyperactivity disorder (ADHD), predominantly inattentive type   • Factor 5 Leiden mutation, heterozygous (CMS/HCC)   • Kidney mass   • Malignant neoplasm of overlapping sites of left breast in female, estrogen receptor negative (CMS/HCC)   • High risk medication use   • Essential hypertension   • Rheumatic mitral valve disease   • Encounter for adjustment or management of vascular access device   • Iron deficiency anemia        Past Medical History:   Diagnosis Date   • Acute stress reaction 2014   • ADD (attention deficit disorder)    • ADHD (attention deficit hyperactivity disorder)    • Anxiety and depression    • CTS (carpal tunnel syndrome)    • Factor 5 Leiden mutation, heterozygous (CMS/HCC) 2021   • Family history of breast cancer 2013   • Fracture, cervical vertebra (CMS/[facility]) 1997    C4   • H/O complete eye exam 2016   • Hearing loss    • 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   • History of rheumatic fever    • Hypertension    • Hypothyroidism    • Infiltrating ductal carcinoma of left breast (CMS/HCC) 2021    Left   • Kidney stone    • Mitral valve insufficiency    • PONV (postoperative nausea and vomiting)    • Stroke (CMS/[facility]) 2020    HX OF   • Stroke-like symptoms         Past Surgical History:   Procedure Laterality Date   • BREAST BIOPSY Left 2021    IDC   • BREAST LUMPECTOMY WITH SENTINEL NODE BIOPSY N/A 2021    Procedure: right port placement, Left SHAINA-guided, bracketed,  partial mastectomy and sentinel lymph node biopsy Left breast needle-localized excisional biopsy;  Surgeon: Lashonda Euceda MD;  Location[facility];  Service: General;  Laterality: N/A;   • BREAST SURGERY Bilateral 7/1/2021    Procedure: RIGHT BREAST REDUCTION MASTOPEXY LEFT BREAST ONCOPLASTIC CLOSURE;  Surgeon: Caridad Baker MD PhD;  Location: [facility];  Service: Plastics;  Laterality: Bilateral;   • NO PAST SURGERIES     • PAP SMEAR  2016         Visit Dx:      ICD-10-CM ICD-9-CM   1. At risk for lymphedema  Z91.89 V49.89   2. Malignant neoplasm of overlapping sites of left breast in female, estrogen receptor negative (HCC)  C50.812 174.8    Z17.1 V86.1       Lymphedema     Row Name 09/28/21 1300             Subjective Pain    Able to rate subjective pain?  yes  -RE      Pre-Treatment Pain Level  0  -RE      Post-Treatment Pain Level  0  -RE         Manual Lymphatic Drainage    Manual Lymphatic Drainage  initial sequence;opened regional lymph nodes;opened anastamoses;extremity treatment  -RE      Initial Sequence  short neck  -RE      Opened Regional Lymph Nodes  axillary;inguinal  -RE      Axillary  right;left  -RE      Inguinal  left  -RE      Opened Anastamoses  anterior axillo-axillary;posterior axillo-axillary;axillo-inguinal  -RE      Axillo-Inguinal  left  -RE      Extremity Treatment  MLD to full limb  -RE      Manual Therapy  c and s strokes to corded areas  -RE         Compression/Skin Care    Compression/Skin Care Comments  forgot bandages. wore garments  -RE        User Key  (r) = Recorded By, (t) = Taken By, (c) = Cosigned By    Initials Name Provider Type    RE Julia Leslie OTKINGSLEY Occupational Therapist                  OT Assessment/Plan     Row Name 09/28/21 1641          OT Assessment    Assessment Comments  Minimal palpable edema. Will try transistioning from full CDT to just compression and minimize that as able. will need full compression during radiation.  -RE         OT Plan    OT Plan Comments  continue  -RE       User Key  (r) = Recorded By, (t) = Taken By, (c) = Cosigned By    Initials Name Provider Type    Julia Rowe OTR Occupational Therapist                 Manual Rx (last 36 hours)      Manual Treatments     Row Name 09/28/21 1647             Total Minutes    39463 - OT Manual Therapy Minutes  45  -RE        User Key  (r) = Recorded By, (t) = Taken By, (c) = Cosigned By    Initials Name Provider Type    Julia Rowe OTR Occupational Therapist            Therapy Education  Given: Edema management, Symptoms/condition management  Program: New, Reinforced  How Provided: Verbal  Provided to: Patient  Level of Understanding: Teach back education performed, Verbalized                Time Calculation:   OT Start Time: 1345  OT Stop Time: 1430  OT Time Calculation (min): 45 min  Total Timed Code Minutes- OT: 45 minute(s)  Timed Charges  71046 - OT Manual Therapy Minutes: 45  Total Minutes  Timed Charges Total Minutes: 45   Total Minutes: 45     Therapy Charges for Today     Code Description Service Date Service Provider Modifiers Qty    [id number]  OT MANUAL THERAPY EA 15 MIN 9/28/2021 Julia Leslie OTR GO 3                      VI Ulloa  9/28/2021

## 2021-09-29 ENCOUNTER — INFUSION (OUTPATIENT)
Dept: ONCOLOGY | Facility: HOSPITAL | Age: 41
End: 2021-09-29

## 2021-09-29 ENCOUNTER — OFFICE VISIT (OUTPATIENT)
Dept: ONCOLOGY | Facility: CLINIC | Age: 41
End: 2021-09-29

## 2021-09-29 VITALS
TEMPERATURE: 97.1 F | WEIGHT: 144.4 LBS | DIASTOLIC BLOOD PRESSURE: 78 MMHG | BODY MASS INDEX: 26.57 KG/M2 | HEART RATE: 112 BPM | RESPIRATION RATE: 16 BRPM | HEIGHT: 62 IN | SYSTOLIC BLOOD PRESSURE: 121 MMHG | OXYGEN SATURATION: 97 %

## 2021-09-29 VITALS — HEART RATE: 105 BPM | DIASTOLIC BLOOD PRESSURE: 84 MMHG | SYSTOLIC BLOOD PRESSURE: 122 MMHG

## 2021-09-29 DIAGNOSIS — Z17.1 MALIGNANT NEOPLASM OF OVERLAPPING SITES OF LEFT BREAST IN FEMALE, ESTROGEN RECEPTOR NEGATIVE (HCC): Primary | ICD-10-CM

## 2021-09-29 DIAGNOSIS — Z17.1 MALIGNANT NEOPLASM OF OVERLAPPING SITES OF LEFT BREAST IN FEMALE, ESTROGEN RECEPTOR NEGATIVE (HCC): ICD-10-CM

## 2021-09-29 DIAGNOSIS — Z45.2 ENCOUNTER FOR ADJUSTMENT OR MANAGEMENT OF VASCULAR ACCESS DEVICE: ICD-10-CM

## 2021-09-29 DIAGNOSIS — D50.8 OTHER IRON DEFICIENCY ANEMIA: ICD-10-CM

## 2021-09-29 DIAGNOSIS — Z86.73 HISTORY OF STROKE: ICD-10-CM

## 2021-09-29 DIAGNOSIS — C50.812 MALIGNANT NEOPLASM OF OVERLAPPING SITES OF LEFT BREAST IN FEMALE, ESTROGEN RECEPTOR NEGATIVE (HCC): ICD-10-CM

## 2021-09-29 DIAGNOSIS — G44.52 NEW DAILY PERSISTENT HEADACHE: ICD-10-CM

## 2021-09-29 DIAGNOSIS — C50.812 MALIGNANT NEOPLASM OF OVERLAPPING SITES OF LEFT BREAST IN FEMALE, ESTROGEN RECEPTOR NEGATIVE (HCC): Primary | ICD-10-CM

## 2021-09-29 DIAGNOSIS — I63.81 CEREBROVASCULAR ACCIDENT (CVA) DUE TO OCCLUSION OF SMALL ARTERY (HCC): ICD-10-CM

## 2021-09-29 DIAGNOSIS — Z79.899 HIGH RISK MEDICATION USE: ICD-10-CM

## 2021-09-29 LAB
ALBUMIN SERPL-MCNC: 4 G/DL (ref 3.5–5.2)
ALBUMIN/GLOB SERPL: 1.7 G/DL
ALP SERPL-CCNC: 93 U/L (ref 39–117)
ALT SERPL W P-5'-P-CCNC: 63 U/L (ref 1–33)
ANION GAP SERPL CALCULATED.3IONS-SCNC: 8.9 MMOL/L (ref 5–15)
AST SERPL-CCNC: 29 U/L (ref 1–32)
BASOPHILS # BLD AUTO: 0.07 10*3/MM3 (ref 0–0.2)
BASOPHILS NFR BLD AUTO: 0.9 % (ref 0–1.5)
BILIRUB SERPL-MCNC: 0.2 MG/DL (ref 0–1.2)
BUN SERPL-MCNC: 13 MG/DL (ref 6–20)
BUN/CREAT SERPL: 17.3 (ref 7–25)
CALCIUM SPEC-SCNC: 9.3 MG/DL (ref 8.6–10.5)
CHLORIDE SERPL-SCNC: 104 MMOL/L (ref 98–107)
CO2 SERPL-SCNC: 25.1 MMOL/L (ref 22–29)
CREAT SERPL-MCNC: 0.75 MG/DL (ref 0.57–1)
DEPRECATED RDW RBC AUTO: 53.1 FL (ref 37–54)
EOSINOPHIL # BLD AUTO: 0.26 10*3/MM3 (ref 0–0.4)
EOSINOPHIL NFR BLD AUTO: 3.4 % (ref 0.3–6.2)
ERYTHROCYTE [DISTWIDTH] IN BLOOD BY AUTOMATED COUNT: 17.2 % (ref 12.3–15.4)
GFR SERPL CREATININE-BSD FRML MDRD: 84 ML/MIN/1.73
GLOBULIN UR ELPH-MCNC: 2.4 GM/DL
GLUCOSE SERPL-MCNC: 140 MG/DL (ref 65–99)
HCT VFR BLD AUTO: 31.7 % (ref 34–46.6)
HGB BLD-MCNC: 10.1 G/DL (ref 12–15.9)
IMM GRANULOCYTES # BLD AUTO: 0.07 10*3/MM3 (ref 0–0.05)
IMM GRANULOCYTES NFR BLD AUTO: 0.9 % (ref 0–0.5)
LYMPHOCYTES # BLD AUTO: 1.34 10*3/MM3 (ref 0.7–3.1)
LYMPHOCYTES NFR BLD AUTO: 17.7 % (ref 19.6–45.3)
MCH RBC QN AUTO: 27.2 PG (ref 26.6–33)
MCHC RBC AUTO-ENTMCNC: 31.9 G/DL (ref 31.5–35.7)
MCV RBC AUTO: 85.4 FL (ref 79–97)
MONOCYTES # BLD AUTO: 0.59 10*3/MM3 (ref 0.1–0.9)
MONOCYTES NFR BLD AUTO: 7.8 % (ref 5–12)
NEUTROPHILS NFR BLD AUTO: 5.22 10*3/MM3 (ref 1.7–7)
NEUTROPHILS NFR BLD AUTO: 69.3 % (ref 42.7–76)
NRBC BLD AUTO-RTO: 0 /100 WBC (ref 0–0.2)
PLATELET # BLD AUTO: 332 10*3/MM3 (ref 140–450)
PMV BLD AUTO: 9.5 FL (ref 6–12)
POTASSIUM SERPL-SCNC: 4 MMOL/L (ref 3.5–5.2)
PROT SERPL-MCNC: 6.4 G/DL (ref 6–8.5)
RBC # BLD AUTO: 3.71 10*6/MM3 (ref 3.77–5.28)
SODIUM SERPL-SCNC: 138 MMOL/L (ref 136–145)
WBC # BLD AUTO: 7.55 10*3/MM3 (ref 3.4–10.8)

## 2021-09-29 PROCEDURE — 85025 COMPLETE CBC W/AUTO DIFF WBC: CPT | Performed by: INTERNAL MEDICINE

## 2021-09-29 PROCEDURE — 96417 CHEMO IV INFUS EACH ADDL SEQ: CPT

## 2021-09-29 PROCEDURE — 25010000002 DEXAMETHASONE SODIUM PHOSPHATE 100 MG/10ML SOLUTION: Performed by: NURSE PRACTITIONER

## 2021-09-29 PROCEDURE — 96413 CHEMO IV INFUSION 1 HR: CPT

## 2021-09-29 PROCEDURE — 25010000002 TRASTUZUMAB-ANNS 420 MG RECONSTITUTED SOLUTION 1 EACH VIAL: Performed by: NURSE PRACTITIONER

## 2021-09-29 PROCEDURE — 99215 OFFICE O/P EST HI 40 MIN: CPT | Performed by: NURSE PRACTITIONER

## 2021-09-29 PROCEDURE — 80053 COMPREHEN METABOLIC PANEL: CPT | Performed by: INTERNAL MEDICINE

## 2021-09-29 PROCEDURE — 25010000002 HEPARIN LOCK FLUSH PER 10 UNITS: Performed by: INTERNAL MEDICINE

## 2021-09-29 PROCEDURE — 96375 TX/PRO/DX INJ NEW DRUG ADDON: CPT

## 2021-09-29 PROCEDURE — 96366 THER/PROPH/DIAG IV INF ADDON: CPT

## 2021-09-29 PROCEDURE — 25010000002 IRON SUCROSE PER 1 MG: Performed by: NURSE PRACTITIONER

## 2021-09-29 PROCEDURE — 96367 TX/PROPH/DG ADDL SEQ IV INF: CPT

## 2021-09-29 PROCEDURE — 25010000002 DIPHENHYDRAMINE PER 50 MG: Performed by: NURSE PRACTITIONER

## 2021-09-29 PROCEDURE — 25010000002 PACLITAXEL PER 1 MG: Performed by: NURSE PRACTITIONER

## 2021-09-29 RX ORDER — FAMOTIDINE 10 MG/ML
20 INJECTION, SOLUTION INTRAVENOUS ONCE
Status: COMPLETED | OUTPATIENT
Start: 2021-09-29 | End: 2021-09-29

## 2021-09-29 RX ORDER — FAMOTIDINE 10 MG/ML
20 INJECTION, SOLUTION INTRAVENOUS ONCE
Status: CANCELLED | OUTPATIENT
Start: 2021-09-29

## 2021-09-29 RX ORDER — HEPARIN SODIUM (PORCINE) LOCK FLUSH IV SOLN 100 UNIT/ML 100 UNIT/ML
500 SOLUTION INTRAVENOUS AS NEEDED
Status: CANCELLED | OUTPATIENT
Start: 2021-09-29

## 2021-09-29 RX ORDER — HEPARIN SODIUM (PORCINE) LOCK FLUSH IV SOLN 100 UNIT/ML 100 UNIT/ML
500 SOLUTION INTRAVENOUS AS NEEDED
Status: DISCONTINUED | OUTPATIENT
Start: 2021-09-29 | End: 2021-09-29 | Stop reason: HOSPADM

## 2021-09-29 RX ORDER — SODIUM CHLORIDE 9 MG/ML
250 INJECTION, SOLUTION INTRAVENOUS ONCE
Status: COMPLETED | OUTPATIENT
Start: 2021-09-29 | End: 2021-09-29

## 2021-09-29 RX ORDER — SODIUM CHLORIDE 0.9 % (FLUSH) 0.9 %
10 SYRINGE (ML) INJECTION AS NEEDED
Status: DISCONTINUED | OUTPATIENT
Start: 2021-09-29 | End: 2021-09-29 | Stop reason: HOSPADM

## 2021-09-29 RX ORDER — FAMOTIDINE 10 MG/ML
20 INJECTION, SOLUTION INTRAVENOUS AS NEEDED
Status: CANCELLED | OUTPATIENT
Start: 2021-09-29

## 2021-09-29 RX ORDER — SODIUM CHLORIDE 0.9 % (FLUSH) 0.9 %
10 SYRINGE (ML) INJECTION AS NEEDED
Status: CANCELLED | OUTPATIENT
Start: 2021-09-29

## 2021-09-29 RX ORDER — DIPHENHYDRAMINE HYDROCHLORIDE 50 MG/ML
50 INJECTION INTRAMUSCULAR; INTRAVENOUS AS NEEDED
Status: CANCELLED | OUTPATIENT
Start: 2021-09-29

## 2021-09-29 RX ORDER — SODIUM CHLORIDE 9 MG/ML
250 INJECTION, SOLUTION INTRAVENOUS ONCE
Status: DISCONTINUED | OUTPATIENT
Start: 2021-09-29 | End: 2021-09-29 | Stop reason: HOSPADM

## 2021-09-29 RX ORDER — SODIUM CHLORIDE 9 MG/ML
250 INJECTION, SOLUTION INTRAVENOUS ONCE
Status: CANCELLED | OUTPATIENT
Start: 2021-09-29

## 2021-09-29 RX ORDER — ONDANSETRON 8 MG/1
8 TABLET, ORALLY DISINTEGRATING ORAL EVERY 8 HOURS PRN
Qty: 30 TABLET | Refills: 3 | Status: SHIPPED | OUTPATIENT
Start: 2021-09-29

## 2021-09-29 RX ADMIN — SODIUM CHLORIDE 250 ML: 9 INJECTION, SOLUTION INTRAVENOUS at 10:11

## 2021-09-29 RX ADMIN — FAMOTIDINE 20 MG: 10 INJECTION INTRAVENOUS at 10:12

## 2021-09-29 RX ADMIN — SODIUM CHLORIDE, PRESERVATIVE FREE 10 ML: 5 INJECTION INTRAVENOUS at 13:54

## 2021-09-29 RX ADMIN — TRASTUZUMAB-ANNS 130 MG: 420 INJECTION, POWDER, LYOPHILIZED, FOR SOLUTION INTRAVENOUS at 13:17

## 2021-09-29 RX ADMIN — DIPHENHYDRAMINE HYDROCHLORIDE 25 MG: 50 INJECTION INTRAMUSCULAR; INTRAVENOUS at 10:12

## 2021-09-29 RX ADMIN — PACLITAXEL 135 MG: 6 INJECTION, SOLUTION INTRAVENOUS at 12:19

## 2021-09-29 RX ADMIN — Medication 500 UNITS: at 13:54

## 2021-09-29 RX ADMIN — SODIUM CHLORIDE 300 MG: 900 INJECTION, SOLUTION INTRAVENOUS at 10:50

## 2021-09-29 RX ADMIN — DEXAMETHASONE SODIUM PHOSPHATE 12 MG: 10 INJECTION, SOLUTION INTRAMUSCULAR; INTRAVENOUS at 10:33

## 2021-09-30 ENCOUNTER — HOSPITAL ENCOUNTER (OUTPATIENT)
Dept: OCCUPATIONAL THERAPY | Facility: HOSPITAL | Age: 41
Setting detail: THERAPIES SERIES
Discharge: HOME | End: 2021-09-30

## 2021-09-30 DIAGNOSIS — C50.812 MALIGNANT NEOPLASM OF OVERLAPPING SITES OF LEFT BREAST IN FEMALE, ESTROGEN RECEPTOR NEGATIVE (HCC): ICD-10-CM

## 2021-09-30 DIAGNOSIS — Z91.89 AT RISK FOR LYMPHEDEMA: Primary | ICD-10-CM

## 2021-09-30 DIAGNOSIS — Z17.1 MALIGNANT NEOPLASM OF OVERLAPPING SITES OF LEFT BREAST IN FEMALE, ESTROGEN RECEPTOR NEGATIVE (HCC): ICD-10-CM

## 2021-09-30 PROCEDURE — 97140 MANUAL THERAPY 1/> REGIONS: CPT

## 2021-09-30 RX ORDER — FAMOTIDINE 10 MG/ML
20 INJECTION, SOLUTION INTRAVENOUS AS NEEDED
Status: CANCELLED | OUTPATIENT
Start: 2021-10-06

## 2021-09-30 RX ORDER — DIPHENHYDRAMINE HYDROCHLORIDE 50 MG/ML
50 INJECTION INTRAMUSCULAR; INTRAVENOUS AS NEEDED
Status: CANCELLED | OUTPATIENT
Start: 2021-10-06

## 2021-09-30 RX ORDER — SODIUM CHLORIDE 9 MG/ML
250 INJECTION, SOLUTION INTRAVENOUS ONCE
Status: CANCELLED | OUTPATIENT
Start: 2021-10-06

## 2021-09-30 RX ORDER — FAMOTIDINE 10 MG/ML
20 INJECTION, SOLUTION INTRAVENOUS ONCE
Status: CANCELLED | OUTPATIENT
Start: 2021-10-06

## 2021-10-01 ENCOUNTER — HOSPITAL ENCOUNTER (OUTPATIENT)
Dept: MRI IMAGING | Facility: HOSPITAL | Age: 41
Discharge: HOME | End: 2021-10-01
Admitting: NURSE PRACTITIONER

## 2021-10-01 DIAGNOSIS — G44.52 NEW DAILY PERSISTENT HEADACHE: ICD-10-CM

## 2021-10-01 DIAGNOSIS — Z86.73 HISTORY OF STROKE: ICD-10-CM

## 2021-10-01 DIAGNOSIS — Z17.1 MALIGNANT NEOPLASM OF OVERLAPPING SITES OF LEFT BREAST IN FEMALE, ESTROGEN RECEPTOR NEGATIVE (HCC): ICD-10-CM

## 2021-10-01 DIAGNOSIS — C50.812 MALIGNANT NEOPLASM OF OVERLAPPING SITES OF LEFT BREAST IN FEMALE, ESTROGEN RECEPTOR NEGATIVE (HCC): ICD-10-CM

## 2021-10-01 PROCEDURE — A9577 INJ MULTIHANCE: HCPCS | Performed by: NURSE PRACTITIONER

## 2021-10-01 PROCEDURE — 70553 MRI BRAIN STEM W/O & W/DYE: CPT

## 2021-10-01 PROCEDURE — 0 GADOBENATE DIMEGLUMINE 529 MG/ML SOLUTION: Performed by: NURSE PRACTITIONER

## 2021-10-01 RX ADMIN — GADOBENATE DIMEGLUMINE 13 ML: 529 INJECTION, SOLUTION INTRAVENOUS at 11:45

## 2021-10-04 ENCOUNTER — HOSPITAL ENCOUNTER (OUTPATIENT)
Dept: OCCUPATIONAL THERAPY | Facility: HOSPITAL | Age: 41
Setting detail: THERAPIES SERIES
Discharge: HOME | End: 2021-10-04

## 2021-10-04 DIAGNOSIS — Z91.89 AT RISK FOR LYMPHEDEMA: Primary | ICD-10-CM

## 2021-10-04 DIAGNOSIS — C50.812 MALIGNANT NEOPLASM OF OVERLAPPING SITES OF LEFT BREAST IN FEMALE, ESTROGEN RECEPTOR NEGATIVE (HCC): ICD-10-CM

## 2021-10-04 DIAGNOSIS — Z17.1 MALIGNANT NEOPLASM OF OVERLAPPING SITES OF LEFT BREAST IN FEMALE, ESTROGEN RECEPTOR NEGATIVE (HCC): ICD-10-CM

## 2021-10-04 DIAGNOSIS — M79.621 AXILLARY PAIN, RIGHT: ICD-10-CM

## 2021-10-04 PROCEDURE — 97140 MANUAL THERAPY 1/> REGIONS: CPT

## 2021-10-04 NOTE — THERAPY TREATMENT NOTE
Outpatient Occupational Therapy Lymphedema Treatment Note  [facility]     Patient Name: Radha Astorga  : 1980  MRN: [id number]  Today's Date: 10/4/2021      Visit Date: 10/04/2021  Patient and therapist both masked. Therapist with face shield and gloves.  Patient Active Problem List   Diagnosis   • Family history of breast cancer   • Hypothyroidism   • Major depressive disorder, recurrent episode, moderate with anxious distress ([facility])   • Attention deficit hyperactivity disorder (ADHD), predominantly inattentive type   • Factor 5 Leiden mutation, heterozygous ([facility])   • Kidney mass   • Malignant neoplasm of overlapping sites of left breast in female, estrogen receptor negative ([facility])   • High risk medication use   • Essential hypertension   • Rheumatic mitral valve disease   • Encounter for adjustment or management of vascular access device   • Iron deficiency anemia        Past Medical History:   Diagnosis Date   • Acute stress reaction 2014   • ADD (attention deficit disorder)    • ADHD (attention deficit hyperactivity disorder)    • Anxiety and depression    • CTS (carpal tunnel syndrome)    • Factor 5 Leiden mutation, heterozygous (CMS/[facility]) 2021   • Family history of breast cancer 2013   • Fracture, cervical vertebra (CMS/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) 2020    HX OF   • Stroke-like symptoms         Past Surgical History:   Procedure Laterality Date   • BREAST BIOPSY Left 2021    IDC   • BREAST LUMPECTOMY WITH SENTINEL NODE BIOPSY N/A 2021    Procedure: right port placement, Left SHAINA-guided, bracketed, partial  mastectomy and sentinel lymph node biopsy Left breast needle-localized excisional biopsy;  Surgeon: Lashonda Euceda MD;  Location: [facility]  Service: General;  Laterality: N/A;   • BREAST SURGERY Bilateral 7/1/2021    Procedure: RIGHT BREAST REDUCTION MASTOPEXY LEFT BREAST ONCOPLASTIC CLOSURE;  Surgeon: Caridad Baker MD PhD;  Location: [facility]  Service: Plastics;  Laterality: Bilateral;   • NO PAST SURGERIES     • PAP SMEAR  2016         Visit Dx:      ICD-10-CM ICD-9-CM   1. At risk for lymphedema  Z91.89 V49.89   2. Malignant neoplasm of overlapping sites of left breast in female, estrogen receptor negative (HCC)  C50.812 174.8    Z17.1 V86.1   3. Axillary pain, right  M79.621 729.5       Lymphedema     Row Name 10/04/21 1300             Subjective Pain    Able to rate subjective pain?  yes  -RE      Pre-Treatment Pain Level  0  -RE      Post-Treatment Pain Level  0  -RE         Subjective Comments    Subjective Comments  some hand swelling over the weekend  -RE         Manual Lymphatic Drainage    Manual Lymphatic Drainage  initial sequence;opened regional lymph nodes;opened anastamoses;extremity treatment  -RE      Initial Sequence  short neck  -RE      Opened Regional Lymph Nodes  axillary;inguinal  -RE      Axillary  right;left  -RE      Inguinal  left  -RE      Opened Anastamoses  anterior axillo-axillary;posterior axillo-axillary;axillo-inguinal  -RE      Axillo-Inguinal  left  -RE      Extremity Treatment  MLD to full limb  -RE      MLD to Full Limb  left  -RE         Compression/Skin Care    Compression/Skin Care  remove bandages fingers  -RE      Skin Care  --  -RE      Wrapping Location  --  -RE      Wrapping Location UE  --  -RE      Bandage Layers  --  -RE      Bandaging Technique  --  -RE      Compression/Skin Care Comments  forgot bandages  -RE        User Key  (r) = Recorded By, (t) = Taken By, (c) = Cosigned By    Initials Name Provider Type    Julia Rowe OTR  Occupational Therapist                  OT Assessment/Plan     Row Name 10/04/21 1627          OT Assessment    Assessment Comments  Mild edema noted in hand adn forearm. Encouraged bandage use as much as possible.  -RE        OT Plan    OT Plan Comments  continue  -RE       User Key  (r) = Recorded By, (t) = Taken By, (c) = Cosigned By    Initials Name Provider Type    Julia Rowe OTR Occupational Therapist                 Manual Rx (last 36 hours)      Manual Treatments     Row Name 10/04/21 1628             Total Minutes    84946 - OT Manual Therapy Minutes  45  -RE        User Key  (r) = Recorded By, (t) = Taken By, (c) = Cosigned By    Initials Name Provider Type    Julia Rowe OTR Occupational Therapist            Therapy Education  Given: Symptoms/condition management, Edema management  Program: Reinforced  How Provided: Verbal  Provided to: Patient  Level of Understanding: Teach back education performed, Verbalized                Time Calculation:   OT Start Time: 1300  OT Stop Time: 1345  OT Time Calculation (min): 45 min  Total Timed Code Minutes- OT: 45 minute(s)  Timed Charges  36071 - OT Manual Therapy Minutes: 45  Total Minutes  Timed Charges Total Minutes: 45   Total Minutes: 45     Therapy Charges for Today     Code Description Service Date Service Provider Modifiers Qty    05912712515 HC OT MANUAL THERAPY EA 15 MIN 10/4/2021 Julia Leslie OTR GO 3                      VI Ulloa  10/4/2021

## 2021-10-05 ENCOUNTER — HOSPITAL ENCOUNTER (OUTPATIENT)
Dept: OCCUPATIONAL THERAPY | Facility: HOSPITAL | Age: 41
Setting detail: THERAPIES SERIES
Discharge: HOME | End: 2021-10-05

## 2021-10-05 DIAGNOSIS — Z91.89 AT RISK FOR LYMPHEDEMA: Primary | ICD-10-CM

## 2021-10-05 DIAGNOSIS — M79.621 AXILLARY PAIN, RIGHT: ICD-10-CM

## 2021-10-05 PROCEDURE — 97140 MANUAL THERAPY 1/> REGIONS: CPT

## 2021-10-05 NOTE — THERAPY TREATMENT NOTE
Outpatient Occupational Therapy Lymphedema Treatment Note  [facility]     Patient Name: Radha Astorga  : 1980  MRN: [id number]  Today's Date: 10/5/2021      Visit Date: 10/05/2021  Patient and therapist both masked. Therapist with face shield and gloves.  Patient Active Problem List   Diagnosis   • Family history of breast cancer   • Hypothyroidism   • Major depressive disorder, recurrent episode, moderate with anxious distress ([facility])   • Attention deficit hyperactivity disorder (ADHD), predominantly inattentive type   • Factor 5 Leiden mutation, heterozygous ([facility])   • Kidney mass   • Malignant neoplasm of overlapping sites of left breast in female, estrogen receptor negative ([facility])   • High risk medication use   • Essential hypertension   • Rheumatic mitral valve disease   • Encounter for adjustment or management of vascular access device   • Iron deficiency anemia        Past Medical History:   Diagnosis Date   • Acute stress reaction 2014   • ADD (attention deficit disorder)    • ADHD (attention deficit hyperactivity disorder)    • Anxiety and depression    • CTS (carpal tunnel syndrome)    • Factor 5 Leiden mutation, heterozygous (CMS/[facility]) 2021   • Family history of breast cancer 2013   • Fracture, cervical vertebra (CMS/[facility]) 1997    C4   • H/O complete eye exam 2016   • Hearing loss    • 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   • History of rheumatic fever    • Hypertension    • Hypothyroidism    • Infiltrating ductal carcinoma of left breast (CMS/HCC) 2021    Left   • Kidney stone    • Mitral valve insufficiency    • PONV (postoperative nausea and vomiting)    • Stroke (CMS/[facility]) 2020    HX OF   • Stroke-like symptoms         Past Surgical History:   Procedure Laterality Date   • BREAST BIOPSY Left 2021    IDC   • BREAST LUMPECTOMY WITH SENTINEL NODE BIOPSY N/A 2021    Procedure: right port placement, Left SHAINA-guided, bracketed, partial  mastectomy and sentinel lymph node biopsy Left breast needle-localized excisional biopsy;  Surgeon: Lashonda Euceda MD;  Location: [facility];  Service: General;  Laterality: N/A;   • BREAST SURGERY Bilateral 7/1/2021    Procedure: RIGHT BREAST REDUCTION MASTOPEXY LEFT BREAST ONCOPLASTIC CLOSURE;  Surgeon: Caridad Baker MD PhD;  Location: [facility];  Service: Plastics;  Laterality: Bilateral;   • NO PAST SURGERIES     • PAP SMEAR  2016         Visit Dx:      ICD-10-CM ICD-9-CM   1. At risk for lymphedema  Z91.89 V49.89   2. Axillary pain, right  M79.621 729.5       Lymphedema     Row Name 10/05/21 1400             Subjective Pain    Able to rate subjective pain?  yes  -RE      Pre-Treatment Pain Level  0  -RE      Post-Treatment Pain Level  0  -RE         Subjective Comments    Subjective Comments  bandaging helped with hand swelling.  -RE         Manual Lymphatic Drainage    Manual Lymphatic Drainage  initial sequence;opened regional lymph nodes;opened anastamoses;extremity treatment  -RE      Initial Sequence  short neck  -RE      Opened Regional Lymph Nodes  axillary;inguinal  -RE      Axillary  right;left  -RE      Inguinal  left  -RE      Opened Anastamoses  anterior axillo-axillary;axillo-inguinal  -RE      Axillo-Inguinal  left  -RE      Extremity Treatment  MLD to full limb  -RE      MLD to Full Limb  left  -RE         Compression/Skin Care    Compression/Skin Care  skin care;wrapping location;bandaging  -RE      Skin Care  lotion applied  -RE      Wrapping Location  upper extremity  -RE      Wrapping Location UE  left:;fingers to axilla  -RE      Bandage Layers  cotton liner;padding/fluff layer;short-stretch bandages (comment size/quantity)  -RE      Bandaging Comments  Tg7, 1 1/2 Artiflex, 1-6cma dn 1-8 cm and 1-10 cm Rosidal K  -RE      Bandaging Technique  circumferential/spiral;light compression  -RE        User Key  (r) = Recorded By, (t) = Taken By, (c) = Cosigned By    Initials  Name Provider Type    Julia Rowe OTR Occupational Therapist                  OT Assessment/Plan     Row Name 10/05/21 1653          OT Assessment    Assessment Comments  Hand edema is improved and not detectable today. Forearm edema persists but is improved. Added third bandage today to address forearm swelling. Pt to order compression glove.  -RE        OT Plan    OT Plan Comments  continue  -RE       User Key  (r) = Recorded By, (t) = Taken By, (c) = Cosigned By    Initials Name Provider Type    Julia Rowe OTR Occupational Therapist                 Manual Rx (last 36 hours)      Manual Treatments     Row Name 10/05/21 1655             Total Minutes    36184 - OT Manual Therapy Minutes  45  -RE        User Key  (r) = Recorded By, (t) = Taken By, (c) = Cosigned By    Initials Name Provider Type    Julia Rowe OTR Occupational Therapist            Therapy Education  Given: Edema management, Symptoms/condition management, Bandaging/dressing change  Program: Reinforced  How Provided: Verbal  Provided to: Patient, Caregiver  Level of Understanding: Teach back education performed, Verbalized                Time Calculation:   OT Start Time: 1400  OT Stop Time: 1445  OT Time Calculation (min): 45 min  Total Timed Code Minutes- OT: 45 minute(s)  Timed Charges  51527 - OT Manual Therapy Minutes: 45  Total Minutes  Timed Charges Total Minutes: 45   Total Minutes: 45     Therapy Charges for Today     Code Description Service Date Service Provider Modifiers Qty    46442127434 HC OT MANUAL THERAPY EA 15 MIN 10/5/2021 Julia Leslie OTR GO 3                      VI Ulloa  10/5/2021

## 2021-10-06 ENCOUNTER — APPOINTMENT (OUTPATIENT)
Dept: RADIATION ONCOLOGY | Facility: HOSPITAL | Age: 41
End: 2021-10-06

## 2021-10-06 ENCOUNTER — INFUSION (OUTPATIENT)
Dept: ONCOLOGY | Facility: HOSPITAL | Age: 41
End: 2021-10-06

## 2021-10-06 ENCOUNTER — CONSULT (OUTPATIENT)
Dept: RADIATION ONCOLOGY | Facility: HOSPITAL | Age: 41
End: 2021-10-06

## 2021-10-06 VITALS
TEMPERATURE: 96.9 F | OXYGEN SATURATION: 98 % | HEART RATE: 102 BPM | WEIGHT: 143.4 LBS | RESPIRATION RATE: 18 BRPM | SYSTOLIC BLOOD PRESSURE: 115 MMHG | DIASTOLIC BLOOD PRESSURE: 79 MMHG | BODY MASS INDEX: 26.39 KG/M2 | HEIGHT: 62 IN

## 2021-10-06 VITALS — SYSTOLIC BLOOD PRESSURE: 127 MMHG | HEART RATE: 111 BPM | DIASTOLIC BLOOD PRESSURE: 85 MMHG | OXYGEN SATURATION: 96 %

## 2021-10-06 DIAGNOSIS — Z45.2 ENCOUNTER FOR ADJUSTMENT OR MANAGEMENT OF VASCULAR ACCESS DEVICE: ICD-10-CM

## 2021-10-06 DIAGNOSIS — C50.812 MALIGNANT NEOPLASM OF OVERLAPPING SITES OF LEFT BREAST IN FEMALE, ESTROGEN RECEPTOR NEGATIVE (HCC): ICD-10-CM

## 2021-10-06 DIAGNOSIS — Z17.1 MALIGNANT NEOPLASM OF OVERLAPPING SITES OF LEFT BREAST IN FEMALE, ESTROGEN RECEPTOR NEGATIVE (HCC): ICD-10-CM

## 2021-10-06 DIAGNOSIS — R21 RASH: Primary | ICD-10-CM

## 2021-10-06 DIAGNOSIS — D50.8 OTHER IRON DEFICIENCY ANEMIA: ICD-10-CM

## 2021-10-06 DIAGNOSIS — C50.812 MALIGNANT NEOPLASM OF OVERLAPPING SITES OF LEFT BREAST IN FEMALE, ESTROGEN RECEPTOR NEGATIVE (HCC): Primary | ICD-10-CM

## 2021-10-06 DIAGNOSIS — Z17.1 MALIGNANT NEOPLASM OF OVERLAPPING SITES OF LEFT BREAST IN FEMALE, ESTROGEN RECEPTOR NEGATIVE (HCC): Primary | ICD-10-CM

## 2021-10-06 LAB
ALBUMIN SERPL-MCNC: 3.8 G/DL (ref 3.5–5.2)
ALBUMIN/GLOB SERPL: 1.3 G/DL
ALP SERPL-CCNC: 90 U/L (ref 39–117)
ALT SERPL W P-5'-P-CCNC: 30 U/L (ref 1–33)
ANION GAP SERPL CALCULATED.3IONS-SCNC: 10.2 MMOL/L (ref 5–15)
AST SERPL-CCNC: 18 U/L (ref 1–32)
BASOPHILS # BLD AUTO: 0.07 10*3/MM3 (ref 0–0.2)
BASOPHILS NFR BLD AUTO: 1 % (ref 0–1.5)
BILIRUB SERPL-MCNC: 0.3 MG/DL (ref 0–1.2)
BUN SERPL-MCNC: 20 MG/DL (ref 6–20)
BUN/CREAT SERPL: 25.6 (ref 7–25)
CALCIUM SPEC-SCNC: 9.2 MG/DL (ref 8.6–10.5)
CHLORIDE SERPL-SCNC: 103 MMOL/L (ref 98–107)
CO2 SERPL-SCNC: 24.8 MMOL/L (ref 22–29)
CREAT SERPL-MCNC: 0.78 MG/DL (ref 0.57–1)
DEPRECATED RDW RBC AUTO: 52.6 FL (ref 37–54)
EOSINOPHIL # BLD AUTO: 0.21 10*3/MM3 (ref 0–0.4)
EOSINOPHIL NFR BLD AUTO: 3 % (ref 0.3–6.2)
ERYTHROCYTE [DISTWIDTH] IN BLOOD BY AUTOMATED COUNT: 17.1 % (ref 12.3–15.4)
GFR SERPL CREATININE-BSD FRML MDRD: 81 ML/MIN/1.73
GLOBULIN UR ELPH-MCNC: 2.9 GM/DL
GLUCOSE SERPL-MCNC: 105 MG/DL (ref 65–99)
HCT VFR BLD AUTO: 31.2 % (ref 34–46.6)
HGB BLD-MCNC: 10 G/DL (ref 12–15.9)
IMM GRANULOCYTES # BLD AUTO: 0.16 10*3/MM3 (ref 0–0.05)
IMM GRANULOCYTES NFR BLD AUTO: 2.3 % (ref 0–0.5)
LYMPHOCYTES # BLD AUTO: 1.43 10*3/MM3 (ref 0.7–3.1)
LYMPHOCYTES NFR BLD AUTO: 20.4 % (ref 19.6–45.3)
MCH RBC QN AUTO: 27.4 PG (ref 26.6–33)
MCHC RBC AUTO-ENTMCNC: 32.1 G/DL (ref 31.5–35.7)
MCV RBC AUTO: 85.5 FL (ref 79–97)
MONOCYTES # BLD AUTO: 0.62 10*3/MM3 (ref 0.1–0.9)
MONOCYTES NFR BLD AUTO: 8.9 % (ref 5–12)
NEUTROPHILS NFR BLD AUTO: 4.51 10*3/MM3 (ref 1.7–7)
NEUTROPHILS NFR BLD AUTO: 64.4 % (ref 42.7–76)
NRBC BLD AUTO-RTO: 0 /100 WBC (ref 0–0.2)
PLATELET # BLD AUTO: 353 10*3/MM3 (ref 140–450)
PMV BLD AUTO: 9.4 FL (ref 6–12)
POTASSIUM SERPL-SCNC: 4.1 MMOL/L (ref 3.5–5.2)
PROT SERPL-MCNC: 6.7 G/DL (ref 6–8.5)
RBC # BLD AUTO: 3.65 10*6/MM3 (ref 3.77–5.28)
SODIUM SERPL-SCNC: 138 MMOL/L (ref 136–145)
WBC # BLD AUTO: 7 10*3/MM3 (ref 3.4–10.8)

## 2021-10-06 PROCEDURE — 36415 COLL VENOUS BLD VENIPUNCTURE: CPT

## 2021-10-06 PROCEDURE — 25010000002 DEXAMETHASONE SODIUM PHOSPHATE 100 MG/10ML SOLUTION: Performed by: INTERNAL MEDICINE

## 2021-10-06 PROCEDURE — 25010000002 TRASTUZUMAB-ANNS 420 MG RECONSTITUTED SOLUTION 1 EACH VIAL: Performed by: INTERNAL MEDICINE

## 2021-10-06 PROCEDURE — 85025 COMPLETE CBC W/AUTO DIFF WBC: CPT | Performed by: INTERNAL MEDICINE

## 2021-10-06 PROCEDURE — 25010000002 IRON SUCROSE PER 1 MG: Performed by: INTERNAL MEDICINE

## 2021-10-06 PROCEDURE — 99204 OFFICE O/P NEW MOD 45 MIN: CPT | Performed by: RADIOLOGY

## 2021-10-06 PROCEDURE — 25010000002 HEPARIN LOCK FLUSH PER 10 UNITS: Performed by: INTERNAL MEDICINE

## 2021-10-06 PROCEDURE — 96413 CHEMO IV INFUSION 1 HR: CPT

## 2021-10-06 PROCEDURE — G0463 HOSPITAL OUTPT CLINIC VISIT: HCPCS | Performed by: RADIOLOGY

## 2021-10-06 PROCEDURE — 25010000002 DIPHENHYDRAMINE PER 50 MG: Performed by: INTERNAL MEDICINE

## 2021-10-06 PROCEDURE — 96366 THER/PROPH/DIAG IV INF ADDON: CPT

## 2021-10-06 PROCEDURE — 80053 COMPREHEN METABOLIC PANEL: CPT | Performed by: INTERNAL MEDICINE

## 2021-10-06 PROCEDURE — 25010000002 PACLITAXEL PER 1 MG: Performed by: INTERNAL MEDICINE

## 2021-10-06 PROCEDURE — 96375 TX/PRO/DX INJ NEW DRUG ADDON: CPT

## 2021-10-06 PROCEDURE — 96417 CHEMO IV INFUS EACH ADDL SEQ: CPT

## 2021-10-06 PROCEDURE — 96367 TX/PROPH/DG ADDL SEQ IV INF: CPT

## 2021-10-06 RX ORDER — FAMOTIDINE 10 MG/ML
20 INJECTION, SOLUTION INTRAVENOUS ONCE
Status: COMPLETED | OUTPATIENT
Start: 2021-10-06 | End: 2021-10-06

## 2021-10-06 RX ORDER — SODIUM CHLORIDE 9 MG/ML
250 INJECTION, SOLUTION INTRAVENOUS ONCE
Status: DISCONTINUED | OUTPATIENT
Start: 2021-10-06 | End: 2021-10-06 | Stop reason: HOSPADM

## 2021-10-06 RX ORDER — METHYLPREDNISOLONE 4 MG/1
TABLET ORAL
Qty: 21 EACH | Refills: 0 | Status: SHIPPED | OUTPATIENT
Start: 2021-10-06 | End: 2021-10-13

## 2021-10-06 RX ORDER — SODIUM CHLORIDE 0.9 % (FLUSH) 0.9 %
10 SYRINGE (ML) INJECTION AS NEEDED
Status: DISCONTINUED | OUTPATIENT
Start: 2021-10-06 | End: 2021-10-06 | Stop reason: HOSPADM

## 2021-10-06 RX ORDER — FAMCICLOVIR 250 MG/1
500 TABLET ORAL 3 TIMES DAILY
Qty: 42 TABLET | Refills: 0 | Status: SHIPPED | OUTPATIENT
Start: 2021-10-06 | End: 2021-10-13

## 2021-10-06 RX ORDER — SODIUM CHLORIDE 0.9 % (FLUSH) 0.9 %
10 SYRINGE (ML) INJECTION AS NEEDED
Status: CANCELLED | OUTPATIENT
Start: 2021-10-06

## 2021-10-06 RX ORDER — HEPARIN SODIUM (PORCINE) LOCK FLUSH IV SOLN 100 UNIT/ML 100 UNIT/ML
500 SOLUTION INTRAVENOUS AS NEEDED
Status: CANCELLED | OUTPATIENT
Start: 2021-10-06

## 2021-10-06 RX ORDER — HEPARIN SODIUM (PORCINE) LOCK FLUSH IV SOLN 100 UNIT/ML 100 UNIT/ML
500 SOLUTION INTRAVENOUS AS NEEDED
Status: DISCONTINUED | OUTPATIENT
Start: 2021-10-06 | End: 2021-10-06 | Stop reason: HOSPADM

## 2021-10-06 RX ORDER — SODIUM CHLORIDE 9 MG/ML
250 INJECTION, SOLUTION INTRAVENOUS ONCE
Status: COMPLETED | OUTPATIENT
Start: 2021-10-06 | End: 2021-10-06

## 2021-10-06 RX ADMIN — DEXAMETHASONE SODIUM PHOSPHATE 12 MG: 10 INJECTION, SOLUTION INTRAMUSCULAR; INTRAVENOUS at 09:36

## 2021-10-06 RX ADMIN — DIPHENHYDRAMINE HYDROCHLORIDE 25 MG: 50 INJECTION INTRAMUSCULAR; INTRAVENOUS at 09:19

## 2021-10-06 RX ADMIN — PACLITAXEL 135 MG: 6 INJECTION, SOLUTION INTRAVENOUS at 11:45

## 2021-10-06 RX ADMIN — Medication 500 UNITS: at 13:23

## 2021-10-06 RX ADMIN — TRASTUZUMAB-ANNS 130 MG: 420 INJECTION, POWDER, LYOPHILIZED, FOR SOLUTION INTRAVENOUS at 12:49

## 2021-10-06 RX ADMIN — SODIUM CHLORIDE 250 ML: 9 INJECTION, SOLUTION INTRAVENOUS at 09:18

## 2021-10-06 RX ADMIN — IRON SUCROSE 300 MG: 20 INJECTION, SOLUTION INTRAVENOUS at 09:55

## 2021-10-06 RX ADMIN — FAMOTIDINE 20 MG: 10 INJECTION INTRAVENOUS at 09:18

## 2021-10-06 RX ADMIN — SODIUM CHLORIDE, PRESERVATIVE FREE 10 ML: 5 INJECTION INTRAVENOUS at 13:23

## 2021-10-06 NOTE — PROGRESS NOTES
DIAGNOSIS and REASON FOR CONSULTATION:  Malignant neoplasm of overlapping sites of left breast in female, estrogen receptor negative (HCC)   - for advice and recommendations regarding the diagnosis    CHIEF COMPLAINT:  For advice and recommendations regarding Malignant neoplasm of overlapping sites of left breast in female, estrogen receptor negative (HCC)  HISTORY OF PRESENT ILLNESS:  The patient is a 41 y.o. year old female who was found to have an abnormality on screening mammogram on April 2, 2021.  This revealed a focal area of asymmetry in the posterior one third retroareolar aspect of the left breast.  Diagnostic mammogram and ultrasound on April 9, 2021 showed persistence of the focal asymmetry in the left breast and ultrasound showed an 8 mm irregular lesion at the 6 o'clock position of the left breast.    She underwent ultrasound-guided biopsy and clip placement on April 26, 2021 which revealed an invasive ductal carcinoma, poorly differentiated, measuring at least 7 mm.  There was no DCIS nor lymphovascular space invasion noted.  The tissue was negative for both estrogen and progesterone receptors and positive for the HER-2/ese oncogene.    She completed genetic testing which was negative and bilateral breast MRI on May 7, 2021 showed enhancement within the 6 o'clock position of the left breast consistent with postbiopsy change and clip with an additional area of more linear enhancement anteriorly measuring 1.2 cm.  An additional area of enhancement measuring 2 to 3 mm was appreciated within the lateral aspect of the left breast, questionably mildly prominent nodes were appreciated in the left axilla and no abnormality in the right breast.    Therefore she underwent an ultrasound-guided biopsy of the largest axillary node on May 21, 2021 and that pathology was negative for carcinoma.  She also underwent an MRI guided biopsy of the non-mass enhancement in the posterior left breast and the outer left breast  on June 2, 2021 and biopsies from the 2:00 region were negative but from the 5:00 region showed invasive ductal carcinoma measuring 4 mm.  That tissue was also negative for both estrogen and progesterone receptors positive for the HER-2/ese oncogene.    Therefore she went on to surgery on July 1, 2021 and underwent right-sided port placement as well as a left SHAINA-guided partial mastectomy, needle localized excisional biopsy and sentinel lymph node biopsy followed by bilateral reductions.  That pathology revealed in the specimen of the partial mastectomy an invasive ductal carcinoma measuring 12 x 8 x 4 mm, grade 3, without lymphovascular space invasion noted.  There was associated DCIS noted spanning 38 x 8 x 2 mm.  The margins were all negative for invasive disease and intraductal disease.  Additional superior medial and inferior margins showed no further in situ or invasive disease.  In the specimen from the needle localized excisional biopsy there was no in situ or invasive carcinoma identified.  Further margins taken for that specimen were also negative.  Additionally 0 of 4 sentinel nodes were involved.  Therefore she appears to have a pathologic T1c N0 high-grade invasive ductal carcinoma, hormone receptor negative, HER-2/ese positive.    She then went on to chemotherapy with weekly Taxol and Herceptin which was started on August 4, 2021.  She has tolerated this well, receiving cycle 10 today and if all goes well she will finish that on October 20, 2021. I was asked to see the patient at the request of the referring provider noted above for advice and recommendations regarding this diagnosis.     Clinically, she is doing fairly well. She is significantly fatigued now and states her taste and appetite are dwindling. She is maintaining her weight. She is seeing Julia three times a week to manage her lymphedema which has now involved her hand as well. Finally she has a new rash on her hands which looks to me  like shingles and she is seeing a dermatologist tomorrow for that.     Performance Status: (1) Restricted in physically strenuous activity, ambulatory and able to do work of light nature  Objective   Past Medical History: she  has a past medical history of Acute stress reaction (11/17/2014), ADD (attention deficit disorder), ADHD (attention deficit hyperactivity disorder), Anxiety and depression, CTS (carpal tunnel syndrome), Factor 5 Leiden mutation, heterozygous ([facility]) (2/17/2021), Family history of breast cancer (03/11/2013), Fracture, cervical vertebra ([facility]) (1997), H/O complete eye exam (01/01/2016), Hearing loss, Hearing loss of both ears, History of rheumatic fever, Hypertension, Hypothyroidism, Infiltrating ductal carcinoma of left breast ([facility]) (5/5/2021), Kidney stone, Mitral valve insufficiency, PONV (postoperative nausea and vomiting), Stroke ([facility]) (12/2020), and Stroke-like symptoms.    Past Surgical History:  she has a past surgical history that includes Pap Smear (2016); Breast biopsy (Left, 05/05/2021); No past surgeries; breast lumpectomy with sentinel node biopsy (N/A, 7/1/2021); and Breast surgery (Bilateral, 7/1/2021).    Meds:    Current Outpatient Medications:   •  amphetamine-dextroamphetamine XR (Adderall XR) 20 MG 24 hr capsule, Take 1 capsule by mouth Every Morning, Disp: 30 capsule, Rfl: 0  •  atorvastatin (Lipitor) 10 MG tablet, Take 1 tablet by mouth Daily., Disp: 30 tablet, Rfl: 11  •  Cholecalciferol (VITAMIN D3 PO), Take 2,000 Units by mouth. Takes 2000 twice a week;  SUNDAYS AND WEDNESDAYS, Disp: , Rfl:   •  famciclovir (FAMVIR) 250 MG tablet, Take 2 tablets by mouth 3 (Three) Times a Day for 7 days., Disp: 42 tablet, Rfl: 0  •  famotidine (PEPCID) 20 MG tablet, Take 1 tablet by mouth Daily., Disp: 30 tablet, Rfl: 3  •  levothyroxine (Synthroid) 150 MCG tablet, Take 1 tablet by mouth Daily., Disp: 90 tablet, Rfl: 1  •  lidocaine-prilocaine (EMLA) 2.5-2.5 % cream, Apply  topically to  the appropriate area as directed As Needed for Mild Pain ., Disp: 1 each, Rfl: 2  •  LORazepam (Ativan) 0.5 MG tablet, Take 1 tablet by mouth Every 8 (Eight) Hours As Needed for Anxiety., Disp: 30 tablet, Rfl: 0  •  methylPREDNISolone (MEDROL) 4 MG dose pack, Take as directed on package instructions., Disp: 21 each, Rfl: 0  •  ondansetron ODT (ZOFRAN-ODT) 8 MG disintegrating tablet, Place 1 tablet on the tongue Every 8 (Eight) Hours As Needed for Nausea or Vomiting., Disp: 30 tablet, Rfl: 3  •  rivaroxaban (XARELTO) 20 MG tablet, Take 1 tablet by mouth Daily., Disp: 30 tablet, Rfl: 3  •  valsartan (DIOVAN) 160 MG tablet, Take 1 tablet by mouth Daily., Disp: 30 tablet, Rfl: 5  No current facility-administered medications for this visit.    Facility-Administered Medications Ordered in Other Visits:   •  heparin injection 500 Units, 500 Units, Intravenous, PRNimesh LUCIANO Leela, MD, 500 Units at 08/11/21 1718  •  sodium chloride 0.9 % flush 10 mL, 10 mL, Intravenous, PRN, Neena Beavers MD, 10 mL at 08/11/21 1718    Allergies:    Allergies   Allergen Reactions   • Sulfa Antibiotics Rash       Family History:  her family history includes Brain cancer (age of onset: 20) in her maternal cousin; Breast cancer in an other family member; Breast cancer (age of onset: 53) in her mother; Breast cancer (age of onset: 55) in her paternal aunt; Breast cancer (age of onset: 94) in her paternal great-grandmother; Hypertension in her brother; Lung cancer in her maternal grandmother; Melanoma in her maternal uncle; Ovarian cancer in an other family member; Pancreatic cancer (age of onset: 68) in her mother; Prostate cancer in her maternal grandfather and paternal grandfather; Stroke in her father.    Social History:  she  reports that she quit smoking about 12 years ago. Her smoking use included cigarettes. She started smoking about 23 years ago. She has a 10.00 pack-year smoking history. She has never used smokeless tobacco. She  reports current alcohol use. She reports that she does not use drugs.    Pertinent Findings on   Review of Systems   Constitutional: Positive for appetite change and fatigue. Negative for chills, diaphoresis, fever and unexpected weight change.   HENT:   Positive for hearing loss and nosebleeds. Negative for lump/mass, mouth sores, sore throat, tinnitus, trouble swallowing and voice change.    Eyes: Negative for eye problems and icterus.   Respiratory: Negative for chest tightness, cough, hemoptysis, shortness of breath and wheezing.    Cardiovascular: Negative for chest pain, leg swelling and palpitations.   Gastrointestinal: Negative for abdominal distention, abdominal pain, blood in stool, constipation, diarrhea, nausea, rectal pain and vomiting.   Endocrine: Negative for hot flashes.   Genitourinary: Negative for bladder incontinence, difficulty urinating, dyspareunia, dysuria, frequency, hematuria, menstrual problem, nocturia, pelvic pain, vaginal bleeding and vaginal discharge.    Musculoskeletal: Negative for arthralgias, back pain, flank pain, gait problem, myalgias, neck pain and neck stiffness.   Skin: Positive for rash. Negative for itching and wound.   Neurological: Negative for dizziness, extremity weakness, gait problem, headaches, light-headedness, numbness, seizures and speech difficulty.   Hematological: Negative for adenopathy. Does not bruise/bleed easily.   Psychiatric/Behavioral: Positive for sleep disturbance. Negative for confusion, decreased concentration, depression and suicidal ideas. The patient is not nervous/anxious.    :     Subjective   Vitals:    10/06/21 1512   BP: 127/85   Pulse: 111   SpO2: 96%   PainSc: 0-No pain       Pertinent Findings on:  Physical Exam  Constitutional:       General: She is not in acute distress.     Appearance: She is well-developed. She is not diaphoretic.   HENT:      Head: Normocephalic.   Chest:      Chest wall: No mass or tenderness.      Breasts:          Right: No inverted nipple, mass, nipple discharge, skin change or tenderness.         Left: No inverted nipple, mass, nipple discharge, skin change or tenderness.      Comments: Reduction incisions healing well bilaterally. Breasts are soft, nontender, easy to examine. There are no nodules/skin abnormality/nipple change noted.  Musculoskeletal:         General: Normal range of motion.      Cervical back: Normal range of motion and neck supple.   Lymphadenopathy:      Cervical: No cervical adenopathy.      Upper Body:      Right upper body: No supraclavicular or pectoral adenopathy.      Left upper body: No supraclavicular or pectoral adenopathy.      Comments: Axillary incision is healing well. There is no palpable axillary, supraclavicular nor cervical lymphadenopathy appreciated. No lymphedema is noted in either upper extremity.   Psychiatric:         Speech: Speech normal.         Behavior: Behavior normal.         Thought Content: Thought content normal.         Judgment: Judgment normal.            Assessment: Malignant neoplasm of overlapping sites of left breast in female, estrogen receptor negative (HCC)    Plan:   We reviewed the specifics of her clinical, imaging and pathology findings today and also talked through the role of radiation therapy in her breast conservation treatment. I recommended, given the above, that we embark on a course of postoperative whole breast radiation therapy consisting of 4256 cGy aimed at the breast given over 16 treatments. We will then plan on boosting the tumor bed region as I will delineate it on her treatment planning scan with an additional 1000 cGy given in five treatments. The above course of treatment should be completed in 4 1/2 weeks.    We discussed the specifics, logistics and side effects of this course of treatment  which may involve acutely, irritation of the skin, including erythema and possibly moist desquamation, tenderness of the musculature of the chest  wall and mild fatigue. We discussed the long term possibility of mild hyperpigmentation and fibrosis of the breast, mild increased incidence of rib fracture and radiation pneumonitis.  We also discussed the importance of our treatment planning process in protecting these underlying structures as much as possible, specifically heart, ribs and lung and I believe all her questions were answered to her satisfaction.      We specifically discussed our deep inspiration breath hold (DIBH) technique which allows us to treat only when the breast is removed as much as possible from the heart and lung volume, the slight increased time in treatment delivery and education regarding the breathing techniques and the important benefits and she was agreeable.    She will need to complete her chemotherapy and we have made plans to reconvene on October 26th to hopefully begin treatment planning.     Objective     My assessment above comes from my review of the imaging, pathology, physician notes and other pertinent information as mentioned.    I personally spent greater than 40 minutes today assessing, managing, discussing and documenting my visit with the patient. That time includes review of records, imaging and pathology reports, obtaining my own history, performing a medically appropriate evaluation, counseling and educating the patient, discussing goals, logistics, alternatives and risks of my recommendations, surveillance options and potential outcomes. It also includes the time documenting the clinical information in the EMR and communicating my recommendations to the other involved physicians.    .

## 2021-10-07 ENCOUNTER — HOSPITAL ENCOUNTER (OUTPATIENT)
Dept: OCCUPATIONAL THERAPY | Facility: HOSPITAL | Age: 41
Setting detail: THERAPIES SERIES
Discharge: HOME | End: 2021-10-07

## 2021-10-07 ENCOUNTER — TELEPHONE (OUTPATIENT)
Dept: ONCOLOGY | Facility: CLINIC | Age: 41
End: 2021-10-07

## 2021-10-07 DIAGNOSIS — M79.621 AXILLARY PAIN, RIGHT: ICD-10-CM

## 2021-10-07 DIAGNOSIS — C50.812 MALIGNANT NEOPLASM OF OVERLAPPING SITES OF LEFT BREAST IN FEMALE, ESTROGEN RECEPTOR NEGATIVE (HCC): ICD-10-CM

## 2021-10-07 DIAGNOSIS — Z17.1 MALIGNANT NEOPLASM OF OVERLAPPING SITES OF LEFT BREAST IN FEMALE, ESTROGEN RECEPTOR NEGATIVE (HCC): ICD-10-CM

## 2021-10-07 DIAGNOSIS — Z91.89 AT RISK FOR LYMPHEDEMA: Primary | ICD-10-CM

## 2021-10-07 PROCEDURE — 97140 MANUAL THERAPY 1/> REGIONS: CPT

## 2021-10-07 NOTE — THERAPY PROGRESS REPORT/RE-CERT
Outpatient Occupational Therapy Lymphedema Progress Note  [facility]     Patient Name: Radha Astorga  : 1980  MRN: [id number]  Today's Date: 10/7/2021      Visit Date: 10/07/2021  Patient and therapist both masked. Therapist with face shield and gloves.  Patient Active Problem List   Diagnosis   • Family history of breast cancer   • Hypothyroidism   • Major depressive disorder, recurrent episode, moderate with anxious distress ([facility])   • Attention deficit hyperactivity disorder (ADHD), predominantly inattentive type   • Factor 5 Leiden mutation, heterozygous ([facility])   • Kidney mass   • Malignant neoplasm of overlapping sites of left breast in female, estrogen receptor negative ([facility])   • High risk medication use   • Essential hypertension   • Rheumatic mitral valve disease   • Encounter for adjustment or management of vascular access device   • Iron deficiency anemia        Past Medical History:   Diagnosis Date   • Acute stress reaction 2014   • ADD (attention deficit disorder)    • ADHD (attention deficit hyperactivity disorder)    • Anxiety and depression    • CTS (carpal tunnel syndrome)    • Factor 5 Leiden mutation, heterozygous ([facility]) 2021   • Family history of breast cancer 2013   • Fracture, cervical vertebra ([facility]) 1997    C4   • H/O complete eye exam 2016   • Hearing loss    • 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   • History of rheumatic fever    • Hypertension    • Hypothyroidism    • Infiltrating ductal carcinoma of left breast ([facility]) 2021    Left   • Kidney stone    • Mitral valve insufficiency    • PONV (postoperative nausea and vomiting)    • Stroke ([facility]) 2020    HX OF   • Stroke-like symptoms         Past Surgical History:   Procedure Laterality Date   • BREAST BIOPSY Left 2021    ID[facility]   • BREAST LUMPECTOMY WITH SENTINEL NODE BIOPSY N/A 2021    Procedure: right port placement, Left SHAINA-guided, bracketed, partial mastectomy and  sentinel lymph node biopsy Left breast needle-localized excisional biopsy;  Surgeon: Lashonda Euceda MD;  Location: [facility];  Service: General;  Laterality: N/A;   • BREAST SURGERY Bilateral 7/1/2021    Procedure: RIGHT BREAST REDUCTION MASTOPEXY LEFT BREAST ONCOPLASTIC CLOSURE;  Surgeon: Caridad Baker MD PhD;  Location: [facility];  Service: Plastics;  Laterality: Bilateral;   • NO PAST SURGERIES     • PAP SMEAR  2016         Visit Dx:      ICD-10-CM ICD-9-CM   1. At risk for lymphedema  Z91.89 V49.89   2. Axillary pain, right  M79.621 729.5   3. Malignant neoplasm of overlapping sites of left breast in female, estrogen receptor negative (HCC)  C50.812 174.8    Z17.1 V86.1       Lymphedema     Row Name 10/07/21 1400             Subjective Pain    Able to rate subjective pain?  yes  -RE      Pre-Treatment Pain Level  2  -RE      Post-Treatment Pain Level  2  -RE      Subjective Pain Comment  with palpation of upper arm  -RE         Subjective Comments    Subjective Comments  increased pain upper arm following treatemtn on tuesday. Got a glove but not a harmony due to cost. Got Juzo basic at [place]  -RE         Manual Lymphatic Drainage    Manual Lymphatic Drainage  initial sequence;opened regional lymph nodes;opened anastamoses;extremity treatment  -RE      Initial Sequence  short neck  -RE      Opened Regional Lymph Nodes  axillary;inguinal  -RE      Axillary  right;left  -RE      Inguinal  left  -RE      Opened Anastamoses  anterior axillo-axillary;axillo-inguinal;posterior axillo-axillary  -RE      Axillo-Inguinal  left  -RE      Extremity Treatment  MLD to full limb  -RE      MLD to Full Limb  left  -RE         Compression/Skin Care    Compression/Skin Care  --  -RE      Skin Care  --  -RE      Wrapping Location  --  -RE      Wrapping Location UE  --  -RE      Bandage Layers  --  -RE      Bandaging Technique  --  -RE      Compression/Skin Care Comments  wore garment  -RE        User Key   (r) = Recorded By, (t) = Taken By, (c) = Cosigned By    Initials Name Provider Type    Julia Rowe OTR Occupational Therapist                  OT Assessment/Plan     Row Name 10/07/21 1606          OT Assessment    Assessment Comments  Is progressing well. mild palpable edema around elbow but hand edema is improved. Plan bioimpedance reassessment for next week. Cording is present and somewhat painful but not affecting AROM at this point.  -RE     OT Diagnosis  post mastectomy lymphedema  -RE     OT Rehab Potential  Good  -RE     Patient/caregiver participated in establishment of treatment plan and goals  Yes  -RE     Patient would benefit from skilled therapy intervention  Yes  -RE        OT Plan    OT Frequency  2x/week  -RE     Predicted Duration of Therapy Intervention (OT)  1-2 weeks unless encounters problems during radiation  -RE     Planned CPT's?  OT THER PROC EA 15 MIN: 74531VD;OT SELF CARE/MGMT/TRAIN 15 MIN: 02731;OT MANUAL THERAPY EA 15 MIN: 48260;OT BIS XTRACELL FLUID ANALYSIS: 92232  -RE     Planned Therapy Interventions (Optional Details)  home exercise program;manual therapy techniques;patient/family education;other (see comments) bioimpedance  -RE     OT Plan Comments  bioimpedance next week then transistion to self care. followup after radiation with bioimpedance. Treat during radiation as needed.  -RE       User Key  (r) = Recorded By, (t) = Taken By, (c) = Cosigned By    Initials Name Provider Type    Julia Rowe OTR Occupational Therapist                 Manual Rx (last 36 hours)      Manual Treatments     Row Name 10/07/21 1612             Total Minutes    17745 - OT Manual Therapy Minutes  45  -RE        User Key  (r) = Recorded By, (t) = Taken By, (c) = Cosigned By    Initials Name Provider Type    Julia Rowe OTR Occupational Therapist        OT Goals     Row Name 10/07/21 1600          OT Short Term Goals    STG Date to Achieve  10/21/21  -RE     STG 1  Pt will demonstrate  understanding of use of compression sleeve for edema prevention, exercise and air travel.   -RE     STG 1 Progress  Met  -RE     STG 2  Patient will demonstrate proper awareness of “What is Lymphedema?” and “Healthy Habits” for improved prevention, management, care of symptoms and ease of transition to self-care of condition.   -RE     STG 2 Progress  Met  -RE     STG 3  Patient independent and compliant with initial home exercise program focused on range of motion,and Flexibility.  -RE     STG 3 Progress  Met  -RE     STG 4  Patient to demonstrate increased left shoulder flexion to 140 to improve functional UE use and to restore pre operative AROM per patient perception.  -RE     STG 4 Progress  Met  -RE     STG 5  Patient to demonstrate increased left shoulder abduction to 140 to improve functional UE use and to restore preoperative AROM per patient perception.  -RE     STG 5 Progress  Met  -RE        Long Term Goals    LTG Date to Achieve  11/04/21  -RE     LTG 1  Patient to demonstrate increased left shoulder flexion to 160 to improve functional UE use and to restore pre operative AROM per patient perception.  -RE     LTG 1 Progress  Met  -RE     LTG 2  Patient to demonstrate increased left shoulder abduction to 160 to improve functional UE use and to restore preoperative AROM per patient perception.  -RE     LTG 2 Progress  Met  -RE     LTG 3  Patient will participate in bioimpedance scans every 3-6 months as a method of early detection of lymphedema to allow for early intervention.  -RE     LTG 3 Progress  Met;Ongoing  -RE     LTG 4   Patient's bioimpedance score to remain below 6.5 for decreased risk of stage II lymphedema.  -RE     LTG 4 Progress  Not Met;Ongoing  -RE        Time Calculation    OT Goal Re-Cert Due Date  11/10/21  -RE       User Key  (r) = Recorded By, (t) = Taken By, (c) = Cosigned By    Initials Name Provider Type    Julia Rowe, OTR Occupational Therapist          Therapy  Education  Education Details: instructed in how to bandage over a garment for increased compression.  Given: Edema management  Program: New  How Provided: Verbal, Demonstration  Provided to: Patient  Level of Understanding: Teach back education performed, Verbalized                Time Calculation:   OT Start Time: 1400  OT Stop Time: 1445  OT Time Calculation (min): 45 min  Timed Charges  22190 - OT Manual Therapy Minutes: 45  Total Minutes  Timed Charges Total Minutes: 45   Total Minutes: 45     Therapy Charges for Today     Code Description Service Date Service Provider Modifiers Qty    [id number]  OT MANUAL THERAPY EA 15 MIN 10/7/2021 Julia Leslie, OTKINGSLEY GO 3                      VI [place]  10/7/2021

## 2021-10-07 NOTE — TELEPHONE ENCOUNTER
Clinical Case Management/[place] [facility] [place]:    Patient is a 41 year old woman with malignant neoplasm of overlapping sites of left breast in female, estrogen receptor negative who was seen by Dr. Byrd on 10/6/2021 in the context of developing recommendations for possible radiation treatment. Patient completed the [employer] Distress Thermometer and Problem List for Patient scoring 7/10 and listing the following concerns: work/school, dealing with children, nervousness, sadness, worry, loss of interest/activities, eating, fatigue, feeling swollen, memory/concentration, nausea, nose dry/congested, sleep, and tingling hands/feet.     OSW called patient to introduce self/role and to assess for needs. Patient was easy to engage, open, and sharing. Patient confirmed that she scored 7/10 yesterday but that her primary concern, beyond having breast cancer, is the lack of sleep she has been experiencing. OSW and patient discussed how OSW can send referral to Dolly [place]oa, sofi CHOW and Dr. Eda Ramsey to see if they can assist with the sleep concern.     Patient also shared that while she is not always working full-time, it has been stressful maintaining her job, going to treatment/medical appointments, and taking care of her partner's 16 year old who is very active in school with extracurricular activities on top of patient living with breast cancer. Patient explained that she has used all of her PTO already and that she applied for FMLA to secure her position at work. OSW inquired about short term disability benefits. Patient stated that she would be open to talking about her options with her HR/benefits department about short term disability.     Patient reported that prior to her cancer diagnosis she was beginning to live a more physically active lifestyle by engaging in regular exercise. She explained that this often helped her mood, focus, and sleep issues. Unfortunately, patient developed lymphedema that  required she be on strict light duty restrictions, so this has temporarily taken away her freedom to continue using exercise as an outlet. Patient reported that she was cleared two days ago to start gently picking things up again, which she shared makes her feel like she will soon work her way back to exercising again.     Patient shared that she has a small but good support system made up of her partner, friends, and the people she has met participating in [place] [employer].     OSW shared direct contact information with patient to call as needs arise. Patient verbalized understanding and expressed gratitude.     Interventions:    1. Referral to CLAUDINE Feng and Dr. Ramsey for sleep concerns.   2. Follow up with patient next week to see how discussion with HR/Benefits went in regards to short term disability.   3. Dr. Byrd already sent referral to nutrition/dietitian     UZAIR Mooyd, W  Oncology Social Worker   [place]

## 2021-10-11 ENCOUNTER — NURSE NAVIGATOR (OUTPATIENT)
Dept: OTHER | Facility: HOSPITAL | Age: 41
End: 2021-10-11

## 2021-10-11 ENCOUNTER — APPOINTMENT (OUTPATIENT)
Dept: OCCUPATIONAL THERAPY | Facility: HOSPITAL | Age: 41
End: 2021-10-11

## 2021-10-11 NOTE — PROGRESS NOTES
Called Ms. Rizwan to see how she was doing. She stated she has two more weeks of taxol treatments and will start radiation afterwards. She is struggling with fatigue, hair loss, and blisters on her knuckles. She stated she is taking it one day at a time and knows many of these side effects will start to diminish soon. She is using hydrocortisone cream for her knuckles and trying to keep them clean and dry. She was thankful for the call and will reach out if any questions or needs arise.

## 2021-10-12 ENCOUNTER — HOSPITAL ENCOUNTER (OUTPATIENT)
Dept: OCCUPATIONAL THERAPY | Facility: HOSPITAL | Age: 41
Setting detail: THERAPIES SERIES
Discharge: HOME | End: 2021-10-12

## 2021-10-12 DIAGNOSIS — Z91.89 AT RISK FOR LYMPHEDEMA: Primary | ICD-10-CM

## 2021-10-12 DIAGNOSIS — C50.812 MALIGNANT NEOPLASM OF OVERLAPPING SITES OF LEFT BREAST IN FEMALE, ESTROGEN RECEPTOR NEGATIVE (HCC): ICD-10-CM

## 2021-10-12 DIAGNOSIS — Z17.1 MALIGNANT NEOPLASM OF OVERLAPPING SITES OF LEFT BREAST IN FEMALE, ESTROGEN RECEPTOR NEGATIVE (HCC): ICD-10-CM

## 2021-10-12 DIAGNOSIS — I97.2 POST-MASTECTOMY LYMPHEDEMA SYNDROME: ICD-10-CM

## 2021-10-12 PROCEDURE — 97140 MANUAL THERAPY 1/> REGIONS: CPT

## 2021-10-12 PROCEDURE — 97535 SELF CARE MNGMENT TRAINING: CPT

## 2021-10-12 NOTE — THERAPY TREATMENT NOTE
Outpatient Occupational Therapy Lymphedema Treatment Note  [facility]     Patient Name: Radha Astorga  : 1980  MRN: [id number]  Today's Date: 10/12/2021      Visit Date: 10/12/2021    Patient Active Problem List   Diagnosis   • Family history of breast cancer   • Hypothyroidism   • Major depressive disorder, recurrent episode, moderate with anxious distress ([facility])   • Attention deficit hyperactivity disorder (ADHD), predominantly inattentive type   • Factor 5 Leiden mutation, heterozygous ([facility])   • Kidney mass   • Malignant neoplasm of overlapping sites of left breast in female, estrogen receptor negative ([facility])   • High risk medication use   • Essential hypertension   • Rheumatic mitral valve disease   • Encounter for adjustment or management of vascular access device   • Iron deficiency anemia        Past Medical History:   Diagnosis Date   • Acute stress reaction 2014   • ADD (attention deficit disorder)    • ADHD (attention deficit hyperactivity disorder)    • Anxiety and depression    • CTS (carpal tunnel syndrome)    • Factor 5 Leiden mutation, heterozygous ([facility]) 2021   • Family history of breast cancer 2013   • Fracture, cervical vertebra ([facility]) 1997    C4   • H/O complete eye exam 2016   • Hearing loss    • 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   • History of rheumatic fever    • Hypertension    • Hypothyroidism    • Infiltrating ductal carcinoma of left breast ([facility]) 2021    Left   • Kidney stone    • Mitral valve insufficiency    • PONV (postoperative nausea and vomiting)    • Stroke ([facility]) 2020    HX OF   • Stroke-like symptoms         Past Surgical History:   Procedure Laterality Date   • BREAST BIOPSY Left 2021    IDC   • BREAST LUMPECTOMY WITH SENTINEL NODE BIOPSY N/A 2021    Procedure: right port placement, Left SHAINA-guided, bracketed, partial mastectomy and sentinel lymph node biopsy Left breast needle-localized excisional biopsy;   Surgeon: Lashonda Euceda MD;  Location: [facility];  Service: General;  Laterality: N/A;   • BREAST SURGERY Bilateral 7/1/2021    Procedure: RIGHT BREAST REDUCTION MASTOPEXY LEFT BREAST ONCOPLASTIC CLOSURE;  Surgeon: Caridad Baker MD PhD;  Location: [facility];  Service: Plastics;  Laterality: Bilateral;   • NO PAST SURGERIES     • PAP SMEAR  2016         Visit Dx:      ICD-10-CM ICD-9-CM   1. At risk for lymphedema  Z91.89 V49.89   2. Malignant neoplasm of overlapping sites of left breast in female, estrogen receptor negative ([facility])  C50.812 174.8    Z17.1 V86.1   3. Post-mastectomy lymphedema syndrome  I97.2 457.0        Lymphedema     Row Name 10/12/21 1300             Subjective Pain    Able to rate subjective pain? yes  -RE      Pre-Treatment Pain Level 3  -RE      Subjective Pain Comment forearm (when flexed)  -RE              Manual Lymphatic Drainage    Manual Lymphatic Drainage initial sequence; opened regional lymph nodes; opened anastamoses; extremity treatment  -RE      Initial Sequence short neck  -RE      Opened Regional Lymph Nodes axillary; inguinal  -RE      Axillary right; left  -RE      Inguinal left  -RE      Opened Anastamoses anterior axillo-axillary; axillo-inguinal  -RE      Axillo-Inguinal left  -RE      Extremity Treatment MLD to full limb  -RE      MLD to Full Limb left  -RE              Compression/Skin Care    Compression/Skin Care skin care; wrapping location; bandaging  -RE      Skin Care lotion applied; topical anti-inflammatory applied  zinc  -RE      Wrapping Location upper extremity  -RE      Wrapping Location UE left:; fingers to axilla  -RE      Bandage Layers cotton liner; padding/fluff layer; short-stretch bandages (comment size/quantity)  -RE      Bandaging Comments Tg7, 1 1/2 Artiflex, 1-6cma dn 1-8 cm and 1-10 cm Rosidal K  -RE      Bandaging Technique circumferential/spiral; light compression; moderate compression  -RE            User Key  (r) =  Recorded By, (t) = Taken By, (c) = Cosigned By    Initials Name Provider Type    Julia Rowe OTR Occupational Therapist                         OT Assessment/Plan     Row Name 10/12/21 1505          OT Assessment    Impairments Edema; Impaired lymphatic circulation; Pain  -RE     Assessment Comments Is having increased rashes adn itching from Taxol. Noted increased edema and cording in her forearm which is painful. Patient will need night compression with a foam compression product to wear at night and will continue with a compression sleeve and glove during the day.  -RE     OT Diagnosis post mastectomy lymphedema  -RE            OT Plan    OT Plan Comments Continue CDT in light of increased edema and pain, pursue night compression coverage,  -RE           User Key  (r) = Recorded By, (t) = Taken By, (c) = Cosigned By    Initials Name Provider Type    Julia Rowe OTR Occupational Therapist                    Manual Rx (last 36 hours)     Manual Treatments     Row Name 10/12/21 1509             Total Minutes    32434 - OT Manual Therapy Minutes 35  -RE            User Key  (r) = Recorded By, (t) = Taken By, (c) = Cosigned By    Initials Name Provider Type    Julia Rowe OTR Occupational Therapist                  Therapy Education  Education Details: measured for Profile from [employer]. She is slightly out of range and would benefit from a custom Tribute for night use.  Given: Edema management, Symptoms/condition management  Program: New  How Provided: Verbal  Provided to: Patient  Level of Understanding: Teach back education performed, Verbalized  25242 - OT Self Care/Mgmt Minutes: 10                Time Calculation:   OT Start Time: 1300  OT Stop Time: 1345  OT Time Calculation (min): 45 min  Total Timed Code Minutes- OT: 45 minute(s)  Timed Charges  67926 - OT Manual Therapy Minutes: 35  88975 - OT Self Care/Mgmt Minutes: 10  Total Minutes  Timed Charges Total Minutes: 45   Total Minutes: 45      Therapy Charges for Today     Code Description Service Date Service Provider Modifiers Qty    80610992224 HC OT MANUAL THERAPY EA 15 MIN 10/12/2021 Julia Leslie OTR GO 2    [id number] HC OT SELF CARE/MGMT/TRAIN EA 15 MIN 10/12/2021 Julia Leslie OTR GO 1                      VI Ulloa  10/12/2021

## 2021-10-13 ENCOUNTER — INFUSION (OUTPATIENT)
Dept: ONCOLOGY | Facility: HOSPITAL | Age: 41
End: 2021-10-13

## 2021-10-13 ENCOUNTER — OFFICE VISIT (OUTPATIENT)
Dept: ONCOLOGY | Facility: CLINIC | Age: 41
End: 2021-10-13

## 2021-10-13 VITALS
TEMPERATURE: 97.1 F | WEIGHT: 144.2 LBS | SYSTOLIC BLOOD PRESSURE: 123 MMHG | HEART RATE: 105 BPM | BODY MASS INDEX: 26.54 KG/M2 | OXYGEN SATURATION: 98 % | HEIGHT: 62 IN | RESPIRATION RATE: 16 BRPM | DIASTOLIC BLOOD PRESSURE: 83 MMHG

## 2021-10-13 VITALS — HEART RATE: 100 BPM | SYSTOLIC BLOOD PRESSURE: 129 MMHG | DIASTOLIC BLOOD PRESSURE: 83 MMHG

## 2021-10-13 DIAGNOSIS — D50.8 OTHER IRON DEFICIENCY ANEMIA: ICD-10-CM

## 2021-10-13 DIAGNOSIS — Z17.1 MALIGNANT NEOPLASM OF OVERLAPPING SITES OF LEFT BREAST IN FEMALE, ESTROGEN RECEPTOR NEGATIVE (HCC): Primary | ICD-10-CM

## 2021-10-13 DIAGNOSIS — L29.9 ITCHING: Primary | ICD-10-CM

## 2021-10-13 DIAGNOSIS — Z45.2 ENCOUNTER FOR ADJUSTMENT OR MANAGEMENT OF VASCULAR ACCESS DEVICE: ICD-10-CM

## 2021-10-13 DIAGNOSIS — C50.812 MALIGNANT NEOPLASM OF OVERLAPPING SITES OF LEFT BREAST IN FEMALE, ESTROGEN RECEPTOR NEGATIVE (HCC): ICD-10-CM

## 2021-10-13 DIAGNOSIS — Z17.1 MALIGNANT NEOPLASM OF OVERLAPPING SITES OF LEFT BREAST IN FEMALE, ESTROGEN RECEPTOR NEGATIVE (HCC): ICD-10-CM

## 2021-10-13 DIAGNOSIS — C50.812 MALIGNANT NEOPLASM OF OVERLAPPING SITES OF LEFT BREAST IN FEMALE, ESTROGEN RECEPTOR NEGATIVE (HCC): Primary | ICD-10-CM

## 2021-10-13 LAB
ALBUMIN SERPL-MCNC: 4 G/DL (ref 3.5–5.2)
ALBUMIN/GLOB SERPL: 1.5 G/DL
ALP SERPL-CCNC: 94 U/L (ref 39–117)
ALT SERPL W P-5'-P-CCNC: 113 U/L (ref 1–33)
ANION GAP SERPL CALCULATED.3IONS-SCNC: 10.7 MMOL/L (ref 5–15)
AST SERPL-CCNC: 60 U/L (ref 1–32)
BASOPHILS # BLD AUTO: 0.06 10*3/MM3 (ref 0–0.2)
BASOPHILS NFR BLD AUTO: 0.8 % (ref 0–1.5)
BILIRUB SERPL-MCNC: 0.2 MG/DL (ref 0–1.2)
BUN SERPL-MCNC: 18 MG/DL (ref 6–20)
BUN/CREAT SERPL: 23.1 (ref 7–25)
CALCIUM SPEC-SCNC: 9.3 MG/DL (ref 8.6–10.5)
CHLORIDE SERPL-SCNC: 104 MMOL/L (ref 98–107)
CO2 SERPL-SCNC: 24.3 MMOL/L (ref 22–29)
CREAT SERPL-MCNC: 0.78 MG/DL (ref 0.57–1)
DEPRECATED RDW RBC AUTO: 53.7 FL (ref 37–54)
EOSINOPHIL # BLD AUTO: 0.27 10*3/MM3 (ref 0–0.4)
EOSINOPHIL NFR BLD AUTO: 3.6 % (ref 0.3–6.2)
ERYTHROCYTE [DISTWIDTH] IN BLOOD BY AUTOMATED COUNT: 17.6 % (ref 12.3–15.4)
GFR SERPL CREATININE-BSD FRML MDRD: 81 ML/MIN/1.73
GLOBULIN UR ELPH-MCNC: 2.6 GM/DL
GLUCOSE SERPL-MCNC: 146 MG/DL (ref 65–99)
HCT VFR BLD AUTO: 33 % (ref 34–46.6)
HGB BLD-MCNC: 10.6 G/DL (ref 12–15.9)
IMM GRANULOCYTES # BLD AUTO: 0.22 10*3/MM3 (ref 0–0.05)
IMM GRANULOCYTES NFR BLD AUTO: 2.9 % (ref 0–0.5)
LYMPHOCYTES # BLD AUTO: 1.87 10*3/MM3 (ref 0.7–3.1)
LYMPHOCYTES NFR BLD AUTO: 24.9 % (ref 19.6–45.3)
MCH RBC QN AUTO: 27.5 PG (ref 26.6–33)
MCHC RBC AUTO-ENTMCNC: 32.1 G/DL (ref 31.5–35.7)
MCV RBC AUTO: 85.7 FL (ref 79–97)
MONOCYTES # BLD AUTO: 0.5 10*3/MM3 (ref 0.1–0.9)
MONOCYTES NFR BLD AUTO: 6.7 % (ref 5–12)
NEUTROPHILS NFR BLD AUTO: 4.59 10*3/MM3 (ref 1.7–7)
NEUTROPHILS NFR BLD AUTO: 61.1 % (ref 42.7–76)
NRBC BLD AUTO-RTO: 0 /100 WBC (ref 0–0.2)
PLATELET # BLD AUTO: 367 10*3/MM3 (ref 140–450)
PMV BLD AUTO: 9.1 FL (ref 6–12)
POTASSIUM SERPL-SCNC: 3.6 MMOL/L (ref 3.5–5.2)
PROT SERPL-MCNC: 6.6 G/DL (ref 6–8.5)
RBC # BLD AUTO: 3.85 10*6/MM3 (ref 3.77–5.28)
SODIUM SERPL-SCNC: 139 MMOL/L (ref 136–145)
WBC # BLD AUTO: 7.51 10*3/MM3 (ref 3.4–10.8)

## 2021-10-13 PROCEDURE — 96413 CHEMO IV INFUSION 1 HR: CPT

## 2021-10-13 PROCEDURE — 25010000002 DEXAMETHASONE SODIUM PHOSPHATE 100 MG/10ML SOLUTION: Performed by: INTERNAL MEDICINE

## 2021-10-13 PROCEDURE — 96375 TX/PRO/DX INJ NEW DRUG ADDON: CPT

## 2021-10-13 PROCEDURE — 25010000002 TRASTUZUMAB-ANNS 420 MG RECONSTITUTED SOLUTION 1 EACH VIAL: Performed by: INTERNAL MEDICINE

## 2021-10-13 PROCEDURE — 80053 COMPREHEN METABOLIC PANEL: CPT | Performed by: INTERNAL MEDICINE

## 2021-10-13 PROCEDURE — 25010000002 IRON SUCROSE PER 1 MG: Performed by: INTERNAL MEDICINE

## 2021-10-13 PROCEDURE — 25010000002 DIPHENHYDRAMINE PER 50 MG: Performed by: INTERNAL MEDICINE

## 2021-10-13 PROCEDURE — 96367 TX/PROPH/DG ADDL SEQ IV INF: CPT

## 2021-10-13 PROCEDURE — 25010000002 HEPARIN LOCK FLUSH PER 10 UNITS: Performed by: INTERNAL MEDICINE

## 2021-10-13 PROCEDURE — 85025 COMPLETE CBC W/AUTO DIFF WBC: CPT | Performed by: INTERNAL MEDICINE

## 2021-10-13 PROCEDURE — 99215 OFFICE O/P EST HI 40 MIN: CPT | Performed by: INTERNAL MEDICINE

## 2021-10-13 PROCEDURE — 96366 THER/PROPH/DIAG IV INF ADDON: CPT

## 2021-10-13 PROCEDURE — 96417 CHEMO IV INFUS EACH ADDL SEQ: CPT

## 2021-10-13 PROCEDURE — 25010000002 PACLITAXEL PER 1 MG: Performed by: INTERNAL MEDICINE

## 2021-10-13 RX ORDER — SODIUM CHLORIDE 9 MG/ML
250 INJECTION, SOLUTION INTRAVENOUS ONCE
Status: CANCELLED | OUTPATIENT
Start: 2021-10-13

## 2021-10-13 RX ORDER — SODIUM CHLORIDE 0.9 % (FLUSH) 0.9 %
10 SYRINGE (ML) INJECTION AS NEEDED
Status: CANCELLED | OUTPATIENT
Start: 2021-10-13

## 2021-10-13 RX ORDER — FAMOTIDINE 10 MG/ML
20 INJECTION, SOLUTION INTRAVENOUS ONCE
Status: CANCELLED | OUTPATIENT
Start: 2021-10-13

## 2021-10-13 RX ORDER — DIPHENHYDRAMINE HYDROCHLORIDE 50 MG/ML
50 INJECTION INTRAMUSCULAR; INTRAVENOUS AS NEEDED
Status: CANCELLED | OUTPATIENT
Start: 2021-10-20

## 2021-10-13 RX ORDER — HEPARIN SODIUM (PORCINE) LOCK FLUSH IV SOLN 100 UNIT/ML 100 UNIT/ML
500 SOLUTION INTRAVENOUS AS NEEDED
Status: CANCELLED | OUTPATIENT
Start: 2021-10-13

## 2021-10-13 RX ORDER — FAMOTIDINE 10 MG/ML
20 INJECTION, SOLUTION INTRAVENOUS AS NEEDED
Status: CANCELLED | OUTPATIENT
Start: 2021-10-20

## 2021-10-13 RX ORDER — FAMOTIDINE 10 MG/ML
20 INJECTION, SOLUTION INTRAVENOUS ONCE
Status: COMPLETED | OUTPATIENT
Start: 2021-10-13 | End: 2021-10-13

## 2021-10-13 RX ORDER — SODIUM CHLORIDE 9 MG/ML
250 INJECTION, SOLUTION INTRAVENOUS ONCE
Status: COMPLETED | OUTPATIENT
Start: 2021-10-13 | End: 2021-10-13

## 2021-10-13 RX ORDER — HYDROXYZINE PAMOATE 25 MG/1
25 CAPSULE ORAL NIGHTLY PRN
Qty: 10 CAPSULE | Refills: 0 | Status: SHIPPED | OUTPATIENT
Start: 2021-10-13 | End: 2021-10-20 | Stop reason: HOSPADM

## 2021-10-13 RX ORDER — SODIUM CHLORIDE 9 MG/ML
250 INJECTION, SOLUTION INTRAVENOUS ONCE
Status: CANCELLED | OUTPATIENT
Start: 2021-10-20

## 2021-10-13 RX ORDER — FAMOTIDINE 10 MG/ML
20 INJECTION, SOLUTION INTRAVENOUS ONCE
Status: CANCELLED | OUTPATIENT
Start: 2021-10-20

## 2021-10-13 RX ORDER — SODIUM CHLORIDE 0.9 % (FLUSH) 0.9 %
10 SYRINGE (ML) INJECTION AS NEEDED
Status: DISCONTINUED | OUTPATIENT
Start: 2021-10-13 | End: 2021-10-13 | Stop reason: HOSPADM

## 2021-10-13 RX ORDER — FAMOTIDINE 10 MG/ML
20 INJECTION, SOLUTION INTRAVENOUS AS NEEDED
Status: CANCELLED | OUTPATIENT
Start: 2021-10-13

## 2021-10-13 RX ORDER — HEPARIN SODIUM (PORCINE) LOCK FLUSH IV SOLN 100 UNIT/ML 100 UNIT/ML
500 SOLUTION INTRAVENOUS AS NEEDED
Status: DISCONTINUED | OUTPATIENT
Start: 2021-10-13 | End: 2021-10-13 | Stop reason: HOSPADM

## 2021-10-13 RX ORDER — DIPHENHYDRAMINE HYDROCHLORIDE 50 MG/ML
50 INJECTION INTRAMUSCULAR; INTRAVENOUS AS NEEDED
Status: CANCELLED | OUTPATIENT
Start: 2021-10-13

## 2021-10-13 RX ORDER — SODIUM CHLORIDE 9 MG/ML
250 INJECTION, SOLUTION INTRAVENOUS ONCE
Status: DISCONTINUED | OUTPATIENT
Start: 2021-10-13 | End: 2021-10-13 | Stop reason: HOSPADM

## 2021-10-13 RX ORDER — HYDROXYZINE PAMOATE 25 MG/1
25 CAPSULE ORAL NIGHTLY PRN
Status: DISCONTINUED | OUTPATIENT
Start: 2021-10-13 | End: 2021-10-13

## 2021-10-13 RX ADMIN — DIPHENHYDRAMINE HYDROCHLORIDE 25 MG: 50 INJECTION INTRAMUSCULAR; INTRAVENOUS at 09:52

## 2021-10-13 RX ADMIN — Medication 500 UNITS: at 13:45

## 2021-10-13 RX ADMIN — SODIUM CHLORIDE 250 ML: 9 INJECTION, SOLUTION INTRAVENOUS at 09:50

## 2021-10-13 RX ADMIN — TRASTUZUMAB-ANNS 130 MG: 420 INJECTION, POWDER, LYOPHILIZED, FOR SOLUTION INTRAVENOUS at 13:10

## 2021-10-13 RX ADMIN — PACLITAXEL 135 MG: 6 INJECTION, SOLUTION INTRAVENOUS at 12:03

## 2021-10-13 RX ADMIN — FAMOTIDINE 20 MG: 10 INJECTION INTRAVENOUS at 09:50

## 2021-10-13 RX ADMIN — DEXAMETHASONE SODIUM PHOSPHATE 12 MG: 10 INJECTION, SOLUTION INTRAMUSCULAR; INTRAVENOUS at 10:12

## 2021-10-13 RX ADMIN — IRON SUCROSE 300 MG: 20 INJECTION, SOLUTION INTRAVENOUS at 10:30

## 2021-10-13 RX ADMIN — SODIUM CHLORIDE, PRESERVATIVE FREE 10 ML: 5 INJECTION INTRAVENOUS at 13:45

## 2021-10-13 NOTE — PROGRESS NOTES
Subjective     REASON FOR follow-up:    1.  Heterozygous state for factor V Leiden    2.  New onset stroke on aspirin by neurology    3.  Hypercholesterolemia    4.  Hypercoagulable work-up done.  Antithrombin 109% protein S activity 85% protein S antigen 107%, protein C activity 85%.  Anticardiolipin antibody IgM 24%, antibeta-2 glycoprotein antibody negative, lupus anticoagulant not detected, prothrombin gene mutation negative.    5.  Screening mammogram showed abnormality in the left breast 6 o'clock position, 8 mm on ultrasound, ultrasound-guided left breast biopsy, 6 cm from the nipple showed evidence of invasive ductal carcinoma poorly differentiated grade 3, Tremonton score of 8 out of 9 ER negative, VT negative, HER-2/ese 3+ positive.    6.  CT scan February 24, 2021 showed focal area of fatty infiltration of the liver.  1 cm hypoattenuating cortical lesion in the upper pole of the right kidney.  Similarly nodule in the upper pole of the left kidney is 1.5 cm.  Further evaluation by ultrasound suggested  · Ultrasound March 1, 2021 shows solid lesion in the upper pole of the right kidney.  In the left kidney along the midpole of the left kidney is a nodule which is 16 x 16 x 14 mm which is thought to be a cyst.  A 6-month follow-up CT suggested    6.  S/p left partial mastectomy with sentinel lymph node biopsy.  Tumor was present at 5:00, invasive ductal carcinoma, Trem[place]on score of 8, grade 3, greatest dimension was 12 mm x 8 x 4 mm.  Single focus of invasive carcinoma present.  There was extensive DCIS which is 30 mm x 8 x 2 mm, grade 3, no evidence of lymphovascular space invasion or dermal lymphatic invasion.  Margins were clear.  4 lymph nodes were negative.  It is a p T1cp N0, ER negative VT negative HER-2/ese 3+ with Ki-67 of 50%.  · [employer] genetic test negative for 47 genes    7.recently initiated Xarelto at loading dose of 15 mg twice a day x3 weeks to then be switched to 20 mg daily per  protocol.  She is doing this because of Mediport in place and known factor V Leiden heterozygosity.        History of Present Illness   Patient is a 41 y.o. female with pT1cp N0 ER negative AR negative HER-2 positive left breast cancer s/p lumpectomy patient is on weekly Taxol Herceptin.    Today for evaluation prior to week #11 Taxol Herceptin.  She will also receive her dose of Venofer as well..    Patient has significant fatigue and rash.  The rash has not improved.  She went to see Dr. Purcell and he did not think the rash on the fingers was any concern for shingles.  She now has a rash on the face.  We discussed about giving her Medrol Dosepak but she refuses as steroids make her very hyper.  She states right now when she gets chemo the next 2 days she is more awake and can sleep.  Unsure if that is steroid related which is given prior to chemo.  She is due to receive cycle 12 next week following which will refer to radiation.  The longer we are giving Taxol the more fatigue and worsening rash that she has and has difficulty to tolerate it.    Oncologic history:  Patient is here for follow-up of her mammogram.  Patient had screening mammogram April 2, 2021:  NAD a focal asymmetry was present in the posterior one third retroareolar region of the left breast.  Further spot compression images and left breast ultrasound was suggested.  Right breast was negative    April 9, 2021: Left diagnostic mammogram showed an area of focal asymmetry in the posterior one third region aspect of the left breast her breast    Ultrasound showed an irregular 0.8 cm lesion in the left breast at the 6 o'clock position of the order of 6 cm from the nipple.  Ultrasound-guided left breast biopsy was recommended.    April 26, 2021: Ultrasound-guided biopsy of the left breast 6 o'clock position, 6 cm from the nipple showed    Invasive ductal carcinoma, poorly differentiated with a Oziel score grade 3 with a score of 8 out of 9 measuring  at least 7 mm.  No definitive ductal carcinoma is in situ is identified.  ER 1% negative  MT 1% negative   HER-2/ese 3+ positive    There was no evidence of lymphadenopathy either in the mammogram of the ultrasound.  We suggested an MRI of the breast.  Patient has strong family history of breast cancer      May 7, 2021: MRI of bilateral breast reviewed.  My interpretation is that there is area of enhancement in the 6 o'clock position of the left breast this is the biopsy-proven malignancy.  There is additional area of linear enhancement anteriorly and laterally measuring up to 1.2 cm this is approximately 5.6 cm from the nipple.  In addition there is a enhancement 2 to 3 mm in the anterolateral aspect of the lateral aspect of the left breast which is 4.6 cm from the nipple.  There is also mildly prominent posterior lymph node in the mid axilla which measures 1 cm with cortical thickening.  Findings are consistent with multifocal disease.  No contralateral disease or internal mammary adenopathy identified    May 20, 2021: Genetic test, [employer] genetic test shows 47 genes negative    May 21, 2021: I reviewed the biopsy of the lymph node in the left axilla which shows reactive lymph node negative for any carcinoma or lymphoma    June 2, 2021:Final Diagnosis  1. Left Breast, 5:00 o'clock, MRI-guided Biopsies for Linear Enhancement: INVASIVE MAMMARY CARCINOMA, NO  SPECIAL TYPE (INVASIVE DUCTAL CARCINOMA).  A. Largest contiguous focus in a core measures 4 mm.  B. No in-situ component identified.  C. Brisk lymphocytic response noted (see Comment).  D. No definitive lymphovascular nor perineural invasion identified.  2. Left Breast, 2:00 o'clock, MRI-guided Biopsy for Enhancement:  A. Benign breast parenchyma with radial scar and micropapillomas.  B. Usual ductal hyperplasia and columnar cell hyperplasia.  C. No atypical hyperplasia, in-situ nor invasive carcinoma identified    ER, MT, HER-2 new and Ki-67 pending on this  new biopsy      Patient has been seen by Dr. Lashonda Euceda with plans of doing surgery on July first 2021    Once patient undergoes surgery lumpectomy with sentinel lymph node biopsy we will give further recommendation about treatment options.  Given that patient has multifocal disease and both of the lesions 2 lesions are 1.2 cm each, with it being a HER-2 positive tumor on the previous biopsy at 6:00 Dr. Euceda and myself discussed and patient preferred to undergo port placement at the same time of surgery.    Patient had [employer] genetic test which was negative.    She has completed surgery July 1, 2021.  She underwent left partial mastectomy with left axillary sentinel lymph node biopsy and right port placement.  Clinically she was thought to have a high-grade multifocal invasive ductal carcinoma ER/SC negative, SC positive.  The lesions require preoperative localization.  The multifocal cancer was bracketed with 2 savvy markers and the radial scar was localized with a needle.  Because of the volume of tissue that will be excised related to the breast volume the case was done in conjunction with Dr. Zavala for oncoplastic closure.    She is 2 weeks from surgery.  She is healing up reasonably well.    On review of her pathology she had left partial mastectomy with sentinel lymph node biopsy.  Tumor was present at 5:00, invasive ductal carcinoma, Herman score of 8, grade 3, greatest dimension was 12 mm x 8 x 4 mm.  Single focus of invasive carcinoma present.  There was extensive DCIS which is 30 mm x 8 x 2 mm, grade 3, no evidence of lymphovascular space invasion or dermal lymphatic invasion.  Margins were clear.  4 lymph nodes were negative.  It is a p T1cp N0, ER negative SC negative HER-2/ese 3+ with Ki-67 of 50%.    Patient is here to discuss further options of treatment.  Given small tumor T1c N0, she is a good candidate for weekly Taxol Herceptin.    Given that patient has heterozygous state for  factor V Leiden and currently has port placed we will plan to start Eliquis 2.5 mg p.o. twice daily.  I have left a message for Dr. Craft in neurology to call me to discuss if patient needs to continue aspirin or we can hold off since be starting Eliquis.      Genetic test negative    August 4, 2021: Weekly Taxol Herceptin x12 weeks followed by Herceptin x1 year.  Cycle 1 today    Hematologic history:  patient is a 40-year-old female who presented end of December with numbness and tingling in her left upper extremity and left face.  She went to see Dr. Goodman who then got concerned and referred to neurology.  She was referred to Dr. Freedman and talk to Dr. Donna guo for evaluation.  Patient underwent ultrasound of the carotids which did not show significant stenosis of the carotids.  She underwent 2D echocardiogram which showed a good ejection fraction of 64% with mild mitral valve prolapse and mild mitral stenosis.  She had a 24-hour Holter which was negative.  She then had also an MRI of the brain February 3, 2021 which showed areas of hyperintensity involving the subcortical white matter of the right frontal lobe superior laterally and posteriorly with the largest area measuring 8 9 mm.  There is also enhancement present.  The findings are consistent with a subacute infarct.  They could not differentiate if there is any underlying neoplastic process but a short-term follow-up was suggested in order to follow-up.  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    Patient currently got started on aspirin and factor V Leiden was obtained by neurology because patient's paternal aunt had factor V Leiden mutation and she was heterozygous.  Patient's testing also showed that she was heterozygous.    Patient states her numbness and tingling is resolved completely on the left arm and face it just lasted for a 24 hours.   She was here referred here for neurology to see if there is any other cause for her underlying hypercoagulable state.  She has not had a complete hypercoagulable work-up.  Also discussed with her that an underlying malignancy could cause a hypercoagulable state and we would need to do a CT scan to rule that out.    Patient's mother has had breast cancer in her 50s but had pancreatic cancer at 68 and  from that.  Patient's dad had stroke at 63.  But he is alive at 74.  She has 1 brother who is 40 in good health.  Paternal aunt had breast cancer in her 40s.  Paternal grandfather had colon cancer in his late 80s.  Maternal uncle had throat cancer and another maternal uncle had skin cancer with metastasis to the lung.  And maternal grandmother had breast cancer.    Patient was to have mammogram done last year but because of COVID-19 it got postponed and she has not had it done yet.    Patient used to be a smoker half pack per day for 7 years but quit 10 years ago.  She has high cholesterol for which she is on treatment.    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   • History of rheumatic fever    • Hypertension    • Hypothyroidism    • Infiltrating ductal carcinoma of left breast ([facility]) 2021    Left   • Kidney stone    • Mitral valve insufficiency    • PONV (postoperative nausea and vomiting)    • Stroke ([facility]) 2020    HX OF   • Stroke-like symptoms         Past Surgical History:   Procedure Laterality Date   • BREAST BIOPSY Left 2021    [facility]   • BREAST  LUMPECTOMY WITH SENTINEL NODE BIOPSY N/A 7/1/2021    Procedure: right port placement, Left SHAINA-guided, bracketed, partial mastectomy and sentinel lymph node biopsy Left breast needle-localized excisional biopsy;  Surgeon: Lashonda Euceda MD;  Location: [facility];  Service: General;  Laterality: N/A;   • BREAST SURGERY Bilateral 7/1/2021    Procedure: RIGHT BREAST REDUCTION MASTOPEXY LEFT BREAST ONCOPLASTIC CLOSURE;  Surgeon: Caridad Baker MD PhD;  Location: [facility];  Service: Plastics;  Laterality: Bilateral;   • NO PAST SURGERIES     • PAP SMEAR  2016        Current Outpatient Medications on File Prior to Visit   Medication Sig Dispense Refill   • amphetamine-dextroamphetamine XR (Adderall XR) 20 MG 24 hr capsule Take 1 capsule by mouth Every Morning 30 capsule 0   • atorvastatin (Lipitor) 10 MG tablet Take 1 tablet by mouth Daily. 30 tablet 11   • famotidine (PEPCID) 20 MG tablet Take 1 tablet by mouth Daily. 30 tablet 3   • levothyroxine (Synthroid) 150 MCG tablet Take 1 tablet by mouth Daily. 90 tablet 1   • lidocaine-prilocaine (EMLA) 2.5-2.5 % cream Apply  topically to the appropriate area as directed As Needed for Mild Pain . 1 each 2   • LORazepam (Ativan) 0.5 MG tablet Take 1 tablet by mouth Every 8 (Eight) Hours As Needed for Anxiety. 30 tablet 0   • ondansetron ODT (ZOFRAN-ODT) 8 MG disintegrating tablet Place 1 tablet on the tongue Every 8 (Eight) Hours As Needed for Nausea or Vomiting. 30 tablet 3   • rivaroxaban (XARELTO) 20 MG tablet Take 1 tablet by mouth Daily. 30 tablet 3   • valsartan (DIOVAN) 160 MG tablet Take 1 tablet by mouth Daily. 30 tablet 5   • [DISCONTINUED] Cholecalciferol (VITAMIN D3 PO) Take 2,000 Units by mouth. Takes 2000 twice a week;  SUNDAYS AND WEDNESDAYS     • [DISCONTINUED] famciclovir (FAMVIR) 250 MG tablet Take 2 tablets by mouth 3 (Three) Times a Day for 7 days. 42 tablet 0   • [DISCONTINUED] methylPREDNISolone (MEDROL) 4 MG dose pack Take as  directed on package instructions. 21 each 0     Current Facility-Administered Medications on File Prior to Visit   Medication Dose Route Frequency Provider Last Rate Last Admin   • heparin injection 500 Units  500 Units Intravenous PRN Neena Beavers MD   500 Units at 21 1718   • sodium chloride 0.9 % flush 10 mL  10 mL Intravenous PRN Neena Beavers MD   10 mL at 21 1718        ALLERGIES:    Allergies   Allergen Reactions   • Sulfa Antibiotics Rash        Social History     Socioeconomic History   • Marital status: Significant Other   • Number of children: 0   Tobacco Use   • Smoking status: Former Smoker     Packs/day: 1.00     Years: 10.00     Pack years: 10.00     Types: Cigarettes     Start date:      Quit date:      Years since quittin.7   • Smokeless tobacco: Never Used   Vaping Use   • Vaping Use: Never used   Substance and Sexual Activity   • Alcohol use: Yes     Comment: RARELY   • Drug use: No   • Sexual activity: Defer     Partners: Male        Family History   Problem Relation Age of Onset   • Breast cancer Mother 53   • Pancreatic cancer Mother 68   • Lung cancer Maternal Grandmother    • Stroke Father    • Breast cancer Paternal Aunt 55   • Prostate cancer Maternal Grandfather    • Prostate cancer Paternal Grandfather    • Brain cancer Maternal Cousin 20   • Melanoma Maternal Uncle    • Ovarian cancer Other         Paternal great aunt    • Breast cancer Other    • Breast cancer Paternal Great-Grandmother 94   • Hypertension Brother    • Malig Hyperthermia Neg Hx       Family  history: Mother had breast cancer at age 57.  Maternal great aunt had breast cancer and paternal great aunt had breast cancer in her 40s.  Patient's mother had pancreatic cancer as well.  Paternal grandfather had prostate cancer.  Review of Systems   Constitutional: Positive for fatigue (10/13/21-Unchanged). Negative for appetite change, chills, diaphoresis, fever and unexpected weight change.   HENT:  "Negative for hearing loss, sore throat and trouble swallowing.    Respiratory: Negative for cough, chest tightness, shortness of breath and wheezing.    Cardiovascular: Negative for chest pain, palpitations and leg swelling.   Gastrointestinal: Positive for nausea (10/13/21-Unchanged). Negative for abdominal distention, abdominal pain, constipation, diarrhea and vomiting.   Genitourinary: Negative for dysuria, frequency, hematuria and urgency.   Musculoskeletal: Negative for joint swelling.        No muscle weakness.   Skin: Positive for rash (Bilat hand/left elbow-10/13/21-Unchanged). Negative for wound.   Neurological: Positive for numbness (Bilat hand-Unchanged) and headaches (see HPI 10/13/21-Improved). Negative for seizures, syncope, speech difficulty and weakness.   Hematological: Negative for adenopathy. Does not bruise/bleed easily.   Psychiatric/Behavioral: Positive for dysphoric mood (stable 10/13/21-Improved) and sleep disturbance (10/13/21-Unchanged). Negative for behavioral problems, confusion and suicidal ideas.   All other systems reviewed and are negative.     I have reviewed and confirmed the accuracy of the ROS as documented by the MA/LPN/RN Neena Beavers MD    Objective     Vitals:    10/13/21 0826   BP: 123/83   Pulse: 105   Resp: 16   Temp: 97.1 °F (36.2 °C)   TempSrc: Temporal   SpO2: 98%   Weight: 65.4 kg (144 lb 3.2 oz)   Height: 157.5 cm (62.01\")   PainSc: 0-No pain     Current Status 9/29/2021   ECOG score 0   Physical exam  CONSTITUTIONAL:  Vital signs reviewed.  No distress, looks comfortable.  EYES:  Conjunctivae and lids unremarkable.  PERRLA.  RESPIRATORY:  Normal respiratory effort.  Lungs clear to auscultation bilaterally.  CARDIOVASCULAR:  Normal S1, S2.  No murmurs rubs or gallops.  No significant lower extremity edema.  GASTROINTESTINAL: Abdomen appears unremarkable.  Nontender.  No hepatomegaly.  No splenomegaly.  LYMPHATIC:  No cervical, supraclavicular, axillary " lymphadenopathy.  SKIN:  Warm.  No rashes.  PSYCHIATRIC:  Normal judgment and insight.  Normal mood and affect.    RECENT LABS:  Results from last 7 days   Lab Units 10/13/21  0825   WBC 10*3/mm3 7.51   NEUTROS ABS 10*3/mm3 4.59   HEMOGLOBIN g/dL 10.6*   HEMATOCRIT % 33.0*   PLATELETS 10*3/mm3 367     Results from last 7 days   Lab Units 10/13/21  0825   SODIUM mmol/L 139   POTASSIUM mmol/L 3.6   CHLORIDE mmol/L 104   CO2 mmol/L 24.3   BUN mg/dL 18   CREATININE mg/dL 0.78   CALCIUM mg/dL 9.3   ALBUMIN g/dL 4.00   BILIRUBIN mg/dL 0.2   ALK PHOS U/L 94   ALT (SGPT) U/L 113*   AST (SGOT) U/L 60*   GLUCOSE mg/dL 146*         Assessment/Plan       * [id number], ER negative MI negative HER-2/ese 3+ with Ki-67 of 50%.  S/p left partial mastectomy with sentinel lymph node biopsy.  Tumor was present at 5:00, invasive ductal carcinoma, [place] score of 8, grade 3, greatest dimension was 12 mm x 8 x 4 mm.  Single focus of invasive carcinoma present.  There was extensive DCIS which is 30 mm x 8 x 2 mm, grade 3, no evidence of lymphovascular space invasion or dermal lymphatic invasion.  Margins were clear.  4 lymph nodes were negative.  It is a p T1cp N0, ER negative MI negative HER-2/ese 3+ with Ki-67 of 50%.  · Patient is s/p port placement  · Discussed in length with patient that given a small tumor she is eligible for weekly Taxol Herceptin.  Weekly Taxol x12 weeks along with weekly Herceptin followed by 1 year of Herceptin.  · Discussed patient in the breast cancer conference  · 2D echo done which showed ejection fraction of 61-65% and strain pattern of -20.8.  · Patient also seen by Dr. Virk cardiology and Dr. Virk is planning to repeat echocardiogram in 3 months after initiation of therapy.  · 8/4/2021 initiating weekly Taxol Herceptin  · 9/8/2021, proceed with week #6 Taxol and Herceptin.  Patient continues to tolerate treatment well. No signs of peripheral neuropathy.  · Her next echocardiogram due November 4, 2021, She  follows up with cardiology Dr. Camron Virk.  · 9/28/2021, proceed with week #9 taxol/herceptin.  She does feel her nausea has worsened after her last cycle of chemotherapy.  She prefers the disintegrating Zofran, this will be called to her pharmacy today.  · Patient is here to take cycle 11 of weekly Taxol with worsening fatigue and worsening rash and overall dysphoric mood.  She also cannot sleep and her quality life is worsening.  At the present time we will plan to give 1 more cycle of Taxol following which she will be referred to radiation.  She already has an appointment with radiation physician on October 26.    *  Factor V Leiden heterozygosity with right frontal stroke and patient presented in December 2020 with left-sided weakness tingling and numbness and also numbness in the face.  · Was referred to neurology and cardiology by Dr. Goodman  · Ultrasound of carotid does not show significant stenosis  · 24 Holter is negative  · 2D echocardiogram shows ejection fraction of 64% with mild mitral regurg and mitral stenosis  · Neurology obtain factor V Leiden given that patient's paternal aunt had's history of factor V Leiden and that was heterozygous for factor V Leiden  · Continue aspirin as per neurology.  Also patient on medications for her high cholesterol started after stroke currently asymptomatic  · Hypercoagulable work-up done which is negative except heterozygous state for factor V Leiden.  · Complete stroke work-up has been negative and since this is arterial stroke and she was not on aspirin prior to that and her symptoms have resolved I agree with continuing aspirin alone along with high cholesterol medications.  · MR venogram done on May 10, 2021 is negative.  Patient has been referred to the stroke neurologist Dr. Johnson  · Patient diagnosed with breast cancer with Mediport placed.  Per discussion with neurology, patient initiated on prophylactic Xarelto and aspirin discontinued.  · Patient had  "one nosebleed which lasted 10 minutes but with pinching the nose it helped.  But she is switching to 20 mg daily from tomorrow.  We discussed about placing ice and notifying us if her nosebleeds are significant.  But it resolved.  It was likely secondary to dry air  · 2021, patient continues to experience nosebleeds.  Reports these occur when her nose itches, and after scratching her nose it begins to bleed.  She feels this may be related to dry air, so I have asked her to start using saline nasal spray to help moisten her nose.  If she experiences nosebleeds that do not stop, I asked her to notify our office.  · No evidence of any nosebleeds      * Family history of cancer: Patient's mother had breast cancer at age 50 and pancreatic cancer at age 68 and  from pancreatic cancer.  Patient's maternal grandmother had breast cancer in her 50s.  Her maternal uncle had skin cancer which went to the lung and another maternal uncle had throat cancer.  Maternal aunt had call colon polyps.  Patient's paternal aunt had breast cancer in her 40s.  And paternal grandfather with colon cancer in his 80s.  Paternal aunt also had factor V Leiden.  · Genetic testing is negative    * Solid nodule in the right kidney on ultrasound, will schedule follow-up with urology  · Patient was to follow-up with Dr. Shultz  · Will need records from urology    *  Elevated BMI, encourage diet and exercise.  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  Patient has been requiring frequent Tums.  Recommended she begin Pepcid 20 mg daily.    · Stable, continue Pepcid 20 mg daily.    *Constipation likely secondary to ferrous sulfate.  Patient was iron deficient.  · We will switch to IV Venofer.    *Headache likely related to body ache because of Taxol  · 2021, patient discusses concern about worsening headaches.  Describing them as \"glass breaking\".  Started about 2 weeks ago, " and progressively worsened.  Occurring 3-4 times daily now, and lasting less than 1 minute with each episode.  This occurs in the same place, right upper skull.  Denies vision changes or dizziness.  Will order stat MRI of the brain for further evaluation.  The patient follows with a neurologist, and is going to notify them of her symptoms and the plan for MRI.    *Elevated liver function tests, total bilirubin still normal AST ALT slightly elevated.  Patient did take Tylenol but not too much.  No dose adjustments required at the present time.    *Iron deficiency anemia, patient's percent saturation still low at 9% s/p full dose of IV Venofer.  · 9/29/2021, patient will receive dose #5 Venofer today.  Hemoglobin today 10.1.    *Insomnia  · 9/29/2021, patient reports difficulty sleeping.  She is experiencing this every night, not just the nights of treatment when she receives IV dexamethasone.  She has attempted taking Benadryl, however felt groggy following this.  She is going to attempt melatonin to see if this improves her sleep.    Plan   · Proceed with week #11 of  Taxol and Herceptin.  · Patient's quality of life is worsened with worsening fatigue and rash and insomnia   · We will plan to give 1 more cycle of weekly Taxol following which will refer to radiation oncology.  · Follow-up in 1 week for Taxol Herceptin  · Follow-up in 4 weeks with Herceptin alone as she will be switched to every 3-week Herceptin.  · Patient has follow-up with cardiology for echo on November 4 with Dr. Forbes      This patient is on drug therapy requiring intensive monitoring for toxicity.     I spent 40 minutes with patient face-to-face with 50% of the time in dictation and coordination of care      MD Dr. Maxine Ibanez Dr., Dr., Dr.

## 2021-10-14 ENCOUNTER — HOSPITAL ENCOUNTER (OUTPATIENT)
Dept: OCCUPATIONAL THERAPY | Facility: HOSPITAL | Age: 41
Setting detail: THERAPIES SERIES
Discharge: HOME | End: 2021-10-14

## 2021-10-14 DIAGNOSIS — I97.2 POST-MASTECTOMY LYMPHEDEMA SYNDROME: ICD-10-CM

## 2021-10-14 DIAGNOSIS — C50.812 MALIGNANT NEOPLASM OF OVERLAPPING SITES OF LEFT BREAST IN FEMALE, ESTROGEN RECEPTOR NEGATIVE (HCC): Primary | ICD-10-CM

## 2021-10-14 DIAGNOSIS — Z17.1 MALIGNANT NEOPLASM OF OVERLAPPING SITES OF LEFT BREAST IN FEMALE, ESTROGEN RECEPTOR NEGATIVE (HCC): Primary | ICD-10-CM

## 2021-10-14 PROCEDURE — 97140 MANUAL THERAPY 1/> REGIONS: CPT

## 2021-10-14 PROCEDURE — 97535 SELF CARE MNGMENT TRAINING: CPT

## 2021-10-14 NOTE — THERAPY TREATMENT NOTE
Outpatient Occupational Therapy Lymphedema Treatment Note  [facility]     Patient Name: Radha Astorga  : 1980  MRN: [id number]  Today's Date: 10/14/2021      Visit Date: 10/14/2021  Patient and therapist both masked. Therapist with face shield and gloves.  Patient Active Problem List   Diagnosis   • Family history of breast cancer   • Hypothyroidism   • Major depressive disorder, recurrent episode, moderate with anxious distress ([facility])   • Attention deficit hyperactivity disorder (ADHD), predominantly inattentive type   • Factor 5 Leiden mutation, heterozygous ([facility])   • Kidney mass   • Malignant neoplasm of overlapping sites of left breast in female, estrogen receptor negative ([facility])   • High risk medication use   • Essential hypertension   • Rheumatic mitral valve disease   • Encounter for adjustment or management of vascular access device   • Iron deficiency anemia        Past Medical History:   Diagnosis Date   • Acute stress reaction 2014   • ADD (attention deficit disorder)    • ADHD (attention deficit hyperactivity disorder)    • Anxiety and depression    • CTS (carpal tunnel syndrome)    • Factor 5 Leiden mutation, heterozygous ([facility]) 2021   • Family history of breast cancer 2013   • Fracture, cervical vertebra ([facility]) 1997    C4   • H/O complete eye exam 2016   • Hearing loss    • 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   • History of rheumatic fever    • Hypertension    • Hypothyroidism    • Infiltrating ductal carcinoma of left breast ([facility]) 2021    Left   • Kidney stone    • Mitral valve insufficiency    • PONV (postoperative nausea and vomiting)    • Stroke ([facility]) 2020    HX OF   • Stroke-like symptoms         Past Surgical History:   Procedure Laterality Date   • BREAST BIOPSY Left 2021    IDC   • BREAST LUMPECTOMY WITH SENTINEL NODE BIOPSY N/A 2021    Procedure: right port placement, Left SHAINA-guided, bracketed, partial mastectomy and  sentinel lymph node biopsy Left breast needle-localized excisional biopsy;  Surgeon: Lashonda Euceda MD;  Location: [facility];  Service: General;  Laterality: N/A;   • BREAST SURGERY Bilateral 7/1/2021    Procedure: RIGHT BREAST REDUCTION MASTOPEXY LEFT BREAST ONCOPLASTIC CLOSURE;  Surgeon: Caridad Baker MD PhD;  Location: [facility];  Service: Plastics;  Laterality: Bilateral;   • NO PAST SURGERIES     • PAP SMEAR  2016         Visit Dx:      ICD-10-CM ICD-9-CM   1. Malignant neoplasm of overlapping sites of left breast in female, estrogen receptor negative (HCC)  C50.812 174.8    Z17.1 V86.1   2. Post-mastectomy lymphedema syndrome  I97.2 457.0        Lymphedema     Row Name 10/14/21 1400             Subjective Pain    Able to rate subjective pain? yes  -RE      Pre-Treatment Pain Level 1  -RE      Post-Treatment Pain Level 1  -RE      Subjective Pain Comment in forearm  -RE              Subjective Comments    Subjective Comments Feels that current sleeve is not tight enough at the wrist and mid forearm  -RE              BUE Circumferential (cm)    Measurement Location 1 Axilla  -RE      Left 1 32.5 cm  -RE      Measurement Location 2 15 cm above elbow  -RE      Left 2 30 cm  -RE      Measurement Location 3 10 cm above elbow  -RE      Left 3 27.8 cm  -RE      Measurement Location 4 5 cm above elbow  -RE      Left 4 25 cm  -RE      Measurement Location 5 elbow  -RE      Left 5 23 cm  -RE      Measurement Location 6 5 cm below elbow  -RE      Left 6 23.8 cm  -RE      Measurement Location 7 10 cm below elbow  -RE      Left 7 22.3 cm  -RE      Measurement Location 8 15 cm below elbow  -RE      Left 8 18 cm  -RE      Measurement Location 9 20 cm below elbow  -RE      Left 9 14.8 cm  -RE      Measurement Location 10 Wrist  -RE      Left 10 14.8 cm  -RE      Measurement Location 11 Mid palm  -RE      Left 11 17.5 cm  -RE      LUE Circumferential Total 249.5 cm  -RE              Manual Lymphatic  Drainage    Manual Lymphatic Drainage initial sequence; opened regional lymph nodes; opened anastamoses; extremity treatment  -RE      Initial Sequence short neck  -RE      Opened Regional Lymph Nodes axillary; inguinal  -RE      Axillary right; left  -RE      Inguinal left  -RE      Opened Anastamoses anterior axillo-axillary; axillo-inguinal  -RE      Axillo-Inguinal left  -RE      Extremity Treatment MLD to full limb  -RE              Compression/Skin Care    Compression/Skin Care skin care; wrapping location; bandaging  -RE      Skin Care lotion applied; topical anti-inflammatory applied  zinc  -RE      Wrapping Location upper extremity  -RE      Wrapping Location UE left:; fingers to axilla  -RE      Bandage Layers cotton liner; padding/fluff layer; short-stretch bandages (comment size/quantity)  -RE      Bandaging Comments Tg7, Rosidal soft, 1-6cm and 1-8 cm and 1-10 cm Rosidal K  -RE      Bandaging Technique circumferential/spiral; light compression; moderate compression  -RE            User Key  (r) = Recorded By, (t) = Taken By, (c) = Cosigned By    Initials Name Provider Type    Julia Rowe OTR Occupational Therapist                         OT Assessment/Plan     Row Name 10/14/21 1650          OT Assessment    Assessment Comments Edema  in forearm is increased with increased pitting. Sleeve is not providing enough containment. Added Rosidal soft to increase compression. may need 4 th bandage. Will decide on compression clas next week based on response to bandage.  -RE            OT Plan    OT Plan Comments continue. Measure for Tribute next session  -RE           User Key  (r) = Recorded By, (t) = Taken By, (c) = Cosigned By    Initials Name Provider Type    Julia Rowe OTR Occupational Therapist                    Manual Rx (last 36 hours)     Manual Treatments     Row Name 10/14/21 1653             Total Minutes    19329 - OT Manual Therapy Minutes 30  -RE            User Key  (r) = Recorded  By, (t) = Taken By, (c) = Cosigned By    Initials Name Provider Type    Julia Rowe OTR Occupational Therapist                  Therapy Education  Education Details: Measured for Juzo sleeve. Plan to switch to that for imprved fit and containment.  Given: Edema management  Program: New  How Provided: Verbal  Provided to: Patient  Level of Understanding: Teach back education performed, Verbalized  87726 - OT Self Care/Mgmt Minutes: 15                Time Calculation:   OT Start Time: 1400  OT Stop Time: 1445  OT Time Calculation (min): 45 min  Timed Charges  55331 - OT Manual Therapy Minutes: 30  73396 - OT Self Care/Mgmt Minutes: 15  Total Minutes  Timed Charges Total Minutes: 45   Total Minutes: 45     Therapy Charges for Today     Code Description Service Date Service Provider Modifiers Qty    16641192073 HC OT MANUAL THERAPY EA 15 MIN 10/14/2021 Julia Leslie OTR GO 2    00049100265 HC OT SELF CARE/MGMT/TRAIN EA 15 MIN 10/14/2021 Julia Leslie OTR GO 1                      VI Ulloa  10/14/2021

## 2021-10-14 NOTE — PROGRESS NOTES
Future treatment plan entered to begin 3 weeks after cycle 12 of Taxol and Kanjinti for maintenance Herceptin ( Kanjinti) every 3 weeks V.O. Dr. Beavers  Cycle 12 Kanjinti dose increased to 6 mg/m2 in preparation for maintenance Kanjinti to start in 3 weeks from that dose scheduled to be given on 10/20/21.  Maintenance Kanjinti to begin on 11/10/21. V.OFELIA Beavers

## 2021-10-19 ENCOUNTER — HOSPITAL ENCOUNTER (OUTPATIENT)
Dept: OCCUPATIONAL THERAPY | Facility: HOSPITAL | Age: 41
Setting detail: THERAPIES SERIES
Discharge: HOME | End: 2021-10-19

## 2021-10-19 ENCOUNTER — CLINICAL SUPPORT (OUTPATIENT)
Dept: OTHER | Facility: HOSPITAL | Age: 41
End: 2021-10-19

## 2021-10-19 VITALS — BODY MASS INDEX: 26.5 KG/M2 | WEIGHT: 144 LBS | HEIGHT: 62 IN

## 2021-10-19 DIAGNOSIS — I97.2 POST-MASTECTOMY LYMPHEDEMA SYNDROME: Primary | ICD-10-CM

## 2021-10-19 DIAGNOSIS — Z17.1 MALIGNANT NEOPLASM OF OVERLAPPING SITES OF LEFT BREAST IN FEMALE, ESTROGEN RECEPTOR NEGATIVE (HCC): ICD-10-CM

## 2021-10-19 DIAGNOSIS — C50.812 MALIGNANT NEOPLASM OF OVERLAPPING SITES OF LEFT BREAST IN FEMALE, ESTROGEN RECEPTOR NEGATIVE (HCC): ICD-10-CM

## 2021-10-19 PROCEDURE — 97140 MANUAL THERAPY 1/> REGIONS: CPT

## 2021-10-19 PROCEDURE — 97535 SELF CARE MNGMENT TRAINING: CPT

## 2021-10-19 NOTE — PROGRESS NOTES
"Outpatient Oncology Nutrition  Assessment/PES    Patient Name:  Radha Astorga  YOB: 1980  MRN: [id number]    Assessment Date:  10/19/2021    Comments:  Completed referral on the phone today. The patient completes chemotherapy tomorrow with plans to initiate radiation later this month.   The patient complains of taste changes. Eating fruit, using protein shakes. Discussed strategies to help with taste alterations and increase intake. Sent a handout via email for her to review. Encouraged her to call with any questions.      General Info     Row Name 10/19/21 1310       Today's Session    Person(s) attending today's session Patient       General Information    Oncology patient? yes       Oncology Specific Assessment    Type of treatment Chemotherapy; Radiation    Frequency of treatment Taxol, Herceptin- last treatment tomorrow. Will begin radiation later this month    Goal of treatment Currative    Reported symptoms Taste changes                 Anthropometrics     Row Name 10/19/21 1319          Anthropometrics    Height 157.5 cm (62.01\")     Weight 65.3 kg (144 lb)            Ideal Body Weight (IBW)    Ideal Body Weight (IBW) (kg) 50.45     % Ideal Body Weight 129.47            Usual Body Weight (UBW)    Usual Body Weight 67.1 kg (148 lb)     % Usual Body Weight 97.3            Body Mass Index (BMI)    BMI (kg/m2) 26.39                Nutritional Info/Activity     Row Name 10/19/21 1320       Nutritional Information    Have you had weight changes? Yes    Describe weight changes Patient has lost about 5 lb since beginning chemotherapy               Home Nutrition Report     Row Name 10/19/21 1321          Home Nutrition Report    Diet No specific     Factors Affecting Nutritional Intake taste altered                Estimated/Assessed Needs     Row Name 10/19/21 1319          Calculation Measurements    Height 157.5 cm (62.01\")                  Labs/Tests/Procedures/Meds     Row Name 10/19/21 1321 "          Labs/Procedures/Meds    Lab Results Reviewed reviewed            Diagnostic Tests/Procedures    Diagnostic Test/Procedure Reviewed reviewed            Medications    Pertinent Medications Reviewed reviewed     Pertinent Medications Comments lipitor, pepcid, synthroid, zofran                   Problem/Interventions:   Problem 1     Row Name 10/19/21 1321          Nutrition Diagnoses Problem 1    Problem 1 Knowledge Deficit     Etiology (related to) Medical Diagnosis; Factors Affecting Nutrition     Oncology Breast cancer     Oral Altered Taste                        Intervention Goal     Row Name 10/19/21 1322          Intervention Goal    General Maintain nutrition     Weight No significant weight loss                    Education/Evaluation     Row Name 10/19/21 1322          Education    Education Education topics; Provided education regarding     Education Topics Other (comment)  tips for dealing with taste changes            Monitor/Evaluation    Monitor PO intake; Symptoms; Weight     Education Follow-up --  emailed handout, encouraged patient to call with any questions                 Electronically signed by:  Arlene Pinon RD, LD  10/19/21 13:23 EDT

## 2021-10-19 NOTE — THERAPY TREATMENT NOTE
Outpatient Occupational Therapy Lymphedema Treatment Note  [facility]     Patient Name: Radha Astorga  : 1980  MRN: [id number]  Today's Date: 10/19/2021      Visit Date: 10/19/2021   Patient and therapist both masked. Therapist with face shield and gloves.  Patient Active Problem List   Diagnosis   • Family history of breast cancer   • Hypothyroidism   • Major depressive disorder, recurrent episode, moderate with anxious distress ([facility])   • Attention deficit hyperactivity disorder (ADHD), predominantly inattentive type   • Factor 5 Leiden mutation, heterozygous ([facility])   • Kidney mass   • Malignant neoplasm of overlapping sites of left breast in female, estrogen receptor negative ([facility])   • High risk medication use   • Essential hypertension   • Rheumatic mitral valve disease   • Encounter for adjustment or management of vascular access device   • Iron deficiency anemia        Past Medical History:   Diagnosis Date   • Acute stress reaction 2014   • ADD (attention deficit disorder)    • ADHD (attention deficit hyperactivity disorder)    • Anxiety and depression    • CTS (carpal tunnel syndrome)    • Factor 5 Leiden mutation, heterozygous ([facility]) 2021   • Family history of breast cancer 2013   • Fracture, cervical vertebra ([facility]) 1997    C4   • H/O complete eye exam 2016   • Hearing loss    • 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   • History of rheumatic fever    • Hypertension    • Hypothyroidism    • Infiltrating ductal carcinoma of left breast ([facility]) 2021    Left   • Kidney stone    • Mitral valve insufficiency    • PONV (postoperative nausea and vomiting)    • Stroke ([facility]) 2020    HX OF   • Stroke-like symptoms         Past Surgical History:   Procedure Laterality Date   • BREAST BIOPSY Left 2021    IDC   • BREAST LUMPECTOMY WITH SENTINEL NODE BIOPSY N/A 2021    Procedure: right port placement, Left SHAINA-guided, bracketed, partial mastectomy and  sentinel lymph node biopsy Left breast needle-localized excisional biopsy;  Surgeon: Lashonda Euceda MD;  Location: [facility];  Service: General;  Laterality: N/A;   • BREAST SURGERY Bilateral 7/1/2021    Procedure: RIGHT BREAST REDUCTION MASTOPEXY LEFT BREAST ONCOPLASTIC CLOSURE;  Surgeon: Caridad Baker MD PhD;  Location: [facility];  Service: Plastics;  Laterality: Bilateral;   • NO PAST SURGERIES     • PAP SMEAR  2016         Visit Dx:      ICD-10-CM ICD-9-CM   1. 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 Malignant neoplasm of overlapping sites of left breast in female, estrogen receptor negative (HCC)  C50.812 174.8    Z17.1 V86.1        Lymphedema     Row Name 10/19/21 1600             Subjective Pain    Able to rate subjective pain? yes  -RE      Pre-Treatment Pain Level 0  -RE      Post-Treatment Pain Level 0  -RE              Manual Lymphatic Drainage    Manual Lymphatic Drainage initial sequence; extremity treatment  -RE      Initial Sequence short neck  -RE      Extremity Treatment simple/brief MLD  -RE              Compression/Skin Care    Compression/Skin Care skin care; wrapping location; bandaging  -RE      Skin Care lotion applied  zinc  -RE      Wrapping Location upper extremity  -RE      Wrapping Location UE left:; fingers to axilla  -RE      Bandage Layers cotton liner; padding/fluff layer; short-stretch bandages (comment size/quantity)  -RE      Bandaging Comments Tg7, Rosidal soft, 1-6cm and 1-8 cm and 1-10 cm Rosidal K  -RE      Bandaging Technique circumferential/spiral; light compression; moderate compression  -RE      Compression/Skin Care Comments measured for Tribute  -RE            User Key  (r) = Recorded By, (t) = Taken By, (c) = Cosigned By    Initials Name Provider Type    RE Julia Leslie OTR Occupational Therapist                         OT Assessment/Plan     Row Name 10/19/21 1641          OT Assessment    Assessment Comments Forearm continues  with increased edema. Pt reports that foam in bandage did decrease swelling at night. Will order Juzo 3511 compression sleeve.  -RE            OT Plan    OT Plan Comments order  new sleeve and Tribute for night use  -RE           User Key  (r) = Recorded By, (t) = Taken By, (c) = Cosigned By    Initials Name Provider Type    Julia Rowe OTR Occupational Therapist                    Manual Rx (last 36 hours)     Manual Treatments     Row Name 10/19/21 1646             Total Minutes    32865 - OT Manual Therapy Minutes 30  -RE            User Key  (r) = Recorded By, (t) = Taken By, (c) = Cosigned By    Initials Name Provider Type    Julia Rowe OTKINGSLEY Occupational Therapist                  Therapy Education  Education Details: Discussed use of Tribute and ordering process.  Given: Edema management, Symptoms/condition management  Program: New, Reinforced  How Provided: Verbal  Provided to: Patient  Level of Understanding: Teach back education performed, Verbalized  46222 - OT Self Care/Mgmt Minutes: 30                Time Calculation:   OT Start Time: 1530  OT Stop Time: 1630  OT Time Calculation (min): 60 min  Total Timed Code Minutes- OT: 30 minute(s)  Timed Charges  06929 - OT Manual Therapy Minutes: 30  94261 - OT Self Care/Mgmt Minutes: 30  Total Minutes  Timed Charges Total Minutes: 60   Total Minutes: 60     Therapy Charges for Today     Code Description Service Date Service Provider Modifiers Qty    01834647586 HC OT MANUAL THERAPY EA 15 MIN 10/19/2021 Julia Leslie OTR GO 2    74836408972 HC OT SELF CARE/MGMT/TRAIN EA 15 MIN 10/19/2021 Julia Leslie OTR GO 2                      VI Ulloa  10/19/2021

## 2021-10-20 ENCOUNTER — OFFICE VISIT (OUTPATIENT)
Dept: SURGERY | Facility: CLINIC | Age: 41
End: 2021-10-20

## 2021-10-20 ENCOUNTER — INFUSION (OUTPATIENT)
Dept: ONCOLOGY | Facility: HOSPITAL | Age: 41
End: 2021-10-20

## 2021-10-20 ENCOUNTER — TELEPHONE (OUTPATIENT)
Dept: SURGERY | Facility: CLINIC | Age: 41
End: 2021-10-20

## 2021-10-20 VITALS
HEART RATE: 100 BPM | RESPIRATION RATE: 18 BRPM | BODY MASS INDEX: 26.98 KG/M2 | TEMPERATURE: 96.9 F | HEIGHT: 62 IN | DIASTOLIC BLOOD PRESSURE: 76 MMHG | WEIGHT: 146.6 LBS | SYSTOLIC BLOOD PRESSURE: 114 MMHG | OXYGEN SATURATION: 98 %

## 2021-10-20 VITALS
WEIGHT: 146 LBS | SYSTOLIC BLOOD PRESSURE: 128 MMHG | RESPIRATION RATE: 18 BRPM | BODY MASS INDEX: 26.87 KG/M2 | OXYGEN SATURATION: 99 % | HEART RATE: 104 BPM | HEIGHT: 62 IN | DIASTOLIC BLOOD PRESSURE: 82 MMHG

## 2021-10-20 DIAGNOSIS — Z12.31 ENCOUNTER FOR SCREENING MAMMOGRAM FOR MALIGNANT NEOPLASM OF BREAST: ICD-10-CM

## 2021-10-20 DIAGNOSIS — Z45.2 ENCOUNTER FOR ADJUSTMENT OR MANAGEMENT OF VASCULAR ACCESS DEVICE: ICD-10-CM

## 2021-10-20 DIAGNOSIS — Z17.1 MALIGNANT NEOPLASM OF OVERLAPPING SITES OF LEFT BREAST IN FEMALE, ESTROGEN RECEPTOR NEGATIVE (HCC): Primary | ICD-10-CM

## 2021-10-20 DIAGNOSIS — C50.812 MALIGNANT NEOPLASM OF OVERLAPPING SITES OF LEFT BREAST IN FEMALE, ESTROGEN RECEPTOR NEGATIVE (HCC): Primary | ICD-10-CM

## 2021-10-20 LAB
25(OH)D3 SERPL-MCNC: 25.3 NG/ML (ref 30–100)
ALBUMIN SERPL-MCNC: 4 G/DL (ref 3.5–5.2)
ALBUMIN/GLOB SERPL: 1.7 G/DL
ALP SERPL-CCNC: 87 U/L (ref 39–117)
ALT SERPL W P-5'-P-CCNC: 187 U/L (ref 1–33)
ANION GAP SERPL CALCULATED.3IONS-SCNC: 8.5 MMOL/L (ref 5–15)
AST SERPL-CCNC: 94 U/L (ref 1–32)
BASOPHILS # BLD AUTO: 0.08 10*3/MM3 (ref 0–0.2)
BASOPHILS NFR BLD AUTO: 1.3 % (ref 0–1.5)
BILIRUB SERPL-MCNC: 0.2 MG/DL (ref 0–1.2)
BUN SERPL-MCNC: 14 MG/DL (ref 6–20)
BUN/CREAT SERPL: 20.6 (ref 7–25)
CALCIUM SPEC-SCNC: 9.3 MG/DL (ref 8.6–10.5)
CHLORIDE SERPL-SCNC: 105 MMOL/L (ref 98–107)
CO2 SERPL-SCNC: 23.5 MMOL/L (ref 22–29)
CREAT SERPL-MCNC: 0.68 MG/DL (ref 0.57–1)
DEPRECATED RDW RBC AUTO: 58.1 FL (ref 37–54)
EOSINOPHIL # BLD AUTO: 0.21 10*3/MM3 (ref 0–0.4)
EOSINOPHIL NFR BLD AUTO: 3.3 % (ref 0.3–6.2)
ERYTHROCYTE [DISTWIDTH] IN BLOOD BY AUTOMATED COUNT: 18.6 % (ref 12.3–15.4)
GFR SERPL CREATININE-BSD FRML MDRD: 94 ML/MIN/1.73
GLOBULIN UR ELPH-MCNC: 2.3 GM/DL
GLUCOSE SERPL-MCNC: 129 MG/DL (ref 65–99)
HCT VFR BLD AUTO: 32.2 % (ref 34–46.6)
HGB BLD-MCNC: 10.4 G/DL (ref 12–15.9)
IMM GRANULOCYTES # BLD AUTO: 0.07 10*3/MM3 (ref 0–0.05)
IMM GRANULOCYTES NFR BLD AUTO: 1.1 % (ref 0–0.5)
LYMPHOCYTES # BLD AUTO: 1.83 10*3/MM3 (ref 0.7–3.1)
LYMPHOCYTES NFR BLD AUTO: 29.1 % (ref 19.6–45.3)
MCH RBC QN AUTO: 28.3 PG (ref 26.6–33)
MCHC RBC AUTO-ENTMCNC: 32.3 G/DL (ref 31.5–35.7)
MCV RBC AUTO: 87.5 FL (ref 79–97)
MONOCYTES # BLD AUTO: 0.34 10*3/MM3 (ref 0.1–0.9)
MONOCYTES NFR BLD AUTO: 5.4 % (ref 5–12)
NEUTROPHILS NFR BLD AUTO: 3.75 10*3/MM3 (ref 1.7–7)
NEUTROPHILS NFR BLD AUTO: 59.8 % (ref 42.7–76)
NRBC BLD AUTO-RTO: 0 /100 WBC (ref 0–0.2)
PLATELET # BLD AUTO: 321 10*3/MM3 (ref 140–450)
PMV BLD AUTO: 9.6 FL (ref 6–12)
POTASSIUM SERPL-SCNC: 4.3 MMOL/L (ref 3.5–5.2)
PROT SERPL-MCNC: 6.3 G/DL (ref 6–8.5)
RBC # BLD AUTO: 3.68 10*6/MM3 (ref 3.77–5.28)
SODIUM SERPL-SCNC: 137 MMOL/L (ref 136–145)
WBC # BLD AUTO: 6.28 10*3/MM3 (ref 3.4–10.8)

## 2021-10-20 PROCEDURE — 85025 COMPLETE CBC W/AUTO DIFF WBC: CPT | Performed by: INTERNAL MEDICINE

## 2021-10-20 PROCEDURE — 80053 COMPREHEN METABOLIC PANEL: CPT | Performed by: INTERNAL MEDICINE

## 2021-10-20 PROCEDURE — 96417 CHEMO IV INFUS EACH ADDL SEQ: CPT

## 2021-10-20 PROCEDURE — 25010000002 DEXAMETHASONE: Performed by: INTERNAL MEDICINE

## 2021-10-20 PROCEDURE — 25010000002 DIPHENHYDRAMINE PER 50 MG: Performed by: INTERNAL MEDICINE

## 2021-10-20 PROCEDURE — 82306 VITAMIN D 25 HYDROXY: CPT | Performed by: INTERNAL MEDICINE

## 2021-10-20 PROCEDURE — 99213 OFFICE O/P EST LOW 20 MIN: CPT | Performed by: SURGERY

## 2021-10-20 PROCEDURE — 96375 TX/PRO/DX INJ NEW DRUG ADDON: CPT

## 2021-10-20 PROCEDURE — 96367 TX/PROPH/DG ADDL SEQ IV INF: CPT

## 2021-10-20 PROCEDURE — 25010000002 PACLITAXEL PER 1 MG: Performed by: INTERNAL MEDICINE

## 2021-10-20 PROCEDURE — 25010000002 TRASTUZUMAB-ANNS 420 MG RECONSTITUTED SOLUTION 1 EACH VIAL: Performed by: NURSE PRACTITIONER

## 2021-10-20 PROCEDURE — 25010000002 HEPARIN LOCK FLUSH PER 10 UNITS: Performed by: INTERNAL MEDICINE

## 2021-10-20 PROCEDURE — 96413 CHEMO IV INFUSION 1 HR: CPT

## 2021-10-20 RX ORDER — LEVOTHYROXINE SODIUM 137 UG/1
TABLET ORAL
COMMUNITY
Start: 2021-10-14 | End: 2021-11-12

## 2021-10-20 RX ORDER — SODIUM CHLORIDE 0.9 % (FLUSH) 0.9 %
10 SYRINGE (ML) INJECTION AS NEEDED
Status: CANCELLED | OUTPATIENT
Start: 2021-10-20

## 2021-10-20 RX ORDER — SODIUM CHLORIDE 0.9 % (FLUSH) 0.9 %
10 SYRINGE (ML) INJECTION AS NEEDED
Status: DISCONTINUED | OUTPATIENT
Start: 2021-10-20 | End: 2021-10-20 | Stop reason: HOSPADM

## 2021-10-20 RX ORDER — SODIUM CHLORIDE 9 MG/ML
250 INJECTION, SOLUTION INTRAVENOUS ONCE
Status: COMPLETED | OUTPATIENT
Start: 2021-10-20 | End: 2021-10-20

## 2021-10-20 RX ORDER — FAMOTIDINE 10 MG/ML
20 INJECTION, SOLUTION INTRAVENOUS ONCE
Status: COMPLETED | OUTPATIENT
Start: 2021-10-20 | End: 2021-10-20

## 2021-10-20 RX ORDER — HEPARIN SODIUM (PORCINE) LOCK FLUSH IV SOLN 100 UNIT/ML 100 UNIT/ML
500 SOLUTION INTRAVENOUS AS NEEDED
Status: DISCONTINUED | OUTPATIENT
Start: 2021-10-20 | End: 2021-10-20 | Stop reason: HOSPADM

## 2021-10-20 RX ORDER — HEPARIN SODIUM (PORCINE) LOCK FLUSH IV SOLN 100 UNIT/ML 100 UNIT/ML
500 SOLUTION INTRAVENOUS AS NEEDED
Status: CANCELLED | OUTPATIENT
Start: 2021-10-20

## 2021-10-20 RX ADMIN — SODIUM CHLORIDE, PRESERVATIVE FREE 10 ML: 5 INJECTION INTRAVENOUS at 15:45

## 2021-10-20 RX ADMIN — Medication 12 MG: at 11:54

## 2021-10-20 RX ADMIN — PACLITAXEL 135 MG: 6 INJECTION, SOLUTION INTRAVENOUS at 13:38

## 2021-10-20 RX ADMIN — Medication 500 UNITS: at 15:45

## 2021-10-20 RX ADMIN — TRASTUZUMAB-ANNS 400 MG: 420 INJECTION, POWDER, LYOPHILIZED, FOR SOLUTION INTRAVENOUS at 14:59

## 2021-10-20 RX ADMIN — DIPHENHYDRAMINE HYDROCHLORIDE 25 MG: 50 INJECTION INTRAMUSCULAR; INTRAVENOUS at 12:28

## 2021-10-20 RX ADMIN — FAMOTIDINE 20 MG: 10 INJECTION INTRAVENOUS at 12:26

## 2021-10-20 RX ADMIN — SODIUM CHLORIDE 250 ML: 9 INJECTION, SOLUTION INTRAVENOUS at 12:27

## 2021-10-20 NOTE — PROGRESS NOTES
[facility]     Referring Provider: Neena Beavers MD     Chief complaint: Routine follow up breast caner      HPI:   5/21/21:  Ms. Radha Astorga is a 42 yo woman, seen at the request of Dr. Neena Beavers, for a new diagnosis of left breast cancer. This was initially detected as an imaging abnormality on routine screening. Her work-up is detailed in the oncologic history below. Prior to the biopsy, she denies any breast lumps, pain, skin changes, or nipple discharge. She has a past history of a benign left breast percutaneous biopsy in 2013. She has a past history of a stroke in December 2020 without any residual neuro deficits. Cardiac work-up was negative, however hypercoagulable work-up revealed a factor V Leiden mutation. She has a family history of breast cancer in her mother (diagnosed at age 53) and a paternal aunt (diagnosed at age 55), as well as a paternal great aunt and her paternal great grandmother. Her paternal great aunt also had ovarian cancer and her mother had pancreatic cancer. She underwent expanded panel genetic testing at Dr. Beavers's office and she was negative for mutation.    6/16/21:  At her last visit, she underwent left axillary lymph node biopsy, which thankfully returned as benign. She underwent left breast MR-guided biopsy x2. The lesion at 5:00, near the primary malignancy, returned as a second foci of cancer, and the lesion at 2:00, returned as a radial scar (see pathology report details below). I have already briefly discussed these results with her over the phone and the plan still is to proceed with breast conservation. She has an appointment scheduled with plastic surgery next week.    7/15/21:  She underwent port placement, left partial mastectomy and excisional biopsy and SLNB with oncoplastic closure and right reduction for symmetry on 7/1/21. See surgery & pathology details below in oncologic history. She is complaining of a full sensation and some pain in her  left armpit.    10/20/21, Interval History:  She returns today for scheduled follow-up. Her last dose of Taxol/Herceptin is later today. She has seen Dr. Byrd and plans on starting radiation sometime in November. Unfortunately she has been dealing with left arm lymphedema for a few months. She has been seeing Julia for this and undergoing bandaging. She also is wearing a compression sleeve and gauntlet at home. She denies any new breast related complaints and is very happy with her cosmetic result.      Oncology/Hematology History Overview Note   12/11/2019, Screening MMG with [facility] ( [facility]):  Scattered fibroglandular density. There are no findings to suggest malignancy.  BI-RADS 1: Negative.     Malignant neoplasm of overlapping sites of left breast in female, estrogen receptor negative (HCC)   4/1/2021 Initial Diagnosis    Malignant neoplasm of overlapping sites of left breast in female, estrogen receptor negative (CMS/HCC)     4/2/2021 Imaging    Screening MMG with [facility] ( [place]):  Scattered fibroglandular densities. In the posterior one-third of the left breast projecting in a retroareolar location on the CC images there is an area of focal asymmetry. I see no suspicious calcifications or areas of architectural distortion in either breast. There is no evidence for skin thickening or nipple retraction.  BI-RADS 0: Incomplete.     4/9/2021 Imaging    Left Diagnostic MMG with [facility] & Left Breast US ( [facility]):  MMG:  With additional imaging there is persistence of the area of focal asymmetry in the posterior one-third inferior aspect of the left breast.  US:  At the 6 o'clock position on the order of 6 cm from the nipple there is a 0.8 cm irregular hypoechoic lesion. Mild posterior acoustic shadowing is noted.  BI-RADS 4: Suspicious.     4/26/2021 Biopsy    Left Breast, US-Guided Biopsy ( [facility]):    1. Left Breast, 6:00, 6 cm FN, U/S-Guided Core Needle Biopsy for a Mass:                 A. INVASIVE DUCTAL  CARCINOMA, Poorly differentiated; Oziel Histologic Grade III/III (tubule score = 3, nuclear score = 2, mitoses score = 3), measuring at least 7 mm.               B. No definitive ductal carcinoma in situ identified.   C. Negative for lymphovascular space invasion.    ER negative (<1%)  CT negative (<1%)  HER2 positive (IHC 3+)  Ki-67 55%     5/5/2021 Genetic Testing    [employer] Common Hereditary Cancers Panel (47 genes):    Negative     5/7/2021 Biopsy    Bilateral Breast MRI ([facility]):  There is an amorphous area of enhancement seen within the 6:00 position of the left breast, posterior depth measuring approximately 1.2 cm (series 9 image 91). Susceptibility artifact is present within this region consistent with recently newly diagnosed biopsy-proven malignancy. There is an additional area of more linear enhancement seen just anteriorly and laterally measuring up to 1.2 cm (series 9 image 91). This is seen approximately 5.6 cm from the nipple. Mixed washout kinetics are present. There is an additional punctate area of enhancement measuring only approximately 2 to 3 mm seen within the anterior lateral aspect of the left breast seen approximately 4.6 cm from the nipple (series 9 image 79) also demonstrating mixed washout  kinetics. No additional areas of enhancement identified. There is questionable mildly prominent lymph nodes present within the left axillary region (series 9 image 34 and series 9 image 18) with mild cortical thickening present with benign-appearing fatty juan miguel present. This is seen both within the high and mid to low axilla. A mildly prominent posterior lymph node is also present within the mid axilla measuring up to 1 cm with cortical thickening present (series 9 image 27). Limited evaluation of the intrathoracic contents symmetric no acute process. A marker seen at the junction of the left breast and the left chest wall inferiorly (series 4 image 135) with no abnormalities  identified within  this region. No abnormal fluid collections identified.  BI-RADS 6: Known malignancy.     5/21/2021 Biopsy    Left Axilla, US-Guided Biopsy ( [facility]):  1.  Lymph Node, Left Axilla, Core Biopsy:                 A.  Fragments of reactive lymph node; negative for lymphoma and carcinoma.      6/2/2021 Biopsy    Left Breast, MR-Guided Biopsy x 2 ([facility]):    1. Left Breast, 5:00 o'clock, MRI-guided Biopsies for Linear Enhancement:   INVASIVE MAMMARY CARCINOMA, NO SPECIAL TYPE (INVASIVE DUCTAL CARCINOMA).               A. Largest contiguous focus in a core measures 4 mm.               B. No in-situ component identified.                C. Brisk lymphocytic response noted (see Comment).               D. No definitive lymphovascular nor perineural invasion identified.  -Bowtie clip. Biopsy clips marking the 2 sites of biopsy-proven malignancy are  by 2.5 cm (bowtie clip and posteriorly located U-shaped clip).     ER negative (0%)  MS negative (0%)  Her2+ (IHC 3+)  Ki-67 50%    2.  Left Breast, 2:00 o'clock, MRI-guided Biopsy for Enhancement:                A. Benign breast parenchyma with radial scar and micropapillomas.               B. Usual ductal hyperplasia and columnar cell hyperplasia.               C. No atypical hyperplasia, in-situ nor invasive carcinoma identified.  -U-shaped clip. Concordant.     7/1/2021 Surgery    Port placement, left SHAINA-guided, bracketed, partial mastectomy and left breast needle-localized excisional biopsy and sentinel lymph node biopsy with oncoplastic closure and right reduction for symmetry    1. Left Breast, Partial Mastectomy:               A. Invasive ductal carcinoma:                            1. Invasive carcinoma measures 12 mm x 8 mm x 4 mm.                            2. Overall [place] grade III (tubular score = 3, nuclear score = 2, mitotic score = 3).                                                 3. No definitive lymphovascular invasion identified.               B.  Associated scattered ductal carcinoma in situ (DCIS):                            1. DCIS spans an area estimated at  30 mm x 8 mm x 2 mm.                            2. High grade solid and comedo DCIS.                            3. Rare microcalcification present in DCIS.                  C. All margins are negative for invasive carcinoma.                    Carcinoma measures 6 mm from the closest (Inferior) margin of excision.                     All other margins measure at least 8 mm from invasive carcinoma including:                            Anterior margin = 18 mm                            Posterior margin = 24 mm                            Superior margin = 8 mm                            Inferior margin = 6 mm                             Lateral margin = 12 mm                            Medial margin = 20 mm                  D. All margins are negative for in ductal situ carcinoma (DCIS).                    DCIS measures 1.5 mm from the closest (Superior) margin of excision.                    All other margins  measure at least 2.5 mm from DCIS including:                            Anterior margin = 18  mm                            Posterior margin = 15  mm                            Superior margin = 1.5  mm                            Inferior margin =  6 mm                             Lateral margin = 3  mm                            Medial margin = 2.5 mm                  E.  Multiple biopsy site changes are identified (x2) and multiple metallic clips (x4) retrieved.               F.  No Pagetoid involvement of skin by malignancy identified.               G. Non-neoplastic breast tissue with fibrocystic change, sclerosing adenosis, columnar cell change, micropapilloma and focal fibroadenomatoid change. Rare microcalcification present in benign breast tissue.               H. Previous Biomarkers: Estrogen receptors: Negative, Progesterone receptors: Negative,                    HER/2-ese: Positive  (score 3+) and Ki-67 = 50% (see [id number]).       2.  Left Breast, Additional Superior, Medial and Inferior Margins:                 A.  No in situ nor infiltrating carcinoma identified.               B.  New margins are negative for malignancy by an additional 15 mm.      3.  Left Breast at 2  o'clock, Needle Localization, Excisional Biopsy:                A.  Benign breast tissue with changes suggesting radial scar with usual ductal hyperplasia,                      sclerosing adenosis and fibrocystic change.                 B.  No in situ nor infiltrating carcinoma identified.               C.  Biopsy site changes are identified and metallic clip retrieved.                D.  All margins are viable and negative for neoplasm/malignancy.                       4.  Left Breast, True Medial and Inferior Margins:                 A.  No in situ nor infiltrating carcinoma identified.                B.  New margins are negative for malignancy by an additional 20 mm.     5.  Left Breast, True Superior and Lateral Margins:                A.  No in situ nor infiltrating carcinoma identified.                B.  New margins are negative for malignancy by an additional 40 mm.     6.  Left Breast, True Inferior Margin:                A.  No in situ nor infiltrating carcinoma identified.                B.  Benign skin and breast tissue.  C.  New margin is negative for malignancy by an additional 15 mm.     7.  Right Breast Tissue, Augmentation:                 A.  Benign skin and breast tissue (312 grams).                B.  No in situ nor infiltrating carcinoma identified.      8.  Left Axilla, [place] Lymph Node #1 (Hot, Blue, Count 5,500).                A.  Three lymph nodes negative for malignancy by routine staining (0/3).   B.  Includes one (largest) lymph node with focal fibrosis suggesting previous biopsy effect.      9.  Left Axilla, [place] Lymph Node, #2 (Hot, Not Blue, Count 900):                A.  One lymph node  negative for metastatic carcinoma by routine staining (0/1).     10.  Left Breast, Superior Pole:                 A.  No in situ nor infiltrating carcinoma identified.                B.  Superior margin is negative for malignancy by an additional 20 mm.     8/4/2021 -  Chemotherapy    OP BREAST PACLitaxel / Trastuzumab-anns (Weekly X 12)     9/1/2021 -  Chemotherapy    OP SUPPORTIVE Iron Sucrose (Venofer)     11/10/2021 -  Chemotherapy    OP BREAST Trastuzumab-anns Q21D (maintenance)         Review of Systems:  See interval history.       Medications:    Current Outpatient Medications:   •  amphetamine-dextroamphetamine XR (Adderall XR) 20 MG 24 hr capsule, Take 1 capsule by mouth Every Morning, Disp: 30 capsule, Rfl: 0  •  atorvastatin (Lipitor) 10 MG tablet, Take 1 tablet by mouth Daily., Disp: 30 tablet, Rfl: 11  •  famotidine (PEPCID) 20 MG tablet, Take 1 tablet by mouth Daily., Disp: 30 tablet, Rfl: 3  •  levothyroxine (SYNTHROID, LEVOTHROID) 137 MCG tablet, , Disp: , Rfl:   •  lidocaine-prilocaine (EMLA) 2.5-2.5 % cream, Apply  topically to the appropriate area as directed As Needed for Mild Pain ., Disp: 1 each, Rfl: 2  •  LORazepam (Ativan) 0.5 MG tablet, Take 1 tablet by mouth Every 8 (Eight) Hours As Needed for Anxiety., Disp: 30 tablet, Rfl: 0  •  ondansetron ODT (ZOFRAN-ODT) 8 MG disintegrating tablet, Place 1 tablet on the tongue Every 8 (Eight) Hours As Needed for Nausea or Vomiting., Disp: 30 tablet, Rfl: 3  •  rivaroxaban (XARELTO) 20 MG tablet, Take 1 tablet by mouth Daily., Disp: 30 tablet, Rfl: 3  •  valsartan (DIOVAN) 160 MG tablet, Take 1 tablet by mouth Daily., Disp: 30 tablet, Rfl: 5  No current facility-administered medications for this visit.    Facility-Administered Medications Ordered in Other Visits:   •  heparin injection 500 Units, 500 Units, Intravenous, PRN, Neena Beavers MD, 500 Units at 08/11/21 1718  •  sodium chloride 0.9 % flush 10 mL, 10 mL, Intravenous, PRN, Neena Beavers,  MD, 10 mL at 08/11/21 1718      Allergies   Allergen Reactions   • Sulfa Antibiotics Rash       Family History   Problem Relation Age of Onset   • Breast cancer Mother 53   • Pancreatic cancer Mother 68   • Lung cancer Maternal Grandmother    • Stroke Father    • Breast cancer Paternal Aunt 55   • Prostate cancer Maternal Grandfather    • Prostate cancer Paternal Grandfather    • Brain cancer Maternal Cousin 20   • Melanoma Maternal Uncle    • Ovarian cancer Other         Paternal great aunt    • Breast cancer Other    • Breast cancer Paternal Great-Grandmother 94   • Hypertension Brother    • Malig Hyperthermia Neg Hx        PHYSICAL EXAMINATION:   Vitals:    10/20/21 0803   BP: 128/82   Pulse: 104   Resp: 18   SpO2: 99%     ECOG 0 - Asymptomatic  General: NAD, well appearing  Psych: a&o x 3, normal mood and affect  Eyes: EOMI, no scleral icterus  ENMT: neck supple without masses or thyromegaly, mucus membranes moist  MSK: normal gait, normal ROM in bilateral shoulders  Lymph nodes: Well-healed left axillary scar; no cervical, supraclavicular or axillary lymphadenopathy  Breast:   Right: Sp mastopexy with well-healing scars. Scars are soft. There is some thickened tissue on either side of the T junction (tissue mobilized for reconstruction). No discrete masses or nipple abnormalities.  Left: Sp mastopexy with well-healing scars. Scars are soft. There is some thickened tissue on either side of the T junction (tissue mobilized for reconstruction). No discrete masses or nipple abnormalities.      Assessment:  41 y.o. F with a diagnosis of left breast: High grade, invasive ductal carcinoma, ER/CO negative, Her2 positive. She underwent port placement, left partial mastectomy and excisional biopsy and SLNB with oncoplastic closure and right reduction for symmetry on 7/1/21, pT1cN0, anatomic stage IA, prognostic stage IA. She completed Taxol/Herceptin on 10/20/21.    Plan:  -Continue follow-up with   Nimesh.  -Continue follow-up with Dr. Byrd.  -Continue follow-up with OT/LE clinic.  -Follow-up in 4/2021 with mammogram with NP  -She was instructed to call sooner with any questions, concerns or changes on BSE.    Lashonda Euceda MD      CC:  MD Fadi Ibanez MD Dana Tisdale, CLAUDINE

## 2021-10-20 NOTE — NURSING NOTE
1230 CMP results reviewed with Angella yip to treat today with elevate liver enzymes.  Lab Results   Component Value Date    GLUCOSE 129 (H) 10/20/2021    BUN 14 10/20/2021    CREATININE 0.68 10/20/2021    EGFRIFNONA 94 10/20/2021    EGFRIFAFRI 96 01/04/2021    BCR 20.6 10/20/2021    K 4.3 10/20/2021    CO2 23.5 10/20/2021    CALCIUM 9.3 10/20/2021    PROTENTOTREF 7.4 01/04/2021    ALBUMIN 4.00 10/20/2021    LABIL2 1.6 01/04/2021    AST 94 (H) 10/20/2021     (H) 10/20/2021

## 2021-10-20 NOTE — TELEPHONE ENCOUNTER
MMG is scheduled @ [facility] on 4/4/2022 @ 10:00am.    F/u appointment with Eda Sales NP is scheduled on 4/13/2022 @ 9:30am.    Called and left message with patient about appointment time, patient to call back and confirm.    Sent patient a reminder letter in the mail.

## 2021-10-26 ENCOUNTER — OFFICE VISIT (OUTPATIENT)
Dept: RADIATION ONCOLOGY | Facility: HOSPITAL | Age: 41
End: 2021-10-26

## 2021-10-26 VITALS — SYSTOLIC BLOOD PRESSURE: 146 MMHG | HEART RATE: 86 BPM | DIASTOLIC BLOOD PRESSURE: 78 MMHG | OXYGEN SATURATION: 100 %

## 2021-10-26 DIAGNOSIS — C50.812 MALIGNANT NEOPLASM OF OVERLAPPING SITES OF LEFT BREAST IN FEMALE, ESTROGEN RECEPTOR NEGATIVE (HCC): Primary | ICD-10-CM

## 2021-10-26 DIAGNOSIS — Z17.1 MALIGNANT NEOPLASM OF OVERLAPPING SITES OF LEFT BREAST IN FEMALE, ESTROGEN RECEPTOR NEGATIVE (HCC): Primary | ICD-10-CM

## 2021-10-26 PROCEDURE — 99214 OFFICE O/P EST MOD 30 MIN: CPT | Performed by: RADIOLOGY

## 2021-10-26 PROCEDURE — 77370 RADIATION PHYSICS CONSULT: CPT | Performed by: RADIOLOGY

## 2021-10-26 PROCEDURE — 77333 RADIATION TREATMENT AID(S): CPT | Performed by: RADIOLOGY

## 2021-10-26 PROCEDURE — 77290 THER RAD SIMULAJ FIELD CPLX: CPT | Performed by: RADIOLOGY

## 2021-10-26 PROCEDURE — 77263 THER RADIOLOGY TX PLNG CPLX: CPT | Performed by: RADIOLOGY

## 2021-10-26 NOTE — PROGRESS NOTES
DIAGNOSIS and REASON FOR SECOND VISIT:  Malignant neoplasm of overlapping sites of left breast in female, estrogen receptor negative (HCC)    CHIEF COMPLAINT:  For advice and recommendations regarding Malignant neoplasm of overlapping sites of left breast in female, estrogen receptor negative (HCC)  HISTORY OF PRESENT ILLNESS:  The patient is a 41 y.o. year old female who I initially saw in consultation on October 6, 2021.    In short, she was found to have an abnormality on screening mammogram on April 2, 2021.  This revealed a focal area of asymmetry in the posterior one third retroareolar aspect of the left breast.  Diagnostic mammogram and ultrasound on April 9, 2021 showed persistence of the focal asymmetry in the left breast and ultrasound showed an 8 mm irregular lesion at the 6 o'clock position of the left breast. She underwent ultrasound-guided biopsy and clip placement on April 26, 2021 which revealed an invasive ductal carcinoma, poorly differentiated, measuring at least 7 mm.  There was no DCIS nor lymphovascular space invasion noted.  The tissue was negative for both estrogen and progesterone receptors and positive for the HER-2/ese oncogene.    She completed genetic testing which was negative and bilateral breast MRI on May 7, 2021 showed enhancement within the 6 o'clock position of the left breast consistent with postbiopsy change and clip with an additional area of more linear enhancement anteriorly measuring 1.2 cm.  An additional area of enhancement measuring 2 to 3 mm was appreciated within the lateral aspect of the left breast, questionably mildly prominent nodes were appreciated in the left axilla and no abnormality in the right breast. Therefore she underwent an ultrasound-guided biopsy of the largest axillary node on May 21, 2021 and that pathology was negative for carcinoma.  She also underwent an MRI guided biopsy of the non-mass enhancement in the posterior left breast and the outer left  breast on June 2, 2021 and biopsies from the 2:00 region were negative but from the 5:00 region showed invasive ductal carcinoma measuring 4 mm.  That tissue was also negative for both estrogen and progesterone receptors positive for the HER-2/ese oncogene.    Therefore she went on to surgery on July 1, 2021 and underwent right-sided port placement as well as a left SHAINA-guided partial mastectomy, needle localized excisional biopsy and sentinel lymph node biopsy followed by bilateral reductions.  That pathology revealed in the specimen of the partial mastectomy an invasive ductal carcinoma measuring 12 x 8 x 4 mm, grade 3, without lymphovascular space invasion noted.  There was associated DCIS noted spanning 38 x 8 x 2 mm.  The margins were all negative for invasive disease and intraductal disease.  Additional superior medial and inferior margins showed no further in situ or invasive disease.  In the specimen from the needle localized excisional biopsy there was no in situ or invasive carcinoma identified.  Further margins taken for that specimen were also negative.  Additionally 0 of 4 sentinel nodes were involved.  Therefore she appears to have a pathologic T1c N0 high-grade invasive ductal carcinoma, hormone receptor negative, HER-2/ese positive.    She then went on to chemotherapy with weekly Taxol and Herceptin which was started on August 4, 2021.  She completed her chemotherapy on October 20, 2021.      INTERVAL HISTORY:  She completed her chemotherapy as scheduled without further issues.  She will continue on with maintenance Herceptin on an every 3-week schedule beginning in about 3 weeks.  She does also continue follow-up with cardiology.  She has additionally seen Dr. Euceda back in follow-up and will return there in April, 2022 with mammogram prior.  She continues to meet with the lymphedema specialists as well.    Clinically, she is doing fairly well. She is fatigued now but feels her taste and appetite  are improving. She is seeing Julia to manage her lymphedema and is ready to begin the treatment planning process.    Performance Status: (1) Restricted in physically strenuous activity, ambulatory and able to do work of light nature  Objective   Past Medical History: she  has a past medical history of Acute stress reaction (11/17/2014), ADD (attention deficit disorder), ADHD (attention deficit hyperactivity disorder), Anxiety and depression, CTS (carpal tunnel syndrome), Factor 5 Leiden mutation, heterozygous ([facility]) (2/17/2021), Family history of breast cancer (03/11/2013), Fracture, cervical vertebra ([facility]) (1997), H/O complete eye exam (01/01/2016), Hearing loss, Hearing loss of both ears, History of rheumatic fever, Hypertension, Hypothyroidism, Infiltrating ductal carcinoma of left breast ([facility]) (5/5/2021), Kidney stone, Mitral valve insufficiency, PONV (postoperative nausea and vomiting), Stroke ([facility]) (12/2020), and Stroke-like symptoms.    Past Surgical History:  she has a past surgical history that includes Pap Smear (2016); Breast biopsy (Left, 05/05/2021); No past surgeries; breast lumpectomy with sentinel node biopsy (N/A, 7/1/2021); and Breast surgery (Bilateral, 7/1/2021).    Meds:    Current Outpatient Medications:   •  amphetamine-dextroamphetamine XR (Adderall XR) 20 MG 24 hr capsule, Take 1 capsule by mouth Every Morning, Disp: 30 capsule, Rfl: 0  •  atorvastatin (Lipitor) 10 MG tablet, Take 1 tablet by mouth Daily., Disp: 30 tablet, Rfl: 11  •  famotidine (PEPCID) 20 MG tablet, Take 1 tablet by mouth Daily., Disp: 30 tablet, Rfl: 3  •  levothyroxine (SYNTHROID, LEVOTHROID) 137 MCG tablet, , Disp: , Rfl:   •  lidocaine-prilocaine (EMLA) 2.5-2.5 % cream, Apply  topically to the appropriate area as directed As Needed for Mild Pain ., Disp: 1 each, Rfl: 2  •  LORazepam (Ativan) 0.5 MG tablet, Take 1 tablet by mouth Every 8 (Eight) Hours As Needed for Anxiety., Disp: 30 tablet, Rfl: 0  •  ondansetron ODT  (ZOFRAN-ODT) 8 MG disintegrating tablet, Place 1 tablet on the tongue Every 8 (Eight) Hours As Needed for Nausea or Vomiting., Disp: 30 tablet, Rfl: 3  •  rivaroxaban (XARELTO) 20 MG tablet, Take 1 tablet by mouth Daily., Disp: 30 tablet, Rfl: 3  •  valsartan (DIOVAN) 160 MG tablet, Take 1 tablet by mouth Daily., Disp: 30 tablet, Rfl: 5  No current facility-administered medications for this visit.    Facility-Administered Medications Ordered in Other Visits:   •  heparin injection 500 Units, 500 Units, Intravenous, Nimesh REYEZ Leela, MD, 500 Units at 08/11/21 1718  •  sodium chloride 0.9 % flush 10 mL, 10 mL, Intravenous, PRNNimesh Leela, MD, 10 mL at 08/11/21 1718    Allergies:    Allergies   Allergen Reactions   • Sulfa Antibiotics Rash       Family History:  her family history includes Brain cancer (age of onset: 20) in her maternal cousin; Breast cancer in an other family member; Breast cancer (age of onset: 53) in her mother; Breast cancer (age of onset: 55) in her paternal aunt; Breast cancer (age of onset: 94) in her paternal great-grandmother; Hypertension in her brother; Lung cancer in her maternal grandmother; Melanoma in her maternal uncle; Ovarian cancer in an other family member; Pancreatic cancer (age of onset: 68) in her mother; Prostate cancer in her maternal grandfather and paternal grandfather; Stroke in her father.    Social History:  she  reports that she quit smoking about 12 years ago. Her smoking use included cigarettes. She started smoking about 23 years ago. She has a 10.00 pack-year smoking history. She has never used smokeless tobacco. She reports current alcohol use. She reports that she does not use drugs.    Pertinent Findings on   Review of Systems   Constitutional: Positive for appetite change and fatigue. Negative for chills, diaphoresis, fever and unexpected weight change.   HENT:   Positive for hearing loss and nosebleeds. Negative for lump/mass, mouth sores, sore throat,  tinnitus, trouble swallowing and voice change.    Eyes: Negative for eye problems and icterus.   Respiratory: Negative for chest tightness, cough, hemoptysis, shortness of breath and wheezing.    Cardiovascular: Negative for chest pain, leg swelling and palpitations.   Gastrointestinal: Negative for abdominal distention, abdominal pain, blood in stool, constipation, diarrhea, nausea, rectal pain and vomiting.   Endocrine: Negative for hot flashes.   Genitourinary: Negative for bladder incontinence, difficulty urinating, dyspareunia, dysuria, frequency, hematuria, menstrual problem, nocturia, pelvic pain, vaginal bleeding and vaginal discharge.    Musculoskeletal: Negative for arthralgias, back pain, flank pain, gait problem, myalgias, neck pain and neck stiffness.   Skin: Negative for itching, rash and wound.   Neurological: Negative for dizziness, extremity weakness, gait problem, headaches, light-headedness, numbness, seizures and speech difficulty.   Hematological: Negative for adenopathy. Does not bruise/bleed easily.   Psychiatric/Behavioral: Positive for sleep disturbance. Negative for confusion, decreased concentration, depression and suicidal ideas. The patient is not nervous/anxious.    :     Subjective   Vitals:    10/26/21 1300   BP: 146/78   Pulse: 86   SpO2: 100%   PainSc: 0-No pain       Pertinent Findings on:  Physical Exam  Constitutional:       General: She is not in acute distress.     Appearance: She is well-developed. She is not diaphoretic.   HENT:      Head: Normocephalic.   Chest:      Chest wall: No mass or tenderness.   Breasts:      Right: No inverted nipple, mass, nipple discharge, skin change, tenderness or supraclavicular adenopathy.      Left: No inverted nipple, mass, nipple discharge, skin change, tenderness or supraclavicular adenopathy.        Comments: Reduction incisions well healed. Breasts are soft, nontender, easy to examine. There are no nodules/skin abnormality/nipple change  noted.  Musculoskeletal:         General: Normal range of motion.      Cervical back: Normal range of motion and neck supple.   Lymphadenopathy:      Cervical: No cervical adenopathy.      Upper Body:      Right upper body: No supraclavicular or pectoral adenopathy.      Left upper body: No supraclavicular or pectoral adenopathy.      Comments: Axillary incision is healing well. There is no palpable axillary, supraclavicular nor cervical lymphadenopathy appreciated. No lymphedema is noted in either upper extremity.   Psychiatric:         Speech: Speech normal.         Behavior: Behavior normal.         Thought Content: Thought content normal.         Judgment: Judgment normal.            Assessment: Malignant neoplasm of overlapping sites of left breast in female, estrogen receptor negative (HCC)    Plan:   We reviewed the role of radiation therapy in her breast conservation treatment and the previously recommended course of postoperative whole breast radiation therapy consisting of 4256 cGy aimed at the breast given over 16 treatments. We will then plan on boosting the tumor bed region as I will delineate it on her treatment planning scan with an additional 1000 cGy given in five treatments. The above course of treatment should be completed in 4 1/2 weeks.    We reviewed the specifics, logistics and side effects of this course of treatment, both acute and long-term and I believe all her questions were answered to her satisfaction.  We reviewed our deep inspiration breath hold (DIBH) technique and we were able to start her treatment planning process with a CAT scan in the department today.  Anticipate getting her treatments underway within the week.  Objective     My assessment above comes from my review of the imaging, pathology, physician notes and other pertinent information as mentioned.    I personally spent greater than 30 minutes today assessing, managing, discussing and documenting my visit with the patient.  That time includes review of records, imaging and pathology reports, obtaining my own history, performing a medically appropriate evaluation, counseling and educating the patient, discussing goals, logistics, alternatives and risks of my recommendations, surveillance options and potential outcomes. It also includes the time documenting the clinical information in the EMR and communicating my recommendations to the other involved physicians.    .

## 2021-10-28 ENCOUNTER — HOSPITAL ENCOUNTER (OUTPATIENT)
Dept: OCCUPATIONAL THERAPY | Facility: HOSPITAL | Age: 41
Setting detail: THERAPIES SERIES
Discharge: HOME | End: 2021-10-28

## 2021-10-28 DIAGNOSIS — C50.812 MALIGNANT NEOPLASM OF OVERLAPPING SITES OF LEFT BREAST IN FEMALE, ESTROGEN RECEPTOR NEGATIVE (HCC): ICD-10-CM

## 2021-10-28 DIAGNOSIS — Z17.1 MALIGNANT NEOPLASM OF OVERLAPPING SITES OF LEFT BREAST IN FEMALE, ESTROGEN RECEPTOR NEGATIVE (HCC): ICD-10-CM

## 2021-10-28 DIAGNOSIS — I97.2 POST-MASTECTOMY LYMPHEDEMA SYNDROME: Primary | ICD-10-CM

## 2021-10-28 PROCEDURE — 77334 RADIATION TREATMENT AID(S): CPT | Performed by: RADIOLOGY

## 2021-10-28 PROCEDURE — 97140 MANUAL THERAPY 1/> REGIONS: CPT

## 2021-10-28 PROCEDURE — 77300 RADIATION THERAPY DOSE PLAN: CPT | Performed by: RADIOLOGY

## 2021-10-28 PROCEDURE — 77293 RESPIRATOR MOTION MGMT SIMUL: CPT | Performed by: RADIOLOGY

## 2021-10-28 PROCEDURE — 97535 SELF CARE MNGMENT TRAINING: CPT

## 2021-10-28 PROCEDURE — 77295 3-D RADIOTHERAPY PLAN: CPT | Performed by: RADIOLOGY

## 2021-10-28 NOTE — THERAPY TREATMENT NOTE
Outpatient Occupational Therapy Lymphedema Treatment Note  [facility]     Patient Name: Radha Astorga  : 1980  MRN: [id number]  Today's Date: 10/28/2021      Visit Date: 10/28/2021  Patient and therapist both masked. Therapist with face shield and gloves.  Patient Active Problem List   Diagnosis   • Family history of breast cancer   • Hypothyroidism   • Major depressive disorder, recurrent episode, moderate with anxious distress ([facility])   • Attention deficit hyperactivity disorder (ADHD), predominantly inattentive type   • Factor 5 Leiden mutation, heterozygous ([facility])   • Kidney mass   • Malignant neoplasm of overlapping sites of left breast in female, estrogen receptor negative ([facility])   • High risk medication use   • Essential hypertension   • Rheumatic mitral valve disease   • Encounter for adjustment or management of vascular access device   • Iron deficiency anemia        Past Medical History:   Diagnosis Date   • Acute stress reaction 2014   • ADD (attention deficit disorder)    • ADHD (attention deficit hyperactivity disorder)    • Anxiety and depression    • CTS (carpal tunnel syndrome)    • Factor 5 Leiden mutation, heterozygous ([facility]) 2021   • Family history of breast cancer 2013   • Fracture, cervical vertebra ([facility]) 1997    C4   • H/O complete eye exam 2016   • Hearing loss    • 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   • History of rheumatic fever    • Hypertension    • Hypothyroidism    • Infiltrating ductal carcinoma of left breast ([facility]) 2021    Left   • Kidney stone    • Mitral valve insufficiency    • PONV (postoperative nausea and vomiting)    • Stroke ([facility]) 2020    HX OF   • Stroke-like symptoms         Past Surgical History:   Procedure Laterality Date   • BREAST BIOPSY Left 2021    IDC   • BREAST LUMPECTOMY WITH SENTINEL NODE BIOPSY N/A 2021    Procedure: right port placement, Left SHAINA-guided, bracketed, partial mastectomy and  sentinel lymph node biopsy Left breast needle-localized excisional biopsy;  Surgeon: Lashonda Euceda MD;  Location: [facility];  Service: General;  Laterality: N/A;   • BREAST SURGERY Bilateral 7/1/2021    Procedure: RIGHT BREAST REDUCTION MASTOPEXY LEFT BREAST ONCOPLASTIC CLOSURE;  Surgeon: Caridad Baker MD PhD;  Location: [facility];  Service: Plastics;  Laterality: Bilateral;   • NO PAST SURGERIES     • PAP SMEAR  2016         Visit Dx:      ICD-10-CM ICD-9-CM   1. 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 Malignant neoplasm of overlapping sites of left breast in female, estrogen receptor negative (HCC)  C50.812 174.8    Z17.1 V86.1        Lymphedema     Row Name 10/28/21 1600             Subjective Pain    Able to rate subjective pain? yes  -RE      Pre-Treatment Pain Level 0  -RE      Post-Treatment Pain Level 0  -RE              Subjective Comments    Subjective Comments Edema has been consistently increased from normal. Did not decrease with bandage or sleeve.  -RE              BUE Circumferential (cm)    Measurement Location 1 Axilla  -RE      Left 1 32 cm  -RE      Measurement Location 2 15 cm above elbow  -RE      Left 2 29 cm  -RE      Measurement Location 3 10 cm above elbow  -RE      Left 3 27.5 cm  -RE      Measurement Location 4 5 cm above elbow  -RE      Left 4 26 cm  -RE      Measurement Location 5 elbow  -RE      Left 5 23 cm  -RE      Measurement Location 6 5 cm below elbow  -RE      Left 6 24.5 cm  -RE      Measurement Location 7 10 cm below elbow  -RE      Left 7 22 cm  -RE      Measurement Location 8 15 cm below elbow  -RE      Left 8 17.5 cm  -RE      Measurement Location 9 20 cm below elbow  -RE      Left 9 15 cm  -RE      Measurement Location 10 Wrist  -RE      Left 10 14.4 cm  -RE      Measurement Location 11 Mid palm  -RE      Left 11 16.5 cm  -RE      LUE Circumferential Total 247.4 cm  -RE              Manual Lymphatic Drainage    Manual Lymphatic  Drainage initial sequence; opened regional lymph nodes; opened anastamoses; extremity treatment  -RE      Initial Sequence short neck  -RE      Opened Regional Lymph Nodes axillary; inguinal  -RE      Axillary right; left  -RE      Inguinal left  -RE      Opened Anastamoses anterior axillo-axillary; axillo-inguinal  -RE      Axillo-Inguinal left  -RE      Extremity Treatment MLD to full limb  -RE              Compression/Skin Care    Compression/Skin Care skin care; wrapping location; bandaging  -RE      Skin Care lotion applied  -RE      Wrapping Location upper extremity  -RE      Wrapping Location UE left:; fingers to axilla  -RE      Bandage Layers cotton liner; padding/fluff layer; short-stretch bandages (comment size/quantity)  -RE      Bandaging Comments Tg7,Artiflex, 1-6cm and 1-8 cm and 2-10 cm Rosidal K  -RE      Bandaging Technique circumferential/spiral; light compression; moderate compression  -RE            User Key  (r) = Recorded By, (t) = Taken By, (c) = Cosigned By    Initials Name Provider Type    Julia Rowe OTKINGSLEY Occupational Therapist                         OT Assessment/Plan     Row Name 10/28/21 4147          OT Assessment    Impairments Edema; Impaired lymphatic circulation  -RE     Assessment Comments Measurements indicate a slight decrease (see flow sheet) but pitting edema persists despite patient consistly using compression (20-30 mmHg) 24 hours per day for more than 4 weeks. Fibrotic tissue noted in wrist. I have recommended a Flexitouch to her to improve her swelling which MLD and bandaging have not resolved.  -RE     OT Rehab Potential Good  -RE     Patient/caregiver participated in establishment of treatment plan and goals Yes  -RE     Patient would benefit from skilled therapy intervention Yes  -RE            OT Plan    OT Plan Comments Continue to follow for MLD and bandaging, pursue coverage of Flexitouch  -RE           User Key  (r) = Recorded By, (t) = Taken By, (c) =  Cosigned By    Initials Name Provider Type    Julia Rowe OTR Occupational Therapist                    Manual Rx (last 36 hours)     Manual Treatments     Row Name 10/28/21 1736             Total Minutes    01698 - OT Manual Therapy Minutes 45  -RE            User Key  (r) = Recorded By, (t) = Taken By, (c) = Cosigned By    Initials Name Provider Type    Julia Rowe OTR Occupational Therapist                  Therapy Education  Given: Edema management, Symptoms/condition management, Bandaging/dressing change  Program: Reinforced  How Provided: Verbal, Demonstration  Provided to: Patient  Level of Understanding: Teach back education performed  64453 - OT Self Care/Mgmt Minutes: 15                Time Calculation:   OT Start Time: 1615  OT Stop Time: 1725  OT Time Calculation (min): 70 min  OT Non-Billable Time (min): 10 min  Total Timed Code Minutes- OT: 70 minute(s)  Timed Charges  87252 - OT Manual Therapy Minutes: 45  86130 - OT Self Care/Mgmt Minutes: 15  Total Minutes  Timed Charges Total Minutes: 60   Total Minutes: 60     Therapy Charges for Today     Code Description Service Date Service Provider Modifiers Qty    23330977427 HC OT MANUAL THERAPY EA 15 MIN 10/28/2021 Julia Leslie OTR GO 3    02344723761 HC OT SELF CARE/MGMT/TRAIN EA 15 MIN 10/28/2021 Julia Leslie OTR GO 1                      VI Ulloa  10/28/2021

## 2021-11-01 ENCOUNTER — APPOINTMENT (OUTPATIENT)
Dept: RADIATION ONCOLOGY | Facility: HOSPITAL | Age: 41
End: 2021-11-01

## 2021-11-02 ENCOUNTER — TELEPHONE (OUTPATIENT)
Dept: FAMILY MEDICINE CLINIC | Facility: CLINIC | Age: 41
End: 2021-11-02

## 2021-11-02 ENCOUNTER — HOSPITAL ENCOUNTER (OUTPATIENT)
Dept: OCCUPATIONAL THERAPY | Facility: HOSPITAL | Age: 41
Setting detail: THERAPIES SERIES
Discharge: HOME | End: 2021-11-02

## 2021-11-02 ENCOUNTER — OFFICE VISIT (OUTPATIENT)
Dept: FAMILY MEDICINE CLINIC | Facility: CLINIC | Age: 41
End: 2021-11-02

## 2021-11-02 ENCOUNTER — PRIOR AUTHORIZATION (OUTPATIENT)
Dept: FAMILY MEDICINE CLINIC | Facility: CLINIC | Age: 41
End: 2021-11-02

## 2021-11-02 VITALS
RESPIRATION RATE: 18 BRPM | DIASTOLIC BLOOD PRESSURE: 81 MMHG | SYSTOLIC BLOOD PRESSURE: 122 MMHG | WEIGHT: 147 LBS | TEMPERATURE: 96.9 F | HEART RATE: 99 BPM | BODY MASS INDEX: 27.05 KG/M2 | HEIGHT: 62 IN

## 2021-11-02 DIAGNOSIS — Z17.1 MALIGNANT NEOPLASM OF OVERLAPPING SITES OF LEFT BREAST IN FEMALE, ESTROGEN RECEPTOR NEGATIVE (HCC): Primary | ICD-10-CM

## 2021-11-02 DIAGNOSIS — I97.2 POST-MASTECTOMY LYMPHEDEMA SYNDROME: Primary | ICD-10-CM

## 2021-11-02 DIAGNOSIS — F90.0 ATTENTION DEFICIT HYPERACTIVITY DISORDER (ADHD), PREDOMINANTLY INATTENTIVE TYPE: Primary | ICD-10-CM

## 2021-11-02 DIAGNOSIS — C50.812 MALIGNANT NEOPLASM OF OVERLAPPING SITES OF LEFT BREAST IN FEMALE, ESTROGEN RECEPTOR NEGATIVE (HCC): ICD-10-CM

## 2021-11-02 DIAGNOSIS — Z91.89 AT RISK FOR LYMPHEDEMA: ICD-10-CM

## 2021-11-02 DIAGNOSIS — Z17.1 MALIGNANT NEOPLASM OF OVERLAPPING SITES OF LEFT BREAST IN FEMALE, ESTROGEN RECEPTOR NEGATIVE (HCC): ICD-10-CM

## 2021-11-02 DIAGNOSIS — C50.812 MALIGNANT NEOPLASM OF OVERLAPPING SITES OF LEFT BREAST IN FEMALE, ESTROGEN RECEPTOR NEGATIVE (HCC): Primary | ICD-10-CM

## 2021-11-02 LAB
B-HCG UR QL: NEGATIVE
EXPIRATION DATE: NORMAL
INTERNAL NEGATIVE CONTROL: NORMAL
INTERNAL POSITIVE CONTROL: NORMAL
Lab: NORMAL

## 2021-11-02 PROCEDURE — 97535 SELF CARE MNGMENT TRAINING: CPT

## 2021-11-02 PROCEDURE — 77280 THER RAD SIMULAJ FIELD SMPL: CPT | Performed by: RADIOLOGY

## 2021-11-02 PROCEDURE — 97140 MANUAL THERAPY 1/> REGIONS: CPT

## 2021-11-02 PROCEDURE — 77412 RADIATION TX DELIVERY LVL 3: CPT | Performed by: RADIOLOGY

## 2021-11-02 PROCEDURE — 77427 RADIATION TX MANAGEMENT X5: CPT | Performed by: RADIOLOGY

## 2021-11-02 PROCEDURE — 99214 OFFICE O/P EST MOD 30 MIN: CPT | Performed by: FAMILY MEDICINE

## 2021-11-02 NOTE — TELEPHONE ENCOUNTER
PATIENT CALLED TO LET DR ROSALES KNOW THAT HER PHARMACY IS SENDING A PA TO THE OFFICE THROUGH COVER MY MEDS FOR THE PATIENT'S VYVANSE.

## 2021-11-02 NOTE — PROGRESS NOTES
Subjective   Radha Astorga is a 41 y.o. female.     History of Present Illness     Chief Complaint:   Chief Complaint   Patient presents with   • ADHD     to discuss per pt [place]       Radha Astorga 41 y.o. female who presents today for Medical Management of the below listed issues and medication refills. She  has a problem list of   Patient Active Problem List   Diagnosis   • Family history of breast cancer   • Hypothyroidism   • Major depressive disorder, recurrent episode, moderate with anxious distress (HCC)   • Attention deficit hyperactivity disorder (ADHD), predominantly inattentive type   • Factor 5 Leiden mutation, heterozygous (HCC)   • Kidney mass   • Malignant neoplasm of overlapping sites of left breast in female, estrogen receptor negative (HCC)   • High risk medication use   • Essential hypertension   • Rheumatic mitral valve disease   • Encounter for adjustment or management of vascular access device   • Iron deficiency anemia   .  Since the last visit, She has had less effective efficacy since having to change from Vyvanse to Adderall XR due to formulary and is very interested in getting back on Vyvanse if she possibly can.   she has been compliant with   Current Outpatient Medications:   •  atorvastatin (Lipitor) 10 MG tablet, Take 1 tablet by mouth Daily., Disp: 30 tablet, Rfl: 11  •  famotidine (PEPCID) 20 MG tablet, Take 1 tablet by mouth Daily., Disp: 30 tablet, Rfl: 3  •  levothyroxine (SYNTHROID, LEVOTHROID) 137 MCG tablet, , Disp: , Rfl:   •  lidocaine-prilocaine (EMLA) 2.5-2.5 % cream, Apply  topically to the appropriate area as directed As Needed for Mild Pain ., Disp: 1 each, Rfl: 2  •  lisdexamfetamine (Vyvanse) 50 MG capsule, Take 1 capsule by mouth Every Morning, Disp: 30 capsule, Rfl: 0  •  LORazepam (Ativan) 0.5 MG tablet, Take 1 tablet by mouth Every 8 (Eight) Hours As Needed for Anxiety., Disp: 30 tablet, Rfl: 0  •  ondansetron ODT (ZOFRAN-ODT) 8 MG disintegrating  "tablet, Place 1 tablet on the tongue Every 8 (Eight) Hours As Needed for Nausea or Vomiting., Disp: 30 tablet, Rfl: 3  •  rivaroxaban (XARELTO) 20 MG tablet, Take 1 tablet by mouth Daily., Disp: 30 tablet, Rfl: 3  •  valsartan (DIOVAN) 160 MG tablet, Take 1 tablet by mouth Daily., Disp: 30 tablet, Rfl: 5  No current facility-administered medications for this visit.    Facility-Administered Medications Ordered in Other Visits:   •  heparin injection 500 Units, 500 Units, Intravenous, PRN, Neena Beavers MD, 500 Units at 08/11/21 1718  •  sodium chloride 0.9 % flush 10 mL, 10 mL, Intravenous, PRN, Neena Beavers MD, 10 mL at 08/11/21 1718.  She denies medication side effects.    All of the other chronic condition(s) listed above are stable w/o issues.    /81   Pulse 99   Temp 96.9 °F (36.1 °C) (Oral)   Resp 18   Ht 157.5 cm (62\")   Wt 66.7 kg (147 lb)   LMP  (LMP Unknown)   BMI 26.89 kg/m²     Results for orders placed or performed in visit on 11/02/21   POC Pregnancy, Urine    Specimen: Urine   Result Value Ref Range    HCG, Urine, QL Negative Negative    Lot Number [id number]     Internal Positive Control Passed Positive, Passed    Internal Negative Control Passed Negative, Passed    Expiration Date 10-              The following portions of the patient's history were reviewed and updated as appropriate: allergies, current medications, past family history, past medical history, past social history, past surgical history, and problem list.    Review of Systems   Constitutional: Negative for activity change, chills and fever.   Respiratory: Negative for cough.    Cardiovascular: Negative for chest pain.   Psychiatric/Behavioral: Negative for dysphoric mood.       Objective   Physical Exam  Constitutional:       General: She is not in acute distress.     Appearance: She is well-developed.   Pulmonary:      Effort: Pulmonary effort is normal.   Neurological:      Mental Status: She is alert and " oriented to person, place, and time.   Psychiatric:         Behavior: Behavior normal.         Thought Content: Thought content normal.           Assessment/Plan   Diagnoses and all orders for this visit:    1. Attention deficit hyperactivity disorder (ADHD), predominantly inattentive type (Primary)  Comments:  uncontrolled  Orders:  -     lisdexamfetamine (Vyvanse) 50 MG capsule; Take 1 capsule by mouth Every Morning  Dispense: 30 capsule; Refill: 0    The Adderall XR is not working for her and thus will attempt to get her back on the vyvanse.

## 2021-11-02 NOTE — THERAPY PROGRESS REPORT/RE-CERT
Outpatient Occupational Therapy Lymphedema Progress Note  [facility]     Patient Name: Radha Astorga  : 1980  MRN: [id number]  Today's Date: 2021      Visit Date: 2021  Patient and therapist both masked. Therapist with face shield and gloves.  Patient Active Problem List   Diagnosis   • Family history of breast cancer   • Hypothyroidism   • Major depressive disorder, recurrent episode, moderate with anxious distress ([facility])   • Attention deficit hyperactivity disorder (ADHD), predominantly inattentive type   • Factor 5 Leiden mutation, heterozygous ([facility])   • Kidney mass   • Malignant neoplasm of overlapping sites of left breast in female, estrogen receptor negative ([facility])   • High risk medication use   • Essential hypertension   • Rheumatic mitral valve disease   • Encounter for adjustment or management of vascular access device   • Iron deficiency anemia        Past Medical History:   Diagnosis Date   • Acute stress reaction 2014   • ADD (attention deficit disorder)    • ADHD (attention deficit hyperactivity disorder)    • Anxiety and depression    • CTS (carpal tunnel syndrome)    • Factor 5 Leiden mutation, heterozygous ([facility]) 2021   • Family history of breast cancer 2013   • Fracture, cervical vertebra ([facility]) 1997    C4   • H/O complete eye exam 2016   • Hearing loss    • 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   • History of rheumatic fever    • Hypertension    • Hypothyroidism    • Infiltrating ductal carcinoma of left breast ([facility]) 2021    Left   • Kidney stone    • Mitral valve insufficiency    • PONV (postoperative nausea and vomiting)    • Stroke ([facility]) 2020    HX OF   • Stroke-like symptoms         Past Surgical History:   Procedure Laterality Date   • BREAST BIOPSY Left 2021    IDC   • BREAST LUMPECTOMY WITH SENTINEL NODE BIOPSY N/A 2021    Procedure: right port placement, Left SHAINA-guided, bracketed, partial mastectomy and  sentinel lymph node biopsy Left breast needle-localized excisional biopsy;  Surgeon: Lashonda Euceda MD;  Location: [facility];  Service: General;  Laterality: N/A;   • BREAST SURGERY Bilateral 7/1/2021    Procedure: RIGHT BREAST REDUCTION MASTOPEXY LEFT BREAST ONCOPLASTIC CLOSURE;  Surgeon: Caridad Baker MD PhD;  Location: [facility];  Service: Plastics;  Laterality: Bilateral;   • NO PAST SURGERIES     • PAP SMEAR  2016         Visit Dx:      ICD-10-CM ICD-9-CM   1. 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 Malignant neoplasm of overlapping sites of left breast in female, estrogen receptor negative (HCC)  C50.812 174.8    Z17.1 V86.1   3. At risk for lymphedema  Z91.89 V49.89        Lymphedema     Row Name 11/02/21 1600             Subjective Pain    Able to rate subjective pain? yes  -RE      Pre-Treatment Pain Level 3  -RE      Post-Treatment Pain Level 3  -RE              Manual Lymphatic Drainage    Extremity Treatment simple/brief MLD  -RE      Manual Therapy s and c strokes to corded areas throughout L UE  -RE              Compression/Skin Care    Compression/Skin Care skin care; wrapping location; bandaging  -RE      Skin Care lotion applied  -RE      Wrapping Location upper extremity  -RE      Wrapping Location UE left:; fingers to axilla  -RE      Bandage Layers cotton liner; padding/fluff layer; short-stretch bandages (comment size/quantity)  -RE      Bandaging Comments Tg7,Artiflex, 1-6cm and 1-8 cm and 2-10 cm Rosidal K. transellast to fingers  -RE      Bandaging Technique circumferential/spiral; light compression; moderate compression  -RE            User Key  (r) = Recorded By, (t) = Taken By, (c) = Cosigned By    Initials Name Provider Type    RE Julia Leslie OTR Occupational Therapist                         OT Assessment/Plan     Row Name 11/02/21 1631          OT Assessment    Impairments Edema; Impaired lymphatic circulation; Pain  -RE     Assessment Comments  Cording is worse today and may be playing a role in pts continued edema in her forearm which is not responding to MLD.  -RE     Please refer to paper survey for additional self-reported information No  -RE     OT Diagnosis L UE lymphedma, cording  -RE     OT Rehab Potential Good  -RE     Patient/caregiver participated in establishment of treatment plan and goals Yes  -RE     Patient would benefit from skilled therapy intervention Yes  -RE            OT Plan    OT Frequency 2x/week; 1x/week  -RE     Predicted Duration of Therapy Intervention (OT) 1-2 weeks  -RE     Planned CPT's? OT SELF CARE/MGMT/TRAIN 15 MIN: 10859; OT MANUAL THERAPY EA 15 MIN: 19161; OT BIS XTRACELL FLUID ANALYSIS: 64090; OT THER PROC EA 15 MIN: 98138ST  -RE     Planned Therapy Interventions (Optional Details) home exercise program; patient/family education; stretching; ROM (Range of Motion); manual therapy techniques; other (see comments)  bbioimpedance  -RE     OT Plan Comments continue  -RE           User Key  (r) = Recorded By, (t) = Taken By, (c) = Cosigned By    Initials Name Provider Type    Julia Rowe OTR Occupational Therapist                    Manual Rx (last 36 hours)     Manual Treatments     Row Name 11/02/21 1636             Total Minutes    10746 - OT Manual Therapy Minutes 45  -RE            User Key  (r) = Recorded By, (t) = Taken By, (c) = Cosigned By    Initials Name Provider Type    Julia Rowe OTR Occupational Therapist               OT Goals     Row Name 11/02/21 1500          OT Short Term Goals    STG Date to Achieve 11/16/21  -RE     STG 1 Pt will demonstrate understanding of use of compression sleeve for edema prevention, exercise and air travel.   -RE     STG 1 Progress Met  -RE     STG 2 Patient will demonstrate proper awareness of “What is Lymphedema?” and “Healthy Habits” for improved prevention, management, care of symptoms and ease of transition to self-care of condition.   -RE     STG 2 Progress Met   -RE     STG 3 Patient independent and compliant with initial home exercise program focused on range of motion,and Flexibility.  -RE     STG 3 Progress Met  -RE     STG 4 Patient to demonstrate increased left shoulder flexion to 140 to improve functional UE use and to restore pre operative AROM per patient perception.  -RE     STG 4 Progress Met  -RE     STG 5 Patient to demonstrate increased left shoulder abduction to 140 to improve functional UE use and to restore preoperative AROM per patient perception.  -RE     STG 5 Progress Met  -RE            Long Term Goals    LTG Date to Achieve 11/30/21  -RE     LTG 1 Patient to demonstrate increased left shoulder flexion to 160 to improve functional UE use and to restore pre operative AROM per patient perception.  -RE     LTG 1 Progress Met  -RE     LTG 2 Patient to demonstrate increased left shoulder abduction to 160 to improve functional UE use and to restore preoperative AROM per patient perception.  -RE     LTG 2 Progress Met  -RE     LTG 3 Patient will participate in bioimpedance scans every 3-6 months as a method of early detection of lymphedema to allow for early intervention.  -RE     LTG 3 Progress Met; Ongoing  -RE     LTG 4  Patient's bioimpedance score to remain below 6.5 for decreased risk of stage II lymphedema.  -RE     LTG 4 Progress Not Met; Ongoing  -RE     LTG 5 Patient will be independent with use and care of Tribute.  -RE     LTG 5 Progress New  -RE            Time Calculation    OT Goal Re-Cert Due Date 02/02/22  -RE           User Key  (r) = Recorded By, (t) = Taken By, (c) = Cosigned By    Initials Name Provider Type    Julia Rowe OTR Occupational Therapist                Therapy Education  Education Details: Resume stretches twice aday  Given: HEP  Program: Reinforced  How Provided: Verbal, Demonstration  Provided to: Patient  Level of Understanding: Teach back education performed, Verbalized  43950 - OT Self Care/Mgmt Minutes: 15                 Time Calculation:   OT Start Time: 1515  OT Stop Time: 1615  OT Time Calculation (min): 60 min  Total Timed Code Minutes- OT: 60 minute(s)  Timed Charges  60227 - OT Manual Therapy Minutes: 45  53010 - OT Self Care/Mgmt Minutes: 15  Total Minutes  Timed Charges Total Minutes: 60   Total Minutes: 60     Therapy Charges for Today     Code Description Service Date Service Provider Modifiers Qty    16500968396 HC OT MANUAL THERAPY EA 15 MIN 11/2/2021 Julia Leslie OTR GO 3    43653166557 HC OT SELF CARE/MGMT/TRAIN EA 15 MIN 11/2/2021 Julia Leslie OTR GO 1                      VI Ulloa  11/2/2021

## 2021-11-03 PROCEDURE — 77417 THER RADIOLOGY PORT IMAGE(S): CPT | Performed by: RADIOLOGY

## 2021-11-03 PROCEDURE — 77412 RADIATION TX DELIVERY LVL 3: CPT | Performed by: RADIOLOGY

## 2021-11-04 ENCOUNTER — HOSPITAL ENCOUNTER (OUTPATIENT)
Dept: CARDIOLOGY | Facility: HOSPITAL | Age: 41
Discharge: HOME | End: 2021-11-04
Admitting: INTERNAL MEDICINE

## 2021-11-04 ENCOUNTER — HOSPITAL ENCOUNTER (OUTPATIENT)
Dept: OCCUPATIONAL THERAPY | Facility: HOSPITAL | Age: 41
Setting detail: THERAPIES SERIES
Discharge: HOME | End: 2021-11-04

## 2021-11-04 ENCOUNTER — TELEPHONE (OUTPATIENT)
Dept: FAMILY MEDICINE CLINIC | Facility: CLINIC | Age: 41
End: 2021-11-04

## 2021-11-04 VITALS
HEART RATE: 93 BPM | HEIGHT: 62 IN | SYSTOLIC BLOOD PRESSURE: 124 MMHG | OXYGEN SATURATION: 99 % | BODY MASS INDEX: 27.05 KG/M2 | WEIGHT: 147 LBS | DIASTOLIC BLOOD PRESSURE: 76 MMHG

## 2021-11-04 DIAGNOSIS — Z17.1 MALIGNANT NEOPLASM OF OVERLAPPING SITES OF LEFT BREAST IN FEMALE, ESTROGEN RECEPTOR NEGATIVE (HCC): ICD-10-CM

## 2021-11-04 DIAGNOSIS — C50.812 MALIGNANT NEOPLASM OF OVERLAPPING SITES OF LEFT BREAST IN FEMALE, ESTROGEN RECEPTOR NEGATIVE (HCC): ICD-10-CM

## 2021-11-04 DIAGNOSIS — I97.2 POST-MASTECTOMY LYMPHEDEMA SYNDROME: Primary | ICD-10-CM

## 2021-11-04 DIAGNOSIS — Z51.11 CHEMOTHERAPY MANAGEMENT, ENCOUNTER FOR: ICD-10-CM

## 2021-11-04 DIAGNOSIS — M79.621 AXILLARY PAIN, RIGHT: ICD-10-CM

## 2021-11-04 DIAGNOSIS — Z91.89 AT RISK FOR LYMPHEDEMA: ICD-10-CM

## 2021-11-04 PROCEDURE — 93306 TTE W/DOPPLER COMPLETE: CPT

## 2021-11-04 PROCEDURE — 93356 MYOCRD STRAIN IMG SPCKL TRCK: CPT

## 2021-11-04 PROCEDURE — 77412 RADIATION TX DELIVERY LVL 3: CPT | Performed by: RADIOLOGY

## 2021-11-04 PROCEDURE — 93356 MYOCRD STRAIN IMG SPCKL TRCK: CPT | Performed by: INTERNAL MEDICINE

## 2021-11-04 PROCEDURE — 25010000002 PERFLUTREN (DEFINITY) 8.476 MG IN SODIUM CHLORIDE (PF) 0.9 % 10 ML INJECTION: Performed by: INTERNAL MEDICINE

## 2021-11-04 PROCEDURE — 93306 TTE W/DOPPLER COMPLETE: CPT | Performed by: INTERNAL MEDICINE

## 2021-11-04 RX ADMIN — PERFLUTREN 1.5 ML: 6.52 INJECTION, SUSPENSION INTRAVENOUS at 15:43

## 2021-11-04 NOTE — TELEPHONE ENCOUNTER
----- Message from Radha Astorga sent at 11/4/2021 10:30 AM EDT -----  Regarding: Medication Denial  I’ve been taking adderall xr instead, since March 2020, prescribed by Dr. Goodman, from when I changed insurance companies with my new job.  There should be a record of that medication shift in March or April of 2020.  
Pt changed from Vyvance to Adderral.  The message from [facility] must have been autogenerated.    Mary    
Warm

## 2021-11-04 NOTE — THERAPY TREATMENT NOTE
Outpatient Occupational Therapy Lymphedema Treatment Note  [facility]     Patient Name: Radha Astorga  : 1980  MRN: [id number]  Today's Date: 2021      Visit Date: 2021  Patient and therapist both masked. Therapist with face shield and gloves.  Patient Active Problem List   Diagnosis   • Family history of breast cancer   • Hypothyroidism   • Major depressive disorder, recurrent episode, moderate with anxious distress ([facility])   • Attention deficit hyperactivity disorder (ADHD), predominantly inattentive type   • Factor 5 Leiden mutation, heterozygous ([facility])   • Kidney mass   • Malignant neoplasm of overlapping sites of left breast in female, estrogen receptor negative ([facility])   • High risk medication use   • Essential hypertension   • Rheumatic mitral valve disease   • Encounter for adjustment or management of vascular access device   • Iron deficiency anemia        Past Medical History:   Diagnosis Date   • Acute stress reaction 2014   • ADD (attention deficit disorder)    • ADHD (attention deficit hyperactivity disorder)    • Anxiety and depression    • CTS (carpal tunnel syndrome)    • Factor 5 Leiden mutation, heterozygous ([facility]) 2021   • Family history of breast cancer 2013   • Fracture, cervical vertebra ([facility]) 1997    C4   • H/O complete eye exam 2016   • Hearing loss    • 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   • History of rheumatic fever    • Hypertension    • Hypothyroidism    • Infiltrating ductal carcinoma of left breast ([facility]) 2021    Left   • Kidney stone    • Mitral valve insufficiency    • PONV (postoperative nausea and vomiting)    • Stroke ([facility]) 2020    HX OF   • Stroke-like symptoms         Past Surgical History:   Procedure Laterality Date   • BREAST BIOPSY Left 2021    IDC   • BREAST LUMPECTOMY WITH SENTINEL NODE BIOPSY N/A 2021    Procedure: right port placement, Left SHAINA-guided, bracketed, partial mastectomy and  sentinel lymph node biopsy Left breast needle-localized excisional biopsy;  Surgeon: Lashonda Euceda MD;  Location: [facility];  Service: General;  Laterality: N/A;   • BREAST SURGERY Bilateral 7/1/2021    Procedure: RIGHT BREAST REDUCTION MASTOPEXY LEFT BREAST ONCOPLASTIC CLOSURE;  Surgeon: Caridad Baker MD PhD;  Location: [facility];  Service: Plastics;  Laterality: Bilateral;   • NO PAST SURGERIES     • PAP SMEAR  2016         Visit Dx:      ICD-10-CM ICD-9-CM   1. 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 At risk for lymphedema  Z91.89 V49.89   3. Axillary pain, right  M79.621 729.5   4. Malignant neoplasm of overlapping sites of left breast in female, estrogen receptor negative (HCC)  C50.812 174.8    Z17.1 V86.1        Lymphedema     Row Name 11/04/21 1400             Subjective Pain    Able to rate subjective pain? yes  -RE      Pre-Treatment Pain Level 2  -RE      Post-Treatment Pain Level 2  -RE              Subjective Comments    Subjective Comments got tribute. Still had some wrist swelling even with tribute use. Cording feels better but is still sore  -RE              BUE Circumferential (cm)    Measurement Location 1 Axilla  -RE      Left 1 31 cm  -RE      Measurement Location 2 15 cm above elbow  -RE      Left 2 29 cm  -RE      Measurement Location 3 10 cm above elbow  -RE      Left 3 26.5 cm  -RE      Measurement Location 4 5 cm above elbow  -RE      Left 4 24.3 cm  -RE      Measurement Location 5 elbow  -RE      Left 5 22.5 cm  -RE      Measurement Location 6 5 cm below elbow  -RE      Left 6 24 cm  -RE      Measurement Location 7 10 cm below elbow  -RE      Left 7 22 cm  -RE      Measurement Location 8 15 cm below elbow  -RE      Left 8 18 cm  -RE      Measurement Location 9 20 cm below elbow  -RE      Left 9 14.5 cm  -RE      Measurement Location 10 Wrist  -RE      Left 10 14.3 cm  -RE      Measurement Location 11 Mid palm  -RE      Left 11 16.7 cm  -RE      LUE  Circumferential Total 242.8 cm  -RE              Manual Lymphatic Drainage    Extremity Treatment simple/brief MLD  -RE      Manual Therapy s and c strokes to corded areas throughout L UE  -RE              Compression/Skin Care    Compression/Skin Care --  -RE      Skin Care --  -RE      Wrapping Location --  -RE      Wrapping Location UE --  -RE      Bandage Layers --  -RE      Bandaging Technique --  -RE      Compression/Skin Care Comments wear tribute tonight and bandage tomorrow  -RE            User Key  (r) = Recorded By, (t) = Taken By, (c) = Cosigned By    Initials Name Provider Type    Julia Rowe OTR Occupational Therapist                         OT Assessment/Plan     Row Name 11/04/21 1739          OT Assessment    Assessment Comments measurements indicate a significant decrease in edema (see flow sheet) . cording is improved but persists. Progressing very well.  -RE            OT Plan    OT Plan Comments continue  -RE           User Key  (r) = Recorded By, (t) = Taken By, (c) = Cosigned By    Initials Name Provider Type    Julia Rowe OTR Occupational Therapist                    Manual Rx (last 36 hours)     Manual Treatments     Row Name 11/04/21 1451             Total Minutes    82564 - OT Manual Therapy Minutes 40  -RE            User Key  (r) = Recorded By, (t) = Taken By, (c) = Cosigned By    Initials Name Provider Type    Julia Rowe OTR Occupational Therapist                  Therapy Education  Education Details: prayer stretch and reverse prayer stretch  Given: HEP, Edema management  Program: New, Reinforced  How Provided: Verbal, Demonstration  Provided to: Patient  Level of Understanding: Teach back education performed, Verbalized, Demonstrated  97773 - OT Self Care/Mgmt Minutes: 13                Time Calculation:   OT Start Time: 1400  OT Stop Time: 1453  OT Time Calculation (min): 53 min  Total Timed Code Minutes- OT: 53 minute(s)  Timed Charges  21244 - OT Manual Therapy  Minutes: 40  64676 - OT Self Care/Mgmt Minutes: 13  Total Minutes  Timed Charges Total Minutes: 53   Total Minutes: 53                     VI Ulloa  11/4/2021

## 2021-11-05 ENCOUNTER — RADIATION ONCOLOGY WEEKLY ASSESSMENT (OUTPATIENT)
Dept: RADIATION ONCOLOGY | Facility: HOSPITAL | Age: 41
End: 2021-11-05

## 2021-11-05 DIAGNOSIS — Z17.1 MALIGNANT NEOPLASM OF OVERLAPPING SITES OF LEFT BREAST IN FEMALE, ESTROGEN RECEPTOR NEGATIVE (HCC): Primary | ICD-10-CM

## 2021-11-05 DIAGNOSIS — C50.812 MALIGNANT NEOPLASM OF OVERLAPPING SITES OF LEFT BREAST IN FEMALE, ESTROGEN RECEPTOR NEGATIVE (HCC): Primary | ICD-10-CM

## 2021-11-05 LAB
AORTIC ARCH: 2.2 CM
ASCENDING AORTA: 2.5 CM
BH CV ECHO MEAS - ACS: 1.3 CM
BH CV ECHO MEAS - AO MAX PG (FULL): 9.1 MMHG
BH CV ECHO MEAS - AO MAX PG: 12.1 MMHG
BH CV ECHO MEAS - AO MEAN PG (FULL): 4.4 MMHG
BH CV ECHO MEAS - AO MEAN PG: 5.9 MMHG
BH CV ECHO MEAS - AO ROOT AREA (BSA CORRECTED): 1.5
BH CV ECHO MEAS - AO ROOT AREA: 4.8 CM^2
BH CV ECHO MEAS - AO ROOT DIAM: 2.5 CM
BH CV ECHO MEAS - AO V2 MAX: 173.7 CM/SEC
BH CV ECHO MEAS - AO V2 MEAN: 114.2 CM/SEC
BH CV ECHO MEAS - AO V2 VTI: 30.4 CM
BH CV ECHO MEAS - ASC AORTA: 2.5 CM
BH CV ECHO MEAS - AVA(I,A): 1.3 CM^2
BH CV ECHO MEAS - AVA(I,D): 1.3 CM^2
BH CV ECHO MEAS - AVA(V,A): 1.3 CM^2
BH CV ECHO MEAS - AVA(V,D): 1.3 CM^2
BH CV ECHO MEAS - BSA(HAYCOCK): 1.7 M^2
BH CV ECHO MEAS - BSA: 1.7 M^2
BH CV ECHO MEAS - BZI_BMI: 26.9 KILOGRAMS/M^2
BH CV ECHO MEAS - BZI_METRIC_HEIGHT: 157.5 CM
BH CV ECHO MEAS - BZI_METRIC_WEIGHT: 66.7 KG
BH CV ECHO MEAS - EDV(MOD-SP2): 74 ML
BH CV ECHO MEAS - EDV(MOD-SP4): 94 ML
BH CV ECHO MEAS - EDV(TEICH): 81.6 ML
BH CV ECHO MEAS - EF(CUBED): 75.7 %
BH CV ECHO MEAS - EF(MOD-BP): 57 %
BH CV ECHO MEAS - EF(MOD-SP2): 56.8 %
BH CV ECHO MEAS - EF(MOD-SP4): 58.5 %
BH CV ECHO MEAS - EF(TEICH): 68 %
BH CV ECHO MEAS - ESV(MOD-SP2): 32 ML
BH CV ECHO MEAS - ESV(MOD-SP4): 39 ML
BH CV ECHO MEAS - ESV(TEICH): 26.1 ML
BH CV ECHO MEAS - FS: 37.6 %
BH CV ECHO MEAS - IVS/LVPW: 0.97
BH CV ECHO MEAS - IVSD: 0.82 CM
BH CV ECHO MEAS - LAT PEAK E' VEL: 10 CM/SEC
BH CV ECHO MEAS - LV DIASTOLIC VOL/BSA (35-75): 56.1 ML/M^2
BH CV ECHO MEAS - LV MASS(C)D: 109.2 GRAMS
BH CV ECHO MEAS - LV MASS(C)DI: 65.1 GRAMS/M^2
BH CV ECHO MEAS - LV MAX PG: 3 MMHG
BH CV ECHO MEAS - LV MEAN PG: 1.5 MMHG
BH CV ECHO MEAS - LV SYSTOLIC VOL/BSA (12-30): 23.3 ML/M^2
BH CV ECHO MEAS - LV V1 MAX: 86.9 CM/SEC
BH CV ECHO MEAS - LV V1 MEAN: 58 CM/SEC
BH CV ECHO MEAS - LV V1 VTI: 15 CM
BH CV ECHO MEAS - LVIDD: 4.3 CM
BH CV ECHO MEAS - LVIDS: 2.7 CM
BH CV ECHO MEAS - LVLD AP2: 6.6 CM
BH CV ECHO MEAS - LVLD AP4: 6.6 CM
BH CV ECHO MEAS - LVLS AP2: 5.2 CM
BH CV ECHO MEAS - LVLS AP4: 5.3 CM
BH CV ECHO MEAS - LVOT AREA (M): 2.5 CM^2
BH CV ECHO MEAS - LVOT AREA: 2.6 CM^2
BH CV ECHO MEAS - LVOT DIAM: 1.8 CM
BH CV ECHO MEAS - LVPWD: 0.84 CM
BH CV ECHO MEAS - MED PEAK E' VEL: 6.7 CM/SEC
BH CV ECHO MEAS - MR MAX PG: 52.9 MMHG
BH CV ECHO MEAS - MR MAX VEL: 363.8 CM/SEC
BH CV ECHO MEAS - MV A DUR: 0.12 SEC
BH CV ECHO MEAS - MV A MAX VEL: 132.8 CM/SEC
BH CV ECHO MEAS - MV DEC SLOPE: 694.6 CM/SEC^2
BH CV ECHO MEAS - MV DEC TIME: 0.16 SEC
BH CV ECHO MEAS - MV E MAX VEL: 116 CM/SEC
BH CV ECHO MEAS - MV E/A: 0.87
BH CV ECHO MEAS - MV MAX PG: 7.5 MMHG
BH CV ECHO MEAS - MV MEAN PG: 4.3 MMHG
BH CV ECHO MEAS - MV P1/2T MAX VEL: 134.4 CM/SEC
BH CV ECHO MEAS - MV P1/2T: 56.7 MSEC
BH CV ECHO MEAS - MV V2 MAX: 137.1 CM/SEC
BH CV ECHO MEAS - MV V2 MEAN: 97.7 CM/SEC
BH CV ECHO MEAS - MV V2 VTI: 32.3 CM
BH CV ECHO MEAS - MVA P1/2T LCG: 1.6 CM^2
BH CV ECHO MEAS - MVA(P1/2T): 3.9 CM^2
BH CV ECHO MEAS - MVA(VTI): 1.2 CM^2
BH CV ECHO MEAS - PA ACC TIME: 0.13 SEC
BH CV ECHO MEAS - PA MAX PG (FULL): 1.5 MMHG
BH CV ECHO MEAS - PA MAX PG: 4 MMHG
BH CV ECHO MEAS - PA PR(ACCEL): 19.6 MMHG
BH CV ECHO MEAS - PA V2 MAX: 100.4 CM/SEC
BH CV ECHO MEAS - PULM A REVS DUR: 0.09 SEC
BH CV ECHO MEAS - PULM A REVS VEL: 30.8 CM/SEC
BH CV ECHO MEAS - PULM DIAS VEL: 29.7 CM/SEC
BH CV ECHO MEAS - PULM S/D: 1.5
BH CV ECHO MEAS - PULM SYS VEL: 44.6 CM/SEC
BH CV ECHO MEAS - PVA(V,A): 2.2 CM^2
BH CV ECHO MEAS - PVA(V,D): 2.2 CM^2
BH CV ECHO MEAS - QP/QS: 1.2
BH CV ECHO MEAS - RAP SYSTOLE: 8 MMHG
BH CV ECHO MEAS - RV MAX PG: 2.5 MMHG
BH CV ECHO MEAS - RV MEAN PG: 1.6 MMHG
BH CV ECHO MEAS - RV V1 MAX: 78.8 CM/SEC
BH CV ECHO MEAS - RV V1 MEAN: 61.7 CM/SEC
BH CV ECHO MEAS - RV V1 VTI: 16.5 CM
BH CV ECHO MEAS - RVOT AREA: 2.8 CM^2
BH CV ECHO MEAS - RVOT DIAM: 1.9 CM
BH CV ECHO MEAS - RVSP: 27 MMHG
BH CV ECHO MEAS - SI(AO): 87.3 ML/M^2
BH CV ECHO MEAS - SI(CUBED): 35.1 ML/M^2
BH CV ECHO MEAS - SI(LVOT): 23.6 ML/M^2
BH CV ECHO MEAS - SI(MOD-SP2): 25 ML/M^2
BH CV ECHO MEAS - SI(MOD-SP4): 32.8 ML/M^2
BH CV ECHO MEAS - SI(TEICH): 33.1 ML/M^2
BH CV ECHO MEAS - SUP REN AO DIAM: 1.8 CM
BH CV ECHO MEAS - SV(AO): 146.3 ML
BH CV ECHO MEAS - SV(CUBED): 58.8 ML
BH CV ECHO MEAS - SV(LVOT): 39.6 ML
BH CV ECHO MEAS - SV(MOD-SP2): 42 ML
BH CV ECHO MEAS - SV(MOD-SP4): 55 ML
BH CV ECHO MEAS - SV(RVOT): 46.9 ML
BH CV ECHO MEAS - SV(TEICH): 55.4 ML
BH CV ECHO MEAS - TAPSE (>1.6): 2.1 CM
BH CV ECHO MEAS - TR MAX VEL: 215.8 CM/SEC
BH CV ECHO MEASUREMENTS AVERAGE E/E' RATIO: 13.89
BH CV XLRA - RV BASE: 1.9 CM
BH CV XLRA - RV LENGTH: 5.8 CM
BH CV XLRA - RV MID: 1.5 CM
BH CV XLRA - TDI S': 14.5 CM/SEC
LEFT ATRIUM VOLUME INDEX: 19 ML/M2
LV EF 2D ECHO EST: 57 %
MAXIMAL PREDICTED HEART RATE: 177 BPM
SINUS: 2.8 CM
STJ: 2.6 CM
STRESS TARGET HR: 150 BPM

## 2021-11-05 PROCEDURE — 77412 RADIATION TX DELIVERY LVL 3: CPT | Performed by: RADIOLOGY

## 2021-11-05 PROCEDURE — FACE2FACE: Performed by: RADIOLOGY

## 2021-11-05 NOTE — PROGRESS NOTES
Please let her know I will personally review the echo upon my return on Monday and call her with results. In the meantime, let her know that her function is still normal and not affected by her chemo.    Brown ritchie

## 2021-11-05 NOTE — PROGRESS NOTES
Physician Weekly Management Note    Diagnosis:     1. Malignant neoplasm of overlapping sites of left breast in female, estrogen receptor negative (HCC)       Reason for Visit: Weekly Status Check (4/21)    Concurrent Chemo:   No    Notes on Treatment course, Films, Medical progress and Plan:  Doing well. Fitted for sleeve/glove, changing small seroma in axilla some but discussed, ok on exam. No problems or questions, cont on.    Performance Status:  (1) Restricted in physically strenuous activity, ambulatory and able to do work of light nature    Subjective   ROS - Other than as listed above, as follow:  Constitutional - Normal - Denies lack of appetite, fatigue, fever, night sweats and change in weight.  Neck - Normal - Denies neck masses, muscle weakness, neck pain, decreased range of motion and swelling of the neck.  Breasts - Normal - Denies breast masses, nipple discharge, nipple inversion and pain.  Respiratory - Normal - Denies cough, dyspnea, hemoptysis, hiccoughs, pleuritic chest pain and wheezing.  Gastrointestinal - Normal - Denies abdominal pain, constipation, diarrhea, heartburn / dyspepsia, hematemesis, hemorrhoids, melena / GI bleeding, nausea, pain / cramping, satiety and vomiting.  Musculoskeletal - Normal - Denies arthritis, bone pain, joint pain, muscle weakness and decreased range of motion.   Hematologic/Lymphatic - Normal - Denies easy bruising and tender or enlarged lymph nodes.  There were no vitals filed for this visit.  Objective   PHYSICAL EXAM - Other than listed above, as follows:  Constitutional - Normal - No evidence of impaired alertness, inadequate appearance, premature or advanced chronologic age, uncooperativeness, altered mood and affect and disorientation.  Neck - Normal - No evidence of tender or enlarged lymph nodes, neck abnormalities, restricted range of motion and enlarged thyroid gland.  Breasts - Normal - No change in nipple or incision site.  Chest - Normal - No evidence  "of chest abnormalities and tender or enlarged lymph nodes.  Hematologic/Lymphatic -  Normal - No evidence of tender or enlarged axillae lymph nodes and tender or enlarged neck lymph nodes.     Technical aspects reviewed:  Weekly OBI approved if applicable? Yes  Weekly port films approved?   Yes  Change requests noted if applicable? Yes  Patient setup and plan reviewed?  Yes    Chart Reviewed:  Continue current treatment plan?   Yes  Treatment plan change requested?  No    I have reviewed and marked as \"reviewed\" the current medications, allergies and problem list in the patients EMR.  I have reviewed the patient's medical, surgical  history in detail, reviewed any pertinent lab work  and updated the computerized patient record if needed.    Patient's Care Team:  Patient Care Team:  Fadi Goodman MD as PCP - General (Family Medicine)  Nadiya Haynes MD as Referring Physician (Neurology)  Neena Beavers MD as Consulting Physician (Hematology and Oncology)  Camron Virk MD as Consulting Physician (Cardiology)  Amanda Byrd MD as Consulting Physician (Radiation Oncology)      "

## 2021-11-08 PROCEDURE — 77417 THER RADIOLOGY PORT IMAGE(S): CPT | Performed by: RADIOLOGY

## 2021-11-08 PROCEDURE — 77412 RADIATION TX DELIVERY LVL 3: CPT | Performed by: RADIOLOGY

## 2021-11-09 ENCOUNTER — APPOINTMENT (OUTPATIENT)
Dept: OTHER | Facility: HOSPITAL | Age: 41
End: 2021-11-09

## 2021-11-09 ENCOUNTER — HOSPITAL ENCOUNTER (OUTPATIENT)
Dept: OCCUPATIONAL THERAPY | Facility: HOSPITAL | Age: 41
Setting detail: THERAPIES SERIES
Discharge: HOME | End: 2021-11-09

## 2021-11-09 DIAGNOSIS — I97.2 POST-MASTECTOMY LYMPHEDEMA SYNDROME: Primary | ICD-10-CM

## 2021-11-09 DIAGNOSIS — M79.621 AXILLARY PAIN, RIGHT: ICD-10-CM

## 2021-11-09 DIAGNOSIS — Z91.89 AT RISK FOR LYMPHEDEMA: ICD-10-CM

## 2021-11-09 PROCEDURE — 97535 SELF CARE MNGMENT TRAINING: CPT

## 2021-11-09 PROCEDURE — 97140 MANUAL THERAPY 1/> REGIONS: CPT

## 2021-11-09 PROCEDURE — 77412 RADIATION TX DELIVERY LVL 3: CPT | Performed by: RADIOLOGY

## 2021-11-09 PROCEDURE — 77336 RADIATION PHYSICS CONSULT: CPT | Performed by: RADIOLOGY

## 2021-11-09 NOTE — THERAPY TREATMENT NOTE
Outpatient Occupational Therapy Lymphedema Treatment Note  [facility]     Patient Name: Radha Astorga  : 1980  MRN: [id number]  Today's Date: 2021      Visit Date: 2021  Patient and therapist both masked. Therapist with face shield and gloves.  Patient Active Problem List   Diagnosis   • Family history of breast cancer   • Hypothyroidism   • Major depressive disorder, recurrent episode, moderate with anxious distress ([facility])   • Attention deficit hyperactivity disorder (ADHD), predominantly inattentive type   • Factor 5 Leiden mutation, heterozygous ([facility])   • Kidney mass   • Malignant neoplasm of overlapping sites of left breast in female, estrogen receptor negative ([facility])   • High risk medication use   • Essential hypertension   • Rheumatic mitral valve disease   • Encounter for adjustment or management of vascular access device   • Iron deficiency anemia        Past Medical History:   Diagnosis Date   • Acute stress reaction 2014   • ADD (attention deficit disorder)    • ADHD (attention deficit hyperactivity disorder)    • Anxiety and depression    • CTS (carpal tunnel syndrome)    • Factor 5 Leiden mutation, heterozygous ([facility]) 2021   • Family history of breast cancer 2013   • Fracture, cervical vertebra ([facility]) 1997    C4   • H/O complete eye exam 2016   • Hearing loss    • 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   • History of rheumatic fever    • Hypertension    • Hypothyroidism    • Infiltrating ductal carcinoma of left breast ([facility]) 2021    Left   • Kidney stone    • Mitral valve insufficiency    • PONV (postoperative nausea and vomiting)    • Stroke ([facility]) 2020    HX OF   • Stroke-like symptoms         Past Surgical History:   Procedure Laterality Date   • BREAST BIOPSY Left 2021    IDC   • BREAST LUMPECTOMY WITH SENTINEL NODE BIOPSY N/A 2021    Procedure: right port placement, Left SHAINA-guided, bracketed, partial mastectomy and  sentinel lymph node biopsy Left breast needle-localized excisional biopsy;  Surgeon: Lashonda Euceda MD;  Location: [facility];  Service: General;  Laterality: N/A;   • BREAST SURGERY Bilateral 7/1/2021    Procedure: RIGHT BREAST REDUCTION MASTOPEXY LEFT BREAST ONCOPLASTIC CLOSURE;  Surgeon: Caridad Baker MD PhD;  Location: [facility];  Service: Plastics;  Laterality: Bilateral;   • NO PAST SURGERIES     • PAP SMEAR  2016         Visit Dx:      ICD-10-CM ICD-9-CM   1. 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 Axillary pain, right  M79.621 729.5   3. At risk for lymphedema  Z91.89 V49.89        Lymphedema     Row Name 11/09/21 1500             Subjective Pain    Able to rate subjective pain? yes  -RE      Pre-Treatment Pain Level 0  -RE      Post-Treatment Pain Level 0  -RE      Subjective Pain Comment increased discomfort from seroma  -RE              Subjective Comments    Subjective Comments Having increased neuropathy symptoms when wearing sleeve  -RE              Manual Lymphatic Drainage    Extremity Treatment simple/brief MLD  -RE              Compression/Skin Care    Compression/Skin Care Comments continue with tribute use  -RE            User Key  (r) = Recorded By, (t) = Taken By, (c) = Cosigned By    Initials Name Provider Type    Julia Rowe, OTR Occupational Therapist                         OT Assessment/Plan     Row Name 11/09/21 0168          OT Assessment    Assessment Comments Cording continues and is palpable. Follow manual techniques she did have some symptom relief. Sleeve is causing increased nerve symptoms which is new and she is c/o increased ssymptoms from her seroma  -RE            OT Plan    OT Plan Comments continue  -RE           User Key  (r) = Recorded By, (t) = Taken By, (c) = Cosigned By    Initials Name Provider Type    Julia Rowe, OTR Occupational Therapist                    Manual Rx (last 36 hours)     Manual Treatments     Row Name  11/09/21 1703             Total Minutes    78339 - OT Manual Therapy Minutes 45  -RE            User Key  (r) = Recorded By, (t) = Taken By, (c) = Cosigned By    Initials Name Provider Type    Julia Rowe OTR Occupational Therapist                  Therapy Education  Education Details: demonstrated swell spots which could be use under a tight shirt to provide light compression to axilla  Given: Edema management, Symptoms/condition management  Program: New  How Provided: Verbal  Provided to: Patient  Level of Understanding: Teach back education performed, Verbalized  51909 - OT Self Care/Mgmt Minutes: 15                Time Calculation:   OT Start Time: 1545  OT Stop Time: 1645  OT Time Calculation (min): 60 min  Total Timed Code Minutes- OT: 60 minute(s)  Timed Charges  91192 - OT Manual Therapy Minutes: 45  33563 - OT Self Care/Mgmt Minutes: 15  Total Minutes  Timed Charges Total Minutes: 60   Total Minutes: 60     Therapy Charges for Today     Code Description Service Date Service Provider Modifiers Qty    45965588914 HC OT MANUAL THERAPY EA 15 MIN 11/9/2021 Julia Leslie OTR GO 3    91637390658 HC OT SELF CARE/MGMT/TRAIN EA 15 MIN 11/9/2021 Julia Leslie OTR GO 1                      VI Ulloa  11/9/2021

## 2021-11-10 ENCOUNTER — DOCUMENTATION (OUTPATIENT)
Dept: OTHER | Facility: HOSPITAL | Age: 41
End: 2021-11-10

## 2021-11-10 ENCOUNTER — INFUSION (OUTPATIENT)
Dept: ONCOLOGY | Facility: HOSPITAL | Age: 41
End: 2021-11-10

## 2021-11-10 ENCOUNTER — OFFICE VISIT (OUTPATIENT)
Dept: ONCOLOGY | Facility: CLINIC | Age: 41
End: 2021-11-10

## 2021-11-10 VITALS
WEIGHT: 146.5 LBS | OXYGEN SATURATION: 98 % | BODY MASS INDEX: 26.96 KG/M2 | HEART RATE: 102 BPM | TEMPERATURE: 97.1 F | HEIGHT: 62 IN | DIASTOLIC BLOOD PRESSURE: 92 MMHG | SYSTOLIC BLOOD PRESSURE: 137 MMHG | RESPIRATION RATE: 16 BRPM

## 2021-11-10 DIAGNOSIS — C50.812 MALIGNANT NEOPLASM OF OVERLAPPING SITES OF LEFT BREAST IN FEMALE, ESTROGEN RECEPTOR NEGATIVE (HCC): ICD-10-CM

## 2021-11-10 DIAGNOSIS — C50.812 MALIGNANT NEOPLASM OF OVERLAPPING SITES OF LEFT BREAST IN FEMALE, ESTROGEN RECEPTOR NEGATIVE (HCC): Primary | ICD-10-CM

## 2021-11-10 DIAGNOSIS — Z17.1 MALIGNANT NEOPLASM OF OVERLAPPING SITES OF LEFT BREAST IN FEMALE, ESTROGEN RECEPTOR NEGATIVE (HCC): Primary | ICD-10-CM

## 2021-11-10 DIAGNOSIS — Z45.2 ENCOUNTER FOR ADJUSTMENT OR MANAGEMENT OF VASCULAR ACCESS DEVICE: ICD-10-CM

## 2021-11-10 DIAGNOSIS — Z17.1 MALIGNANT NEOPLASM OF OVERLAPPING SITES OF LEFT BREAST IN FEMALE, ESTROGEN RECEPTOR NEGATIVE (HCC): ICD-10-CM

## 2021-11-10 DIAGNOSIS — F90.0 ATTENTION DEFICIT HYPERACTIVITY DISORDER (ADHD), PREDOMINANTLY INATTENTIVE TYPE: Primary | ICD-10-CM

## 2021-11-10 LAB
ALBUMIN SERPL-MCNC: 4.2 G/DL (ref 3.5–5.2)
ALBUMIN/GLOB SERPL: 1.6 G/DL
ALP SERPL-CCNC: 105 U/L (ref 39–117)
ALT SERPL W P-5'-P-CCNC: 122 U/L (ref 1–33)
ANION GAP SERPL CALCULATED.3IONS-SCNC: 10.6 MMOL/L (ref 5–15)
AST SERPL-CCNC: 61 U/L (ref 1–32)
BASOPHILS # BLD AUTO: 0.07 10*3/MM3 (ref 0–0.2)
BASOPHILS NFR BLD AUTO: 0.9 % (ref 0–1.5)
BILIRUB SERPL-MCNC: 0.3 MG/DL (ref 0–1.2)
BUN SERPL-MCNC: 16 MG/DL (ref 6–20)
BUN/CREAT SERPL: 22.9 (ref 7–25)
CALCIUM SPEC-SCNC: 9.9 MG/DL (ref 8.6–10.5)
CHLORIDE SERPL-SCNC: 104 MMOL/L (ref 98–107)
CO2 SERPL-SCNC: 25.4 MMOL/L (ref 22–29)
CREAT SERPL-MCNC: 0.7 MG/DL (ref 0.57–1)
DEPRECATED RDW RBC AUTO: 53.1 FL (ref 37–54)
EOSINOPHIL # BLD AUTO: 0.2 10*3/MM3 (ref 0–0.4)
EOSINOPHIL NFR BLD AUTO: 2.5 % (ref 0.3–6.2)
ERYTHROCYTE [DISTWIDTH] IN BLOOD BY AUTOMATED COUNT: 16.3 % (ref 12.3–15.4)
GFR SERPL CREATININE-BSD FRML MDRD: 91 ML/MIN/1.73
GLOBULIN UR ELPH-MCNC: 2.7 GM/DL
GLUCOSE SERPL-MCNC: 109 MG/DL (ref 65–99)
HCG SERPL QL: NEGATIVE
HCT VFR BLD AUTO: 36.9 % (ref 34–46.6)
HGB BLD-MCNC: 11.9 G/DL (ref 12–15.9)
IMM GRANULOCYTES # BLD AUTO: 0.04 10*3/MM3 (ref 0–0.05)
IMM GRANULOCYTES NFR BLD AUTO: 0.5 % (ref 0–0.5)
LYMPHOCYTES # BLD AUTO: 1.49 10*3/MM3 (ref 0.7–3.1)
LYMPHOCYTES NFR BLD AUTO: 18.8 % (ref 19.6–45.3)
MCH RBC QN AUTO: 28.5 PG (ref 26.6–33)
MCHC RBC AUTO-ENTMCNC: 32.2 G/DL (ref 31.5–35.7)
MCV RBC AUTO: 88.3 FL (ref 79–97)
MONOCYTES # BLD AUTO: 0.81 10*3/MM3 (ref 0.1–0.9)
MONOCYTES NFR BLD AUTO: 10.2 % (ref 5–12)
NEUTROPHILS NFR BLD AUTO: 5.31 10*3/MM3 (ref 1.7–7)
NEUTROPHILS NFR BLD AUTO: 67.1 % (ref 42.7–76)
NRBC BLD AUTO-RTO: 0 /100 WBC (ref 0–0.2)
PLATELET # BLD AUTO: 284 10*3/MM3 (ref 140–450)
PMV BLD AUTO: 9.8 FL (ref 6–12)
POTASSIUM SERPL-SCNC: 4.1 MMOL/L (ref 3.5–5.2)
PROT SERPL-MCNC: 6.9 G/DL (ref 6–8.5)
RBC # BLD AUTO: 4.18 10*6/MM3 (ref 3.77–5.28)
SODIUM SERPL-SCNC: 140 MMOL/L (ref 136–145)
WBC # BLD AUTO: 7.92 10*3/MM3 (ref 3.4–10.8)

## 2021-11-10 PROCEDURE — 99215 OFFICE O/P EST HI 40 MIN: CPT | Performed by: INTERNAL MEDICINE

## 2021-11-10 PROCEDURE — 96413 CHEMO IV INFUSION 1 HR: CPT

## 2021-11-10 PROCEDURE — 77412 RADIATION TX DELIVERY LVL 3: CPT | Performed by: RADIOLOGY

## 2021-11-10 PROCEDURE — 85025 COMPLETE CBC W/AUTO DIFF WBC: CPT | Performed by: INTERNAL MEDICINE

## 2021-11-10 PROCEDURE — 25010000002 HEPARIN LOCK FLUSH PER 10 UNITS: Performed by: INTERNAL MEDICINE

## 2021-11-10 PROCEDURE — 77427 RADIATION TX MANAGEMENT X5: CPT | Performed by: RADIOLOGY

## 2021-11-10 PROCEDURE — 96375 TX/PRO/DX INJ NEW DRUG ADDON: CPT

## 2021-11-10 PROCEDURE — 84703 CHORIONIC GONADOTROPIN ASSAY: CPT

## 2021-11-10 PROCEDURE — 80053 COMPREHEN METABOLIC PANEL: CPT | Performed by: INTERNAL MEDICINE

## 2021-11-10 PROCEDURE — 25010000002 TRASTUZUMAB-ANNS 420 MG RECONSTITUTED SOLUTION 1 EACH VIAL: Performed by: INTERNAL MEDICINE

## 2021-11-10 RX ORDER — SODIUM CHLORIDE 9 MG/ML
250 INJECTION, SOLUTION INTRAVENOUS ONCE
Status: COMPLETED | OUTPATIENT
Start: 2021-11-10 | End: 2021-11-10

## 2021-11-10 RX ORDER — DIPHENHYDRAMINE HCL 25 MG
25 CAPSULE ORAL ONCE
Status: DISCONTINUED | OUTPATIENT
Start: 2021-11-10 | End: 2021-11-10

## 2021-11-10 RX ORDER — SODIUM CHLORIDE 0.9 % (FLUSH) 0.9 %
10 SYRINGE (ML) INJECTION AS NEEDED
Status: DISCONTINUED | OUTPATIENT
Start: 2021-11-10 | End: 2021-11-10 | Stop reason: HOSPADM

## 2021-11-10 RX ORDER — HEPARIN SODIUM (PORCINE) LOCK FLUSH IV SOLN 100 UNIT/ML 100 UNIT/ML
500 SOLUTION INTRAVENOUS AS NEEDED
Status: DISCONTINUED | OUTPATIENT
Start: 2021-11-10 | End: 2021-11-10 | Stop reason: HOSPADM

## 2021-11-10 RX ORDER — HEPARIN SODIUM (PORCINE) LOCK FLUSH IV SOLN 100 UNIT/ML 100 UNIT/ML
500 SOLUTION INTRAVENOUS AS NEEDED
Status: CANCELLED | OUTPATIENT
Start: 2021-11-10

## 2021-11-10 RX ORDER — DEXTROAMPHETAMINE SACCHARATE, AMPHETAMINE ASPARTATE MONOHYDRATE, DEXTROAMPHETAMINE SULFATE AND AMPHETAMINE SULFATE 5; 5; 5; 5 MG/1; MG/1; MG/1; MG/1
20 CAPSULE, EXTENDED RELEASE ORAL EVERY MORNING
Qty: 30 CAPSULE | Refills: 0 | Status: SHIPPED | OUTPATIENT
Start: 2021-11-10 | End: 2021-12-03 | Stop reason: SDUPTHER

## 2021-11-10 RX ORDER — SODIUM CHLORIDE 9 MG/ML
250 INJECTION, SOLUTION INTRAVENOUS ONCE
Status: CANCELLED | OUTPATIENT
Start: 2021-11-10

## 2021-11-10 RX ORDER — SODIUM CHLORIDE 0.9 % (FLUSH) 0.9 %
10 SYRINGE (ML) INJECTION AS NEEDED
Status: CANCELLED | OUTPATIENT
Start: 2021-11-10

## 2021-11-10 RX ORDER — DIPHENHYDRAMINE HCL 25 MG
25 CAPSULE ORAL ONCE
Status: CANCELLED | OUTPATIENT
Start: 2021-11-10

## 2021-11-10 RX ORDER — FAMOTIDINE 10 MG/ML
20 INJECTION, SOLUTION INTRAVENOUS ONCE
Status: COMPLETED | OUTPATIENT
Start: 2021-11-10 | End: 2021-11-10

## 2021-11-10 RX ADMIN — SODIUM CHLORIDE, PRESERVATIVE FREE 10 ML: 5 INJECTION INTRAVENOUS at 11:44

## 2021-11-10 RX ADMIN — Medication 500 UNITS: at 11:45

## 2021-11-10 RX ADMIN — TRASTUZUMAB-ANNS 390 MG: 420 INJECTION, POWDER, LYOPHILIZED, FOR SOLUTION INTRAVENOUS at 11:07

## 2021-11-10 RX ADMIN — FAMOTIDINE 20 MG: 10 INJECTION INTRAVENOUS at 10:51

## 2021-11-10 RX ADMIN — SODIUM CHLORIDE 250 ML: 9 INJECTION, SOLUTION INTRAVENOUS at 10:51

## 2021-11-10 NOTE — PROGRESS NOTES
Quantitative hcg test ordered today. Per lab, has to be qualitative hcg. Order added and lab notified.

## 2021-11-10 NOTE — PROGRESS NOTES
Oncology Social Work  [facility] - Follow up    OSW met with patient after her radiation appt yesterday. Chart reviewed.  OSW last spoke to patient in June.  Since that conversation a lot has happened.  Patient is s/p left SHAINA-guided partial mastectomy - margins were clear. Patient is HER 2 positive and has completed chemotherapy ( Weekly Taxol and Herceptin. ). Patient to complete 21 Radiation Treatments.   Patient is back at work, states her appetite is improving slowly, and she states sleep has improved greatly.  She joined the [employer] group - She states that this has been a huge support to her.   Patient has some concerns with her hearing and recently lost her hearing aids.  Referred pt to [facility] .  Also referred to [employer] for garments for her head while she sleeps at night. - she is experiencing temperature changes that is affecting quality of her sleep.    Will also email articles regarding holding grief and gratitude in the same hand while undergoing cancer treatment.   Patient feels that her anxiety is being managed. Coping appropriately.   Patient has my contact information and I encouraged her to reach out if needs arise  Thank you  Mary Montenegro, MSSW, CSW

## 2021-11-10 NOTE — NURSING NOTE
Patient elected not to take PO Benadryl due to having to drive and go to work following infusion appt today.  IV Pepcid ordered instead per Dr. Beavers.

## 2021-11-11 ENCOUNTER — HOSPITAL ENCOUNTER (OUTPATIENT)
Dept: OCCUPATIONAL THERAPY | Facility: HOSPITAL | Age: 41
Setting detail: THERAPIES SERIES
Discharge: HOME | End: 2021-11-11

## 2021-11-11 ENCOUNTER — RADIATION ONCOLOGY WEEKLY ASSESSMENT (OUTPATIENT)
Dept: RADIATION ONCOLOGY | Facility: HOSPITAL | Age: 41
End: 2021-11-11

## 2021-11-11 DIAGNOSIS — M79.621 AXILLARY PAIN, RIGHT: ICD-10-CM

## 2021-11-11 DIAGNOSIS — C50.812 MALIGNANT NEOPLASM OF OVERLAPPING SITES OF LEFT BREAST IN FEMALE, ESTROGEN RECEPTOR NEGATIVE (HCC): ICD-10-CM

## 2021-11-11 DIAGNOSIS — Z91.89 AT RISK FOR LYMPHEDEMA: ICD-10-CM

## 2021-11-11 DIAGNOSIS — Z17.1 MALIGNANT NEOPLASM OF OVERLAPPING SITES OF LEFT BREAST IN FEMALE, ESTROGEN RECEPTOR NEGATIVE (HCC): ICD-10-CM

## 2021-11-11 DIAGNOSIS — Z17.1 MALIGNANT NEOPLASM OF OVERLAPPING SITES OF LEFT BREAST IN FEMALE, ESTROGEN RECEPTOR NEGATIVE (HCC): Primary | ICD-10-CM

## 2021-11-11 DIAGNOSIS — C50.812 MALIGNANT NEOPLASM OF OVERLAPPING SITES OF LEFT BREAST IN FEMALE, ESTROGEN RECEPTOR NEGATIVE (HCC): Primary | ICD-10-CM

## 2021-11-11 DIAGNOSIS — I97.2 POST-MASTECTOMY LYMPHEDEMA SYNDROME: Primary | ICD-10-CM

## 2021-11-11 DIAGNOSIS — Z09 CHEMOTHERAPY FOLLOW-UP EXAMINATION: Primary | ICD-10-CM

## 2021-11-11 PROCEDURE — 77412 RADIATION TX DELIVERY LVL 3: CPT | Performed by: RADIOLOGY

## 2021-11-11 PROCEDURE — 97535 SELF CARE MNGMENT TRAINING: CPT

## 2021-11-11 PROCEDURE — 97140 MANUAL THERAPY 1/> REGIONS: CPT

## 2021-11-11 NOTE — PROGRESS NOTES
Physician Weekly Management Note    Diagnosis:     1. Malignant neoplasm of overlapping sites of left breast in female, estrogen receptor negative (HCC)       Reason for Visit: Weekly Status Check (8/21 - 2128/5254)    Concurrent Chemo:   No    Notes on Treatment course, Films, Medical progress and Plan:  Doing well. Struggling with lymphedema, cording and axillary seroma. Wrapped today, working diligently on that. Skin on breast fine, incisions fine. Cont on.    Performance Status:  (1) Restricted in physically strenuous activity, ambulatory and able to do work of light nature    Subjective   ROS - Other than as listed above, as follow:  Constitutional - Normal - Denies lack of appetite, fatigue, fever, night sweats and change in weight.  Neck - Normal - Denies neck masses, muscle weakness, neck pain, decreased range of motion and swelling of the neck.  Breasts - Normal - Denies breast masses, nipple discharge, nipple inversion and pain.  Respiratory - Normal - Denies cough, dyspnea, hemoptysis, hiccoughs, pleuritic chest pain and wheezing.  Gastrointestinal - Normal - Denies abdominal pain, constipation, diarrhea, heartburn / dyspepsia, hematemesis, hemorrhoids, melena / GI bleeding, nausea, pain / cramping, satiety and vomiting.  Musculoskeletal - Normal - Denies arthritis, bone pain, joint pain, muscle weakness and decreased range of motion.   Hematologic/Lymphatic - Normal - Denies easy bruising and tender or enlarged lymph nodes.  There were no vitals filed for this visit.  Objective   PHYSICAL EXAM - Other than listed above, as follows:  Constitutional - Normal - No evidence of impaired alertness, inadequate appearance, premature or advanced chronologic age, uncooperativeness, altered mood and affect and disorientation.  Neck - Normal - No evidence of tender or enlarged lymph nodes, neck abnormalities, restricted range of motion and enlarged thyroid gland.  Breasts - Normal - No change in nipple or incision  "site.  Chest - Normal - No evidence of chest abnormalities and tender or enlarged lymph nodes.  Hematologic/Lymphatic -  Normal - No evidence of tender or enlarged axillae lymph nodes and tender or enlarged neck lymph nodes.     Technical aspects reviewed:  Weekly OBI approved if applicable? Yes  Weekly port films approved?   Yes  Change requests noted if applicable? Yes  Patient setup and plan reviewed?  Yes    Chart Reviewed:  Continue current treatment plan?   Yes  Treatment plan change requested?  No    I have reviewed and marked as \"reviewed\" the current medications, allergies and problem list in the patients EMR.  I have reviewed the patient's medical, surgical  history in detail, reviewed any pertinent lab work  and updated the computerized patient record if needed.    Patient's Care Team:  Patient Care Team:  Fadi Goodman MD as PCP - General (Family Medicine)  Nadiya Haynes MD as Referring Physician (Neurology)  Neena Beavers MD as Consulting Physician (Hematology and Oncology)  Camron Virk MD as Consulting Physician (Cardiology)  Amanda Byrd MD as Consulting Physician (Radiation Oncology)      "

## 2021-11-11 NOTE — THERAPY TREATMENT NOTE
Outpatient Occupational Therapy Lymphedema Treatment Note  [facility]     Patient Name: Radha Astorga  : 1980  MRN: [id number]  Today's Date: 2021      Visit Date: 2021  Patient and therapist both masked. Therapist with face shield and gloves.  Patient Active Problem List   Diagnosis   • Family history of breast cancer   • Hypothyroidism   • Major depressive disorder, recurrent episode, moderate with anxious distress ([facility])   • Attention deficit hyperactivity disorder (ADHD), predominantly inattentive type   • Factor 5 Leiden mutation, heterozygous ([facility])   • Kidney mass   • Malignant neoplasm of overlapping sites of left breast in female, estrogen receptor negative ([facility])   • High risk medication use   • Essential hypertension   • Rheumatic mitral valve disease   • Encounter for adjustment or management of vascular access device   • Iron deficiency anemia        Past Medical History:   Diagnosis Date   • Acute stress reaction 2014   • ADD (attention deficit disorder)    • ADHD (attention deficit hyperactivity disorder)    • Anxiety and depression    • CTS (carpal tunnel syndrome)    • Factor 5 Leiden mutation, heterozygous ([facility]) 2021   • Family history of breast cancer 2013   • Fracture, cervical vertebra ([facility]) 1997    C4   • H/O complete eye exam 2016   • Hearing loss    • 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   • History of rheumatic fever    • Hypertension    • Hypothyroidism    • Infiltrating ductal carcinoma of left breast ([facility]) 2021    Left   • Kidney stone    • Mitral valve insufficiency    • PONV (postoperative nausea and vomiting)    • Stroke ([facility]) 2020    HX OF   • Stroke-like symptoms         Past Surgical History:   Procedure Laterality Date   • BREAST BIOPSY Left 2021    IDC   • BREAST LUMPECTOMY WITH SENTINEL NODE BIOPSY N/A 2021    Procedure: right port placement, Left SHAINA-guided, bracketed, partial mastectomy and  sentinel lymph node biopsy Left breast needle-localized excisional biopsy;  Surgeon: Lashonda Euceda MD;  Location: [facility];  Service: General;  Laterality: N/A;   • BREAST SURGERY Bilateral 7/1/2021    Procedure: RIGHT BREAST REDUCTION MASTOPEXY LEFT BREAST ONCOPLASTIC CLOSURE;  Surgeon: Caridad Baker MD PhD;  Location: [facility];  Service: Plastics;  Laterality: Bilateral;   • NO PAST SURGERIES     • PAP SMEAR  2016         Visit Dx:      ICD-10-CM ICD-9-CM   1. 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 Axillary pain, right  M79.621 729.5   3. At risk for lymphedema  Z91.89 V49.89   4. Malignant neoplasm of overlapping sites of left breast in female, estrogen receptor negative (HCC)  C50.812 174.8    Z17.1 V86.1        Lymphedema     Row Name 11/11/21 1000             Subjective Pain    Able to rate subjective pain? yes  -RE      Pre-Treatment Pain Level 2  -RE      Post-Treatment Pain Level 2  -RE      Subjective Pain Comment Feeling better after last session  -RE              Subjective Comments    Subjective Comments Pain is 2 at end range  -RE              Manual Lymphatic Drainage    Extremity Treatment simple/brief MLD  -RE      Manual Therapy c and s strokes to corded area  -RE              Compression/Skin Care    Compression/Skin Care skin care; wrapping location; bandaging  -RE      Skin Care lotion applied  -RE      Wrapping Location upper extremity  -RE      Wrapping Location UE left:; fingers to axilla  -RE      Bandage Layers cotton liner; padding/fluff layer; short-stretch bandages (comment size/quantity)  -RE      Bandaging Comments Tg7,Artiflex, 1-6cm and 1-8 cm and 2-10 cm Rosidal K. transellast to fingers  -RE      Bandaging Technique circumferential/spiral; light compression; moderate compression  -RE            User Key  (r) = Recorded By, (t) = Taken By, (c) = Cosigned By    Initials Name Provider Type    RE Julia Leslie, MAYITOR Occupational Therapist                          OT Assessment/Plan     Row Name 11/11/21 1650          OT Assessment    Assessment Comments Cording and seroma are causing discomfort and decreased AROM. Edema is stable but is not decreasing either. Patient to further increase her time in bandages. Will have flexitouch soon.  -RE            OT Plan    OT Plan Comments continue  -RE           User Key  (r) = Recorded By, (t) = Taken By, (c) = Cosigned By    Initials Name Provider Type    Julia Rowe OTR Occupational Therapist                    Manual Rx (last 36 hours)     Manual Treatments     Row Name 11/11/21 1653             Total Minutes    11589 - OT Manual Therapy Minutes 30  -RE            User Key  (r) = Recorded By, (t) = Taken By, (c) = Cosigned By    Initials Name Provider Type    Julia Rowe OTR Occupational Therapist                  Therapy Education  Given: Edema management, Bandaging/dressing change, Symptoms/condition management  Program: Reinforced  How Provided: Verbal  Provided to: Patient  Level of Understanding: Teach back education performed, Verbalized  24249 - OT Self Care/Mgmt Minutes: 10                Time Calculation:   OT Start Time: 1035  OT Stop Time: 1115  OT Time Calculation (min): 40 min  Total Timed Code Minutes- OT: 40 minute(s)  Timed Charges  89358 - OT Manual Therapy Minutes: 30  62818 - OT Self Care/Mgmt Minutes: 10  Total Minutes  Timed Charges Total Minutes: 40   Total Minutes: 40     Therapy Charges for Today     Code Description Service Date Service Provider Modifiers Qty    58081085801 HC OT MANUAL THERAPY EA 15 MIN 11/11/2021 Julia Leslie OTR GO 2    65449615982 HC OT SELF CARE/MGMT/TRAIN EA 15 MIN 11/11/2021 Julia Leslie OTR GO 1                      VI Ulloa  11/11/2021

## 2021-11-11 NOTE — PROGRESS NOTES
I called and left her a message.     Upon re-evaluation of echo, diastology was deemed indeterminate but I strongly feel it's normal. EF/GLS are normal. SHe has mild valvular thicknening but I don't feel there is significant dysfunction.    Scheduling, please arrange f/u limited echo in 3 months.     Dr Beavers, okay to proceed with treatment.    Thanks all,  RAQUEL

## 2021-11-12 PROCEDURE — 77412 RADIATION TX DELIVERY LVL 3: CPT | Performed by: RADIOLOGY

## 2021-11-12 RX ORDER — LEVOTHYROXINE SODIUM 137 MCG
137 TABLET ORAL DAILY
Qty: 90 TABLET | Refills: 1 | Status: SHIPPED | OUTPATIENT
Start: 2021-11-12 | End: 2022-02-23

## 2021-11-12 RX ORDER — LEVOTHYROXINE SODIUM 137 UG/1
TABLET ORAL
Qty: 90 TABLET | Refills: 1 | OUTPATIENT
Start: 2021-11-12

## 2021-11-15 PROCEDURE — 77412 RADIATION TX DELIVERY LVL 3: CPT | Performed by: RADIOLOGY

## 2021-11-16 ENCOUNTER — HOSPITAL ENCOUNTER (OUTPATIENT)
Dept: OCCUPATIONAL THERAPY | Facility: HOSPITAL | Age: 41
Setting detail: THERAPIES SERIES
Discharge: HOME | End: 2021-11-16

## 2021-11-16 DIAGNOSIS — C50.812 MALIGNANT NEOPLASM OF OVERLAPPING SITES OF LEFT BREAST IN FEMALE, ESTROGEN RECEPTOR NEGATIVE (HCC): ICD-10-CM

## 2021-11-16 DIAGNOSIS — Z17.1 MALIGNANT NEOPLASM OF OVERLAPPING SITES OF LEFT BREAST IN FEMALE, ESTROGEN RECEPTOR NEGATIVE (HCC): ICD-10-CM

## 2021-11-16 DIAGNOSIS — M79.621 AXILLARY PAIN, RIGHT: ICD-10-CM

## 2021-11-16 DIAGNOSIS — I97.2 POST-MASTECTOMY LYMPHEDEMA SYNDROME: Primary | ICD-10-CM

## 2021-11-16 PROCEDURE — 77417 THER RADIOLOGY PORT IMAGE(S): CPT | Performed by: RADIOLOGY

## 2021-11-16 PROCEDURE — 97140 MANUAL THERAPY 1/> REGIONS: CPT

## 2021-11-16 PROCEDURE — 77412 RADIATION TX DELIVERY LVL 3: CPT | Performed by: RADIOLOGY

## 2021-11-16 PROCEDURE — 77336 RADIATION PHYSICS CONSULT: CPT | Performed by: RADIOLOGY

## 2021-11-16 NOTE — THERAPY TREATMENT NOTE
Outpatient Occupational Therapy Lymphedema Treatment Note  [facility]     Patient Name: Radha Astorga  : 1980  MRN: [id number]  Today's Date: 2021      Visit Date: 2021  Patient and therapist both masked. Therapist with face shield and gloves.  Patient Active Problem List   Diagnosis   • Family history of breast cancer   • Hypothyroidism   • Major depressive disorder, recurrent episode, moderate with anxious distress ([facility])   • Attention deficit hyperactivity disorder (ADHD), predominantly inattentive type   • Factor 5 Leiden mutation, heterozygous ([facility])   • Kidney mass   • Malignant neoplasm of overlapping sites of left breast in female, estrogen receptor negative ([facility])   • High risk medication use   • Essential hypertension   • Rheumatic mitral valve disease   • Encounter for adjustment or management of vascular access device   • Iron deficiency anemia        Past Medical History:   Diagnosis Date   • Acute stress reaction 2014   • ADD (attention deficit disorder)    • ADHD (attention deficit hyperactivity disorder)    • Anxiety and depression    • CTS (carpal tunnel syndrome)    • Factor 5 Leiden mutation, heterozygous ([facility]) 2021   • Family history of breast cancer 2013   • Fracture, cervical vertebra ([facility]) 1997    C4   • H/O complete eye exam 2016   • Hearing loss    • 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   • History of rheumatic fever    • Hypertension    • Hypothyroidism    • Infiltrating ductal carcinoma of left breast ([facility]) 2021    Left   • Kidney stone    • Mitral valve insufficiency    • PONV (postoperative nausea and vomiting)    • Stroke ([facility]) 2020    HX OF   • Stroke-like symptoms         Past Surgical History:   Procedure Laterality Date   • BREAST BIOPSY Left 2021    IDC   • BREAST LUMPECTOMY WITH SENTINEL NODE BIOPSY N/A 2021    Procedure: right port placement, Left SHAINA-guided, bracketed, partial mastectomy and  sentinel lymph node biopsy Left breast needle-localized excisional biopsy;  Surgeon: Lashonda Euceda MD;  Location: [facility];  Service: General;  Laterality: N/A;   • BREAST SURGERY Bilateral 7/1/2021    Procedure: RIGHT BREAST REDUCTION MASTOPEXY LEFT BREAST ONCOPLASTIC CLOSURE;  Surgeon: Caridad Baker MD PhD;  Location: [facility];  Service: Plastics;  Laterality: Bilateral;   • NO PAST SURGERIES     • PAP SMEAR  2016         Visit Dx:      ICD-10-CM ICD-9-CM   1. 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 Malignant neoplasm of overlapping sites of left breast in female, estrogen receptor negative (HCC)  C50.812 174.8    Z17.1 V86.1   3. Axillary pain, right  M79.621 729.5        Lymphedema     Row Name 11/16/21 1500             Subjective Pain    Able to rate subjective pain? yes  -RE      Pre-Treatment Pain Level 1  -RE      Post-Treatment Pain Level 1  -RE      Subjective Pain Comment cord pain  -RE              Manual Lymphatic Drainage    Extremity Treatment simple/brief MLD  -RE      Manual Therapy c and s strokes to corded area  -RE              Compression/Skin Care    Compression/Skin Care Comments wore sleeve. forgot bandages  -RE            User Key  (r) = Recorded By, (t) = Taken By, (c) = Cosigned By    Initials Name Provider Type    Julia Rowe, MAYITOR Occupational Therapist                         OT Assessment/Plan     Row Name 11/16/21 5848          OT Assessment    Assessment Comments 2-3+ pitting along ulna which decreased significantly with manual techniques to corded area and brief MLD. cords continue to be firm and prominant  -RE            OT Plan    OT Plan Comments continue  -RE           User Key  (r) = Recorded By, (t) = Taken By, (c) = Cosigned By    Initials Name Provider Type    Julia Rowe OTKINGSLEY Occupational Therapist                    Manual Rx (last 36 hours)     Manual Treatments     Row Name 11/16/21 1654             Total Minutes     36040 - OT Manual Therapy Minutes 45  -RE            User Key  (r) = Recorded By, (t) = Taken By, (c) = Cosigned By    Initials Name Provider Type    Julia Rowe OTR Occupational Therapist                  Therapy Education  Given: Edema management, Symptoms/condition management  Program: Reinforced  How Provided: Verbal  Provided to: Patient  Level of Understanding: Teach back education performed, Verbalized                Time Calculation:   OT Start Time: 1550  OT Stop Time: 1635  OT Time Calculation (min): 45 min  Total Timed Code Minutes- OT: 45 minute(s)  Timed Charges  56888 - OT Manual Therapy Minutes: 45  Total Minutes  Timed Charges Total Minutes: 45   Total Minutes: 45     Therapy Charges for Today     Code Description Service Date Service Provider Modifiers Qty    72088475420 HC OT MANUAL THERAPY EA 15 MIN 11/16/2021 Julia Leslie OTR GO 3                      VI Ulloa  11/16/2021

## 2021-11-17 PROCEDURE — 77427 RADIATION TX MANAGEMENT X5: CPT | Performed by: RADIOLOGY

## 2021-11-17 PROCEDURE — 77412 RADIATION TX DELIVERY LVL 3: CPT | Performed by: RADIOLOGY

## 2021-11-18 PROCEDURE — 77412 RADIATION TX DELIVERY LVL 3: CPT | Performed by: RADIOLOGY

## 2021-11-18 PROCEDURE — 77387 GUIDANCE FOR RADJ TX DLVR: CPT | Performed by: RADIOLOGY

## 2021-11-19 ENCOUNTER — RADIATION ONCOLOGY WEEKLY ASSESSMENT (OUTPATIENT)
Dept: RADIATION ONCOLOGY | Facility: HOSPITAL | Age: 41
End: 2021-11-19

## 2021-11-19 DIAGNOSIS — C50.812 MALIGNANT NEOPLASM OF OVERLAPPING SITES OF LEFT BREAST IN FEMALE, ESTROGEN RECEPTOR NEGATIVE (HCC): Primary | ICD-10-CM

## 2021-11-19 DIAGNOSIS — Z17.1 MALIGNANT NEOPLASM OF OVERLAPPING SITES OF LEFT BREAST IN FEMALE, ESTROGEN RECEPTOR NEGATIVE (HCC): Primary | ICD-10-CM

## 2021-11-19 PROCEDURE — 77412 RADIATION TX DELIVERY LVL 3: CPT | Performed by: RADIOLOGY

## 2021-11-19 PROCEDURE — 77387 GUIDANCE FOR RADJ TX DLVR: CPT | Performed by: RADIOLOGY

## 2021-11-19 NOTE — PROGRESS NOTES
Physician Weekly Management Note    Diagnosis:     1. Malignant neoplasm of overlapping sites of left breast in female, estrogen receptor negative (HCC)       Reason for Visit: Weekly Status Check (14/21 - 7668/4050)    Concurrent Chemo:   No    Notes on Treatment course, Films, Medical progress and Plan:  Doing well. Struggling with lymphedema, cording and axillary seroma - edema in arm much better but settling into lateral breast. She will mention to therapist next week. Tender in deep axilla, probably from cord rupture. Skin on breast fine, incisions fine. Cont on.    Performance Status:  (1) Restricted in physically strenuous activity, ambulatory and able to do work of light nature    Subjective   ROS - Other than as listed above, as follow:  Constitutional - Normal - Denies lack of appetite, fatigue, fever, night sweats and change in weight.  Neck - Normal - Denies neck masses, muscle weakness, neck pain, decreased range of motion and swelling of the neck.  Breasts - Normal - Denies breast masses, nipple discharge, nipple inversion and pain.  Respiratory - Normal - Denies cough, dyspnea, hemoptysis, hiccoughs, pleuritic chest pain and wheezing.  Gastrointestinal - Normal - Denies abdominal pain, constipation, diarrhea, heartburn / dyspepsia, hematemesis, hemorrhoids, melena / GI bleeding, nausea, pain / cramping, satiety and vomiting.  Musculoskeletal - Normal - Denies arthritis, bone pain, joint pain, muscle weakness and decreased range of motion.   Hematologic/Lymphatic - Normal - Denies easy bruising and tender or enlarged lymph nodes.  There were no vitals filed for this visit.  Objective   PHYSICAL EXAM - Other than listed above, as follows:  Constitutional - Normal - No evidence of impaired alertness, inadequate appearance, premature or advanced chronologic age, uncooperativeness, altered mood and affect and disorientation.  Neck - Normal - No evidence of tender or enlarged lymph nodes, neck  "abnormalities, restricted range of motion and enlarged thyroid gland.  Breasts - Normal - No change in nipple or incision site.  Chest - Normal - No evidence of chest abnormalities and tender or enlarged lymph nodes.  Hematologic/Lymphatic -  Normal - No evidence of tender or enlarged axillae lymph nodes and tender or enlarged neck lymph nodes.     Technical aspects reviewed:  Weekly OBI approved if applicable? Yes  Weekly port films approved?   Yes  Change requests noted if applicable? Yes  Patient setup and plan reviewed?  Yes    Chart Reviewed:  Continue current treatment plan?   Yes  Treatment plan change requested?  No    I have reviewed and marked as \"reviewed\" the current medications, allergies and problem list in the patients EMR.  I have reviewed the patient's medical, surgical  history in detail, reviewed any pertinent lab work  and updated the computerized patient record if needed.    Patient's Care Team:  Patient Care Team:  Fadi Goodman MD as PCP - General (Family Medicine)  Nadiya Haynes MD as Referring Physician (Neurology)  Neena Beavers MD as Consulting Physician (Hematology and Oncology)  Camron Virk MD as Consulting Physician (Cardiology)  Amanda Byrd MD as Consulting Physician (Radiation Oncology)      "

## 2021-11-22 PROCEDURE — 77412 RADIATION TX DELIVERY LVL 3: CPT | Performed by: RADIOLOGY

## 2021-11-22 PROCEDURE — 77387 GUIDANCE FOR RADJ TX DLVR: CPT | Performed by: RADIOLOGY

## 2021-11-23 PROCEDURE — 77336 RADIATION PHYSICS CONSULT: CPT | Performed by: RADIOLOGY

## 2021-11-23 PROCEDURE — 77412 RADIATION TX DELIVERY LVL 3: CPT | Performed by: RADIOLOGY

## 2021-11-23 PROCEDURE — 77387 GUIDANCE FOR RADJ TX DLVR: CPT | Performed by: RADIOLOGY

## 2021-11-24 PROCEDURE — 77427 RADIATION TX MANAGEMENT X5: CPT | Performed by: RADIOLOGY

## 2021-11-24 PROCEDURE — 77280 THER RAD SIMULAJ FIELD SMPL: CPT | Performed by: RADIOLOGY

## 2021-11-24 PROCEDURE — 77412 RADIATION TX DELIVERY LVL 3: CPT | Performed by: RADIOLOGY

## 2021-11-29 ENCOUNTER — HOSPITAL ENCOUNTER (OUTPATIENT)
Dept: OCCUPATIONAL THERAPY | Facility: HOSPITAL | Age: 41
Setting detail: THERAPIES SERIES
Discharge: HOME | End: 2021-11-29

## 2021-11-29 ENCOUNTER — RADIATION ONCOLOGY WEEKLY ASSESSMENT (OUTPATIENT)
Dept: RADIATION ONCOLOGY | Facility: HOSPITAL | Age: 41
End: 2021-11-29

## 2021-11-29 DIAGNOSIS — Z17.1 MALIGNANT NEOPLASM OF OVERLAPPING SITES OF LEFT BREAST IN FEMALE, ESTROGEN RECEPTOR NEGATIVE (HCC): ICD-10-CM

## 2021-11-29 DIAGNOSIS — C50.812 MALIGNANT NEOPLASM OF OVERLAPPING SITES OF LEFT BREAST IN FEMALE, ESTROGEN RECEPTOR NEGATIVE (HCC): ICD-10-CM

## 2021-11-29 DIAGNOSIS — I97.2 POST-MASTECTOMY LYMPHEDEMA SYNDROME: Primary | ICD-10-CM

## 2021-11-29 DIAGNOSIS — Z17.1 MALIGNANT NEOPLASM OF OVERLAPPING SITES OF LEFT BREAST IN FEMALE, ESTROGEN RECEPTOR NEGATIVE (HCC): Primary | ICD-10-CM

## 2021-11-29 DIAGNOSIS — M79.621 AXILLARY PAIN, RIGHT: ICD-10-CM

## 2021-11-29 DIAGNOSIS — C50.812 MALIGNANT NEOPLASM OF OVERLAPPING SITES OF LEFT BREAST IN FEMALE, ESTROGEN RECEPTOR NEGATIVE (HCC): Primary | ICD-10-CM

## 2021-11-29 PROCEDURE — 97140 MANUAL THERAPY 1/> REGIONS: CPT

## 2021-11-29 PROCEDURE — 77412 RADIATION TX DELIVERY LVL 3: CPT | Performed by: RADIOLOGY

## 2021-11-29 PROCEDURE — 77387 GUIDANCE FOR RADJ TX DLVR: CPT | Performed by: RADIOLOGY

## 2021-11-29 NOTE — PROGRESS NOTES
Physician Weekly Management Note    Diagnosis:     1. Malignant neoplasm of overlapping sites of left breast in female, estrogen receptor negative (HCC)       Reason for Visit: Weekly Status Check (18/21----6349/6796)    Concurrent Chemo:   No    Notes on Treatment course, Films, Medical progress and Plan:  Doing well. Still seeing lymphedema - edema stable. Could try bra for support if comfortable. Skin on breast fine - mildy erythematous. Cont on.    Performance Status:  (1) Restricted in physically strenuous activity, ambulatory and able to do work of light nature    Subjective   ROS - Other than as listed above, as follow:  Constitutional - Normal - Denies lack of appetite, fatigue, fever, night sweats and change in weight.  Neck - Normal - Denies neck masses, muscle weakness, neck pain, decreased range of motion and swelling of the neck.  Breasts - Normal - Denies breast masses, nipple discharge, nipple inversion and pain.  Respiratory - Normal - Denies cough, dyspnea, hemoptysis, hiccoughs, pleuritic chest pain and wheezing.  Gastrointestinal - Normal - Denies abdominal pain, constipation, diarrhea, heartburn / dyspepsia, hematemesis, hemorrhoids, melena / GI bleeding, nausea, pain / cramping, satiety and vomiting.  Musculoskeletal - Normal - Denies arthritis, bone pain, joint pain, muscle weakness and decreased range of motion.   Hematologic/Lymphatic - Normal - Denies easy bruising and tender or enlarged lymph nodes.  There were no vitals filed for this visit.  Objective   PHYSICAL EXAM - Other than listed above, as follows:  Constitutional - Normal - No evidence of impaired alertness, inadequate appearance, premature or advanced chronologic age, uncooperativeness, altered mood and affect and disorientation.  Neck - Normal - No evidence of tender or enlarged lymph nodes, neck abnormalities, restricted range of motion and enlarged thyroid gland.  Breasts - Normal - No change in nipple or incision  "site.  Chest - Normal - No evidence of chest abnormalities and tender or enlarged lymph nodes.  Hematologic/Lymphatic -  Normal - No evidence of tender or enlarged axillae lymph nodes and tender or enlarged neck lymph nodes.     Technical aspects reviewed:  Weekly OBI approved if applicable? Yes  Weekly port films approved?   Yes  Change requests noted if applicable? Yes  Patient setup and plan reviewed?  Yes    Chart Reviewed:  Continue current treatment plan?   Yes  Treatment plan change requested?  No    I have reviewed and marked as \"reviewed\" the current medications, allergies and problem list in the patients EMR.  I have reviewed the patient's medical, surgical  history in detail, reviewed any pertinent lab work  and updated the computerized patient record if needed.    Patient's Care Team:  Patient Care Team:  Fadi Goodman MD as PCP - General (Family Medicine)  Nadiya Haynes MD as Referring Physician (Neurology)  Neena Beavers MD as Consulting Physician (Hematology and Oncology)  Camron Virk MD as Consulting Physician (Cardiology)  Amanda Byrd MD as Consulting Physician (Radiation Oncology)      "

## 2021-11-29 NOTE — THERAPY TREATMENT NOTE
Outpatient Occupational Therapy Lymphedema Treatment Note  [facility]     Patient Name: Radha Astorga  : 1980  MRN: [id number]  Today's Date: 2021      Visit Date: 2021  Patient and therapist both masked. Therapist with face shield and gloves.  Patient Active Problem List   Diagnosis   • Family history of breast cancer   • Hypothyroidism   • Major depressive disorder, recurrent episode, moderate with anxious distress ([facility])   • Attention deficit hyperactivity disorder (ADHD), predominantly inattentive type   • Factor 5 Leiden mutation, heterozygous ([facility])   • Kidney mass   • Malignant neoplasm of overlapping sites of left breast in female, estrogen receptor negative ([facility])   • High risk medication use   • Essential hypertension   • Rheumatic mitral valve disease   • Encounter for adjustment or management of vascular access device   • Iron deficiency anemia        Past Medical History:   Diagnosis Date   • Acute stress reaction 2014   • ADD (attention deficit disorder)    • ADHD (attention deficit hyperactivity disorder)    • Anxiety and depression    • CTS (carpal tunnel syndrome)    • Factor 5 Leiden mutation, heterozygous ([facility]) 2021   • Family history of breast cancer 2013   • Fracture, cervical vertebra ([facility]) 1997    C4   • H/O complete eye exam 2016   • Hearing loss    • 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   • History of rheumatic fever    • Hypertension    • Hypothyroidism    • Infiltrating ductal carcinoma of left breast ([facility]) 2021    Left   • Kidney stone    • Mitral valve insufficiency    • PONV (postoperative nausea and vomiting)    • Stroke ([facility]) 2020    HX OF   • Stroke-like symptoms         Past Surgical History:   Procedure Laterality Date   • BREAST BIOPSY Left 2021    IDC   • BREAST LUMPECTOMY WITH SENTINEL NODE BIOPSY N/A 2021    Procedure: right port placement, Left SHAINA-guided, bracketed, partial mastectomy and  sentinel lymph node biopsy Left breast needle-localized excisional biopsy;  Surgeon: Lashonda Euceda MD;  Location: [facility]  Service: General;  Laterality: N/A;   • BREAST SURGERY Bilateral 7/1/2021    Procedure: RIGHT BREAST REDUCTION MASTOPEXY LEFT BREAST ONCOPLASTIC CLOSURE;  Surgeon: Caridad Baker MD PhD;  Location: [facility]  Service: Plastics;  Laterality: Bilateral;   • NO PAST SURGERIES     • PAP SMEAR  2016         Visit Dx:      ICD-10-CM ICD-9-CM   1. 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 Axillary pain, right  M79.621 729.5   3. Malignant neoplasm of overlapping sites of left breast in female, estrogen receptor negative (HCC)  C50.812 174.8    Z17.1 V86.1        Lymphedema     Row Name 11/29/21 0800             Subjective Pain    Able to rate subjective pain? yes  -RE      Pre-Treatment Pain Level 0  -RE      Post-Treatment Pain Level 0  -RE              Subjective Comments    Subjective Comments fels she is having some breast swelling but cords feel better.  -RE              Manual Lymphatic Drainage    Manual Lymphatic Drainage initial sequence; opened regional lymph nodes; opened anastamoses; extremity treatment  -RE      Initial Sequence short neck  -RE      Opened Regional Lymph Nodes axillary; inguinal  -RE      Axillary right; left  -RE      Inguinal left  -RE      Opened Anastamoses anterior axillo-axillary; posterior axillo-axillary; axillo-inguinal  -RE      Axillo-Inguinal left  -RE      Extremity Treatment other extremity treatment  trunk and breast focus with simple arm MLD  -RE              Compression/Skin Care    Compression/Skin Care Comments wearing sleeve  -RE            User Key  (r) = Recorded By, (t) = Taken By, (c) = Cosigned By    Initials Name Provider Type    RE Julia Leslie OTR Occupational Therapist                         OT Assessment/Plan     Row Name 11/29/21 1007          OT Assessment    Assessment Comments Cording is  resolving and no longer painful. Is developing some breast and flank edema. edema is soft without pitting. Preformed MLD primarily outside the field of radiation to minimize skin irritation. Will monitor closely.  -RE            OT Plan    OT Plan Comments continue  -RE           User Key  (r) = Recorded By, (t) = Taken By, (c) = Cosigned By    Initials Name Provider Type    Julia Rowe OTR Occupational Therapist                    Manual Rx (last 36 hours)     Manual Treatments     Row Name 11/29/21 0917             Total Minutes    53849 - OT Manual Therapy Minutes 55  -RE            User Key  (r) = Recorded By, (t) = Taken By, (c) = Cosigned By    Initials Name Provider Type    Julia Rowe OTKINGSLEY Occupational Therapist                  Therapy Education  Given: Edema management, Symptoms/condition management  Program: Reinforced  How Provided: Verbal  Provided to: Patient  Level of Understanding: Teach back education performed, Verbalized                Time Calculation:   OT Start Time: 0820  OT Stop Time: 0915  OT Time Calculation (min): 55 min  Total Timed Code Minutes- OT: 55 minute(s)  Timed Charges  92556 - OT Manual Therapy Minutes: 55  Total Minutes  Timed Charges Total Minutes: 55   Total Minutes: 55     Therapy Charges for Today     Code Description Service Date Service Provider Modifiers Qty    02154614974 HC OT MANUAL THERAPY EA 15 MIN 11/29/2021 Julia Leslie OTR GO 4                      VI Ulloa  11/29/2021

## 2021-11-30 PROCEDURE — 77412 RADIATION TX DELIVERY LVL 3: CPT | Performed by: RADIOLOGY

## 2021-11-30 PROCEDURE — 77336 RADIATION PHYSICS CONSULT: CPT | Performed by: RADIOLOGY

## 2021-11-30 PROCEDURE — 77387 GUIDANCE FOR RADJ TX DLVR: CPT | Performed by: RADIOLOGY

## 2021-11-30 RX ORDER — SODIUM CHLORIDE 9 MG/ML
250 INJECTION, SOLUTION INTRAVENOUS ONCE
Status: CANCELLED | OUTPATIENT
Start: 2021-12-01

## 2021-11-30 RX ORDER — FAMOTIDINE 10 MG/ML
20 INJECTION, SOLUTION INTRAVENOUS ONCE
Status: CANCELLED | OUTPATIENT
Start: 2021-12-01

## 2021-12-01 ENCOUNTER — INFUSION (OUTPATIENT)
Dept: ONCOLOGY | Facility: HOSPITAL | Age: 41
End: 2021-12-01

## 2021-12-01 ENCOUNTER — APPOINTMENT (OUTPATIENT)
Dept: RADIATION ONCOLOGY | Facility: HOSPITAL | Age: 41
End: 2021-12-01

## 2021-12-01 VITALS
HEART RATE: 100 BPM | OXYGEN SATURATION: 98 % | DIASTOLIC BLOOD PRESSURE: 80 MMHG | BODY MASS INDEX: 26.59 KG/M2 | WEIGHT: 145.4 LBS | TEMPERATURE: 97.1 F | SYSTOLIC BLOOD PRESSURE: 123 MMHG

## 2021-12-01 DIAGNOSIS — C50.812 MALIGNANT NEOPLASM OF OVERLAPPING SITES OF LEFT BREAST IN FEMALE, ESTROGEN RECEPTOR NEGATIVE (HCC): Primary | ICD-10-CM

## 2021-12-01 DIAGNOSIS — Z17.1 MALIGNANT NEOPLASM OF OVERLAPPING SITES OF LEFT BREAST IN FEMALE, ESTROGEN RECEPTOR NEGATIVE (HCC): Primary | ICD-10-CM

## 2021-12-01 LAB
BASOPHILS # BLD AUTO: 0.06 10*3/MM3 (ref 0–0.2)
BASOPHILS NFR BLD AUTO: 1 % (ref 0–1.5)
DEPRECATED RDW RBC AUTO: 44.6 FL (ref 37–54)
EOSINOPHIL # BLD AUTO: 0.19 10*3/MM3 (ref 0–0.4)
EOSINOPHIL NFR BLD AUTO: 3.3 % (ref 0.3–6.2)
ERYTHROCYTE [DISTWIDTH] IN BLOOD BY AUTOMATED COUNT: 13.5 % (ref 12.3–15.4)
HCT VFR BLD AUTO: 36.5 % (ref 34–46.6)
HGB BLD-MCNC: 12 G/DL (ref 12–15.9)
IMM GRANULOCYTES # BLD AUTO: 0.04 10*3/MM3 (ref 0–0.05)
IMM GRANULOCYTES NFR BLD AUTO: 0.7 % (ref 0–0.5)
LYMPHOCYTES # BLD AUTO: 1.38 10*3/MM3 (ref 0.7–3.1)
LYMPHOCYTES NFR BLD AUTO: 23.7 % (ref 19.6–45.3)
MCH RBC QN AUTO: 29.4 PG (ref 26.6–33)
MCHC RBC AUTO-ENTMCNC: 32.9 G/DL (ref 31.5–35.7)
MCV RBC AUTO: 89.5 FL (ref 79–97)
MONOCYTES # BLD AUTO: 0.71 10*3/MM3 (ref 0.1–0.9)
MONOCYTES NFR BLD AUTO: 12.2 % (ref 5–12)
NEUTROPHILS NFR BLD AUTO: 3.45 10*3/MM3 (ref 1.7–7)
NEUTROPHILS NFR BLD AUTO: 59.1 % (ref 42.7–76)
NRBC BLD AUTO-RTO: 0 /100 WBC (ref 0–0.2)
PLATELET # BLD AUTO: 243 10*3/MM3 (ref 140–450)
PMV BLD AUTO: 9.5 FL (ref 6–12)
RBC # BLD AUTO: 4.08 10*6/MM3 (ref 3.77–5.28)
WBC NRBC COR # BLD: 5.83 10*3/MM3 (ref 3.4–10.8)

## 2021-12-01 PROCEDURE — 25010000002 TRASTUZUMAB-ANNS 420 MG RECONSTITUTED SOLUTION 1 EACH VIAL: Performed by: INTERNAL MEDICINE

## 2021-12-01 PROCEDURE — 96375 TX/PRO/DX INJ NEW DRUG ADDON: CPT

## 2021-12-01 PROCEDURE — 77412 RADIATION TX DELIVERY LVL 3: CPT | Performed by: RADIOLOGY

## 2021-12-01 PROCEDURE — 77387 GUIDANCE FOR RADJ TX DLVR: CPT | Performed by: RADIOLOGY

## 2021-12-01 PROCEDURE — 85025 COMPLETE CBC W/AUTO DIFF WBC: CPT | Performed by: INTERNAL MEDICINE

## 2021-12-01 PROCEDURE — 96413 CHEMO IV INFUSION 1 HR: CPT

## 2021-12-01 RX ORDER — SODIUM CHLORIDE 9 MG/ML
250 INJECTION, SOLUTION INTRAVENOUS ONCE
Status: COMPLETED | OUTPATIENT
Start: 2021-12-01 | End: 2021-12-01

## 2021-12-01 RX ORDER — FAMOTIDINE 10 MG/ML
20 INJECTION, SOLUTION INTRAVENOUS ONCE
Status: COMPLETED | OUTPATIENT
Start: 2021-12-01 | End: 2021-12-01

## 2021-12-01 RX ADMIN — SODIUM CHLORIDE 250 ML: 9 INJECTION, SOLUTION INTRAVENOUS at 09:16

## 2021-12-01 RX ADMIN — TRASTUZUMAB-ANNS 390 MG: 420 INJECTION, POWDER, LYOPHILIZED, FOR SOLUTION INTRAVENOUS at 09:26

## 2021-12-01 RX ADMIN — FAMOTIDINE 20 MG: 10 INJECTION INTRAVENOUS at 09:16

## 2021-12-02 ENCOUNTER — HOSPITAL ENCOUNTER (OUTPATIENT)
Dept: OCCUPATIONAL THERAPY | Facility: HOSPITAL | Age: 41
Setting detail: THERAPIES SERIES
Discharge: HOME | End: 2021-12-02

## 2021-12-02 ENCOUNTER — DOCUMENTATION (OUTPATIENT)
Dept: RADIATION ONCOLOGY | Facility: HOSPITAL | Age: 41
End: 2021-12-02

## 2021-12-02 ENCOUNTER — APPOINTMENT (OUTPATIENT)
Dept: RADIATION ONCOLOGY | Facility: HOSPITAL | Age: 41
End: 2021-12-02

## 2021-12-02 DIAGNOSIS — C50.812 MALIGNANT NEOPLASM OF OVERLAPPING SITES OF LEFT BREAST IN FEMALE, ESTROGEN RECEPTOR NEGATIVE (HCC): ICD-10-CM

## 2021-12-02 DIAGNOSIS — Z17.1 MALIGNANT NEOPLASM OF OVERLAPPING SITES OF LEFT BREAST IN FEMALE, ESTROGEN RECEPTOR NEGATIVE (HCC): ICD-10-CM

## 2021-12-02 DIAGNOSIS — Z17.1 MALIGNANT NEOPLASM OF OVERLAPPING SITES OF LEFT BREAST IN FEMALE, ESTROGEN RECEPTOR NEGATIVE (HCC): Primary | ICD-10-CM

## 2021-12-02 DIAGNOSIS — M79.621 AXILLARY PAIN, RIGHT: ICD-10-CM

## 2021-12-02 DIAGNOSIS — I97.2 POST-MASTECTOMY LYMPHEDEMA SYNDROME: Primary | ICD-10-CM

## 2021-12-02 DIAGNOSIS — C50.812 MALIGNANT NEOPLASM OF OVERLAPPING SITES OF LEFT BREAST IN FEMALE, ESTROGEN RECEPTOR NEGATIVE (HCC): Primary | ICD-10-CM

## 2021-12-02 PROCEDURE — 77387 GUIDANCE FOR RADJ TX DLVR: CPT | Performed by: RADIOLOGY

## 2021-12-02 PROCEDURE — 77412 RADIATION TX DELIVERY LVL 3: CPT | Performed by: RADIOLOGY

## 2021-12-02 PROCEDURE — 97140 MANUAL THERAPY 1/> REGIONS: CPT

## 2021-12-02 PROCEDURE — 97535 SELF CARE MNGMENT TRAINING: CPT

## 2021-12-02 RX ORDER — SODIUM CHLORIDE 9 MG/ML
250 INJECTION, SOLUTION INTRAVENOUS ONCE
Status: CANCELLED | OUTPATIENT
Start: 2021-12-22

## 2021-12-02 RX ORDER — FAMOTIDINE 10 MG/ML
20 INJECTION, SOLUTION INTRAVENOUS ONCE
Status: CANCELLED | OUTPATIENT
Start: 2021-12-22

## 2021-12-02 NOTE — THERAPY PROGRESS REPORT/RE-CERT
Outpatient Occupational Therapy Lymphedema Progress Note  [facility]     Patient Name: Radha Astorga  : 1980  MRN: [id number]  Today's Date: 2021      Visit Date: 2021  Patient and therapist both masked. Therapist with face shield and gloves.  Patient Active Problem List   Diagnosis   • Family history of breast cancer   • Hypothyroidism   • Major depressive disorder, recurrent episode, moderate with anxious distress ([facility])   • Attention deficit hyperactivity disorder (ADHD), predominantly inattentive type   • Factor 5 Leiden mutation, heterozygous ([facility])   • Kidney mass   • Malignant neoplasm of overlapping sites of left breast in female, estrogen receptor negative ([facility])   • High risk medication use   • Essential hypertension   • Rheumatic mitral valve disease   • Encounter for adjustment or management of vascular access device   • Iron deficiency anemia        Past Medical History:   Diagnosis Date   • Acute stress reaction 2014   • ADD (attention deficit disorder)    • ADHD (attention deficit hyperactivity disorder)    • Anxiety and depression    • CTS (carpal tunnel syndrome)    • Factor 5 Leiden mutation, heterozygous ([facility]) 2021   • Family history of breast cancer 2013   • Fracture, cervical vertebra ([facility]) 1997    C4   • H/O complete eye exam 2016   • Hearing loss    • 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   • History of rheumatic fever    • Hypertension    • Hypothyroidism    • Infiltrating ductal carcinoma of left breast ([facility]) 2021    Left   • Kidney stone    • Mitral valve insufficiency    • PONV (postoperative nausea and vomiting)    • Stroke ([facility]) 2020    HX OF   • Stroke-like symptoms         Past Surgical History:   Procedure Laterality Date   • BREAST BIOPSY Left 2021    IDC   • BREAST LUMPECTOMY WITH SENTINEL NODE BIOPSY N/A 2021    Procedure: right port placement, Left SHAINA-guided, bracketed, partial mastectomy and  sentinel lymph node biopsy Left breast needle-localized excisional biopsy;  Surgeon: Lashonda Euceda MD;  Location: [facility];  Service: General;  Laterality: N/A;   • BREAST SURGERY Bilateral 7/1/2021    Procedure: RIGHT BREAST REDUCTION MASTOPEXY LEFT BREAST ONCOPLASTIC CLOSURE;  Surgeon: Caridad Baker MD PhD;  Location: [facility];  Service: Plastics;  Laterality: Bilateral;   • NO PAST SURGERIES     • PAP SMEAR  2016         Visit Dx:      ICD-10-CM ICD-9-CM   1. 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 Malignant neoplasm of overlapping sites of left breast in female, estrogen receptor negative (HCC)  C50.812 174.8    Z17.1 V86.1   3. Axillary pain, right  M79.621 729.5        Lymphedema     Row Name 12/02/21 0800             Subjective Pain    Able to rate subjective pain? yes  -RE      Pre-Treatment Pain Level 0  -RE      Post-Treatment Pain Level 0  -RE              BUE Circumferential (cm)    Measurement Location 1 Axilla  -RE      Left 1 31 cm  -RE      Measurement Location 2 15 cm above elbow  -RE      Left 2 29 cm  -RE      Measurement Location 3 10 cm above elbow  -RE      Left 3 27 cm  -RE      Measurement Location 4 5 cm above elbow  -RE      Left 4 25 cm  -RE      Measurement Location 5 elbow  -RE      Left 5 22.5 cm  -RE      Measurement Location 6 5 cm below elbow  -RE      Left 6 23.8 cm  -RE      Measurement Location 7 10 cm below elbow  -RE      Left 7 21 cm  -RE      Measurement Location 8 15 cm below elbow  -RE      Left 8 17.5 cm  -RE      Measurement Location 9 20 cm below elbow  -RE      Left 9 15 cm  -RE      Measurement Location 10 Wrist  -RE      Left 10 15 cm  -RE      Measurement Location 11 Mid palm  -RE      Left 11 18 cm  -RE      LUE Circumferential Total 244.8 cm  -RE              Manual Lymphatic Drainage    Manual Lymphatic Drainage initial sequence  -RE      Initial Sequence short neck  -RE      Opened Regional Lymph Nodes inguinal  -RE       Inguinal left  -RE      Opened Anastamoses axillo-inguinal  -RE      Axillo-Inguinal left  -RE      Extremity Treatment MLD to full limb  -RE      Manual Lymphatic Drainage Comments focus on thumb and index finger  -RE              Compression/Skin Care    Compression/Skin Care Comments measured for medi Keystone sleeve and glove both size 2 20-30 mmHg  -RE            User Key  (r) = Recorded By, (t) = Taken By, (c) = Cosigned By    Initials Name Provider Type    Julia Rowe OTR Occupational Therapist                         OT Assessment/Plan     Row Name 12/02/21 1247          OT Assessment    Impairments Edema; Impaired lymphatic circulation  -RE     Assessment Comments Measurements are relatively stable. Increased edema noted in fingers 1and 2. Will need to wear glove rather than gauntet. Keystone product line should give improved containment. Continues with mild axilry edema. Plan to reassess this and finger edema next visit and then follow as needed unless edema is signifcantly increased.  -RE     Please refer to paper survey for additional self-reported information No  -RE     OT Diagnosis post lumpectomy lymphedema  -RE     OT Rehab Potential Good  -RE     Patient/caregiver participated in establishment of treatment plan and goals Yes  -RE     Patient would benefit from skilled therapy intervention Yes  -RE            OT Plan    OT Frequency Other (comment)  -RE     Predicted Duration of Therapy Intervention (OT) one visit then follow as needed  -RE     Planned CPT's? OT THER PROC EA 15 MIN: 29961ME; OT SELF CARE/MGMT/TRAIN 15 MIN: 90153; OT MANUAL THERAPY EA 15 MIN: 17035; OT BIS XTRACELL FLUID ANALYSIS: 63666  -RE     Planned Therapy Interventions (Optional Details) home exercise program; manual therapy techniques; other (see comments); patient/family education  bioimpedance  -RE           User Key  (r) = Recorded By, (t) = Taken By, (c) = Cosigned By    Initials Name Provider Type    Julia Rowe,  OTR Occupational Therapist                    Manual Rx (last 36 hours)     Manual Treatments     Row Name 12/02/21 1254             Total Minutes    25396 - OT Manual Therapy Minutes 30  -RE            User Key  (r) = Recorded By, (t) = Taken By, (c) = Cosigned By    Initials Name Provider Type    Julia Rowe OTR Occupational Therapist               OT Goals     Row Name 12/02/21 1200          OT Short Term Goals    STG Date to Achieve 12/16/21  -RE     STG 1 Pt will demonstrate understanding of use of compression sleeve for edema prevention, exercise and air travel.   -RE     STG 1 Progress Met  -RE     STG 2 Patient will demonstrate proper awareness of “What is Lymphedema?” and “Healthy Habits” for improved prevention, management, care of symptoms and ease of transition to self-care of condition.   -RE     STG 2 Progress Met  -RE     STG 3 Patient independent and compliant with initial home exercise program focused on range of motion,and Flexibility.  -RE     STG 3 Progress Met  -RE     STG 4 Patient to demonstrate increased left shoulder flexion to 140 to improve functional UE use and to restore pre operative AROM per patient perception.  -RE     STG 4 Progress Met  -RE     STG 5 Patient to demonstrate increased left shoulder abduction to 140 to improve functional UE use and to restore preoperative AROM per patient perception.  -RE     STG 5 Progress Met  -RE            Long Term Goals    LTG Date to Achieve 12/30/21  -RE     LTG 1 Patient to demonstrate increased left shoulder flexion to 160 to improve functional UE use and to restore pre operative AROM per patient perception.  -RE     LTG 1 Progress Met  -RE     LTG 2 Patient to demonstrate increased left shoulder abduction to 160 to improve functional UE use and to restore preoperative AROM per patient perception.  -RE     LTG 2 Progress Met  -RE     LTG 3 Patient will participate in bioimpedance scans every 3-6 months as a method of early detection of  lymphedema to allow for early intervention.  -RE     LTG 3 Progress Met; Ongoing  -RE     LTG 4  Patient's bioimpedance score to remain below 6.5 for decreased risk of stage II lymphedema.  -RE     LTG 4 Progress Not Met  -RE     LTG 4 Progress Comments visible edema continues  -RE     LTG 5 Patient will be independent with use and care of Tribute.  -RE     LTG 5 Progress Met  -RE     LTG 6 patient to demonstrate no vsible edema in the L UE.  -RE     LTG 6 Progress New  -RE     LTG 7 Patient to demonstrate no visible edema inthe L breast/flank.  -RE     LTG 7 Progress New  -RE            Time Calculation    OT Goal Re-Cert Due Date 02/02/22  -RE           User Key  (r) = Recorded By, (t) = Taken By, (c) = Cosigned By    Initials Name Provider Type    Julia Rowe OTR Occupational Therapist                Therapy Education  Education Details: Will switch to medi harmony sleeve and glove. May wear these during exercise. If she has increased edema may need the Mondi fabric. Gave exercise position paper and discussed slowly increasing activity.  Given: HEP, Edema management, Symptoms/condition management  Program: New, Reinforced, Progressed  How Provided: Verbal, Written  Provided to: Patient  Level of Understanding: Teach back education performed, Verbalized  16957 - OT Self Care/Mgmt Minutes: 30                Time Calculation:   OT Start Time: 0815  OT Stop Time: 0915  OT Time Calculation (min): 60 min  Total Timed Code Minutes- OT: 60 minute(s)  Timed Charges  67489 - OT Manual Therapy Minutes: 30  52196 - OT Self Care/Mgmt Minutes: 30  Total Minutes  Timed Charges Total Minutes: 60   Total Minutes: 60     Therapy Charges for Today     Code Description Service Date Service Provider Modifiers Qty    68684833795 HC OT MANUAL THERAPY EA 15 MIN 12/2/2021 Julia Leslie OTR GO 2    03120168378 HC OT SELF CARE/MGMT/TRAIN EA 15 MIN 12/2/2021 Julia Leslie OTR GO 2                      VI Ulloa  12/2/2021

## 2021-12-03 ENCOUNTER — PATIENT MESSAGE (OUTPATIENT)
Dept: FAMILY MEDICINE CLINIC | Facility: CLINIC | Age: 41
End: 2021-12-03

## 2021-12-03 DIAGNOSIS — F43.21 GRIEF REACTION: Chronic | ICD-10-CM

## 2021-12-03 DIAGNOSIS — F90.0 ATTENTION DEFICIT HYPERACTIVITY DISORDER (ADHD), PREDOMINANTLY INATTENTIVE TYPE: ICD-10-CM

## 2021-12-03 RX ORDER — LORAZEPAM 0.5 MG/1
TABLET ORAL
Qty: 30 TABLET | Refills: 1 | Status: SHIPPED | OUTPATIENT
Start: 2021-12-03 | End: 2022-02-21 | Stop reason: SDUPTHER

## 2021-12-03 RX ORDER — DEXTROAMPHETAMINE SACCHARATE, AMPHETAMINE ASPARTATE MONOHYDRATE, DEXTROAMPHETAMINE SULFATE AND AMPHETAMINE SULFATE 5; 5; 5; 5 MG/1; MG/1; MG/1; MG/1
20 CAPSULE, EXTENDED RELEASE ORAL EVERY MORNING
Qty: 30 CAPSULE | Refills: 0 | Status: SHIPPED | OUTPATIENT
Start: 2021-12-03 | End: 2021-12-07

## 2021-12-03 NOTE — PROGRESS NOTES
RADIATION THERAPY TREATMENT SUMMARY  Patient: Radha Astorga  YOB: 1980  Diagnosis:  Malignant neoplasm of overlapping sites of left breast in female, estrogen receptor negative (HCC)   No matching staging information was found for the patient.      Radha Astorga has recently completed the course of radiation therapy prescribed for the above-mentioned diagnosis.  Below, please find the specifics of the course of treatment delivered:    Radiation Details:    Tx Start Date  11/2/2021   Tx End date  12/1/2021   Intent   Curative   Total Treatments 4  Total Dose  5256 cGy    Prescription Name LT BREAST 4256  Site   Breast, Left  Primary/Boost  Primary  Dose Per Fraction 266 cGy/Frac  Number of Fractions 16  Prescribe To  IsodoseLine 100 % 4256 cGy  266 cGy/Frac  Mode    Photon  Technique  TANGENTS  Frequency  Once Daily  Energy   6X/18X  Prescription Note PRESCRIBED 100% TO [facility] PT      Prescription Name LT LV BOOST  Site   Breast, Left  Primary/Boost  Boost  Dose Per Fraction 200 cGy/Frac  Number of Fractions 5  Prescribe To  IsodoseLine 100 % 1000 cGy  200 cGy/Frac  Mode    Photon  Technique  3D CONFORMAL  Frequency  Once Daily  Energy   6X/18X  Prescription Note PRESCRIBED 100% TO [facility] PT           Tolerance and Toxicities: she tolerated the treatments well , requiring no treatment breaks.she developed mild erythema and mild edema over left breast.  she completed the treatments in 30 elapsed days.    Follow-up Plans:Will plan to see back via video follow-up in 4 weeks.  I have also made a referral to our [facility].

## 2021-12-07 ENCOUNTER — HOSPITAL ENCOUNTER (OUTPATIENT)
Dept: OCCUPATIONAL THERAPY | Facility: HOSPITAL | Age: 41
Setting detail: THERAPIES SERIES
Discharge: HOME | End: 2021-12-07

## 2021-12-07 DIAGNOSIS — C50.812 MALIGNANT NEOPLASM OF OVERLAPPING SITES OF LEFT BREAST IN FEMALE, ESTROGEN RECEPTOR NEGATIVE (HCC): ICD-10-CM

## 2021-12-07 DIAGNOSIS — I97.2 POST-MASTECTOMY LYMPHEDEMA SYNDROME: Primary | ICD-10-CM

## 2021-12-07 DIAGNOSIS — Z17.1 MALIGNANT NEOPLASM OF OVERLAPPING SITES OF LEFT BREAST IN FEMALE, ESTROGEN RECEPTOR NEGATIVE (HCC): ICD-10-CM

## 2021-12-07 DIAGNOSIS — F90.0 ATTENTION DEFICIT HYPERACTIVITY DISORDER (ADHD), PREDOMINANTLY INATTENTIVE TYPE: ICD-10-CM

## 2021-12-07 DIAGNOSIS — F90.0 ATTENTION DEFICIT HYPERACTIVITY DISORDER (ADHD), PREDOMINANTLY INATTENTIVE TYPE: Primary | ICD-10-CM

## 2021-12-07 PROCEDURE — 97140 MANUAL THERAPY 1/> REGIONS: CPT

## 2021-12-07 PROCEDURE — 97535 SELF CARE MNGMENT TRAINING: CPT

## 2021-12-07 RX ORDER — DEXTROAMPHETAMINE SACCHARATE, AMPHETAMINE ASPARTATE MONOHYDRATE, DEXTROAMPHETAMINE SULFATE AND AMPHETAMINE SULFATE 6.25; 6.25; 6.25; 6.25 MG/1; MG/1; MG/1; MG/1
25 CAPSULE, EXTENDED RELEASE ORAL EVERY MORNING
Qty: 30 CAPSULE | Refills: 0 | Status: SHIPPED | OUTPATIENT
Start: 2021-12-07 | End: 2022-01-04 | Stop reason: SDUPTHER

## 2021-12-07 RX ORDER — DEXTROAMPHETAMINE SACCHARATE, AMPHETAMINE ASPARTATE MONOHYDRATE, DEXTROAMPHETAMINE SULFATE AND AMPHETAMINE SULFATE 6.25; 6.25; 6.25; 6.25 MG/1; MG/1; MG/1; MG/1
25 CAPSULE, EXTENDED RELEASE ORAL EVERY MORNING
Qty: 30 CAPSULE | Refills: 0 | OUTPATIENT
Start: 2021-12-07

## 2021-12-07 RX ORDER — DEXTROAMPHETAMINE SACCHARATE, AMPHETAMINE ASPARTATE MONOHYDRATE, DEXTROAMPHETAMINE SULFATE AND AMPHETAMINE SULFATE 5; 5; 5; 5 MG/1; MG/1; MG/1; MG/1
25 CAPSULE, EXTENDED RELEASE ORAL EVERY MORNING
Qty: 30 CAPSULE | Refills: 0 | OUTPATIENT
Start: 2021-12-07

## 2021-12-07 NOTE — TELEPHONE ENCOUNTER
PATIENT SENT A MESSAGE ON 12/3/2021 REQUESTING A REFILL FOR ADDERAL XR AND FOR FOR THIS TO BE INCREASED TO 25 MGS. PT RECEIVED MESSAGE STATING THIS WAS DONE FROM DR ROSALES , HOWEVER THE DOSE WAS NOT INCREASED . PLEASE REVIEW . THANKS   Please refill Adderall XR and increase dosage to 25mg.  Thanks 12/03/2021

## 2021-12-07 NOTE — THERAPY TREATMENT NOTE
Outpatient Occupational Therapy Lymphedema Treatment Note  [facility]     Patient Name: Radha Astorga  : 1980  MRN: [id number]  Today's Date: 2021      Visit Date: 2021  Patient and therapist both masked. Therapist with face shield and gloves.  Patient Active Problem List   Diagnosis   • Family history of breast cancer   • Hypothyroidism   • Major depressive disorder, recurrent episode, moderate with anxious distress ([facility])   • Attention deficit hyperactivity disorder (ADHD), predominantly inattentive type   • Factor 5 Leiden mutation, heterozygous ([facility])   • Kidney mass   • Malignant neoplasm of overlapping sites of left breast in female, estrogen receptor negative ([facility])   • High risk medication use   • Essential hypertension   • Rheumatic mitral valve disease   • Encounter for adjustment or management of vascular access device   • Iron deficiency anemia        Past Medical History:   Diagnosis Date   • Acute stress reaction 2014   • ADD (attention deficit disorder)    • ADHD (attention deficit hyperactivity disorder)    • Anxiety and depression    • CTS (carpal tunnel syndrome)    • Factor 5 Leiden mutation, heterozygous ([facility]) 2021   • Family history of breast cancer 2013   • Fracture, cervical vertebra ([facility]) 1997    C4   • H/O complete eye exam 2016   • Hearing loss    • 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   • History of rheumatic fever    • Hypertension    • Hypothyroidism    • Infiltrating ductal carcinoma of left breast ([facility]) 2021    Left   • Kidney stone    • Mitral valve insufficiency    • PONV (postoperative nausea and vomiting)    • Stroke ([facility]) 2020    HX OF   • Stroke-like symptoms         Past Surgical History:   Procedure Laterality Date   • BREAST BIOPSY Left 2021    IDC   • BREAST LUMPECTOMY WITH SENTINEL NODE BIOPSY N/A 2021    Procedure: right port placement, Left SHAINA-guided, bracketed, partial mastectomy and  sentinel lymph node biopsy Left breast needle-localized excisional biopsy;  Surgeon: Lashonda Euceda MD;  Location: [facility];  Service: General;  Laterality: N/A;   • BREAST SURGERY Bilateral 7/1/2021    Procedure: RIGHT BREAST REDUCTION MASTOPEXY LEFT BREAST ONCOPLASTIC CLOSURE;  Surgeon: Caridad Baker MD PhD;  Location: [facility];  Service: Plastics;  Laterality: Bilateral;   • NO PAST SURGERIES     • PAP SMEAR  2016         Visit Dx:      ICD-10-CM ICD-9-CM   1. 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 Malignant neoplasm of overlapping sites of left breast in female, estrogen receptor negative (HCC)  C50.812 174.8    Z17.1 V86.1        Lymphedema     Row Name 12/07/21 1000             Subjective Pain    Able to rate subjective pain? yes  -RE      Pre-Treatment Pain Level 0  -RE      Post-Treatment Pain Level 0  -RE              Subjective Comments    Subjective Comments Worked out yesterday and noted increased symptoms. Wore Tribute overnight and most resolved. Some fullness in axilla today.  -RE              Manual Lymphatic Drainage    Manual Lymphatic Drainage initial sequence  -RE      Initial Sequence short neck  -RE      Opened Regional Lymph Nodes axillary  -RE      Axillary right  -RE      Axillary Comment per e-mail  -RE      Opened Anastamoses anterior axillo-axillary  -RE      Axillo-Inguinal left  -RE      Extremity Treatment MLD to full limb  -RE      Manual Lymphatic Drainage Comments focus on instructing in self breast massage/MLD and scar massage.  -RE              Compression/Skin Care    Compression/Skin Care Comments per email glove is ordered  -RE            User Key  (r) = Recorded By, (t) = Taken By, (c) = Cosigned By    Initials Name Provider Type    RE Julia Leslie OTR Occupational Therapist                         OT Assessment/Plan     Row Name 12/07/21 1008          OT Assessment    Assessment Comments Has progressed well. mild edema in the axilla  and tumb today. Good understanding of self massage.  -RE            OT Plan    OT Plan Comments return fo  treatment in has increased symptoms. Recommended bioimppedance if symptoms appear to resolve.  -RE           User Key  (r) = Recorded By, (t) = Taken By, (c) = Cosigned By    Initials Name Provider Type    Julia Rowe OTR Occupational Therapist                    Manual Rx (last 36 hours)     Manual Treatments     Row Name 12/07/21 1010             Total Minutes    55172 - OT Manual Therapy Minutes 35  -RE            User Key  (r) = Recorded By, (t) = Taken By, (c) = Cosigned By    Initials Name Provider Type    Julia Rowe OTR Occupational Therapist                  Therapy Education  Education Details: discussed use of tribute, garments and Flexitouch for maintaining reductions. instructed in self breast MLD and scar massage. If symptoms appear to resolve recommended bioimpedance monitoring.  Given: Edema management, Other (comment)  Program: New, Reinforced  How Provided: Verbal, Demonstration  Provided to: Patient  Level of Understanding: Teach back education performed, Verbalized  63965 - OT Self Care/Mgmt Minutes: 10                Time Calculation:   OT Start Time: 0900  OT Stop Time: 0945  OT Time Calculation (min): 45 min  Total Timed Code Minutes- OT: 45 minute(s)  Timed Charges  34786 - OT Manual Therapy Minutes: 35  64523 - OT Self Care/Mgmt Minutes: 10  Total Minutes  Timed Charges Total Minutes: 45   Total Minutes: 45     Therapy Charges for Today     Code Description Service Date Service Provider Modifiers Qty    77106846391 HC OT MANUAL THERAPY EA 15 MIN 12/7/2021 Julia Leslie OTR GO 2    63073291815 HC OT SELF CARE/MGMT/TRAIN EA 15 MIN 12/7/2021 Julia Leslie OTR GO 1                      VI Ulloa  12/7/2021

## 2021-12-14 ENCOUNTER — TELEPHONE (OUTPATIENT)
Dept: SURGERY | Facility: CLINIC | Age: 41
End: 2021-12-14

## 2021-12-20 NOTE — PROGRESS NOTES
Subjective     REASON FOR follow-up:    1.  Heterozygous state for factor V Leiden    2.  New onset stroke on aspirin by neurology    3.  Hypercholesterolemia    4.  Hypercoagulable work-up done.  Antithrombin 109% protein S activity 85% protein S antigen 107%, protein C activity 85%.  Anticardiolipin antibody IgM 24%, antibeta-2 glycoprotein antibody negative, lupus anticoagulant not detected, prothrombin gene mutation negative.    5.  Screening mammogram showed abnormality in the left breast 6 o'clock position, 8 mm on ultrasound, ultrasound-guided left breast biopsy, 6 cm from the nipple showed evidence of invasive ductal carcinoma poorly differentiated grade 3, [place] score of 8 out of 9 ER negative, CT negative, HER-2/ese 3+ positive.    6.  CT scan February 24, 2021 showed focal area of fatty infiltration of the liver.  1 cm hypoattenuating cortical lesion in the upper pole of the right kidney.  Similarly nodule in the upper pole of the left kidney is 1.5 cm.  Further evaluation by ultrasound suggested  · Ultrasound March 1, 2021 shows solid lesion in the upper pole of the right kidney.  In the left kidney along the midpole of the left kidney is a nodule which is 16 x 16 x 14 mm which is thought to be a cyst.  A 6-month follow-up CT suggested    6.  S/p left partial mastectomy with sentinel lymph node biopsy.  Tumor was present at 5:00, invasive ductal carcinoma, Milwaukee score of 8, grade 3, greatest dimension was 12 mm x 8 x 4 mm.  Single focus of invasive carcinoma present.  There was extensive DCIS which is 30 mm x 8 x 2 mm, grade 3, no evidence of lymphovascular space invasion or dermal lymphatic invasion.  Margins were clear.  4 lymph nodes were negative.  It is a p T1cp N0, ER negative CT negative HER-2/ese 3+ with Ki-67 of 50%.  · [facility] genetic test negative for 47 genes    7.recently initiated Xarelto at loading dose of 15 mg twice a day x3 weeks to then be switched to 20 mg daily per  protocol.  She is doing this because of Mediport in place and known factor V Leiden heterozygosity.        History of Present Illness   Patient is a 41 y.o. female with pT1cp N0 ER negative KS negative HER-2 positive left breast cancer s/p lumpectomy.        Patient completed 12 weekly treatments of Taxol and Herceptin and currently is here for every 3 week Herceptin.  She completed radiation in November 2021.      Today, she is feeling reasonably well.  She experiences intermittent nausea, controlled with Zofran.  She has also started to notice swelling in her left wrist, and soreness to some of the fingers in this hand.  She continues to wear her sleeve routinely and is doing massages.  She has also noticed some fingernails on the right hand have become more sensitive, and are starting to split.  She is eating and drinking adequately.  Her bowels are moving regularly.  She denies fever or chills.  She denies vomiting.  She denies any new or worsening pain.  She denies chest pain, palpitations, or shortness of breath.    Oncologic history:  Patient is here for follow-up of her mammogram.  Patient had screening mammogram April 2, 2021:  NAD a focal asymmetry was present in the posterior one third retroareolar region of the left breast.  Further spot compression images and left breast ultrasound was suggested.  Right breast was negative    April 9, 2021: Left diagnostic mammogram showed an area of focal asymmetry in the posterior one third region aspect of the left breast her breast    Ultrasound showed an irregular 0.8 cm lesion in the left breast at the 6 o'clock position of the order of 6 cm from the nipple.  Ultrasound-guided left breast biopsy was recommended.    April 26, 2021: Ultrasound-guided biopsy of the left breast 6 o'clock position, 6 cm from the nipple showed    Invasive ductal carcinoma, poorly differentiated with a Nelsonia score grade 3 with a score of 8 out of 9 measuring at least 7 mm.  No  definitive ductal carcinoma is in situ is identified.  ER 1% negative  DC 1% negative   HER-2/ese 3+ positive    There was no evidence of lymphadenopathy either in the mammogram of the ultrasound.  We suggested an MRI of the breast.  Patient has strong family history of breast cancer      May 7, 2021: MRI of bilateral breast reviewed.  My interpretation is that there is area of enhancement in the 6 o'clock position of the left breast this is the biopsy-proven malignancy.  There is additional area of linear enhancement anteriorly and laterally measuring up to 1.2 cm this is approximately 5.6 cm from the nipple.  In addition there is a enhancement 2 to 3 mm in the anterolateral aspect of the lateral aspect of the left breast which is 4.6 cm from the nipple.  There is also mildly prominent posterior lymph node in the mid axilla which measures 1 cm with cortical thickening.  Findings are consistent with multifocal disease.  No contralateral disease or internal mammary adenopathy identified    May 20, 2021: Genetic test, [facility] genetic test shows 47 genes negative    May 21, 2021: I reviewed the biopsy of the lymph node in the left axilla which shows reactive lymph node negative for any carcinoma or lymphoma    June 2, 2021:Final Diagnosis  1. Left Breast, 5:00 o'clock, MRI-guided Biopsies for Linear Enhancement: INVASIVE MAMMARY CARCINOMA, NO  SPECIAL TYPE (INVASIVE DUCTAL CARCINOMA).  A. Largest contiguous focus in a core measures 4 mm.  B. No in-situ component identified.  C. Brisk lymphocytic response noted (see Comment).  D. No definitive lymphovascular nor perineural invasion identified.  2. Left Breast, 2:00 o'clock, MRI-guided Biopsy for Enhancement:  A. Benign breast parenchyma with radial scar and micropapillomas.  B. Usual ductal hyperplasia and columnar cell hyperplasia.  C. No atypical hyperplasia, in-situ nor invasive carcinoma identified    ER, DC, HER-2 new and Ki-67 pending on this new  biopsy      Patient has been seen by Dr. Lashonda Euceda with plans of doing surgery on July first 2021    Once patient undergoes surgery lumpectomy with sentinel lymph node biopsy we will give further recommendation about treatment options.  Given that patient has multifocal disease and both of the lesions 2 lesions are 1.2 cm each, with it being a HER-2 positive tumor on the previous biopsy at 6:00 Dr. Euceda and myself discussed and patient preferred to undergo port placement at the same time of surgery.    Patient had [facility] genetic test which was negative.    She has completed surgery July 1, 2021.  She underwent left partial mastectomy with left axillary sentinel lymph node biopsy and right port placement.  Clinically she was thought to have a high-grade multifocal invasive ductal carcinoma ER/MI negative, MI positive.  The lesions require preoperative localization.  The multifocal cancer was bracketed with 2 savvy markers and the radial scar was localized with a needle.  Because of the volume of tissue that will be excised related to the breast volume the case was done in conjunction with Dr. Zavala for oncoplastic closure.    She is 2 weeks from surgery.  She is healing up reasonably well.    On review of her pathology she had left partial mastectomy with sentinel lymph node biopsy.  Tumor was present at 5:00, invasive ductal carcinoma, Westphalia score of 8, grade 3, greatest dimension was 12 mm x 8 x 4 mm.  Single focus of invasive carcinoma present.  There was extensive DCIS which is 30 mm x 8 x 2 mm, grade 3, no evidence of lymphovascular space invasion or dermal lymphatic invasion.  Margins were clear.  4 lymph nodes were negative.  It is a p T1cp N0, ER negative MI negative HER-2/ese 3+ with Ki-67 of 50%.    Patient is here to discuss further options of treatment.  Given small tumor T1c N0, she is a good candidate for weekly Taxol Herceptin.    Given that patient has heterozygous state for factor V  Lauryn and currently has port placed we will plan to start Eliquis 2.5 mg p.o. twice daily.  I have left a message for Dr. Craft in neurology to call me to discuss if patient needs to continue aspirin or we can hold off since be starting Eliquis.      Genetic test negative    August 4, 2021: Weekly Taxol Herceptin x12 weeks followed by Herceptin x1 year.  Cycle 1 today    Hematologic history:  patient is a 40-year-old female who presented end of December with numbness and tingling in her left upper extremity and left face.  She went to see Dr. Goodman who then got concerned and referred to neurology.  She was referred to Dr. Freedman and talk to Dr. Donna guo for evaluation.  Patient underwent ultrasound of the carotids which did not show significant stenosis of the carotids.  She underwent 2D echocardiogram which showed a good ejection fraction of 64% with mild mitral valve prolapse and mild mitral stenosis.  She had a 24-hour Holter which was negative.  She then had also an MRI of the brain February 3, 2021 which showed areas of hyperintensity involving the subcortical white matter of the right frontal lobe superior laterally and posteriorly with the largest area measuring 8 9 mm.  There is also enhancement present.  The findings are consistent with a subacute infarct.  They could not differentiate if there is any underlying neoplastic process but a short-term follow-up was suggested in order to follow-up.  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    Patient currently got started on aspirin and factor V Leiden was obtained by neurology because patient's paternal aunt had factor V Leiden mutation and she was heterozygous.  Patient's testing also showed that she was heterozygous.    Patient states her numbness and tingling is resolved completely on the left arm and face it just lasted for a 24 hours.  She was  here referred here for neurology to see if there is any other cause for her underlying hypercoagulable state.  She has not had a complete hypercoagulable work-up.  Also discussed with her that an underlying malignancy could cause a hypercoagulable state and we would need to do a CT scan to rule that out.    Patient's mother has had breast cancer in her 50s but had pancreatic cancer at 68 and  from that.  Patient's dad had stroke at 63.  But he is alive at 74.  She has 1 brother who is 40 in good health.  Paternal aunt had breast cancer in her 40s.  Paternal grandfather had colon cancer in his late 80s.  Maternal uncle had throat cancer and another maternal uncle had skin cancer with metastasis to the lung.  And maternal grandmother had breast cancer.    Patient was to have mammogram done last year but because of COVID-19 it got postponed and she has not had it done yet.    Patient used to be a smoker half pack per day for 7 years but quit 10 years ago.  She has high cholesterol for which she is on treatment.    Past Medical History:   Diagnosis Date   • Acute stress reaction 2014   • ADD (attention deficit disorder)    • ADHD (attention deficit hyperactivity disorder)    • Anxiety and depression    • CTS (carpal tunnel syndrome)    • Factor 5 Leiden mutation, heterozygous ([facility]) 2021   • Family history of breast cancer 2013   • Fracture, cervical vertebra ([facility]) 1997    C4   • H/O complete eye exam 2016   • Hearing loss    • 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   • History of rheumatic fever    • Hypertension    • Hypothyroidism    • Infiltrating ductal carcinoma of left breast ([facility]) 2021    Left   • Kidney stone    • Mitral valve insufficiency    • PONV (postoperative nausea and vomiting)    • Stroke ([facility]) 2020    HX OF   • Stroke-like symptoms         Past Surgical History:   Procedure Laterality Date   • BREAST BIOPSY Left 2021    [facility]   • BREAST LUMPECTOMY  WITH SENTINEL NODE BIOPSY N/A 7/1/2021    Procedure: right port placement, Left SHAINA-guided, bracketed, partial mastectomy and sentinel lymph node biopsy Left breast needle-localized excisional biopsy;  Surgeon: Lashonda Euceda MD;  Location: [facility];  Service: General;  Laterality: N/A;   • BREAST SURGERY Bilateral 7/1/2021    Procedure: RIGHT BREAST REDUCTION MASTOPEXY LEFT BREAST ONCOPLASTIC CLOSURE;  Surgeon: Caridad Baker MD PhD;  Location: [facility];  Service: Plastics;  Laterality: Bilateral;   • NO PAST SURGERIES     • PAP SMEAR  2016        Current Outpatient Medications on File Prior to Visit   Medication Sig Dispense Refill   • amphetamine-dextroamphetamine XR (Adderall XR) 25 MG 24 hr capsule Take 1 capsule by mouth Every Morning 30 capsule 0   • atorvastatin (Lipitor) 10 MG tablet Take 1 tablet by mouth Daily. 30 tablet 11   • famotidine (PEPCID) 20 MG tablet TAKE 1 TABLET BY MOUTH DAILY 30 tablet 3   • lidocaine-prilocaine (EMLA) 2.5-2.5 % cream Apply  topically to the appropriate area as directed As Needed for Mild Pain . 1 each 2   • LORazepam (ATIVAN) 0.5 MG tablet TAKE 1 TABLET BY MOUTH EVERY 8 HOURS AS NEEDED FOR ANXIETY 30 tablet 1   • ondansetron ODT (ZOFRAN-ODT) 8 MG disintegrating tablet Place 1 tablet on the tongue Every 8 (Eight) Hours As Needed for Nausea or Vomiting. 30 tablet 3   • Synthroid 137 MCG tablet Take 1 tablet by mouth Daily. 90 tablet 1   • valsartan (DIOVAN) 160 MG tablet Take 1 tablet by mouth Daily. 30 tablet 5   • Xarelto 20 MG tablet TAKE 1 TABLET BY MOUTH DAILY 30 tablet 3     Current Facility-Administered Medications on File Prior to Visit   Medication Dose Route Frequency Provider Last Rate Last Admin   • heparin injection 500 Units  500 Units Intravenous PRN Neena Beavers MD   500 Units at 08/11/21 1718   • sodium chloride 0.9 % flush 10 mL  10 mL Intravenous PRN Neena Beavers MD   10 mL at 08/11/21 1718        ALLERGIES:    Allergies    Allergen Reactions   • Sulfa Antibiotics Rash        Social History     Socioeconomic History   • Marital status: Significant Other   • Number of children: 0   Tobacco Use   • Smoking status: Former Smoker     Packs/day: 1.00     Years: 10.00     Pack years: 10.00     Types: Cigarettes     Start date:      Quit date: 2009     Years since quittin.9   • Smokeless tobacco: Never Used   Vaping Use   • Vaping Use: Never used   Substance and Sexual Activity   • Alcohol use: Yes     Comment: RARELY   • Drug use: No   • Sexual activity: Defer     Partners: Male        Family History   Problem Relation Age of Onset   • Breast cancer Mother 53   • Pancreatic cancer Mother 68   • Lung cancer Maternal Grandmother    • Stroke Father    • Breast cancer Paternal Aunt 55   • Prostate cancer Maternal Grandfather    • Prostate cancer Paternal Grandfather    • Brain cancer Maternal Cousin 20   • Melanoma Maternal Uncle    • Ovarian cancer Other         Paternal great aunt    • Breast cancer Other    • Breast cancer Paternal Great-Grandmother 94   • Hypertension Brother    • Malig Hyperthermia Neg Hx       Family  history: Mother had breast cancer at age 57.  Maternal great aunt had breast cancer and paternal great aunt had breast cancer in her 40s.  Patient's mother had pancreatic cancer as well.  Paternal grandfather had prostate cancer.    Review of Systems   Constitutional: Positive for fatigue (Improved). Negative for appetite change, chills, diaphoresis, fever and unexpected weight change.   HENT: Negative for hearing loss, sore throat and trouble swallowing.    Respiratory: Negative for cough, chest tightness, shortness of breath and wheezing.    Cardiovascular: Negative for chest pain, palpitations and leg swelling.   Gastrointestinal: Negative for abdominal distention, abdominal pain, constipation, diarrhea and vomiting.   Genitourinary: Negative for dysuria, frequency, hematuria and urgency.   Musculoskeletal:  "Negative for joint swelling.        No muscle weakness.   Skin: Negative for rash and wound.   Neurological: Positive for numbness (Bilat hand-Unchanged). Negative for seizures, syncope, speech difficulty and weakness.   Hematological: Negative for adenopathy. Does not bruise/bleed easily.   Psychiatric/Behavioral: Positive for dysphoric mood (stable Improved) and sleep disturbance (Unchanged). Negative for behavioral problems, confusion and suicidal ideas.   All other systems reviewed and are negative.     I have reviewed and confirmed the accuracy of the ROS as documented by the MA/LPN/RN CLAUDINE Sloan    Objective     Vitals:    12/22/21 0932   BP: 119/84   Pulse: 101   Resp: 20   Temp: 97.7 °F (36.5 °C)   TempSrc: Temporal   SpO2: 98%   Weight: 65.8 kg (145 lb)   Height: 157 cm (61.81\")   PainSc: 0-No pain     Current Status 12/22/2021   ECOG score 0   Physical exam    CONSTITUTIONAL:  Vital signs reviewed.  No distress, looks comfortable.  EYES:  Conjunctivae and lids unremarkable.  PERRLA  EARS,NOSE,MOUTH,THROAT:  Ears and nose appear unremarkable.  Lips, teeth, gums appear unremarkable.  RESPIRATORY:  Normal respiratory effort.  Lungs clear to auscultation bilaterally.  CARDIOVASCULAR:  Normal S1, S2.  No murmurs rubs or gallops.  No significant lower extremity edema.    GASTROINTESTINAL: Abdomen appears unremarkable.  Nontender.  No hepatomegaly.  No splenomegaly.  LYMPHATIC:  No cervical, supraclavicular, axillary lymphadenopathy.  SKIN:  Warm.  No rashes.  PSYCHIATRIC:  Normal judgment and insight.  Normal mood and affect.  HANDS: unable to examine fingernails as patient has dark nail polish on.  Per patient report they are changing color, brown at the nailbed.      RECENT LABS:  Results from last 7 days   Lab Units 12/22/21  0925   WBC 10*3/mm3 5.49   NEUTROS ABS 10*3/mm3 3.41   HEMOGLOBIN g/dL 12.7   HEMATOCRIT % 38.2   PLATELETS 10*3/mm3 247             Assessment/Plan       * [id number], ER " negative SC negative HER-2/ese 3+ with Ki-67 of 50%.  S/p left partial mastectomy with sentinel lymph node biopsy.  Tumor was present at 5:00, invasive ductal carcinoma, Oziel score of 8, grade 3, greatest dimension was 12 mm x 8 x 4 mm.  Single focus of invasive carcinoma present.  There was extensive DCIS which is 30 mm x 8 x 2 mm, grade 3, no evidence of lymphovascular space invasion or dermal lymphatic invasion.  Margins were clear.  4 lymph nodes were negative.  It is a p T1cp N0, ER negative SC negative HER-2/ese 3+ with Ki-67 of 50%.  · Patient is s/p port placement  · Discussed in length with patient that given a small tumor she is eligible for weekly Taxol Herceptin.  Weekly Taxol x12 weeks along with weekly Herceptin followed by 1 year of Herceptin.  · Discussed patient in the breast cancer conference  · 2D echo done which showed ejection fraction of 61-65% and strain pattern of -20.8.  · Patient also seen by Dr. Virk cardiology and Dr. Virk is planning to repeat echocardiogram in 3 months after initiation of therapy.  · 8/4/2021 initiating weekly Taxol Herceptin  · 9/8/2021, proceed with week #6 Taxol and Herceptin.  Patient continues to tolerate treatment well. No signs of peripheral neuropathy.  · Her next echocardiogram due November 4, 2021, She follows up with cardiology Dr. Camron Virk.  · 9/28/2021, proceed with week #9 taxol/herceptin.  She does feel her nausea has worsened after her last cycle of chemotherapy.  She prefers the disintegrating Zofran, this will be called to her pharmacy today.  · Patient is here to take cycle 11 of weekly Taxol with worsening fatigue and worsening rash and overall dysphoric mood.  She also cannot sleep and her quality life is worsening.  At the present time we will plan to give 1 more cycle of Taxol following which she will be referred to radiation.    · Completed radiation November 2021.  · Patient continues to receive Herceptin every 3 weeks and is tolerating  this well.  Proceed with Herceptin today.  She has started to experience splitting of the fingernails on three middle fingers to the right hand, suspect this is delayed effect of Taxol.    *  Factor V Leiden heterozygosity with right frontal stroke and patient presented in December 2020 with left-sided weakness tingling and numbness and also numbness in the face.  · Was referred to neurology and cardiology by Dr. Goodman  · Ultrasound of carotid does not show significant stenosis  · 24 Holter is negative  · 2D echocardiogram shows ejection fraction of 64% with mild mitral regurg and mitral stenosis  · Neurology obtain factor V Leiden given that patient's paternal aunt had's history of factor V Leiden and that was heterozygous for factor V Leiden  · Continue aspirin as per neurology.  Also patient on medications for her high cholesterol started after stroke currently asymptomatic  · Hypercoagulable work-up done which is negative except heterozygous state for factor V Leiden.  · Complete stroke work-up has been negative and since this is arterial stroke and she was not on aspirin prior to that and her symptoms have resolved I agree with continuing aspirin alone along with high cholesterol medications.  · MR venogram done on May 10, 2021 is negative.  Patient has been referred to the stroke neurologist Dr. Johnson  · Patient diagnosed with breast cancer with Mediport placed.  Per discussion with neurology, patient initiated on prophylactic Xarelto and aspirin discontinued.  · Patient had one nosebleed which lasted 10 minutes but with pinching the nose it helped.  But she is switching to 20 mg daily from tomorrow.  We discussed about placing ice and notifying us if her nosebleeds are significant.  But it resolved.  It was likely secondary to dry air  · 9/7/2021, patient continues to experience nosebleeds.  Reports these occur when her nose itches, and after scratching her nose it begins to bleed.  She feels this may be  "related to dry air, so I have asked her to start using saline nasal spray to help moisten her nose.  If she experiences nosebleeds that do not stop, I asked her to notify our office.  · Tolerating anticoagulation with Xarelto    * Family history of cancer: Patient's mother had breast cancer at age 50 and pancreatic cancer at age 68 and  from pancreatic cancer.  Patient's maternal grandmother had breast cancer in her 50s.  Her maternal uncle had skin cancer which went to the lung and another maternal uncle had throat cancer.  Maternal aunt had call colon polyps.  Patient's paternal aunt had breast cancer in her 40s.  And paternal grandfather with colon cancer in his 80s.  Paternal aunt also had factor V Leiden.  · Genetic testing is negative    * Solid nodule in the right kidney on ultrasound, will schedule follow-up with urology  · Patient was to follow-up with Dr. Shultz  · Will need records from urology    *  Elevated BMI, encourage diet and exercise.  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  Patient has been requiring frequent Tums.  Recommended she begin Pepcid 20 mg daily.    · Stable, continue Pepcid 20 mg daily.    *Constipation likely secondary to ferrous sulfate.  Patient was iron deficient.  · We will switch to IV Venofer.    *Headache likely related to body ache because of Taxol  · 2021, patient discusses concern about worsening headaches.  Describing them as \"glass breaking\".  Started about 2 weeks ago, and progressively worsened.  Occurring 3-4 times daily now, and lasting less than 1 minute with each episode.  This occurs in the same place, right upper skull.  Denies vision changes or dizziness.  Will order stat MRI of the brain for further evaluation.  The patient follows with a neurologist, and is going to notify them of her symptoms and the plan for MRI.    *Elevated liver function tests, total bilirubin still normal AST " ALT slightly elevated.  Patient did take Tylenol but not too much.  No dose adjustments required at the present time.    *Iron deficiency anemia, patient's percent saturation still low at 9% s/p full dose of IV Venofer.  · 9/29/2021, patient will receive dose #5 Venofer today.  Hemoglobin today 10.1.  · 12/22/2021, hemoglobin today 12.7.    *Insomnia  · 9/29/2021, patient reports difficulty sleeping.  She is experiencing this every night, not just the nights of treatment when she receives IV dexamethasone.  She has attempted taking Benadryl, however felt groggy following this.  She is going to attempt melatonin to see if this improves her sleep.    Plan:   · Proceed with Herceptin today and continue every 3 weeks.  · Return in 3 weeks for MD follow-up and Herceptin.  · Echocardiogram scheduled for 2/9/2022.    This patient is on drug therapy requiring intensive monitoring for toxicity.     Nessa Howe, APRN  12/22/21       Dr. Maxine Haynes

## 2021-12-21 ENCOUNTER — APPOINTMENT (OUTPATIENT)
Dept: OCCUPATIONAL THERAPY | Facility: HOSPITAL | Age: 41
End: 2021-12-21

## 2021-12-21 DIAGNOSIS — D68.51 FACTOR 5 LEIDEN MUTATION, HETEROZYGOUS (HCC): ICD-10-CM

## 2021-12-21 DIAGNOSIS — Z86.73 HISTORY OF STROKE: ICD-10-CM

## 2021-12-21 DIAGNOSIS — C50.812 MALIGNANT NEOPLASM OF OVERLAPPING SITES OF LEFT BREAST IN FEMALE, ESTROGEN RECEPTOR NEGATIVE (HCC): ICD-10-CM

## 2021-12-21 DIAGNOSIS — Z17.1 MALIGNANT NEOPLASM OF OVERLAPPING SITES OF LEFT BREAST IN FEMALE, ESTROGEN RECEPTOR NEGATIVE (HCC): ICD-10-CM

## 2021-12-21 RX ORDER — RIVAROXABAN 20 MG/1
TABLET, FILM COATED ORAL
Qty: 30 TABLET | Refills: 3 | Status: SHIPPED | OUTPATIENT
Start: 2021-12-21 | End: 2022-07-05

## 2021-12-21 RX ORDER — FAMOTIDINE 20 MG/1
20 TABLET, FILM COATED ORAL DAILY
Qty: 30 TABLET | Refills: 3 | Status: SHIPPED | OUTPATIENT
Start: 2021-12-21 | End: 2022-03-07

## 2021-12-22 ENCOUNTER — INFUSION (OUTPATIENT)
Dept: ONCOLOGY | Facility: HOSPITAL | Age: 41
End: 2021-12-22

## 2021-12-22 ENCOUNTER — OFFICE VISIT (OUTPATIENT)
Dept: ONCOLOGY | Facility: CLINIC | Age: 41
End: 2021-12-22

## 2021-12-22 VITALS
RESPIRATION RATE: 20 BRPM | BODY MASS INDEX: 26.68 KG/M2 | HEIGHT: 62 IN | SYSTOLIC BLOOD PRESSURE: 119 MMHG | OXYGEN SATURATION: 98 % | HEART RATE: 101 BPM | TEMPERATURE: 97.7 F | WEIGHT: 145 LBS | DIASTOLIC BLOOD PRESSURE: 84 MMHG

## 2021-12-22 DIAGNOSIS — T45.1X5A CHEMOTHERAPY-INDUCED NAUSEA: ICD-10-CM

## 2021-12-22 DIAGNOSIS — Z17.1 MALIGNANT NEOPLASM OF OVERLAPPING SITES OF LEFT BREAST IN FEMALE, ESTROGEN RECEPTOR NEGATIVE (HCC): Primary | ICD-10-CM

## 2021-12-22 DIAGNOSIS — Z45.2 ENCOUNTER FOR ADJUSTMENT OR MANAGEMENT OF VASCULAR ACCESS DEVICE: ICD-10-CM

## 2021-12-22 DIAGNOSIS — Z79.899 HIGH RISK MEDICATION USE: ICD-10-CM

## 2021-12-22 DIAGNOSIS — C50.812 MALIGNANT NEOPLASM OF OVERLAPPING SITES OF LEFT BREAST IN FEMALE, ESTROGEN RECEPTOR NEGATIVE (HCC): Primary | ICD-10-CM

## 2021-12-22 DIAGNOSIS — C50.812 MALIGNANT NEOPLASM OF OVERLAPPING SITES OF LEFT BREAST IN FEMALE, ESTROGEN RECEPTOR NEGATIVE (HCC): ICD-10-CM

## 2021-12-22 DIAGNOSIS — Z17.1 MALIGNANT NEOPLASM OF OVERLAPPING SITES OF LEFT BREAST IN FEMALE, ESTROGEN RECEPTOR NEGATIVE (HCC): ICD-10-CM

## 2021-12-22 DIAGNOSIS — R11.0 CHEMOTHERAPY-INDUCED NAUSEA: ICD-10-CM

## 2021-12-22 LAB
BASOPHILS # BLD AUTO: 0.05 10*3/MM3 (ref 0–0.2)
BASOPHILS NFR BLD AUTO: 0.9 % (ref 0–1.5)
DEPRECATED RDW RBC AUTO: 38.9 FL (ref 37–54)
EOSINOPHIL # BLD AUTO: 0.14 10*3/MM3 (ref 0–0.4)
EOSINOPHIL NFR BLD AUTO: 2.6 % (ref 0.3–6.2)
ERYTHROCYTE [DISTWIDTH] IN BLOOD BY AUTOMATED COUNT: 11.9 % (ref 12.3–15.4)
HCT VFR BLD AUTO: 38.2 % (ref 34–46.6)
HGB BLD-MCNC: 12.7 G/DL (ref 12–15.9)
IMM GRANULOCYTES # BLD AUTO: 0.01 10*3/MM3 (ref 0–0.05)
IMM GRANULOCYTES NFR BLD AUTO: 0.2 % (ref 0–0.5)
LYMPHOCYTES # BLD AUTO: 1.34 10*3/MM3 (ref 0.7–3.1)
LYMPHOCYTES NFR BLD AUTO: 24.4 % (ref 19.6–45.3)
MCH RBC QN AUTO: 29.2 PG (ref 26.6–33)
MCHC RBC AUTO-ENTMCNC: 33.2 G/DL (ref 31.5–35.7)
MCV RBC AUTO: 87.8 FL (ref 79–97)
MONOCYTES # BLD AUTO: 0.54 10*3/MM3 (ref 0.1–0.9)
MONOCYTES NFR BLD AUTO: 9.8 % (ref 5–12)
NEUTROPHILS NFR BLD AUTO: 3.41 10*3/MM3 (ref 1.7–7)
NEUTROPHILS NFR BLD AUTO: 62.1 % (ref 42.7–76)
NRBC BLD AUTO-RTO: 0 /100 WBC (ref 0–0.2)
PLATELET # BLD AUTO: 247 10*3/MM3 (ref 140–450)
PMV BLD AUTO: 9.9 FL (ref 6–12)
RBC # BLD AUTO: 4.35 10*6/MM3 (ref 3.77–5.28)
WBC NRBC COR # BLD: 5.49 10*3/MM3 (ref 3.4–10.8)

## 2021-12-22 PROCEDURE — 25010000002 HEPARIN LOCK FLUSH PER 10 UNITS: Performed by: INTERNAL MEDICINE

## 2021-12-22 PROCEDURE — 99214 OFFICE O/P EST MOD 30 MIN: CPT | Performed by: NURSE PRACTITIONER

## 2021-12-22 PROCEDURE — 96375 TX/PRO/DX INJ NEW DRUG ADDON: CPT

## 2021-12-22 PROCEDURE — 96413 CHEMO IV INFUSION 1 HR: CPT

## 2021-12-22 PROCEDURE — 25010000002 TRASTUZUMAB-ANNS 420 MG RECONSTITUTED SOLUTION 1 EACH VIAL: Performed by: INTERNAL MEDICINE

## 2021-12-22 PROCEDURE — 85025 COMPLETE CBC W/AUTO DIFF WBC: CPT | Performed by: INTERNAL MEDICINE

## 2021-12-22 RX ORDER — HEPARIN SODIUM (PORCINE) LOCK FLUSH IV SOLN 100 UNIT/ML 100 UNIT/ML
500 SOLUTION INTRAVENOUS AS NEEDED
Status: DISCONTINUED | OUTPATIENT
Start: 2021-12-22 | End: 2021-12-22 | Stop reason: HOSPADM

## 2021-12-22 RX ORDER — FAMOTIDINE 10 MG/ML
20 INJECTION, SOLUTION INTRAVENOUS ONCE
Status: COMPLETED | OUTPATIENT
Start: 2021-12-22 | End: 2021-12-22

## 2021-12-22 RX ORDER — SODIUM CHLORIDE 0.9 % (FLUSH) 0.9 %
10 SYRINGE (ML) INJECTION AS NEEDED
Status: DISCONTINUED | OUTPATIENT
Start: 2021-12-22 | End: 2021-12-22 | Stop reason: HOSPADM

## 2021-12-22 RX ORDER — SODIUM CHLORIDE 9 MG/ML
250 INJECTION, SOLUTION INTRAVENOUS ONCE
Status: COMPLETED | OUTPATIENT
Start: 2021-12-22 | End: 2021-12-22

## 2021-12-22 RX ORDER — HEPARIN SODIUM (PORCINE) LOCK FLUSH IV SOLN 100 UNIT/ML 100 UNIT/ML
500 SOLUTION INTRAVENOUS AS NEEDED
Status: CANCELLED | OUTPATIENT
Start: 2021-12-22

## 2021-12-22 RX ORDER — SODIUM CHLORIDE 0.9 % (FLUSH) 0.9 %
10 SYRINGE (ML) INJECTION AS NEEDED
Status: CANCELLED | OUTPATIENT
Start: 2021-12-22

## 2021-12-22 RX ADMIN — TRASTUZUMAB-ANNS 390 MG: 420 INJECTION, POWDER, LYOPHILIZED, FOR SOLUTION INTRAVENOUS at 10:30

## 2021-12-22 RX ADMIN — Medication 500 UNITS: at 11:07

## 2021-12-22 RX ADMIN — SODIUM CHLORIDE 250 ML: 9 INJECTION, SOLUTION INTRAVENOUS at 10:10

## 2021-12-22 RX ADMIN — FAMOTIDINE 20 MG: 10 INJECTION INTRAVENOUS at 10:15

## 2021-12-22 RX ADMIN — SODIUM CHLORIDE, PRESERVATIVE FREE 10 ML: 5 INJECTION INTRAVENOUS at 11:07

## 2022-01-04 DIAGNOSIS — I10 ESSENTIAL HYPERTENSION: Chronic | ICD-10-CM

## 2022-01-04 DIAGNOSIS — F90.0 ATTENTION DEFICIT HYPERACTIVITY DISORDER (ADHD), PREDOMINANTLY INATTENTIVE TYPE: ICD-10-CM

## 2022-01-04 RX ORDER — VALSARTAN 160 MG/1
160 TABLET ORAL DAILY
Qty: 30 TABLET | Refills: 5 | Status: SHIPPED | OUTPATIENT
Start: 2022-01-04 | End: 2022-08-05 | Stop reason: SDUPTHER

## 2022-01-04 RX ORDER — DEXTROAMPHETAMINE SACCHARATE, AMPHETAMINE ASPARTATE MONOHYDRATE, DEXTROAMPHETAMINE SULFATE AND AMPHETAMINE SULFATE 6.25; 6.25; 6.25; 6.25 MG/1; MG/1; MG/1; MG/1
25 CAPSULE, EXTENDED RELEASE ORAL EVERY MORNING
Qty: 30 CAPSULE | Refills: 0 | Status: SHIPPED | OUTPATIENT
Start: 2022-01-04 | End: 2022-02-21 | Stop reason: SDUPTHER

## 2022-01-05 ENCOUNTER — TELEPHONE (OUTPATIENT)
Dept: ONCOLOGY | Facility: CLINIC | Age: 42
End: 2022-01-05

## 2022-01-07 DIAGNOSIS — C50.812 MALIGNANT NEOPLASM OF OVERLAPPING SITES OF LEFT BREAST IN FEMALE, ESTROGEN RECEPTOR NEGATIVE: Primary | ICD-10-CM

## 2022-01-07 DIAGNOSIS — Z17.1 MALIGNANT NEOPLASM OF OVERLAPPING SITES OF LEFT BREAST IN FEMALE, ESTROGEN RECEPTOR NEGATIVE: Primary | ICD-10-CM

## 2022-01-12 ENCOUNTER — INFUSION (OUTPATIENT)
Dept: ONCOLOGY | Facility: HOSPITAL | Age: 42
End: 2022-01-12

## 2022-01-12 ENCOUNTER — OFFICE VISIT (OUTPATIENT)
Dept: ONCOLOGY | Facility: CLINIC | Age: 42
End: 2022-01-12

## 2022-01-12 VITALS
OXYGEN SATURATION: 97 % | SYSTOLIC BLOOD PRESSURE: 126 MMHG | HEIGHT: 62 IN | DIASTOLIC BLOOD PRESSURE: 88 MMHG | RESPIRATION RATE: 18 BRPM | WEIGHT: 148.2 LBS | BODY MASS INDEX: 27.27 KG/M2 | TEMPERATURE: 97.3 F | HEART RATE: 105 BPM

## 2022-01-12 DIAGNOSIS — Z17.1 MALIGNANT NEOPLASM OF OVERLAPPING SITES OF LEFT BREAST IN FEMALE, ESTROGEN RECEPTOR NEGATIVE: Primary | ICD-10-CM

## 2022-01-12 DIAGNOSIS — Z45.2 ENCOUNTER FOR ADJUSTMENT OR MANAGEMENT OF VASCULAR ACCESS DEVICE: ICD-10-CM

## 2022-01-12 DIAGNOSIS — C50.812 MALIGNANT NEOPLASM OF OVERLAPPING SITES OF LEFT BREAST IN FEMALE, ESTROGEN RECEPTOR NEGATIVE: ICD-10-CM

## 2022-01-12 DIAGNOSIS — C50.812 MALIGNANT NEOPLASM OF OVERLAPPING SITES OF LEFT BREAST IN FEMALE, ESTROGEN RECEPTOR NEGATIVE: Primary | ICD-10-CM

## 2022-01-12 DIAGNOSIS — Z17.1 MALIGNANT NEOPLASM OF OVERLAPPING SITES OF LEFT BREAST IN FEMALE, ESTROGEN RECEPTOR NEGATIVE: ICD-10-CM

## 2022-01-12 LAB
ALBUMIN SERPL-MCNC: 4.3 G/DL (ref 3.5–5.2)
ALBUMIN/GLOB SERPL: 1.7 G/DL
ALP SERPL-CCNC: 96 U/L (ref 39–117)
ALT SERPL W P-5'-P-CCNC: 57 U/L (ref 1–33)
ANION GAP SERPL CALCULATED.3IONS-SCNC: 9.2 MMOL/L (ref 5–15)
AST SERPL-CCNC: 36 U/L (ref 1–32)
BASOPHILS # BLD AUTO: 0.05 10*3/MM3 (ref 0–0.2)
BASOPHILS NFR BLD AUTO: 1 % (ref 0–1.5)
BILIRUB SERPL-MCNC: 0.2 MG/DL (ref 0–1.2)
BUN SERPL-MCNC: 14 MG/DL (ref 6–20)
BUN/CREAT SERPL: 19.7 (ref 7–25)
CALCIUM SPEC-SCNC: 9.8 MG/DL (ref 8.6–10.5)
CHLORIDE SERPL-SCNC: 103 MMOL/L (ref 98–107)
CO2 SERPL-SCNC: 25.8 MMOL/L (ref 22–29)
CREAT SERPL-MCNC: 0.71 MG/DL (ref 0.57–1)
DEPRECATED RDW RBC AUTO: 38 FL (ref 37–54)
EOSINOPHIL # BLD AUTO: 0.15 10*3/MM3 (ref 0–0.4)
EOSINOPHIL NFR BLD AUTO: 3 % (ref 0.3–6.2)
ERYTHROCYTE [DISTWIDTH] IN BLOOD BY AUTOMATED COUNT: 11.8 % (ref 12.3–15.4)
GFR SERPL CREATININE-BSD FRML MDRD: 90 ML/MIN/1.73
GLOBULIN UR ELPH-MCNC: 2.5 GM/DL
GLUCOSE SERPL-MCNC: 106 MG/DL (ref 65–99)
HCT VFR BLD AUTO: 38.7 % (ref 34–46.6)
HGB BLD-MCNC: 12.9 G/DL (ref 12–15.9)
IMM GRANULOCYTES # BLD AUTO: 0.01 10*3/MM3 (ref 0–0.05)
IMM GRANULOCYTES NFR BLD AUTO: 0.2 % (ref 0–0.5)
LYMPHOCYTES # BLD AUTO: 1.16 10*3/MM3 (ref 0.7–3.1)
LYMPHOCYTES NFR BLD AUTO: 23.3 % (ref 19.6–45.3)
MCH RBC QN AUTO: 29 PG (ref 26.6–33)
MCHC RBC AUTO-ENTMCNC: 33.3 G/DL (ref 31.5–35.7)
MCV RBC AUTO: 87 FL (ref 79–97)
MONOCYTES # BLD AUTO: 0.44 10*3/MM3 (ref 0.1–0.9)
MONOCYTES NFR BLD AUTO: 8.9 % (ref 5–12)
NEUTROPHILS NFR BLD AUTO: 3.16 10*3/MM3 (ref 1.7–7)
NEUTROPHILS NFR BLD AUTO: 63.6 % (ref 42.7–76)
NRBC BLD AUTO-RTO: 0 /100 WBC (ref 0–0.2)
PLATELET # BLD AUTO: 251 10*3/MM3 (ref 140–450)
PMV BLD AUTO: 9.4 FL (ref 6–12)
POTASSIUM SERPL-SCNC: 4.5 MMOL/L (ref 3.5–5.2)
PROT SERPL-MCNC: 6.8 G/DL (ref 6–8.5)
RBC # BLD AUTO: 4.45 10*6/MM3 (ref 3.77–5.28)
SODIUM SERPL-SCNC: 138 MMOL/L (ref 136–145)
WBC NRBC COR # BLD: 4.97 10*3/MM3 (ref 3.4–10.8)

## 2022-01-12 PROCEDURE — 80053 COMPREHEN METABOLIC PANEL: CPT | Performed by: INTERNAL MEDICINE

## 2022-01-12 PROCEDURE — 96375 TX/PRO/DX INJ NEW DRUG ADDON: CPT

## 2022-01-12 PROCEDURE — 25010000002 TRASTUZUMAB-ANNS 420 MG RECONSTITUTED SOLUTION 1 EACH VIAL: Performed by: INTERNAL MEDICINE

## 2022-01-12 PROCEDURE — 99214 OFFICE O/P EST MOD 30 MIN: CPT | Performed by: INTERNAL MEDICINE

## 2022-01-12 PROCEDURE — 85025 COMPLETE CBC W/AUTO DIFF WBC: CPT | Performed by: INTERNAL MEDICINE

## 2022-01-12 PROCEDURE — 25010000002 HEPARIN LOCK FLUSH PER 10 UNITS: Performed by: INTERNAL MEDICINE

## 2022-01-12 PROCEDURE — 96413 CHEMO IV INFUSION 1 HR: CPT

## 2022-01-12 RX ORDER — SODIUM CHLORIDE 0.9 % (FLUSH) 0.9 %
10 SYRINGE (ML) INJECTION AS NEEDED
Status: DISCONTINUED | OUTPATIENT
Start: 2022-01-12 | End: 2022-01-12 | Stop reason: HOSPADM

## 2022-01-12 RX ORDER — SODIUM CHLORIDE 0.9 % (FLUSH) 0.9 %
10 SYRINGE (ML) INJECTION AS NEEDED
Status: CANCELLED | OUTPATIENT
Start: 2022-01-12

## 2022-01-12 RX ORDER — SODIUM CHLORIDE 9 MG/ML
250 INJECTION, SOLUTION INTRAVENOUS ONCE
Status: CANCELLED | OUTPATIENT
Start: 2022-01-12

## 2022-01-12 RX ORDER — FAMOTIDINE 10 MG/ML
20 INJECTION, SOLUTION INTRAVENOUS ONCE
Status: CANCELLED | OUTPATIENT
Start: 2022-01-12

## 2022-01-12 RX ORDER — FAMOTIDINE 10 MG/ML
20 INJECTION, SOLUTION INTRAVENOUS ONCE
Status: COMPLETED | OUTPATIENT
Start: 2022-01-12 | End: 2022-01-12

## 2022-01-12 RX ORDER — HEPARIN SODIUM (PORCINE) LOCK FLUSH IV SOLN 100 UNIT/ML 100 UNIT/ML
500 SOLUTION INTRAVENOUS AS NEEDED
Status: CANCELLED | OUTPATIENT
Start: 2022-01-12

## 2022-01-12 RX ORDER — HEPARIN SODIUM (PORCINE) LOCK FLUSH IV SOLN 100 UNIT/ML 100 UNIT/ML
500 SOLUTION INTRAVENOUS AS NEEDED
Status: DISCONTINUED | OUTPATIENT
Start: 2022-01-12 | End: 2022-01-12 | Stop reason: HOSPADM

## 2022-01-12 RX ORDER — SODIUM CHLORIDE 9 MG/ML
250 INJECTION, SOLUTION INTRAVENOUS ONCE
Status: COMPLETED | OUTPATIENT
Start: 2022-01-12 | End: 2022-01-12

## 2022-01-12 RX ADMIN — FAMOTIDINE 20 MG: 10 INJECTION INTRAVENOUS at 10:39

## 2022-01-12 RX ADMIN — TRASTUZUMAB-ANNS 390 MG: 420 INJECTION, POWDER, LYOPHILIZED, FOR SOLUTION INTRAVENOUS at 10:53

## 2022-01-12 RX ADMIN — SODIUM CHLORIDE, PRESERVATIVE FREE 10 ML: 5 INJECTION INTRAVENOUS at 11:29

## 2022-01-12 RX ADMIN — SODIUM CHLORIDE 250 ML: 9 INJECTION, SOLUTION INTRAVENOUS at 10:36

## 2022-01-12 RX ADMIN — Medication 500 UNITS: at 11:29

## 2022-01-13 ENCOUNTER — OFFICE VISIT (OUTPATIENT)
Dept: OTHER | Facility: HOSPITAL | Age: 42
End: 2022-01-13

## 2022-01-13 VITALS — RESPIRATION RATE: 18 BRPM

## 2022-01-13 DIAGNOSIS — Z17.1 MALIGNANT NEOPLASM OF OVERLAPPING SITES OF LEFT BREAST IN FEMALE, ESTROGEN RECEPTOR NEGATIVE: Primary | ICD-10-CM

## 2022-01-13 DIAGNOSIS — C50.812 MALIGNANT NEOPLASM OF OVERLAPPING SITES OF LEFT BREAST IN FEMALE, ESTROGEN RECEPTOR NEGATIVE: Primary | ICD-10-CM

## 2022-01-13 PROCEDURE — 99215 OFFICE O/P EST HI 40 MIN: CPT | Performed by: NURSE PRACTITIONER

## 2022-01-13 NOTE — PROGRESS NOTES
Subjective     REASON FOR follow-up:    1.  Heterozygous state for factor V Leiden    2.  New onset stroke on aspirin by neurology    3.  Hypercholesterolemia    4.  Hypercoagulable work-up done.  Antithrombin 109% protein S activity 85% protein S antigen 107%, protein C activity 85%.  Anticardiolipin antibody IgM 24%, antibeta-2 glycoprotein antibody negative, lupus anticoagulant not detected, prothrombin gene mutation negative.    5.  Screening mammogram showed abnormality in the left breast 6 o'clock position, 8 mm on ultrasound, ultrasound-guided left breast biopsy, 6 cm from the nipple showed evidence of invasive ductal carcinoma poorly differentiated grade 3, [place] score of 8 out of 9 ER negative, [place] negative, HER-2/ese 3+ positive.    6.  CT scan February 24, 2021 showed focal area of fatty infiltration of the liver.  1 cm hypoattenuating cortical lesion in the upper pole of the right kidney.  Similarly nodule in the upper pole of the left kidney is 1.5 cm.  Further evaluation by ultrasound suggested  · Ultrasound March 1, 2021 shows solid lesion in the upper pole of the right kidney.  In the left kidney along the midpole of the left kidney is a nodule which is 16 x 16 x 14 mm which is thought to be a cyst.  A 6-month follow-up CT suggested    6.  S/p left partial mastectomy with sentinel lymph node biopsy.  Tumor was present at 5:00, invasive ductal carcinoma, [place] score of 8, grade 3, greatest dimension was 12 mm x 8 x 4 mm.  Single focus of invasive carcinoma present.  There was extensive DCIS which is 30 mm x 8 x 2 mm, grade 3, no evidence of lymphovascular space invasion or dermal lymphatic invasion.  Margins were clear.  4 lymph nodes were negative.  It is a p T1cp N0, ER negative [place] negative HER-2/ese 3+ with Ki-67 of 50%.  · [facility] genetic test negative for 47 genes    7.recently initiated Xarelto at loading dose of 15 mg twice a day x3 weeks to then be switched to 20 mg daily per  protocol.  She is doing this because of Mediport in place and known factor V Leiden heterozygosity.        History of Present Illness   Patient is a 41 y.o. female with pT1cp N0 ER negative PA negative HER-2 positive left breast cancer s/p lumpectomy.        Patient completed 12 weekly treatments of Taxol and Herceptin and currently is here for every 3 week Herceptin.  She completed radiation in November 2021.  Patient is here for Herceptin every 3 weeks echocardiogram scheduled in February.  Last echo had shown an ejection fraction of 57%.  Prior to that it was 61-65%.  However Dr. Virk has been following the patient.  Patient is doing well.  There is no shortness of breath and there is no lower extremity edema.      Oncologic history:  Patient is here for follow-up of her mammogram.  Patient had screening mammogram April 2, 2021:  NAD a focal asymmetry was present in the posterior one third retroareolar region of the left breast.  Further spot compression images and left breast ultrasound was suggested.  Right breast was negative    April 9, 2021: Left diagnostic mammogram showed an area of focal asymmetry in the posterior one third region aspect of the left breast her breast    Ultrasound showed an irregular 0.8 cm lesion in the left breast at the 6 o'clock position of the order of 6 cm from the nipple.  Ultrasound-guided left breast biopsy was recommended.    April 26, 2021: Ultrasound-guided biopsy of the left breast 6 o'clock position, 6 cm from the nipple showed    Invasive ductal carcinoma, poorly differentiated with a [place] score grade 3 with a score of 8 out of 9 measuring at least 7 mm.  No definitive ductal carcinoma is in situ is identified.  ER 1% negative  PA 1% negative   HER-2/ese 3+ positive    There was no evidence of lymphadenopathy either in the mammogram of the ultrasound.  We suggested an MRI of the breast.  Patient has strong family history of breast cancer      May 7, 2021: MRI of  bilateral breast reviewed.  My interpretation is that there is area of enhancement in the 6 o'clock position of the left breast this is the biopsy-proven malignancy.  There is additional area of linear enhancement anteriorly and laterally measuring up to 1.2 cm this is approximately 5.6 cm from the nipple.  In addition there is a enhancement 2 to 3 mm in the anterolateral aspect of the lateral aspect of the left breast which is 4.6 cm from the nipple.  There is also mildly prominent posterior lymph node in the mid axilla which measures 1 cm with cortical thickening.  Findings are consistent with multifocal disease.  No contralateral disease or internal mammary adenopathy identified    May 20, 2021: Genetic test, [facility] genetic test shows 47 genes negative    May 21, 2021: I reviewed the biopsy of the lymph node in the left axilla which shows reactive lymph node negative for any carcinoma or lymphoma    June 2, 2021:Final Diagnosis  1. Left Breast, 5:00 o'clock, MRI-guided Biopsies for Linear Enhancement: INVASIVE MAMMARY CARCINOMA, NO  SPECIAL TYPE (INVASIVE DUCTAL CARCINOMA).  A. Largest contiguous focus in a core measures 4 mm.  B. No in-situ component identified.  C. Brisk lymphocytic response noted (see Comment).  D. No definitive lymphovascular nor perineural invasion identified.  2. Left Breast, 2:00 o'clock, MRI-guided Biopsy for Enhancement:  A. Benign breast parenchyma with radial scar and micropapillomas.  B. Usual ductal hyperplasia and columnar cell hyperplasia.  C. No atypical hyperplasia, in-situ nor invasive carcinoma identified    ER, UT, HER-2 new and Ki-67 pending on this new biopsy      Patient has been seen by Dr. Lashonda Euceda with plans of doing surgery on July first 2021    Once patient undergoes surgery lumpectomy with sentinel lymph node biopsy we will give further recommendation about treatment options.  Given that patient has multifocal disease and both of the lesions 2 lesions are 1.2  cm each, with it being a HER-2 positive tumor on the previous biopsy at 6:00 Dr. Euceda and myself discussed and patient preferred to undergo port placement at the same time of surgery.    Patient had [facility] genetic test which was negative.    She has completed surgery July 1, 2021.  She underwent left partial mastectomy with left axillary sentinel lymph node biopsy and right port placement.  Clinically she was thought to have a high-grade multifocal invasive ductal carcinoma ER/MN negative, MN positive.  The lesions require preoperative localization.  The multifocal cancer was bracketed with 2 savvy markers and the radial scar was localized with a needle.  Because of the volume of tissue that will be excised related to the breast volume the case was done in conjunction with Dr. Zavala for oncoplastic closure.    She is 2 weeks from surgery.  She is healing up reasonably well.    On review of her pathology she had left partial mastectomy with sentinel lymph node biopsy.  Tumor was present at 5:00, invasive ductal carcinoma, [place] score of 8, grade 3, greatest dimension was 12 mm x 8 x 4 mm.  Single focus of invasive carcinoma present.  There was extensive DCIS which is 30 mm x 8 x 2 mm, grade 3, no evidence of lymphovascular space invasion or dermal lymphatic invasion.  Margins were clear.  4 lymph nodes were negative.  It is a p T1cp N0, ER negative MN negative HER-2/ese 3+ with Ki-67 of 50%.    Patient is here to discuss further options of treatment.  Given small tumor T1c N0, she is a good candidate for weekly Taxol Herceptin.    Given that patient has heterozygous state for factor V Leiden and currently has port placed we will plan to start Eliquis 2.5 mg p.o. twice daily.  I have left a message for Dr. Craft in neurology to call me to discuss if patient needs to continue aspirin or we can hold off since be starting Eliquis.      Genetic test negative    August 4, 2021: Weekly Taxol Herceptin x12 weeks  followed by Herceptin x1 year.  Cycle 1 today    Hematologic history:  patient is a 40-year-old female who presented end of December with numbness and tingling in her left upper extremity and left face.  She went to see Dr. Goodman who then got concerned and referred to neurology.  She was referred to Dr. Freedman and talk to Dr. Donna guo for evaluation.  Patient underwent ultrasound of the carotids which did not show significant stenosis of the carotids.  She underwent 2D echocardiogram which showed a good ejection fraction of 64% with mild mitral valve prolapse and mild mitral stenosis.  She had a 24-hour Holter which was negative.  She then had also an MRI of the brain February 3, 2021 which showed areas of hyperintensity involving the subcortical white matter of the right frontal lobe superior laterally and posteriorly with the largest area measuring 8 9 mm.  There is also enhancement present.  The findings are consistent with a subacute infarct.  They could not differentiate if there is any underlying neoplastic process but a short-term follow-up was suggested in order to follow-up.  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    Patient currently got started on aspirin and factor V Leiden was obtained by neurology because patient's paternal aunt had factor V Leiden mutation and she was heterozygous.  Patient's testing also showed that she was heterozygous.    Patient states her numbness and tingling is resolved completely on the left arm and face it just lasted for a 24 hours.  She was here referred here for neurology to see if there is any other cause for her underlying hypercoagulable state.  She has not had a complete hypercoagulable work-up.  Also discussed with her that an underlying malignancy could cause a hypercoagulable state and we would need to do a CT scan to rule that out.    Patient's mother  has had breast cancer in her 50s but had pancreatic cancer at 68 and  from that.  Patient's dad had stroke at 63.  But he is alive at 74.  She has 1 brother who is 40 in good health.  Paternal aunt had breast cancer in her 40s.  Paternal grandfather had colon cancer in his late 80s.  Maternal uncle had throat cancer and another maternal uncle had skin cancer with metastasis to the lung.  And maternal grandmother had breast cancer.    Patient was to have mammogram done last year but because of COVID-19 it got postponed and she has not had it done yet.    Patient used to be a smoker half pack per day for 7 years but quit 10 years ago.  She has high cholesterol for which she is on treatment.    Past Medical History:   Diagnosis Date   • Acute stress reaction 2014   • ADD (attention deficit disorder)    • ADHD (attention deficit hyperactivity disorder)    • Anxiety and depression    • CTS (carpal tunnel syndrome)    • Factor 5 Leiden mutation, heterozygous ([facility]) 2021   • Family history of breast cancer 2013   • Fracture, cervical vertebra ([facility]) 1997    C4   • H/O complete eye exam 2016   • Hearing loss    • 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   • History of rheumatic fever    • Hypertension    • Hypothyroidism    • Infiltrating ductal carcinoma of left breast ([facility]) 2021    Left   • Kidney stone    • Mitral valve insufficiency    • PONV (postoperative nausea and vomiting)    • Stroke ([facility]) 2020    HX OF   • Stroke-like symptoms         Past Surgical History:   Procedure Laterality Date   • BREAST BIOPSY Left 2021    IDC   • BREAST LUMPECTOMY WITH SENTINEL NODE BIOPSY N/A 2021    Procedure: right port placement, Left SHAINA-guided, bracketed, partial mastectomy and sentinel lymph node biopsy Left breast needle-localized excisional biopsy;  Surgeon: Lashonda Euceda MD;  Location: [facility] OR;  Service: General;  Laterality: N/A;   • BREAST SURGERY  Bilateral 7/1/2021    Procedure: RIGHT BREAST REDUCTION MASTOPEXY LEFT BREAST ONCOPLASTIC CLOSURE;  Surgeon: Caridad Baker MD PhD;  Location: [facility] OR;  Service: Plastics;  Laterality: Bilateral;   • NO PAST SURGERIES     • PAP SMEAR  2016        Current Outpatient Medications on File Prior to Visit   Medication Sig Dispense Refill   • amphetamine-dextroamphetamine XR (Adderall XR) 25 MG 24 hr capsule Take 1 capsule by mouth Every Morning 30 capsule 0   • atorvastatin (Lipitor) 10 MG tablet Take 1 tablet by mouth Daily. 30 tablet 11   • famotidine (PEPCID) 20 MG tablet TAKE 1 TABLET BY MOUTH DAILY 30 tablet 3   • lidocaine-prilocaine (EMLA) 2.5-2.5 % cream Apply  topically to the appropriate area as directed As Needed for Mild Pain . 1 each 2   • LORazepam (ATIVAN) 0.5 MG tablet TAKE 1 TABLET BY MOUTH EVERY 8 HOURS AS NEEDED FOR ANXIETY 30 tablet 1   • ondansetron ODT (ZOFRAN-ODT) 8 MG disintegrating tablet Place 1 tablet on the tongue Every 8 (Eight) Hours As Needed for Nausea or Vomiting. 30 tablet 3   • Synthroid 137 MCG tablet Take 1 tablet by mouth Daily. 90 tablet 1   • valsartan (DIOVAN) 160 MG tablet Take 1 tablet by mouth Daily. 30 tablet 5   • Xarelto 20 MG tablet TAKE 1 TABLET BY MOUTH DAILY 30 tablet 3     Current Facility-Administered Medications on File Prior to Visit   Medication Dose Route Frequency Provider Last Rate Last Admin   • heparin injection 500 Units  500 Units Intravenous PRN Neena Beavers MD   500 Units at 08/11/21 1718   • sodium chloride 0.9 % flush 10 mL  10 mL Intravenous PRN Neena Beavers MD   10 mL at 08/11/21 1718        ALLERGIES:    Allergies   Allergen Reactions   • Sulfa Antibiotics Rash        Social History     Socioeconomic History   • Marital status: Significant Other   • Number of children: 0   Tobacco Use   • Smoking status: Former Smoker     Packs/day: 1.00     Years: 10.00     Pack years: 10.00     Types: Cigarettes     Start date: 1998     Quit  date:      Years since quittin.0   • Smokeless tobacco: Never Used   Vaping Use   • Vaping Use: Never used   Substance and Sexual Activity   • Alcohol use: Yes     Comment: RARELY   • Drug use: No   • Sexual activity: Defer     Partners: Male        Family History   Problem Relation Age of Onset   • Breast cancer Mother 53   • Pancreatic cancer Mother 68   • Lung cancer Maternal Grandmother    • Stroke Father    • Breast cancer Paternal Aunt 55   • Prostate cancer Maternal Grandfather    • Prostate cancer Paternal Grandfather    • Brain cancer Maternal Cousin 20   • Melanoma Maternal Uncle    • Ovarian cancer Other         Paternal great aunt    • Breast cancer Other    • Breast cancer Paternal Great-Grandmother 94   • Hypertension Brother    • Malig Hyperthermia Neg Hx       Family  history: Mother had breast cancer at age 57.  Maternal great aunt had breast cancer and paternal great aunt had breast cancer in her 40s.  Patient's mother had pancreatic cancer as well.  Paternal grandfather had prostate cancer.    Review of Systems   Constitutional: Positive for fatigue (Improved). Negative for appetite change, chills, diaphoresis, fever and unexpected weight change.   HENT: Negative for hearing loss, sore throat and trouble swallowing.    Respiratory: Negative for cough, chest tightness, shortness of breath and wheezing.    Cardiovascular: Negative for chest pain, palpitations and leg swelling.   Gastrointestinal: Negative for abdominal distention, abdominal pain, constipation, diarrhea and vomiting.   Genitourinary: Negative for dysuria, frequency, hematuria and urgency.   Musculoskeletal: Negative for joint swelling.        No muscle weakness.   Skin: Negative for rash and wound.   Neurological: Positive for numbness (Bilat hand-Unchanged). Negative for seizures, syncope, speech difficulty and weakness.   Hematological: Negative for adenopathy. Does not bruise/bleed easily.   Psychiatric/Behavioral:  "Positive for dysphoric mood (stable Improved) and sleep disturbance (Unchanged). Negative for behavioral problems, confusion and suicidal ideas.   All other systems reviewed and are negative.     I have reviewed and confirmed the accuracy of the ROS as documented by the MA/LPN/RN Neena Beavers MD    Objective     Vitals:    01/12/22 0946   BP: 126/88   Pulse: 105   Resp: 18   Temp: 97.3 °F (36.3 °C)   TempSrc: Temporal   SpO2: 97%   Weight: 67.2 kg (148 lb 3.2 oz)   Height: 157 cm (61.81\")   PainSc: 0-No pain     Current Status 1/12/2022   ECOG score 0   Physical exam      CONSTITUTIONAL:  Vital signs reviewed.  No distress, looks comfortable.  EYES:  Conjunctivae and lids unremarkable.  PERRLA  RESPIRATORY:  Normal respiratory effort.  Lungs clear to auscultation bilaterally.  CARDIOVASCULAR:  Normal S1, S2.  No murmurs rubs or gallops.  No significant lower extremity edema.  GASTROINTESTINAL: Abdomen appears unremarkable.  Nontender.  No hepatomegaly.  No splenomegaly.  LYMPHATIC:  No cervical, supraclavicular, axillary lymphadenopathy.  SKIN:  Warm.  No rashes.  PSYCHIATRIC:  Normal judgment and insight.  Normal mood and affect.      RECENT LABS:  Results from last 7 days   Lab Units 01/12/22  0923   WBC 10*3/mm3 4.97   NEUTROS ABS 10*3/mm3 3.16   HEMOGLOBIN g/dL 12.9   HEMATOCRIT % 38.7   PLATELETS 10*3/mm3 251     Results from last 7 days   Lab Units 01/12/22  0923   SODIUM mmol/L 138   POTASSIUM mmol/L 4.5   CHLORIDE mmol/L 103   CO2 mmol/L 25.8   BUN mg/dL 14   CREATININE mg/dL 0.71   CALCIUM mg/dL 9.8   ALBUMIN g/dL 4.30   BILIRUBIN mg/dL 0.2   ALK PHOS U/L 96   ALT (SGPT) U/L 57*   AST (SGOT) U/L 36*   GLUCOSE mg/dL 106*         Assessment/Plan       * [id number], ER negative [place] negative HER-2/ese 3+ with Ki-67 of 50%.  S/p left partial mastectomy with sentinel lymph node biopsy.  Tumor was present at 5:00, invasive ductal carcinoma, [place] score of 8, grade 3, greatest dimension was 12 mm x 8 x 4 mm.  " Single focus of invasive carcinoma present.  There was extensive DCIS which is 30 mm x 8 x 2 mm, grade 3, no evidence of lymphovascular space invasion or dermal lymphatic invasion.  Margins were clear.  4 lymph nodes were negative.  It is a p T1cp N0, ER negative [place] negative HER-2/ese 3+ with Ki-67 of 50%.  · Patient is s/p port placement  · Discussed in length with patient that given a small tumor she is eligible for weekly Taxol Herceptin.  Weekly Taxol x12 weeks along with weekly Herceptin followed by 1 year of Herceptin.  · Discussed patient in the breast cancer conference  · 2D echo done which showed ejection fraction of 61-65% and strain pattern of -20.8.  · Patient also seen by Dr. Virk cardiology and Dr. Virk is planning to repeat echocardiogram in 3 months after initiation of therapy.  · 8/4/2021 initiating weekly Taxol Herceptin  · 9/8/2021, proceed with week #6 Taxol and Herceptin.  Patient continues to tolerate treatment well. No signs of peripheral neuropathy.  · Her next echocardiogram due November 4, 2021, She follows up with cardiology Dr. Camron Virk.  · 9/28/2021, proceed with week #9 taxol/herceptin.  She does feel her nausea has worsened after her last cycle of chemotherapy.  She prefers the disintegrating Zofran, this will be called to her pharmacy today.  · Patient is here to take cycle 11 of weekly Taxol with worsening fatigue and worsening rash and overall dysphoric mood.  She also cannot sleep and her quality life is worsening.  At the present time we will plan to give 1 more cycle of Taxol following which she will be referred to radiation.    · Completed radiation November 2021.  · Currently tolerating Herceptin.  Next echocardiogram in February 2022    *  Factor V Leiden heterozygosity with right frontal stroke and patient presented in December 2020 with left-sided weakness tingling and numbness and also numbness in the face.  · Was referred to neurology and cardiology by   Maxine  · Ultrasound of carotid does not show significant stenosis  · 24 Holter is negative  · 2D echocardiogram shows ejection fraction of 64% with mild mitral regurg and mitral stenosis  · Neurology obtain factor V Leiden given that patient's paternal aunt had's history of factor V Leiden and that was heterozygous for factor V Leiden  · Continue aspirin as per neurology.  Also patient on medications for her high cholesterol started after stroke currently asymptomatic  · Hypercoagulable work-up done which is negative except heterozygous state for factor V Leiden.  · Complete stroke work-up has been negative and since this is arterial stroke and she was not on aspirin prior to that and her symptoms have resolved I agree with continuing aspirin alone along with high cholesterol medications.  · MR venogram done on May 10, 2021 is negative.  Patient has been referred to the stroke neurologist Dr. Johnson  · Patient diagnosed with breast cancer with Mediport placed.  Per discussion with neurology, patient initiated on prophylactic Xarelto and aspirin discontinued.  · Patient had one nosebleed which lasted 10 minutes but with pinching the nose it helped.  But she is switching to 20 mg daily from tomorrow.  We discussed about placing ice and notifying us if her nosebleeds are significant.  But it resolved.  It was likely secondary to dry air  · 2021, patient continues to experience nosebleeds.  Reports these occur when her nose itches, and after scratching her nose it begins to bleed.  She feels this may be related to dry air, so I have asked her to start using saline nasal spray to help moisten her nose.  If she experiences nosebleeds that do not stop, I asked her to notify our office.  · Tolerating anticoagulation with Xarelto.  No bleeding issues with Xarelto    * Family history of cancer: Patient's mother had breast cancer at age 50 and pancreatic cancer at age 68 and  from pancreatic cancer.  Patient's  "maternal grandmother had breast cancer in her 50s.  Her maternal uncle had skin cancer which went to the lung and another maternal uncle had throat cancer.  Maternal aunt had call colon polyps.  Patient's paternal aunt had breast cancer in her 40s.  And paternal grandfather with colon cancer in his 80s.  Paternal aunt also had factor V Leiden.  · Genetic testing is negative    * Solid nodule in the right kidney on ultrasound, will schedule follow-up with urology  · Patient was to follow-up with Dr. Shultz  · Will need records from urology  · Will discuss with patient at her next appointment about follow-up with urology    *  Elevated BMI, encourage diet and exercise.  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  Patient has been requiring frequent Tums.  Recommended she begin Pepcid 20 mg daily.    · Stable, continue Pepcid 20 mg daily.    *Constipation likely secondary to ferrous sulfate.  Patient was iron deficient.  · We will switch to IV Venofer.    *Headache likely related to body ache because of Taxol  · 9/29/2021, patient discusses concern about worsening headaches.  Describing them as \"glass breaking\".  Started about 2 weeks ago, and progressively worsened.  Occurring 3-4 times daily now, and lasting less than 1 minute with each episode.  This occurs in the same place, right upper skull.  Denies vision changes or dizziness.  Will order stat MRI of the brain for further evaluation.  The patient follows with a neurologist, and is going to notify them of her symptoms and the plan for MRI.    *Elevated liver function tests, total bilirubin still normal AST ALT slightly elevated.  Patient did take Tylenol but not too much.  No dose adjustments required at the present time.    *Iron deficiency anemia, patient's percent saturation still low at 9% s/p full dose of IV Venofer.  · 9/29/2021, patient will receive dose #5 Venofer today.  Hemoglobin today " 10.1.  · 12/22/2021, hemoglobin today 12.7.    *Insomnia  · 9/29/2021, patient reports difficulty sleeping.  She is experiencing this every night, not just the nights of treatment when she receives IV dexamethasone.  She has attempted taking Benadryl, however felt groggy following this.  She is going to attempt melatonin to see if this improves her sleep.    Plan:   · We will give Herceptin today  · Follow-up in 3 weeks with labs and Herceptin  · Echocardiogram is scheduled February 9  · We will discussed with patient about referral to urology  in order to follow-up on the renal lesion  · Follow-up with me in 6 weeks with labs and Herceptin    This patient is on drug therapy requiring intensive monitoring for toxicity.     Neena Beavers MD  01/12/22       Dr. Maxine Haynes

## 2022-01-13 NOTE — PROGRESS NOTES
[facility] MULTIDISCIPLINARY CLINIC  SURVIVORSHIP VISIT: TELEHEALTH  Survivorship Treatment Summary Initial Visit        Radha Astorga is a pleasant 41 y.o. female being followed by Neena Beavers MD for left breast invasive ductal carcinoma. Reviewed today by VIDEO, for initial survivorship treatment summary visit.     You have chosen to receive care through a video visit. Do you consent to use a video visit for your medical care today? Yes      HPI  Luann 41 year old,       TREATMENT HISTORY:     Oncology/Hematology History Overview Note   12/11/2019, Screening MMG with Romeo ( [place]):  Scattered fibroglandular density. There are no findings to suggest malignancy.  BI-RADS 1: Negative.     Malignant neoplasm of overlapping sites of left breast in female, estrogen receptor negative (HCC)   4/1/2021 Initial Diagnosis    Malignant neoplasm of overlapping sites of left breast in female, estrogen receptor negative (CMS/HCC)     4/2/2021 Imaging    Screening MMG with Romeo ( [place]):  Scattered fibroglandular densities. In the posterior one-third of the left breast projecting in a retroareolar location on the CC images there is an area of focal asymmetry. I see no suspicious calcifications or areas of architectural distortion in either breast. There is no evidence for skin thickening or nipple retraction.  BI-RADS 0: Incomplete.     4/9/2021 Imaging    Left Diagnostic MMG with Romeo & Left Breast US ( [facility]):  MMG:  With additional imaging there is persistence of the area of focal asymmetry in the posterior one-third inferior aspect of the left breast.  US:  At the 6 o'clock position on the order of 6 cm from the nipple there is a 0.8 cm irregular hypoechoic lesion. Mild posterior acoustic shadowing is noted.  BI-RADS 4: Suspicious.     4/26/2021 Biopsy    Left Breast, US-Guided Biopsy ( [facility]):    1. Left Breast, 6:00, 6 cm FN, U/S-Guided Core Needle Biopsy for a Mass:                 A. INVASIVE  DUCTAL CARCINOMA, Poorly differentiated; Oziel Histologic Grade III/III (tubule score = 3, nuclear score = 2, mitoses score = 3), measuring at least 7 mm.               B. No definitive ductal carcinoma in situ identified.   C. Negative for lymphovascular space invasion.    ER negative (<1%)  OR negative (<1%)  HER2 positive (IHC 3+)  Ki-67 55%     5/5/2021 Genetic Testing    [employer] Common Hereditary Cancers Panel (47 genes):    Negative     5/7/2021 Biopsy    Bilateral Breast [facility]):  There is an amorphous area of enhancement seen within the 6:00 position of the left breast, posterior depth measuring approximately 1.2 cm (series 9 image 91). Susceptibility artifact is present within this region consistent with recently newly diagnosed biopsy-proven malignancy. There is an additional area of more linear enhancement seen just anteriorly and laterally measuring up to 1.2 cm (series 9 image 91). This is seen approximately 5.6 cm from the nipple. Mixed washout kinetics are present. There is an additional punctate area of enhancement measuring only approximately 2 to 3 mm seen within the anterior lateral aspect of the left breast seen approximately 4.6 cm from the nipple (series 9 image 79) also demonstrating mixed washout  kinetics. No additional areas of enhancement identified. There is questionable mildly prominent lymph nodes present within the left axillary region (series 9 image 34 and series 9 image 18) with mild cortical thickening present with benign-appearing fatty juan miguel present. This is seen both within the high and mid to low axilla. A mildly prominent posterior lymph node is also present within the mid axilla measuring up to 1 cm with cortical thickening present (series 9 image 27). Limited evaluation of the intrathoracic contents symmetric no acute process. A marker seen at the junction of the left breast and the left chest wall inferiorly (series 4 image 135) with no abnormalities  identified  within this region. No abnormal fluid collections identified.  BI-RADS 6: Known malignancy.     5/21/2021 Biopsy    Left Axilla, US-Guided Biopsy ( [facility]):  1.  Lymph Node, Left Axilla, Core Biopsy:                 A.  Fragments of reactive lymph node; negative for lymphoma and carcinoma.      6/2/2021 Biopsy    Left Breast, MR-Guided Biopsy x 2 ([facility]):    1. Left Breast, 5:00 o'clock, MRI-guided Biopsies for Linear Enhancement:   INVASIVE MAMMARY CARCINOMA, NO SPECIAL TYPE (INVASIVE DUCTAL CARCINOMA).               A. Largest contiguous focus in a core measures 4 mm.               B. No in-situ component identified.                C. Brisk lymphocytic response noted (see Comment).               D. No definitive lymphovascular nor perineural invasion identified.  -Bowtie clip. Biopsy clips marking the 2 sites of biopsy-proven malignancy are  by 2.5 cm (bowtie clip and posteriorly located U-shaped clip).     ER negative (0%)  [place] negative (0%)  Her2+ (IHC 3+)  Ki-67 50%    2.  Left Breast, 2:00 o'clock, MRI-guided Biopsy for Enhancement:                A. Benign breast parenchyma with radial scar and micropapillomas.               B. Usual ductal hyperplasia and columnar cell hyperplasia.               C. No atypical hyperplasia, in-situ nor invasive carcinoma identified.  -U-shaped clip. Concordant.     7/1/2021 Surgery    Port placement, left SHAINA-guided, bracketed, partial mastectomy and left breast needle-localized excisional biopsy and sentinel lymph node biopsy with oncoplastic closure and right reduction for symmetry    1. Left Breast, Partial Mastectomy:               A. Invasive ductal carcinoma:                            1. Invasive carcinoma measures 12 mm x 8 mm x 4 mm.                            2. Overall Oziel grade III (tubular score = 3, nuclear score = 2, mitotic score = 3).                                                 3. No definitive lymphovascular invasion identified.                B. Associated scattered ductal carcinoma in situ (DCIS):                            1. DCIS spans an area estimated at  30 mm x 8 mm x 2 mm.                            2. High grade solid and comedo DCIS.                            3. Rare microcalcification present in DCIS.                  C. All margins are negative for invasive carcinoma.                    Carcinoma measures 6 mm from the closest (Inferior) margin of excision.                     All other margins measure at least 8 mm from invasive carcinoma including:                            Anterior margin = 18 mm                            Posterior margin = 24 mm                            Superior margin = 8 mm                            Inferior margin = 6 mm                             Lateral margin = 12 mm                            Medial margin = 20 mm                  D. All margins are negative for in ductal situ carcinoma (DCIS).                    DCIS measures 1.5 mm from the closest (Superior) margin of excision.                    All other margins  measure at least 2.5 mm from DCIS including:                            Anterior margin = 18  mm                            Posterior margin = 15  mm                            Superior margin = 1.5  mm                            Inferior margin =  6 mm                             Lateral margin = 3  mm                            Medial margin = 2.5 mm                  E.  Multiple biopsy site changes are identified (x2) and multiple metallic clips (x4) retrieved.               F.  No Pagetoid involvement of skin by malignancy identified.               G. Non-neoplastic breast tissue with fibrocystic change, sclerosing adenosis, columnar cell change, micropapilloma and focal fibroadenomatoid change. Rare microcalcification present in benign breast tissue.               H. Previous Biomarkers: Estrogen receptors: Negative, Progesterone receptors: Negative,                    HER/2-ese:  Positive (score 3+) and Ki-67 = 50% (see [id number]).       2.  Left Breast, Additional Superior, Medial and Inferior Margins:                 A.  No in situ nor infiltrating carcinoma identified.               B.  New margins are negative for malignancy by an additional 15 mm.      3.  Left Breast at 2  o'clock, Needle Localization, Excisional Biopsy:                A.  Benign breast tissue with changes suggesting radial scar with usual ductal hyperplasia,                      sclerosing adenosis and fibrocystic change.                 B.  No in situ nor infiltrating carcinoma identified.               C.  Biopsy site changes are identified and metallic clip retrieved.                D.  All margins are viable and negative for neoplasm/malignancy.                       4.  Left Breast, True Medial and Inferior Margins:                 A.  No in situ nor infiltrating carcinoma identified.                B.  New margins are negative for malignancy by an additional 20 mm.     5.  Left Breast, True Superior and Lateral Margins:                A.  No in situ nor infiltrating carcinoma identified.                B.  New margins are negative for malignancy by an additional 40 mm.     6.  Left Breast, True Inferior Margin:                A.  No in situ nor infiltrating carcinoma identified.                B.  Benign skin and breast tissue.  C.  New margin is negative for malignancy by an additional 15 mm.     7.  Right Breast Tissue, Augmentation:                 A.  Benign skin and breast tissue (312 grams).                B.  No in situ nor infiltrating carcinoma identified.      8.  Left Axilla, [place] Lymph Node #1 (Hot, Blue, Count 5,500).                A.  Three lymph nodes negative for malignancy by routine staining (0/3).   B.  Includes one (largest) lymph node with focal fibrosis suggesting previous biopsy effect.      9.  Left Axilla, [place] Lymph Node, #2 (Hot, Not Blue, Count 900):                A.  One  lymph node negative for metastatic carcinoma by routine staining (0/1).     10.  Left Breast, Superior Pole:                 A.  No in situ nor infiltrating carcinoma identified.                B.  Superior margin is negative for malignancy by an additional 20 mm.     8/4/2021 - 10/20/2021 Chemotherapy    OP BREAST PACLitaxel / Trastuzumab-anns (Weekly X 12)     9/1/2021 - 10/13/2021 Chemotherapy    OP SUPPORTIVE Iron Sucrose (Venofer)     11/2/2021 - 12/1/2021 Radiation    Radiation OncologyTreatment Course:  Radha Astorga received 5256 cGy in 21 fractions to LEFT breast with tumor bed boost.     11/10/2021 -  Chemotherapy    OP BREAST Trastuzumab-anns Q21D (maintenance)         Past Medical History:   Diagnosis Date   • Acute stress reaction 11/17/2014   • ADD (attention deficit disorder)    • ADHD (attention deficit hyperactivity disorder)    • Anxiety and depression    • CTS (carpal tunnel syndrome)    • Factor 5 Leiden mutation, heterozygous ([facility]) 2/17/2021   • Family history of breast cancer 03/11/2013   • Fracture, cervical vertebra ([facility]) 1997    C4   • H/O complete eye exam 01/01/2016   • Hearing loss    • 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   • History of rheumatic fever    • Hypertension    • Hypothyroidism    • Infiltrating ductal carcinoma of left breast ([facility]) 5/5/2021    Left   • Kidney stone    • Mitral valve insufficiency    • PONV (postoperative nausea and vomiting)    • Stroke ([facility]) 12/2020    HX OF   • Stroke-like symptoms        Past Surgical History:   Procedure Laterality Date   • BREAST BIOPSY Left 05/05/2021    ID[facility]   • BREAST LUMPECTOMY WITH SENTINEL NODE BIOPSY N/A 7/1/2021    Procedure: right port placement, Left SHAINA-guided, bracketed, partial mastectomy and sentinel lymph node biopsy Left breast needle-localized excisional biopsy;  Surgeon: Lashonda Euceda MD;  Location: [facility] OR;  Service: General;  Laterality: N/A;   • BREAST SURGERY Bilateral 7/1/2021     Procedure: RIGHT BREAST REDUCTION MASTOPEXY LEFT BREAST ONCOPLASTIC CLOSURE;  Surgeon: Caridad Baker MD PhD;  Location: [facility] OR;  Service: Plastics;  Laterality: Bilateral;   • NO PAST SURGERIES     • PAP SMEAR         Social History     Socioeconomic History   • Marital status: Significant Other   • Number of children: 0   Tobacco Use   • Smoking status: Former Smoker     Packs/day: 1.00     Years: 10.00     Pack years: 10.00     Types: Cigarettes     Start date:      Quit date:      Years since quittin.0   • Smokeless tobacco: Never Used   Vaping Use   • Vaping Use: Never used   Substance and Sexual Activity   • Alcohol use: Yes     Comment: RARELY   • Drug use: No   • Sexual activity: Defer       No results found for: LDH, URICACID    Lab Results   Component Value Date    GLUCOSE 106 (H) 2022    BUN 14 2022    CREATININE 0.71 2022    EGFRIFNONA 90 2022    EGFRIFAFRI 96 2021    BCR 19.7 2022    K 4.5 2022    CO2 25.8 2022    CALCIUM 9.8 2022    PROTENTOTREF 7.4 2021    ALBUMIN 4.30 2022    LABIL2 1.6 2021    AST 36 (H) 2022    ALT 57 (H) 2022       CBC w/diff    CBC w/Diff 21   WBC 5.83 5.49 4.97   RBC 4.08 4.35 4.45   Hemoglobin 12.0 12.7 12.9   Hematocrit 36.5 38.2 38.7   MCV 89.5 87.8 87.0   MCH 29.4 29.2 29.0   MCHC 32.9 33.2 33.3   RDW 13.5 11.9 (A) 11.8 (A)   Platelets 243 247 251   Neutrophil Rel % 59.1 62.1 63.6   Immature Granulocyte Rel % 0.7 (A) 0.2 0.2   Lymphocyte Rel % 23.7 24.4 23.3   Monocyte Rel % 12.2 (A) 9.8 8.9   Eosinophil Rel % 3.3 2.6 3.0   Basophil Rel % 1.0 0.9 1.0   (A) Abnormal value              Allergies as of 2022 - Reviewed 2022   Allergen Reaction Noted   • Sulfa antibiotics Rash 10/29/2015       MEDICATIONS:  Medication list reviewed today    Review of Systems   Constitutional: Positive for appetite change and fatigue. Negative  for activity change and unexpected weight change.        Positive for hot flahses   Respiratory: Negative for chest tightness and shortness of breath.    Cardiovascular: Negative for chest pain and leg swelling.   Gastrointestinal: Positive for nausea (in the am, typically resolves without intervention). Negative for blood in stool, constipation and diarrhea.   Genitourinary: Negative for dysuria and hematuria.        LMP august 2021   Musculoskeletal:        Left arm and left breast swelling r/t lymphedema   Skin: Negative for rash.   Neurological: Positive for dizziness (briefly in morning at times). Negative for light-headedness.        Denies falls last 6 months   Psychiatric/Behavioral: Positive for sleep disturbance (improving). Negative for decreased concentration and dysphoric mood. The patient is not nervous/anxious.        Resp 18     Wt Readings from Last 3 Encounters:   01/12/22 67.2 kg (148 lb 3.2 oz)   12/22/21 65.8 kg (145 lb)   12/01/21 66 kg (145 lb 6.4 oz)       Pain Score    01/13/22 1646   PainSc: 0-No pain         Physical Exam  Constitutional:       Appearance: Normal appearance.   HENT:      Head: Normocephalic and atraumatic.   Pulmonary:      Effort: Pulmonary effort is normal.   Skin:     General: Skin is dry.   Neurological:      Mental Status: She is alert and oriented to person, place, and time.   Psychiatric:         Attention and Perception: Attention and perception normal.         Mood and Affect: Mood and affect normal.         Speech: Speech normal.         Behavior: Behavior normal. Behavior is cooperative.         Thought Content: Thought content normal.         Cognition and Memory: Cognition and memory normal.         Judgment: Judgment normal.           Advance Care Planning     Patient does not have advance care planning complete, we do not have a copy on file    Patient has not designated a healthcare surrogate:     Brief discussion and written information provided regarding  "advance care planning and appropriateness for all healthy adults, choosing a healthcare surrogate, prior experiences with loved ones who have been seriously ill, and exploration of goals of care in the event of a sudden injury or illness. She is interested in some facilitated assistance with this, and we will get that scheduled.            DISCUSSION HELD TODAY:   Discussed NCCN recommendations for all cancer survivors of 150 minutes/week moderate intensity exercise, achieve and maintain a healthy weight, plants-based whole-foods diet, avoid tobacco and second hand smoke, avoid alcohol or minimize alcohol intake - no more than 1 drink in a day for adults.    After review of the Survivorship Treatment Summary & Care Plan, the patient verbalized understanding of recommendations for follow-up. As outlined in the care plan, they were advised to continue with follow-up care in accordance with the NCCN surveillance guidelines while transitioning back to their primary care physician for continued general preventive and healthcare needs. We discussed the importance of healthy eating, exercise and weight management. We reviewed current guidelines for routine screening of other cancers.     A copy of the Survivorship Treatment Summary & Care Plan for Ms. Astorga was provided to and forwarded to the providers identified on the care team.    Problems identified:  1. Lymphedema: This has been ongoing and she has been followed in the lymphedema clinic, fitted with compression garments, and has just begun using a mechanical sleeve/vest at home. She does not have follow up scheduled currently. I have encouraged her to do so as needed and we did discuss that I would anticipate ongoing maintenance to manage.  2. Appetite: Has been generally poor although improving along with any taste changes. Has made some changes to include a protein drink for breakfast and packing lunch at work. Not always eating \"healthy\" foods at night, but has " her eye on modifying this in the future. She has met with oncology nutrition and I reminded her this resource remains available as needed.  3. Chemotherapy induced peripheral neuropathy, late onset: happening in bilateral thumbs, first and second finger tips, worse at night. Discussed addition of B complex supplement and some anecdotal reports of vicks vapo rub topically suggest benefit. Sometime difficulty with necklace clasp, but overall fine motor function intact.  4. Menopausal symptoms: LMP in August 2021. She is having hot flashes. Do not seem to be impacting sleep, but sleep remains challenged at times regardless. Discussed gabapentin as an option for management of hot flashes, neuropathy symptoms, and if dosed at bedtime this may also benefit sleep. She will consider and discuss with Dr Beaevrs further. Some report of decreased libido, which she understands as part of her overall healing and is not causing distress at this time.  5. Psychosocial: explored themes of guilt, frustration with slow recovery process, and loss of her old self. Good support from a dear friend whom she speaks to weekly. Lives with partner Norma and son who is a justina in high school. 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 Is a member of [employer] and has developed a good emotional support network there. Discussed peer based support via [employer] [place], [employer], upcoming [employer] group beginning in April.       Plan and recommendations:  1. Continue lymphedema follow up as needed  2. Follow up with oncology nutrition as desired  3. Will send referral to [facility], and add to [employer] interest list for April cohort.  4. Scheduled for advanced care planning in two weeks. Will email her some written information, blank [place] living will, Conversations That Matter Booklet to review prior to  that.  5. Provided contact information for Massage/Reiki therapy  6. She has not had follow up scheduled post radiation and I have asked her to call the department to schedule. She was a patient of Dr Byrd.  7. Call my office as needed at [phone] for additional information, resources or support.        ICD-10-CM ICD-9-CM   1. Malignant neoplasm of overlapping sites of left breast in female, estrogen receptor negative (HCC)  C50.812 174.8    Z17.1 V86.1       No orders of the defined types were placed in this encounter.      I spent 60 minutes caring for this patient on this date of service by TELEHEALTH. This time includes time spent by me in the following activities: preparing for the visit, reviewing tests, obtaining and/or reviewing a separately obtained history, performing a medically appropriate examination and/or evaluation, counseling and educating the patient/family/caregiver, referring and communicating with other health care professionals, documenting information in the medical record and care coordination

## 2022-01-19 ENCOUNTER — OFFICE VISIT (OUTPATIENT)
Dept: RADIATION ONCOLOGY | Facility: HOSPITAL | Age: 42
End: 2022-01-19

## 2022-01-19 VITALS — DIASTOLIC BLOOD PRESSURE: 80 MMHG | SYSTOLIC BLOOD PRESSURE: 128 MMHG | OXYGEN SATURATION: 99 % | HEART RATE: 90 BPM

## 2022-01-19 DIAGNOSIS — C50.812 MALIGNANT NEOPLASM OF OVERLAPPING SITES OF LEFT BREAST IN FEMALE, ESTROGEN RECEPTOR NEGATIVE: Primary | ICD-10-CM

## 2022-01-19 DIAGNOSIS — Z17.1 MALIGNANT NEOPLASM OF OVERLAPPING SITES OF LEFT BREAST IN FEMALE, ESTROGEN RECEPTOR NEGATIVE: Primary | ICD-10-CM

## 2022-01-19 PROCEDURE — 99024 POSTOP FOLLOW-UP VISIT: CPT | Performed by: RADIOLOGY

## 2022-01-19 NOTE — PROGRESS NOTES
FOLLOW-UP NOTE    Name: Radha Astorga  YOB: 1980  MRN #: [id number]  Date of Service: 1/19/2022  Referring Provider: No referring provider defined for this encounter.  Primary Care Provider: Fadi Goodman MD    DIAGNOSIS:   1. Malignant neoplasm of overlapping sites of left breast in female, estrogen receptor negative (HCC)        REASON FOR VISIT: Follow up after completion of radiotherapy course      RADIATION TREATMENT COURSE:    HISTORY OF PRESENT ILLNESS: The patient is a 41 y.o. year old female who had left breast lumpectomy and sentinel node biopsy, bilateral breast reduction followed by chemotherapy and left breast radiotherapy which was completed 4 weeks ago. The patient is doing well and other than some discomfort in the left axilla she has no other complaints. She continues with manual lymph drainage and she is also wears the compression sleeve and glove.         The following portions of the patient's history were reviewed and updated as appropriate: allergies, current medications, past family history, past medical history, past social history, past surgical history and problem list. Reviewed with the patient and remain unchanged.    PAST MEDICAL HISTORY:  she  has a past medical history of Acute stress reaction (11/17/2014), ADD (attention deficit disorder), ADHD (attention deficit hyperactivity disorder), Anxiety and depression, CTS (carpal tunnel syndrome), Factor 5 Leiden mutation, heterozygous ([facility]) (2/17/2021), Family history of breast cancer (03/11/2013), Fracture, cervical vertebra ([facility]) (1997), H/O complete eye exam (01/01/2016), Hearing loss, Hearing loss of both ears, History of rheumatic fever, Hypertension, Hypothyroidism, Infiltrating ductal carcinoma of left breast ([facility]) (5/5/2021), Kidney stone, Mitral valve insufficiency, PONV (postoperative nausea and vomiting), Stroke ([facility]) (12/2020), and Stroke-like symptoms.  MEDICATIONS:   Current Outpatient Medications:   •   amphetamine-dextroamphetamine XR (Adderall XR) 25 MG 24 hr capsule, Take 1 capsule by mouth Every Morning, Disp: 30 capsule, Rfl: 0  •  atorvastatin (Lipitor) 10 MG tablet, Take 1 tablet by mouth Daily., Disp: 30 tablet, Rfl: 11  •  famotidine (PEPCID) 20 MG tablet, TAKE 1 TABLET BY MOUTH DAILY, Disp: 30 tablet, Rfl: 3  •  lidocaine-prilocaine (EMLA) 2.5-2.5 % cream, Apply  topically to the appropriate area as directed As Needed for Mild Pain ., Disp: 1 each, Rfl: 2  •  LORazepam (ATIVAN) 0.5 MG tablet, TAKE 1 TABLET BY MOUTH EVERY 8 HOURS AS NEEDED FOR ANXIETY, Disp: 30 tablet, Rfl: 1  •  ondansetron ODT (ZOFRAN-ODT) 8 MG disintegrating tablet, Place 1 tablet on the tongue Every 8 (Eight) Hours As Needed for Nausea or Vomiting., Disp: 30 tablet, Rfl: 3  •  Synthroid 137 MCG tablet, Take 1 tablet by mouth Daily., Disp: 90 tablet, Rfl: 1  •  valsartan (DIOVAN) 160 MG tablet, Take 1 tablet by mouth Daily., Disp: 30 tablet, Rfl: 5  •  Xarelto 20 MG tablet, TAKE 1 TABLET BY MOUTH DAILY, Disp: 30 tablet, Rfl: 3  No current facility-administered medications for this visit.    Facility-Administered Medications Ordered in Other Visits:   •  heparin injection 500 Units, 500 Units, Intravenous, PRNimesh LUCIANO Leela, MD, 500 Units at 08/11/21 1718  •  sodium chloride 0.9 % flush 10 mL, 10 mL, Intravenous, PRNNimesh Leela, MD, 10 mL at 08/11/21 1718  ALLERGIES:   Allergies   Allergen Reactions   • Sulfa Antibiotics Rash     PAST SURGICAL HISTORY: she has a past surgical history that includes Pap Smear (2016); Breast biopsy (Left, 05/05/2021); No past surgeries; breast lumpectomy with sentinel node biopsy (N/A, 7/1/2021); and Breast surgery (Bilateral, 7/1/2021).  PREVIOUS RADIOTHERAPY OR CHEMOTHERAPY: Yes  FAMILY HISTORY: her family history includes Brain cancer (age of onset: 20) in her maternal cousin; Breast cancer in an other family member; Breast cancer (age of onset: 53) in her mother; Breast cancer (age of onset:  55) in her paternal aunt; Breast cancer (age of onset: 94) in her paternal great-grandmother; Hypertension in her brother; Lung cancer in her maternal grandmother; Melanoma in her maternal uncle; Ovarian cancer in an other family member; Pancreatic cancer (age of onset: 68) in her mother; Prostate cancer in her maternal grandfather and paternal grandfather; Stroke in her father.  SOCIAL HISTORY: she  reports that she quit smoking about 13 years ago. Her smoking use included cigarettes. She started smoking about 24 years ago. She has a 10.00 pack-year smoking history. She has never used smokeless tobacco. She reports current alcohol use. She reports that she does not use drugs.  PAIN AND PAIN MANAGEMENT:   Vitals:    01/19/22 0839   BP: 128/80   Pulse: 90   SpO2: 99%   PainSc:   3   PainLoc: Arm  Comment: Left Arm Pit     NUTRITIONAL STATUS:    Otherwise no issues  KPS: 100: Normal; no evidence of disease  ECOG: (0) Fully active, able to carry on all predisease performance without restriction       Review of Systems:   Review of Systems     Otherwise negative as below.     General: No fevers, chills, weight change, or drenching night sweats. Skin: No rashes or jaundice.  HEENT: No change in vision or hearing, no headaches.  Neck: No dysphagia or masses.  Heme/Lymph: No easy bruising or bleeding.  Respiratory System: No shortness of breath or cough.  Cardiovascular: No chest pain, palpitations, or dyspnea on exertion.  +/- Pacemaker. GI: No nausea, vomiting, diarrhea, melena, or hematochezia.  : No dysuria or hematuria.  Endocrine: No heat or cold intolerance. Musculoskeletal: No myalgias or arthralgias.  Neuro: No weakness, numbness, syncope, or seizures. Psych: No mood changes or depression. Ext: Mild swelling of the left arm.        Objective     Vitals:  Vitals:    01/19/22 0839   BP: 128/80   Pulse: 90   SpO2: 99%   PainSc:   3   PainLoc: Arm  Comment: Left Arm Pit       PHYSICAL EXAM:  GENERAL: in no apparent  distress, sitting comfortably in room.    HEENT: normocephalic, atraumatic. Pupils are equal, round, reactive to light. Sclera anicteric. Conjunctiva not injected. Oropharynx without erythema, ulcerations or thrush.   NECK: Supple with no masses.  LYMPHATIC: no cervical, supraclavicular or axillary adenopathy appreciated bilaterally.   CARDIOVASCULAR: Normal rate and rhythm.  CHEST: clear to auscultation bilaterally; no wheezes, crackles or rubs. Work of breathing normal.  ABDOMEN: bowel sounds present. Abdomen is soft, nontender, nondistended.   MUSCULOSKELETAL: no tenderness to palpation along the spine or scapulae. Normal range of motion.  EXTREMITIES: no clubbing, cyanosis, mild left upper arm and axillary edema.  SKIN: no erythema, rashes, ulcerations noted.   NEUROLOGIC: cranial nerves II-XII grossly intact bilaterally. No focal neurologic deficits.  PSYCHIATRIC:  alert, aware, and appropriate.  BREASTS: No palpable mass or abnormality in either breasts.   Left breast unremarkable with no dominant masses, skin changes, discharge or pain; Right breast unremarkable with no dominant masses, skin changes, discharge or pain.  PERTINENT IMAGING/PATHOLOGY/LABS (Medical Decision Making):     COORDINATION OF CARE: A copy of this note is sent to the referring provider.          LABS (Reviewed):  Hematology WBC   Date Value Ref Range Status   01/12/2022 4.97 3.40 - 10.80 10*3/mm3 Final   01/04/2021 9.5 3.4 - 10.8 x10E3/uL Final     RBC   Date Value Ref Range Status   01/12/2022 4.45 3.77 - 5.28 10*6/mm3 Final   01/04/2021 4.85 3.77 - 5.28 x10E6/uL Final     Hemoglobin   Date Value Ref Range Status   01/12/2022 12.9 12.0 - 15.9 g/dL Final     Hematocrit   Date Value Ref Range Status   01/12/2022 38.7 34.0 - 46.6 % Final     Platelets   Date Value Ref Range Status   01/12/2022 251 140 - 450 10*3/mm3 Final      Chemistry   Lab Results   Component Value Date    GLUCOSE 106 (H) 01/12/2022    BUN 14 01/12/2022    CREATININE  0.71 01/12/2022    EGFRIFNONA 90 01/12/2022    EGFRIFAFRI 96 01/04/2021    BCR 19.7 01/12/2022    K 4.5 01/12/2022    CO2 25.8 01/12/2022    CALCIUM 9.8 01/12/2022    PROTENTOTREF 7.4 01/04/2021    ALBUMIN 4.30 01/12/2022    LABIL2 1.6 01/04/2021    AST 36 (H) 01/12/2022    ALT 57 (H) 01/12/2022         Assessment/Plan     ASSESSMENT AND PLAN:    1. Malignant neoplasm of overlapping sites of left breast in female, estrogen receptor negative (HCC)         No orders of the defined types were placed in this encounter.      This assessment comes from my review of the imaging, pathology, physician notes and other pertinent information as mentioned.    DISPOSITION: The patient will continue her FU appointments with her surgeon and Medical oncologist.        TIME SPENT WITH PATIENT:   I spent greater than 15 minutes in face-to-face time with the patient     CC:     France Contreras MD  1/19/2022  9:16 AM EST

## 2022-02-01 ENCOUNTER — TELEPHONE (OUTPATIENT)
Dept: NEUROLOGY | Facility: CLINIC | Age: 42
End: 2022-02-01

## 2022-02-01 NOTE — TELEPHONE ENCOUNTER
Attempted to reach patient to let her know that her appointment with Dr. Johnson was rescheduled.  Left voicemail.  Also mailing letter with new appointment information.

## 2022-02-02 ENCOUNTER — INFUSION (OUTPATIENT)
Dept: ONCOLOGY | Facility: HOSPITAL | Age: 42
End: 2022-02-02

## 2022-02-02 VITALS
HEIGHT: 62 IN | OXYGEN SATURATION: 100 % | SYSTOLIC BLOOD PRESSURE: 121 MMHG | BODY MASS INDEX: 27.68 KG/M2 | DIASTOLIC BLOOD PRESSURE: 82 MMHG | WEIGHT: 150.4 LBS | TEMPERATURE: 97.7 F | RESPIRATION RATE: 18 BRPM | HEART RATE: 91 BPM

## 2022-02-02 DIAGNOSIS — Z17.1 MALIGNANT NEOPLASM OF OVERLAPPING SITES OF LEFT BREAST IN FEMALE, ESTROGEN RECEPTOR NEGATIVE: Primary | ICD-10-CM

## 2022-02-02 DIAGNOSIS — Z45.2 ENCOUNTER FOR ADJUSTMENT OR MANAGEMENT OF VASCULAR ACCESS DEVICE: ICD-10-CM

## 2022-02-02 DIAGNOSIS — C50.812 MALIGNANT NEOPLASM OF OVERLAPPING SITES OF LEFT BREAST IN FEMALE, ESTROGEN RECEPTOR NEGATIVE: Primary | ICD-10-CM

## 2022-02-02 LAB
BASOPHILS # BLD AUTO: 0.06 10*3/MM3 (ref 0–0.2)
BASOPHILS NFR BLD AUTO: 1.1 % (ref 0–1.5)
DEPRECATED RDW RBC AUTO: 38 FL (ref 37–54)
EOSINOPHIL # BLD AUTO: 0.19 10*3/MM3 (ref 0–0.4)
EOSINOPHIL NFR BLD AUTO: 3.5 % (ref 0.3–6.2)
ERYTHROCYTE [DISTWIDTH] IN BLOOD BY AUTOMATED COUNT: 11.9 % (ref 12.3–15.4)
HCT VFR BLD AUTO: 38.1 % (ref 34–46.6)
HGB BLD-MCNC: 12.5 G/DL (ref 12–15.9)
IMM GRANULOCYTES # BLD AUTO: 0.01 10*3/MM3 (ref 0–0.05)
IMM GRANULOCYTES NFR BLD AUTO: 0.2 % (ref 0–0.5)
LYMPHOCYTES # BLD AUTO: 1.42 10*3/MM3 (ref 0.7–3.1)
LYMPHOCYTES NFR BLD AUTO: 26.3 % (ref 19.6–45.3)
MCH RBC QN AUTO: 28.5 PG (ref 26.6–33)
MCHC RBC AUTO-ENTMCNC: 32.8 G/DL (ref 31.5–35.7)
MCV RBC AUTO: 86.8 FL (ref 79–97)
MONOCYTES # BLD AUTO: 0.52 10*3/MM3 (ref 0.1–0.9)
MONOCYTES NFR BLD AUTO: 9.6 % (ref 5–12)
NEUTROPHILS NFR BLD AUTO: 3.2 10*3/MM3 (ref 1.7–7)
NEUTROPHILS NFR BLD AUTO: 59.3 % (ref 42.7–76)
NRBC BLD AUTO-RTO: 0 /100 WBC (ref 0–0.2)
PLATELET # BLD AUTO: 231 10*3/MM3 (ref 140–450)
PMV BLD AUTO: 9.7 FL (ref 6–12)
RBC # BLD AUTO: 4.39 10*6/MM3 (ref 3.77–5.28)
WBC NRBC COR # BLD: 5.4 10*3/MM3 (ref 3.4–10.8)

## 2022-02-02 PROCEDURE — 85025 COMPLETE CBC W/AUTO DIFF WBC: CPT | Performed by: INTERNAL MEDICINE

## 2022-02-02 PROCEDURE — 25010000002 TRASTUZUMAB-ANNS 420 MG RECONSTITUTED SOLUTION 1 EACH VIAL: Performed by: NURSE PRACTITIONER

## 2022-02-02 PROCEDURE — 96413 CHEMO IV INFUSION 1 HR: CPT

## 2022-02-02 PROCEDURE — 96375 TX/PRO/DX INJ NEW DRUG ADDON: CPT

## 2022-02-02 PROCEDURE — 25010000002 HEPARIN LOCK FLUSH PER 10 UNITS: Performed by: INTERNAL MEDICINE

## 2022-02-02 RX ORDER — HEPARIN SODIUM (PORCINE) LOCK FLUSH IV SOLN 100 UNIT/ML 100 UNIT/ML
500 SOLUTION INTRAVENOUS AS NEEDED
Status: CANCELLED | OUTPATIENT
Start: 2022-02-02

## 2022-02-02 RX ORDER — SODIUM CHLORIDE 0.9 % (FLUSH) 0.9 %
10 SYRINGE (ML) INJECTION AS NEEDED
Status: DISCONTINUED | OUTPATIENT
Start: 2022-02-02 | End: 2022-02-02 | Stop reason: HOSPADM

## 2022-02-02 RX ORDER — SODIUM CHLORIDE 0.9 % (FLUSH) 0.9 %
10 SYRINGE (ML) INJECTION AS NEEDED
Status: CANCELLED | OUTPATIENT
Start: 2022-02-02

## 2022-02-02 RX ORDER — FAMOTIDINE 10 MG/ML
20 INJECTION, SOLUTION INTRAVENOUS ONCE
Status: COMPLETED | OUTPATIENT
Start: 2022-02-02 | End: 2022-02-02

## 2022-02-02 RX ORDER — SODIUM CHLORIDE 9 MG/ML
250 INJECTION, SOLUTION INTRAVENOUS ONCE
Status: COMPLETED | OUTPATIENT
Start: 2022-02-02 | End: 2022-02-02

## 2022-02-02 RX ORDER — HEPARIN SODIUM (PORCINE) LOCK FLUSH IV SOLN 100 UNIT/ML 100 UNIT/ML
500 SOLUTION INTRAVENOUS AS NEEDED
Status: DISCONTINUED | OUTPATIENT
Start: 2022-02-02 | End: 2022-02-02 | Stop reason: HOSPADM

## 2022-02-02 RX ADMIN — TRASTUZUMAB-ANNS 390 MG: 420 INJECTION, POWDER, LYOPHILIZED, FOR SOLUTION INTRAVENOUS at 09:24

## 2022-02-02 RX ADMIN — FAMOTIDINE 20 MG: 10 INJECTION INTRAVENOUS at 09:12

## 2022-02-02 RX ADMIN — SODIUM CHLORIDE, PRESERVATIVE FREE 10 ML: 5 INJECTION INTRAVENOUS at 10:05

## 2022-02-02 RX ADMIN — SODIUM CHLORIDE 250 ML: 9 INJECTION, SOLUTION INTRAVENOUS at 09:12

## 2022-02-02 RX ADMIN — Medication 500 UNITS: at 10:05

## 2022-02-03 ENCOUNTER — OFFICE VISIT (OUTPATIENT)
Dept: OTHER | Facility: HOSPITAL | Age: 42
End: 2022-02-03

## 2022-02-03 DIAGNOSIS — Z17.1 MALIGNANT NEOPLASM OF OVERLAPPING SITES OF LEFT BREAST IN FEMALE, ESTROGEN RECEPTOR NEGATIVE: ICD-10-CM

## 2022-02-03 DIAGNOSIS — C50.812 MALIGNANT NEOPLASM OF OVERLAPPING SITES OF LEFT BREAST IN FEMALE, ESTROGEN RECEPTOR NEGATIVE: ICD-10-CM

## 2022-02-03 DIAGNOSIS — Z71.89 ADVANCED CARE PLANNING/COUNSELING DISCUSSION: Primary | ICD-10-CM

## 2022-02-03 PROCEDURE — 99497 ADVNCD CARE PLAN 30 MIN: CPT | Performed by: NURSE PRACTITIONER

## 2022-02-03 PROCEDURE — 99498 ADVNCD CARE PLAN ADDL 30 MIN: CPT | Performed by: NURSE PRACTITIONER

## 2022-02-03 NOTE — PROGRESS NOTES
[facility] MULTIDISCIPLINARY CLINIC  SURVIVORSHIP VISIT: TELEHEALTH  Advance Care Planning Follow-Up Visit        Radha Asotrga is a pleasant 41 y.o. female reviewed today by VIDEO, for follow-up advance care planning visit.   You have chosen to receive care through a telehealth visit. Do you consent to use a telephone visit for your medical care today? Yes        HPI  Patient presents today for advance care planning visit. She has connected with a volunteer from [employer], and this was a very positive experience.She is looking forward to one day volunteering.    TREATMENT HISTORY:     Oncology/Hematology History Overview Note   12/11/2019, Screening MMG with Romeo ( [place]):  Scattered fibroglandular density. There are no findings to suggest malignancy.  BI-RADS 1: Negative.     Malignant neoplasm of overlapping sites of left breast in female, estrogen receptor negative (HCC)   4/1/2021 Initial Diagnosis    Malignant neoplasm of overlapping sites of left breast in female, estrogen receptor negative (CMS/HCC)     4/2/2021 Imaging    Screening MMG with Romeo ( [place]):  Scattered fibroglandular densities. In the posterior one-third of the left breast projecting in a retroareolar location on the CC images there is an area of focal asymmetry. I see no suspicious calcifications or areas of architectural distortion in either breast. There is no evidence for skin thickening or nipple retraction.  BI-RADS 0: Incomplete.     4/9/2021 Imaging    Left Diagnostic MMG with Romeo & Left Breast US ( [place]):  MMG:  With additional imaging there is persistence of the area of focal asymmetry in the posterior one-third inferior aspect of the left breast.  US:  At the 6 o'clock position on the order of 6 cm from the nipple there is a 0.8 cm irregular hypoechoic lesion. Mild posterior acoustic shadowing is noted.  BI-RADS 4: Suspicious.     4/26/2021 Biopsy    Left Breast, US-Guided Biopsy ( [facility]):    1. Left  Breast, 6:00, 6 cm FN, U/S-Guided Core Needle Biopsy for a Mass:                 A. INVASIVE DUCTAL CARCINOMA, Poorly differentiated; Oziel Histologic Grade III/III (tubule score = 3, nuclear score = 2, mitoses score = 3), measuring at least 7 mm.               B. No definitive ductal carcinoma in situ identified.   C. Negative for lymphovascular space invasion.    ER negative (<1%)  DE negative (<1%)  HER2 positive (IHC 3+)  Ki-67 55%     5/5/2021 Genetic Testing    [employer] Common Hereditary Cancers Panel (47 genes):    Negative     5/7/2021 Biopsy    Bilateral Breast MRI ([facility]):  There is an amorphous area of enhancement seen within the 6:00 position of the left breast, posterior depth measuring approximately 1.2 cm (series 9 image 91). Susceptibility artifact is present within this region consistent with recently newly diagnosed biopsy-proven malignancy. There is an additional area of more linear enhancement seen just anteriorly and laterally measuring up to 1.2 cm (series 9 image 91). This is seen approximately 5.6 cm from the nipple. Mixed washout kinetics are present. There is an additional punctate area of enhancement measuring only approximately 2 to 3 mm seen within the anterior lateral aspect of the left breast seen approximately 4.6 cm from the nipple (series 9 image 79) also demonstrating mixed washout  kinetics. No additional areas of enhancement identified. There is questionable mildly prominent lymph nodes present within the left axillary region (series 9 image 34 and series 9 image 18) with mild cortical thickening present with benign-appearing fatty juan miguel present. This is seen both within the high and mid to low axilla. A mildly prominent posterior lymph node is also present within the mid axilla measuring up to 1 cm with cortical thickening present (series 9 image 27). Limited evaluation of the intrathoracic contents symmetric no acute process. A marker seen at the junction of the left  breast and the left chest wall inferiorly (series 4 image 135) with no abnormalities  identified within this region. No abnormal fluid collections identified.  BI-RADS 6: Known malignancy.     5/21/2021 Biopsy    Left Axilla, US-Guided Biopsy ( [facility]):  1.  Lymph Node, Left Axilla, Core Biopsy:                 A.  Fragments of reactive lymph node; negative for lymphoma and carcinoma.      6/2/2021 Biopsy    Left Breast, MR-Guided Biopsy x 2 ( [facility]):    1. Left Breast, 5:00 o'clock, MRI-guided Biopsies for Linear Enhancement:   INVASIVE MAMMARY CARCINOMA, NO SPECIAL TYPE (INVASIVE DUCTAL CARCINOMA).               A. Largest contiguous focus in a core measures 4 mm.               B. No in-situ component identified.                C. Brisk lymphocytic response noted (see Comment).               D. No definitive lymphovascular nor perineural invasion identified.  -Bowtie clip. Biopsy clips marking the 2 sites of biopsy-proven malignancy are  by 2.5 cm (bowtie clip and posteriorly located U-shaped clip).     ER negative (0%)  UT negative (0%)  Her2+ (IHC 3+)  Ki-67 50%    2.  Left Breast, 2:00 o'clock, MRI-guided Biopsy for Enhancement:                A. Benign breast parenchyma with radial scar and micropapillomas.               B. Usual ductal hyperplasia and columnar cell hyperplasia.               C. No atypical hyperplasia, in-situ nor invasive carcinoma identified.  -U-shaped clip. Concordant.     7/1/2021 Surgery    Port placement, left SHAINA-guided, bracketed, partial mastectomy and left breast needle-localized excisional biopsy and sentinel lymph node biopsy with oncoplastic closure and right reduction for symmetry    1. Left Breast, Partial Mastectomy:               A. Invasive ductal carcinoma:                            1. Invasive carcinoma measures 12 mm x 8 mm x 4 mm.                            2. Overall [place] grade III (tubular score = 3, nuclear score = 2, mitotic score = 3).                                                  3. No definitive lymphovascular invasion identified.               B. Associated scattered ductal carcinoma in situ (DCIS):                            1. DCIS spans an area estimated at  30 mm x 8 mm x 2 mm.                            2. High grade solid and comedo DCIS.                            3. Rare microcalcification present in DCIS.                  C. All margins are negative for invasive carcinoma.                    Carcinoma measures 6 mm from the closest (Inferior) margin of excision.                     All other margins measure at least 8 mm from invasive carcinoma including:                            Anterior margin = 18 mm                            Posterior margin = 24 mm                            Superior margin = 8 mm                            Inferior margin = 6 mm                             Lateral margin = 12 mm                            Medial margin = 20 mm                  D. All margins are negative for in ductal situ carcinoma (DCIS).                    DCIS measures 1.5 mm from the closest (Superior) margin of excision.                    All other margins  measure at least 2.5 mm from DCIS including:                            Anterior margin = 18  mm                            Posterior margin = 15  mm                            Superior margin = 1.5  mm                            Inferior margin =  6 mm                             Lateral margin = 3  mm                            Medial margin = 2.5 mm                  E.  Multiple biopsy site changes are identified (x2) and multiple metallic clips (x4) retrieved.               F.  No Pagetoid involvement of skin by malignancy identified.               G. Non-neoplastic breast tissue with fibrocystic change, sclerosing adenosis, columnar cell change, micropapilloma and focal fibroadenomatoid change. Rare microcalcification present in benign breast tissue.               H. Previous Biomarkers:  Estrogen receptors: Negative, Progesterone receptors: Negative,                    HER/2-ese: Positive (score 3+) and Ki-67 = 50% (see [id number]).       2.  Left Breast, Additional Superior, Medial and Inferior Margins:                 A.  No in situ nor infiltrating carcinoma identified.               B.  New margins are negative for malignancy by an additional 15 mm.      3.  Left Breast at 2  o'clock, Needle Localization, Excisional Biopsy:                A.  Benign breast tissue with changes suggesting radial scar with usual ductal hyperplasia,                      sclerosing adenosis and fibrocystic change.                 B.  No in situ nor infiltrating carcinoma identified.               C.  Biopsy site changes are identified and metallic clip retrieved.                D.  All margins are viable and negative for neoplasm/malignancy.                       4.  Left Breast, True Medial and Inferior Margins:                 A.  No in situ nor infiltrating carcinoma identified.                B.  New margins are negative for malignancy by an additional 20 mm.     5.  Left Breast, True Superior and Lateral Margins:                A.  No in situ nor infiltrating carcinoma identified.                B.  New margins are negative for malignancy by an additional 40 mm.     6.  Left Breast, True Inferior Margin:                A.  No in situ nor infiltrating carcinoma identified.                B.  Benign skin and breast tissue.  C.  New margin is negative for malignancy by an additional 15 mm.     7.  Right Breast Tissue, Augmentation:                 A.  Benign skin and breast tissue (312 grams).                B.  No in situ nor infiltrating carcinoma identified.      8.  Left Axilla, [place] Lymph Node #1 (Hot, Blue, Count 5,500).                A.  Three lymph nodes negative for malignancy by routine staining (0/3).   B.  Includes one (largest) lymph node with focal fibrosis suggesting previous biopsy effect.       9.  Left Axilla, [place] Lymph Node, #2 (Hot, Not Blue, Count 900):                A.  One lymph node negative for metastatic carcinoma by routine staining (0/1).     10.  Left Breast, Superior Pole:                 A.  No in situ nor infiltrating carcinoma identified.                B.  Superior margin is negative for malignancy by an additional 20 mm.     8/4/2021 - 10/20/2021 Chemotherapy    OP BREAST PACLitaxel / Trastuzumab-anns (Weekly X 12)     9/1/2021 - 10/13/2021 Chemotherapy    OP SUPPORTIVE Iron Sucrose (Venofer)     11/2/2021 - 12/1/2021 Radiation    Radiation OncologyTreatment Course:  Radha Astorga received 5256 cGy in 21 fractions to LEFT breast with tumor bed boost.     11/10/2021 -  Chemotherapy    OP BREAST Trastuzumab-anns Q21D (maintenance)         Past Medical History:   Diagnosis Date   • Acute stress reaction 11/17/2014   • ADD (attention deficit disorder)    • ADHD (attention deficit hyperactivity disorder)    • Anxiety and depression    • CTS (carpal tunnel syndrome)    • Factor 5 Leiden mutation, heterozygous ([facility]) 2/17/2021   • Family history of breast cancer 03/11/2013   • Fracture, cervical vertebra ([facility]) 1997    C4   • H/O complete eye exam 01/01/2016   • Hearing loss    • 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   • History of rheumatic fever    • Hypertension    • Hypothyroidism    • Infiltrating ductal carcinoma of left breast (HCC) 5/5/2021    Left   • Kidney stone    • Mitral valve insufficiency    • PONV (postoperative nausea and vomiting)    • Stroke ([facility]) 12/2020    HX OF   • Stroke-like symptoms        Past Surgical History:   Procedure Laterality Date   • BREAST BIOPSY Left 05/05/2021    [facility]   • BREAST LUMPECTOMY WITH SENTINEL NODE BIOPSY N/A 7/1/2021    Procedure: right port placement, Left SHAINA-guided, bracketed, partial mastectomy and sentinel lymph node biopsy Left breast needle-localized excisional biopsy;  Surgeon: Lashonda Euceda MD;   Location: [facility];  Service: General;  Laterality: N/A;   • BREAST SURGERY Bilateral 2021    Procedure: RIGHT BREAST REDUCTION MASTOPEXY LEFT BREAST ONCOPLASTIC CLOSURE;  Surgeon: Caridad Baker MD PhD;  Location: [facility];  Service: Plastics;  Laterality: Bilateral;   • NO PAST SURGERIES     • PAP SMEAR         Social History     Socioeconomic History   • Marital status: Significant Other   • Number of children: 0   Tobacco Use   • Smoking status: Former Smoker     Packs/day: 1.00     Years: 10.00     Pack years: 10.00     Types: Cigarettes     Start date:      Quit date:      Years since quittin.0   • Smokeless tobacco: Never Used   Vaping Use   • Vaping Use: Never used   Substance and Sexual Activity   • Alcohol use: Yes     Comment: RARELY   • Drug use: No   • Sexual activity: Defer     No results found for: LDH, URICACID    Lab Results   Component Value Date    GLUCOSE 106 (H) 2022    BUN 14 2022    CREATININE 0.71 2022    EGFRIFNONA 90 2022    EGFRIFAFRI 96 2021    BCR 19.7 2022    K 4.5 2022    CO2 25.8 2022    CALCIUM 9.8 2022    PROTENTOTREF 7.4 2021    ALBUMIN 4.30 2022    LABIL2 1.6 2021    AST 36 (H) 2022    ALT 57 (H) 2022       CBC w/diff    CBC w/Diff 21   WBC 5.49 4.97 5.40   RBC 4.35 4.45 4.39   Hemoglobin 12.7 12.9 12.5   Hematocrit 38.2 38.7 38.1   MCV 87.8 87.0 86.8   MCH 29.2 29.0 28.5   MCHC 33.2 33.3 32.8   RDW 11.9 (A) 11.8 (A) 11.9 (A)   Platelets 247 251 231   Neutrophil Rel % 62.1 63.6 59.3   Immature Granulocyte Rel % 0.2 0.2 0.2   Lymphocyte Rel % 24.4 23.3 26.3   Monocyte Rel % 9.8 8.9 9.6   Eosinophil Rel % 2.6 3.0 3.5   Basophil Rel % 0.9 1.0 1.1   (A) Abnormal value              Allergies as of 2022 - Reviewed 2022   Allergen Reaction Noted   • Sulfa antibiotics Rash 10/29/2015       MEDICATIONS:  Medication list reviewed  today    Review of Systems   Constitutional: Negative for activity change.       There were no vitals taken for this visit.    Wt Readings from Last 3 Encounters:   02/02/22 68.2 kg (150 lb 6.4 oz)   01/12/22 67.2 kg (148 lb 3.2 oz)   12/22/21 65.8 kg (145 lb)       There were no vitals filed for this visit.    Physical Exam  Constitutional:       Appearance: Normal appearance.   Neurological:      Mental Status: She is alert and oriented to person, place, and time.   Psychiatric:         Attention and Perception: Attention normal.         Mood and Affect: Mood normal.         Speech: Speech normal.         Behavior: Behavior normal. Behavior is cooperative.         Thought Content: Thought content normal.             Advance Care Planning     Date of First Steps ACP interview: 2/3/2022  First Steps ACP Facilitator: CLAUDINE Cooper   Referral Source: Survivorship  Present for facilitation: Patient    SUMMARY OF ADVANCE CARE PLANNING DISCUSSION:  Radha presents for First Steps facilitation. We reviewed purpose and goals for advance care planning.    I reviewed with Radha qualities to consider when choosing a healthcare agent. Radha had selected her spouse Norma Hodge as her healthcare agent. I encouraged Radha to discuss her preferences for future care with healthcare agents and others close to her.    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 Radha felt honored. And also grateful to have sat down with her mom and brother only a week or two prior to her serious health event.      She also witnessed another family go through a similar situation where the individual unable to communicate did have have their needs known, and so there was much conflict in the  family.    From these experiences Radha learned how valuable these discussions are to have before a crisis.    Radha identified the following as most important to living well: Being with her spouse Norma, and others that she loves, spending time outside on a beka day, having slept well, having good food. Not that these are *required*. She has had a long career serving communities with disabilities.      Goals of medical care for severe, permanent brain injury were explored: Yes Radha is not certain what medical care would be most important to her if she was unable to know who she was or who she was with. She has not previously discussed her desires for ventilation, artificial nutrition or hydration with Norma but they have had some conversations more generally.     Education materials were provided on: Advance Care Planning, Healthcare Agent Selection and Conversations That Matter    Each section of the advance care/living will document was reviewed and discussed.    Advance care/living will document finished during this facilitation? no    Time spent on preparation, facilitation and documentation was 61-90 minutes.    RECOMMENDATIONS/FOLLOW-UP:    · I've encouraged Radha to spend some time digesting our conversation today, and then set some time aside to review the [place] living will documents with Norma.  · We discussed the living will can be notarized or witnessed by two people unrelated to her.  · Encouraged to share completed living will with health care surrogate, family members, and all health care providers and to keep in an accessible place  · Discussed completion of a new living will serves as an update and voids any previous versions  · Encouraged her to call with further questions or to schedule follow up as needed.        CLAUDINE Cooper          DISCUSSION HELD TODAY:             Plan and recommendations:  1. Advance care planing discussion held today as noted above  2. Call my  office as needed at [phone] for additional information, resources or support.      No diagnosis found.    No orders of the defined types were placed in this encounter.      I spent 5 minutes caring for this patient on this date of service by TELEHEALTH. This time includes time spent by me in the following activities: preparing for the visit, obtaining and/or reviewing a separately obtained history, counseling and educating the patient/family/caregiver, referring and communicating with other health care professionals and documenting information in the medical record     I spent 61 minutes on the separately reported service of advance care planning, counseling on living will completion, document review, choosing a health care surrogate, exploring quality of life issues. This time is not included in the time used to support the E/M service also reported today.

## 2022-02-09 ENCOUNTER — HOSPITAL ENCOUNTER (OUTPATIENT)
Dept: CARDIOLOGY | Facility: HOSPITAL | Age: 42
Discharge: HOME | End: 2022-02-09
Admitting: INTERNAL MEDICINE

## 2022-02-09 VITALS
WEIGHT: 150 LBS | BODY MASS INDEX: 27.6 KG/M2 | DIASTOLIC BLOOD PRESSURE: 60 MMHG | SYSTOLIC BLOOD PRESSURE: 120 MMHG | HEIGHT: 62 IN | HEART RATE: 91 BPM

## 2022-02-09 DIAGNOSIS — Z09 CHEMOTHERAPY FOLLOW-UP EXAMINATION: Primary | ICD-10-CM

## 2022-02-09 DIAGNOSIS — Z09 CHEMOTHERAPY FOLLOW-UP EXAMINATION: ICD-10-CM

## 2022-02-09 PROCEDURE — 25010000002 PERFLUTREN (DEFINITY) 8.476 MG IN SODIUM CHLORIDE (PF) 0.9 % 10 ML INJECTION: Performed by: INTERNAL MEDICINE

## 2022-02-09 PROCEDURE — 93325 DOPPLER ECHO COLOR FLOW MAPG: CPT | Performed by: INTERNAL MEDICINE

## 2022-02-09 PROCEDURE — 93325 DOPPLER ECHO COLOR FLOW MAPG: CPT

## 2022-02-09 PROCEDURE — 93308 TTE F-UP OR LMTD: CPT | Performed by: INTERNAL MEDICINE

## 2022-02-09 PROCEDURE — 93321 DOPPLER ECHO F-UP/LMTD STD: CPT

## 2022-02-09 PROCEDURE — 93356 MYOCRD STRAIN IMG SPCKL TRCK: CPT | Performed by: INTERNAL MEDICINE

## 2022-02-09 PROCEDURE — 93356 MYOCRD STRAIN IMG SPCKL TRCK: CPT

## 2022-02-09 PROCEDURE — 93321 DOPPLER ECHO F-UP/LMTD STD: CPT | Performed by: INTERNAL MEDICINE

## 2022-02-09 PROCEDURE — 93308 TTE F-UP OR LMTD: CPT

## 2022-02-09 RX ADMIN — PERFLUTREN 1.5 ML: 6.52 INJECTION, SUSPENSION INTRAVENOUS at 14:08

## 2022-02-10 LAB
BH CV ECHO MEAS - BSA(HAYCOCK): 1.7 M^2
BH CV ECHO MEAS - BSA: 1.7 M^2
BH CV ECHO MEAS - BZI_BMI: 27.4 KILOGRAMS/M^2
BH CV ECHO MEAS - BZI_METRIC_HEIGHT: 157.5 CM
BH CV ECHO MEAS - BZI_METRIC_WEIGHT: 68 KG
BH CV ECHO MEAS - EDV(MOD-SP2): 103 ML
BH CV ECHO MEAS - EDV(MOD-SP4): 80 ML
BH CV ECHO MEAS - EDV(TEICH): 50.9 ML
BH CV ECHO MEAS - EF(CUBED): 71.2 %
BH CV ECHO MEAS - EF(MOD-BP): 58.8 %
BH CV ECHO MEAS - EF(MOD-SP2): 58.3 %
BH CV ECHO MEAS - EF(MOD-SP4): 56.3 %
BH CV ECHO MEAS - EF(TEICH): 63.9 %
BH CV ECHO MEAS - ESV(MOD-SP2): 43 ML
BH CV ECHO MEAS - ESV(MOD-SP4): 35 ML
BH CV ECHO MEAS - ESV(TEICH): 18.3 ML
BH CV ECHO MEAS - FS: 34 %
BH CV ECHO MEAS - IVS/LVPW: 1
BH CV ECHO MEAS - IVSD: 0.98 CM
BH CV ECHO MEAS - LAT PEAK E' VEL: 11.6 CM/SEC
BH CV ECHO MEAS - LV DIASTOLIC VOL/BSA (35-75): 47.3 ML/M^2
BH CV ECHO MEAS - LV MASS(C)D: 100.7 GRAMS
BH CV ECHO MEAS - LV MASS(C)DI: 59.6 GRAMS/M^2
BH CV ECHO MEAS - LV SYSTOLIC VOL/BSA (12-30): 20.7 ML/M^2
BH CV ECHO MEAS - LVIDD: 3.5 CM
BH CV ECHO MEAS - LVIDS: 2.3 CM
BH CV ECHO MEAS - LVLD AP2: 7.7 CM
BH CV ECHO MEAS - LVLD AP4: 7.1 CM
BH CV ECHO MEAS - LVLS AP2: 5.9 CM
BH CV ECHO MEAS - LVLS AP4: 6 CM
BH CV ECHO MEAS - LVPWD: 0.98 CM
BH CV ECHO MEAS - MED PEAK E' VEL: 11.7 CM/SEC
BH CV ECHO MEAS - MV A DUR: 0.12 SEC
BH CV ECHO MEAS - MV A MAX VEL: 123.4 CM/SEC
BH CV ECHO MEAS - MV DEC TIME: 0.21 SEC
BH CV ECHO MEAS - MV E MAX VEL: 95.5 CM/SEC
BH CV ECHO MEAS - MV E/A: 0.77
BH CV ECHO MEAS - SI(CUBED): 18 ML/M^2
BH CV ECHO MEAS - SI(MOD-SP2): 35.5 ML/M^2
BH CV ECHO MEAS - SI(MOD-SP4): 26.6 ML/M^2
BH CV ECHO MEAS - SI(TEICH): 19.2 ML/M^2
BH CV ECHO MEAS - SV(CUBED): 30.5 ML
BH CV ECHO MEAS - SV(MOD-SP2): 60 ML
BH CV ECHO MEAS - SV(MOD-SP4): 45 ML
BH CV ECHO MEAS - SV(TEICH): 32.5 ML
BH CV ECHO MEAS - TR MAX VEL: 176.5 CM/SEC
BH CV ECHO MEASUREMENTS AVERAGE E/E' RATIO: 8.2
BH CV XLRA - TDI S': 16.3 CM/SEC
MAXIMAL PREDICTED HEART RATE: 177 BPM
STRESS TARGET HR: 150 BPM

## 2022-02-14 ENCOUNTER — TELEPHONE (OUTPATIENT)
Dept: NEUROLOGY | Facility: OTHER | Age: 42
End: 2022-02-14

## 2022-02-15 ENCOUNTER — TELEPHONE (OUTPATIENT)
Dept: NEUROLOGY | Facility: CLINIC | Age: 42
End: 2022-02-15

## 2022-02-15 NOTE — TELEPHONE ENCOUNTER
Attempted to reach patient to offer a sooner appointment since she is on Dr. Johnson's wait list.  Left message to call back.

## 2022-02-16 DIAGNOSIS — F90.0 ATTENTION DEFICIT HYPERACTIVITY DISORDER (ADHD), PREDOMINANTLY INATTENTIVE TYPE: ICD-10-CM

## 2022-02-16 RX ORDER — DEXTROAMPHETAMINE SACCHARATE, AMPHETAMINE ASPARTATE MONOHYDRATE, DEXTROAMPHETAMINE SULFATE AND AMPHETAMINE SULFATE 6.25; 6.25; 6.25; 6.25 MG/1; MG/1; MG/1; MG/1
25 CAPSULE, EXTENDED RELEASE ORAL EVERY MORNING
Qty: 30 CAPSULE | Refills: 0 | OUTPATIENT
Start: 2022-02-16

## 2022-02-21 VITALS — HEIGHT: 62 IN | WEIGHT: 150 LBS | BODY MASS INDEX: 27.6 KG/M2

## 2022-02-21 NOTE — PROGRESS NOTES
Subjective   Radha Astorga is a 41 y.o. female.     CC: Video Visit for Medical Management    History of Present Illness     Chief Complaint:   Chief Complaint   Patient presents with   • ADHD     video visit / med refill due    • Anxiety   • Depression       Radha Astorga 41 y.o. female who presents today for Medical Management of the below listed issues and medication refills. She  has a problem list of   Patient Active Problem List   Diagnosis   • Family history of breast cancer   • Hypothyroidism   • Major depressive disorder, recurrent episode, moderate with anxious distress (HCC)   • Attention deficit hyperactivity disorder (ADHD), predominantly inattentive type   • Factor 5 Leiden mutation, heterozygous (HCC)   • Kidney mass   • Malignant neoplasm of overlapping sites of left breast in female, estrogen receptor negative (HCC)   • High risk medication use   • Essential hypertension   • Rheumatic mitral valve disease   • Encounter for adjustment or management of vascular access device   • Iron deficiency anemia   .  Since the last visit, She has overall felt well.  she has been compliant with   Current Outpatient Medications:   •  amphetamine-dextroamphetamine XR (Adderall XR) 25 MG 24 hr capsule, Take 1 capsule by mouth Every Morning, Disp: 30 capsule, Rfl: 0  •  LORazepam (ATIVAN) 0.5 MG tablet, Take 1 tablet by mouth Every 8 (Eight) Hours As Needed for Anxiety. for anxiety, Disp: 30 tablet, Rfl: 2  •  atorvastatin (Lipitor) 10 MG tablet, Take 1 tablet by mouth Daily., Disp: 30 tablet, Rfl: 11  •  famotidine (PEPCID) 20 MG tablet, TAKE 1 TABLET BY MOUTH DAILY, Disp: 30 tablet, Rfl: 3  •  lidocaine-prilocaine (EMLA) 2.5-2.5 % cream, Apply  topically to the appropriate area as directed As Needed for Mild Pain ., Disp: 1 each, Rfl: 2  •  ondansetron ODT (ZOFRAN-ODT) 8 MG disintegrating tablet, Place 1 tablet on the tongue Every 8 (Eight) Hours As Needed for Nausea or Vomiting., Disp: 30 tablet, Rfl: 3  •  " Synthroid 137 MCG tablet, Take 1 tablet by mouth Daily., Disp: 90 tablet, Rfl: 1  •  valsartan (DIOVAN) 160 MG tablet, Take 1 tablet by mouth Daily., Disp: 30 tablet, Rfl: 5  •  Xarelto 20 MG tablet, TAKE 1 TABLET BY MOUTH DAILY, Disp: 30 tablet, Rfl: 3  No current facility-administered medications for this visit.    Facility-Administered Medications Ordered in Other Visits:   •  heparin injection 500 Units, 500 Units, Intravenous, PRNimesh LUCIANO Leela, MD, 500 Units at 08/11/21 1718  •  sodium chloride 0.9 % flush 10 mL, 10 mL, Intravenous, PRNNimesh Leela, MD, 10 mL at 08/11/21 1718.  She denies medication side effects.    All of the other chronic condition(s) listed above are stable w/o issues.    Ht 157.5 cm (62\")   Wt 68 kg (150 lb)   BMI 27.44 kg/m²     Results for orders placed or performed during the hospital encounter of 02/09/22   Adult Transthoracic Echo Limited W/ Cont if Necessary Per Protocol   Result Value Ref Range    BSA 1.7 m^2    IVSd 0.98 cm    LVIDd 3.5 cm    LVIDs 2.3 cm    LVPWd 0.98 cm    IVS/LVPW 1.0     FS 34.0 %    EDV(Teich) 50.9 ml    ESV(Teich) 18.3 ml    EF(Teich) 63.9 %    EF(cubed) 71.2 %    LV mass(C)d 100.7 grams    LV mass(C)dI 59.6 grams/m^2    SV(Teich) 32.5 ml    SI(Teich) 19.2 ml/m^2    SV(cubed) 30.5 ml    SI(cubed) 18.0 ml/m^2    LVLd ap4 7.1 cm    EDV(MOD-sp4) 80.0 ml    LVLs ap4 6.0 cm    ESV(MOD-sp4) 35.0 ml    EF(MOD-sp4) 56.3 %    LVLd ap2 7.7 cm    EDV(MOD-sp2) 103.0 ml    LVLs ap2 5.9 cm    ESV(MOD-sp2) 43.0 ml    EF(MOD-sp2) 58.3 %    SV(MOD-sp4) 45.0 ml    SI(MOD-sp4) 26.6 ml/m^2    SV(MOD-sp2) 60.0 ml    SI(MOD-sp2) 35.5 ml/m^2    LV Ortiz Vol (BSA corrected) 47.3 ml/m^2    LV Sys Vol (BSA corrected) 20.7 ml/m^2    EF(MOD-bp) 58.8 %    MV A dur 0.12 sec    MV E max srikanth 95.5 cm/sec    MV A max srikanth 123.4 cm/sec    MV E/A 0.77     MV dec time 0.21 sec    TR max srikanth 176.5 cm/sec    Lat Peak E' Srikanth 11.6 cm/sec    Med Peak E' Srikanth 11.7 cm/sec    RV S' 16.3 cm/sec "     CV ECHO DEBBIE - BZI_BMI 27.4 kilograms/m^2    [facility] ECHO DEBBIE - BSA(HAYCOCK) 1.7 m^2    [facility] ECHO DEBBIE - BZI_METRIC_WEIGHT 68.0 kg     CV ECHO DEBBIE - BZI_METRIC_HEIGHT 157.5 cm    Avg E/e' ratio 8.20     Target HR (85%) 150 bpm    Max. Pred. HR (100%) 177 bpm             The following portions of the patient's history were reviewed and updated as appropriate: allergies, current medications, past family history, past medical history, past social history, past surgical history, and problem list.    Review of Systems   Constitutional: Negative for activity change, chills and fever.   Respiratory: Negative for cough.    Cardiovascular: Negative for chest pain.   Psychiatric/Behavioral: Negative for dysphoric mood.       Objective   Physical Exam  Constitutional:       General: She is not in acute distress.     Appearance: She is well-developed.   Pulmonary:      Effort: Pulmonary effort is normal.   Neurological:      Mental Status: She is alert and oriented to person, place, and time.   Psychiatric:         Behavior: Behavior normal.         Thought Content: Thought content normal.           Assessment/Plan   Diagnoses and all orders for this visit:    1. Attention deficit hyperactivity disorder (ADHD), predominantly inattentive type (Primary)  -     amphetamine-dextroamphetamine XR (Adderall XR) 25 MG 24 hr capsule; Take 1 capsule by mouth Every Morning  Dispense: 30 capsule; Refill: 0    2. Grief reaction  -     LORazepam (ATIVAN) 0.5 MG tablet; Take 1 tablet by mouth Every 8 (Eight) Hours As Needed for Anxiety. for anxiety  Dispense: 30 tablet; Refill: 2    3. Acquired hypothyroidism  -     T4, Free  -     T3, Free  -     TSH    Spent  10   minutes with chart and interview and consent for this encounter given by the patient.  You have chosen to receive care through a telehealth visit.  Do you consent to use a video/audio connection for your medical care today? Yes

## 2022-02-22 ENCOUNTER — TELEMEDICINE (OUTPATIENT)
Dept: FAMILY MEDICINE CLINIC | Facility: CLINIC | Age: 42
End: 2022-02-22

## 2022-02-22 ENCOUNTER — TELEPHONE (OUTPATIENT)
Dept: NEUROLOGY | Facility: CLINIC | Age: 42
End: 2022-02-22

## 2022-02-22 DIAGNOSIS — F90.0 ATTENTION DEFICIT HYPERACTIVITY DISORDER (ADHD), PREDOMINANTLY INATTENTIVE TYPE: Primary | Chronic | ICD-10-CM

## 2022-02-22 DIAGNOSIS — E03.9 ACQUIRED HYPOTHYROIDISM: ICD-10-CM

## 2022-02-22 DIAGNOSIS — F43.21 GRIEF REACTION: Chronic | ICD-10-CM

## 2022-02-22 PROCEDURE — 99214 OFFICE O/P EST MOD 30 MIN: CPT | Performed by: FAMILY MEDICINE

## 2022-02-22 RX ORDER — DEXTROAMPHETAMINE SACCHARATE, AMPHETAMINE ASPARTATE MONOHYDRATE, DEXTROAMPHETAMINE SULFATE AND AMPHETAMINE SULFATE 6.25; 6.25; 6.25; 6.25 MG/1; MG/1; MG/1; MG/1
25 CAPSULE, EXTENDED RELEASE ORAL EVERY MORNING
Qty: 30 CAPSULE | Refills: 0 | Status: SHIPPED | OUTPATIENT
Start: 2022-02-22 | End: 2022-03-28

## 2022-02-22 RX ORDER — LORAZEPAM 0.5 MG/1
0.5 TABLET ORAL EVERY 8 HOURS PRN
Qty: 30 TABLET | Refills: 2 | Status: SHIPPED | OUTPATIENT
Start: 2022-02-22 | End: 2023-02-28 | Stop reason: SDUPTHER

## 2022-02-22 NOTE — TELEPHONE ENCOUNTER
Attempted to reach patient to offer sooner appt on 3/3/22 w/ Dr. Johnson since she is on his wait list.  Left message to call back.   Oxygen saturation drop to mid 80's. Turned O2 to 13L high flow. Saturation up to 90%. Continue to monitor. Ashley Caldwell RN

## 2022-02-23 ENCOUNTER — INFUSION (OUTPATIENT)
Dept: ONCOLOGY | Facility: HOSPITAL | Age: 42
End: 2022-02-23

## 2022-02-23 ENCOUNTER — OFFICE VISIT (OUTPATIENT)
Dept: ONCOLOGY | Facility: CLINIC | Age: 42
End: 2022-02-23

## 2022-02-23 VITALS
HEART RATE: 97 BPM | OXYGEN SATURATION: 98 % | WEIGHT: 150.4 LBS | SYSTOLIC BLOOD PRESSURE: 127 MMHG | TEMPERATURE: 97.3 F | RESPIRATION RATE: 16 BRPM | HEIGHT: 62 IN | BODY MASS INDEX: 27.68 KG/M2 | DIASTOLIC BLOOD PRESSURE: 83 MMHG

## 2022-02-23 DIAGNOSIS — Z79.899 HIGH RISK MEDICATION USE: ICD-10-CM

## 2022-02-23 DIAGNOSIS — Z45.2 ENCOUNTER FOR ADJUSTMENT OR MANAGEMENT OF VASCULAR ACCESS DEVICE: ICD-10-CM

## 2022-02-23 DIAGNOSIS — Z86.73 HISTORY OF STROKE: ICD-10-CM

## 2022-02-23 DIAGNOSIS — C50.812 MALIGNANT NEOPLASM OF OVERLAPPING SITES OF LEFT BREAST IN FEMALE, ESTROGEN RECEPTOR NEGATIVE: Primary | ICD-10-CM

## 2022-02-23 DIAGNOSIS — Z17.1 MALIGNANT NEOPLASM OF OVERLAPPING SITES OF LEFT BREAST IN FEMALE, ESTROGEN RECEPTOR NEGATIVE: Primary | ICD-10-CM

## 2022-02-23 DIAGNOSIS — E03.9 ACQUIRED HYPOTHYROIDISM: Primary | ICD-10-CM

## 2022-02-23 DIAGNOSIS — G62.9 NEUROPATHY: Primary | ICD-10-CM

## 2022-02-23 DIAGNOSIS — Z17.1 MALIGNANT NEOPLASM OF OVERLAPPING SITES OF LEFT BREAST IN FEMALE, ESTROGEN RECEPTOR NEGATIVE: ICD-10-CM

## 2022-02-23 DIAGNOSIS — C50.812 MALIGNANT NEOPLASM OF OVERLAPPING SITES OF LEFT BREAST IN FEMALE, ESTROGEN RECEPTOR NEGATIVE: ICD-10-CM

## 2022-02-23 DIAGNOSIS — D68.51 FACTOR 5 LEIDEN MUTATION, HETEROZYGOUS: ICD-10-CM

## 2022-02-23 LAB
ALBUMIN SERPL-MCNC: 4.3 G/DL (ref 3.5–5.2)
ALBUMIN/GLOB SERPL: 2 G/DL
ALP SERPL-CCNC: 98 U/L (ref 39–117)
ALT SERPL W P-5'-P-CCNC: 34 U/L (ref 1–33)
ANION GAP SERPL CALCULATED.3IONS-SCNC: 8.7 MMOL/L (ref 5–15)
AST SERPL-CCNC: 24 U/L (ref 1–32)
BASOPHILS # BLD AUTO: 0.05 10*3/MM3 (ref 0–0.2)
BASOPHILS NFR BLD AUTO: 1 % (ref 0–1.5)
BILIRUB SERPL-MCNC: 0.2 MG/DL (ref 0–1.2)
BUN SERPL-MCNC: 16 MG/DL (ref 6–20)
BUN/CREAT SERPL: 22.5 (ref 7–25)
CALCIUM SPEC-SCNC: 10 MG/DL (ref 8.6–10.5)
CHLORIDE SERPL-SCNC: 107 MMOL/L (ref 98–107)
CO2 SERPL-SCNC: 26.3 MMOL/L (ref 22–29)
CREAT SERPL-MCNC: 0.71 MG/DL (ref 0.57–1)
DEPRECATED RDW RBC AUTO: 37.3 FL (ref 37–54)
EOSINOPHIL # BLD AUTO: 0.12 10*3/MM3 (ref 0–0.4)
EOSINOPHIL NFR BLD AUTO: 2.3 % (ref 0.3–6.2)
ERYTHROCYTE [DISTWIDTH] IN BLOOD BY AUTOMATED COUNT: 11.9 % (ref 12.3–15.4)
GFR SERPL CREATININE-BSD FRML MDRD: 90 ML/MIN/1.73
GLOBULIN UR ELPH-MCNC: 2.2 GM/DL
GLUCOSE SERPL-MCNC: 95 MG/DL (ref 65–99)
HCT VFR BLD AUTO: 39 % (ref 34–46.6)
HGB BLD-MCNC: 13 G/DL (ref 12–15.9)
IMM GRANULOCYTES # BLD AUTO: 0.01 10*3/MM3 (ref 0–0.05)
IMM GRANULOCYTES NFR BLD AUTO: 0.2 % (ref 0–0.5)
LYMPHOCYTES # BLD AUTO: 1.29 10*3/MM3 (ref 0.7–3.1)
LYMPHOCYTES NFR BLD AUTO: 25.2 % (ref 19.6–45.3)
MCH RBC QN AUTO: 28.6 PG (ref 26.6–33)
MCHC RBC AUTO-ENTMCNC: 33.3 G/DL (ref 31.5–35.7)
MCV RBC AUTO: 85.9 FL (ref 79–97)
MONOCYTES # BLD AUTO: 0.45 10*3/MM3 (ref 0.1–0.9)
MONOCYTES NFR BLD AUTO: 8.8 % (ref 5–12)
NEUTROPHILS NFR BLD AUTO: 3.2 10*3/MM3 (ref 1.7–7)
NEUTROPHILS NFR BLD AUTO: 62.5 % (ref 42.7–76)
NRBC BLD AUTO-RTO: 0 /100 WBC (ref 0–0.2)
PLATELET # BLD AUTO: 248 10*3/MM3 (ref 140–450)
PMV BLD AUTO: 9.5 FL (ref 6–12)
POTASSIUM SERPL-SCNC: 4.4 MMOL/L (ref 3.5–5.2)
PROT SERPL-MCNC: 6.5 G/DL (ref 6–8.5)
RBC # BLD AUTO: 4.54 10*6/MM3 (ref 3.77–5.28)
SODIUM SERPL-SCNC: 142 MMOL/L (ref 136–145)
T3FREE SERPL-MCNC: 3.92 PG/ML (ref 2–4.4)
T4 FREE SERPL-MCNC: 2.03 NG/DL (ref 0.93–1.7)
TSH SERPL DL<=0.05 MIU/L-ACNC: 0.04 UIU/ML (ref 0.27–4.2)
WBC NRBC COR # BLD: 5.12 10*3/MM3 (ref 3.4–10.8)

## 2022-02-23 PROCEDURE — 25010000002 TRASTUZUMAB-ANNS 420 MG RECONSTITUTED SOLUTION 1 EACH VIAL: Performed by: INTERNAL MEDICINE

## 2022-02-23 PROCEDURE — 96375 TX/PRO/DX INJ NEW DRUG ADDON: CPT

## 2022-02-23 PROCEDURE — 96413 CHEMO IV INFUSION 1 HR: CPT

## 2022-02-23 PROCEDURE — 80050 GENERAL HEALTH PANEL: CPT | Performed by: INTERNAL MEDICINE

## 2022-02-23 PROCEDURE — 36415 COLL VENOUS BLD VENIPUNCTURE: CPT | Performed by: FAMILY MEDICINE

## 2022-02-23 PROCEDURE — 25010000002 HEPARIN LOCK FLUSH PER 10 UNITS: Performed by: INTERNAL MEDICINE

## 2022-02-23 PROCEDURE — 84481 FREE ASSAY (FT-3): CPT | Performed by: FAMILY MEDICINE

## 2022-02-23 PROCEDURE — 99215 OFFICE O/P EST HI 40 MIN: CPT | Performed by: INTERNAL MEDICINE

## 2022-02-23 PROCEDURE — 84439 ASSAY OF FREE THYROXINE: CPT | Performed by: FAMILY MEDICINE

## 2022-02-23 RX ORDER — SODIUM CHLORIDE 0.9 % (FLUSH) 0.9 %
10 SYRINGE (ML) INJECTION AS NEEDED
Status: CANCELLED | OUTPATIENT
Start: 2022-02-23

## 2022-02-23 RX ORDER — FAMOTIDINE 10 MG/ML
20 INJECTION, SOLUTION INTRAVENOUS ONCE
Status: CANCELLED | OUTPATIENT
Start: 2022-02-23

## 2022-02-23 RX ORDER — HEPARIN SODIUM (PORCINE) LOCK FLUSH IV SOLN 100 UNIT/ML 100 UNIT/ML
500 SOLUTION INTRAVENOUS AS NEEDED
Status: DISCONTINUED | OUTPATIENT
Start: 2022-02-23 | End: 2022-02-23 | Stop reason: HOSPADM

## 2022-02-23 RX ORDER — FAMOTIDINE 10 MG/ML
20 INJECTION, SOLUTION INTRAVENOUS ONCE
Status: COMPLETED | OUTPATIENT
Start: 2022-02-23 | End: 2022-02-23

## 2022-02-23 RX ORDER — SODIUM CHLORIDE 0.9 % (FLUSH) 0.9 %
10 SYRINGE (ML) INJECTION AS NEEDED
Status: DISCONTINUED | OUTPATIENT
Start: 2022-02-23 | End: 2022-02-23 | Stop reason: HOSPADM

## 2022-02-23 RX ORDER — LEVOTHYROXINE SODIUM 0.12 MG/1
125 TABLET ORAL DAILY
Qty: 90 TABLET | Refills: 1 | Status: SHIPPED | OUTPATIENT
Start: 2022-02-23 | End: 2022-06-29 | Stop reason: SDUPTHER

## 2022-02-23 RX ORDER — GABAPENTIN 100 MG/1
100 CAPSULE ORAL
Qty: 30 CAPSULE | Refills: 2 | Status: SHIPPED | OUTPATIENT
Start: 2022-02-23 | End: 2022-03-16 | Stop reason: SINTOL

## 2022-02-23 RX ORDER — SODIUM CHLORIDE 9 MG/ML
250 INJECTION, SOLUTION INTRAVENOUS ONCE
Status: COMPLETED | OUTPATIENT
Start: 2022-02-23 | End: 2022-02-23

## 2022-02-23 RX ORDER — SODIUM CHLORIDE 9 MG/ML
250 INJECTION, SOLUTION INTRAVENOUS ONCE
Status: CANCELLED | OUTPATIENT
Start: 2022-02-23

## 2022-02-23 RX ORDER — HEPARIN SODIUM (PORCINE) LOCK FLUSH IV SOLN 100 UNIT/ML 100 UNIT/ML
500 SOLUTION INTRAVENOUS AS NEEDED
Status: CANCELLED | OUTPATIENT
Start: 2022-02-23

## 2022-02-23 RX ADMIN — Medication 10 ML: at 12:05

## 2022-02-23 RX ADMIN — SODIUM CHLORIDE 250 ML: 9 INJECTION, SOLUTION INTRAVENOUS at 11:14

## 2022-02-23 RX ADMIN — TRASTUZUMAB-ANNS 390 MG: 420 INJECTION, POWDER, LYOPHILIZED, FOR SOLUTION INTRAVENOUS at 11:27

## 2022-02-23 RX ADMIN — Medication 500 UNITS: at 12:05

## 2022-02-23 RX ADMIN — FAMOTIDINE 20 MG: 10 INJECTION, SOLUTION INTRAVENOUS at 11:14

## 2022-02-23 NOTE — PROGRESS NOTES
Subjective     REASON FOR follow-up:    1.  Heterozygous state for factor V Leiden    2.  New onset stroke on aspirin by neurology    3.  Hypercholesterolemia    4.  Hypercoagulable work-up done.  Antithrombin 109% protein S activity 85% protein S antigen 107%, protein C activity 85%.  Anticardiolipin antibody IgM 24%, antibeta-2 glycoprotein antibody negative, lupus anticoagulant not detected, prothrombin gene mutation negative.    5.  Screening mammogram showed abnormality in the left breast 6 o'clock position, 8 mm on ultrasound, ultrasound-guided left breast biopsy, 6 cm from the nipple showed evidence of invasive ductal carcinoma poorly differentiated grade 3, Wheatland score of 8 out of 9 ER negative, IN negative, HER-2/ese 3+ positive.    6.  CT scan February 24, 2021 showed focal area of fatty infiltration of the liver.  1 cm hypoattenuating cortical lesion in the upper pole of the right kidney.  Similarly nodule in the upper pole of the left kidney is 1.5 cm.  Further evaluation by ultrasound suggested  · Ultrasound March 1, 2021 shows solid lesion in the upper pole of the right kidney.  In the left kidney along the midpole of the left kidney is a nodule which is 16 x 16 x 14 mm which is thought to be a cyst.  A 6-month follow-up CT suggested    6.  S/p left partial mastectomy with sentinel lymph node biopsy.  Tumor was present at 5:00, invasive ductal carcinoma, Wheatland score of 8, grade 3, greatest dimension was 12 mm x 8 x 4 mm.  Single focus of invasive carcinoma present.  There was extensive DCIS which is 30 mm x 8 x 2 mm, grade 3, no evidence of lymphovascular space invasion or dermal lymphatic invasion.  Margins were clear.  4 lymph nodes were negative.  It is a p T1cp N0, ER negative IN negative HER-2/ese 3+ with Ki-67 of 50%.  · [employer] genetic test negative for 47 genes    7.recently initiated Xarelto at loading dose of 15 mg twice a day x3 weeks to then be switched to 20 mg daily per  protocol.  She is doing this because of Mediport in place and known factor V Leiden heterozygosity.        History of Present Illness   Patient is a 41 y.o. female with pT1cp N0 ER negative KS negative HER-2 positive left breast cancer s/p lumpectomy.        Patient completed 12 weekly treatments of Taxol and Herceptin and currently is here for every 3 week Herceptin.  She completed radiation in November 2021.     Patient is here to continue Herceptin.  Her echocardiogram shows evidence of normal ejection fraction, 15% with strain pattern of -20.  Patient is followed by Dr. Virk and laquita to continue Herceptin.  She does have difficulty with sleep in the night and she has neuropathy.  We discussed about considering gabapentin at bedtime 100 mg at bedtime to see if it will help with her neuropathy and also help with sleep      Oncologic history:  Patient is here for follow-up of her mammogram.  Patient had screening mammogram April 2, 2021:  NAD a focal asymmetry was present in the posterior one third retroareolar region of the left breast.  Further spot compression images and left breast ultrasound was suggested.  Right breast was negative    April 9, 2021: Left diagnostic mammogram showed an area of focal asymmetry in the posterior one third region aspect of the left breast her breast    Ultrasound showed an irregular 0.8 cm lesion in the left breast at the 6 o'clock position of the order of 6 cm from the nipple.  Ultrasound-guided left breast biopsy was recommended.    April 26, 2021: Ultrasound-guided biopsy of the left breast 6 o'clock position, 6 cm from the nipple showed    Invasive ductal carcinoma, poorly differentiated with a [place] score grade 3 with a score of 8 out of 9 measuring at least 7 mm.  No definitive ductal carcinoma is in situ is identified.  ER 1% negative  KS 1% negative   HER-2/ese 3+ positive    There was no evidence of lymphadenopathy either in the mammogram of the ultrasound.  We  suggested an MRI of the breast.  Patient has strong family history of breast cancer      May 7, 2021: MRI of bilateral breast reviewed.  My interpretation is that there is area of enhancement in the 6 o'clock position of the left breast this is the biopsy-proven malignancy.  There is additional area of linear enhancement anteriorly and laterally measuring up to 1.2 cm this is approximately 5.6 cm from the nipple.  In addition there is a enhancement 2 to 3 mm in the anterolateral aspect of the lateral aspect of the left breast which is 4.6 cm from the nipple.  There is also mildly prominent posterior lymph node in the mid axilla which measures 1 cm with cortical thickening.  Findings are consistent with multifocal disease.  No contralateral disease or internal mammary adenopathy identified    May 20, 2021: Genetic test, [facility] genetic test shows 47 genes negative    May 21, 2021: I reviewed the biopsy of the lymph node in the left axilla which shows reactive lymph node negative for any carcinoma or lymphoma    June 2, 2021:Final Diagnosis  1. Left Breast, 5:00 o'clock, MRI-guided Biopsies for Linear Enhancement: INVASIVE MAMMARY CARCINOMA, NO  SPECIAL TYPE (INVASIVE DUCTAL CARCINOMA).  A. Largest contiguous focus in a core measures 4 mm.  B. No in-situ component identified.  C. Brisk lymphocytic response noted (see Comment).  D. No definitive lymphovascular nor perineural invasion identified.  2. Left Breast, 2:00 o'clock, MRI-guided Biopsy for Enhancement:  A. Benign breast parenchyma with radial scar and micropapillomas.  B. Usual ductal hyperplasia and columnar cell hyperplasia.  C. No atypical hyperplasia, in-situ nor invasive carcinoma identified    ER, UT, HER-2 new and Ki-67 pending on this new biopsy      Patient has been seen by Dr. Lashonda Euceda with plans of doing surgery on July first 2021    Once patient undergoes surgery lumpectomy with sentinel lymph node biopsy we will give further recommendation  about treatment options.  Given that patient has multifocal disease and both of the lesions 2 lesions are 1.2 cm each, with it being a HER-2 positive tumor on the previous biopsy at 6:00 Dr. Euceda and myself discussed and patient preferred to undergo port placement at the same time of surgery.    Patient had [employer] genetic test which was negative.    She has completed surgery July 1, 2021.  She underwent left partial mastectomy with left axillary sentinel lymph node biopsy and right port placement.  Clinically she was thought to have a high-grade multifocal invasive ductal carcinoma ER/OR negative, OR positive.  The lesions require preoperative localization.  The multifocal cancer was bracketed with 2 savvy markers and the radial scar was localized with a needle.  Because of the volume of tissue that will be excised related to the breast volume the case was done in conjunction with Dr. Zavala for oncoplastic closure.    She is 2 weeks from surgery.  She is healing up reasonably well.    On review of her pathology she had left partial mastectomy with sentinel lymph node biopsy.  Tumor was present at 5:00, invasive ductal carcinoma, Oziel score of 8, grade 3, greatest dimension was 12 mm x 8 x 4 mm.  Single focus of invasive carcinoma present.  There was extensive DCIS which is 30 mm x 8 x 2 mm, grade 3, no evidence of lymphovascular space invasion or dermal lymphatic invasion.  Margins were clear.  4 lymph nodes were negative.  It is a p T1cp N0, ER negative OR negative HER-2/ese 3+ with Ki-67 of 50%.    Patient is here to discuss further options of treatment.  Given small tumor T1c N0, she is a good candidate for weekly Taxol Herceptin.    Given that patient has heterozygous state for factor V Leiden and currently has port placed we will plan to start Eliquis 2.5 mg p.o. twice daily.  I have left a message for Dr. Craft in neurology to call me to discuss if patient needs to continue aspirin or we can hold  off since be starting Eliquis.      Genetic test negative    August 4, 2021: Weekly Taxol Herceptin x12 weeks followed by Herceptin x1 year.  Cycle 1 today    Hematologic history:  patient is a 40-year-old female who presented end of December with numbness and tingling in her left upper extremity and left face.  She went to see Dr. Goodman who then got concerned and referred to neurology.  She was referred to Dr. Freedman and talk to Dr. Thompson neurology for evaluation.  Patient underwent ultrasound of the carotids which did not show significant stenosis of the carotids.  She underwent 2D echocardiogram which showed a good ejection fraction of 64% with mild mitral valve prolapse and mild mitral stenosis.  She had a 24-hour Holter which was negative.  She then had also an MRI of the brain February 3, 2021 which showed areas of hyperintensity involving the subcortical white matter of the right frontal lobe superior laterally and posteriorly with the largest area measuring 8 9 mm.  There is also enhancement present.  The findings are consistent with a subacute infarct.  They could not differentiate if there is any underlying neoplastic process but a short-term follow-up was suggested in order to follow-up.  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    Patient currently got started on aspirin and factor V Leiden was obtained by neurology because patient's paternal aunt had factor V Leiden mutation and she was heterozygous.  Patient's testing also showed that she was heterozygous.    Patient states her numbness and tingling is resolved completely on the left arm and face it just lasted for a 24 hours.  She was here referred here for neurology to see if there is any other cause for her underlying hypercoagulable state.  She has not had a complete hypercoagulable work-up.  Also discussed with her that an underlying malignancy  could cause a hypercoagulable state and we would need to do a CT scan to rule that out.    Patient's mother has had breast cancer in her 50s but had pancreatic cancer at 68 and  from that.  Patient's dad had stroke at 63.  But he is alive at 74.  She has 1 brother who is 40 in good health.  Paternal aunt had breast cancer in her 40s.  Paternal grandfather had colon cancer in his late 80s.  Maternal uncle had throat cancer and another maternal uncle had skin cancer with metastasis to the lung.  And maternal grandmother had breast cancer.    Patient was to have mammogram done last year but because of COVID-19 it got postponed and she has not had it done yet.    Patient used to be a smoker half pack per day for 7 years but quit 10 years ago.  She has high cholesterol for which she is on treatment.    Past Medical History:   Diagnosis Date   • Acute stress reaction 2014   • ADD (attention deficit disorder)    • ADHD (attention deficit hyperactivity disorder)    • Anxiety and depression    • CTS (carpal tunnel syndrome)    • Factor 5 Leiden mutation, heterozygous ([facility]) 2021   • Family history of breast cancer 2013   • Fracture, cervical vertebra ([facility]) 1997    C4   • H/O complete eye exam 2016   • Hearing loss    • 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   • History of rheumatic fever    • Hypertension    • Hypothyroidism    • Infiltrating ductal carcinoma of left breast ([facility]) 2021    Left   • Kidney stone    • Mitral valve insufficiency    • PONV (postoperative nausea and vomiting)    • Stroke ([facility]) 2020    HX OF   • Stroke-like symptoms         Past Surgical History:   Procedure Laterality Date   • BREAST BIOPSY Left 2021    IDC   • BREAST LUMPECTOMY WITH SENTINEL NODE BIOPSY N/A 2021    Procedure: right port placement, Left SHAINA-guided, bracketed, partial mastectomy and sentinel lymph node biopsy Left breast needle-localized excisional biopsy;  Surgeon:  Lashonda Euceda MD;  Location: [facility];  Service: General;  Laterality: N/A;   • BREAST SURGERY Bilateral 7/1/2021    Procedure: RIGHT BREAST REDUCTION MASTOPEXY LEFT BREAST ONCOPLASTIC CLOSURE;  Surgeon: Caridad Baker MD PhD;  Location: [facility];  Service: Plastics;  Laterality: Bilateral;   • NO PAST SURGERIES     • PAP SMEAR  2016        Current Outpatient Medications on File Prior to Visit   Medication Sig Dispense Refill   • amphetamine-dextroamphetamine XR (Adderall XR) 25 MG 24 hr capsule Take 1 capsule by mouth Every Morning 30 capsule 0   • atorvastatin (Lipitor) 10 MG tablet Take 1 tablet by mouth Daily. 30 tablet 11   • famotidine (PEPCID) 20 MG tablet TAKE 1 TABLET BY MOUTH DAILY 30 tablet 3   • lidocaine-prilocaine (EMLA) 2.5-2.5 % cream Apply  topically to the appropriate area as directed As Needed for Mild Pain . 1 each 2   • LORazepam (ATIVAN) 0.5 MG tablet Take 1 tablet by mouth Every 8 (Eight) Hours As Needed for Anxiety. for anxiety 30 tablet 2   • ondansetron ODT (ZOFRAN-ODT) 8 MG disintegrating tablet Place 1 tablet on the tongue Every 8 (Eight) Hours As Needed for Nausea or Vomiting. 30 tablet 3   • Synthroid 137 MCG tablet Take 1 tablet by mouth Daily. 90 tablet 1   • valsartan (DIOVAN) 160 MG tablet Take 1 tablet by mouth Daily. 30 tablet 5   • Xarelto 20 MG tablet TAKE 1 TABLET BY MOUTH DAILY 30 tablet 3     Current Facility-Administered Medications on File Prior to Visit   Medication Dose Route Frequency Provider Last Rate Last Admin   • heparin injection 500 Units  500 Units Intravenous PRN Neena Beavers MD   500 Units at 08/11/21 1718   • sodium chloride 0.9 % flush 10 mL  10 mL Intravenous PRN Neena Beavers MD   10 mL at 08/11/21 1718        ALLERGIES:    Allergies   Allergen Reactions   • Sulfa Antibiotics Rash        Social History     Socioeconomic History   • Marital status: Significant Other   • Number of children: 0   Tobacco Use   • Smoking status:  Former Smoker     Packs/day: 1.00     Years: 10.00     Pack years: 10.00     Types: Cigarettes     Start date:      Quit date:      Years since quittin.1   • Smokeless tobacco: Never Used   Vaping Use   • Vaping Use: Never used   Substance and Sexual Activity   • Alcohol use: Yes     Comment: RARELY   • Drug use: No   • Sexual activity: Defer        Family History   Problem Relation Age of Onset   • Breast cancer Mother 53   • Pancreatic cancer Mother 68   • Lung cancer Maternal Grandmother    • Stroke Father    • Breast cancer Paternal Aunt 55   • Prostate cancer Maternal Grandfather    • Prostate cancer Paternal Grandfather    • Brain cancer Maternal Cousin 20   • Melanoma Maternal Uncle    • Ovarian cancer Other         Paternal great aunt    • Breast cancer Other    • Breast cancer Paternal Great-Grandmother 94   • Hypertension Brother    • Malig Hyperthermia Neg Hx       Family  history: Mother had breast cancer at age 57.  Maternal great aunt had breast cancer and paternal great aunt had breast cancer in her 40s.  Patient's mother had pancreatic cancer as well.  Paternal grandfather had prostate cancer.    Review of Systems   Constitutional: Positive for fatigue (Improved). Negative for appetite change, chills, diaphoresis, fever and unexpected weight change.   HENT: Negative for hearing loss, sore throat and trouble swallowing.    Respiratory: Negative for cough, chest tightness, shortness of breath and wheezing.    Cardiovascular: Negative for chest pain, palpitations and leg swelling.   Gastrointestinal: Negative for abdominal distention, abdominal pain, constipation, diarrhea and vomiting.   Genitourinary: Negative for dysuria, frequency, hematuria and urgency.   Musculoskeletal: Negative for joint swelling.        No muscle weakness.   Skin: Negative for rash and wound.   Neurological: Positive for numbness (Bilat hand-Improved). Negative for seizures, syncope, speech difficulty and weakness.  "  Hematological: Negative for adenopathy. Does not bruise/bleed easily.   Psychiatric/Behavioral: Positive for dysphoric mood (stable Improved) and sleep disturbance (Unchanged). Negative for behavioral problems, confusion and suicidal ideas.   All other systems reviewed and are negative.     I have reviewed and confirmed the accuracy of the ROS as documented by the MA/LPN/RN Neena Beavers MD    Objective     Vitals:    02/23/22 0935   BP: 127/83   Pulse: 97   Resp: 16   Temp: 97.3 °F (36.3 °C)   TempSrc: Temporal   SpO2: 98%   Weight: 68.2 kg (150 lb 6.4 oz)   Height: 157.5 cm (62.01\")   PainSc: 0-No pain     Current Status 2/23/2022   ECOG score 0   Physical exam      CONSTITUTIONAL:  Vital signs reviewed.  No distress, looks comfortable.  EYES:  Conjunctivae and lids unremarkable.  PERRLA  EARS,NOSE,MOUTH,THROAT:  Ears and nose appear unremarkable.  Lips, teeth, gums appear unremarkable.  RESPIRATORY:  Normal respiratory effort.  Lungs clear to auscultation bilaterally.  CARDIOVASCULAR:  Normal S1, S2.  No murmurs rubs or gallops.  No significant lower extremity edema.  GASTROINTESTINAL: Abdomen appears unremarkable.  Nontender.  No hepatomegaly.  No splenomegaly.  LYMPHATIC:  No cervical, supraclavicular, axillary lymphadenopathy.  SKIN:  Warm.  No rashes.  PSYCHIATRIC:  Normal judgment and insight.  Normal mood and affect.        RECENT LABS:  Results from last 7 days   Lab Units 02/23/22  0920   WBC 10*3/mm3 5.12   NEUTROS ABS 10*3/mm3 3.20   HEMOGLOBIN g/dL 13.0   HEMATOCRIT % 39.0   PLATELETS 10*3/mm3 248     Results from last 7 days   Lab Units 02/23/22  0920   SODIUM mmol/L 142   POTASSIUM mmol/L 4.4   CHLORIDE mmol/L 107   CO2 mmol/L 26.3   BUN mg/dL 16   CREATININE mg/dL 0.71   CALCIUM mg/dL 10.0   ALBUMIN g/dL 4.30   BILIRUBIN mg/dL 0.2   ALK PHOS U/L 98   ALT (SGPT) U/L 34*   AST (SGOT) U/L 24   GLUCOSE mg/dL 95         Assessment/Plan       * [id number], ER negative GA negative HER-2/ese 3+ with Ki-67 " of 50%.  S/p left partial mastectomy with sentinel lymph node biopsy.  Tumor was present at 5:00, invasive ductal carcinoma, Oziel score of 8, grade 3, greatest dimension was 12 mm x 8 x 4 mm.  Single focus of invasive carcinoma present.  There was extensive DCIS which is 30 mm x 8 x 2 mm, grade 3, no evidence of lymphovascular space invasion or dermal lymphatic invasion.  Margins were clear.  4 lymph nodes were negative.  It is a p T1cp N0, ER negative MD negative HER-2/ese 3+ with Ki-67 of 50%.  · Patient is s/p port placement  · Discussed in length with patient that given a small tumor she is eligible for weekly Taxol Herceptin.  Weekly Taxol x12 weeks along with weekly Herceptin followed by 1 year of Herceptin.  · Discussed patient in the breast cancer conference  · 2D echo done which showed ejection fraction of 61-65% and strain pattern of -20.8.  · Patient also seen by Dr. Virk cardiology and Dr. Virk is planning to repeat echocardiogram in 3 months after initiation of therapy.  · 8/4/2021 initiating weekly Taxol Herceptin  · 9/8/2021, proceed with week #6 Taxol and Herceptin.  Patient continues to tolerate treatment well. No signs of peripheral neuropathy.  · Her next echocardiogram due November 4, 2021, She follows up with cardiology Dr. Camron Virk.  · 9/28/2021, proceed with week #9 taxol/herceptin.  She does feel her nausea has worsened after her last cycle of chemotherapy.  She prefers the disintegrating Zofran, this will be called to her pharmacy today.  · Patient is here to take cycle 11 of weekly Taxol with worsening fatigue and worsening rash and overall dysphoric mood.  She also cannot sleep and her quality life is worsening.  At the present time we will plan to give 1 more cycle of Taxol following which she will be referred to radiation.    · Completed radiation November 2021.  · February 23, 2022: Reviewed echocardiogram with normal ejection fraction and strain pattern.  Patient seen by   Virk and okay to continue Herceptin    *  Factor V Leiden heterozygosity with right frontal stroke and patient presented in December 2020 with left-sided weakness tingling and numbness and also numbness in the face.  · Was referred to neurology and cardiology by Dr. Goodman  · Ultrasound of carotid does not show significant stenosis  · 24 Holter is negative  · 2D echocardiogram shows ejection fraction of 64% with mild mitral regurg and mitral stenosis  · Neurology obtain factor V Leiden given that patient's paternal aunt had's history of factor V Leiden and that was heterozygous for factor V Leiden  · Continue aspirin as per neurology.  Also patient on medications for her high cholesterol started after stroke currently asymptomatic  · Hypercoagulable work-up done which is negative except heterozygous state for factor V Leiden.  · Complete stroke work-up has been negative and since this is arterial stroke and she was not on aspirin prior to that and her symptoms have resolved I agree with continuing aspirin alone along with high cholesterol medications.  · MR venogram done on May 10, 2021 is negative.  Patient has been referred to the stroke neurologist Dr. Johnson  · Patient diagnosed with breast cancer with Mediport placed.  Per discussion with neurology, patient initiated on prophylactic Xarelto and aspirin discontinued.  · Patient had one nosebleed which lasted 10 minutes but with pinching the nose it helped.  But she is switching to 20 mg daily from tomorrow.  We discussed about placing ice and notifying us if her nosebleeds are significant.  But it resolved.  It was likely secondary to dry air  · 9/7/2021, patient continues to experience nosebleeds.  Reports these occur when her nose itches, and after scratching her nose it begins to bleed.  She feels this may be related to dry air, so I have asked her to start using saline nasal spray to help moisten her nose.  If she experiences nosebleeds that do not stop,  "I asked her to notify our office.  · Tolerating anticoagulation with Xarelto.  No bleeding issues with Xarelto  · Patient still has the port and hence will need to continue Xarelto    * Family history of cancer: Patient's mother had breast cancer at age 50 and pancreatic cancer at age 68 and  from pancreatic cancer.  Patient's maternal grandmother had breast cancer in her 50s.  Her maternal uncle had skin cancer which went to the lung and another maternal uncle had throat cancer.  Maternal aunt had call colon polyps.  Patient's paternal aunt had breast cancer in her 40s.  And paternal grandfather with colon cancer in his 80s.  Paternal aunt also had factor V Leiden.  · Genetic testing is negative    * Solid nodule in the right kidney on ultrasound, will schedule follow-up with urology  · Patient was to follow-up with Dr. Shultz  · Will need records from urology  · Will discuss with patient at her next appointment about follow-up with urology  · Patient was seen by Dr. Becerra and apparently no follow-up was needed for the nodules in the kidney.  · We will get records from Dr. Becerra's office    *  Elevated BMI, encourage diet and exercise.  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  Patient has been requiring frequent Tums.  Recommended she begin Pepcid 20 mg daily.    · Stable, continue Pepcid 20 mg daily.    *Constipation likely secondary to ferrous sulfate.  Patient was iron deficient.  · We will switch to IV Venofer.    *Headache likely related to body ache because of Taxol  · 2021, patient discusses concern about worsening headaches.  Describing them as \"glass breaking\".  Started about 2 weeks ago, and progressively worsened.  Occurring 3-4 times daily now, and lasting less than 1 minute with each episode.  This occurs in the same place, right upper skull.  Denies vision changes or dizziness.  Will order stat MRI of the brain for " further evaluation.  The patient follows with a neurologist, and is going to notify patient help with neuropathy as well of her symptoms and the plan for MRI.    *Elevated liver function tests, total bilirubin still normal AST ALT slightly elevated.  Patient did take Tylenol but not too much.  No dose adjustments required at the fingers time.  · Liver function tests normalized    *Iron deficiency anemia, patient's percent saturation still low at 9% s/p full dose of IV Venofer.  · 9/29/2021, patient will receive dose #5 Venofer today.  Hemoglobin today 10.1.  · 12/22/2021, hemoglobin today 12.7.    *Insomnia  · 9/29/2021, patient reports difficulty sleeping.  She is experiencing this every night, not just the nights of treatment when she receives IV dexamethasone.  She has attempted taking Benadryl, however felt groggy following this.  She is going to attempt melatonin to see if this improves her sleep.  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       I spent 40total minutes, face-to-face, caring for Radha today.  Greater than 50% of this time involved counseling and/or coordination of care as documented within this note.  Neena Beavers MD  02/23/22       Dr. Maxine Haynes

## 2022-02-23 NOTE — PROGRESS NOTES
Well, even skipping the doses, you're still a little too high. I went ahead and sent in a little lower dose and will still ask for you to take it just 5 days/week. I placed an order to repeat the thyroid labs the next time you see your oncologist AFTER a 6 week period. You'll need to remind them, as you did this time, to draw those. Take care!

## 2022-03-07 RX ORDER — FAMOTIDINE 20 MG/1
20 TABLET, FILM COATED ORAL DAILY
Qty: 30 TABLET | Refills: 3 | Status: SHIPPED | OUTPATIENT
Start: 2022-03-07 | End: 2022-06-06

## 2022-03-08 DIAGNOSIS — I10 ESSENTIAL HYPERTENSION: Chronic | ICD-10-CM

## 2022-03-08 RX ORDER — VALSARTAN 160 MG/1
160 TABLET ORAL DAILY
Qty: 30 TABLET | Refills: 5 | OUTPATIENT
Start: 2022-03-08

## 2022-03-09 DIAGNOSIS — I10 ESSENTIAL HYPERTENSION: Chronic | ICD-10-CM

## 2022-03-09 RX ORDER — VALSARTAN 160 MG/1
TABLET ORAL
Qty: 30 TABLET | Refills: 5 | OUTPATIENT
Start: 2022-03-09

## 2022-03-16 ENCOUNTER — INFUSION (OUTPATIENT)
Dept: ONCOLOGY | Facility: HOSPITAL | Age: 42
End: 2022-03-16

## 2022-03-16 ENCOUNTER — OFFICE VISIT (OUTPATIENT)
Dept: ONCOLOGY | Facility: CLINIC | Age: 42
End: 2022-03-16

## 2022-03-16 VITALS
SYSTOLIC BLOOD PRESSURE: 123 MMHG | DIASTOLIC BLOOD PRESSURE: 84 MMHG | HEIGHT: 62 IN | OXYGEN SATURATION: 100 % | TEMPERATURE: 97.1 F | BODY MASS INDEX: 27.57 KG/M2 | RESPIRATION RATE: 16 BRPM | HEART RATE: 90 BPM | WEIGHT: 149.8 LBS

## 2022-03-16 DIAGNOSIS — G47.00 INSOMNIA, UNSPECIFIED TYPE: ICD-10-CM

## 2022-03-16 DIAGNOSIS — D50.8 OTHER IRON DEFICIENCY ANEMIA: ICD-10-CM

## 2022-03-16 DIAGNOSIS — Z45.2 ENCOUNTER FOR ADJUSTMENT OR MANAGEMENT OF VASCULAR ACCESS DEVICE: ICD-10-CM

## 2022-03-16 DIAGNOSIS — C50.812 MALIGNANT NEOPLASM OF OVERLAPPING SITES OF LEFT BREAST IN FEMALE, ESTROGEN RECEPTOR NEGATIVE: Primary | ICD-10-CM

## 2022-03-16 DIAGNOSIS — Z17.1 MALIGNANT NEOPLASM OF OVERLAPPING SITES OF LEFT BREAST IN FEMALE, ESTROGEN RECEPTOR NEGATIVE: Primary | ICD-10-CM

## 2022-03-16 DIAGNOSIS — C50.812 MALIGNANT NEOPLASM OF OVERLAPPING SITES OF LEFT BREAST IN FEMALE, ESTROGEN RECEPTOR NEGATIVE: ICD-10-CM

## 2022-03-16 DIAGNOSIS — Z79.899 HIGH RISK MEDICATION USE: ICD-10-CM

## 2022-03-16 DIAGNOSIS — Z17.1 MALIGNANT NEOPLASM OF OVERLAPPING SITES OF LEFT BREAST IN FEMALE, ESTROGEN RECEPTOR NEGATIVE: ICD-10-CM

## 2022-03-16 LAB
BASOPHILS # BLD AUTO: 0.06 10*3/MM3 (ref 0–0.2)
BASOPHILS NFR BLD AUTO: 1 % (ref 0–1.5)
DEPRECATED RDW RBC AUTO: 38.4 FL (ref 37–54)
EOSINOPHIL # BLD AUTO: 0.17 10*3/MM3 (ref 0–0.4)
EOSINOPHIL NFR BLD AUTO: 2.8 % (ref 0.3–6.2)
ERYTHROCYTE [DISTWIDTH] IN BLOOD BY AUTOMATED COUNT: 12.3 % (ref 12.3–15.4)
HCT VFR BLD AUTO: 39.3 % (ref 34–46.6)
HGB BLD-MCNC: 12.9 G/DL (ref 12–15.9)
IMM GRANULOCYTES # BLD AUTO: 0.01 10*3/MM3 (ref 0–0.05)
IMM GRANULOCYTES NFR BLD AUTO: 0.2 % (ref 0–0.5)
LYMPHOCYTES # BLD AUTO: 1.58 10*3/MM3 (ref 0.7–3.1)
LYMPHOCYTES NFR BLD AUTO: 26.3 % (ref 19.6–45.3)
MCH RBC QN AUTO: 28.2 PG (ref 26.6–33)
MCHC RBC AUTO-ENTMCNC: 32.8 G/DL (ref 31.5–35.7)
MCV RBC AUTO: 85.8 FL (ref 79–97)
MONOCYTES # BLD AUTO: 0.59 10*3/MM3 (ref 0.1–0.9)
MONOCYTES NFR BLD AUTO: 9.8 % (ref 5–12)
NEUTROPHILS NFR BLD AUTO: 3.59 10*3/MM3 (ref 1.7–7)
NEUTROPHILS NFR BLD AUTO: 59.9 % (ref 42.7–76)
NRBC BLD AUTO-RTO: 0 /100 WBC (ref 0–0.2)
PLATELET # BLD AUTO: 250 10*3/MM3 (ref 140–450)
PMV BLD AUTO: 9.7 FL (ref 6–12)
RBC # BLD AUTO: 4.58 10*6/MM3 (ref 3.77–5.28)
WBC NRBC COR # BLD: 6 10*3/MM3 (ref 3.4–10.8)

## 2022-03-16 PROCEDURE — 96375 TX/PRO/DX INJ NEW DRUG ADDON: CPT

## 2022-03-16 PROCEDURE — 99214 OFFICE O/P EST MOD 30 MIN: CPT | Performed by: NURSE PRACTITIONER

## 2022-03-16 PROCEDURE — 25010000002 HEPARIN LOCK FLUSH PER 10 UNITS: Performed by: INTERNAL MEDICINE

## 2022-03-16 PROCEDURE — 85025 COMPLETE CBC W/AUTO DIFF WBC: CPT | Performed by: INTERNAL MEDICINE

## 2022-03-16 PROCEDURE — 25010000002 TRASTUZUMAB-ANNS 420 MG RECONSTITUTED SOLUTION 1 EACH VIAL: Performed by: NURSE PRACTITIONER

## 2022-03-16 PROCEDURE — 96413 CHEMO IV INFUSION 1 HR: CPT

## 2022-03-16 RX ORDER — SODIUM CHLORIDE 0.9 % (FLUSH) 0.9 %
10 SYRINGE (ML) INJECTION AS NEEDED
Status: DISCONTINUED | OUTPATIENT
Start: 2022-03-16 | End: 2022-03-16 | Stop reason: HOSPADM

## 2022-03-16 RX ORDER — FAMOTIDINE 10 MG/ML
20 INJECTION, SOLUTION INTRAVENOUS ONCE
Status: COMPLETED | OUTPATIENT
Start: 2022-03-16 | End: 2022-03-16

## 2022-03-16 RX ORDER — SODIUM CHLORIDE 0.9 % (FLUSH) 0.9 %
10 SYRINGE (ML) INJECTION AS NEEDED
Status: CANCELLED | OUTPATIENT
Start: 2022-03-16

## 2022-03-16 RX ORDER — SODIUM CHLORIDE 9 MG/ML
250 INJECTION, SOLUTION INTRAVENOUS ONCE
Status: CANCELLED | OUTPATIENT
Start: 2022-03-16

## 2022-03-16 RX ORDER — HEPARIN SODIUM (PORCINE) LOCK FLUSH IV SOLN 100 UNIT/ML 100 UNIT/ML
500 SOLUTION INTRAVENOUS AS NEEDED
Status: CANCELLED | OUTPATIENT
Start: 2022-03-16

## 2022-03-16 RX ORDER — FAMOTIDINE 10 MG/ML
20 INJECTION, SOLUTION INTRAVENOUS ONCE
Status: CANCELLED | OUTPATIENT
Start: 2022-03-16

## 2022-03-16 RX ORDER — HEPARIN SODIUM (PORCINE) LOCK FLUSH IV SOLN 100 UNIT/ML 100 UNIT/ML
500 SOLUTION INTRAVENOUS AS NEEDED
Status: DISCONTINUED | OUTPATIENT
Start: 2022-03-16 | End: 2022-03-16 | Stop reason: HOSPADM

## 2022-03-16 RX ORDER — SODIUM CHLORIDE 9 MG/ML
250 INJECTION, SOLUTION INTRAVENOUS ONCE
Status: COMPLETED | OUTPATIENT
Start: 2022-03-16 | End: 2022-03-16

## 2022-03-16 RX ADMIN — SODIUM CHLORIDE 250 ML: 9 INJECTION, SOLUTION INTRAVENOUS at 08:45

## 2022-03-16 RX ADMIN — FAMOTIDINE 20 MG: 10 INJECTION INTRAVENOUS at 08:55

## 2022-03-16 RX ADMIN — Medication 500 UNITS: at 09:59

## 2022-03-16 RX ADMIN — TRASTUZUMAB-ANNS 390 MG: 420 INJECTION, POWDER, LYOPHILIZED, FOR SOLUTION INTRAVENOUS at 09:19

## 2022-03-16 RX ADMIN — Medication 10 ML: at 09:59

## 2022-03-28 DIAGNOSIS — F90.0 ATTENTION DEFICIT HYPERACTIVITY DISORDER (ADHD), PREDOMINANTLY INATTENTIVE TYPE: Primary | ICD-10-CM

## 2022-03-28 RX ORDER — METHYLPHENIDATE HYDROCHLORIDE 36 MG/1
36 TABLET ORAL EVERY MORNING
Qty: 30 TABLET | Refills: 0 | Status: SHIPPED | OUTPATIENT
Start: 2022-03-28 | End: 2022-05-03 | Stop reason: SDUPTHER

## 2022-04-04 ENCOUNTER — HOSPITAL ENCOUNTER (OUTPATIENT)
Dept: MAMMOGRAPHY | Facility: HOSPITAL | Age: 42
Discharge: HOME | End: 2022-04-04
Admitting: SURGERY

## 2022-04-04 DIAGNOSIS — Z12.31 ENCOUNTER FOR SCREENING MAMMOGRAM FOR MALIGNANT NEOPLASM OF BREAST: ICD-10-CM

## 2022-04-04 PROCEDURE — 77063 BREAST TOMOSYNTHESIS BI: CPT

## 2022-04-04 PROCEDURE — 77067 SCR MAMMO BI INCL CAD: CPT

## 2022-04-06 ENCOUNTER — APPOINTMENT (OUTPATIENT)
Dept: ONCOLOGY | Facility: HOSPITAL | Age: 42
End: 2022-04-06

## 2022-04-06 ENCOUNTER — INFUSION (OUTPATIENT)
Dept: ONCOLOGY | Facility: HOSPITAL | Age: 42
End: 2022-04-06

## 2022-04-06 VITALS
HEART RATE: 99 BPM | WEIGHT: 153 LBS | OXYGEN SATURATION: 100 % | DIASTOLIC BLOOD PRESSURE: 88 MMHG | TEMPERATURE: 97.3 F | SYSTOLIC BLOOD PRESSURE: 126 MMHG | BODY MASS INDEX: 28.16 KG/M2

## 2022-04-06 DIAGNOSIS — Z45.2 ENCOUNTER FOR ADJUSTMENT OR MANAGEMENT OF VASCULAR ACCESS DEVICE: ICD-10-CM

## 2022-04-06 DIAGNOSIS — C50.812 MALIGNANT NEOPLASM OF OVERLAPPING SITES OF LEFT BREAST IN FEMALE, ESTROGEN RECEPTOR NEGATIVE: Primary | ICD-10-CM

## 2022-04-06 DIAGNOSIS — Z17.1 MALIGNANT NEOPLASM OF OVERLAPPING SITES OF LEFT BREAST IN FEMALE, ESTROGEN RECEPTOR NEGATIVE: Primary | ICD-10-CM

## 2022-04-06 LAB
BASOPHILS # BLD AUTO: 0.06 10*3/MM3 (ref 0–0.2)
BASOPHILS NFR BLD AUTO: 0.9 % (ref 0–1.5)
DEPRECATED RDW RBC AUTO: 38.2 FL (ref 37–54)
EOSINOPHIL # BLD AUTO: 0.15 10*3/MM3 (ref 0–0.4)
EOSINOPHIL NFR BLD AUTO: 2.2 % (ref 0.3–6.2)
ERYTHROCYTE [DISTWIDTH] IN BLOOD BY AUTOMATED COUNT: 12 % (ref 12.3–15.4)
HCT VFR BLD AUTO: 37.7 % (ref 34–46.6)
HGB BLD-MCNC: 12.7 G/DL (ref 12–15.9)
IMM GRANULOCYTES # BLD AUTO: 0.02 10*3/MM3 (ref 0–0.05)
IMM GRANULOCYTES NFR BLD AUTO: 0.3 % (ref 0–0.5)
LYMPHOCYTES # BLD AUTO: 1.69 10*3/MM3 (ref 0.7–3.1)
LYMPHOCYTES NFR BLD AUTO: 24.4 % (ref 19.6–45.3)
MCH RBC QN AUTO: 28.9 PG (ref 26.6–33)
MCHC RBC AUTO-ENTMCNC: 33.7 G/DL (ref 31.5–35.7)
MCV RBC AUTO: 85.9 FL (ref 79–97)
MONOCYTES # BLD AUTO: 0.66 10*3/MM3 (ref 0.1–0.9)
MONOCYTES NFR BLD AUTO: 9.5 % (ref 5–12)
NEUTROPHILS NFR BLD AUTO: 4.35 10*3/MM3 (ref 1.7–7)
NEUTROPHILS NFR BLD AUTO: 62.7 % (ref 42.7–76)
NRBC BLD AUTO-RTO: 0 /100 WBC (ref 0–0.2)
PLATELET # BLD AUTO: 256 10*3/MM3 (ref 140–450)
PMV BLD AUTO: 9.3 FL (ref 6–12)
RBC # BLD AUTO: 4.39 10*6/MM3 (ref 3.77–5.28)
WBC NRBC COR # BLD: 6.93 10*3/MM3 (ref 3.4–10.8)

## 2022-04-06 PROCEDURE — 25010000002 TRASTUZUMAB-ANNS 420 MG RECONSTITUTED SOLUTION 1 EACH VIAL: Performed by: NURSE PRACTITIONER

## 2022-04-06 PROCEDURE — 96413 CHEMO IV INFUSION 1 HR: CPT

## 2022-04-06 PROCEDURE — 85025 COMPLETE CBC W/AUTO DIFF WBC: CPT | Performed by: INTERNAL MEDICINE

## 2022-04-06 PROCEDURE — 25010000002 HEPARIN LOCK FLUSH PER 10 UNITS: Performed by: INTERNAL MEDICINE

## 2022-04-06 PROCEDURE — 96372 THER/PROPH/DIAG INJ SC/IM: CPT

## 2022-04-06 PROCEDURE — 96375 TX/PRO/DX INJ NEW DRUG ADDON: CPT

## 2022-04-06 RX ORDER — SODIUM CHLORIDE 0.9 % (FLUSH) 0.9 %
10 SYRINGE (ML) INJECTION AS NEEDED
Status: CANCELLED | OUTPATIENT
Start: 2022-04-06

## 2022-04-06 RX ORDER — FAMOTIDINE 10 MG/ML
20 INJECTION, SOLUTION INTRAVENOUS ONCE
Status: COMPLETED | OUTPATIENT
Start: 2022-04-06 | End: 2022-04-06

## 2022-04-06 RX ORDER — HEPARIN SODIUM (PORCINE) LOCK FLUSH IV SOLN 100 UNIT/ML 100 UNIT/ML
500 SOLUTION INTRAVENOUS AS NEEDED
Status: CANCELLED | OUTPATIENT
Start: 2022-04-06

## 2022-04-06 RX ORDER — HEPARIN SODIUM (PORCINE) LOCK FLUSH IV SOLN 100 UNIT/ML 100 UNIT/ML
500 SOLUTION INTRAVENOUS AS NEEDED
Status: DISCONTINUED | OUTPATIENT
Start: 2022-04-06 | End: 2022-04-06 | Stop reason: HOSPADM

## 2022-04-06 RX ORDER — SODIUM CHLORIDE 0.9 % (FLUSH) 0.9 %
10 SYRINGE (ML) INJECTION AS NEEDED
Status: DISCONTINUED | OUTPATIENT
Start: 2022-04-06 | End: 2022-04-06 | Stop reason: HOSPADM

## 2022-04-06 RX ORDER — SODIUM CHLORIDE 9 MG/ML
250 INJECTION, SOLUTION INTRAVENOUS ONCE
Status: COMPLETED | OUTPATIENT
Start: 2022-04-06 | End: 2022-04-06

## 2022-04-06 RX ADMIN — Medication 10 ML: at 10:48

## 2022-04-06 RX ADMIN — Medication 500 UNITS: at 10:48

## 2022-04-06 RX ADMIN — TRASTUZUMAB-ANNS 390 MG: 420 INJECTION, POWDER, LYOPHILIZED, FOR SOLUTION INTRAVENOUS at 10:10

## 2022-04-06 RX ADMIN — SODIUM CHLORIDE 250 ML: 9 INJECTION, SOLUTION INTRAVENOUS at 10:10

## 2022-04-06 RX ADMIN — FAMOTIDINE 20 MG: 10 INJECTION INTRAVENOUS at 09:26

## 2022-04-06 NOTE — NURSING NOTE
Pt arrived for Herceptin. Labs reviewed ok to treat. Pt gained wt and dose appropriate for pt per Dr. Beavers.

## 2022-04-18 ENCOUNTER — PATIENT MESSAGE (OUTPATIENT)
Dept: OTHER | Facility: HOSPITAL | Age: 42
End: 2022-04-18

## 2022-04-18 DIAGNOSIS — Z80.3 FAMILY HISTORY OF BREAST CANCER: ICD-10-CM

## 2022-04-18 DIAGNOSIS — G31.84 MILD COGNITIVE IMPAIRMENT: Primary | ICD-10-CM

## 2022-04-19 ENCOUNTER — TELEPHONE (OUTPATIENT)
Dept: SURGERY | Facility: CLINIC | Age: 42
End: 2022-04-19

## 2022-04-19 NOTE — PROGRESS NOTES
Please let patient know that imaging from 4/4/22 is stable, I will see her and discuss further at her appointment 7/11/22 with me. thanks

## 2022-04-19 NOTE — TELEPHONE ENCOUNTER
Patient is aware.       ----- Message from CLAUDINE Benitez sent at 4/19/2022  6:15 AM EDT -----  Please let patient know that imaging from 4/4/22 is stable, I will see her and discuss further at her appointment 7/11/22 with me. thanks

## 2022-04-27 ENCOUNTER — OFFICE VISIT (OUTPATIENT)
Dept: ONCOLOGY | Facility: CLINIC | Age: 42
End: 2022-04-27

## 2022-04-27 ENCOUNTER — INFUSION (OUTPATIENT)
Dept: ONCOLOGY | Facility: HOSPITAL | Age: 42
End: 2022-04-27

## 2022-04-27 ENCOUNTER — TELEPHONE (OUTPATIENT)
Dept: ONCOLOGY | Facility: CLINIC | Age: 42
End: 2022-04-27

## 2022-04-27 VITALS
HEART RATE: 89 BPM | WEIGHT: 153.5 LBS | BODY MASS INDEX: 28.25 KG/M2 | TEMPERATURE: 97.5 F | OXYGEN SATURATION: 98 % | HEIGHT: 62 IN | SYSTOLIC BLOOD PRESSURE: 125 MMHG | RESPIRATION RATE: 16 BRPM | DIASTOLIC BLOOD PRESSURE: 83 MMHG

## 2022-04-27 DIAGNOSIS — C50.812 MALIGNANT NEOPLASM OF OVERLAPPING SITES OF LEFT BREAST IN FEMALE, ESTROGEN RECEPTOR NEGATIVE: Primary | ICD-10-CM

## 2022-04-27 DIAGNOSIS — C50.812 MALIGNANT NEOPLASM OF OVERLAPPING SITES OF LEFT BREAST IN FEMALE, ESTROGEN RECEPTOR NEGATIVE: ICD-10-CM

## 2022-04-27 DIAGNOSIS — Z17.1 MALIGNANT NEOPLASM OF OVERLAPPING SITES OF LEFT BREAST IN FEMALE, ESTROGEN RECEPTOR NEGATIVE: ICD-10-CM

## 2022-04-27 DIAGNOSIS — Z17.1 MALIGNANT NEOPLASM OF OVERLAPPING SITES OF LEFT BREAST IN FEMALE, ESTROGEN RECEPTOR NEGATIVE: Primary | ICD-10-CM

## 2022-04-27 DIAGNOSIS — Z45.2 ENCOUNTER FOR ADJUSTMENT OR MANAGEMENT OF VASCULAR ACCESS DEVICE: ICD-10-CM

## 2022-04-27 LAB
ALBUMIN SERPL-MCNC: 4.5 G/DL (ref 3.5–5.2)
ALBUMIN/GLOB SERPL: 2.1 G/DL
ALP SERPL-CCNC: 101 U/L (ref 39–117)
ALT SERPL W P-5'-P-CCNC: 30 U/L (ref 1–33)
ANION GAP SERPL CALCULATED.3IONS-SCNC: 8.5 MMOL/L (ref 5–15)
AST SERPL-CCNC: 27 U/L (ref 1–32)
BASOPHILS # BLD AUTO: 0.07 10*3/MM3 (ref 0–0.2)
BASOPHILS NFR BLD AUTO: 1.1 % (ref 0–1.5)
BILIRUB SERPL-MCNC: 0.3 MG/DL (ref 0–1.2)
BUN SERPL-MCNC: 17 MG/DL (ref 6–20)
BUN/CREAT SERPL: 23.9 (ref 7–25)
CALCIUM SPEC-SCNC: 9.4 MG/DL (ref 8.6–10.5)
CHLORIDE SERPL-SCNC: 106 MMOL/L (ref 98–107)
CO2 SERPL-SCNC: 26.5 MMOL/L (ref 22–29)
CREAT SERPL-MCNC: 0.71 MG/DL (ref 0.57–1)
DEPRECATED RDW RBC AUTO: 39.6 FL (ref 37–54)
EGFRCR SERPLBLD CKD-EPI 2021: 107.7 ML/MIN/1.73
EOSINOPHIL # BLD AUTO: 0.16 10*3/MM3 (ref 0–0.4)
EOSINOPHIL NFR BLD AUTO: 2.6 % (ref 0.3–6.2)
ERYTHROCYTE [DISTWIDTH] IN BLOOD BY AUTOMATED COUNT: 12.2 % (ref 12.3–15.4)
FERRITIN SERPL-MCNC: 465.7 NG/ML (ref 13–150)
GLOBULIN UR ELPH-MCNC: 2.1 GM/DL
GLUCOSE SERPL-MCNC: 126 MG/DL (ref 65–99)
HCT VFR BLD AUTO: 38.2 % (ref 34–46.6)
HGB BLD-MCNC: 12.4 G/DL (ref 12–15.9)
IMM GRANULOCYTES # BLD AUTO: 0.01 10*3/MM3 (ref 0–0.05)
IMM GRANULOCYTES NFR BLD AUTO: 0.2 % (ref 0–0.5)
IRON 24H UR-MRATE: 76 MCG/DL (ref 37–145)
IRON SATN MFR SERPL: 20 % (ref 20–50)
LYMPHOCYTES # BLD AUTO: 1.31 10*3/MM3 (ref 0.7–3.1)
LYMPHOCYTES NFR BLD AUTO: 21.3 % (ref 19.6–45.3)
MCH RBC QN AUTO: 28.3 PG (ref 26.6–33)
MCHC RBC AUTO-ENTMCNC: 32.5 G/DL (ref 31.5–35.7)
MCV RBC AUTO: 87.2 FL (ref 79–97)
MONOCYTES # BLD AUTO: 0.62 10*3/MM3 (ref 0.1–0.9)
MONOCYTES NFR BLD AUTO: 10.1 % (ref 5–12)
NEUTROPHILS NFR BLD AUTO: 3.98 10*3/MM3 (ref 1.7–7)
NEUTROPHILS NFR BLD AUTO: 64.7 % (ref 42.7–76)
NRBC BLD AUTO-RTO: 0 /100 WBC (ref 0–0.2)
PLATELET # BLD AUTO: 251 10*3/MM3 (ref 140–450)
PMV BLD AUTO: 9.8 FL (ref 6–12)
POTASSIUM SERPL-SCNC: 4.1 MMOL/L (ref 3.5–5.2)
PROT SERPL-MCNC: 6.6 G/DL (ref 6–8.5)
RBC # BLD AUTO: 4.38 10*6/MM3 (ref 3.77–5.28)
SODIUM SERPL-SCNC: 141 MMOL/L (ref 136–145)
TIBC SERPL-MCNC: 373 MCG/DL (ref 298–536)
TRANSFERRIN SERPL-MCNC: 250 MG/DL (ref 200–360)
VIT B12 BLD-MCNC: 253 PG/ML (ref 211–946)
WBC NRBC COR # BLD: 6.15 10*3/MM3 (ref 3.4–10.8)

## 2022-04-27 PROCEDURE — 96375 TX/PRO/DX INJ NEW DRUG ADDON: CPT

## 2022-04-27 PROCEDURE — 82607 VITAMIN B-12: CPT

## 2022-04-27 PROCEDURE — 80053 COMPREHEN METABOLIC PANEL: CPT | Performed by: INTERNAL MEDICINE

## 2022-04-27 PROCEDURE — 83540 ASSAY OF IRON: CPT | Performed by: INTERNAL MEDICINE

## 2022-04-27 PROCEDURE — 85025 COMPLETE CBC W/AUTO DIFF WBC: CPT | Performed by: INTERNAL MEDICINE

## 2022-04-27 PROCEDURE — 96413 CHEMO IV INFUSION 1 HR: CPT

## 2022-04-27 PROCEDURE — 82728 ASSAY OF FERRITIN: CPT | Performed by: INTERNAL MEDICINE

## 2022-04-27 PROCEDURE — 84466 ASSAY OF TRANSFERRIN: CPT | Performed by: INTERNAL MEDICINE

## 2022-04-27 PROCEDURE — 25010000002 TRASTUZUMAB-ANNS 420 MG RECONSTITUTED SOLUTION 1 EACH VIAL: Performed by: INTERNAL MEDICINE

## 2022-04-27 PROCEDURE — 25010000002 HEPARIN LOCK FLUSH PER 10 UNITS: Performed by: INTERNAL MEDICINE

## 2022-04-27 PROCEDURE — 99214 OFFICE O/P EST MOD 30 MIN: CPT | Performed by: INTERNAL MEDICINE

## 2022-04-27 RX ORDER — SODIUM CHLORIDE 0.9 % (FLUSH) 0.9 %
10 SYRINGE (ML) INJECTION AS NEEDED
Status: DISCONTINUED | OUTPATIENT
Start: 2022-04-27 | End: 2022-04-27 | Stop reason: HOSPADM

## 2022-04-27 RX ORDER — FAMOTIDINE 10 MG/ML
20 INJECTION, SOLUTION INTRAVENOUS ONCE
Status: COMPLETED | OUTPATIENT
Start: 2022-04-27 | End: 2022-04-27

## 2022-04-27 RX ORDER — HEPARIN SODIUM (PORCINE) LOCK FLUSH IV SOLN 100 UNIT/ML 100 UNIT/ML
500 SOLUTION INTRAVENOUS AS NEEDED
Status: DISCONTINUED | OUTPATIENT
Start: 2022-04-27 | End: 2022-04-27 | Stop reason: HOSPADM

## 2022-04-27 RX ORDER — SODIUM CHLORIDE 9 MG/ML
250 INJECTION, SOLUTION INTRAVENOUS ONCE
Status: CANCELLED | OUTPATIENT
Start: 2022-04-27

## 2022-04-27 RX ORDER — LANOLIN ALCOHOL/MO/W.PET/CERES
1000 CREAM (GRAM) TOPICAL DAILY
Qty: 30 TABLET | Refills: 2 | Status: SHIPPED | OUTPATIENT
Start: 2022-04-27 | End: 2022-08-01

## 2022-04-27 RX ORDER — HEPARIN SODIUM (PORCINE) LOCK FLUSH IV SOLN 100 UNIT/ML 100 UNIT/ML
500 SOLUTION INTRAVENOUS AS NEEDED
Status: CANCELLED | OUTPATIENT
Start: 2022-04-27

## 2022-04-27 RX ORDER — SODIUM CHLORIDE 0.9 % (FLUSH) 0.9 %
10 SYRINGE (ML) INJECTION AS NEEDED
Status: CANCELLED | OUTPATIENT
Start: 2022-04-27

## 2022-04-27 RX ORDER — FAMOTIDINE 10 MG/ML
20 INJECTION, SOLUTION INTRAVENOUS ONCE
Status: CANCELLED | OUTPATIENT
Start: 2022-04-27

## 2022-04-27 RX ORDER — SODIUM CHLORIDE 9 MG/ML
250 INJECTION, SOLUTION INTRAVENOUS ONCE
Status: COMPLETED | OUTPATIENT
Start: 2022-04-27 | End: 2022-04-27

## 2022-04-27 RX ADMIN — TRASTUZUMAB-ANNS 390 MG: 420 INJECTION, POWDER, LYOPHILIZED, FOR SOLUTION INTRAVENOUS at 10:21

## 2022-04-27 RX ADMIN — FAMOTIDINE 20 MG: 10 INJECTION INTRAVENOUS at 09:59

## 2022-04-27 RX ADMIN — SODIUM CHLORIDE, PRESERVATIVE FREE 500 UNITS: 5 INJECTION INTRAVENOUS at 11:09

## 2022-04-27 RX ADMIN — Medication 10 ML: at 11:09

## 2022-04-27 RX ADMIN — SODIUM CHLORIDE 250 ML: 9 INJECTION, SOLUTION INTRAVENOUS at 09:58

## 2022-04-27 NOTE — PROGRESS NOTES
Subjective     REASON FOR follow-up:    1.  Heterozygous state for factor V Leiden    2.  New onset stroke on aspirin by neurology    3.  Hypercholesterolemia    4.  Hypercoagulable work-up done.  Antithrombin 109% protein S activity 85% protein S antigen 107%, protein C activity 85%.  Anticardiolipin antibody IgM 24%, antibeta-2 glycoprotein antibody negative, lupus anticoagulant not detected, prothrombin gene mutation negative.    5.  Screening mammogram showed abnormality in the left breast 6 o'clock position, 8 mm on ultrasound, ultrasound-guided left breast biopsy, 6 cm from the nipple showed evidence of invasive ductal carcinoma poorly differentiated grade 3, [place] score of 8 out of 9 ER negative, TN negative, HER-2/ese 3+ positive.    6.  CT scan February 24, 2021 showed focal area of fatty infiltration of the liver.  1 cm hypoattenuating cortical lesion in the upper pole of the right kidney.  Similarly nodule in the upper pole of the left kidney is 1.5 cm.  Further evaluation by ultrasound suggested  · Ultrasound March 1, 2021 shows solid lesion in the upper pole of the right kidney.  In the left kidney along the midpole of the left kidney is a nodule which is 16 x 16 x 14 mm which is thought to be a cyst.  A 6-month follow-up CT suggested    6.  S/p left partial mastectomy with sentinel lymph node biopsy.  Tumor was present at 5:00, invasive ductal carcinoma, [place] score of 8, grade 3, greatest dimension was 12 mm x 8 x 4 mm.  Single focus of invasive carcinoma present.  There was extensive DCIS which is 30 mm x 8 x 2 mm, grade 3, no evidence of lymphovascular space invasion or dermal lymphatic invasion.  Margins were clear.  4 lymph nodes were negative.  It is a p T1cp N0, ER negative TN negative HER-2/ese 3+ with Ki-67 of 50%.  · [employer] genetic test negative for 47 genes    7.recently initiated Xarelto at loading dose of 15 mg twice a day x3 weeks to then be switched to 20 mg daily per  protocol.  She is doing this because of Mediport in place and known factor V Leiden heterozygosity.        History of Present Illness     Patient is a 42-year-old female with T1CN0 ER negative AL negative HER2 positive breast cancer status postchemotherapy and currently on Herceptin every 3 weeks.  She is tolerating well.  She will complete her treatment in June 18, 2022.  She is getting an echocardiogram every 3 months.  Her ejection fraction was 57% and has been followed by Dr. Virk.    She is scheduled for echocardiogram 11/2022.  She denies any chest pain shortness of breath or cough.            Oncologic history:  Patient is here for follow-up of her mammogram.  Patient had screening mammogram April 2, 2021:  NAD a focal asymmetry was present in the posterior one third retroareolar region of the left breast.  Further spot compression images and left breast ultrasound was suggested.  Right breast was negative    April 9, 2021: Left diagnostic mammogram showed an area of focal asymmetry in the posterior one third region aspect of the left breast her breast    Ultrasound showed an irregular 0.8 cm lesion in the left breast at the 6 o'clock position of the order of 6 cm from the nipple.  Ultrasound-guided left breast biopsy was recommended.    April 26, 2021: Ultrasound-guided biopsy of the left breast 6 o'clock position, 6 cm from the nipple showed    Invasive ductal carcinoma, poorly differentiated with a [place] score grade 3 with a score of 8 out of 9 measuring at least 7 mm.  No definitive ductal carcinoma is in situ is identified.  ER 1% negative  AL 1% negative   HER-2/ese 3+ positive    There was no evidence of lymphadenopathy either in the mammogram of the ultrasound.  We suggested an MRI of the breast.  Patient has strong family history of breast cancer      May 7, 2021: MRI of bilateral breast reviewed.  My interpretation is that there is area of enhancement in the 6 o'clock position of the left  breast this is the biopsy-proven malignancy.  There is additional area of linear enhancement anteriorly and laterally measuring up to 1.2 cm this is approximately 5.6 cm from the nipple.  In addition there is a enhancement 2 to 3 mm in the anterolateral aspect of the lateral aspect of the left breast which is 4.6 cm from the nipple.  There is also mildly prominent posterior lymph node in the mid axilla which measures 1 cm with cortical thickening.  Findings are consistent with multifocal disease.  No contralateral disease or internal mammary adenopathy identified    May 20, 2021: Genetic test, [employer] genetic test shows 47 genes negative    May 21, 2021: I reviewed the biopsy of the lymph node in the left axilla which shows reactive lymph node negative for any carcinoma or lymphoma    June 2, 2021:Final Diagnosis  1. Left Breast, 5:00 o'clock, MRI-guided Biopsies for Linear Enhancement: INVASIVE MAMMARY CARCINOMA, NO  SPECIAL TYPE (INVASIVE DUCTAL CARCINOMA).  A. Largest contiguous focus in a core measures 4 mm.  B. No in-situ component identified.  C. Brisk lymphocytic response noted (see Comment).  D. No definitive lymphovascular nor perineural invasion identified.  2. Left Breast, 2:00 o'clock, MRI-guided Biopsy for Enhancement:  A. Benign breast parenchyma with radial scar and micropapillomas.  B. Usual ductal hyperplasia and columnar cell hyperplasia.  C. No atypical hyperplasia, in-situ nor invasive carcinoma identified    ER, OK, HER-2 new and Ki-67 pending on this new biopsy      Patient has been seen by Dr. Lashonda Euceda with plans of doing surgery on July first 2021    Once patient undergoes surgery lumpectomy with sentinel lymph node biopsy we will give further recommendation about treatment options.  Given that patient has multifocal disease and both of the lesions 2 lesions are 1.2 cm each, with it being a HER-2 positive tumor on the previous biopsy at 6:00 Dr. Euceda and myself discussed and patient  preferred to undergo port placement at the same time of surgery.    Patient had [employer] genetic test which was negative.    She has completed surgery July 1, 2021.  She underwent left partial mastectomy with left axillary sentinel lymph node biopsy and right port placement.  Clinically she was thought to have a high-grade multifocal invasive ductal carcinoma ER/TX negative, TX positive.  The lesions require preoperative localization.  The multifocal cancer was bracketed with 2 savvy markers and the radial scar was localized with a needle.  Because of the volume of tissue that will be excised related to the breast volume the case was done in conjunction with Dr. Zavala for oncoplastic closure.    She is 2 weeks from surgery.  She is healing up reasonably well.    On review of her pathology she had left partial mastectomy with sentinel lymph node biopsy.  Tumor was present at 5:00, invasive ductal carcinoma, [place] score of 8, grade 3, greatest dimension was 12 mm x 8 x 4 mm.  Single focus of invasive carcinoma present.  There was extensive DCIS which is 30 mm x 8 x 2 mm, grade 3, no evidence of lymphovascular space invasion or dermal lymphatic invasion.  Margins were clear.  4 lymph nodes were negative.  It is a p T1cp N0, ER negative TX negative HER-2/ese 3+ with Ki-67 of 50%.    Patient is here to discuss further options of treatment.  Given small tumor T1c N0, she is a good candidate for weekly Taxol Herceptin.    Given that patient has heterozygous state for factor V Leiden and currently has port placed we will plan to start Eliquis 2.5 mg p.o. twice daily.  I have left a message for Dr. Craft in neurology to call me to discuss if patient needs to continue aspirin or we can hold off since be starting Eliquis.      Genetic test negative    August 4, 2021: Weekly Taxol Herceptin x12 weeks followed by Herceptin x1 year.  Cycle 1 today  Cycle 12 Taxol Herceptin October 10, 2021      Hematologic  history:  patient is a 40-year-old female who presented end of December with numbness and tingling in her left upper extremity and left face.  She went to see Dr. Goodman who then got concerned and referred to neurology.  She was referred to Dr. Freedman and talk to Dr. Thompson neurology for evaluation.  Patient underwent ultrasound of the carotids which did not show significant stenosis of the carotids.  She underwent 2D echocardiogram which showed a good ejection fraction of 64% with mild mitral valve prolapse and mild mitral stenosis.  She had a 24-hour Holter which was negative.  She then had also an MRI of the brain February 3, 2021 which showed areas of hyperintensity involving the subcortical white matter of the right frontal lobe superior laterally and posteriorly with the largest area measuring 8 9 mm.  There is also enhancement present.  The findings are consistent with a subacute infarct.  They could not differentiate if there is any underlying neoplastic process but a short-term follow-up was suggested in order to follow-up.  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    Patient currently got started on aspirin and factor V Leiden was obtained by neurology because patient's paternal aunt had factor V Leiden mutation and she was heterozygous.  Patient's testing also showed that she was heterozygous.    Patient states her numbness and tingling is resolved completely on the left arm and face it just lasted for a 24 hours.  She was here referred here for neurology to see if there is any other cause for her underlying hypercoagulable state.  She has not had a complete hypercoagulable work-up.  Also discussed with her that an underlying malignancy could cause a hypercoagulable state and we would need to do a CT scan to rule that out.    Patient's mother has had breast cancer in her 50s but had pancreatic cancer at  68 and  from that.  Patient's dad had stroke at 63.  But he is alive at 74.  She has 1 brother who is 40 in good health.  Paternal aunt had breast cancer in her 40s.  Paternal grandfather had colon cancer in his late 80s.  Maternal uncle had throat cancer and another maternal uncle had skin cancer with metastasis to the lung.  And maternal grandmother had breast cancer.    Patient was to have mammogram done last year but because of COVID-19 it got postponed and she has not had it done yet.    Patient used to be a smoker half pack per day for 7 years but quit 10 years ago.  She has high cholesterol for which she is on treatment.    Past Medical History:   Diagnosis Date   • Acute stress reaction 2014   • ADD (attention deficit disorder)    • ADHD (attention deficit hyperactivity disorder)    • Anxiety and depression    • CTS (carpal tunnel syndrome)    • Factor 5 Leiden mutation, heterozygous ([facility]) 2021   • Family history of breast cancer 2013   • Fracture, cervical vertebra ([facility]) 1997    C4   • H/O complete eye exam 2016   • Hearing loss    • 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   • History of rheumatic fever    • Hypertension    • Hypothyroidism    • Infiltrating ductal carcinoma of left breast ([facility]) 2021    Left   • Kidney stone    • Mitral valve insufficiency    • PONV (postoperative nausea and vomiting)    • Stroke ([facility]) 2020    HX OF   • Stroke-like symptoms         Past Surgical History:   Procedure Laterality Date   • BREAST BIOPSY Left 2021    IDC   • BREAST LUMPECTOMY     • BREAST LUMPECTOMY WITH SENTINEL NODE BIOPSY N/A 2021    Procedure: right port placement, Left SHAINA-guided, bracketed, partial mastectomy and sentinel lymph node biopsy Left breast needle-localized excisional biopsy;  Surgeon: Lashonda Euceda MD;  Location: [facility];  Service: General;  Laterality: N/A;   • BREAST SURGERY Bilateral 2021    Procedure: RIGHT  BREAST REDUCTION MASTOPEXY LEFT BREAST ONCOPLASTIC CLOSURE;  Surgeon: Caridad Baker MD PhD;  Location: [facility] MAIN OR;  Service: Plastics;  Laterality: Bilateral;   • NO PAST SURGERIES     • PAP SMEAR  2016        Current Outpatient Medications on File Prior to Visit   Medication Sig Dispense Refill   • atorvastatin (Lipitor) 10 MG tablet Take 1 tablet by mouth Daily. 30 tablet 11   • famotidine (PEPCID) 20 MG tablet TAKE 1 TABLET BY MOUTH DAILY 30 tablet 3   • levothyroxine (Synthroid) 125 MCG tablet Take 1 tablet by mouth Daily. 90 tablet 1   • lidocaine-prilocaine (EMLA) 2.5-2.5 % cream Apply  topically to the appropriate area as directed As Needed for Mild Pain . 1 each 2   • LORazepam (ATIVAN) 0.5 MG tablet Take 1 tablet by mouth Every 8 (Eight) Hours As Needed for Anxiety. for anxiety 30 tablet 2   • methylphenidate (Concerta) 36 MG CR tablet Take 1 tablet by mouth Every Morning 30 tablet 0   • ondansetron ODT (ZOFRAN-ODT) 8 MG disintegrating tablet Place 1 tablet on the tongue Every 8 (Eight) Hours As Needed for Nausea or Vomiting. 30 tablet 3   • valsartan (DIOVAN) 160 MG tablet Take 1 tablet by mouth Daily. 30 tablet 5   • Xarelto 20 MG tablet TAKE 1 TABLET BY MOUTH DAILY 30 tablet 3     Current Facility-Administered Medications on File Prior to Visit   Medication Dose Route Frequency Provider Last Rate Last Admin   • heparin injection 500 Units  500 Units Intravenous PRN Neena Beavers MD   500 Units at 08/11/21 1718   • sodium chloride 0.9 % flush 10 mL  10 mL Intravenous PRN Neena Beavers MD   10 mL at 08/11/21 1718        ALLERGIES:    Allergies   Allergen Reactions   • Sulfa Antibiotics Rash        Social History     Socioeconomic History   • Marital status: Significant Other   • Number of children: 0   Tobacco Use   • Smoking status: Former Smoker     Packs/day: 1.00     Years: 10.00     Pack years: 10.00     Types: Cigarettes     Start date: 1998     Quit date: 2009     Years since  quittin.3   • Smokeless tobacco: Never Used   Vaping Use   • Vaping Use: Never used   Substance and Sexual Activity   • Alcohol use: Yes     Comment: RARELY   • Drug use: No   • Sexual activity: Defer        Family History   Problem Relation Age of Onset   • Breast cancer Mother 53   • Pancreatic cancer Mother 68   • Lung cancer Maternal Grandmother    • Stroke Father    • Breast cancer Paternal Aunt 55   • Prostate cancer Maternal Grandfather    • Prostate cancer Paternal Grandfather    • Brain cancer Maternal Cousin 20   • Melanoma Maternal Uncle    • Ovarian cancer Other         Paternal great aunt    • Breast cancer Other    • Breast cancer Paternal Great-Grandmother 94   • Hypertension Brother    • Malig Hyperthermia Neg Hx       Family  history: Mother had breast cancer at age 57.  Maternal great aunt had breast cancer and paternal great aunt had breast cancer in her 40s.  Patient's mother had pancreatic cancer as well.  Paternal grandfather had prostate cancer.    Review of Systems   Constitutional: Positive for appetite change. Negative for chills, diaphoresis, fatigue, fever and unexpected weight change.   HENT: Negative for hearing loss, sore throat and trouble swallowing.    Respiratory: Negative for cough, chest tightness, shortness of breath and wheezing.    Cardiovascular: Negative for chest pain, palpitations and leg swelling.   Gastrointestinal: Positive for constipation and nausea. Negative for abdominal distention, abdominal pain, diarrhea and vomiting.   Genitourinary: Negative for dysuria, frequency, hematuria and urgency.   Musculoskeletal: Negative for joint swelling.        No muscle weakness.   Skin: Negative for rash and wound.   Neurological: Negative for seizures, syncope, speech difficulty, weakness, numbness and headaches.   Hematological: Negative for adenopathy. Does not bruise/bleed easily.   Psychiatric/Behavioral: Positive for sleep disturbance. Negative for behavioral  "problems, confusion and suicidal ideas.   All other systems reviewed and are negative.   as per HPI       Objective     Vitals:    04/27/22 0901   BP: 125/83   Pulse: 89   Resp: 16   Temp: 97.5 °F (36.4 °C)   TempSrc: Temporal   SpO2: 98%   Weight: 69.6 kg (153 lb 8 oz)   Height: 157 cm (61.81\")   PainSc: 0-No pain     Current Status 4/27/2022   ECOG score 0     Physical exam      CONSTITUTIONAL:  Vital signs reviewed.  No distress, looks comfortable.  RESPIRATORY:  Normal respiratory effort.  Lungs clear to auscultation bilaterally.  CARDIOVASCULAR:  Normal S1, S2.  No murmurs rubs or gallops.  No significant lower extremity edema.  BREAST: Right breast: No skin changes, no evidence of breast mass, no nipple discharge, no evidence of any right axillary adenopathy or right supraclavicular adenopathy  Left breast: No evidence of any skin changes, no evidence of any left breast mass and no evidence of left nipple discharge as well as no left axillary adenopathy or left supraclavicular adenopathy.  GASTROINTESTINAL: Abdomen appears unremarkable.  Nontender.  No hepatomegaly.  No splenomegaly.  LYMPHATIC:  No cervical, supraclavicular, axillary lymphadenopathy.  SKIN:  Warm.  No rashes.  PSYCHIATRIC:  Normal judgment and insight.  Normal mood and affect.    RECENT LABS:  Results from last 7 days   Lab Units 04/27/22  0910   WBC 10*3/mm3 6.15   NEUTROS ABS 10*3/mm3 3.98   HEMOGLOBIN g/dL 12.4   HEMATOCRIT % 38.2   PLATELETS 10*3/mm3 251     Results from last 7 days   Lab Units 04/27/22  0910   SODIUM mmol/L 141   POTASSIUM mmol/L 4.1   CHLORIDE mmol/L 106   CO2 mmol/L 26.5   BUN mg/dL 17   CREATININE mg/dL 0.71   CALCIUM mg/dL 9.4   ALBUMIN g/dL 4.50   BILIRUBIN mg/dL 0.3   ALK PHOS U/L 101   ALT (SGPT) U/L 30   AST (SGOT) U/L 27   GLUCOSE mg/dL 126*   FERRITIN ng/mL 465.70*   IRON mcg/dL 76   TIBC mcg/dL 373         Assessment/Plan       * rC5meT0, ER negative [place] negative HER-2/ese 3+ with Ki-67 of 50%.  S/p left " partial mastectomy with sentinel lymph node biopsy.  Tumor was present at 5:00, invasive ductal carcinoma, [place] score of 8, grade 3, greatest dimension was 12 mm x 8 x 4 mm.  Single focus of invasive carcinoma present.  There was extensive DCIS which is 30 mm x 8 x 2 mm, grade 3, no evidence of lymphovascular space invasion or dermal lymphatic invasion.  Margins were clear.  4 lymph nodes were negative.  It is a p T1cp N0, ER negative UT negative HER-2/ese 3+ with Ki-67 of 50%.  · Patient is s/p port placement  · Discussed in length with patient that given a small tumor she is eligible for weekly Taxol Herceptin.  Weekly Taxol x12 weeks along with weekly Herceptin followed by 1 year of Herceptin.  · Discussed patient in the breast cancer conference  · 2D echo done which showed ejection fraction of 61-65% and strain pattern of -20.8.  · Patient also seen by Dr. Virk cardiology and Dr. Virk is planning to repeat echocardiogram in 3 months after initiation of therapy.  · 8/4/2021 initiating weekly Taxol Herceptin  · 9/8/2021, proceed with week #6 Taxol and Herceptin.  Patient continues to tolerate treatment well. No signs of peripheral neuropathy.  · Her next echocardiogram due November 4, 2021, She follows up with cardiology Dr. Camron Virk.  · 9/28/2021, proceed with week #9 taxol/herceptin.  She does feel her nausea has worsened after her last cycle of chemotherapy.  She prefers the disintegrating Zofran, this will be called to her pharmacy today.  · Patient is here to take cycle 11 of weekly Taxol with worsening fatigue and worsening rash and overall dysphoric mood.  She also cannot sleep and her quality life is worsening.  At the present time we will plan to give 1 more cycle of Taxol following which she will be referred to radiation.  · Cycle 12 Taxol Herceptin October 20, 2021  · Completed radiation November 2021.  · February 23, 2022: Reviewed echocardiogram with normal ejection fraction and strain  pattern.  Patient seen by Dr. Virk and laquita to continue Herceptin  · 3/16/2022 here for continued Herceptin maintenance.  · April 27 2022: Currently tolerating Herceptin.  Last dose of Herceptin will be June 18, 2022.  Reviewed mammogram results from April 4, 2022 which is negative    *  Factor V Leiden heterozygosity with right frontal stroke and patient presented in December 2020 with left-sided weakness tingling and numbness and also numbness in the face.  · Was referred to neurology and cardiology by Dr. Goodman  · Ultrasound of carotid does not show significant stenosis  · 24 Holter is negative  · 2D echocardiogram shows ejection fraction of 64% with mild mitral regurg and mitral stenosis  · Neurology obtain factor V Leiden given that patient's paternal aunt had's history of factor V Leiden and that was heterozygous for factor V Leiden  · Continue aspirin as per neurology.  Also patient on medications for her high cholesterol started after stroke currently asymptomatic  · Hypercoagulable work-up done which is negative except heterozygous state for factor V Leiden.  · Complete stroke work-up has been negative and since this is arterial stroke and she was not on aspirin prior to that and her symptoms have resolved I agree with continuing aspirin alone along with high cholesterol medications.  · MR venogram done on May 10, 2021 is negative.  Patient has been referred to the stroke neurologist Dr. Johnson  · Patient diagnosed with breast cancer with Mediport placed.  Per discussion with neurology, patient initiated on prophylactic Xarelto and aspirin discontinued.  · Patient had one nosebleed which lasted 10 minutes but with pinching the nose it helped.  But she is switching to 20 mg daily from tomorrow.  We discussed about placing ice and notifying us if her nosebleeds are significant.  But it resolved.  It was likely secondary to dry air  · 9/7/2021, patient continues to experience nosebleeds.  Reports these  "occur when her nose itches, and after scratching her nose it begins to bleed.  She feels this may be related to dry air, so I have asked her to start using saline nasal spray to help moisten her nose.  If she experiences nosebleeds that do not stop, I asked her to notify our office.  · Tolerating anticoagulation with Xarelto.  No bleeding issues with Xarelto  · Patient still has the port and hence will need to continue Xarelto    * Family history of cancer: Patient's mother had breast cancer at age 50 and pancreatic cancer at age 68 and  from pancreatic cancer.  Patient's maternal grandmother had breast cancer in her 50s.  Her maternal uncle had skin cancer which went to the lung and another maternal uncle had throat cancer.  Maternal aunt had call colon polyps.  Patient's paternal aunt had breast cancer in her 40s.  And paternal grandfather with colon cancer in his 80s.  Paternal aunt also had factor V Leiden.  · Genetic testing is negative    * Solid nodule in the right kidney on ultrasound, will schedule follow-up with urology  · Patient was to follow-up with Dr. Shultz  · Will need records from urology  · Will discuss with patient at her next appointment about follow-up with urology  · Patient was seen by Dr. Becerra and apparently no follow-up was needed for the nodules in the kidney.  · We will get records from Dr. Becerra's office    *  Elevated BMI, encourage diet and exercise.  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  Patient has been requiring frequent Tums.  Recommended she begin Pepcid 20 mg daily.    · Stable, continue Pepcid 20 mg daily.    *Constipation likely secondary to ferrous sulfate.  Oral iron discontinued.     *Headache likely related to body ache because of Taxol  · 2021, patient discusses concern about worsening headaches.  Describing them as \"glass breaking\".  Started about 2 weeks ago, and progressively " worsened.  Occurring 3-4 times daily now, and lasting less than 1 minute with each episode.  This occurs in the same place, right upper skull.  Denies vision changes or dizziness.  Will order stat MRI of the brain for further evaluation.  The patient follows with a neurologist, and is going to notify patient help with neuropathy as well of her symptoms and the plan for MRI.    *Elevated liver function tests, total bilirubin still normal AST ALT slightly elevated.  Patient did take Tylenol but not too much.  No dose adjustments required at the fingers time.  · Liver function tests normalized    *Iron deficiency anemia, patient's percent saturation still low at 9% s/p full dose of IV Venofer.  · 9/29/2021, patient will receive dose #5 Venofer today.  Hemoglobin today 10.1.  · 12/22/2021, hemoglobin today 12.7.  · 3/16/2022 hemoglobin remains normal at 12.9.    *Insomnia  · 9/29/2021, patient reports difficulty sleeping.  She is experiencing this every night, not just the nights of treatment when she receives IV dexamethasone.  She has attempted taking Benadryl, however felt groggy following this.  She is going to attempt melatonin to see if this improves her sleep.  · Worsening, will try gabapentin which will help with neuropathy as well  · Patient took gabapentin for a few nights and had issues with  headache.  She is also very concerned about other possible side effects that she discontinued the medication.  She is tried melatonin in the past without results.  We discussed trying Benadryl versus other prescription medication.  She is going to try Benadryl first.    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  · Reviewed mammogram from April 4, 2022 negative  · Follow-up in 3 weeks with me for Herceptin.  Last dose of Herceptin June 18, 2020  · Echo scheduled in May 2022      Patient on high risk medication requiring close monitor for toxicity.    Neena Beavers MD  04/27/22         Maxine Haynes

## 2022-04-27 NOTE — TELEPHONE ENCOUNTER
Call to Ms. Rizwan to let her know that Dr. Beavers said her vitamin B12 level is low, and she wants her to come in for injections daily for a week, weekly for 4 weeks, and then monthly.  Patient requests to take oral B12.  Reviewed with Dr. Beavers and orders received for patient to take Vitamin B12 1000 mcg daily and we can recheck her vitamin B12 level in a month. Patient verbalized understanding.

## 2022-05-02 ENCOUNTER — HOSPITAL ENCOUNTER (OUTPATIENT)
Dept: SPEECH THERAPY | Facility: HOSPITAL | Age: 42
Setting detail: THERAPIES SERIES
Discharge: HOME | End: 2022-05-02

## 2022-05-02 DIAGNOSIS — Z86.73 HISTORY OF STROKE: Primary | ICD-10-CM

## 2022-05-02 DIAGNOSIS — R41.841 COGNITIVE COMMUNICATION DEFICIT: ICD-10-CM

## 2022-05-02 PROCEDURE — 96125 COGNITIVE TEST BY HC PRO: CPT | Performed by: SPEECH-LANGUAGE PATHOLOGIST

## 2022-05-02 NOTE — THERAPY EVALUATION
Outpatient Speech Language Pathology   Adult Speech Language Cognitive Initial Evaluation  [facility]     Patient Name: Radha Astorga  : 1980  MRN: [id number]  Today's Date: 2022        Visit Date: 2022   Patient Active Problem List   Diagnosis   • Family history of breast cancer   • Hypothyroidism   • Major depressive disorder, recurrent episode, moderate with anxious distress ([facility])   • Attention deficit hyperactivity disorder (ADHD), predominantly inattentive type   • Factor 5 Leiden mutation, heterozygous ([facility])   • Kidney mass   • Malignant neoplasm of overlapping sites of left breast in female, estrogen receptor negative ([facility])   • High risk medication use   • Essential hypertension   • Rheumatic mitral valve disease   • Encounter for adjustment or management of vascular access device   • Iron deficiency anemia   • History of stroke        Past Medical History:   Diagnosis Date   • Acute stress reaction 2014   • ADD (attention deficit disorder)    • ADHD (attention deficit hyperactivity disorder)    • Anxiety and depression    • CTS (carpal tunnel syndrome)    • Factor 5 Leiden mutation, heterozygous ([facility]) 2021   • Family history of breast cancer 2013   • Fracture, cervical vertebra ([facility]) 1997    C4   • H/O complete eye exam 2016   • Hearing loss    • 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   • History of rheumatic fever    • Hypertension    • Hypothyroidism    • Infiltrating ductal carcinoma of left breast ([facility]) 2021    Left   • Kidney stone    • Mitral valve insufficiency    • PONV (postoperative nausea and vomiting)    • Stroke ([facility]) 2020    HX OF   • Stroke-like symptoms         Past Surgical History:   Procedure Laterality Date   • BREAST BIOPSY Left 2021    [facility]   • BREAST LUMPECTOMY     • BREAST LUMPECTOMY WITH SENTINEL NODE BIOPSY N/A 2021    Procedure: right port placement, Left SHAINA-guided, bracketed, partial mastectomy  and sentinel lymph node biopsy Left breast needle-localized excisional biopsy;  Surgeon: Lashonda Euceda MD;  Location: [facility];  Service: General;  Laterality: N/A;   • BREAST SURGERY Bilateral 07/01/2021    Procedure: RIGHT BREAST REDUCTION MASTOPEXY LEFT BREAST ONCOPLASTIC CLOSURE;  Surgeon: Caridad Baker MD PhD;  Location: [facility];  Service: Plastics;  Laterality: Bilateral;   • NO PAST SURGERIES     • PAP SMEAR  2016         Visit Dx:    ICD-10-CM ICD-9-CM   1. History of stroke  Z86.73 V12.54   2. Cognitive communication deficit  R41.841 799.52        Patient History     Row Name 05/01/22 1755             History    Chief Complaint Difficulty with daily activities (P)    -patient      Date Current Problem(s) Began 03/14/22 (P)    -patient      Brief Description of Current Complaint Trouble remembering information from others, instructions, recommendations; Misremembering and misunderstanding spoken and written information; trouble organizing thoughts and managing time (P)    -patient      Patient/Caregiver Goals Return to prior level of function;Know what to do to help the symptoms;Other (comment) (P)    -patient      Patient/Caregiver Goals Comment Tactics/hacks to address thought organization and messaging confusion (P)    -patient      Hand Dominance right-handed (P)    -patient      Occupation/sports/leisure activities 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 OT - lymphedema (P)    -patient      Are you or can you be pregnant No (P)    -patient              Fall Risk Assessment    Any falls in the past year: No (P)    -patient              Services    Prior Rehab/Home Health Experiences Yes (P)    -patient      Are you currently receiving Home Health services No (P)    -patient      Do you plan to receive Home Health services in the near future No (P)     -patient              Daily Activities    Primary Language English (P)    -patient      Are you able to read Yes (P)    -patient      Are you able to write Yes (P)    -patient      How does patient learn best? Listening;Reading;Demonstration;Pictures/Video;Other (comment) (P)    -patient      Additional ways patient learns? 2-3 ways simultaneously, one being listening (P)    -patient              Safety    Are you being hurt, hit, or frightened by anyone at home or in your life? No (P)    -patient      Are you being neglected by a caregiver No (P)    -patient      Have you had any of the following issues with Depression;Anxiety (P)    -patient            User Key  (r) = Recorded By, (t) = Taken By, (c) = Cosigned By    Initials Name Provider Type    patient Radha Astorga --                 OP SLP Assessment/Plan - 05/02/22 1634        SLP Assessment    Functional Problems Speech Language- Adult/Cognition  -KA    Impact on Function: Adult Speech Language/Cognition Unable to complete specified job requirements;Trouble learning or remembering new information;Difficulty participating in avocational activities  -KA    Clinical Impression: Speech Language-Adult/Congnition Mild:;Cognitive Communication Impairment  -KA    Please refer to paper survey for additional self-reported information Yes  -KA    Please refer to items scanned into chart for additional diagnostic informaiton and handouts as provided by clinician Yes  -KA    Prognosis Good (comment)  -KA    Patient/caregiver participated in establishment of treatment plan and goals Yes  -KA    Patient would benefit from skilled therapy intervention Yes  -KA       SLP Plan    Frequency 1x a week  -KA    Duration 4 to 8 weeks  -KA    Planned CPT's? SLP DEV COG SKILLS INITIAL (15 MIN) : 60466;SLP DEV COG SKILLS ADD (15 MIN) : 23974  -KA    Expected Duration of Therapy Session (SLP Eval) 45  -KA          User Key  (r) = Recorded By, (t) = Taken By, (c) = Cosigned By     "Initials Name Provider Type    Garry Gomez MA,CCC-SLP Speech and Language Pathologist                 SLP SLC Evaluation - 05/02/22 1400        Communication Assessment/Intervention    Document Type evaluation  -KA    Subjective Information no complaints  -KA    Patient Observations alert;cooperative  -KA    Patient Effort good  -KA    Symptoms Noted During/After Treatment none  -KA       General Information    Patient Profile Reviewed yes  -KA    Pertinent History Of Current Problem Patient is referred today for cognitive impairment. Patient has recent history of breast cancer treatment, on Herceptin. Patient was diagnosed with Breast cancer in the spring of 2021 and underwent partial mastectomy. Patient also started chemotherapy June 2021 and finishes Herceptin in June of this year. Patient reports she also was taking Taxol for 12 weeks. Patient also has history of right CVA in December 2020. Patient developed left sided tingling and numbess and underwent MRI which revealed right CVA. Patient reports her cognitive deficits are impacting her ability to do her job. She reports she needs things repeated at work and has difficulty remembering information from meetings. 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 Patient reports she does have history of ADD and her ADD is exacerbated by her impaired executive function skills from her chemotherapy.  -KA    Precautions/Limitations, Vision WFL  -KA    Precautions/Limitations, Hearing WFL  -KA    Prior Level of Function-Communication WFL  -KA    Plans/Goals Discussed with patient  -KA    Barriers to Rehab none identified  -KA    Patient's Goals for Discharge return to all previous roles/activities;functional cognition  -KA    Standardized Assessment Used CLQT  -KA       Standardized Tests    Cognitive/Memory Tests CLQT: Cognitive Linguistic " Quick Test  -       CLQT (The Cognitive Linguistic Quick Test)    Attention Domain Score 199  -KA    Attention Severity Rating 4: WNL  -KA    Memory Domain Score 166  -KA    Memory Severity Rating 4: WNL  -KA    Executive Function Domain Score 38  -KA    Executive Function Severity Rating 4: WNL  -KA    Language Domain Score 33  -KA    Language Severity Rating 4: WN  -KA    Visuospatial Domain Score 102  -KA    Visuospatial Severity Rating 4: WNL  -    Clock Drawing Total Score 13  -KA    Clock Drawing Severity Rating WNL  -KA    Composite Severity Rating 4.0  -KA    Composite Severity Rating Range 4.0 - 3.5: WNL  -KA    CLQT Comments Patient did not score below the criterion cut off scores for any subtest. In story retelling task assessing immediate recall for detail at the paragraph level, pt recalled 11 facts out of 18, mild impairment for immediate recall of detail at the paragraph level. Informal assessment of higher level reasoning and executive function task where patient had to determine a route requiring her to follow the written directions, plan and organize and utilize reasoning skills. Patient required extended amount of time to complete task (10 minutes) and took three trials to solve the task (independently self corrected) and broke one rule however was able to identify and self correct. Patient reports difficulty with planning and organization when completing her job requirements. Recommend outpatient speech therapy to target strategies and education on memory and executive function strategies and compensations.  -KA       SLP Evaluation Clinical Impressions    SLP Diagnosis Mild cognitive impairment impacting executive function, reasoning and auditory recall for more detailed spoken information (sentences, paragraph)  -KA    Rehab Potential/Prognosis good  -KA    SLC Criteria for Skilled Therapy Interventions Met yes  -KA    Functional Impact other (see comments);difficulty completing home  management task;difficulty completing vocational tasks   unable to complete job responsibilities -KA       SLP Treatment Clinical Impressions    Care Plan Review evaluation/treatment results reviewed  -KA       Recommendations    Therapy Frequency (SLP SLC) 1 day per week  -KA    Predicted Duration Therapy Intervention (Days) until discharge  -KA    Anticipated Discharge Disposition (SLP) home  -KA          User Key  (r) = Recorded By, (t) = Taken By, (c) = Cosigned By    Initials Name Provider Type    Garry Gomez MA,CCC-SLP Speech and Language Pathologist                                OP SLP Education     Row Name 05/02/22 7815       Education    Barriers to Learning No barriers identified  -KA    Education Provided Described results of evaluation;Patient expressed understanding of evaluation  -KA    Assessed Learning needs;Learning motivation  -KA    Learning Motivation Strong  -    Learning Method Explanation  -KA    Teaching Response Verbalized understanding  -KA          User Key  (r) = Recorded By, (t) = Taken By, (c) = Cosigned By    Initials Name Effective Dates    Garry Gomez MA,CCC-SLP 06/16/21 -                SLP OP Goals     Row Name 05/02/22 1600          Goal Type Needed    Goal Type Needed Memory;Executive Function  -KA            Executive Function Goals    Executive Function LTG's Patient will be able to use high level cognitive skills to allow patient to return to work  -KA     Patient will be able to use high level cognitive skills to allow patient to return to work 90%:;without cues  -KA     Status: Patient will be able to use high level cognitive skills to allow patient to return to work New  -KA     Executive Function STG's Patient will improve executive functioning skills by designing a self-instructional technique and/or choose a metacognitive strategy that will assist with completing novel tasks;Patient will improve executive functioning skills by using planning strategies  prior to beginning tasks  -KA     Patient will improve executive functioning skills by using planning strategies prior to beginning tasks 90%:;without cues  -KA     Status: Patient will improve executive functioning skills by using planning strategies prior to beginning tasks New  -KA     Patient will improve executive functioning skills by designing a self-instructional technique and/or choose a metacognitive strategy that will assist with completing novel tasks 90%:;without cues  -KA     Status: Patient will improve executive functioning skills by designing a self-instructional technique and/or choose a metacognitive strategy that will assist with completing novel tasks New  -KA            Memory Goals    Memory LTG's Patient and family will implement compensatory strategies to maximize patient’s Memory function so patient can continue to participate in daily activities  -KA     Patient and family will implement compensatory strategies to maximize patient’s Memory function so patient can continue to participate in daily activities 90%:;without cues  -KA     Status: Patient and family will implement compensatory strategies to maximize patient’s Memory function so patient can continue to participate in daily activities New  -KA     Memory STG's Patient’s memory skills will be enhanced as reported by patient by using external memory aides;Patient’s memory skills will be enhanced as reported by patient by utilizing internal memory strategies to recall up to 3 pieces of information after a 5- minute delay  -KA     Patient’s memory skills will be enhanced as reported by patient by utilizing internal memory strategies to recall up to 3 pieces of information after a 5- minute delay 90%:;without cues  -KA     Status: Patient’s memory skills will be enhanced as reported by patient by utilizing internal memory strategies to recall up to 3 pieces of information after a 5- minute delay New  -KA     Patient’s memory skills will  be enhanced as reported by patient by using external memory aides 90%:;without cues  -KA     Status: Patient’s memory skills will be enhanced as reported by patient by using external memory aides New  -KA           User Key  (r) = Recorded By, (t) = Taken By, (c) = Cosigned By    Initials Name Provider Type    Garry Gomez MA,CCC-SLP Speech and Language Pathologist              SLP Outcome Measures (last 72 hours)     SLP Outcome Measures     Row Name 22 1600             SLP Outcome Measures    Outcome Measure Used? Adult NOMS  -KA              Adult FCM Scores    FCM Chosen Memory  -KA      Memory FCM Score 5  -KA            User Key  (r) = Recorded By, (t) = Taken By, (c) = Cosigned By    Initials Name Effective Dates    Garry Gomez MA,CCC-SLP 21 -                    Time Calculation:   SLP Start Time: 1515  SLP Stop Time: 1615  SLP Time Calculation (min): 60 min  Timed Charges  96125-Standardized cognitive performance testin  Total Minutes  Timed Charges Total Minutes: 60   Total Minutes: 60    Therapy Charges for Today     Code Description Service Date Service Provider Modifiers Qty    [id number] HC ST STD COG PERF TEST PER HOUR 2022 Garry Ramos MA,CCC-SLP GN 1                   Garry Ramos MA,CCC-SLP  2022

## 2022-05-02 NOTE — PROGRESS NOTES
Subjective   Radha Astorga is a 42 y.o. female.     CC: Annual Exam    History of Present Illness     Radha Astorga 42 y.o. female who presents for an Annual Wellness Visit.  she has a history of   Patient Active Problem List   Diagnosis   • Family history of breast cancer   • Hypothyroidism   • Major depressive disorder, recurrent episode, moderate with anxious distress ([facility])   • Attention deficit hyperactivity disorder (ADHD), predominantly inattentive type   • Factor 5 Leiden mutation, heterozygous ([facility])   • Kidney mass   • Malignant neoplasm of overlapping sites of left breast in female, estrogen receptor negative ([facility])   • High risk medication use   • Essential hypertension   • Rheumatic mitral valve disease   • Encounter for adjustment or management of vascular access device   • Iron deficiency anemia   • History of stroke   .  she has been feeling well and is seeing Hem/Onc for radiation/chemo for her breast cancer.    I  reviewed health maintenance with her as part of my preventative care plan.    Pt reports having SEs and lack of efficacy with Adderall XR and Concerta vs the amazing response she had with her Vyvanse. Pt had to change due to insurance issues.    The following portions of the patient's history were reviewed and updated as appropriate: allergies, current medications, past family history, past medical history, past social history, past surgical history and problem list.    Review of Systems   Constitutional: Negative for appetite change, fever and unexpected weight change.   HENT: Negative for ear pain, facial swelling and sore throat.    Eyes: Negative for pain and visual disturbance.   Respiratory: Negative for chest tightness, shortness of breath and wheezing.    Cardiovascular: Negative for chest pain and palpitations.   Gastrointestinal: Negative for abdominal pain and blood in stool.   Endocrine: Negative.    Genitourinary: Negative for difficulty urinating and hematuria.  "  Musculoskeletal: Negative for joint swelling.   Neurological: Negative for tremors, seizures and syncope.   Hematological: Negative for adenopathy.   Psychiatric/Behavioral: Positive for sleep disturbance (can't stay asleep (chronic)).       /84   Pulse 86   Temp 96.8 °F (36 °C) (Skin)   Resp 16   Ht 157 cm (61.81\")   Wt 69.4 kg (153 lb)   SpO2 99%   BMI 28.16 kg/m²     Objective   Physical Exam  Vitals and nursing note reviewed. Exam conducted with a chaperone present.   Constitutional:       Appearance: She is well-developed.   HENT:      Head: Normocephalic and atraumatic.      Right Ear: External ear normal.      Left Ear: External ear normal.      Mouth/Throat:      Pharynx: No oropharyngeal exudate.   Eyes:      General: No scleral icterus.     Conjunctiva/sclera: Conjunctivae normal.      Pupils: Pupils are equal, round, and reactive to light.   Neck:      Thyroid: No thyromegaly.   Cardiovascular:      Rate and Rhythm: Normal rate and regular rhythm.      Heart sounds: No murmur heard.    No gallop.   Pulmonary:      Effort: Pulmonary effort is normal.      Breath sounds: Normal breath sounds. No wheezing or rales.   Abdominal:      General: Bowel sounds are normal. There is no distension.      Palpations: Abdomen is soft. There is no mass.      Tenderness: There is no abdominal tenderness.      Hernia: No hernia is present.   Musculoskeletal:         General: No tenderness or deformity. Normal range of motion.      Cervical back: Normal range of motion and neck supple.   Lymphadenopathy:      Cervical: No cervical adenopathy.   Skin:     General: Skin is warm and dry.      Findings: No rash.   Neurological:      Mental Status: She is alert and oriented to person, place, and time.      Deep Tendon Reflexes: Reflexes are normal and symmetric.   Psychiatric:         Behavior: Behavior normal.     Jovany present for exam.  Labs from her Oncologist reviewed by me at today's " visit.    Assessment/Plan   Diagnoses and all orders for this visit:    1. Annual physical exam (Primary)    2. Insomnia, unspecified type  -     traZODone (DESYREL) 50 MG tablet; Take 1 tablet by mouth Every Night.  Dispense: 30 tablet; Refill: 11    3. Attention deficit hyperactivity disorder (ADHD), predominantly inattentive type  -     methylphenidate (Concerta) 36 MG CR tablet; Take 1 tablet by mouth Every Morning  Dispense: 30 tablet; Refill: 0    Healthy diet and exercise discussed.

## 2022-05-03 ENCOUNTER — OFFICE VISIT (OUTPATIENT)
Dept: FAMILY MEDICINE CLINIC | Facility: CLINIC | Age: 42
End: 2022-05-03

## 2022-05-03 VITALS
SYSTOLIC BLOOD PRESSURE: 123 MMHG | WEIGHT: 153 LBS | BODY MASS INDEX: 28.16 KG/M2 | HEART RATE: 86 BPM | DIASTOLIC BLOOD PRESSURE: 84 MMHG | RESPIRATION RATE: 16 BRPM | TEMPERATURE: 96.8 F | OXYGEN SATURATION: 99 % | HEIGHT: 62 IN

## 2022-05-03 DIAGNOSIS — Z00.00 ANNUAL PHYSICAL EXAM: Primary | ICD-10-CM

## 2022-05-03 DIAGNOSIS — F90.0 ATTENTION DEFICIT HYPERACTIVITY DISORDER (ADHD), PREDOMINANTLY INATTENTIVE TYPE: ICD-10-CM

## 2022-05-03 DIAGNOSIS — G47.00 INSOMNIA, UNSPECIFIED TYPE: ICD-10-CM

## 2022-05-03 PROCEDURE — 99396 PREV VISIT EST AGE 40-64: CPT | Performed by: FAMILY MEDICINE

## 2022-05-03 RX ORDER — TRAZODONE HYDROCHLORIDE 50 MG/1
50 TABLET ORAL NIGHTLY
Qty: 30 TABLET | Refills: 11 | Status: SHIPPED | OUTPATIENT
Start: 2022-05-03 | End: 2022-12-20 | Stop reason: SDUPTHER

## 2022-05-03 RX ORDER — METHYLPHENIDATE HYDROCHLORIDE 36 MG/1
36 TABLET ORAL EVERY MORNING
Qty: 30 TABLET | Refills: 0 | Status: SHIPPED | OUTPATIENT
Start: 2022-05-03 | End: 2022-05-04 | Stop reason: SINTOL

## 2022-05-04 ENCOUNTER — PATIENT MESSAGE (OUTPATIENT)
Dept: FAMILY MEDICINE CLINIC | Facility: CLINIC | Age: 42
End: 2022-05-04

## 2022-05-04 RX ORDER — ATOMOXETINE 40 MG/1
40 CAPSULE ORAL DAILY
Qty: 30 CAPSULE | Refills: 0 | Status: SHIPPED | OUTPATIENT
Start: 2022-05-04 | End: 2022-05-25 | Stop reason: SINTOL

## 2022-05-04 NOTE — TELEPHONE ENCOUNTER
From: Radha Astorga  To: Fadi Goodman MD  Sent: 5/4/2022 9:54 AM EDT  Subject: Concerta not great, either    Dr Goodman, I’ve been taking the Concerta for a month and hoping some side effects would go away, but they haven’t. It gives me a terrible taste in my mouth as soon as it kicks in, and it makes me nauseous.   Any other ideas besides the Adderall XR and Concerta?

## 2022-05-11 ENCOUNTER — HOSPITAL ENCOUNTER (OUTPATIENT)
Dept: CARDIOLOGY | Facility: HOSPITAL | Age: 42
Discharge: HOME | End: 2022-05-11
Admitting: INTERNAL MEDICINE

## 2022-05-11 ENCOUNTER — OFFICE VISIT (OUTPATIENT)
Dept: CARDIOLOGY | Facility: CLINIC | Age: 42
End: 2022-05-11

## 2022-05-11 VITALS
WEIGHT: 154.8 LBS | BODY MASS INDEX: 28.49 KG/M2 | HEART RATE: 78 BPM | HEIGHT: 62 IN | SYSTOLIC BLOOD PRESSURE: 118 MMHG | DIASTOLIC BLOOD PRESSURE: 80 MMHG

## 2022-05-11 VITALS
HEIGHT: 62 IN | HEART RATE: 64 BPM | DIASTOLIC BLOOD PRESSURE: 80 MMHG | BODY MASS INDEX: 28.16 KG/M2 | WEIGHT: 153 LBS | SYSTOLIC BLOOD PRESSURE: 118 MMHG

## 2022-05-11 DIAGNOSIS — I10 ESSENTIAL HYPERTENSION: ICD-10-CM

## 2022-05-11 DIAGNOSIS — C50.812 MALIGNANT NEOPLASM OF OVERLAPPING SITES OF LEFT BREAST IN FEMALE, ESTROGEN RECEPTOR NEGATIVE: Primary | ICD-10-CM

## 2022-05-11 DIAGNOSIS — Z09 CHEMOTHERAPY FOLLOW-UP EXAMINATION: ICD-10-CM

## 2022-05-11 DIAGNOSIS — I05.9 RHEUMATIC MITRAL VALVE DISEASE: ICD-10-CM

## 2022-05-11 DIAGNOSIS — Z17.1 MALIGNANT NEOPLASM OF OVERLAPPING SITES OF LEFT BREAST IN FEMALE, ESTROGEN RECEPTOR NEGATIVE: Primary | ICD-10-CM

## 2022-05-11 LAB
BH CV ECHO LEFT VENTRICLE GLOBAL LONGITUDINAL STRAIN: -20.9 %
BH CV ECHO MEAS - EDV(CUBED): 60.8 ML
BH CV ECHO MEAS - EDV(MOD-SP2): 120 ML
BH CV ECHO MEAS - EDV(MOD-SP4): 92 ML
BH CV ECHO MEAS - EF(MOD-BP): 59 %
BH CV ECHO MEAS - EF(MOD-SP2): 64.2 %
BH CV ECHO MEAS - EF(MOD-SP4): 58.7 %
BH CV ECHO MEAS - ESV(CUBED): 15.1 ML
BH CV ECHO MEAS - ESV(MOD-SP2): 43 ML
BH CV ECHO MEAS - ESV(MOD-SP4): 38 ML
BH CV ECHO MEAS - FS: 37.2 %
BH CV ECHO MEAS - IVS/LVPW: 0.99 CM
BH CV ECHO MEAS - IVSD: 0.94 CM
BH CV ECHO MEAS - LAT PEAK E' VEL: 11.7 CM/SEC
BH CV ECHO MEAS - LV DIASTOLIC VOL/BSA (35-75): 53.9 CM2
BH CV ECHO MEAS - LV MASS(C)D: 113.7 GRAMS
BH CV ECHO MEAS - LV SYSTOLIC VOL/BSA (12-30): 22.3 CM2
BH CV ECHO MEAS - LVIDD: 3.9 CM
BH CV ECHO MEAS - LVIDS: 2.47 CM
BH CV ECHO MEAS - LVOT AREA: 3.4 CM2
BH CV ECHO MEAS - LVOT DIAM: 2.08 CM
BH CV ECHO MEAS - LVPWD: 0.95 CM
BH CV ECHO MEAS - MED PEAK E' VEL: 12.5 CM/SEC
BH CV ECHO MEAS - MV A DUR: 0.15 SEC
BH CV ECHO MEAS - MV A MAX VEL: 129.4 CM/SEC
BH CV ECHO MEAS - MV DEC SLOPE: 799.6 CM/SEC2
BH CV ECHO MEAS - MV DEC TIME: 0.16 MSEC
BH CV ECHO MEAS - MV E MAX VEL: 130 CM/SEC
BH CV ECHO MEAS - MV E/A: 1
BH CV ECHO MEAS - MV MAX PG: 11.1 MMHG
BH CV ECHO MEAS - MV MEAN PG: 5.9 MMHG
BH CV ECHO MEAS - MV P1/2T: 58.7 MSEC
BH CV ECHO MEAS - MV V2 VTI: 35.4 CM
BH CV ECHO MEAS - MVA(P1/2T): 3.7 CM2
BH CV ECHO MEAS - PULM A REVS DUR: 0.17 SEC
BH CV ECHO MEAS - PULM A REVS VEL: 30.9 CM/SEC
BH CV ECHO MEAS - PULM DIAS VEL: 30.9 CM/SEC
BH CV ECHO MEAS - PULM S/D: 0.86
BH CV ECHO MEAS - PULM SYS VEL: 26.7 CM/SEC
BH CV ECHO MEAS - SI(MOD-SP2): 45.1 ML/M2
BH CV ECHO MEAS - SI(MOD-SP4): 31.7 ML/M2
BH CV ECHO MEAS - SV(MOD-SP2): 77 ML
BH CV ECHO MEAS - SV(MOD-SP4): 54 ML
BH CV ECHO MEASUREMENTS AVERAGE E/E' RATIO: 10.74
BH CV XLRA - TDI S': 10.8 CM/SEC
LEFT ATRIUM VOLUME INDEX: 15.6 ML/M2
MAXIMAL PREDICTED HEART RATE: 176 BPM
STRESS TARGET HR: 150 BPM

## 2022-05-11 PROCEDURE — 93325 DOPPLER ECHO COLOR FLOW MAPG: CPT | Performed by: INTERNAL MEDICINE

## 2022-05-11 PROCEDURE — 25010000002 PERFLUTREN (DEFINITY) 8.476 MG IN SODIUM CHLORIDE (PF) 0.9 % 10 ML INJECTION: Performed by: INTERNAL MEDICINE

## 2022-05-11 PROCEDURE — 93356 MYOCRD STRAIN IMG SPCKL TRCK: CPT | Performed by: INTERNAL MEDICINE

## 2022-05-11 PROCEDURE — 93308 TTE F-UP OR LMTD: CPT | Performed by: INTERNAL MEDICINE

## 2022-05-11 PROCEDURE — 99214 OFFICE O/P EST MOD 30 MIN: CPT | Performed by: INTERNAL MEDICINE

## 2022-05-11 PROCEDURE — 93000 ELECTROCARDIOGRAM COMPLETE: CPT | Performed by: INTERNAL MEDICINE

## 2022-05-11 PROCEDURE — 93308 TTE F-UP OR LMTD: CPT

## 2022-05-11 PROCEDURE — 93325 DOPPLER ECHO COLOR FLOW MAPG: CPT

## 2022-05-11 PROCEDURE — 93321 DOPPLER ECHO F-UP/LMTD STD: CPT | Performed by: INTERNAL MEDICINE

## 2022-05-11 PROCEDURE — 93321 DOPPLER ECHO F-UP/LMTD STD: CPT

## 2022-05-11 PROCEDURE — 93356 MYOCRD STRAIN IMG SPCKL TRCK: CPT

## 2022-05-11 RX ADMIN — PERFLUTREN 2 ML: 6.52 INJECTION, SUSPENSION INTRAVENOUS at 10:34

## 2022-05-17 ENCOUNTER — HOSPITAL ENCOUNTER (OUTPATIENT)
Dept: OCCUPATIONAL THERAPY | Facility: HOSPITAL | Age: 42
Setting detail: THERAPIES SERIES
Discharge: HOME | End: 2022-05-17

## 2022-05-17 DIAGNOSIS — Z91.89 AT RISK FOR LYMPHEDEMA: ICD-10-CM

## 2022-05-17 DIAGNOSIS — Z17.1 MALIGNANT NEOPLASM OF OVERLAPPING SITES OF LEFT BREAST IN FEMALE, ESTROGEN RECEPTOR NEGATIVE: ICD-10-CM

## 2022-05-17 DIAGNOSIS — N64.89 BREAST EDEMA: ICD-10-CM

## 2022-05-17 DIAGNOSIS — I97.2 POST-MASTECTOMY LYMPHEDEMA SYNDROME: Primary | ICD-10-CM

## 2022-05-17 DIAGNOSIS — C50.812 MALIGNANT NEOPLASM OF OVERLAPPING SITES OF LEFT BREAST IN FEMALE, ESTROGEN RECEPTOR NEGATIVE: ICD-10-CM

## 2022-05-17 PROCEDURE — 97535 SELF CARE MNGMENT TRAINING: CPT

## 2022-05-17 PROCEDURE — 93702 BIS XTRACELL FLUID ANALYSIS: CPT

## 2022-05-17 NOTE — THERAPY PROGRESS REPORT/RE-CERT
Outpatient Occupational Therapy Lymphedema Progress Note  [facility]     Patient Name: Radha Astorga  : 1980  MRN: [id number]  Today's Date: 2022      Visit Date: 2022  Patient and therapist both masked. Therapist with face shield and gloves.  Patient Active Problem List   Diagnosis   • Family history of breast cancer   • Hypothyroidism   • Major depressive disorder, recurrent episode, moderate with anxious distress ([facility])   • Attention deficit hyperactivity disorder (ADHD), predominantly inattentive type   • Factor 5 Leiden mutation, heterozygous ([facility])   • Kidney mass   • Malignant neoplasm of overlapping sites of left breast in female, estrogen receptor negative ([facility])   • High risk medication use   • Essential hypertension   • Rheumatic mitral valve disease   • Encounter for adjustment or management of vascular access device   • Iron deficiency anemia   • History of stroke        Past Medical History:   Diagnosis Date   • Acute stress reaction 2014   • ADD (attention deficit disorder)    • ADHD (attention deficit hyperactivity disorder)    • Anxiety and depression    • CTS (carpal tunnel syndrome)    • Factor 5 Leiden mutation, heterozygous ([facility]) 2021   • Family history of breast cancer 2013   • Fracture, cervical vertebra ([facility]) 1997    C4   • H/O complete eye exam 2016   • Hearing loss    • 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   • History of rheumatic fever    • Hypertension    • Hypothyroidism    • Infiltrating ductal carcinoma of left breast ([facility]) 2021    Left   • Kidney stone    • Mitral valve insufficiency    • PONV (postoperative nausea and vomiting)    • Stroke ([facility]) 2020    HX OF   • Stroke-like symptoms         Past Surgical History:   Procedure Laterality Date   • BREAST BIOPSY Left 2021    [facility]   • BREAST LUMPECTOMY     • BREAST LUMPECTOMY WITH SENTINEL NODE BIOPSY N/A 2021    Procedure: right port placement, Left  SHAINA-guided, bracketed, partial mastectomy and sentinel lymph node biopsy Left breast needle-localized excisional biopsy;  Surgeon: Lashonda Euceda MD;  Location: [facility];  Service: General;  Laterality: N/A;   • BREAST SURGERY Bilateral 07/01/2021    Procedure: RIGHT BREAST REDUCTION MASTOPEXY LEFT BREAST ONCOPLASTIC CLOSURE;  Surgeon: Caridad Baker MD PhD;  Location: [facility];  Service: Plastics;  Laterality: Bilateral;   • NO PAST SURGERIES     • PAP SMEAR  2016         Visit Dx:      ICD-10-CM ICD-9-CM   1. 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 Malignant neoplasm of overlapping sites of left breast in female, estrogen receptor negative ([facility])  C50.812 174.8    Z17.1 V86.1   3. At risk for lymphedema  Z91.89 V49.89   4. Breast edema  N64.89 611.89        Lymphedema     Row Name 05/17/22 0900             Subjective Pain    Able to rate subjective pain? yes  -RE      Pre-Treatment Pain Level 0  -RE      Post-Treatment Pain Level 0  -RE              Subjective Comments    Subjective Comments Was able to participate in rowing without increased symptoms. Is having some mild breast edema.  -RE              Skin Changes/Observations    Skin Observations Comment pitting edema medial left breast  -RE              L-Dex Bioimpedence Screening    L-Dex Measurement Extremity LUE  -RE      L-Dex Patient Position Standing  -RE      L-Dex UE Dominate Side Right  -RE      L-Dex UE At Risk Side Left  -RE      L-Dex UE Pre Surgical Value No  -RE      L-Dex UE Score 3.5  -RE      L-Dex UE Baseline Score 3.5  -RE      L-Dex UE Value Change 0  -RE      L-Dex UE Comment WNL  -RE            User Key  (r) = Recorded By, (t) = Taken By, (c) = Cosigned By    Initials Name Provider Type    RE Julia Leslie OTR Occupational Therapist                         OT Assessment/Plan     Row Name 05/17/22 1520          OT Assessment    Impairments Edema;Impaired lymphatic circulation  -RE     Assessment  Comments Patient returns for bioimpedance reassessment.  Her last measurement was 8 months ago and she then developed visible edema so L-dex was paused. Her arm symptoms continue to improve so she was assessed with bioimpedance and she is now WNL. She does have mild hand edema and breast swelling which is not assessed through bioimpedance.  L-Dex value today is 3.5.  She has the following new complaints: breast edema. We addressed management strategies for her breast swelling. If it is not better in a month she may need treatment.  I recommended follow up in 1 month.  -RE     Please refer to paper survey for additional self-reported information Yes  -RE     OT Diagnosis post mastectomy lymphedema  -RE     OT Rehab Potential Good  -RE     Patient/caregiver participated in establishment of treatment plan and goals Yes  -RE     Patient would benefit from skilled therapy intervention Yes  -RE            OT Plan    OT Frequency Other (comment)  -RE     Predicted Duration of Therapy Intervention (OT) reassess breast in 1 month; bioimpedance in 3 months  -RE     Planned CPT's? OT SELF CARE/MGMT/TRAIN 15 MIN: 71662;OT MANUAL THERAPY EA 15 MIN: 82454;OT BIS XTRACELL FLUID ANALYSIS: 50687;OT THER PROC EA 15 MIN: [id number]  -RE     OT Plan Comments follow up in 1 months  -RE           User Key  (r) = Recorded By, (t) = Taken By, (c) = Cosigned By    Initials Name Provider Type    Julia Rowe OTR Occupational Therapist                 The patient had a reassessment SOZO measurement which I reviewed today. The score is WNL, see in EMR. Bioimpedance spectroscopy helps identify the onset of lymphedema in an arm or leg before patients experience noticeable swelling. Research has shown that the early detection of lymphedema using L-Dex combined with treatment can reduce progression to chronic lymphedema by 95% in breast cancer patients. Whenever possible, patients are tested for baseline L-Dex score before cancer treatment  begins and then are reassessed during regular follow-up visits using the SOZO device. Otherwise, this can be started postoperatively and continued during regular follow-up visits. If the patient's L-Dex score increases above normal levels, that is a sign that lymphedema is developing and a referral is made to occupational therapy for further evaluation and early compression treatment. Lymphedema assessment with the SOZO L-Dex score is recommended to be done every 3 months for the first 3 years and then every 6 months for years 4 and 5 followed by annually afterwards.       OT Goals     Row Name 05/17/22 1500          OT Short Term Goals    STG Date to Achieve 05/31/22  -RE     STG 1 Pt will demonstrate understanding of use of compression sleeve for edema prevention, exercise and air travel.   -RE     STG 1 Progress Met  -RE     STG 2 Patient will demonstrate proper awareness of “What is Lymphedema?” and “Healthy Habits” for improved prevention, management, care of symptoms and ease of transition to self-care of condition.   -RE     STG 2 Progress Met  -RE     STG 3 Patient independent and compliant with initial home exercise program focused on range of motion,and Flexibility.  -RE     STG 3 Progress Met  -RE     STG 4 Patient to demonstrate increased left shoulder flexion to 140 to improve functional UE use and to restore pre operative AROM per patient perception.  -RE     STG 4 Progress Met  -RE     STG 5 Patient to demonstrate increased left shoulder abduction to 140 to improve functional UE use and to restore preoperative AROM per patient perception.  -RE     STG 5 Progress Met  -RE            Long Term Goals    LTG Date to Achieve 06/14/22  -RE     LTG 1 Patient to demonstrate increased left shoulder flexion to 160 to improve functional UE use and to restore pre operative AROM per patient perception.  -RE     LTG 1 Progress Met  -RE     LTG 2 Patient to demonstrate increased left shoulder abduction to 160 to  improve functional UE use and to restore preoperative AROM per patient perception.  -RE     LTG 2 Progress Met  -RE     LTG 3 Patient will participate in bioimpedance scans every 3-6 months as a method of early detection of lymphedema to allow for early intervention.  -RE     LTG 3 Progress Met;Ongoing  -RE     LTG 4  Patient's bioimpedance score to remain below 6.5 for decreased risk of stage II lymphedema.  -RE     LTG 4 Progress Met;Ongoing  -RE     LTG 5 Patient will be independent with use and care of [employer].  -RE     LTG 5 Progress Met  -RE     LTG 6 patient to demonstrate no vsible edema in the L UE.  -RE     LTG 6 Progress Not Met;Ongoing  -RE     LTG 6 Progress Comments mild edema in hand  -RE     LTG 7 Patient to demonstrate no visible edema inthe L breast/flank.  -RE     LTG 7 Progress Not Met;Ongoing  -RE            Time Calculation    OT Goal Re-Cert Due Date 08/17/22  -RE           User Key  (r) = Recorded By, (t) = Taken By, (c) = Cosigned By    Initials Name Provider Type    Julia Rowe OTR Occupational Therapist                Therapy Education  Education Details: Discussed patient's current L-Dex value of 3.5  and recommended continued sleeve wear during the day, Flexitouch use and night sleeve. Use sports bra at night with swell spot. Recommended a more supportive bra for day use to try to compress the medial breast to decrease edema.   Reviewed replacement schedule for garments. Her garments are available at the pharmacy.  Given: Edema management, Symptoms/condition management  Program: New, Modified  How Provided: Verbal  Provided to: Patient  Level of Understanding: Teach back education performed, Verbalized  04973 - OT Self Care/Mgmt Minutes: 35                Time Calculation:   OT Start Time: 0905  OT Stop Time: 0945  OT Time Calculation (min): 40 min  Total Timed Code Minutes- OT: 35 minute(s)  Timed Charges  45265 - OT Self Care/Mgmt Minutes: 35  Untimed Charges  44080 - OT  Bioimpedence Rx Minutes: 5  Total Minutes  Timed Charges Total Minutes: 35  Untimed Charges Total Minutes: 5   Total Minutes: 40     Therapy Charges for Today     Code Description Service Date Service Provider Modifiers Qty    56116851472 HC OT SELF CARE/MGMT/TRAIN EA 15 MIN 5/17/2022 Julia Leslie OTR GO 2    [id number]  OT BIS XTRACELL FLUID ANALYSIS 5/17/2022 Julia Leslie OTR GO 1                      VI Ulloa  5/17/2022

## 2022-05-18 ENCOUNTER — INFUSION (OUTPATIENT)
Dept: ONCOLOGY | Facility: HOSPITAL | Age: 42
End: 2022-05-18

## 2022-05-18 ENCOUNTER — OFFICE VISIT (OUTPATIENT)
Dept: ONCOLOGY | Facility: CLINIC | Age: 42
End: 2022-05-18

## 2022-05-18 VITALS
HEART RATE: 73 BPM | HEIGHT: 62 IN | RESPIRATION RATE: 16 BRPM | SYSTOLIC BLOOD PRESSURE: 125 MMHG | WEIGHT: 153 LBS | TEMPERATURE: 97.3 F | BODY MASS INDEX: 28.16 KG/M2 | DIASTOLIC BLOOD PRESSURE: 80 MMHG | OXYGEN SATURATION: 97 %

## 2022-05-18 DIAGNOSIS — Z17.1 MALIGNANT NEOPLASM OF OVERLAPPING SITES OF LEFT BREAST IN FEMALE, ESTROGEN RECEPTOR NEGATIVE: Primary | ICD-10-CM

## 2022-05-18 DIAGNOSIS — E53.8 B12 DEFICIENCY: ICD-10-CM

## 2022-05-18 DIAGNOSIS — C50.812 MALIGNANT NEOPLASM OF OVERLAPPING SITES OF LEFT BREAST IN FEMALE, ESTROGEN RECEPTOR NEGATIVE: Primary | ICD-10-CM

## 2022-05-18 DIAGNOSIS — Z45.2 ENCOUNTER FOR ADJUSTMENT OR MANAGEMENT OF VASCULAR ACCESS DEVICE: ICD-10-CM

## 2022-05-18 DIAGNOSIS — Z17.1 MALIGNANT NEOPLASM OF OVERLAPPING SITES OF LEFT BREAST IN FEMALE, ESTROGEN RECEPTOR NEGATIVE: ICD-10-CM

## 2022-05-18 DIAGNOSIS — C50.812 MALIGNANT NEOPLASM OF OVERLAPPING SITES OF LEFT BREAST IN FEMALE, ESTROGEN RECEPTOR NEGATIVE: ICD-10-CM

## 2022-05-18 LAB
ALBUMIN SERPL-MCNC: 4.4 G/DL (ref 3.5–5.2)
ALBUMIN/GLOB SERPL: 2 G/DL
ALP SERPL-CCNC: 101 U/L (ref 39–117)
ALT SERPL W P-5'-P-CCNC: 20 U/L (ref 1–33)
ANION GAP SERPL CALCULATED.3IONS-SCNC: 7.6 MMOL/L (ref 5–15)
AST SERPL-CCNC: 19 U/L (ref 1–32)
BASOPHILS # BLD AUTO: 0.04 10*3/MM3 (ref 0–0.2)
BASOPHILS NFR BLD AUTO: 0.7 % (ref 0–1.5)
BILIRUB SERPL-MCNC: 0.2 MG/DL (ref 0–1.2)
BUN SERPL-MCNC: 17 MG/DL (ref 6–20)
BUN/CREAT SERPL: 20.5 (ref 7–25)
CALCIUM SPEC-SCNC: 9.4 MG/DL (ref 8.6–10.5)
CHLORIDE SERPL-SCNC: 105 MMOL/L (ref 98–107)
CO2 SERPL-SCNC: 26.4 MMOL/L (ref 22–29)
CREAT SERPL-MCNC: 0.83 MG/DL (ref 0.57–1)
DEPRECATED RDW RBC AUTO: 39 FL (ref 37–54)
EGFRCR SERPLBLD CKD-EPI 2021: 89.3 ML/MIN/1.73
EOSINOPHIL # BLD AUTO: 0.26 10*3/MM3 (ref 0–0.4)
EOSINOPHIL NFR BLD AUTO: 4.2 % (ref 0.3–6.2)
ERYTHROCYTE [DISTWIDTH] IN BLOOD BY AUTOMATED COUNT: 12 % (ref 12.3–15.4)
GLOBULIN UR ELPH-MCNC: 2.2 GM/DL
GLUCOSE SERPL-MCNC: 115 MG/DL (ref 65–99)
HCT VFR BLD AUTO: 35.8 % (ref 34–46.6)
HGB BLD-MCNC: 11.9 G/DL (ref 12–15.9)
IMM GRANULOCYTES # BLD AUTO: 0.01 10*3/MM3 (ref 0–0.05)
IMM GRANULOCYTES NFR BLD AUTO: 0.2 % (ref 0–0.5)
LYMPHOCYTES # BLD AUTO: 1.4 10*3/MM3 (ref 0.7–3.1)
LYMPHOCYTES NFR BLD AUTO: 22.9 % (ref 19.6–45.3)
MCH RBC QN AUTO: 29.3 PG (ref 26.6–33)
MCHC RBC AUTO-ENTMCNC: 33.2 G/DL (ref 31.5–35.7)
MCV RBC AUTO: 88.2 FL (ref 79–97)
MONOCYTES # BLD AUTO: 0.59 10*3/MM3 (ref 0.1–0.9)
MONOCYTES NFR BLD AUTO: 9.6 % (ref 5–12)
NEUTROPHILS NFR BLD AUTO: 3.82 10*3/MM3 (ref 1.7–7)
NEUTROPHILS NFR BLD AUTO: 62.4 % (ref 42.7–76)
NRBC BLD AUTO-RTO: 0 /100 WBC (ref 0–0.2)
PLATELET # BLD AUTO: 257 10*3/MM3 (ref 140–450)
PMV BLD AUTO: 10 FL (ref 6–12)
POTASSIUM SERPL-SCNC: 4.1 MMOL/L (ref 3.5–5.2)
PROT SERPL-MCNC: 6.6 G/DL (ref 6–8.5)
RBC # BLD AUTO: 4.06 10*6/MM3 (ref 3.77–5.28)
SODIUM SERPL-SCNC: 139 MMOL/L (ref 136–145)
VIT B12 BLD-MCNC: 868 PG/ML (ref 211–946)
WBC NRBC COR # BLD: 6.12 10*3/MM3 (ref 3.4–10.8)

## 2022-05-18 PROCEDURE — 82607 VITAMIN B-12: CPT | Performed by: INTERNAL MEDICINE

## 2022-05-18 PROCEDURE — 85025 COMPLETE CBC W/AUTO DIFF WBC: CPT | Performed by: INTERNAL MEDICINE

## 2022-05-18 PROCEDURE — 96375 TX/PRO/DX INJ NEW DRUG ADDON: CPT

## 2022-05-18 PROCEDURE — 25010000002 TRASTUZUMAB-ANNS 420 MG RECONSTITUTED SOLUTION 1 EACH VIAL: Performed by: INTERNAL MEDICINE

## 2022-05-18 PROCEDURE — 25010000002 HEPARIN LOCK FLUSH PER 10 UNITS: Performed by: INTERNAL MEDICINE

## 2022-05-18 PROCEDURE — 99214 OFFICE O/P EST MOD 30 MIN: CPT | Performed by: INTERNAL MEDICINE

## 2022-05-18 PROCEDURE — 96413 CHEMO IV INFUSION 1 HR: CPT

## 2022-05-18 PROCEDURE — 80053 COMPREHEN METABOLIC PANEL: CPT | Performed by: INTERNAL MEDICINE

## 2022-05-18 RX ORDER — HEPARIN SODIUM (PORCINE) LOCK FLUSH IV SOLN 100 UNIT/ML 100 UNIT/ML
500 SOLUTION INTRAVENOUS AS NEEDED
Status: DISCONTINUED | OUTPATIENT
Start: 2022-05-18 | End: 2022-05-18 | Stop reason: HOSPADM

## 2022-05-18 RX ORDER — FAMOTIDINE 10 MG/ML
20 INJECTION, SOLUTION INTRAVENOUS ONCE
Status: CANCELLED | OUTPATIENT
Start: 2022-05-18

## 2022-05-18 RX ORDER — SODIUM CHLORIDE 9 MG/ML
250 INJECTION, SOLUTION INTRAVENOUS ONCE
Status: CANCELLED | OUTPATIENT
Start: 2022-05-18

## 2022-05-18 RX ORDER — SODIUM CHLORIDE 0.9 % (FLUSH) 0.9 %
10 SYRINGE (ML) INJECTION AS NEEDED
Status: CANCELLED | OUTPATIENT
Start: 2022-05-18

## 2022-05-18 RX ORDER — FAMOTIDINE 10 MG/ML
20 INJECTION, SOLUTION INTRAVENOUS ONCE
Status: COMPLETED | OUTPATIENT
Start: 2022-05-18 | End: 2022-05-18

## 2022-05-18 RX ORDER — SODIUM CHLORIDE 0.9 % (FLUSH) 0.9 %
10 SYRINGE (ML) INJECTION AS NEEDED
Status: DISCONTINUED | OUTPATIENT
Start: 2022-05-18 | End: 2022-05-18 | Stop reason: HOSPADM

## 2022-05-18 RX ORDER — SODIUM CHLORIDE 9 MG/ML
250 INJECTION, SOLUTION INTRAVENOUS ONCE
Status: COMPLETED | OUTPATIENT
Start: 2022-05-18 | End: 2022-05-18

## 2022-05-18 RX ORDER — CHOLECALCIFEROL (VITAMIN D3) 50 MCG
4000 TABLET ORAL 2 TIMES WEEKLY
COMMUNITY

## 2022-05-18 RX ORDER — HEPARIN SODIUM (PORCINE) LOCK FLUSH IV SOLN 100 UNIT/ML 100 UNIT/ML
500 SOLUTION INTRAVENOUS AS NEEDED
Status: CANCELLED | OUTPATIENT
Start: 2022-05-18

## 2022-05-18 RX ADMIN — FAMOTIDINE 20 MG: 10 INJECTION INTRAVENOUS at 10:23

## 2022-05-18 RX ADMIN — TRASTUZUMAB-ANNS 390 MG: 420 INJECTION, POWDER, LYOPHILIZED, FOR SOLUTION INTRAVENOUS at 10:48

## 2022-05-18 RX ADMIN — Medication 500 UNITS: at 11:31

## 2022-05-18 RX ADMIN — SODIUM CHLORIDE 250 ML: 9 INJECTION, SOLUTION INTRAVENOUS at 10:00

## 2022-05-18 RX ADMIN — Medication 10 ML: at 11:31

## 2022-05-18 NOTE — NURSING NOTE
Noted that the change in mg/kg was discussed last treatment with Dr. Beavers and she did not want dose changed. Pt. Also states that she has 3 treatments after todays dose.

## 2022-05-18 NOTE — PROGRESS NOTES
Subjective     REASON FOR follow-up:    1.  Heterozygous state for factor V Leiden    2.  New onset stroke on aspirin by neurology    3.  Hypercholesterolemia    4.  Hypercoagulable work-up done.  Antithrombin 109% protein S activity 85% protein S antigen 107%, protein C activity 85%.  Anticardiolipin antibody IgM 24%, antibeta-2 glycoprotein antibody negative, lupus anticoagulant not detected, prothrombin gene mutation negative.    5.  Screening mammogram showed abnormality in the left breast 6 o'clock position, 8 mm on ultrasound, ultrasound-guided left breast biopsy, 6 cm from the nipple showed evidence of invasive ductal carcinoma poorly differentiated grade 3, [place] score of 8 out of 9 ER negative, TX negative, HER-2/ese 3+ positive.    6.  CT scan February 24, 2021 showed focal area of fatty infiltration of the liver.  1 cm hypoattenuating cortical lesion in the upper pole of the right kidney.  Similarly nodule in the upper pole of the left kidney is 1.5 cm.  Further evaluation by ultrasound suggested  · Ultrasound March 1, 2021 shows solid lesion in the upper pole of the right kidney.  In the left kidney along the midpole of the left kidney is a nodule which is 16 x 16 x 14 mm which is thought to be a cyst.  A 6-month follow-up CT suggested    6.  S/p left partial mastectomy with sentinel lymph node biopsy.  Tumor was present at 5:00, invasive ductal carcinoma, [place] score of 8, grade 3, greatest dimension was 12 mm x 8 x 4 mm.  Single focus of invasive carcinoma present.  There was extensive DCIS which is 30 mm x 8 x 2 mm, grade 3, no evidence of lymphovascular space invasion or dermal lymphatic invasion.  Margins were clear.  4 lymph nodes were negative.  It is a p T1cp N0, ER negative TX negative HER-2/ese 3+ with Ki-67 of 50%.  · [employer] genetic test negative for 47 genes    7.recently initiated Xarelto at loading dose of 15 mg twice a day x3 weeks to then be switched to 20 mg daily per  protocol.  She is doing this because of Mediport in place and known factor V Leiden heterozygosity.        History of Present Illness     Patient is a 42-year-old female with T1CN0 ER negative ND negative HER2 positive breast cancer status postchemotherapy and currently on Herceptin every 3 weeks.    Last echocardiogram May 11, 2022 which showed ejection fraction of 57%.  Previous echocardiogram showed ejection fraction of 58.8%.  Patient has already seen Dr. Virk.  She has got 2 more Herceptin's left.  She is asymptomatic.  She has got a little lymphedema of the left upper extremity.  Patient has new onset constipation for the last few months and will refer her to GI.        Oncologic history:  Patient is here for follow-up of her mammogram.  Patient had screening mammogram April 2, 2021:  NAD a focal asymmetry was present in the posterior one third retroareolar region of the left breast.  Further spot compression images and left breast ultrasound was suggested.  Right breast was negative    April 9, 2021: Left diagnostic mammogram showed an area of focal asymmetry in the posterior one third region aspect of the left breast her breast    Ultrasound showed an irregular 0.8 cm lesion in the left breast at the 6 o'clock position of the order of 6 cm from the nipple.  Ultrasound-guided left breast biopsy was recommended.    April 26, 2021: Ultrasound-guided biopsy of the left breast 6 o'clock position, 6 cm from the nipple showed    Invasive ductal carcinoma, poorly differentiated with a [place] score grade 3 with a score of 8 out of 9 measuring at least 7 mm.  No definitive ductal carcinoma is in situ is identified.  ER 1% negative  ND 1% negative   HER-2/ese 3+ positive    There was no evidence of lymphadenopathy either in the mammogram of the ultrasound.  We suggested an MRI of the breast.  Patient has strong family history of breast cancer      May 7, 2021: MRI of bilateral breast reviewed.  My interpretation is  that there is area of enhancement in the 6 o'clock position of the left breast this is the biopsy-proven malignancy.  There is additional area of linear enhancement anteriorly and laterally measuring up to 1.2 cm this is approximately 5.6 cm from the nipple.  In addition there is a enhancement 2 to 3 mm in the anterolateral aspect of the lateral aspect of the left breast which is 4.6 cm from the nipple.  There is also mildly prominent posterior lymph node in the mid axilla which measures 1 cm with cortical thickening.  Findings are consistent with multifocal disease.  No contralateral disease or internal mammary adenopathy identified    May 20, 2021: Genetic test, [facility] genetic test shows 47 genes negative    May 21, 2021: I reviewed the biopsy of the lymph node in the left axilla which shows reactive lymph node negative for any carcinoma or lymphoma    June 2, 2021:Final Diagnosis  1. Left Breast, 5:00 o'clock, MRI-guided Biopsies for Linear Enhancement: INVASIVE MAMMARY CARCINOMA, NO  SPECIAL TYPE (INVASIVE DUCTAL CARCINOMA).  A. Largest contiguous focus in a core measures 4 mm.  B. No in-situ component identified.  C. Brisk lymphocytic response noted (see Comment).  D. No definitive lymphovascular nor perineural invasion identified.  2. Left Breast, 2:00 o'clock, MRI-guided Biopsy for Enhancement:  A. Benign breast parenchyma with radial scar and micropapillomas.  B. Usual ductal hyperplasia and columnar cell hyperplasia.  C. No atypical hyperplasia, in-situ nor invasive carcinoma identified    ER, AR, HER-2 new and Ki-67 pending on this new biopsy      Patient has been seen by Dr. Lashonda Euceda with plans of doing surgery on July first 2021    Once patient undergoes surgery lumpectomy with sentinel lymph node biopsy we will give further recommendation about treatment options.  Given that patient has multifocal disease and both of the lesions 2 lesions are 1.2 cm each, with it being a HER-2 positive tumor on  the previous biopsy at 6:00 Dr. Euceda and myself discussed and patient preferred to undergo port placement at the same time of surgery.    Patient had [employer] genetic test which was negative.    She has completed surgery July 1, 2021.  She underwent left partial mastectomy with left axillary sentinel lymph node biopsy and right port placement.  Clinically she was thought to have a high-grade multifocal invasive ductal carcinoma ER/SD negative, SD positive.  The lesions require preoperative localization.  The multifocal cancer was bracketed with 2 savvy markers and the radial scar was localized with a needle.  Because of the volume of tissue that will be excised related to the breast volume the case was done in conjunction with Dr. Zavala for oncoplastic closure.    She is 2 weeks from surgery.  She is healing up reasonably well.    On review of her pathology she had left partial mastectomy with sentinel lymph node biopsy.  Tumor was present at 5:00, invasive ductal carcinoma, Oziel score of 8, grade 3, greatest dimension was 12 mm x 8 x 4 mm.  Single focus of invasive carcinoma present.  There was extensive DCIS which is 30 mm x 8 x 2 mm, grade 3, no evidence of lymphovascular space invasion or dermal lymphatic invasion.  Margins were clear.  4 lymph nodes were negative.  It is a p T1cp N0, ER negative SD negative HER-2/ese 3+ with Ki-67 of 50%.    Patient is here to discuss further options of treatment.  Given small tumor T1c N0, she is a good candidate for weekly Taxol Herceptin.    Given that patient has heterozygous state for factor V Leiden and currently has port placed we will plan to start Eliquis 2.5 mg p.o. twice daily.  I have left a message for Dr. Craft in neurology to call me to discuss if patient needs to continue aspirin or we can hold off since be starting Eliquis.      Genetic test negative    August 4, 2021: Weekly Taxol Herceptin x12 weeks followed by Herceptin x1 year.  Cycle 1  today  Cycle 12 Taxol Herceptin October 10, 2021      Hematologic history:  patient is a 40-year-old female who presented end of December with numbness and tingling in her left upper extremity and left face.  She went to see Dr. Goodman who then got concerned and referred to neurology.  She was referred to Dr. Freedman and talk to Dr. Donna guo for evaluation.  Patient underwent ultrasound of the carotids which did not show significant stenosis of the carotids.  She underwent 2D echocardiogram which showed a good ejection fraction of 64% with mild mitral valve prolapse and mild mitral stenosis.  She had a 24-hour Holter which was negative.  She then had also an MRI of the brain February 3, 2021 which showed areas of hyperintensity involving the subcortical white matter of the right frontal lobe superior laterally and posteriorly with the largest area measuring 8 9 mm.  There is also enhancement present.  The findings are consistent with a subacute infarct.  They could not differentiate if there is any underlying neoplastic process but a short-term follow-up was suggested in order to follow-up.  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    Patient currently got started on aspirin and factor V Leiden was obtained by neurology because patient's paternal aunt had factor V Leiden mutation and she was heterozygous.  Patient's testing also showed that she was heterozygous.    Patient states her numbness and tingling is resolved completely on the left arm and face it just lasted for a 24 hours.  She was here referred here for neurology to see if there is any other cause for her underlying hypercoagulable state.  She has not had a complete hypercoagulable work-up.  Also discussed with her that an underlying malignancy could cause a hypercoagulable state and we would need to do a CT scan to rule that out.    Patient's  mother has had breast cancer in her 50s but had pancreatic cancer at 68 and  from that.  Patient's dad had stroke at 63.  But he is alive at 74.  She has 1 brother who is 40 in good health.  Paternal aunt had breast cancer in her 40s.  Paternal grandfather had colon cancer in his late 80s.  Maternal uncle had throat cancer and another maternal uncle had skin cancer with metastasis to the lung.  And maternal grandmother had breast cancer.    Patient was to have mammogram done last year but because of COVID-19 it got postponed and she has not had it done yet.    Patient used to be a smoker half pack per day for 7 years but quit 10 years ago.  She has high cholesterol for which she is on treatment.    Past Medical History:   Diagnosis Date   • Acute stress reaction 2014   • ADD (attention deficit disorder)    • ADHD (attention deficit hyperactivity disorder)    • Anxiety and depression    • CTS (carpal tunnel syndrome)    • Factor 5 Leiden mutation, heterozygous ([facility]) 2021   • Family history of breast cancer 2013   • Fracture, cervical vertebra ([facility]) 1997    C4   • H/O complete eye exam 2016   • Hearing loss    • 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   • History of rheumatic fever    • Hypertension    • Hypothyroidism    • Infiltrating ductal carcinoma of left breast ([facility]) 2021    Left   • Kidney stone    • Mitral valve insufficiency    • PONV (postoperative nausea and vomiting)    • Stroke ([facility]) 2020    HX OF   • Stroke-like symptoms         Past Surgical History:   Procedure Laterality Date   • BREAST BIOPSY Left 2021    IDC   • BREAST LUMPECTOMY     • BREAST LUMPECTOMY WITH SENTINEL NODE BIOPSY N/A 2021    Procedure: right port placement, Left SHAINA-guided, bracketed, partial mastectomy and sentinel lymph node biopsy Left breast needle-localized excisional biopsy;  Surgeon: Lashonda Euceda MD;  Location: [facility] OR;  Service: General;   Laterality: N/A;   • BREAST SURGERY Bilateral 07/01/2021    Procedure: RIGHT BREAST REDUCTION MASTOPEXY LEFT BREAST ONCOPLASTIC CLOSURE;  Surgeon: Caridad Baker MD PhD;  Location: [facility] OR;  Service: Plastics;  Laterality: Bilateral;   • NO PAST SURGERIES     • PAP SMEAR  2016        Current Outpatient Medications on File Prior to Visit   Medication Sig Dispense Refill   • atomoxetine (Strattera) 40 MG capsule Take 1 capsule by mouth Daily. 30 capsule 0   • atorvastatin (Lipitor) 10 MG tablet Take 1 tablet by mouth Daily. 30 tablet 11   • Cholecalciferol (D3) 50 MCG (2000 UT) tablet Take 4,000 Units by mouth Daily.     • famotidine (PEPCID) 20 MG tablet TAKE 1 TABLET BY MOUTH DAILY 30 tablet 3   • levothyroxine (Synthroid) 125 MCG tablet Take 1 tablet by mouth Daily. 90 tablet 1   • lidocaine-prilocaine (EMLA) 2.5-2.5 % cream Apply  topically to the appropriate area as directed As Needed for Mild Pain . 1 each 2   • LORazepam (ATIVAN) 0.5 MG tablet Take 1 tablet by mouth Every 8 (Eight) Hours As Needed for Anxiety. for anxiety 30 tablet 2   • ondansetron ODT (ZOFRAN-ODT) 8 MG disintegrating tablet Place 1 tablet on the tongue Every 8 (Eight) Hours As Needed for Nausea or Vomiting. 30 tablet 3   • traZODone (DESYREL) 50 MG tablet Take 1 tablet by mouth Every Night. 30 tablet 11   • valsartan (DIOVAN) 160 MG tablet Take 1 tablet by mouth Daily. 30 tablet 5   • vitamin B-12 (CYANOCOBALAMIN) 1000 MCG tablet Take 1 tablet by mouth Daily. 30 tablet 2   • Xarelto 20 MG tablet TAKE 1 TABLET BY MOUTH DAILY 30 tablet 3     Current Facility-Administered Medications on File Prior to Visit   Medication Dose Route Frequency Provider Last Rate Last Admin   • heparin injection 500 Units  500 Units Intravenous PRN Neena Beavers MD   500 Units at 08/11/21 1718   • sodium chloride 0.9 % flush 10 mL  10 mL Intravenous PRN Neena Beavers MD   10 mL at 08/11/21 1718        ALLERGIES:    Allergies   Allergen Reactions    • Sulfa Antibiotics Rash        Social History     Socioeconomic History   • Marital status: Significant Other   • Number of children: 0   Tobacco Use   • Smoking status: Former Smoker     Packs/day: 1.00     Years: 10.00     Pack years: 10.00     Types: Cigarettes     Start date:      Quit date: 2009     Years since quittin.3   • Smokeless tobacco: Never Used   Vaping Use   • Vaping Use: Never used   Substance and Sexual Activity   • Alcohol use: Yes     Comment: RARELY   • Drug use: No   • Sexual activity: Defer        Family History   Problem Relation Age of Onset   • Breast cancer Mother 53   • Pancreatic cancer Mother 68   • Lung cancer Maternal Grandmother    • Stroke Father    • Breast cancer Paternal Aunt 55   • Prostate cancer Maternal Grandfather    • Prostate cancer Paternal Grandfather    • Brain cancer Maternal Cousin 20   • Melanoma Maternal Uncle    • Ovarian cancer Other         Paternal great aunt    • Breast cancer Other    • Breast cancer Paternal Great-Grandmother 94   • Hypertension Brother    • Malig Hyperthermia Neg Hx       Family  history: Mother had breast cancer at age 57.  Maternal great aunt had breast cancer and paternal great aunt had breast cancer in her 40s.  Patient's mother had pancreatic cancer as well.  Paternal grandfather had prostate cancer.    Review of Systems   Constitutional: Positive for appetite change. Negative for chills, diaphoresis, fatigue, fever and unexpected weight change.   HENT: Negative for hearing loss, sore throat and trouble swallowing.    Respiratory: Negative for cough, chest tightness, shortness of breath and wheezing.    Cardiovascular: Negative for chest pain, palpitations and leg swelling.   Gastrointestinal: Positive for constipation and nausea (Every morning). Negative for abdominal distention, abdominal pain, diarrhea and vomiting.   Genitourinary: Negative for dysuria, frequency, hematuria and urgency.   Musculoskeletal: Negative for  "joint swelling.        No muscle weakness.   Skin: Negative for rash and wound.   Neurological: Negative for seizures, syncope, speech difficulty, weakness, numbness and headaches.   Hematological: Negative for adenopathy. Does not bruise/bleed easily.   Psychiatric/Behavioral: Positive for sleep disturbance. Negative for behavioral problems, confusion and suicidal ideas.   All other systems reviewed and are negative.   as per HPI       Objective     Vitals:    05/18/22 0817   BP: 125/80   Pulse: 73   Resp: 16   Temp: 97.3 °F (36.3 °C)   TempSrc: Temporal   SpO2: 97%   Weight: 69.4 kg (153 lb)   Height: 157.5 cm (62.01\")   PainSc: 0-No pain     Current Status 5/18/2022   ECOG score 0     Physical exam    CONSTITUTIONAL:  Vital signs reviewed.  No distress, looks comfortable.  RESPIRATORY:  Normal respiratory effort.  Lungs clear to auscultation bilaterally.  CARDIOVASCULAR:  Normal S1, S2.  No murmurs rubs or gallops.  No significant lower extremity edema.  GASTROINTESTINAL: Abdomen appears unremarkable.  Nontender.  No hepatomegaly.  No splenomegaly.  LYMPHATIC:  No cervical, supraclavicular, axillary lymphadenopathy.  SKIN:  Warm.  No rashes.  PSYCHIATRIC:  Normal judgment and insight.  Normal mood and affect.    CRECENT LABS:  Results from last 7 days   Lab Units 05/18/22  0826   WBC 10*3/mm3 6.12   NEUTROS ABS 10*3/mm3 3.82   HEMOGLOBIN g/dL 11.9*   HEMATOCRIT % 35.8   PLATELETS 10*3/mm3 257     Results from last 7 days   Lab Units 05/18/22  0826   SODIUM mmol/L 139   POTASSIUM mmol/L 4.1   CHLORIDE mmol/L 105   CO2 mmol/L 26.4   BUN mg/dL 17   CREATININE mg/dL 0.83   CALCIUM mg/dL 9.4   ALBUMIN g/dL 4.40   BILIRUBIN mg/dL 0.2   ALK PHOS U/L 101   ALT (SGPT) U/L 20   AST (SGOT) U/L 19   GLUCOSE mg/dL 115*         Assessment & Plan       * [id number], ER negative ME negative HER-2/ese 3+ with Ki-67 of 50%.  S/p left partial mastectomy with sentinel lymph node biopsy.  Tumor was present at 5:00, invasive ductal " carcinoma, Oziel score of 8, grade 3, greatest dimension was 12 mm x 8 x 4 mm.  Single focus of invasive carcinoma present.  There was extensive DCIS which is 30 mm x 8 x 2 mm, grade 3, no evidence of lymphovascular space invasion or dermal lymphatic invasion.  Margins were clear.  4 lymph nodes were negative.  It is a p T1cp N0, ER negative PA negative HER-2/ese 3+ with Ki-67 of 50%.  · Patient is s/p port placement  · Discussed in length with patient that given a small tumor she is eligible for weekly Taxol Herceptin.  Weekly Taxol x12 weeks along with weekly Herceptin followed by 1 year of Herceptin.  · Discussed patient in the breast cancer conference  · 2D echo done which showed ejection fraction of 61-65% and strain pattern of -20.8.  · Patient also seen by Dr. Virk cardiology and Dr. Virk is planning to repeat echocardiogram in 3 months after initiation of therapy.  · 8/4/2021 initiating weekly Taxol Herceptin  · 9/8/2021, proceed with week #6 Taxol and Herceptin.  Patient continues to tolerate treatment well. No signs of peripheral neuropathy.  · Her next echocardiogram due November 4, 2021, She follows up with cardiology Dr. Camron Virk.  · 9/28/2021, proceed with week #9 taxol/herceptin.  She does feel her nausea has worsened after her last cycle of chemotherapy.  She prefers the disintegrating Zofran, this will be called to her pharmacy today.  · Patient is here to take cycle 11 of weekly Taxol with worsening fatigue and worsening rash and overall dysphoric mood.  She also cannot sleep and her quality life is worsening.  At the present time we will plan to give 1 more cycle of Taxol following which she will be referred to radiation.  · Cycle 12 Taxol Herceptin October 20, 2021  · Completed radiation November 2021.  · February 23, 2022: Reviewed echocardiogram with normal ejection fraction and strain pattern.  Patient seen by Dr. Virk and okay to continue Herceptin  · 3/16/2022 here for continued  Herceptin maintenance.  · April 27 2022: Currently tolerating Herceptin.  Last dose of Herceptin will be June 18, 2022.  Reviewed mammogram results from April 4, 2022 which is negative  · May 18 2022: Patient has 2 more cycles of Herceptin after today.  Reviewed echo with ejection fraction 57% stable    *  Factor V Leiden heterozygosity with right frontal stroke and patient presented in December 2020 with left-sided weakness tingling and numbness and also numbness in the face.  · Was referred to neurology and cardiology by Dr. Goodman  · Ultrasound of carotid does not show significant stenosis  · 24 Holter is negative  · 2D echocardiogram shows ejection fraction of 64% with mild mitral regurg and mitral stenosis  · Neurology obtain factor V Leiden given that patient's paternal aunt had's history of factor V Leiden and that was heterozygous for factor V Leiden  · Continue aspirin as per neurology.  Also patient on medications for her high cholesterol started after stroke currently asymptomatic  · Hypercoagulable work-up done which is negative except heterozygous state for factor V Leiden.  · Complete stroke work-up has been negative and since this is arterial stroke and she was not on aspirin prior to that and her symptoms have resolved I agree with continuing aspirin alone along with high cholesterol medications.  · MR venogram done on May 10, 2021 is negative.  Patient has been referred to the stroke neurologist Dr. Johnson  · Patient diagnosed with breast cancer with Mediport placed.  Per discussion with neurology, patient initiated on prophylactic Xarelto and aspirin discontinued.  · Patient had one nosebleed which lasted 10 minutes but with pinching the nose it helped.  But she is switching to 20 mg daily from tomorrow.  We discussed about placing ice and notifying us if her nosebleeds are significant.  But it resolved.  It was likely secondary to dry air  · 9/7/2021, patient continues to experience nosebleeds.  " Reports these occur when her nose itches, and after scratching her nose it begins to bleed.  She feels this may be related to dry air, so I have asked her to start using saline nasal spray to help moisten her nose.  If she experiences nosebleeds that do not stop, I asked her to notify our office.  · Tolerating anticoagulation with Xarelto.  No bleeding issues with Xarelto  · Patient still has the port and hence will need to continue Xarelto    * Family history of cancer: Patient's mother had breast cancer at age 50 and pancreatic cancer at age 68 and  from pancreatic cancer.  Patient's maternal grandmother had breast cancer in her 50s.  Her maternal uncle had skin cancer which went to the lung and another maternal uncle had throat cancer.  Maternal aunt had call colon polyps.  Patient's paternal aunt had breast cancer in her 40s.  And paternal grandfather with colon cancer in his 80s.  Paternal aunt also had factor V Leiden.  · Genetic testing is negative    * Solid nodule in the right kidney on ultrasound, will schedule follow-up with urology  · Patient was to follow-up with Dr. Shultz  · Will need records from urology  · Will discuss with patient at her next appointment about follow-up with urology  · Patient was seen by Dr. Becerra and apparently no follow-up was needed for the nodules in the kidney.  · We will get records from Dr. Becerra's office    *  Elevated BMI, encourage diet and exercise.  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  Patient has been requiring frequent Tums.  Recommended she begin Pepcid 20 mg daily.    · Stable, continue Pepcid 20 mg daily.    *Constipation likely secondary to ferrous sulfate.  Oral iron discontinued.     *Headache likely related to body ache because of Taxol  · 2021, patient discusses concern about worsening headaches.  Describing them as \"glass breaking\".  Started about 2 weeks ago, and " progressively worsened.  Occurring 3-4 times daily now, and lasting less than 1 minute with each episode.  This occurs in the same place, right upper skull.  Denies vision changes or dizziness.  Will order stat MRI of the brain for further evaluation.  The patient follows with a neurologist, and is going to notify patient help with neuropathy as well of her symptoms and the plan for MRI.    *Elevated liver function tests, total bilirubin still normal AST ALT slightly elevated.  Patient did take Tylenol but not too much.  No dose adjustments required at the fingers time.  · Liver function tests normalized    *Iron deficiency anemia, patient's percent saturation still low at 9% s/p full dose of IV Venofer.  · 9/29/2021, patient will receive dose #5 Venofer today.  Hemoglobin today 10.1.  · 12/22/2021, hemoglobin today 12.7.  · 3/16/2022 hemoglobin remains normal at 12.9.    *Insomnia  · 9/29/2021, patient reports difficulty sleeping.  She is experiencing this every night, not just the nights of treatment when she receives IV dexamethasone.  She has attempted taking Benadryl, however felt groggy following this.  She is going to attempt melatonin to see if this improves her sleep.  · Worsening, will try gabapentin which will help with neuropathy as well  · Patient took gabapentin for a few nights and had issues with  headache.  She is also very concerned about other possible side effects that she discontinued the medication.  She is tried melatonin in the past without results.  We discussed trying Benadryl versus other prescription medication.  She is going to try Benadryl first.    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  · Reviewed echocardiogram which is normal  · Follow-up in 3 weeks in 6 weeks for Herceptin  · Follow-up with me in 6 weeks  · Follow-up with cardiology with Dr. Virk    Patient on high risk medication requiring close monitor for toxicity.    Neena Beavers,  MD  05/18/22       Dr. Maxine Haynes

## 2022-05-20 ENCOUNTER — OFFICE VISIT (OUTPATIENT)
Dept: FAMILY MEDICINE CLINIC | Facility: CLINIC | Age: 42
End: 2022-05-20

## 2022-05-20 VITALS
HEART RATE: 88 BPM | DIASTOLIC BLOOD PRESSURE: 76 MMHG | HEIGHT: 62 IN | WEIGHT: 153 LBS | TEMPERATURE: 97.5 F | OXYGEN SATURATION: 98 % | BODY MASS INDEX: 28.16 KG/M2 | RESPIRATION RATE: 16 BRPM | SYSTOLIC BLOOD PRESSURE: 112 MMHG

## 2022-05-20 DIAGNOSIS — Z12.4 PAP SMEAR FOR CERVICAL CANCER SCREENING: Primary | ICD-10-CM

## 2022-05-20 NOTE — PROGRESS NOTES
Subjective   Radha Astorga is a 42 y.o. female.     History of Present Illness   Radha Astorga 42 y.o. female  0, Para 0 female who presents for their yearly GYN exam. Periods are absent since chemotherapy, LMP was 2021,Cyclic symptoms include none. No intermenstrual bleeding, spotting, or discharge.  She reports not having additional complaints.    Current contraception:  no method at present time  History of abnormal Pap smear: no  Family history of uterine or ovarian cancer: no  Family history of colon cancer: yes,   History of abnormal mammogram: yes - breast cancer   Family history of breast cancer: yes - mother, maternal grandmother, paternal aunt, paternal grandmother      She is seeing OT for lymphedema to left breast and chest wall post mastectomy and reconstruction.    The following portions of the patient's history were reviewed and updated as appropriate: allergies, current medications, past family history, past medical history, past social history, past surgical history and problem list.    Review of Systems   Constitutional: Negative for unexpected weight change.   HENT: Negative.    Eyes: Negative for pain and visual disturbance.   Respiratory: Negative for chest tightness and shortness of breath.         No nipple discharge or breast mass   Cardiovascular: Negative for chest pain and palpitations.   Gastrointestinal: Negative for abdominal pain and blood in stool.   Endocrine: Negative.    Genitourinary: Negative for dyspareunia, dysuria, frequency, hematuria, menstrual problem, pelvic pain, vaginal discharge and vaginal pain.   Musculoskeletal: Negative for joint swelling.   Skin: Negative for color change, rash and wound.   Allergic/Immunologic: Negative.    Neurological: Negative for dizziness and syncope.   Hematological: Negative for adenopathy.   Psychiatric/Behavioral: Negative.  Negative for behavioral problems.   All other systems reviewed and are negative.      Objective    Physical Exam  Vitals and nursing note reviewed.   Constitutional:       Appearance: Normal appearance. She is well-developed.   HENT:      Head: Normocephalic and atraumatic.   Pulmonary:      Effort: Pulmonary effort is normal.   Chest:   Breasts:      Breasts are asymmetrical.      Right: No mass.      Left: Swelling present. No mass.       Genitourinary:     Exam position: Supine.      Labia:         Right: No rash, tenderness, lesion or injury.         Left: No rash, tenderness, lesion or injury.       Vagina: Normal.      Cervix: No cervical motion tenderness, discharge or friability.      Adnexa:         Right: No mass, tenderness or fullness.          Left: No mass, tenderness or fullness.     Skin:     General: Skin is warm and dry.   Neurological:      Mental Status: She is alert and oriented to person, place, and time.   Psychiatric:         Mood and Affect: Mood normal.         Behavior: Behavior normal.         Thought Content: Thought content normal.         Judgment: Judgment normal.         Assessment & Plan   Diagnoses and all orders for this visit:    1. Pap smear for cervical cancer screening (Primary)  -     Pap IG, HPV-hr

## 2022-05-23 ENCOUNTER — TELEPHONE (OUTPATIENT)
Dept: FAMILY MEDICINE CLINIC | Facility: CLINIC | Age: 42
End: 2022-05-23

## 2022-05-23 NOTE — TELEPHONE ENCOUNTER
In regards to the message you sent me on Vyvanse for this patient.   I looked back in her chart to see why the Vyvanse was denied and saw this:     Vyvance was denied. It says you need to try 2 alternative medication and fail them before they will cover it.  Those medication are Atomoxetine (Strattera), dexmethylphenidate ER (Focalin), methylphenidate (Cotempla XR-ODT) and methlyphenidate ER.      I can Appeal it but it most likely will take 14-30 days and be denied according to these plan limitations. She hasn't tried any of the above since this denial in Nov. 2021. She just changed back to the Adderall.       Thanks  Mary

## 2022-05-25 ENCOUNTER — PRIOR AUTHORIZATION (OUTPATIENT)
Dept: FAMILY MEDICINE CLINIC | Facility: CLINIC | Age: 42
End: 2022-05-25

## 2022-05-25 ENCOUNTER — OFFICE VISIT (OUTPATIENT)
Dept: NEUROLOGY | Facility: CLINIC | Age: 42
End: 2022-05-25

## 2022-05-25 VITALS
WEIGHT: 152 LBS | HEART RATE: 87 BPM | OXYGEN SATURATION: 99 % | DIASTOLIC BLOOD PRESSURE: 76 MMHG | BODY MASS INDEX: 27.79 KG/M2 | SYSTOLIC BLOOD PRESSURE: 110 MMHG

## 2022-05-25 DIAGNOSIS — Z86.73 HISTORY OF STROKE: Primary | ICD-10-CM

## 2022-05-25 DIAGNOSIS — D68.51 FACTOR 5 LEIDEN MUTATION, HETEROZYGOUS: ICD-10-CM

## 2022-05-25 DIAGNOSIS — F90.0 ATTENTION DEFICIT HYPERACTIVITY DISORDER (ADHD), PREDOMINANTLY INATTENTIVE TYPE: Primary | ICD-10-CM

## 2022-05-25 DIAGNOSIS — E78.2 MIXED HYPERLIPIDEMIA: ICD-10-CM

## 2022-05-25 DIAGNOSIS — C50.812 MALIGNANT NEOPLASM OF OVERLAPPING SITES OF LEFT BREAST IN FEMALE, ESTROGEN RECEPTOR NEGATIVE: ICD-10-CM

## 2022-05-25 DIAGNOSIS — I10 ESSENTIAL HYPERTENSION: ICD-10-CM

## 2022-05-25 DIAGNOSIS — Z17.1 MALIGNANT NEOPLASM OF OVERLAPPING SITES OF LEFT BREAST IN FEMALE, ESTROGEN RECEPTOR NEGATIVE: ICD-10-CM

## 2022-05-25 PROCEDURE — 99213 OFFICE O/P EST LOW 20 MIN: CPT | Performed by: PSYCHIATRY & NEUROLOGY

## 2022-05-25 NOTE — PROGRESS NOTES
DOS: 2022  NAME: Radha Astorga   : 1980  PCP: Fadi Goodman MD  Chief Complaint   Patient presents with   • Stroke       Chief complaint: Stroke follow-up  Subjective: 42-year-old female with early-stage breast cancer, chronic right subcortical stroke, hypertension, hyperlipidemia, factor V Leiden.  She was originally seen by me for her right subcortical stroke.  She has no residual deficits.  She was started on Xarelto given that she has a port for chemotherapy and factor V Leiden.  No recurrent signs or symptoms of stroke since I originally saw her in 2021.  She is finishing up her cancer treatment and has done very well.  She has had occasional headaches but none recently.  She had diffuse nosebleeds on Xarelto but this remitted.  She is not sure whether the plan is for her to remain on long-term Xarelto after her cancer treatment finishes.  She is interested in reducing or limiting his many medications as possible.    Objective:  Vital signs: /76 (BP Location: Right arm, Patient Position: Sitting, Cuff Size: Adult)   Pulse 87   Wt 68.9 kg (152 lb)   SpO2 99%   BMI 27.79 kg/m²    Exam:  vitals reviewed  MS: oriented x3, recent/remote memory intact, normal attention/concentration, language intact, no neglect.  CN: visual acuity grossly normal, PERRL, EOMI, no facial droop, no dysarthria  Motor: 5/5 throughout upper and lower extremities, normal tone  Sensory: intact to cold temperature and vibration throughout  Gait: Normal station, no ataxia    Laboratory results:  Lab Results   Component Value Date    LDL 46 2021     (H) 2021     (H) 2019            Review of labs: Repeat lipid panel ordered    Review and interpretation of imaging: I personally reviewed her prior MRI in preparation for this visit.  Her initial MRI showed a subacute moderate-sized right subcortical infarct which appears to be lacunar in etiology.  Bilateral carotid duplex was  significant for right carotid plaque but no significant stenosis.  Follow-up MRIs over the course of the year were stable with no new areas of subacute or chronic infarct.    Diagnoses:  Chronic right subcortical stroke due to small vessel disease  Asymptomatic very low-grade right carotid stenosis  Early-stage breast cancer status post chemotherapy and radiation  Factor V Leiden  Essential hypertension  Mixed hyperlipidemia    Assessment/comments: Patient continues to do very well with no recurrent signs or symptoms of stroke.  From my standpoint there is no indication for long-term Xarelto specifically with regard to secondary stroke prevention.  I have no issue with de-escalating to low-dose aspirin after her port is removed.  I will discuss this with her oncologist.  I would recommend continuation of a low-dose statin over the long-term given her carotid stenosis.  She should continue to monitor her blood pressure regularly.    Plan:  1.  Okay to transition from Xarelto to low-dose aspirin after port is removed if oncology thinks this is appropriate  2.  Continue low-dose Lipitor  3.  Close blood pressure monitoring lifelong    Follow-up with me on an as-needed basis    I spent a total of 25 minutes on this clinical encounter including chart/records review, review of laboratory data, personal review of imaging studies, patient counseling, and care coordination.

## 2022-05-31 LAB
CYTOLOGIST CVX/VAG CYTO: NORMAL
CYTOLOGY CVX/VAG DOC CYTO: NORMAL
CYTOLOGY CVX/VAG DOC THIN PREP: NORMAL
DX ICD CODE: NORMAL
HIV 1 & 2 AB SER-IMP: NORMAL
HPV I/H RISK 4 DNA CVX QL PROBE+SIG AMP: NEGATIVE
Lab: NORMAL
OTHER STN SPEC: NORMAL
RECOM F/U CVX/VAG CYTO: NORMAL
STAT OF ADQ CVX/VAG CYTO-IMP: NORMAL

## 2022-06-06 RX ORDER — FAMOTIDINE 20 MG/1
20 TABLET, FILM COATED ORAL DAILY
Qty: 30 TABLET | Refills: 3 | Status: SHIPPED | OUTPATIENT
Start: 2022-06-06 | End: 2022-11-30

## 2022-06-06 RX ORDER — FAMOTIDINE 20 MG/1
20 TABLET, FILM COATED ORAL DAILY
Qty: 30 TABLET | Refills: 3 | OUTPATIENT
Start: 2022-06-06

## 2022-06-08 ENCOUNTER — INFUSION (OUTPATIENT)
Dept: ONCOLOGY | Facility: HOSPITAL | Age: 42
End: 2022-06-08

## 2022-06-08 ENCOUNTER — TELEPHONE (OUTPATIENT)
Dept: FAMILY MEDICINE CLINIC | Facility: CLINIC | Age: 42
End: 2022-06-08

## 2022-06-08 VITALS
HEART RATE: 91 BPM | BODY MASS INDEX: 27.82 KG/M2 | WEIGHT: 151.2 LBS | OXYGEN SATURATION: 100 % | TEMPERATURE: 97.5 F | SYSTOLIC BLOOD PRESSURE: 135 MMHG | DIASTOLIC BLOOD PRESSURE: 92 MMHG | HEIGHT: 62 IN | RESPIRATION RATE: 18 BRPM

## 2022-06-08 DIAGNOSIS — E78.2 MIXED HYPERLIPIDEMIA: ICD-10-CM

## 2022-06-08 DIAGNOSIS — Z45.2 ENCOUNTER FOR ADJUSTMENT OR MANAGEMENT OF VASCULAR ACCESS DEVICE: ICD-10-CM

## 2022-06-08 DIAGNOSIS — Z17.1 MALIGNANT NEOPLASM OF OVERLAPPING SITES OF LEFT BREAST IN FEMALE, ESTROGEN RECEPTOR NEGATIVE: Primary | ICD-10-CM

## 2022-06-08 DIAGNOSIS — Z01.419 GYNECOLOGIC EXAM NORMAL: Primary | ICD-10-CM

## 2022-06-08 DIAGNOSIS — C50.812 MALIGNANT NEOPLASM OF OVERLAPPING SITES OF LEFT BREAST IN FEMALE, ESTROGEN RECEPTOR NEGATIVE: Primary | ICD-10-CM

## 2022-06-08 LAB
BASOPHILS # BLD AUTO: 0.08 10*3/MM3 (ref 0–0.2)
BASOPHILS NFR BLD AUTO: 1.4 % (ref 0–1.5)
CHOLEST SERPL-MCNC: 144 MG/DL (ref 0–200)
DEPRECATED RDW RBC AUTO: 38.7 FL (ref 37–54)
EOSINOPHIL # BLD AUTO: 0.22 10*3/MM3 (ref 0–0.4)
EOSINOPHIL NFR BLD AUTO: 3.8 % (ref 0.3–6.2)
ERYTHROCYTE [DISTWIDTH] IN BLOOD BY AUTOMATED COUNT: 11.9 % (ref 12.3–15.4)
HCT VFR BLD AUTO: 38 % (ref 34–46.6)
HDLC SERPL-MCNC: 42 MG/DL (ref 40–60)
HGB BLD-MCNC: 12.5 G/DL (ref 12–15.9)
IMM GRANULOCYTES # BLD AUTO: 0.01 10*3/MM3 (ref 0–0.05)
IMM GRANULOCYTES NFR BLD AUTO: 0.2 % (ref 0–0.5)
LDLC SERPL CALC-MCNC: 83 MG/DL (ref 0–100)
LDLC/HDLC SERPL: 1.94 {RATIO}
LYMPHOCYTES # BLD AUTO: 1.53 10*3/MM3 (ref 0.7–3.1)
LYMPHOCYTES NFR BLD AUTO: 26.3 % (ref 19.6–45.3)
MCH RBC QN AUTO: 28.9 PG (ref 26.6–33)
MCHC RBC AUTO-ENTMCNC: 32.9 G/DL (ref 31.5–35.7)
MCV RBC AUTO: 87.8 FL (ref 79–97)
MONOCYTES # BLD AUTO: 0.51 10*3/MM3 (ref 0.1–0.9)
MONOCYTES NFR BLD AUTO: 8.8 % (ref 5–12)
NEUTROPHILS NFR BLD AUTO: 3.46 10*3/MM3 (ref 1.7–7)
NEUTROPHILS NFR BLD AUTO: 59.5 % (ref 42.7–76)
NRBC BLD AUTO-RTO: 0 /100 WBC (ref 0–0.2)
PLATELET # BLD AUTO: 271 10*3/MM3 (ref 140–450)
PMV BLD AUTO: 9.5 FL (ref 6–12)
RBC # BLD AUTO: 4.33 10*6/MM3 (ref 3.77–5.28)
TRIGL SERPL-MCNC: 102 MG/DL (ref 0–150)
VLDLC SERPL-MCNC: 19 MG/DL (ref 5–40)
WBC NRBC COR # BLD: 5.81 10*3/MM3 (ref 3.4–10.8)

## 2022-06-08 PROCEDURE — 80061 LIPID PANEL: CPT | Performed by: PSYCHIATRY & NEUROLOGY

## 2022-06-08 PROCEDURE — 25010000002 HEPARIN LOCK FLUSH PER 10 UNITS: Performed by: INTERNAL MEDICINE

## 2022-06-08 PROCEDURE — 96375 TX/PRO/DX INJ NEW DRUG ADDON: CPT

## 2022-06-08 PROCEDURE — 85025 COMPLETE CBC W/AUTO DIFF WBC: CPT | Performed by: INTERNAL MEDICINE

## 2022-06-08 PROCEDURE — 96413 CHEMO IV INFUSION 1 HR: CPT

## 2022-06-08 PROCEDURE — 25010000002 TRASTUZUMAB-ANNS 420 MG RECONSTITUTED SOLUTION 1 EACH VIAL

## 2022-06-08 RX ORDER — SODIUM CHLORIDE 9 MG/ML
250 INJECTION, SOLUTION INTRAVENOUS ONCE
Status: COMPLETED | OUTPATIENT
Start: 2022-06-08 | End: 2022-06-08

## 2022-06-08 RX ORDER — FAMOTIDINE 10 MG/ML
20 INJECTION, SOLUTION INTRAVENOUS ONCE
Status: COMPLETED | OUTPATIENT
Start: 2022-06-08 | End: 2022-06-08

## 2022-06-08 RX ORDER — SODIUM CHLORIDE 0.9 % (FLUSH) 0.9 %
10 SYRINGE (ML) INJECTION AS NEEDED
Status: DISCONTINUED | OUTPATIENT
Start: 2022-06-08 | End: 2022-06-08 | Stop reason: HOSPADM

## 2022-06-08 RX ORDER — SODIUM CHLORIDE 0.9 % (FLUSH) 0.9 %
10 SYRINGE (ML) INJECTION AS NEEDED
Status: CANCELLED | OUTPATIENT
Start: 2022-06-08

## 2022-06-08 RX ORDER — HEPARIN SODIUM (PORCINE) LOCK FLUSH IV SOLN 100 UNIT/ML 100 UNIT/ML
500 SOLUTION INTRAVENOUS AS NEEDED
Status: CANCELLED | OUTPATIENT
Start: 2022-06-08

## 2022-06-08 RX ORDER — HEPARIN SODIUM (PORCINE) LOCK FLUSH IV SOLN 100 UNIT/ML 100 UNIT/ML
500 SOLUTION INTRAVENOUS AS NEEDED
Status: DISCONTINUED | OUTPATIENT
Start: 2022-06-08 | End: 2022-06-08 | Stop reason: HOSPADM

## 2022-06-08 RX ADMIN — SODIUM CHLORIDE 250 ML: 9 INJECTION, SOLUTION INTRAVENOUS at 09:30

## 2022-06-08 RX ADMIN — FAMOTIDINE 20 MG: 10 INJECTION INTRAVENOUS at 10:04

## 2022-06-08 RX ADMIN — Medication 10 ML: at 11:00

## 2022-06-08 RX ADMIN — Medication 500 UNITS: at 11:00

## 2022-06-08 RX ADMIN — TRASTUZUMAB-ANNS 390 MG: 420 INJECTION, POWDER, LYOPHILIZED, FOR SOLUTION INTRAVENOUS at 10:26

## 2022-06-10 ENCOUNTER — OFFICE VISIT (OUTPATIENT)
Dept: GASTROENTEROLOGY | Facility: CLINIC | Age: 42
End: 2022-06-10

## 2022-06-10 ENCOUNTER — PREP FOR SURGERY (OUTPATIENT)
Dept: SURGERY | Facility: SURGERY CENTER | Age: 42
End: 2022-06-10

## 2022-06-10 VITALS
OXYGEN SATURATION: 98 % | TEMPERATURE: 97.1 F | DIASTOLIC BLOOD PRESSURE: 80 MMHG | WEIGHT: 148.3 LBS | HEIGHT: 62 IN | BODY MASS INDEX: 27.29 KG/M2 | HEART RATE: 90 BPM | SYSTOLIC BLOOD PRESSURE: 132 MMHG

## 2022-06-10 DIAGNOSIS — K59.00 CONSTIPATION, UNSPECIFIED CONSTIPATION TYPE: Primary | ICD-10-CM

## 2022-06-10 DIAGNOSIS — R11.0 NAUSEA: ICD-10-CM

## 2022-06-10 DIAGNOSIS — Z80.0 FAMILY HISTORY OF COLON CANCER: ICD-10-CM

## 2022-06-10 DIAGNOSIS — R14.0 BLOATING: ICD-10-CM

## 2022-06-10 DIAGNOSIS — Z80.0 FAMILY HISTORY OF PANCREATIC CANCER: ICD-10-CM

## 2022-06-10 DIAGNOSIS — R11.0 NAUSEA: Primary | ICD-10-CM

## 2022-06-10 DIAGNOSIS — Z12.11 ENCOUNTER FOR SCREENING FOR MALIGNANT NEOPLASM OF COLON: ICD-10-CM

## 2022-06-10 DIAGNOSIS — R19.4 CHANGE IN BOWEL HABITS: ICD-10-CM

## 2022-06-10 LAB
CYTOLOGIST CVX/VAG CYTO: NORMAL
CYTOLOGY CVX/VAG DOC CYTO: NORMAL
CYTOLOGY CVX/VAG DOC THIN PREP: NORMAL
DX ICD CODE: NORMAL
HIV 1 & 2 AB SER-IMP: NORMAL
HPV I/H RISK 4 DNA CVX QL PROBE+SIG AMP: NEGATIVE
Lab: NORMAL
OTHER STN SPEC: NORMAL
STAT OF ADQ CVX/VAG CYTO-IMP: NORMAL

## 2022-06-10 PROCEDURE — 99214 OFFICE O/P EST MOD 30 MIN: CPT | Performed by: NURSE PRACTITIONER

## 2022-06-10 RX ORDER — SODIUM CHLORIDE 0.9 % (FLUSH) 0.9 %
10 SYRINGE (ML) INJECTION AS NEEDED
Status: CANCELLED | OUTPATIENT
Start: 2022-06-10

## 2022-06-10 RX ORDER — SODIUM CHLORIDE, SODIUM LACTATE, POTASSIUM CHLORIDE, CALCIUM CHLORIDE 600; 310; 30; 20 MG/100ML; MG/100ML; MG/100ML; MG/100ML
30 INJECTION, SOLUTION INTRAVENOUS CONTINUOUS PRN
Status: CANCELLED | OUTPATIENT
Start: 2022-06-10

## 2022-06-10 RX ORDER — SODIUM CHLORIDE 0.9 % (FLUSH) 0.9 %
3 SYRINGE (ML) INJECTION EVERY 12 HOURS SCHEDULED
Status: CANCELLED | OUTPATIENT
Start: 2022-06-10

## 2022-06-10 NOTE — PATIENT INSTRUCTIONS
Schedule EGD and colonoscopy for further evaluation.     For constipation, recommend starting MiraLAX daily available over-the-counter.  Mix 1 capful in 8 ounces of noncarbonated liquid and drink once daily.

## 2022-06-10 NOTE — PROGRESS NOTES
"Chief Complaint   Patient presents with   • Constipation         History of Present Illness  Patient is a 42-year-old female who presents today for evaluation.c She has a history of breast cancer and was referred for change in bowel habits.    Patient presents today for constipation.  She reports this began over the last 4 to 6 weeks.  She reports previously she was having a daily bowel movement and now she is having a bowel movement around every 3 days.  She has had generalized discomfort and bloating associated with this.  She does not feel she is emptying completely even when she has a bowel movement.    She has been experiencing nausea on a regular basis.  Has been taking ondansetron for this, generally daily, for the last 4 to 5 months.  She denies vomiting.  Denies heartburn or reflux.    Denies any blood in the stool or dark stool.    She has a family history of colon cancer in her maternal grandfather and a maternal aunt who has had polyps.  Her mother had pancreatic cancer.  She had a CT scan of the abdomen pelvis in 2021 with a normal-appearing pancreas.     Result Review :       Vitamin B12 (05/18/2022 08:26)   CBC and Differential (06/08/2022 08:57)   Office Visit with Neena Beavers MD (05/18/2022)   Referral to Gastroenterology for Malignant neoplasm of overlapping sites of left breast in female, estrogen receptor negative (HCC); B12 deficiency (05/18/2022)    SCANNED - IMAGING (06/22/2021)      Vital Signs:   /80   Pulse 90   Temp 97.1 °F (36.2 °C)   Ht 157.5 cm (62\")   Wt 67.3 kg (148 lb 4.8 oz)   SpO2 98%   BMI 27.12 kg/m²     Body mass index is 27.12 kg/m².     Physical Exam  Vitals reviewed.   Constitutional:       General: She is not in acute distress.     Appearance: She is well-developed.   HENT:      Head: Normocephalic and atraumatic.   Pulmonary:      Effort: Pulmonary effort is normal. No respiratory distress.   Abdominal:      General: Abdomen is flat. Bowel sounds are " normal.      Palpations: Abdomen is soft.      Tenderness: There is generalized abdominal tenderness.   Skin:     General: Skin is dry.      Coloration: Skin is not pale.   Neurological:      Mental Status: She is alert and oriented to person, place, and time.   Psychiatric:         Thought Content: Thought content normal.           Assessment and Plan {CC Problem List  Visit Diagnosis  ROS  Review (Popup)  [employer]  Quality  BestPractice  Medications  SmartSets  SnapShot Encounters  Media :23}   Diagnoses and all orders for this visit:    1. Constipation, unspecified constipation type (Primary)    2. Nausea    3. Bloating    4. Change in bowel habits    5. Encounter for screening for malignant neoplasm of colon    6. Family history of colon cancer    7. Family history of pancreatic cancer         Discussion  Patient presents today for evaluation of change in bowel habits with constipation, also with bloating and nausea.  Recommended colonoscopy which she is also due for for screening purposes and EGD to evaluate nausea.  While work-up is being completed, recommended trial of MiraLAX.  Discussed with patient today ondansetron can cause constipation and may consider trying to limit use of this to see if that would help.    If EGD and colonoscopy are negative and symptoms persist, may consider updated CT scan.          Follow Up   Return for Follow up to review results after testing complete.    Patient Instructions   Schedule EGD and colonoscopy for further evaluation.     For constipation, recommend starting MiraLAX daily available over-the-counter.  Mix 1 capful in 8 ounces of noncarbonated liquid and drink once daily.

## 2022-06-17 ENCOUNTER — TELEPHONE (OUTPATIENT)
Dept: GASTROENTEROLOGY | Facility: CLINIC | Age: 42
End: 2022-06-17

## 2022-06-17 NOTE — TELEPHONE ENCOUNTER
Spoke to the patient and she is aware that she has been cleared to hold her xarelto by Dr. Beavers

## 2022-06-17 NOTE — TELEPHONE ENCOUNTER
----- Message from David Santa sent at 6/16/2022  3:54 PM EDT -----  Regarding: SURGERY CLEARANCE SCANNED IN THE PATIENTS CHART

## 2022-06-27 ENCOUNTER — HOSPITAL ENCOUNTER (OUTPATIENT)
Dept: OCCUPATIONAL THERAPY | Facility: HOSPITAL | Age: 42
Setting detail: THERAPIES SERIES
Discharge: HOME | End: 2022-06-27

## 2022-06-27 DIAGNOSIS — C50.812 MALIGNANT NEOPLASM OF OVERLAPPING SITES OF LEFT BREAST IN FEMALE, ESTROGEN RECEPTOR NEGATIVE: ICD-10-CM

## 2022-06-27 DIAGNOSIS — I97.2 POST-MASTECTOMY LYMPHEDEMA SYNDROME: Primary | ICD-10-CM

## 2022-06-27 DIAGNOSIS — Z17.1 MALIGNANT NEOPLASM OF OVERLAPPING SITES OF LEFT BREAST IN FEMALE, ESTROGEN RECEPTOR NEGATIVE: ICD-10-CM

## 2022-06-27 DIAGNOSIS — N64.89 BREAST EDEMA: ICD-10-CM

## 2022-06-27 PROCEDURE — 97140 MANUAL THERAPY 1/> REGIONS: CPT

## 2022-06-27 PROCEDURE — 97535 SELF CARE MNGMENT TRAINING: CPT

## 2022-06-27 NOTE — THERAPY PROGRESS REPORT/RE-CERT
Outpatient Occupational Therapy Lymphedema Progress Note  [facility]     Patient Name: Radha Astorga  : 1980  MRN: [id number]  Today's Date: 2022      Visit Date: 2022  Patient and therapist both masked. Therapist with face shield and gloves.  Patient Active Problem List   Diagnosis   • Family history of breast cancer   • Hypothyroidism   • Major depressive disorder, recurrent episode, moderate with anxious distress ([facility])   • Attention deficit hyperactivity disorder (ADHD), predominantly inattentive type   • Factor 5 Leiden mutation, heterozygous ([facility])   • Kidney mass   • Malignant neoplasm of overlapping sites of left breast in female, estrogen receptor negative ([facility])   • High risk medication use   • Essential hypertension   • Rheumatic mitral valve disease   • Encounter for adjustment or management of vascular access device   • Iron deficiency anemia   • History of stroke   • B12 deficiency   • Nausea   • Bloating   • Family history of colon cancer        Past Medical History:   Diagnosis Date   • Acute stress reaction 2014   • ADD (attention deficit disorder)    • ADHD (attention deficit hyperactivity disorder)    • Anxiety and depression    • CTS (carpal tunnel syndrome)    • Factor 5 Leiden mutation, heterozygous ([facility]) 2021   • Family history of breast cancer 2013   • Fracture, cervical vertebra ([facility]) 1997    C4   • GERD (gastroesophageal reflux disease) 2022    Medicated   • H/O complete eye exam 2016   • Hearing loss    • 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   • History of rheumatic fever    • Hyperlipidemia 2022    Resolved; now unmedicated   • Hypertension    • Hypothyroidism    • Infiltrating ductal carcinoma of left breast ([facility]) 2021    Left   • Kidney stone    • Mitral valve insufficiency    • PONV (postoperative nausea and vomiting)    • Stroke ([facility]) 2020    HX OF   • Stroke-like symptoms         Past Surgical  History:   Procedure Laterality Date   • BREAST BIOPSY Left 05/05/2021    ID[facility]   • BREAST LUMPECTOMY     • BREAST LUMPECTOMY WITH SENTINEL NODE BIOPSY N/A 07/01/2021    Procedure: right port placement, Left SHAINA-guided, bracketed, partial mastectomy and sentinel lymph node biopsy Left breast needle-localized excisional biopsy;  Surgeon: Lashonda Euceda MD;  Location: [facility];  Service: General;  Laterality: N/A;   • BREAST SURGERY Bilateral 07/01/2021    Procedure: RIGHT BREAST REDUCTION MASTOPEXY LEFT BREAST ONCOPLASTIC CLOSURE;  Surgeon: Caridad Baker MD PhD;  Location: [facility];  Service: Plastics;  Laterality: Bilateral;   • NO PAST SURGERIES     • PAP SMEAR  2016         Visit Dx:      ICD-10-CM ICD-9-CM   1. 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 Malignant neoplasm of overlapping sites of left breast in female, estrogen receptor negative (HCC)  C50.812 174.8    Z17.1 V86.1   3. Breast edema  N64.89 611.89        Lymphedema     Row Name 06/27/22 1200             Subjective Pain    Able to rate subjective pain? yes  -RE      Pre-Treatment Pain Level 0  -RE      Post-Treatment Pain Level 0  -RE              Subjective Comments    Subjective Comments Purchased a sports bra which is improving breast swelling.  -RE              Manual Lymphatic Drainage    Manual Lymphatic Drainage initial sequence;opened regional lymph nodes;opened anastamoses  -RE      Initial Sequence short neck  -RE      Opened Regional Lymph Nodes axillary  -RE      Axillary right  -RE      Inguinal left  -RE      Opened Anastamoses anterior axillo-axillary;axillo-inguinal  -RE      Axillo-Inguinal left  -RE      Extremity Treatment other extremity treatment  L breast and trunk  -RE      Manual Lymphatic Drainage Comments full breast  -RE      Manual Therapy scar massage  -RE              Compression/Skin Care    Compression/Skin Care Comments added Komprex II to bra to decrease  -RE            User Key   (r) = Recorded By, (t) = Taken By, (c) = Cosigned By    Initials Name Provider Type    Julia Rowe OTR Occupational Therapist                         OT Assessment/Plan     Row Name 06/27/22 1248          OT Assessment    Impairments Edema;Impaired lymphatic circulation  -RE     Assessment Comments Breast edema is minimal today and hand edema is resolved. She is wearing bras with improved support which has helped to improve her breast edema. Noted mild decreased scar mobility which improved with manual techniques.  Pt has progressed well.  -RE     OT Diagnosis breast lymphedema  -RE     OT Rehab Potential Good  -RE     Patient would benefit from skilled therapy intervention Yes  -RE            OT Plan    OT Frequency Other (comment)  -RE     Predicted Duration of Therapy Intervention (OT) Follow up for bioimpedance in August and then every 3 months for bioimpedance  -RE     Planned CPT's? OT THER PROC EA 15 MIN: [id number];OT SELF CARE/MGMT/TRAIN 15 MIN: 12406;OT MANUAL THERAPY EA 15 MIN: 34479;OT BIS XTRACELL FLUID ANALYSIS: 25179  -RE     OT Plan Comments Follow up in august.  -RE           User Key  (r) = Recorded By, (t) = Taken By, (c) = Cosigned By    Initials Name Provider Type    Julia Rowe OTR Occupational Therapist                    Manual Rx (last 36 hours)     Manual Treatments     Row Name 06/27/22 1259             Total Minutes    57754 - OT Manual Therapy Minutes 30  -RE            User Key  (r) = Recorded By, (t) = Taken By, (c) = Cosigned By    Initials Name Provider Type    Julia Rowe OTR Occupational Therapist               OT Goals     Row Name 06/27/22 1200          OT Short Term Goals    STG Date to Achieve 07/11/22  -RE     STG 1 Pt will demonstrate understanding of use of compression sleeve for edema prevention, exercise and air travel.   -RE     STG 1 Progress Met  -RE     STG 2 Patient will demonstrate proper awareness of “What is Lymphedema?” and “Healthy Habits” for  improved prevention, management, care of symptoms and ease of transition to self-care of condition.   -RE     STG 2 Progress Met  -RE     STG 3 Patient independent and compliant with initial home exercise program focused on range of motion,and Flexibility.  -RE     STG 3 Progress Met  -RE     STG 4 Patient to demonstrate increased left shoulder flexion to 140 to improve functional UE use and to restore pre operative AROM per patient perception.  -RE     STG 4 Progress Met  -RE     STG 5 Patient to demonstrate increased left shoulder abduction to 140 to improve functional UE use and to restore preoperative AROM per patient perception.  -RE     STG 5 Progress Met  -RE            Long Term Goals    LTG Date to Achieve 07/25/22  -RE     LTG 1 Patient to demonstrate increased left shoulder flexion to 160 to improve functional UE use and to restore pre operative AROM per patient perception.  -RE     LTG 1 Progress Met  -RE     LTG 2 Patient to demonstrate increased left shoulder abduction to 160 to improve functional UE use and to restore preoperative AROM per patient perception.  -RE     LTG 2 Progress Met  -RE     LTG 3 Patient will participate in bioimpedance scans every 3-6 months as a method of early detection of lymphedema to allow for early intervention.  -RE     LTG 3 Progress Met;Ongoing  -RE     LTG 4  Patient's bioimpedance score to remain below 6.5 for decreased risk of stage II lymphedema.  -RE     LTG 4 Progress Met;Ongoing  -RE     LTG 5 Patient will be independent with use and care of Tribute.  -RE     LTG 5 Progress Met  -RE     LTG 6 Pt will report decrease in breast edema.  -RE     LTG 6 Progress New;Met  -RE            Time Calculation    OT Goal Re-Cert Due Date 08/17/22  -RE           User Key  (r) = Recorded By, (t) = Taken By, (c) = Cosigned By    Initials Name Provider Type    Julia Rowe OTR Occupational Therapist                Therapy Education  Education Details: Reviewed self massage  and instructed in scar massage. Discussed how to use Komprex II. Discussed additional bra options for improved compression of the side of the breast.  Given: Edema management, Symptoms/condition management  Program: New, Reinforced  How Provided: Verbal, Demonstration  Provided to: Patient  Level of Understanding: Teach back education performed, Verbalized  87785 - OT Self Care/Mgmt Minutes: 15                Time Calculation:   OT Start Time: 0945  OT Stop Time: 1030  OT Time Calculation (min): 45 min  Total Timed Code Minutes- OT: 45 minute(s)  Timed Charges  79635 - OT Manual Therapy Minutes: 30  03211 - OT Self Care/Mgmt Minutes: 15  Total Minutes  Timed Charges Total Minutes: 45   Total Minutes: 45     Therapy Charges for Today     Code Description Service Date Service Provider Modifiers Qty    65191295378 HC OT MANUAL THERAPY EA 15 MIN 6/27/2022 Julia Leslie OTR GO 2    [id number]  OT SELF CARE/MGMT/TRAIN EA 15 MIN 6/27/2022 Julia Leslie OTR GO 1                      VI Ulloa  6/27/2022

## 2022-06-29 ENCOUNTER — INFUSION (OUTPATIENT)
Dept: ONCOLOGY | Facility: HOSPITAL | Age: 42
End: 2022-06-29

## 2022-06-29 ENCOUNTER — TELEPHONE (OUTPATIENT)
Dept: ONCOLOGY | Facility: CLINIC | Age: 42
End: 2022-06-29

## 2022-06-29 ENCOUNTER — OFFICE VISIT (OUTPATIENT)
Dept: ONCOLOGY | Facility: CLINIC | Age: 42
End: 2022-06-29

## 2022-06-29 VITALS
HEART RATE: 76 BPM | TEMPERATURE: 97.2 F | SYSTOLIC BLOOD PRESSURE: 105 MMHG | WEIGHT: 149.9 LBS | HEIGHT: 62 IN | DIASTOLIC BLOOD PRESSURE: 73 MMHG | BODY MASS INDEX: 27.58 KG/M2 | RESPIRATION RATE: 16 BRPM | OXYGEN SATURATION: 98 %

## 2022-06-29 DIAGNOSIS — Z17.1 MALIGNANT NEOPLASM OF OVERLAPPING SITES OF LEFT BREAST IN FEMALE, ESTROGEN RECEPTOR NEGATIVE: ICD-10-CM

## 2022-06-29 DIAGNOSIS — C50.812 MALIGNANT NEOPLASM OF OVERLAPPING SITES OF LEFT BREAST IN FEMALE, ESTROGEN RECEPTOR NEGATIVE: Primary | ICD-10-CM

## 2022-06-29 DIAGNOSIS — C50.812 MALIGNANT NEOPLASM OF OVERLAPPING SITES OF LEFT BREAST IN FEMALE, ESTROGEN RECEPTOR NEGATIVE: ICD-10-CM

## 2022-06-29 DIAGNOSIS — E03.9 ACQUIRED HYPOTHYROIDISM: ICD-10-CM

## 2022-06-29 DIAGNOSIS — F90.0 ATTENTION DEFICIT HYPERACTIVITY DISORDER (ADHD), PREDOMINANTLY INATTENTIVE TYPE: ICD-10-CM

## 2022-06-29 DIAGNOSIS — Z17.1 MALIGNANT NEOPLASM OF OVERLAPPING SITES OF LEFT BREAST IN FEMALE, ESTROGEN RECEPTOR NEGATIVE: Primary | ICD-10-CM

## 2022-06-29 DIAGNOSIS — Z45.2 ENCOUNTER FOR ADJUSTMENT OR MANAGEMENT OF VASCULAR ACCESS DEVICE: ICD-10-CM

## 2022-06-29 LAB
ALBUMIN SERPL-MCNC: 4.3 G/DL (ref 3.5–5.2)
ALBUMIN/GLOB SERPL: 1.7 G/DL
ALP SERPL-CCNC: 105 U/L (ref 39–117)
ALT SERPL W P-5'-P-CCNC: 17 U/L (ref 1–33)
ANION GAP SERPL CALCULATED.3IONS-SCNC: 10.8 MMOL/L (ref 5–15)
AST SERPL-CCNC: 17 U/L (ref 1–32)
BASOPHILS # BLD AUTO: 0.1 10*3/MM3 (ref 0–0.2)
BASOPHILS NFR BLD AUTO: 1.6 % (ref 0–1.5)
BILIRUB SERPL-MCNC: 0.3 MG/DL (ref 0–1.2)
BUN SERPL-MCNC: 15 MG/DL (ref 6–20)
BUN/CREAT SERPL: 14.3 (ref 7–25)
CALCIUM SPEC-SCNC: 9.4 MG/DL (ref 8.6–10.5)
CHLORIDE SERPL-SCNC: 105 MMOL/L (ref 98–107)
CO2 SERPL-SCNC: 25.2 MMOL/L (ref 22–29)
CREAT SERPL-MCNC: 1.05 MG/DL (ref 0.57–1)
DEPRECATED RDW RBC AUTO: 39.9 FL (ref 37–54)
EGFRCR SERPLBLD CKD-EPI 2021: 67.3 ML/MIN/1.73
EOSINOPHIL # BLD AUTO: 0.26 10*3/MM3 (ref 0–0.4)
EOSINOPHIL NFR BLD AUTO: 4.2 % (ref 0.3–6.2)
ERYTHROCYTE [DISTWIDTH] IN BLOOD BY AUTOMATED COUNT: 12.2 % (ref 12.3–15.4)
GLOBULIN UR ELPH-MCNC: 2.5 GM/DL
GLUCOSE SERPL-MCNC: 121 MG/DL (ref 65–99)
HCT VFR BLD AUTO: 41.3 % (ref 34–46.6)
HGB BLD-MCNC: 13.4 G/DL (ref 12–15.9)
IMM GRANULOCYTES # BLD AUTO: 0.01 10*3/MM3 (ref 0–0.05)
IMM GRANULOCYTES NFR BLD AUTO: 0.2 % (ref 0–0.5)
LYMPHOCYTES # BLD AUTO: 1.95 10*3/MM3 (ref 0.7–3.1)
LYMPHOCYTES NFR BLD AUTO: 31.9 % (ref 19.6–45.3)
MCH RBC QN AUTO: 28.9 PG (ref 26.6–33)
MCHC RBC AUTO-ENTMCNC: 32.4 G/DL (ref 31.5–35.7)
MCV RBC AUTO: 89.2 FL (ref 79–97)
MONOCYTES # BLD AUTO: 0.61 10*3/MM3 (ref 0.1–0.9)
MONOCYTES NFR BLD AUTO: 10 % (ref 5–12)
NEUTROPHILS NFR BLD AUTO: 3.19 10*3/MM3 (ref 1.7–7)
NEUTROPHILS NFR BLD AUTO: 52.1 % (ref 42.7–76)
NRBC BLD AUTO-RTO: 0 /100 WBC (ref 0–0.2)
PLATELET # BLD AUTO: 235 10*3/MM3 (ref 140–450)
PMV BLD AUTO: 9.6 FL (ref 6–12)
POTASSIUM SERPL-SCNC: 4.4 MMOL/L (ref 3.5–5.2)
PROT SERPL-MCNC: 6.8 G/DL (ref 6–8.5)
RBC # BLD AUTO: 4.63 10*6/MM3 (ref 3.77–5.28)
SODIUM SERPL-SCNC: 141 MMOL/L (ref 136–145)
WBC NRBC COR # BLD: 6.12 10*3/MM3 (ref 3.4–10.8)

## 2022-06-29 PROCEDURE — 25010000002 TRASTUZUMAB-ANNS 420 MG RECONSTITUTED SOLUTION 1 EACH VIAL: Performed by: INTERNAL MEDICINE

## 2022-06-29 PROCEDURE — 85025 COMPLETE CBC W/AUTO DIFF WBC: CPT | Performed by: INTERNAL MEDICINE

## 2022-06-29 PROCEDURE — 96413 CHEMO IV INFUSION 1 HR: CPT

## 2022-06-29 PROCEDURE — 80053 COMPREHEN METABOLIC PANEL: CPT

## 2022-06-29 PROCEDURE — 99215 OFFICE O/P EST HI 40 MIN: CPT | Performed by: INTERNAL MEDICINE

## 2022-06-29 PROCEDURE — 25010000002 HEPARIN LOCK FLUSH PER 10 UNITS: Performed by: INTERNAL MEDICINE

## 2022-06-29 PROCEDURE — 96375 TX/PRO/DX INJ NEW DRUG ADDON: CPT

## 2022-06-29 RX ORDER — FAMOTIDINE 10 MG/ML
20 INJECTION, SOLUTION INTRAVENOUS ONCE
Status: COMPLETED | OUTPATIENT
Start: 2022-06-29 | End: 2022-06-29

## 2022-06-29 RX ORDER — SODIUM CHLORIDE 9 MG/ML
250 INJECTION, SOLUTION INTRAVENOUS ONCE
Status: COMPLETED | OUTPATIENT
Start: 2022-06-29 | End: 2022-06-29

## 2022-06-29 RX ORDER — SODIUM CHLORIDE 9 MG/ML
250 INJECTION, SOLUTION INTRAVENOUS ONCE
Status: CANCELLED | OUTPATIENT
Start: 2022-06-29

## 2022-06-29 RX ORDER — SODIUM CHLORIDE 0.9 % (FLUSH) 0.9 %
10 SYRINGE (ML) INJECTION AS NEEDED
Status: CANCELLED | OUTPATIENT
Start: 2022-06-29

## 2022-06-29 RX ORDER — HEPARIN SODIUM (PORCINE) LOCK FLUSH IV SOLN 100 UNIT/ML 100 UNIT/ML
500 SOLUTION INTRAVENOUS AS NEEDED
Status: DISCONTINUED | OUTPATIENT
Start: 2022-06-29 | End: 2022-06-29 | Stop reason: HOSPADM

## 2022-06-29 RX ORDER — SODIUM CHLORIDE 0.9 % (FLUSH) 0.9 %
10 SYRINGE (ML) INJECTION AS NEEDED
Status: DISCONTINUED | OUTPATIENT
Start: 2022-06-29 | End: 2022-06-29 | Stop reason: HOSPADM

## 2022-06-29 RX ORDER — FAMOTIDINE 10 MG/ML
20 INJECTION, SOLUTION INTRAVENOUS ONCE
Status: CANCELLED | OUTPATIENT
Start: 2022-06-29

## 2022-06-29 RX ORDER — HEPARIN SODIUM (PORCINE) LOCK FLUSH IV SOLN 100 UNIT/ML 100 UNIT/ML
500 SOLUTION INTRAVENOUS AS NEEDED
Status: CANCELLED | OUTPATIENT
Start: 2022-06-29

## 2022-06-29 RX ADMIN — FAMOTIDINE 20 MG: 10 INJECTION INTRAVENOUS at 10:30

## 2022-06-29 RX ADMIN — SODIUM CHLORIDE 250 ML: 9 INJECTION, SOLUTION INTRAVENOUS at 10:29

## 2022-06-29 RX ADMIN — Medication 10 ML: at 11:11

## 2022-06-29 RX ADMIN — TRASTUZUMAB-ANNS 390 MG: 420 INJECTION, POWDER, LYOPHILIZED, FOR SOLUTION INTRAVENOUS at 10:34

## 2022-06-29 RX ADMIN — Medication 500 UNITS: at 11:11

## 2022-06-29 NOTE — PROGRESS NOTES
Subjective     REASON FOR follow-up:    1.  Heterozygous state for factor V Leiden    2.  New onset stroke on aspirin by neurology    3.  Hypercholesterolemia    4.  Hypercoagulable work-up done.  Antithrombin 109% protein S activity 85% protein S antigen 107%, protein C activity 85%.  Anticardiolipin antibody IgM 24%, antibeta-2 glycoprotein antibody negative, lupus anticoagulant not detected, prothrombin gene mutation negative.    5.  Screening mammogram showed abnormality in the left breast 6 o'clock position, 8 mm on ultrasound, ultrasound-guided left breast biopsy, 6 cm from the nipple showed evidence of invasive ductal carcinoma poorly differentiated grade 3, [place] score of 8 out of 9 ER negative, LA negative, HER-2/ese 3+ positive.    6.  CT scan February 24, 2021 showed focal area of fatty infiltration of the liver.  1 cm hypoattenuating cortical lesion in the upper pole of the right kidney.  Similarly nodule in the upper pole of the left kidney is 1.5 cm.  Further evaluation by ultrasound suggested  · Ultrasound March 1, 2021 shows solid lesion in the upper pole of the right kidney.  In the left kidney along the midpole of the left kidney is a nodule which is 16 x 16 x 14 mm which is thought to be a cyst.  A 6-month follow-up CT suggested    6.  S/p left partial mastectomy with sentinel lymph node biopsy.  Tumor was present at 5:00, invasive ductal carcinoma, [place] score of 8, grade 3, greatest dimension was 12 mm x 8 x 4 mm.  Single focus of invasive carcinoma present.  There was extensive DCIS which is 30 mm x 8 x 2 mm, grade 3, no evidence of lymphovascular space invasion or dermal lymphatic invasion.  Margins were clear.  4 lymph nodes were negative.  It is a p T1cp N0, ER negative LA negative HER-2/ese 3+ with Ki-67 of 50%.  · [facility] genetic test negative for 47 genes    7.recently initiated Xarelto at loading dose of 15 mg twice a day x3 weeks to then be switched to 20 mg daily per  protocol.  She is doing this because of Mediport in place and known factor V Leiden heterozygosity.        History of Present Illness     Patient is a 42-year-old female with T1CN0 ER negative OH negative HER2 positive breast cancer status postchemotherapy and currently on Herceptin every 3 weeks.    Last echocardiogram May 11, 2022 which showed ejection fraction of 57%.  Previous echocardiogram showed ejection fraction of 58.8%.  Patient has already seen Dr. Virk.  She has got 2 more Herceptin's left.  She is asymptomatic.  She has got a little lymphedema of the left upper extremity.  Patient has new onset constipation for the last few months and will refer her to GI.    Interval history: Patient is doing very well and this is her last dose of Herceptin.  She will require port to be removed after this but she wants to wait till Dr. Euceda comes back prior to port removal.  She does have constipation and she is due to receive colonoscopy end of July 2022.  She has echocardiogram scheduled as of August 11, 2022 and follows up with Dr. Virk.  Currently she does not have any symptoms of shortness of breath or lower extremity edema.  Her hair has come back and it is curly  and thick.  Patient is also scheduled to see Kadi CHOW.  She currently denies any complaints.    Oncologic history:  Patient is here for follow-up of her mammogram.  Patient had screening mammogram April 2, 2021:  NAD a focal asymmetry was present in the posterior one third retroareolar region of the left breast.  Further spot compression images and left breast ultrasound was suggested.  Right breast was negative    April 9, 2021: Left diagnostic mammogram showed an area of focal asymmetry in the posterior one third region aspect of the left breast her breast    Ultrasound showed an irregular 0.8 cm lesion in the left breast at the 6 o'clock position of the order of 6 cm from the nipple.  Ultrasound-guided left breast biopsy was  recommended.    April 26, 2021: Ultrasound-guided biopsy of the left breast 6 o'clock position, 6 cm from the nipple showed    Invasive ductal carcinoma, poorly differentiated with a [place] score grade 3 with a score of 8 out of 9 measuring at least 7 mm.  No definitive ductal carcinoma is in situ is identified.  ER 1% negative  NM 1% negative   HER-2/ese 3+ positive    There was no evidence of lymphadenopathy either in the mammogram of the ultrasound.  We suggested an MRI of the breast.  Patient has strong family history of breast cancer      May 7, 2021: MRI of bilateral breast reviewed.  My interpretation is that there is area of enhancement in the 6 o'clock position of the left breast this is the biopsy-proven malignancy.  There is additional area of linear enhancement anteriorly and laterally measuring up to 1.2 cm this is approximately 5.6 cm from the nipple.  In addition there is a enhancement 2 to 3 mm in the anterolateral aspect of the lateral aspect of the left breast which is 4.6 cm from the nipple.  There is also mildly prominent posterior lymph node in the mid axilla which measures 1 cm with cortical thickening.  Findings are consistent with multifocal disease.  No contralateral disease or internal mammary adenopathy identified    May 20, 2021: Genetic test, [facility] genetic test shows 47 genes negative    May 21, 2021: I reviewed the biopsy of the lymph node in the left axilla which shows reactive lymph node negative for any carcinoma or lymphoma    June 2, 2021:Final Diagnosis  1. Left Breast, 5:00 o'clock, MRI-guided Biopsies for Linear Enhancement: INVASIVE MAMMARY CARCINOMA, NO  SPECIAL TYPE (INVASIVE DUCTAL CARCINOMA).  A. Largest contiguous focus in a core measures 4 mm.  B. No in-situ component identified.  C. Brisk lymphocytic response noted (see Comment).  D. No definitive lymphovascular nor perineural invasion identified.  2. Left Breast, 2:00 o'clock, MRI-guided Biopsy for  Enhancement:  A. Benign breast parenchyma with radial scar and micropapillomas.  B. Usual ductal hyperplasia and columnar cell hyperplasia.  C. No atypical hyperplasia, in-situ nor invasive carcinoma identified    ER, GA, HER-2 new and Ki-67 pending on this new biopsy      Patient has been seen by Dr. Lashonda Euceda with plans of doing surgery on July first 2021    Once patient undergoes surgery lumpectomy with sentinel lymph node biopsy we will give further recommendation about treatment options.  Given that patient has multifocal disease and both of the lesions 2 lesions are 1.2 cm each, with it being a HER-2 positive tumor on the previous biopsy at 6:00 Dr. Euceda and myself discussed and patient preferred to undergo port placement at the same time of surgery.    Patient had [facility] genetic test which was negative.    She has completed surgery July 1, 2021.  She underwent left partial mastectomy with left axillary sentinel lymph node biopsy and right port placement.  Clinically she was thought to have a high-grade multifocal invasive ductal carcinoma ER/GA negative, GA positive.  The lesions require preoperative localization.  The multifocal cancer was bracketed with 2 savvy markers and the radial scar was localized with a needle.  Because of the volume of tissue that will be excised related to the breast volume the case was done in conjunction with Dr. Zavala for oncoplastic closure.    She is 2 weeks from surgery.  She is healing up reasonably well.    On review of her pathology she had left partial mastectomy with sentinel lymph node biopsy.  Tumor was present at 5:00, invasive ductal carcinoma, Oziel score of 8, grade 3, greatest dimension was 12 mm x 8 x 4 mm.  Single focus of invasive carcinoma present.  There was extensive DCIS which is 30 mm x 8 x 2 mm, grade 3, no evidence of lymphovascular space invasion or dermal lymphatic invasion.  Margins were clear.  4 lymph nodes were negative.  It is a p  T1cp N0, ER negative CA negative HER-2/ese 3+ with Ki-67 of 50%.    Patient is here to discuss further options of treatment.  Given small tumor T1c N0, she is a good candidate for weekly Taxol Herceptin.    Given that patient has heterozygous state for factor V Leiden and currently has port placed we will plan to start Eliquis 2.5 mg p.o. twice daily.  I have left a message for Dr. Craft in neurology to call me to discuss if patient needs to continue aspirin or we can hold off since be starting Eliquis.      Genetic test negative    August 4, 2021: Weekly Taxol Herceptin x12 weeks followed by Herceptin x1 year.  Cycle 1 today  Cycle 12 Taxol Herceptin October 10, 2021      Hematologic history:  patient is a 40-year-old female who presented end of December with numbness and tingling in her left upper extremity and left face.  She went to see Dr. Goodman who then got concerned and referred to neurology.  She was referred to Dr. Freedman and talk to Dr. Thompson neurology for evaluation.  Patient underwent ultrasound of the carotids which did not show significant stenosis of the carotids.  She underwent 2D echocardiogram which showed a good ejection fraction of 64% with mild mitral valve prolapse and mild mitral stenosis.  She had a 24-hour Holter which was negative.  She then had also an MRI of the brain February 3, 2021 which showed areas of hyperintensity involving the subcortical white matter of the right frontal lobe superior laterally and posteriorly with the largest area measuring 8 9 mm.  There is also enhancement present.  The findings are consistent with a subacute infarct.  They could not differentiate if there is any underlying neoplastic process but a short-term follow-up was suggested in order to follow-up.  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    Patient currently got started on aspirin and factor V  Leiden was obtained by neurology because patient's paternal aunt had factor V Leiden mutation and she was heterozygous.  Patient's testing also showed that she was heterozygous.    Patient states her numbness and tingling is resolved completely on the left arm and face it just lasted for a 24 hours.  She was here referred here for neurology to see if there is any other cause for her underlying hypercoagulable state.  She has not had a complete hypercoagulable work-up.  Also discussed with her that an underlying malignancy could cause a hypercoagulable state and we would need to do a CT scan to rule that out.    Patient's mother has had breast cancer in her 50s but had pancreatic cancer at 68 and  from that.  Patient's dad had stroke at 63.  But he is alive at 74.  She has 1 brother who is 40 in good health.  Paternal aunt had breast cancer in her 40s.  Paternal grandfather had colon cancer in his late 80s.  Maternal uncle had throat cancer and another maternal uncle had skin cancer with metastasis to the lung.  And maternal grandmother had breast cancer.    Patient was to have mammogram done last year but because of COVID-19 it got postponed and she has not had it done yet.    Patient used to be a smoker half pack per day for 7 years but quit 10 years ago.  She has high cholesterol for which she is on treatment.    Past Medical History:   Diagnosis Date   • Acute stress reaction 2014   • ADD (attention deficit disorder)    • ADHD (attention deficit hyperactivity disorder)    • Anxiety and depression    • CTS (carpal tunnel syndrome)    • Factor 5 Leiden mutation, heterozygous ([facility]) 2021   • Family history of breast cancer 2013   • Fracture, cervical vertebra ([facility]) 1997    C4   • GERD (gastroesophageal reflux disease) 2022    Medicated   • H/O complete eye exam 2016   • Hearing loss    • 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   • History of rheumatic fever    •  Hyperlipidemia 1/2/2022    Resolved; now unmedicated   • Hypertension    • Hypothyroidism    • Infiltrating ductal carcinoma of left breast (HCC) 05/05/2021    Left   • Kidney stone    • Mitral valve insufficiency    • PONV (postoperative nausea and vomiting)    • Stroke (HCC) 12/2020    HX OF   • Stroke-like symptoms         Past Surgical History:   Procedure Laterality Date   • BREAST BIOPSY Left 05/05/2021    IDC   • BREAST LUMPECTOMY     • BREAST LUMPECTOMY WITH SENTINEL NODE BIOPSY N/A 07/01/2021    Procedure: right port placement, Left SHAINA-guided, bracketed, partial mastectomy and sentinel lymph node biopsy Left breast needle-localized excisional biopsy;  Surgeon: Lashonda Euceda MD;  Location: [facility];  Service: General;  Laterality: N/A;   • BREAST SURGERY Bilateral 07/01/2021    Procedure: RIGHT BREAST REDUCTION MASTOPEXY LEFT BREAST ONCOPLASTIC CLOSURE;  Surgeon: Caridad Baker MD PhD;  Location: [facility];  Service: Plastics;  Laterality: Bilateral;   • NO PAST SURGERIES     • PAP SMEAR  2016        Current Outpatient Medications on File Prior to Visit   Medication Sig Dispense Refill   • Cholecalciferol (D3) 50 MCG (2000 UT) tablet Take 4,000 Units by mouth Daily.     • famotidine (PEPCID) 20 MG tablet TAKE 1 TABLET BY MOUTH DAILY 30 tablet 3   • levothyroxine (Synthroid) 125 MCG tablet Take 1 tablet by mouth Daily. 90 tablet 1   • lidocaine-prilocaine (EMLA) 2.5-2.5 % cream Apply  topically to the appropriate area as directed As Needed for Mild Pain . 1 each 2   • lisdexamfetamine (Vyvanse) 50 MG capsule Take 1 capsule by mouth Every Morning 30 capsule 0   • LORazepam (ATIVAN) 0.5 MG tablet Take 1 tablet by mouth Every 8 (Eight) Hours As Needed for Anxiety. for anxiety 30 tablet 2   • ondansetron ODT (ZOFRAN-ODT) 8 MG disintegrating tablet Place 1 tablet on the tongue Every 8 (Eight) Hours As Needed for Nausea or Vomiting. 30 tablet 3   • Polyethylene Glycol 3350 (MIRALAX PO)  Take  by mouth As Needed.     • traZODone (DESYREL) 50 MG tablet Take 1 tablet by mouth Every Night. (Patient taking differently: Take 25 mg by mouth Every Night.) 30 tablet 11   • valsartan (DIOVAN) 160 MG tablet Take 1 tablet by mouth Daily. 30 tablet 5   • vitamin B-12 (CYANOCOBALAMIN) 1000 MCG tablet Take 1 tablet by mouth Daily. 30 tablet 2   • Xarelto 20 MG tablet TAKE 1 TABLET BY MOUTH DAILY 30 tablet 3   • atorvastatin (Lipitor) 10 MG tablet Take 1 tablet by mouth Daily. 30 tablet 11     Current Facility-Administered Medications on File Prior to Visit   Medication Dose Route Frequency Provider Last Rate Last Admin   • heparin injection 500 Units  500 Units Intravenous PRN Neena Beavers MD   500 Units at 21 171   • sodium chloride 0.9 % flush 10 mL  10 mL Intravenous PRN Neena Beavers MD   10 mL at 21        ALLERGIES:    Allergies   Allergen Reactions   • Sulfa Antibiotics Rash        Social History     Socioeconomic History   • Marital status: Significant Other   • Number of children: 0   Tobacco Use   • Smoking status: Former Smoker     Packs/day: 0.50     Years: 10.00     Pack years: 5.00     Types: Cigarettes, Cigarettes     Start date: 1997     Quit date: 2009     Years since quittin.4   • Smokeless tobacco: Never Used   • Tobacco comment: Off and on for 12 years   Vaping Use   • Vaping Use: Never used   Substance and Sexual Activity   • Alcohol use: Not Currently     Comment: RARELY   • Drug use: No   • Sexual activity: Yes     Partners: Female     Birth control/protection: None     Comment: Unnecessary        Family History   Problem Relation Age of Onset   • Breast cancer Mother 53   • Pancreatic cancer Mother 68   • Stroke Father    • Hypertension Brother    • Colon polyps Maternal Aunt    • Melanoma Maternal Uncle    • Breast cancer Paternal Aunt 55   • Lung cancer Maternal Grandmother    • Colon cancer Maternal Grandfather    • Prostate cancer Maternal  Grandfather    • Prostate cancer Paternal Grandfather    • Breast cancer Paternal Great-Grandmother 94   • Brain cancer Maternal Cousin 20   • Ovarian cancer Other         Paternal great aunt    • Breast cancer Other    • Colon polyps Maternal Aunt    • Malig Hyperthermia Neg Hx    • Crohn's disease Neg Hx    • Irritable bowel syndrome Neg Hx    • Ulcerative colitis Neg Hx       Family  history: Mother had breast cancer at age 57.  Maternal great aunt had breast cancer and paternal great aunt had breast cancer in her 40s.  Patient's mother had pancreatic cancer as well.  Paternal grandfather had prostate cancer.    Review of Systems   Constitutional: Positive for appetite change (6/29/22 IMPROVED). Negative for chills, diaphoresis, fatigue, fever and unexpected weight change.   HENT: Negative for hearing loss, nosebleeds, sore throat, trouble swallowing and voice change.    Eyes: Negative for visual disturbance.   Respiratory: Negative for cough, chest tightness, shortness of breath and wheezing.    Cardiovascular: Negative for chest pain, palpitations and leg swelling.   Gastrointestinal: Positive for constipation (IMPROVED 6/29/22) and nausea (Every morning IMPROVED 6/29/22). Negative for abdominal distention, abdominal pain, blood in stool, diarrhea and vomiting.   Genitourinary: Negative for dysuria, frequency, hematuria and urgency.   Musculoskeletal: Negative for back pain and joint swelling.        No muscle weakness.   Skin: Negative for rash and wound.   Neurological: Negative for dizziness, seizures, syncope, speech difficulty, weakness, numbness and headaches.   Hematological: Negative for adenopathy. Does not bruise/bleed easily.   Psychiatric/Behavioral: Positive for sleep disturbance (6/29/22 IMPROVED ). Negative for behavioral problems, confusion, dysphoric mood and suicidal ideas. The patient is not nervous/anxious.    All other systems reviewed and are negative.   as per HPI       Objective  "    Vitals:    06/29/22 0919   BP: 105/73   Pulse: 76   Resp: 16   Temp: 97.2 °F (36.2 °C)   TempSrc: Temporal   SpO2: 98%   Weight: 68 kg (149 lb 14.4 oz)   Height: 157.5 cm (62.01\")   PainSc: 0-No pain     Current Status 6/29/2022   ECOG score 0     Physical exam    CONSTITUTIONAL:  Vital signs reviewed.  No distress, looks comfortable.  RESPIRATORY:  Normal respiratory effort.  Lungs clear to auscultation bilaterally.  Port is in place and is clean.  CARDIOVASCULAR:  Normal S1, S2.  No murmurs rubs or gallops.  No significant lower extremity edema.  GASTROINTESTINAL: Abdomen appears unremarkable.  Nontender.  No hepatomegaly.  No splenomegaly.  LYMPHATIC:  No cervical, supraclavicular, axillary lymphadenopathy.  SKIN:  Warm.  No rashes.  PSYCHIATRIC:  Normal judgment and insight.  Normal mood and affect..    CRECENT LABS:  Results from last 7 days   Lab Units 06/29/22  0912   WBC 10*3/mm3 6.12   NEUTROS ABS 10*3/mm3 3.19   HEMOGLOBIN g/dL 13.4   HEMATOCRIT % 41.3   PLATELETS 10*3/mm3 235     Results from last 7 days   Lab Units 06/29/22  0912   SODIUM mmol/L 141   POTASSIUM mmol/L 4.4   CHLORIDE mmol/L 105   CO2 mmol/L 25.2   BUN mg/dL 15   CREATININE mg/dL 1.05*   CALCIUM mg/dL 9.4   ALBUMIN g/dL 4.30   BILIRUBIN mg/dL 0.3   ALK PHOS U/L 105   ALT (SGPT) U/L 17   AST (SGOT) U/L 17   GLUCOSE mg/dL 121*         Assessment & Plan       * [id number], ER negative NJ negative HER-2/ese 3+ with Ki-67 of 50%.  S/p left partial mastectomy with sentinel lymph node biopsy.  Tumor was present at 5:00, invasive ductal carcinoma, Oziel score of 8, grade 3, greatest dimension was 12 mm x 8 x 4 mm.  Single focus of invasive carcinoma present.  There was extensive DCIS which is 30 mm x 8 x 2 mm, grade 3, no evidence of lymphovascular space invasion or dermal lymphatic invasion.  Margins were clear.  4 lymph nodes were negative.  It is a p T1cp N0, ER negative NJ negative HER-2/ese 3+ with Ki-67 of 50%.  · Patient is s/p port " placement  · Discussed in length with patient that given a small tumor she is eligible for weekly Taxol Herceptin.  Weekly Taxol x12 weeks along with weekly Herceptin followed by 1 year of Herceptin.  · Discussed patient in the breast cancer conference  · 2D echo done which showed ejection fraction of 61-65% and strain pattern of -20.8.  · Patient also seen by Dr. Virk cardiology and Dr. Virk is planning to repeat echocardiogram in 3 months after initiation of therapy.  · 8/4/2021 initiating weekly Taxol Herceptin  · 9/8/2021, proceed with week #6 Taxol and Herceptin.  Patient continues to tolerate treatment well. No signs of peripheral neuropathy.  · Her next echocardiogram due November 4, 2021, She follows up with cardiology Dr. Camron Virk.  · 9/28/2021, proceed with week #9 taxol/herceptin.  She does feel her nausea has worsened after her last cycle of chemotherapy.  She prefers the disintegrating Zofran, this will be called to her pharmacy today.  · Patient is here to take cycle 11 of weekly Taxol with worsening fatigue and worsening rash and overall dysphoric mood.  She also cannot sleep and her quality life is worsening.  At the present time we will plan to give 1 more cycle of Taxol following which she will be referred to radiation.  · Cycle 12 Taxol Herceptin October 20, 2021  · Completed radiation November 2021.  · February 23, 2022: Reviewed echocardiogram with normal ejection fraction and strain pattern.  Patient seen by Dr. Virk and okay to continue Herceptin  · 3/16/2022 here for continued Herceptin maintenance.  · April 27 2022: Currently tolerating Herceptin.  Last dose of Herceptin will be June 18, 2022.  Reviewed mammogram results from April 4, 2022 which is negative  · May 18 2022: Patient has 2 more cycles of Herceptin after today.  Reviewed echo with ejection fraction 57% stable  · June 29, 2022: Last dose of Herceptin today.  No further issues.    *  Factor V Leiden heterozygosity with right  frontal stroke and patient presented in December 2020 with left-sided weakness tingling and numbness and also numbness in the face.  · Was referred to neurology and cardiology by Dr. Goodman  · Ultrasound of carotid does not show significant stenosis  · 24 Holter is negative  · 2D echocardiogram shows ejection fraction of 64% with mild mitral regurg and mitral stenosis  · Neurology obtain factor V Leiden given that patient's paternal aunt had's history of factor V Leiden and that was heterozygous for factor V Leiden  · Continue aspirin as per neurology.  Also patient on medications for her high cholesterol started after stroke currently asymptomatic  · Hypercoagulable work-up done which is negative except heterozygous state for factor V Leiden.  · Complete stroke work-up has been negative and since this is arterial stroke and she was not on aspirin prior to that and her symptoms have resolved I agree with continuing aspirin alone along with high cholesterol medications.  · MR venogram done on May 10, 2021 is negative.  Patient has been referred to the stroke neurologist Dr. Johnson  · Patient diagnosed with breast cancer with Mediport placed.  Per discussion with neurology, patient initiated on prophylactic Xarelto and aspirin discontinued.  · Patient had one nosebleed which lasted 10 minutes but with pinching the nose it helped.  But she is switching to 20 mg daily from tomorrow.  We discussed about placing ice and notifying us if her nosebleeds are significant.  But it resolved.  It was likely secondary to dry air  · 9/7/2021, patient continues to experience nosebleeds.  Reports these occur when her nose itches, and after scratching her nose it begins to bleed.  She feels this may be related to dry air, so I have asked her to start using saline nasal spray to help moisten her nose.  If she experiences nosebleeds that do not stop, I asked her to notify our office.  · Tolerating anticoagulation with Xarelto.  No  "bleeding issues with Xarelto  · Patient still has the port and hence will need to continue Xarelto  · Patient will require port removal but wants to wait till Dr. Euceda comes back prior to port removal    * Family history of cancer: Patient's mother had breast cancer at age 50 and pancreatic cancer at age 68 and  from pancreatic cancer.  Patient's maternal grandmother had breast cancer in her 50s.  Her maternal uncle had skin cancer which went to the lung and another maternal uncle had throat cancer.  Maternal aunt had call colon polyps.  Patient's paternal aunt had breast cancer in her 40s.  And paternal grandfather with colon cancer in his 80s.  Paternal aunt also had factor V Leiden.  · Genetic testing is negative    * Solid nodule in the right kidney on ultrasound, will schedule follow-up with urology  · Patient was to follow-up with Dr. Shultz  · Will need records from urology  · Will discuss with patient at her next appointment about follow-up with urology  · Patient was seen by Dr. Becerra and apparently no follow-up was needed for the nodules in the kidney.  · We will get records from Dr. Becerra's office    *  Elevated BMI, encourage diet and exercise.  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  Patient has been requiring frequent Tums.  Recommended she begin Pepcid 20 mg daily.    · Stable, continue Pepcid 20 mg daily.    *Constipation likely secondary to ferrous sulfate.  Oral iron discontinued.     *Headache likely related to body ache because of Taxol  · 2021, patient discusses concern about worsening headaches.  Describing them as \"glass breaking\".  Started about 2 weeks ago, and progressively worsened.  Occurring 3-4 times daily now, and lasting less than 1 minute with each episode.  This occurs in the same place, right upper skull.  Denies vision changes or dizziness.  Will order stat MRI of the brain for further " evaluation.  The patient follows with a neurologist, and is going to notify patient help with neuropathy as well of her symptoms and the plan for MRI.    *Elevated liver function tests, total bilirubin still normal AST ALT slightly elevated.  Patient did take Tylenol but not too much.  No dose adjustments required at the fingers time.  · Liver function tests normalized    *Iron deficiency anemia, patient's percent saturation still low at 9% s/p full dose of IV Venofer.  · 9/29/2021, patient will receive dose #5 Venofer today.  Hemoglobin today 10.1.  · 12/22/2021, hemoglobin today 12.7.  · 3/16/2022 hemoglobin remains normal at 12.9.  · Patient scheduled for colonoscopy end of July 2022    *Insomnia  · 9/29/2021, patient reports difficulty sleeping.  She is experiencing this every night, not just the nights of treatment when she receives IV dexamethasone.  She has attempted taking Benadryl, however felt groggy following this.  She is going to attempt melatonin to see if this improves her sleep.  · Worsening, will try gabapentin which will help with neuropathy as well  · Patient took gabapentin for a few nights and had issues with  headache.  She is also very concerned about other possible side effects that she discontinued the medication.  She is tried melatonin in the past without results.  We discussed trying Benadryl versus other prescription medication.  She is going to try Benadryl first.    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  Last dose today.  · Patient wants to wait until Dr. Euceda comes back to get the port removed  · Patient is scheduled for colonoscopy end of July 2022 as she has constipation  · She has appointment on August 11 for echocardiogram and follow-up with Dr. Virk  · Follow-up with [facility]  · Reviewed screening mammogram from April 4, 2022 and there is no evidence of malignancy in either breast.  · Prior to port removal patient will have to hold  Xarelto 3 days prior and following port removal she can just go on baby aspirin with food  · I will see her mid August at which time she can have a port flush.  Subsequently will get port removed once Dr. Euceda comes back.    Patient on high risk medication requiring close monitor for toxicity.    Neena Beavers MD  06/29/22       Dr. Maxine Haynes

## 2022-06-29 NOTE — TELEPHONE ENCOUNTER
PATIENT RUNNING LATE FOR APPOINTMENT TODAY, PER ALINA AT THE OFFICE. ADVISE PATIENT TO KEEP COMING

## 2022-06-29 NOTE — TELEPHONE ENCOUNTER
Caller: Radha Astorga    Relationship: Self    Best call back number: [phone]      What was the call regarding: PATIENT CALLED, SHE MISSED CALL FROM THE OFFICE. SHE STATED NO MESSAGE LEFT. NOT SURE WHO MAY HAVE BEEN CALLING

## 2022-06-30 RX ORDER — LEVOTHYROXINE SODIUM 0.12 MG/1
125 TABLET ORAL DAILY
Qty: 90 TABLET | Refills: 0 | Status: SHIPPED | OUTPATIENT
Start: 2022-06-30 | End: 2022-11-21

## 2022-07-03 DIAGNOSIS — D68.51 FACTOR 5 LEIDEN MUTATION, HETEROZYGOUS: ICD-10-CM

## 2022-07-03 DIAGNOSIS — Z86.73 HISTORY OF STROKE: ICD-10-CM

## 2022-07-03 DIAGNOSIS — C50.812 MALIGNANT NEOPLASM OF OVERLAPPING SITES OF LEFT BREAST IN FEMALE, ESTROGEN RECEPTOR NEGATIVE: ICD-10-CM

## 2022-07-03 DIAGNOSIS — Z17.1 MALIGNANT NEOPLASM OF OVERLAPPING SITES OF LEFT BREAST IN FEMALE, ESTROGEN RECEPTOR NEGATIVE: ICD-10-CM

## 2022-07-05 RX ORDER — RIVAROXABAN 20 MG/1
TABLET, FILM COATED ORAL
Qty: 30 TABLET | Refills: 3 | Status: SHIPPED | OUTPATIENT
Start: 2022-07-05 | End: 2022-11-30

## 2022-07-26 ENCOUNTER — TRANSCRIBE ORDERS (OUTPATIENT)
Dept: GASTROENTEROLOGY | Facility: CLINIC | Age: 42
End: 2022-07-26

## 2022-07-26 ENCOUNTER — HOSPITAL ENCOUNTER (EMERGENCY)
Facility: HOSPITAL | Age: 42
End: 2022-07-26

## 2022-07-26 ENCOUNTER — LAB (OUTPATIENT)
Dept: LAB | Facility: HOSPITAL | Age: 42
End: 2022-07-26

## 2022-07-26 ENCOUNTER — TRANSCRIBE ORDERS (OUTPATIENT)
Dept: ADMINISTRATIVE | Facility: HOSPITAL | Age: 42
End: 2022-07-26

## 2022-07-26 DIAGNOSIS — Z01.818 PREOP EXAMINATION: Primary | ICD-10-CM

## 2022-07-26 LAB — SARS-COV-2 ORF1AB RESP QL NAA+PROBE: NOT DETECTED

## 2022-07-26 PROCEDURE — C9803 HOPD COVID-19 SPEC COLLECT: HCPCS

## 2022-07-26 PROCEDURE — U0004 COV-19 TEST NON-CDC HGH THRU: HCPCS | Performed by: INTERNAL MEDICINE

## 2022-07-27 ENCOUNTER — TELEPHONE (OUTPATIENT)
Dept: SURGERY | Facility: CLINIC | Age: 42
End: 2022-07-27

## 2022-07-28 ENCOUNTER — PATIENT MESSAGE (OUTPATIENT)
Dept: FAMILY MEDICINE CLINIC | Facility: CLINIC | Age: 42
End: 2022-07-28

## 2022-07-28 ENCOUNTER — TELEMEDICINE (OUTPATIENT)
Dept: FAMILY MEDICINE CLINIC | Facility: CLINIC | Age: 42
End: 2022-07-28

## 2022-07-28 VITALS — HEIGHT: 62 IN | BODY MASS INDEX: 27.6 KG/M2 | WEIGHT: 150 LBS

## 2022-07-28 DIAGNOSIS — J06.9 UPPER RESPIRATORY TRACT INFECTION DUE TO COVID-19 VIRUS: Primary | ICD-10-CM

## 2022-07-28 DIAGNOSIS — U07.1 UPPER RESPIRATORY TRACT INFECTION DUE TO COVID-19 VIRUS: Primary | ICD-10-CM

## 2022-07-28 PROCEDURE — 99213 OFFICE O/P EST LOW 20 MIN: CPT | Performed by: FAMILY MEDICINE

## 2022-07-28 RX ORDER — AZITHROMYCIN 250 MG/1
TABLET, FILM COATED ORAL
Qty: 6 TABLET | Refills: 0 | Status: SHIPPED | OUTPATIENT
Start: 2022-07-28 | End: 2022-08-10

## 2022-07-28 RX ORDER — MONTELUKAST SODIUM 10 MG/1
10 TABLET ORAL NIGHTLY
Qty: 14 TABLET | Refills: 0 | Status: SHIPPED | OUTPATIENT
Start: 2022-07-28 | End: 2022-08-10

## 2022-07-28 NOTE — PROGRESS NOTES
Subjective   Radha Astorga is a 42 y.o. female.     CC: VV for COVID    History of Present Illness     Pt seen today on a VV after testing positive for COVID yesterday. Symptoms mainly started yesterday, too. No f/c.     Pt reports ST/HA/Cough (mild); reports the ST is a little better today. No dyspnea.     Pt has had 4 COVID shots.    The following portions of the patient's history were reviewed and updated as appropriate: allergies, current medications, past family history, past medical history, past social history, past surgical history and problem list.    Review of Systems   Constitutional: Positive for fatigue. Negative for activity change, chills and fever.   HENT: Positive for sore throat.    Respiratory: Positive for cough.    Cardiovascular: Negative for chest pain.   Neurological: Positive for headaches.   Psychiatric/Behavioral: Negative for dysphoric mood.       Objective   Physical Exam  Constitutional:       General: She is not in acute distress.     Appearance: She is well-developed.   Pulmonary:      Effort: Pulmonary effort is normal.   Neurological:      Mental Status: She is alert and oriented to person, place, and time.   Psychiatric:         Behavior: Behavior normal.         Thought Content: Thought content normal.         Assessment & Plan   Diagnoses and all orders for this visit:    1. Upper respiratory tract infection due to COVID-19 virus (Primary)  -     azithromycin (Zithromax Z-Jj) 250 MG tablet; Take 2 tablets the first day, then 1 tablet daily for 4 days.  Dispense: 6 tablet; Refill: 0  -     montelukast (Singulair) 10 MG tablet; Take 1 tablet by mouth Every Night for 14 days.  Dispense: 14 tablet; Refill: 0    Vitamin protocol discussed. Scripts to [facility] at 30mg QD x 7 days and 200mg BID x 7 days sent.     Spent  14   minutes with chart and interview and consent for this encounter given by the patient.  You have chosen to receive care through a telehealth visit.  Do you consent to  use a video/audio connection for your medical care today? Yes

## 2022-07-31 DIAGNOSIS — F90.0 ATTENTION DEFICIT HYPERACTIVITY DISORDER (ADHD), PREDOMINANTLY INATTENTIVE TYPE: ICD-10-CM

## 2022-08-01 RX ORDER — LANOLIN ALCOHOL/MO/W.PET/CERES
CREAM (GRAM) TOPICAL
Qty: 30 TABLET | Refills: 2 | Status: SHIPPED | OUTPATIENT
Start: 2022-08-01 | End: 2022-11-30

## 2022-08-04 DIAGNOSIS — I63.9 CEREBRAL INFARCTION, UNSPECIFIED MECHANISM: ICD-10-CM

## 2022-08-04 RX ORDER — ATORVASTATIN CALCIUM 10 MG/1
10 TABLET, FILM COATED ORAL DAILY
Qty: 30 TABLET | Refills: 11 | Status: SHIPPED | OUTPATIENT
Start: 2022-08-04 | End: 2022-08-10

## 2022-08-05 DIAGNOSIS — F90.0 ATTENTION DEFICIT HYPERACTIVITY DISORDER (ADHD), PREDOMINANTLY INATTENTIVE TYPE: ICD-10-CM

## 2022-08-05 DIAGNOSIS — I10 ESSENTIAL HYPERTENSION: Chronic | ICD-10-CM

## 2022-08-05 RX ORDER — VALSARTAN 160 MG/1
160 TABLET ORAL DAILY
Qty: 30 TABLET | Refills: 5 | Status: SHIPPED | OUTPATIENT
Start: 2022-08-05 | End: 2022-11-28 | Stop reason: SDUPTHER

## 2022-08-08 ENCOUNTER — TELEPHONE (OUTPATIENT)
Dept: ONCOLOGY | Facility: CLINIC | Age: 42
End: 2022-08-08

## 2022-08-08 NOTE — TELEPHONE ENCOUNTER
Radha returned call, and able to let her know that Dr. Hough's office is putting her on the list to call in case of cancellations, to possibly get her in for her EGD/Colonoscopy at an earlier date.  Patient verbalized understanding and appreciation.

## 2022-08-10 ENCOUNTER — OFFICE VISIT (OUTPATIENT)
Dept: ONCOLOGY | Facility: CLINIC | Age: 42
End: 2022-08-10

## 2022-08-10 ENCOUNTER — INFUSION (OUTPATIENT)
Dept: ONCOLOGY | Facility: HOSPITAL | Age: 42
End: 2022-08-10

## 2022-08-10 VITALS
HEART RATE: 78 BPM | WEIGHT: 151.9 LBS | RESPIRATION RATE: 18 BRPM | TEMPERATURE: 97.3 F | OXYGEN SATURATION: 99 % | DIASTOLIC BLOOD PRESSURE: 76 MMHG | SYSTOLIC BLOOD PRESSURE: 115 MMHG | BODY MASS INDEX: 27.95 KG/M2 | HEIGHT: 62 IN

## 2022-08-10 DIAGNOSIS — D68.51 FACTOR 5 LEIDEN MUTATION, HETEROZYGOUS: ICD-10-CM

## 2022-08-10 DIAGNOSIS — Z86.73 HISTORY OF STROKE: ICD-10-CM

## 2022-08-10 DIAGNOSIS — Z17.1 MALIGNANT NEOPLASM OF OVERLAPPING SITES OF LEFT BREAST IN FEMALE, ESTROGEN RECEPTOR NEGATIVE: Primary | ICD-10-CM

## 2022-08-10 DIAGNOSIS — C50.812 MALIGNANT NEOPLASM OF OVERLAPPING SITES OF LEFT BREAST IN FEMALE, ESTROGEN RECEPTOR NEGATIVE: Primary | ICD-10-CM

## 2022-08-10 DIAGNOSIS — Z45.2 ENCOUNTER FOR ADJUSTMENT OR MANAGEMENT OF VASCULAR ACCESS DEVICE: ICD-10-CM

## 2022-08-10 LAB
ALBUMIN SERPL-MCNC: 4.2 G/DL (ref 3.5–5.2)
ALBUMIN/GLOB SERPL: 1.8 G/DL
ALP SERPL-CCNC: 93 U/L (ref 39–117)
ALT SERPL W P-5'-P-CCNC: 25 U/L (ref 1–33)
ANION GAP SERPL CALCULATED.3IONS-SCNC: 8.1 MMOL/L (ref 5–15)
AST SERPL-CCNC: 21 U/L (ref 1–32)
BASOPHILS # BLD AUTO: 0.06 10*3/MM3 (ref 0–0.2)
BASOPHILS NFR BLD AUTO: 0.9 % (ref 0–1.5)
BILIRUB SERPL-MCNC: 0.2 MG/DL (ref 0–1.2)
BUN SERPL-MCNC: 12 MG/DL (ref 6–20)
BUN/CREAT SERPL: 12.8 (ref 7–25)
CALCIUM SPEC-SCNC: 9.4 MG/DL (ref 8.6–10.5)
CHLORIDE SERPL-SCNC: 106 MMOL/L (ref 98–107)
CO2 SERPL-SCNC: 26.9 MMOL/L (ref 22–29)
CREAT SERPL-MCNC: 0.94 MG/DL (ref 0.57–1)
DEPRECATED RDW RBC AUTO: 38.5 FL (ref 37–54)
EGFRCR SERPLBLD CKD-EPI 2021: 76.9 ML/MIN/1.73
EOSINOPHIL # BLD AUTO: 0.15 10*3/MM3 (ref 0–0.4)
EOSINOPHIL NFR BLD AUTO: 2.3 % (ref 0.3–6.2)
ERYTHROCYTE [DISTWIDTH] IN BLOOD BY AUTOMATED COUNT: 11.9 % (ref 12.3–15.4)
GLOBULIN UR ELPH-MCNC: 2.4 GM/DL
GLUCOSE SERPL-MCNC: 110 MG/DL (ref 65–99)
HCT VFR BLD AUTO: 38.4 % (ref 34–46.6)
HGB BLD-MCNC: 12.5 G/DL (ref 12–15.9)
IMM GRANULOCYTES # BLD AUTO: 0.02 10*3/MM3 (ref 0–0.05)
IMM GRANULOCYTES NFR BLD AUTO: 0.3 % (ref 0–0.5)
LYMPHOCYTES # BLD AUTO: 1.69 10*3/MM3 (ref 0.7–3.1)
LYMPHOCYTES NFR BLD AUTO: 26.1 % (ref 19.6–45.3)
MCH RBC QN AUTO: 28.9 PG (ref 26.6–33)
MCHC RBC AUTO-ENTMCNC: 32.6 G/DL (ref 31.5–35.7)
MCV RBC AUTO: 88.7 FL (ref 79–97)
MONOCYTES # BLD AUTO: 0.45 10*3/MM3 (ref 0.1–0.9)
MONOCYTES NFR BLD AUTO: 7 % (ref 5–12)
NEUTROPHILS NFR BLD AUTO: 4.1 10*3/MM3 (ref 1.7–7)
NEUTROPHILS NFR BLD AUTO: 63.4 % (ref 42.7–76)
NRBC BLD AUTO-RTO: 0 /100 WBC (ref 0–0.2)
PLATELET # BLD AUTO: 284 10*3/MM3 (ref 140–450)
PMV BLD AUTO: 9.6 FL (ref 6–12)
POTASSIUM SERPL-SCNC: 4.2 MMOL/L (ref 3.5–5.2)
PROT SERPL-MCNC: 6.6 G/DL (ref 6–8.5)
RBC # BLD AUTO: 4.33 10*6/MM3 (ref 3.77–5.28)
SODIUM SERPL-SCNC: 141 MMOL/L (ref 136–145)
WBC NRBC COR # BLD: 6.47 10*3/MM3 (ref 3.4–10.8)

## 2022-08-10 PROCEDURE — 36591 DRAW BLOOD OFF VENOUS DEVICE: CPT

## 2022-08-10 PROCEDURE — 85025 COMPLETE CBC W/AUTO DIFF WBC: CPT | Performed by: INTERNAL MEDICINE

## 2022-08-10 PROCEDURE — 99213 OFFICE O/P EST LOW 20 MIN: CPT | Performed by: INTERNAL MEDICINE

## 2022-08-10 PROCEDURE — 25010000002 HEPARIN LOCK FLUSH PER 10 UNITS: Performed by: INTERNAL MEDICINE

## 2022-08-10 PROCEDURE — 80053 COMPREHEN METABOLIC PANEL: CPT | Performed by: INTERNAL MEDICINE

## 2022-08-10 RX ORDER — HEPARIN SODIUM (PORCINE) LOCK FLUSH IV SOLN 100 UNIT/ML 100 UNIT/ML
500 SOLUTION INTRAVENOUS AS NEEDED
Status: CANCELLED | OUTPATIENT
Start: 2022-08-10

## 2022-08-10 RX ORDER — HEPARIN SODIUM (PORCINE) LOCK FLUSH IV SOLN 100 UNIT/ML 100 UNIT/ML
500 SOLUTION INTRAVENOUS AS NEEDED
Status: DISCONTINUED | OUTPATIENT
Start: 2022-08-10 | End: 2022-08-10 | Stop reason: HOSPADM

## 2022-08-10 RX ADMIN — SODIUM CHLORIDE, PRESERVATIVE FREE 500 UNITS: 5 INJECTION INTRAVENOUS at 13:51

## 2022-08-10 NOTE — PROGRESS NOTES
Subjective     REASON FOR follow-up:    1.  Heterozygous state for factor V Leiden    2.  New onset stroke on aspirin by neurology    3.  Hypercholesterolemia    4.  Hypercoagulable work-up done.  Antithrombin 109% protein S activity 85% protein S antigen 107%, protein C activity 85%.  Anticardiolipin antibody IgM 24%, antibeta-2 glycoprotein antibody negative, lupus anticoagulant not detected, prothrombin gene mutation negative.    5.  Screening mammogram showed abnormality in the left breast 6 o'clock position, 8 mm on ultrasound, ultrasound-guided left breast biopsy, 6 cm from the nipple showed evidence of invasive ductal carcinoma poorly differentiated grade 3, [place] score of 8 out of 9 ER negative, NH negative, HER-2/ese 3+ positive.    6.  CT scan February 24, 2021 showed focal area of fatty infiltration of the liver.  1 cm hypoattenuating cortical lesion in the upper pole of the right kidney.  Similarly nodule in the upper pole of the left kidney is 1.5 cm.  Further evaluation by ultrasound suggested  · Ultrasound March 1, 2021 shows solid lesion in the upper pole of the right kidney.  In the left kidney along the midpole of the left kidney is a nodule which is 16 x 16 x 14 mm which is thought to be a cyst.  A 6-month follow-up CT suggested    6.  S/p left partial mastectomy with sentinel lymph node biopsy.  Tumor was present at 5:00, invasive ductal carcinoma, [place] score of 8, grade 3, greatest dimension was 12 mm x 8 x 4 mm.  Single focus of invasive carcinoma present.  There was extensive DCIS which is 30 mm x 8 x 2 mm, grade 3, no evidence of lymphovascular space invasion or dermal lymphatic invasion.  Margins were clear.  4 lymph nodes were negative.  It is a p T1cp N0, ER negative NH negative HER-2/ese 3+ with Ki-67 of 50%.  · [facility] genetic test negative for 47 genes    7.recently initiated Xarelto at loading dose of 15 mg twice a day x3 weeks to then be switched to 20 mg daily per  protocol.  She is doing this because of Mediport in place and known factor V Leiden heterozygosity.        History of Present Illness     Patient is a 42-year-old female with T1CN0 ER negative [place] negative HER2 positive breast cancer status postchemotherapy and currently on Herceptin every 3 weeks.    Last echocardiogram May 11, 2022 which showed ejection fraction of 57%.  Previous echocardiogram showed ejection fraction of 58.8%.  Patient has already seen Dr. Virk.  She has got 2 more Herceptin's left.  She is asymptomatic.  She has got a little lymphedema of the left upper extremity.  Patient has new onset constipation for the last few months and will refer her to GI.    Interval history: Patient is doing well except constipation.  She has a follow-up appointment with GI.  She was to have colonoscopy last week but had COVID infection and that had to be canceled.  She is now scheduled for October 2022.  She is continuing to take some stool softeners and MiraLAX.  I also encouraged her to take prune juice.  She is currently followed with observation.  She has an echocardiogram today and she is seen by Dr. Virk in cardiology.  She is not short of breath and she does not have any lower extremity edema        Oncologic history:  Patient is here for follow-up of her mammogram.  Patient had screening mammogram April 2, 2021:  NAD a focal asymmetry was present in the posterior one third retroareolar region of the left breast.  Further spot compression images and left breast ultrasound was suggested.  Right breast was negative    April 9, 2021: Left diagnostic mammogram showed an area of focal asymmetry in the posterior one third region aspect of the left breast her breast    Ultrasound showed an irregular 0.8 cm lesion in the left breast at the 6 o'clock position of the order of 6 cm from the nipple.  Ultrasound-guided left breast biopsy was recommended.    April 26, 2021: Ultrasound-guided biopsy of the left breast 6  o'clock position, 6 cm from the nipple showed    Invasive ductal carcinoma, poorly differentiated with a Oziel score grade 3 with a score of 8 out of 9 measuring at least 7 mm.  No definitive ductal carcinoma is in situ is identified.  ER 1% negative  WI 1% negative   HER-2/ese 3+ positive    There was no evidence of lymphadenopathy either in the mammogram of the ultrasound.  We suggested an MRI of the breast.  Patient has strong family history of breast cancer      May 7, 2021: MRI of bilateral breast reviewed.  My interpretation is that there is area of enhancement in the 6 o'clock position of the left breast this is the biopsy-proven malignancy.  There is additional area of linear enhancement anteriorly and laterally measuring up to 1.2 cm this is approximately 5.6 cm from the nipple.  In addition there is a enhancement 2 to 3 mm in the anterolateral aspect of the lateral aspect of the left breast which is 4.6 cm from the nipple.  There is also mildly prominent posterior lymph node in the mid axilla which measures 1 cm with cortical thickening.  Findings are consistent with multifocal disease.  No contralateral disease or internal mammary adenopathy identified    May 20, 2021: Genetic test, [facility] genetic test shows 47 genes negative    May 21, 2021: I reviewed the biopsy of the lymph node in the left axilla which shows reactive lymph node negative for any carcinoma or lymphoma    June 2, 2021:Final Diagnosis  1. Left Breast, 5:00 o'clock, MRI-guided Biopsies for Linear Enhancement: INVASIVE MAMMARY CARCINOMA, NO  SPECIAL TYPE (INVASIVE DUCTAL CARCINOMA).  A. Largest contiguous focus in a core measures 4 mm.  B. No in-situ component identified.  C. Brisk lymphocytic response noted (see Comment).  D. No definitive lymphovascular nor perineural invasion identified.  2. Left Breast, 2:00 o'clock, MRI-guided Biopsy for Enhancement:  A. Benign breast parenchyma with radial scar and micropapillomas.  B. Usual  ductal hyperplasia and columnar cell hyperplasia.  C. No atypical hyperplasia, in-situ nor invasive carcinoma identified    ER, WI, HER-2 new and Ki-67 pending on this new biopsy      Patient has been seen by Dr. Lashonda Euceda with plans of doing surgery on July first 2021    Once patient undergoes surgery lumpectomy with sentinel lymph node biopsy we will give further recommendation about treatment options.  Given that patient has multifocal disease and both of the lesions 2 lesions are 1.2 cm each, with it being a HER-2 positive tumor on the previous biopsy at 6:00 Dr. Euceda and myself discussed and patient preferred to undergo port placement at the same time of surgery.    Patient had Button genetic test which was negative.    She has completed surgery July 1, 2021.  She underwent left partial mastectomy with left axillary sentinel lymph node biopsy and right port placement.  Clinically she was thought to have a high-grade multifocal invasive ductal carcinoma ER/WI negative, WI positive.  The lesions require preoperative localization.  The multifocal cancer was bracketed with 2 savvy markers and the radial scar was localized with a needle.  Because of the volume of tissue that will be excised related to the breast volume the case was done in conjunction with Dr. Zavala for oncoplastic closure.    She is 2 weeks from surgery.  She is healing up reasonably well.    On review of her pathology she had left partial mastectomy with sentinel lymph node biopsy.  Tumor was present at 5:00, invasive ductal carcinoma, Ace score of 8, grade 3, greatest dimension was 12 mm x 8 x 4 mm.  Single focus of invasive carcinoma present.  There was extensive DCIS which is 30 mm x 8 x 2 mm, grade 3, no evidence of lymphovascular space invasion or dermal lymphatic invasion.  Margins were clear.  4 lymph nodes were negative.  It is a p T1cp N0, ER negative WI negative HER-2/ese 3+ with Ki-67 of 50%.    Patient is here to  discuss further options of treatment.  Given small tumor T1c N0, she is a good candidate for weekly Taxol Herceptin.    Given that patient has heterozygous state for factor V Leiden and currently has port placed we will plan to start Eliquis 2.5 mg p.o. twice daily.  I have left a message for Dr. Craft in neurology to call me to discuss if patient needs to continue aspirin or we can hold off since be starting Eliquis.      Genetic test negative    August 4, 2021: Weekly Taxol Herceptin x12 weeks followed by Herceptin x1 year.  Cycle 1 today  Cycle 12 Taxol Herceptin October 10, 2021      Hematologic history:  patient is a 40-year-old female who presented end of December with numbness and tingling in her left upper extremity and left face.  She went to see Dr. Goodman who then got concerned and referred to neurology.  She was referred to Dr. Freedman and talk to Dr. Thompson neurology for evaluation.  Patient underwent ultrasound of the carotids which did not show significant stenosis of the carotids.  She underwent 2D echocardiogram which showed a good ejection fraction of 64% with mild mitral valve prolapse and mild mitral stenosis.  She had a 24-hour Holter which was negative.  She then had also an MRI of the brain February 3, 2021 which showed areas of hyperintensity involving the subcortical white matter of the right frontal lobe superior laterally and posteriorly with the largest area measuring 8 9 mm.  There is also enhancement present.  The findings are consistent with a subacute infarct.  They could not differentiate if there is any underlying neoplastic process but a short-term follow-up was suggested in order to follow-up.  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    Patient currently got started on aspirin and factor V Leiden was obtained by neurology because patient's paternal aunt had factor V Leiden  mutation and she was heterozygous.  Patient's testing also showed that she was heterozygous.    Patient states her numbness and tingling is resolved completely on the left arm and face it just lasted for a 24 hours.  She was here referred here for neurology to see if there is any other cause for her underlying hypercoagulable state.  She has not had a complete hypercoagulable work-up.  Also discussed with her that an underlying malignancy could cause a hypercoagulable state and we would need to do a CT scan to rule that out.    Patient's mother has had breast cancer in her 50s but had pancreatic cancer at 68 and  from that.  Patient's dad had stroke at 63.  But he is alive at 74.  She has 1 brother who is 40 in good health.  Paternal aunt had breast cancer in her 40s.  Paternal grandfather had colon cancer in his late 80s.  Maternal uncle had throat cancer and another maternal uncle had skin cancer with metastasis to the lung.  And maternal grandmother had breast cancer.    Patient was to have mammogram done last year but because of COVID-19 it got postponed and she has not had it done yet.    Patient used to be a smoker half pack per day for 7 years but quit 10 years ago.  She has high cholesterol for which she is on treatment.    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   • History of rheumatic fever    • Hyperlipidemia 2022    Resolved; now unmedicated   • Hypertension    • Hypothyroidism     • Infiltrating ductal carcinoma of left breast (HCC) 05/05/2021    Left   • Kidney stone    • Mitral valve insufficiency    • PONV (postoperative nausea and vomiting)    • Stroke (HCC) 12/2020    HX OF   • Stroke-like symptoms         Past Surgical History:   Procedure Laterality Date   • BREAST BIOPSY Left 05/05/2021    IDC   • BREAST LUMPECTOMY     • BREAST LUMPECTOMY WITH SENTINEL NODE BIOPSY N/A 07/01/2021    Procedure: right port placement, Left SHAINA-guided, bracketed, partial mastectomy and sentinel lymph node biopsy Left breast needle-localized excisional biopsy;  Surgeon: Lashonda Euceda MD;  Location: [facility];  Service: General;  Laterality: N/A;   • BREAST SURGERY Bilateral 07/01/2021    Procedure: RIGHT BREAST REDUCTION MASTOPEXY LEFT BREAST ONCOPLASTIC CLOSURE;  Surgeon: Caridad Baker MD PhD;  Location: [facility];  Service: Plastics;  Laterality: Bilateral;   • NO PAST SURGERIES     • PAP SMEAR  2016        Current Outpatient Medications on File Prior to Visit   Medication Sig Dispense Refill   • Cholecalciferol (D3) 50 MCG (2000 UT) tablet Take 4,000 Units by mouth Daily.     • famotidine (PEPCID) 20 MG tablet TAKE 1 TABLET BY MOUTH DAILY 30 tablet 3   • levothyroxine (Synthroid) 125 MCG tablet Take 1 tablet by mouth Daily. 90 tablet 0   • lidocaine-prilocaine (EMLA) 2.5-2.5 % cream Apply  topically to the appropriate area as directed As Needed for Mild Pain . 1 each 2   • lisdexamfetamine (Vyvanse) 50 MG capsule Take 1 capsule by mouth Every Morning 30 capsule 0   • LORazepam (ATIVAN) 0.5 MG tablet Take 1 tablet by mouth Every 8 (Eight) Hours As Needed for Anxiety. for anxiety 30 tablet 2   • ondansetron ODT (ZOFRAN-ODT) 8 MG disintegrating tablet Place 1 tablet on the tongue Every 8 (Eight) Hours As Needed for Nausea or Vomiting. 30 tablet 3   • Polyethylene Glycol 3350 (MIRALAX PO) Take  by mouth As Needed.     • traZODone (DESYREL) 50 MG tablet Take 1 tablet by mouth  Every Night. (Patient taking differently: Take 25 mg by mouth Every Night.) 30 tablet 11   • valsartan (DIOVAN) 160 MG tablet Take 1 tablet by mouth Daily. 30 tablet 5   • vitamin B-12 (CYANOCOBALAMIN) 1000 MCG tablet TAKE 1 TABLET BY MOUTH 30 tablet 2   • Xarelto 20 MG tablet TAKE 1 TABLET BY MOUTH DAILY 30 tablet 3   • atorvastatin (LIPITOR) 10 MG tablet TAKE 1 TABLET BY MOUTH DAILY 30 tablet 11   • azithromycin (Zithromax Z-Jj) 250 MG tablet Take 2 tablets the first day, then 1 tablet daily for 4 days. 6 tablet 0   • montelukast (Singulair) 10 MG tablet Take 1 tablet by mouth Every Night for 14 days. 14 tablet 0     Current Facility-Administered Medications on File Prior to Visit   Medication Dose Route Frequency Provider Last Rate Last Admin   • [DISCONTINUED] heparin injection 500 Units  500 Units Intravenous PRN Neena Beavers MD   500 Units at 21   • [DISCONTINUED] sodium chloride 0.9 % flush 10 mL  10 mL Intravenous PRN Neena Beavers MD   10 mL at 21        ALLERGIES:    Allergies   Allergen Reactions   • Sulfa Antibiotics Rash        Social History     Socioeconomic History   • Marital status: Significant Other   • Number of children: 0   Tobacco Use   • Smoking status: Former Smoker     Packs/day: 0.50     Years: 10.00     Pack years: 5.00     Types: Cigarettes, Cigarettes     Start date: 1997     Quit date: 2009     Years since quittin.6   • Smokeless tobacco: Never Used   • Tobacco comment: Off and on for 12 years   Vaping Use   • Vaping Use: Never used   Substance and Sexual Activity   • Alcohol use: Not Currently     Comment: RARELY   • Drug use: No   • Sexual activity: Yes     Partners: Female     Birth control/protection: None     Comment: Unnecessary        Family History   Problem Relation Age of Onset   • Breast cancer Mother 53   • Pancreatic cancer Mother 68   • Stroke Father    • Hypertension Brother    • Colon polyps Maternal Aunt    • Melanoma Maternal  Uncle    • Breast cancer Paternal Aunt 55   • Lung cancer Maternal Grandmother    • Colon cancer Maternal Grandfather    • Prostate cancer Maternal Grandfather    • Prostate cancer Paternal Grandfather    • Breast cancer Paternal Great-Grandmother 94   • Brain cancer Maternal Cousin 20   • Ovarian cancer Other         Paternal great aunt    • Breast cancer Other    • Colon polyps Maternal Aunt    • Malig Hyperthermia Neg Hx    • Crohn's disease Neg Hx    • Irritable bowel syndrome Neg Hx    • Ulcerative colitis Neg Hx       Family  history: Mother had breast cancer at age 57.  Maternal great aunt had breast cancer and paternal great aunt had breast cancer in her 40s.  Patient's mother had pancreatic cancer as well.  Paternal grandfather had prostate cancer.    Review of Systems   Constitutional: Positive for appetite change (6/29/22 IMPROVED). Negative for chills, diaphoresis, fatigue, fever and unexpected weight change.   HENT: Negative for hearing loss, nosebleeds, sore throat, trouble swallowing and voice change.    Eyes: Negative for visual disturbance.   Respiratory: Negative for cough, chest tightness, shortness of breath and wheezing.    Cardiovascular: Negative for chest pain, palpitations and leg swelling.   Gastrointestinal: Positive for constipation (IMPROVED 6/29/22) and nausea (Every morning IMPROVED 6/29/22). Negative for abdominal distention, abdominal pain, blood in stool, diarrhea and vomiting.   Genitourinary: Negative for dysuria, frequency, hematuria and urgency.   Musculoskeletal: Negative for back pain and joint swelling.        No muscle weakness.   Skin: Negative for rash and wound.   Neurological: Negative for dizziness, seizures, syncope, speech difficulty, weakness, numbness and headaches.   Hematological: Negative for adenopathy. Does not bruise/bleed easily.   Psychiatric/Behavioral: Positive for sleep disturbance (6/29/22 IMPROVED ). Negative for behavioral problems, confusion,  "dysphoric mood and suicidal ideas. The patient is not nervous/anxious.    All other systems reviewed and are negative.   as per HPI       Objective     Vitals:    08/10/22 1350   BP: 115/76   Pulse: 78   Resp: 18   Temp: 97.3 °F (36.3 °C)   TempSrc: Temporal   SpO2: 99%   Weight: 68.9 kg (151 lb 14.4 oz)   Height: 157.5 cm (62.01\")   PainSc: 0-No pain     Current Status 8/10/2022   ECOG score 0     Physical exam    CONSTITUTIONAL:  Vital signs reviewed.  No distress, looks comfortable.  RESPIRATORY:  Normal respiratory effort.  Lungs clear to auscultation bilaterally.  BREAST: Right breast: No skin changes, no evidence of breast mass, no nipple discharge, no evidence of any right axillary adenopathy or right supraclavicular adenopathy  Left breast: No evidence of any skin changes, no evidence of any left breast mass and no evidence of left nipple discharge as well as no left axillary adenopathy or left supraclavicular adenopathy.  CARDIOVASCULAR:  Normal S1, S2.  No murmurs rubs or gallops.  No significant lower extremity edema.  GASTROINTESTINAL: Abdomen appears unremarkable.  Nontender.  No hepatomegaly.  No splenomegaly.  LYMPHATIC:  No cervical, supraclavicular, axillary lymphadenopathy.  SKIN:  Warm.  No rashes.  PSYCHIATRIC:  Normal judgment and insight.  Normal mood and affect.    CRECENT LABS:  Results from last 7 days   Lab Units 08/10/22  1336   WBC 10*3/mm3 6.47   NEUTROS ABS 10*3/mm3 4.10   HEMOGLOBIN g/dL 12.5   HEMATOCRIT % 38.4   PLATELETS 10*3/mm3 284     Results from last 7 days   Lab Units 08/10/22  1336   SODIUM mmol/L 141   POTASSIUM mmol/L 4.2   CHLORIDE mmol/L 106   CO2 mmol/L 26.9   BUN mg/dL 12   CREATININE mg/dL 0.94   CALCIUM mg/dL 9.4   ALBUMIN g/dL 4.20   BILIRUBIN mg/dL 0.2   ALK PHOS U/L 93   ALT (SGPT) U/L 25   AST (SGOT) U/L 21   GLUCOSE mg/dL 110*         Assessment & Plan       * [id number], ER negative MI negative HER-2/ese 3+ with Ki-67 of 50%.  S/p left partial mastectomy with " sentinel lymph node biopsy.  Tumor was present at 5:00, invasive ductal carcinoma, [place] score of 8, grade 3, greatest dimension was 12 mm x 8 x 4 mm.  Single focus of invasive carcinoma present.  There was extensive DCIS which is 30 mm x 8 x 2 mm, grade 3, no evidence of lymphovascular space invasion or dermal lymphatic invasion.  Margins were clear.  4 lymph nodes were negative.  It is a p T1cp N0, ER negative UT negative HER-2/ese 3+ with Ki-67 of 50%.  · Patient is s/p port placement  · Discussed in length with patient that given a small tumor she is eligible for weekly Taxol Herceptin.  Weekly Taxol x12 weeks along with weekly Herceptin followed by 1 year of Herceptin.  · Discussed patient in the breast cancer conference  · 2D echo done which showed ejection fraction of 61-65% and strain pattern of -20.8.  · Patient also seen by Dr. Virk cardiology and Dr. Virk is planning to repeat echocardiogram in 3 months after initiation of therapy.  · 8/4/2021 initiating weekly Taxol Herceptin  · 9/8/2021, proceed with week #6 Taxol and Herceptin.  Patient continues to tolerate treatment well. No signs of peripheral neuropathy.  · Her next echocardiogram due November 4, 2021, She follows up with cardiology Dr. Camron Virk.  · 9/28/2021, proceed with week #9 taxol/herceptin.  She does feel her nausea has worsened after her last cycle of chemotherapy.  She prefers the disintegrating Zofran, this will be called to her pharmacy today.  · Patient is here to take cycle 11 of weekly Taxol with worsening fatigue and worsening rash and overall dysphoric mood.  She also cannot sleep and her quality life is worsening.  At the present time we will plan to give 1 more cycle of Taxol following which she will be referred to radiation.  · Cycle 12 Taxol Herceptin October 20, 2021  · Completed radiation November 2021.  · February 23, 2022: Reviewed echocardiogram with normal ejection fraction and strain pattern.  Patient seen by   [place] and okay to continue Herceptin  · 3/16/2022 here for continued Herceptin maintenance.  · April 27 2022: Currently tolerating Herceptin.  Last dose of Herceptin will be June 18, 2022.  Reviewed mammogram results from April 4, 2022 which is negative  · May 18 2022: Patient has 2 more cycles of Herceptin after today.  Reviewed echo with ejection fraction 57% stable  · June 29, 2022: Last dose of Herceptin today.  No further issues.  · Followed with observation    *  Factor V Leiden heterozygosity with right frontal stroke and patient presented in December 2020 with left-sided weakness tingling and numbness and also numbness in the face.  · Was referred to neurology and cardiology by Dr. Goodman  · Ultrasound of carotid does not show significant stenosis  · 24 Holter is negative  · 2D echocardiogram shows ejection fraction of 64% with mild mitral regurg and mitral stenosis  · Neurology obtain factor V Leiden given that patient's paternal aunt had's history of factor V Leiden and that was heterozygous for factor V Leiden  · Continue aspirin as per neurology.  Also patient on medications for her high cholesterol started after stroke currently asymptomatic  · Hypercoagulable work-up done which is negative except heterozygous state for factor V Leiden.  · Complete stroke work-up has been negative and since this is arterial stroke and she was not on aspirin prior to that and her symptoms have resolved I agree with continuing aspirin alone along with high cholesterol medications.  · MR venogram done on May 10, 2021 is negative.  Patient has been referred to the stroke neurologist Dr. Johnson  · Patient diagnosed with breast cancer with Mediport placed.  Per discussion with neurology, patient initiated on prophylactic Xarelto and aspirin discontinued.  · Patient had one nosebleed which lasted 10 minutes but with pinching the nose it helped.  But she is switching to 20 mg daily from tomorrow.  We discussed about placing  ice and notifying us if her nosebleeds are significant.  But it resolved.  It was likely secondary to dry air  · 2021, patient continues to experience nosebleeds.  Reports these occur when her nose itches, and after scratching her nose it begins to bleed.  She feels this may be related to dry air, so I have asked her to start using saline nasal spray to help moisten her nose.  If she experiences nosebleeds that do not stop, I asked her to notify our office.  · Tolerating anticoagulation with Xarelto.  No bleeding issues with Xarelto  · Patient still has the port and hence will need to continue Xarelto  · Patient will require port removal but wants to wait till Dr. Euceda comes back prior to port removal    * Family history of cancer: Patient's mother had breast cancer at age 50 and pancreatic cancer at age 68 and  from pancreatic cancer.  Patient's maternal grandmother had breast cancer in her 50s.  Her maternal uncle had skin cancer which went to the lung and another maternal uncle had throat cancer.  Maternal aunt had call colon polyps.  Patient's paternal aunt had breast cancer in her 40s.  And paternal grandfather with colon cancer in his 80s.  Paternal aunt also had factor V Leiden.  · Genetic testing is negative    * Solid nodule in the right kidney on ultrasound, will schedule follow-up with urology  · Patient was to follow-up with Dr. Shultz  · Will need records from urology  · Will discuss with patient at her next appointment about follow-up with urology  · Patient was seen by Dr. Becerra and apparently no follow-up was needed for the nodules in the kidney.  · We will get records from Dr. Becerra's office    *  Elevated BMI, encourage diet and exercise.  Patient is improving her diet and exercise after having had the stroke  · Patient has lost weight and she is trying to lose weight    *  Reflux secondary to chemotherapy.  Patient has been requiring frequent Tums.  Recommended she begin Pepcid 20 mg  "daily.    · Stable, continue Pepcid 20 mg daily.    *Constipation likely secondary to ferrous sulfate.  Oral iron discontinued  · Continues to have constipation, seen by GI who currently is going to do colonoscopy in October.     *Headache likely related to body ache because of Taxol  · 9/29/2021, patient discusses concern about worsening headaches.  Describing them as \"glass breaking\".  Started about 2 weeks ago, and progressively worsened.  Occurring 3-4 times daily now, and lasting less than 1 minute with each episode.  This occurs in the same place, right upper skull.  Denies vision changes or dizziness.  Will order stat MRI of the brain for further evaluation.  The patient follows with a neurologist, and is going to notify patient help with neuropathy as well of her symptoms and the plan for MRI.    *Elevated liver function tests, total bilirubin still normal AST ALT slightly elevated.  Patient did take Tylenol but not too much.  No dose adjustments required at the fingers time.  · Liver function tests normalized    *Iron deficiency anemia, patient's percent saturation still low at 9% s/p full dose of IV Venofer.  · 9/29/2021, patient will receive dose #5 Venofer today.  Hemoglobin today 10.1.  · 12/22/2021, hemoglobin today 12.7.  · 3/16/2022 hemoglobin remains normal at 12.9.  · Patient scheduled for colonoscopy end of July 2022    *Insomnia  · 9/29/2021, patient reports difficulty sleeping.  She is experiencing this every night, not just the nights of treatment when she receives IV dexamethasone.  She has attempted taking Benadryl, however felt groggy following this.  She is going to attempt melatonin to see if this improves her sleep.  · Worsening, will try gabapentin which will help with neuropathy as well  · Patient took gabapentin for a few nights and had issues with  headache.  She is also very concerned about other possible side effects that she discontinued the medication.  She is tried melatonin in " the past without results.  We discussed trying Benadryl versus other prescription medication.  She is going to try Benadryl first.    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 Lashonda Euceda next week who can then schedule her for port removal after colonoscopy  · Encourage patient to exercise, increase fiber in diet and use prune juice to help with her constipation  · Follow-up with me in 3 months with labs with port flush in 6 weeks in 3 months  Patient on high risk medication requiring close monitor for toxicity.    Neena Beavers MD  08/10/22       Dr. Maxine Haynes

## 2022-08-11 ENCOUNTER — HOSPITAL ENCOUNTER (OUTPATIENT)
Dept: CARDIOLOGY | Facility: HOSPITAL | Age: 42
Discharge: HOME | End: 2022-08-11
Admitting: INTERNAL MEDICINE

## 2022-08-11 VITALS
HEART RATE: 69 BPM | BODY MASS INDEX: 27.79 KG/M2 | SYSTOLIC BLOOD PRESSURE: 100 MMHG | OXYGEN SATURATION: 99 % | DIASTOLIC BLOOD PRESSURE: 70 MMHG | WEIGHT: 151 LBS | HEIGHT: 62 IN

## 2022-08-11 DIAGNOSIS — I05.9 RHEUMATIC MITRAL VALVE DISEASE: ICD-10-CM

## 2022-08-11 DIAGNOSIS — Z09 CHEMOTHERAPY FOLLOW-UP EXAMINATION: ICD-10-CM

## 2022-08-11 LAB
AORTIC ARCH: 1.7 CM
ASCENDING AORTA: 2.7 CM
BH CV ECHO LEFT VENTRICLE GLOBAL LONGITUDINAL STRAIN: -20.5 %
BH CV ECHO MEAS - ACS: 1.51 CM
BH CV ECHO MEAS - AO MAX PG: 6.8 MMHG
BH CV ECHO MEAS - AO MEAN PG: 4.1 MMHG
BH CV ECHO MEAS - AO ROOT DIAM: 2.9 CM
BH CV ECHO MEAS - AO V2 MAX: 130.3 CM/SEC
BH CV ECHO MEAS - AO V2 VTI: 28 CM
BH CV ECHO MEAS - AVA(I,D): 2.1 CM2
BH CV ECHO MEAS - EDV(CUBED): 69.3 ML
BH CV ECHO MEAS - EDV(MOD-SP2): 91 ML
BH CV ECHO MEAS - EDV(MOD-SP4): 110 ML
BH CV ECHO MEAS - EF(MOD-BP): 58.8 %
BH CV ECHO MEAS - EF(MOD-SP2): 59.3 %
BH CV ECHO MEAS - EF(MOD-SP4): 58.2 %
BH CV ECHO MEAS - ESV(CUBED): 17.4 ML
BH CV ECHO MEAS - ESV(MOD-SP2): 37 ML
BH CV ECHO MEAS - ESV(MOD-SP4): 46 ML
BH CV ECHO MEAS - FS: 36.9 %
BH CV ECHO MEAS - IVS/LVPW: 0.98 CM
BH CV ECHO MEAS - IVSD: 0.92 CM
BH CV ECHO MEAS - LAT PEAK E' VEL: 11.1 CM/SEC
BH CV ECHO MEAS - LV DIASTOLIC VOL/BSA (35-75): 64.8 CM2
BH CV ECHO MEAS - LV MASS(C)D: 119.2 GRAMS
BH CV ECHO MEAS - LV MAX PG: 4 MMHG
BH CV ECHO MEAS - LV MEAN PG: 2.2 MMHG
BH CV ECHO MEAS - LV SYSTOLIC VOL/BSA (12-30): 27.1 CM2
BH CV ECHO MEAS - LV V1 MAX: 100.3 CM/SEC
BH CV ECHO MEAS - LV V1 VTI: 21.1 CM
BH CV ECHO MEAS - LVIDD: 4.1 CM
BH CV ECHO MEAS - LVIDS: 2.6 CM
BH CV ECHO MEAS - LVOT AREA: 2.8 CM2
BH CV ECHO MEAS - LVOT DIAM: 1.89 CM
BH CV ECHO MEAS - LVPWD: 0.94 CM
BH CV ECHO MEAS - MED PEAK E' VEL: 8.1 CM/SEC
BH CV ECHO MEAS - MR MAX PG: 20.8 MMHG
BH CV ECHO MEAS - MR MAX VEL: 227.9 CM/SEC
BH CV ECHO MEAS - MV A DUR: 0.12 SEC
BH CV ECHO MEAS - MV A MAX VEL: 112.3 CM/SEC
BH CV ECHO MEAS - MV DEC SLOPE: 434.9 CM/SEC2
BH CV ECHO MEAS - MV DEC TIME: 0.26 MSEC
BH CV ECHO MEAS - MV E MAX VEL: 120 CM/SEC
BH CV ECHO MEAS - MV E/A: 1.07
BH CV ECHO MEAS - MV MAX PG: 5.1 MMHG
BH CV ECHO MEAS - MV MEAN PG: 2.48 MMHG
BH CV ECHO MEAS - MV P1/2T: 75.4 MSEC
BH CV ECHO MEAS - MV V2 VTI: 32 CM
BH CV ECHO MEAS - MVA(P1/2T): 2.9 CM2
BH CV ECHO MEAS - MVA(VTI): 1.84 CM2
BH CV ECHO MEAS - PA ACC TIME: 0.19 SEC
BH CV ECHO MEAS - PA PR(ACCEL): -5.4 MMHG
BH CV ECHO MEAS - PA V2 MAX: 88.9 CM/SEC
BH CV ECHO MEAS - PULM A REVS DUR: 0.09 SEC
BH CV ECHO MEAS - PULM A REVS VEL: 24.9 CM/SEC
BH CV ECHO MEAS - PULM DIAS VEL: 27 CM/SEC
BH CV ECHO MEAS - PULM S/D: 1.32
BH CV ECHO MEAS - PULM SYS VEL: 35.6 CM/SEC
BH CV ECHO MEAS - QP/QS: 0.54
BH CV ECHO MEAS - RAP SYSTOLE: 3 MMHG
BH CV ECHO MEAS - RV MAX PG: 2 MMHG
BH CV ECHO MEAS - RV V1 MAX: 70.7 CM/SEC
BH CV ECHO MEAS - RV V1 VTI: 16.8 CM
BH CV ECHO MEAS - RVOT DIAM: 1.55 CM
BH CV ECHO MEAS - RVSP: 17 MMHG
BH CV ECHO MEAS - SI(MOD-SP2): 31.8 ML/M2
BH CV ECHO MEAS - SI(MOD-SP4): 37.7 ML/M2
BH CV ECHO MEAS - SUP REN AO DIAM: 1.7 CM
BH CV ECHO MEAS - SV(LVOT): 58.9 ML
BH CV ECHO MEAS - SV(MOD-SP2): 54 ML
BH CV ECHO MEAS - SV(MOD-SP4): 64 ML
BH CV ECHO MEAS - SV(RVOT): 31.7 ML
BH CV ECHO MEAS - TAPSE (>1.6): 2.07 CM
BH CV ECHO MEAS - TR MAX PG: 14.4 MMHG
BH CV ECHO MEAS - TR MAX VEL: 190 CM/SEC
BH CV ECHO MEASUREMENTS AVERAGE E/E' RATIO: 12.5
BH CV XLRA - RV BASE: 2.2 CM
BH CV XLRA - RV LENGTH: 4.6 CM
BH CV XLRA - RV MID: 1.8 CM
BH CV XLRA - TDI S': 9.8 CM/SEC
LEFT ATRIUM VOLUME INDEX: 16.2 ML/M2
MAXIMAL PREDICTED HEART RATE: 176 BPM
SINUS: 3.1 CM
STJ: 2.6 CM
STRESS TARGET HR: 150 BPM

## 2022-08-11 PROCEDURE — 93306 TTE W/DOPPLER COMPLETE: CPT

## 2022-08-11 PROCEDURE — 93306 TTE W/DOPPLER COMPLETE: CPT | Performed by: INTERNAL MEDICINE

## 2022-08-11 PROCEDURE — 25010000002 PERFLUTREN (DEFINITY) 8.476 MG IN SODIUM CHLORIDE (PF) 0.9 % 10 ML INJECTION: Performed by: INTERNAL MEDICINE

## 2022-08-11 PROCEDURE — 93356 MYOCRD STRAIN IMG SPCKL TRCK: CPT | Performed by: INTERNAL MEDICINE

## 2022-08-11 PROCEDURE — 93356 MYOCRD STRAIN IMG SPCKL TRCK: CPT

## 2022-08-11 RX ADMIN — PERFLUTREN 1.5 ML: 6.52 INJECTION, SUSPENSION INTRAVENOUS at 08:32

## 2022-08-12 DIAGNOSIS — I05.9 RHEUMATIC MITRAL VALVE DISEASE: Primary | ICD-10-CM

## 2022-08-12 NOTE — PROGRESS NOTES
Renetta -- Please call to schedule echo in one year, and f/u with TK same day.    gloria ritchie    (For myself -- called patient, normal echo, no need for more Herceptin echoes, but does need an echo in a year for her MV)

## 2022-08-18 ENCOUNTER — HOSPITAL ENCOUNTER (OUTPATIENT)
Dept: OCCUPATIONAL THERAPY | Facility: HOSPITAL | Age: 42
Setting detail: THERAPIES SERIES
Discharge: HOME | End: 2022-08-18

## 2022-08-18 DIAGNOSIS — C50.812 MALIGNANT NEOPLASM OF OVERLAPPING SITES OF LEFT BREAST IN FEMALE, ESTROGEN RECEPTOR NEGATIVE: ICD-10-CM

## 2022-08-18 DIAGNOSIS — Z17.1 MALIGNANT NEOPLASM OF OVERLAPPING SITES OF LEFT BREAST IN FEMALE, ESTROGEN RECEPTOR NEGATIVE: ICD-10-CM

## 2022-08-18 DIAGNOSIS — N64.89 BREAST EDEMA: ICD-10-CM

## 2022-08-18 DIAGNOSIS — M79.621 AXILLARY PAIN, RIGHT: ICD-10-CM

## 2022-08-18 DIAGNOSIS — I97.2 POST-MASTECTOMY LYMPHEDEMA SYNDROME: Primary | ICD-10-CM

## 2022-08-18 PROCEDURE — 93702 BIS XTRACELL FLUID ANALYSIS: CPT

## 2022-08-18 PROCEDURE — 97535 SELF CARE MNGMENT TRAINING: CPT

## 2022-08-18 NOTE — PROGRESS NOTES
[facility]     Referring Provider: No ref. provider found     Chief complaint: Routine follow up breast caner      HPI:   5/21/21: Saw Dr. Euceda  Ms. Radha Astorga is a 42 yo woman, seen at the request of Dr. Neena Beavers, for a new diagnosis of left breast cancer. This was initially detected as an imaging abnormality on routine screening. Her work-up is detailed in the oncologic history below. Prior to the biopsy, she denies any breast lumps, pain, skin changes, or nipple discharge. She has a past history of a benign left breast percutaneous biopsy in 2013. She has a past history of a stroke in December 2020 without any residual neuro deficits. Cardiac work-up was negative, however hypercoagulable work-up revealed a factor V Leiden mutation. She has a family history of breast cancer in her mother (diagnosed at age 53) and a paternal aunt (diagnosed at age 55), as well as a paternal great aunt and her paternal great grandmother. Her paternal great aunt also had ovarian cancer and her mother had pancreatic cancer. She underwent expanded panel genetic testing at Dr. Beavers's office and she was negative for mutation.    6/16/21: Saw Dr. Euceda  At her last visit, she underwent left axillary lymph node biopsy, which thankfully returned as benign. She underwent left breast MR-guided biopsy x2. The lesion at 5:00, near the primary malignancy, returned as a second foci of cancer, and the lesion at 2:00, returned as a radial scar (see pathology report details below). I have already briefly discussed these results with her over the phone and the plan still is to proceed with breast conservation. She has an appointment scheduled with plastic surgery next week.    7/15/21: Saw Dr. Euceda   She underwent port placement, left partial mastectomy and excisional biopsy and SLNB with oncoplastic closure and right reduction for symmetry on 7/1/21. See surgery & pathology details below in oncologic history. She is  "complaining of a full sensation and some pain in her left armpit.    10/20/21 Saw Dr. Euceda  She returns today for scheduled follow-up. Her last dose of Taxol/Herceptin is later today. She has seen Dr. Byrd and plans on starting radiation sometime in November. Unfortunately she has been dealing with left arm lymphedema for a few months. She has been seeing Julia for this and undergoing bandaging. She also is wearing a compression sleeve and gauntlet at home. She denies any new breast related complaints and is very happy with her cosmetic result.    8/18/2022 Interval History  Returns for routine follow up today.  Completed radiation November 2021.  Completed Cycle 12 Taxol Herceptin Oct 2021  Completed Herceptin June 2022  Saw Dr. Beavers 8/10/22  \"Plan:   · Patient awaiting colonoscopy as she has had intermittent constipation which is new for her and is scheduled in October  · She had port flush today  · Discussed about port removal but she wants to wait till her colonoscopy is completed and subsequently wants to get the port removed  · Reviewed screening mammogram from April 2022 which is negative  · Patient is going to see Dr. Lashonda Euceda next week who can then schedule her for port removal after colonoscopy  · Encourage patient to exercise, increase fiber in diet and use prune juice to help with her constipation  · Follow-up with me in 3 months with labs with port flush in 6 weeks in 3 months\"  She had bioimpedence yesterday \"L-Dex value today is 1.7 which is WNL\"  Today she has complaints of left breast edema.  She states she is wearing her sports bras and she is continuing with breast massage.  The edema is on the lower outer portion of the left breast.    Oncology/Hematology History Overview Note   12/11/2019, Screening MMG with [facility] ( [facility]):  Scattered fibroglandular density. There are no findings to suggest malignancy.  BI-RADS 1: Negative.     Malignant neoplasm of overlapping sites of left " breast in female, estrogen receptor negative (HCC)   4/1/2021 Initial Diagnosis    Malignant neoplasm of overlapping sites of left breast in female, estrogen receptor negative (CMS/HCC)     4/2/2021 Imaging    Screening MMG with [facility] ( [place]):  Scattered fibroglandular densities. In the posterior one-third of the left breast projecting in a retroareolar location on the CC images there is an area of focal asymmetry. I see no suspicious calcifications or areas of architectural distortion in either breast. There is no evidence for skin thickening or nipple retraction.  BI-RADS 0: Incomplete.     4/9/2021 Imaging    Left Diagnostic MMG with [facility] & Left Breast US ( [facility]):  MMG:  With additional imaging there is persistence of the area of focal asymmetry in the posterior one-third inferior aspect of the left breast.  US:  At the 6 o'clock position on the order of 6 cm from the nipple there is a 0.8 cm irregular hypoechoic lesion. Mild posterior acoustic shadowing is noted.  BI-RADS 4: Suspicious.     4/26/2021 Biopsy    Left Breast, US-Guided Biopsy ( [facility]):    1. Left Breast, 6:00, 6 cm FN, U/S-Guided Core Needle Biopsy for a Mass:                 A. INVASIVE DUCTAL CARCINOMA, Poorly differentiated; [place] Histologic Grade III/III (tubule score = 3, nuclear score = 2, mitoses score = 3), measuring at least 7 mm.               B. No definitive ductal carcinoma in situ identified.   C. Negative for lymphovascular space invasion.    ER negative (<1%)  NM negative (<1%)  HER2 positive (IHC 3+)  Ki-67 55%     5/5/2021 Genetic Testing    [employer] Common Hereditary Cancers Panel (47 genes):    Negative     5/7/2021 Biopsy    [facility]  There is an amorphous area of enhancement seen within the 6:00 position of the left breast, posterior depth measuring approximately 1.2 cm (series 9 image 91). Susceptibility artifact is present within this region consistent with recently newly diagnosed  biopsy-proven malignancy. There is an additional area of more linear enhancement seen just anteriorly and laterally measuring up to 1.2 cm (series 9 image 91). This is seen approximately 5.6 cm from the nipple. Mixed washout kinetics are present. There is an additional punctate area of enhancement measuring only approximately 2 to 3 mm seen within the anterior lateral aspect of the left breast seen approximately 4.6 cm from the nipple (series 9 image 79) also demonstrating mixed washout  kinetics. No additional areas of enhancement identified. There is questionable mildly prominent lymph nodes present within the left axillary region (series 9 image 34 and series 9 image 18) with mild cortical thickening present with benign-appearing fatty juan miguel present. This is seen both within the high and mid to low axilla. A mildly prominent posterior lymph node is also present within the mid axilla measuring up to 1 cm with cortical thickening present (series 9 image 27). Limited evaluation of the intrathoracic contents symmetric no acute process. A marker seen at the junction of the left breast and the left chest wall inferiorly (series 4 image 135) with no abnormalities  identified within this region. No abnormal fluid collections identified.  BI-RADS 6: Known malignancy.     5/21/2021 Biopsy    Left Axilla, US-Guided Biopsy ( [facility]):  1.  Lymph Node, Left Axilla, Core Biopsy:                 A.  Fragments of reactive lymph node; negative for lymphoma and carcinoma.      6/2/2021 Biopsy    Left Breast, MR-Guided Biopsy x 2 ( [facility]):    1. Left Breast, 5:00 o'clock, MRI-guided Biopsies for Linear Enhancement:   INVASIVE MAMMARY CARCINOMA, NO SPECIAL TYPE (INVASIVE DUCTAL CARCINOMA).               A. Largest contiguous focus in a core measures 4 mm.               B. No in-situ component identified.                C. Brisk lymphocytic response noted (see Comment).               D. No definitive lymphovascular nor perineural  invasion identified.  -Bowtie clip. Biopsy clips marking the 2 sites of biopsy-proven malignancy are  by 2.5 cm (bowtie clip and posteriorly located U-shaped clip).     ER negative (0%)  NV negative (0%)  Her2+ (IHC 3+)  Ki-67 50%    2.  Left Breast, 2:00 o'clock, MRI-guided Biopsy for Enhancement:                A. Benign breast parenchyma with radial scar and micropapillomas.               B. Usual ductal hyperplasia and columnar cell hyperplasia.               C. No atypical hyperplasia, in-situ nor invasive carcinoma identified.  -U-shaped clip. Concordant.     7/1/2021 Surgery    Port placement, left SHAINA-guided, bracketed, partial mastectomy and left breast needle-localized excisional biopsy and sentinel lymph node biopsy with oncoplastic closure and right reduction for symmetry    1. Left Breast, Partial Mastectomy:               A. Invasive ductal carcinoma:                            1. Invasive carcinoma measures 12 mm x 8 mm x 4 mm.                            2. Overall Oziel grade III (tubular score = 3, nuclear score = 2, mitotic score = 3).                                                 3. No definitive lymphovascular invasion identified.               B. Associated scattered ductal carcinoma in situ (DCIS):                            1. DCIS spans an area estimated at  30 mm x 8 mm x 2 mm.                            2. High grade solid and comedo DCIS.                            3. Rare microcalcification present in DCIS.                  C. All margins are negative for invasive carcinoma.                    Carcinoma measures 6 mm from the closest (Inferior) margin of excision.                     All other margins measure at least 8 mm from invasive carcinoma including:                            Anterior margin = 18 mm                            Posterior margin = 24 mm                            Superior margin = 8 mm                            Inferior margin = 6 mm                              Lateral margin = 12 mm                            Medial margin = 20 mm                  D. All margins are negative for in ductal situ carcinoma (DCIS).                    DCIS measures 1.5 mm from the closest (Superior) margin of excision.                    All other margins  measure at least 2.5 mm from DCIS including:                            Anterior margin = 18  mm                            Posterior margin = 15  mm                            Superior margin = 1.5  mm                            Inferior margin =  6 mm                             Lateral margin = 3  mm                            Medial margin = 2.5 mm                  E.  Multiple biopsy site changes are identified (x2) and multiple metallic clips (x4) retrieved.               F.  No Pagetoid involvement of skin by malignancy identified.               G. Non-neoplastic breast tissue with fibrocystic change, sclerosing adenosis, columnar cell change, micropapilloma and focal fibroadenomatoid change. Rare microcalcification present in benign breast tissue.               H. Previous Biomarkers: Estrogen receptors: Negative, Progesterone receptors: Negative,                    HER/2-ese: Positive (score 3+) and Ki-67 = 50% (see [id number]).       2.  Left Breast, Additional Superior, Medial and Inferior Margins:                 A.  No in situ nor infiltrating carcinoma identified.               B.  New margins are negative for malignancy by an additional 15 mm.      3.  Left Breast at 2  o'clock, Needle Localization, Excisional Biopsy:                A.  Benign breast tissue with changes suggesting radial scar with usual ductal hyperplasia,                      sclerosing adenosis and fibrocystic change.                 B.  No in situ nor infiltrating carcinoma identified.               C.  Biopsy site changes are identified and metallic clip retrieved.                D.  All margins are viable and negative for neoplasm/malignancy.                        4.  Left Breast, True Medial and Inferior Margins:                 A.  No in situ nor infiltrating carcinoma identified.                B.  New margins are negative for malignancy by an additional 20 mm.     5.  Left Breast, True Superior and Lateral Margins:                A.  No in situ nor infiltrating carcinoma identified.                B.  New margins are negative for malignancy by an additional 40 mm.     6.  Left Breast, True Inferior Margin:                A.  No in situ nor infiltrating carcinoma identified.                B.  Benign skin and breast tissue.  C.  New margin is negative for malignancy by an additional 15 mm.     7.  Right Breast Tissue, Augmentation:                 A.  Benign skin and breast tissue (312 grams).                B.  No in situ nor infiltrating carcinoma identified.      8.  Left Axilla, [place] Lymph Node #1 (Hot, Blue, Count 5,500).                A.  Three lymph nodes negative for malignancy by routine staining (0/3).   B.  Includes one (largest) lymph node with focal fibrosis suggesting previous biopsy effect.      9.  Left Axilla, [place] Lymph Node, #2 (Hot, Not Blue, Count 900):                A.  One lymph node negative for metastatic carcinoma by routine staining (0/1).     10.  Left Breast, Superior Pole:                 A.  No in situ nor infiltrating carcinoma identified.                B.  Superior margin is negative for malignancy by an additional 20 mm.     8/4/2021 - 10/20/2021 Chemotherapy    OP BREAST PACLitaxel / Trastuzumab-anns (Weekly X 12)     9/1/2021 - 10/13/2021 Chemotherapy    OP SUPPORTIVE Iron Sucrose (Venofer)     11/2/2021 - 12/1/2021 Radiation    Radiation OncologyTreatment Course:  Radha Astorga received 5256 cGy in 21 fractions to LEFT breast with tumor bed boost.     11/10/2021 -  Chemotherapy    OP BREAST Trastuzumab-anns Q21D (maintenance)     4/4/2022 Imaging       EXAMINATION: Bilateral digital mammography with  R2 computer aided  detection and bilateral digital Tomosynthesis     FINDINGS: Bilateral digital CC and MLO mammographic and digital  Tomosynthesis images were obtained. Comparison is made to prior studies  dated 4/2/2021 and 4/9/2021 . Scattered fibroglandular densities are  seen throughout both breasts. There has been interval bilateral breast  surgery. No suspicious calcifications or new or dominant masses are  appreciated. Surgical clips are seen in the left axilla and the hilum of  a Mediport catheter projects over the right axilla. There is no evidence  for axillary lymphadenopathy or nipple retraction.     IMPRESSION:  1. There is no evidence for malignancy or significant change in either  breast. Routine followup mammography is recommended.     BI-RADS category 2: Benign.         Review of Systems:  See interval history.       Medications:    Current Outpatient Medications:   •  Cholecalciferol (D3) 50 MCG (2000 UT) tablet, Take 4,000 Units by mouth Daily., Disp: , Rfl:   •  famotidine (PEPCID) 20 MG tablet, TAKE 1 TABLET BY MOUTH DAILY, Disp: 30 tablet, Rfl: 3  •  levothyroxine (Synthroid) 125 MCG tablet, Take 1 tablet by mouth Daily., Disp: 90 tablet, Rfl: 0  •  lidocaine-prilocaine (EMLA) 2.5-2.5 % cream, Apply  topically to the appropriate area as directed As Needed for Mild Pain ., Disp: 1 each, Rfl: 2  •  lisdexamfetamine (Vyvanse) 50 MG capsule, Take 1 capsule by mouth Every Morning, Disp: 30 capsule, Rfl: 0  •  LORazepam (ATIVAN) 0.5 MG tablet, Take 1 tablet by mouth Every 8 (Eight) Hours As Needed for Anxiety. for anxiety, Disp: 30 tablet, Rfl: 2  •  ondansetron ODT (ZOFRAN-ODT) 8 MG disintegrating tablet, Place 1 tablet on the tongue Every 8 (Eight) Hours As Needed for Nausea or Vomiting., Disp: 30 tablet, Rfl: 3  •  Polyethylene Glycol 3350 (MIRALAX PO), Take  by mouth As Needed., Disp: , Rfl:   •  traZODone (DESYREL) 50 MG tablet, Take 1 tablet by mouth Every Night. (Patient taking differently:  Take 25 mg by mouth Every Night.), Disp: 30 tablet, Rfl: 11  •  valsartan (DIOVAN) 160 MG tablet, Take 1 tablet by mouth Daily., Disp: 30 tablet, Rfl: 5  •  vitamin B-12 (CYANOCOBALAMIN) 1000 MCG tablet, TAKE 1 TABLET BY MOUTH, Disp: 30 tablet, Rfl: 2  •  Xarelto 20 MG tablet, TAKE 1 TABLET BY MOUTH DAILY, Disp: 30 tablet, Rfl: 3      Allergies   Allergen Reactions   • Sulfa Antibiotics Rash       Family History   Problem Relation Age of Onset   • Breast cancer Mother 53   • Pancreatic cancer Mother 68   • Stroke Father    • Hypertension Brother    • Colon polyps Maternal Aunt    • Melanoma Maternal Uncle    • Breast cancer Paternal Aunt 55   • Lung cancer Maternal Grandmother    • Colon cancer Maternal Grandfather    • Prostate cancer Maternal Grandfather    • Prostate cancer Paternal Grandfather    • Breast cancer Paternal Great-Grandmother 94   • Brain cancer Maternal Cousin 20   • Ovarian cancer Other         Paternal great aunt    • Breast cancer Other    • Colon polyps Maternal Aunt    • Malig Hyperthermia Neg Hx    • Crohn's disease Neg Hx    • Irritable bowel syndrome Neg Hx    • Ulcerative colitis Neg Hx        PHYSICAL EXAMINATION:   Vitals:    08/19/22 0841   BP: 128/70   Pulse: 85   SpO2: 98%     ECOG 0 - Asymptomatic  General: NAD, well appearing  Psych: a&o x 3, normal mood and affect  Eyes: EOMI, no scleral icterus  ENMT: neck supple without masses or thyromegaly, mucus membranes moist  MSK: normal gait, normal ROM in bilateral shoulders  Lymph nodes: Well-healed left axillary scar; no cervical, supraclavicular or axillary lymphadenopathy  Breast:   Right: Sp mastopexy with well-healed scars. Scars are soft. There is some thickened tissue on either side of the T junction (tissue mobilized for reconstruction). No discrete masses or nipple abnormalities.  Left: Sp mastopexy with well-healed scars. Scars are soft. There is some thickened tissue on either side of the T junction (tissue mobilized for  reconstruction). No discrete masses or nipple abnormalities. Edema noted 5:00 to 8:00 with the worse area at 7-8:00      Assessment:  42 y.o. F with a diagnosis of left breast: High grade, invasive ductal carcinoma, ER/IA negative, Her2 positive. She underwent port placement, left partial mastectomy and excisional biopsy and SLNB with oncoplastic closure and right reduction for symmetry on 7/1/21, pT1cN0, anatomic stage IA, prognostic stage IA. She completed Taxol/Herceptin on 10/20/21.    Discussion:  1. We discussed lymphedema along with prevention, recognition, and treatment that is recommended.  The positive response to lymphedema prevention and reduction including weight reduction, regular exercise, and recognition and treatment of symptoms at the onset were discussed.   2. Massage left massage upward and outward daily to help with fluid movement    Plan:  -Continue follow-up with Dr. Beavers.  -Continue follow-up with Dr. Byrd.  -Continue follow-up with OT/LE clinic.  -Follow-up in 2/2023 for exam  -mammo 4/2023  - left breast massage for edema  -rto if edema gets worse or any other complaints  -can call office for port removal after colonoscopy   -She was instructed to call sooner with any questions, concerns or changes on BSE.    CLAUDINE Noriega      CC:  No ref. provider found  MD Tarsha Davis APRN

## 2022-08-18 NOTE — THERAPY PROGRESS REPORT/RE-CERT
Outpatient Occupational Therapy Lymphedema Progress Note  [facility]     Patient Name: Radha Astorga  : 1980  MRN: [id number]  Today's Date: 2022      Visit Date: 2022  Patient and therapist both masked. Therapist with face shield and gloves.  Patient Active Problem List   Diagnosis   • Family history of breast cancer   • Hypothyroidism   • Major depressive disorder, recurrent episode, moderate with anxious distress ([facility])   • Attention deficit hyperactivity disorder (ADHD), predominantly inattentive type   • Factor 5 Leiden mutation, heterozygous ([facility])   • Kidney mass   • Malignant neoplasm of overlapping sites of left breast in female, estrogen receptor negative ([facility])   • High risk medication use   • Essential hypertension   • Rheumatic mitral valve disease   • Encounter for adjustment or management of vascular access device   • Iron deficiency anemia   • History of stroke   • B12 deficiency   • Nausea   • Bloating   • Family history of colon cancer        Past Medical History:   Diagnosis Date   • Acute stress reaction 2014   • ADD (attention deficit disorder)    • ADHD (attention deficit hyperactivity disorder)    • Anxiety and depression    • CTS (carpal tunnel syndrome)    • Factor 5 Leiden mutation, heterozygous ([facility]) 2021   • Family history of breast cancer 2013   • Fracture, cervical vertebra ([facility]) 1997    C4   • GERD (gastroesophageal reflux disease) 2022    Medicated   • H/O complete eye exam 2016   • Hearing loss    • 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   • History of rheumatic fever    • Hyperlipidemia 2022    Resolved; now unmedicated   • Hypertension    • Hypothyroidism    • Infiltrating ductal carcinoma of left breast ([facility]) 2021    Left   • Kidney stone    • Mitral valve insufficiency    • PONV (postoperative nausea and vomiting)    • Stroke ([facility]) 2020    HX OF   • Stroke-like symptoms         Past Surgical  History:   Procedure Laterality Date   • BREAST BIOPSY Left 05/05/2021    IDC   • BREAST LUMPECTOMY     • BREAST LUMPECTOMY WITH SENTINEL NODE BIOPSY N/A 07/01/2021    Procedure: right port placement, Left SHAINA-guided, bracketed, partial mastectomy and sentinel lymph node biopsy Left breast needle-localized excisional biopsy;  Surgeon: Lashonda Euceda MD;  Location: [facility];  Service: General;  Laterality: N/A;   • BREAST SURGERY Bilateral 07/01/2021    Procedure: RIGHT BREAST REDUCTION MASTOPEXY LEFT BREAST ONCOPLASTIC CLOSURE;  Surgeon: Caridad Baker MD PhD;  Location: [facility];  Service: Plastics;  Laterality: Bilateral;   • NO PAST SURGERIES     • PAP SMEAR  2016         Visit Dx:     ICD-10-CM ICD-9-CM   1. 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 Malignant neoplasm of overlapping sites of left breast in female, estrogen receptor negative (HCC)  C50.812 174.8    Z17.1 V86.1   3. Breast edema  N64.89 611.89   4. Axillary pain, right  M79.621 729.5            Lymphedema     Row Name 08/18/22 1400             Subjective Pain    Able to rate subjective pain? yes  -RE      Pre-Treatment Pain Level 0  -RE      Post-Treatment Pain Level 0  -RE              Subjective Comments    Subjective Comments has occasional axillary pain/swelling  -RE              L-Dex Bioimpedence Screening    L-Dex Measurement Extremity LUE  -RE      L-Dex Patient Position Standing  -RE      L-Dex UE Dominate Side Right  -RE      L-Dex UE At Risk Side Left  -RE      L-Dex UE Pre Surgical Value No  -RE      L-Dex UE Score 1.7  -RE      L-Dex UE Baseline Score 3.5  -RE      L-Dex UE Value Change -1.8  -RE      L-Dex UE Comment WNL  -RE            User Key  (r) = Recorded By, (t) = Taken By, (c) = Cosigned By    Initials Name Provider Type    RE Julia Leslie, OTR Occupational Therapist                        Therapy Education  Education Details: Discussed patient's current L-Dex value of 1.7 and  recommended sleeve wear during air travel and UE exercise. Discussed her current garment wearing schedule which varies based on her symptoms. This is working well for her. Discussed her good long term prognosis, but that I do not think she will be able to completely stop wearing compression.  Given: Symptoms/condition management  Program: Reinforced  How Provided: Verbal  Provided to: Patient  Level of Understanding: Teach back education performed, Verbalized  93795 - OT Self Care/Mgmt Minutes: 20         OT Goals     Row Name 08/18/22 1700          OT Short Term Goals    STG 1 Pt will demonstrate understanding of use of compression sleeve for edema prevention, exercise and air travel.   -RE     STG 1 Progress Met  -RE     STG 2 Patient will demonstrate proper awareness of “What is Lymphedema?” and “Healthy Habits” for improved prevention, management, care of symptoms and ease of transition to self-care of condition.   -RE     STG 2 Progress Met  -RE     STG 3 Patient independent and compliant with initial home exercise program focused on range of motion,and Flexibility.  -RE     STG 3 Progress Met  -RE     STG 4 Patient to demonstrate increased left shoulder flexion to 140 to improve functional UE use and to restore pre operative AROM per patient perception.  -RE     STG 4 Progress Met  -RE     STG 5 Patient to demonstrate increased left shoulder abduction to 140 to improve functional UE use and to restore preoperative AROM per patient perception.  -RE     STG 5 Progress Met  -RE            Long Term Goals    LTG Date to Achieve 11/18/22  -RE     LTG 1 Patient to demonstrate increased left shoulder flexion to 160 to improve functional UE use and to restore pre operative AROM per patient perception.  -RE     LTG 1 Progress Met  -RE     LTG 2 Patient to demonstrate increased left shoulder abduction to 160 to improve functional UE use and to restore preoperative AROM per patient perception.  -RE     LTG 2 Progress Met   -RE     LTG 3 Patient will participate in bioimpedance scans every 3-6 months as a method of early detection of lymphedema to allow for early intervention.  -RE     LTG 3 Progress Met;Ongoing  -RE     LTG 4  Patient's bioimpedance score to remain below 6.5 for decreased risk of stage II lymphedema.  -RE     LTG 4 Progress Met;Ongoing  -RE     LTG 5 Patient will be independent with use and care of [employer].  -RE     LTG 5 Progress Met  -RE     LTG 6 Pt will report decrease in breast edema.  -RE     LTG 6 Progress Met;Ongoing  -RE     LTG 6 Progress Comments edema is variable  -RE            Time Calculation    OT Goal Re-Cert Due Date 11/18/22  -RE           User Key  (r) = Recorded By, (t) = Taken By, (c) = Cosigned By    Initials Name Provider Type    Julia Rowe OTR Occupational Therapist                 OT Assessment/Plan     Row Name 08/18/22 5526          OT Assessment    Impairments Edema;Impaired lymphatic circulation  -RE     Assessment Comments Patient returns for bioimpedance reassessment.  Her last measurement was 3 months ago.  L-Dex value today is 1.7 which is WNL. Her symptoms are under good control except for occasional axillary pain and occasional breast swelling. .  Today we reviewed her maintenance home program and possible causes of the axillary pain including mild edema . I recommended follow up in 3 months.  -RE     Please refer to paper survey for additional self-reported information No  -RE     OT Diagnosis post mastectomy lymphedema  -RE     OT Rehab Potential Good  -RE     Patient/caregiver participated in establishment of treatment plan and goals Yes  -RE     Patient would benefit from skilled therapy intervention Yes  -RE            OT Plan    OT Frequency Other (comment)  -RE     Predicted Duration of Therapy Intervention (OT) bioimpedance every 3 months  -RE     Planned CPT's? OT SELF CARE/MGMT/TRAIN 15 MIN: 06004;OT BIS XTRACELL FLUID ANALYSIS: 80617  -RE     Planned Therapy  Interventions (Optional Details) patient/family education;other (see comments)  bioimpedance  -RE     OT Plan Comments follow up in 3 months  -RE           User Key  (r) = Recorded By, (t) = Taken By, (c) = Cosigned By    Initials Name Provider Type    Julia Rowe OTR Occupational Therapist              The patient had a 3 month SOZO measurement which I reviewed today. The score is WNL, see in EMR. Bioimpedance spectroscopy helps identify the onset of lymphedema in an arm or leg before patients experience noticeable swelling. Research has shown that the early detection of lymphedema using L-Dex combined with treatment can reduce progression to chronic lymphedema by 95% in breast cancer patients. Whenever possible, patients are tested for baseline L-Dex score before cancer treatment begins and then are reassessed during regular follow-up visits using the SOZO device. Otherwise, this can be started postoperatively and continued during regular follow-up visits. If the patient's L-Dex score increases above normal levels, that is a sign that lymphedema is developing and a referral is made to occupational therapy for further evaluation and early compression treatment. Lymphedema assessment with the SOZO L-Dex score is recommended to be done every 3 months for the first 3 years and then every 6 months for years 4 and 5 followed by annually afterwards.            Time Calculation:   OT Start Time: 1425  OT Stop Time: 1455  OT Time Calculation (min): 30 min  Total Timed Code Minutes- OT: 20 minute(s)  Timed Charges  99300 - OT Self Care/Mgmt Minutes: 20  Untimed Charges  49079 - OT Bioimpedence Rx Minutes: 10  Total Minutes  Timed Charges Total Minutes: 20  Untimed Charges Total Minutes: 10   Total Minutes: 30     Therapy Charges for Today     Code Description Service Date Service Provider Modifiers Qty    [id number]  OT SELF CARE/MGMT/TRAIN EA 15 MIN 8/18/2022 Julia Leslie OTR GO 1    [id number] HC OT BIS  XTRACELL FLUID ANALYSIS 8/18/2022 Julia Leslie, OTR GO 1                    VI Ulloa  8/18/2022

## 2022-08-19 ENCOUNTER — TELEPHONE (OUTPATIENT)
Dept: SURGERY | Facility: CLINIC | Age: 42
End: 2022-08-19

## 2022-08-19 ENCOUNTER — OFFICE VISIT (OUTPATIENT)
Dept: SURGERY | Facility: CLINIC | Age: 42
End: 2022-08-19

## 2022-08-19 VITALS
SYSTOLIC BLOOD PRESSURE: 128 MMHG | OXYGEN SATURATION: 98 % | BODY MASS INDEX: 28.01 KG/M2 | HEIGHT: 62 IN | WEIGHT: 152.2 LBS | DIASTOLIC BLOOD PRESSURE: 70 MMHG | HEART RATE: 85 BPM

## 2022-08-19 DIAGNOSIS — C50.812 MALIGNANT NEOPLASM OF OVERLAPPING SITES OF LEFT BREAST IN FEMALE, ESTROGEN RECEPTOR NEGATIVE: ICD-10-CM

## 2022-08-19 DIAGNOSIS — Z80.3 FAMILY HISTORY OF BREAST CANCER: Primary | ICD-10-CM

## 2022-08-19 DIAGNOSIS — N64.89 BREAST EDEMA: ICD-10-CM

## 2022-08-19 DIAGNOSIS — Z17.1 MALIGNANT NEOPLASM OF OVERLAPPING SITES OF LEFT BREAST IN FEMALE, ESTROGEN RECEPTOR NEGATIVE: ICD-10-CM

## 2022-08-19 PROCEDURE — 99214 OFFICE O/P EST MOD 30 MIN: CPT | Performed by: NURSE PRACTITIONER

## 2022-08-19 NOTE — TELEPHONE ENCOUNTER
Pt was given her follow up and mmg appt at the time of her visit.  Follow up (clinic exam only) with CLAUDINE Noriega on 02/20/2023 at 8:00.   Screening MMG scheduled at [facility] on 04/07/2023 arrive at 7:45.    Pt vus    CMA

## 2022-09-04 DIAGNOSIS — F43.21 GRIEF REACTION: Chronic | ICD-10-CM

## 2022-09-06 DIAGNOSIS — F90.0 ATTENTION DEFICIT HYPERACTIVITY DISORDER (ADHD), PREDOMINANTLY INATTENTIVE TYPE: ICD-10-CM

## 2022-09-06 RX ORDER — LORAZEPAM 0.5 MG/1
0.5 TABLET ORAL EVERY 8 HOURS PRN
Qty: 30 TABLET | Refills: 2 | OUTPATIENT
Start: 2022-09-06

## 2022-09-06 NOTE — TELEPHONE ENCOUNTER
----- Message from Radha Astorga sent at 9/4/2022 11:25 AM EDT -----  Regarding: Vyvanse refill time  Please refill Vyvanse script. Thanks!!

## 2022-09-21 ENCOUNTER — INFUSION (OUTPATIENT)
Dept: ONCOLOGY | Facility: HOSPITAL | Age: 42
End: 2022-09-21

## 2022-09-21 DIAGNOSIS — Z45.2 ENCOUNTER FOR ADJUSTMENT OR MANAGEMENT OF VASCULAR ACCESS DEVICE: Primary | ICD-10-CM

## 2022-09-21 PROCEDURE — 25010000002 HEPARIN LOCK FLUSH PER 10 UNITS: Performed by: INTERNAL MEDICINE

## 2022-09-21 PROCEDURE — 96523 IRRIG DRUG DELIVERY DEVICE: CPT

## 2022-09-21 RX ORDER — HEPARIN SODIUM (PORCINE) LOCK FLUSH IV SOLN 100 UNIT/ML 100 UNIT/ML
500 SOLUTION INTRAVENOUS AS NEEDED
Status: CANCELLED | OUTPATIENT
Start: 2022-09-21

## 2022-09-21 RX ORDER — HEPARIN SODIUM (PORCINE) LOCK FLUSH IV SOLN 100 UNIT/ML 100 UNIT/ML
500 SOLUTION INTRAVENOUS AS NEEDED
Status: DISCONTINUED | OUTPATIENT
Start: 2022-09-21 | End: 2022-09-21 | Stop reason: HOSPADM

## 2022-09-21 RX ADMIN — Medication 500 UNITS: at 13:08

## 2022-09-26 ENCOUNTER — TELEPHONE (OUTPATIENT)
Dept: ONCOLOGY | Facility: CLINIC | Age: 42
End: 2022-09-26

## 2022-09-26 NOTE — TELEPHONE ENCOUNTER
Caller: Radha Astorga    Relationship to patient: Self    Best call back number:[phone]    Chief complaint: R/S    Type of visit: FLUSH AND FOLLOW UP    Requested date: IN OCTOBER     If rescheduling, when is the original appointment: 11-2    Additional notes:PLEASE ADVISE

## 2022-10-04 ENCOUNTER — ANESTHESIA (OUTPATIENT)
Dept: SURGERY | Facility: SURGERY CENTER | Age: 42
End: 2022-10-04

## 2022-10-04 ENCOUNTER — HOSPITAL ENCOUNTER (OUTPATIENT)
Facility: SURGERY CENTER | Age: 42
Setting detail: HOSPITAL OUTPATIENT SURGERY
Discharge: HOME | End: 2022-10-04
Attending: INTERNAL MEDICINE | Admitting: INTERNAL MEDICINE

## 2022-10-04 ENCOUNTER — ANESTHESIA EVENT (OUTPATIENT)
Dept: SURGERY | Facility: SURGERY CENTER | Age: 42
End: 2022-10-04

## 2022-10-04 VITALS
TEMPERATURE: 97.8 F | WEIGHT: 151 LBS | DIASTOLIC BLOOD PRESSURE: 87 MMHG | RESPIRATION RATE: 16 BRPM | BODY MASS INDEX: 27.79 KG/M2 | SYSTOLIC BLOOD PRESSURE: 132 MMHG | HEIGHT: 62 IN | OXYGEN SATURATION: 95 % | HEART RATE: 80 BPM

## 2022-10-04 DIAGNOSIS — Z12.11 ENCOUNTER FOR SCREENING FOR MALIGNANT NEOPLASM OF COLON: ICD-10-CM

## 2022-10-04 DIAGNOSIS — R11.0 NAUSEA: ICD-10-CM

## 2022-10-04 DIAGNOSIS — Z80.0 FAMILY HISTORY OF COLON CANCER: ICD-10-CM

## 2022-10-04 DIAGNOSIS — R14.0 BLOATING: ICD-10-CM

## 2022-10-04 LAB
B-HCG UR QL: NEGATIVE
EXPIRATION DATE: NORMAL
INTERNAL NEGATIVE CONTROL: NEGATIVE
INTERNAL POSITIVE CONTROL: POSITIVE
Lab: NORMAL

## 2022-10-04 PROCEDURE — 81025 URINE PREGNANCY TEST: CPT | Performed by: INTERNAL MEDICINE

## 2022-10-04 PROCEDURE — 43239 EGD BIOPSY SINGLE/MULTIPLE: CPT | Performed by: INTERNAL MEDICINE

## 2022-10-04 PROCEDURE — 45380 COLONOSCOPY AND BIOPSY: CPT | Performed by: INTERNAL MEDICINE

## 2022-10-04 PROCEDURE — 88305 TISSUE EXAM BY PATHOLOGIST: CPT | Performed by: INTERNAL MEDICINE

## 2022-10-04 PROCEDURE — 25010000002 PROPOFOL 10 MG/ML EMULSION: Performed by: ANESTHESIOLOGY

## 2022-10-04 RX ORDER — SODIUM CHLORIDE 0.9 % (FLUSH) 0.9 %
3 SYRINGE (ML) INJECTION EVERY 12 HOURS SCHEDULED
Status: DISCONTINUED | OUTPATIENT
Start: 2022-10-04 | End: 2022-10-04 | Stop reason: HOSPADM

## 2022-10-04 RX ORDER — MAGNESIUM HYDROXIDE 1200 MG/15ML
LIQUID ORAL AS NEEDED
Status: DISCONTINUED | OUTPATIENT
Start: 2022-10-04 | End: 2022-10-04 | Stop reason: HOSPADM

## 2022-10-04 RX ORDER — PROPOFOL 10 MG/ML
VIAL (ML) INTRAVENOUS AS NEEDED
Status: DISCONTINUED | OUTPATIENT
Start: 2022-10-04 | End: 2022-10-04 | Stop reason: SURG

## 2022-10-04 RX ORDER — SODIUM CHLORIDE 0.9 % (FLUSH) 0.9 %
10 SYRINGE (ML) INJECTION AS NEEDED
Status: DISCONTINUED | OUTPATIENT
Start: 2022-10-04 | End: 2022-10-04 | Stop reason: HOSPADM

## 2022-10-04 RX ORDER — LIDOCAINE HYDROCHLORIDE 20 MG/ML
INJECTION, SOLUTION INFILTRATION; PERINEURAL AS NEEDED
Status: DISCONTINUED | OUTPATIENT
Start: 2022-10-04 | End: 2022-10-04 | Stop reason: SURG

## 2022-10-04 RX ORDER — SODIUM CHLORIDE, SODIUM LACTATE, POTASSIUM CHLORIDE, CALCIUM CHLORIDE 600; 310; 30; 20 MG/100ML; MG/100ML; MG/100ML; MG/100ML
30 INJECTION, SOLUTION INTRAVENOUS CONTINUOUS PRN
Status: DISCONTINUED | OUTPATIENT
Start: 2022-10-04 | End: 2022-10-04 | Stop reason: HOSPADM

## 2022-10-04 RX ADMIN — LIDOCAINE HYDROCHLORIDE 40 MG: 20 INJECTION, SOLUTION INFILTRATION; PERINEURAL at 08:48

## 2022-10-04 RX ADMIN — GLYCOPYRROLATE 0.2 MG: 0.2 INJECTION, SOLUTION INTRAMUSCULAR; INTRAVITREAL at 08:48

## 2022-10-04 RX ADMIN — SODIUM CHLORIDE, POTASSIUM CHLORIDE, SODIUM LACTATE AND CALCIUM CHLORIDE 30 ML/HR: 600; 310; 30; 20 INJECTION, SOLUTION INTRAVENOUS at 08:26

## 2022-10-04 RX ADMIN — PROPOFOL 160 MCG/KG/MIN: 10 INJECTION, EMULSION INTRAVENOUS at 08:48

## 2022-10-04 RX ADMIN — PROPOFOL 180 MG: 10 INJECTION, EMULSION INTRAVENOUS at 08:48

## 2022-10-04 NOTE — ANESTHESIA PREPROCEDURE EVALUATION
Anesthesia Evaluation     Patient summary reviewed and Nursing notes reviewed   history of anesthetic complications: PONV  NPO Solid Status: > 8 hours  NPO Liquid Status: > 2 hours           Airway   Mallampati: II  TM distance: >3 FB  Neck ROM: full  no difficulty expected  Dental - normal exam     Pulmonary - normal exam   (+) a smoker Former,   (-) COPD, asthma, lung cancer, no home oxygen  Cardiovascular - normal exam  Exercise tolerance: good (4-7 METS)    ECG reviewed  Rhythm: regular  Rate: normal    (+) hypertension well controlled 2 medications or greater, valvular problems/murmurs MR, hyperlipidemia,   (-) past MI, CAD, dysrhythmias, cardiac stents, CABG      Neuro/Psych  (+) CVA, numbness, psychiatric history Anxiety, Depression, ADD and ADHD,    (-) seizures, TIA, headaches  GI/Hepatic/Renal/Endo    (+)  GERD poorly controlled,  renal disease stones, thyroid problem hypothyroidism    Musculoskeletal (-) negative ROS    Abdominal  - normal exam   Substance History - negative use     OB/GYN          Other      history of cancer remission      Other Comment: Hx/o BRCA                  Anesthesia Plan    ASA 3     MAC     intravenous induction     Anesthetic plan, risks, benefits, and alternatives have been provided, discussed and informed consent has been obtained with: patient.        CODE STATUS:

## 2022-10-04 NOTE — H&P
No chief complaint on file.      HPI  Patient here today for an EGD due to nausea and a colonoscopy for screening.         Problem List:    Patient Active Problem List   Diagnosis   • Family history of breast cancer   • Hypothyroidism   • Major depressive disorder, recurrent episode, moderate with anxious distress ([facility])   • Attention deficit hyperactivity disorder (ADHD), predominantly inattentive type   • Factor 5 Leiden mutation, heterozygous ([facility])   • Kidney mass   • Malignant neoplasm of overlapping sites of left breast in female, estrogen receptor negative ([facility])   • High risk medication use   • Essential hypertension   • Rheumatic mitral valve disease   • Encounter for adjustment or management of vascular access device   • Iron deficiency anemia   • History of stroke   • B12 deficiency   • Nausea   • Bloating   • Family history of colon cancer   • Breast edema       Medical History:    Past Medical History:   Diagnosis Date   • Acute stress reaction 11/17/2014   • ADD (attention deficit disorder)    • ADHD (attention deficit hyperactivity disorder)    • Anxiety and depression    • CTS (carpal tunnel syndrome)    • Factor 5 Leiden mutation, heterozygous ([facility]) 02/17/2021   • Family history of breast cancer 03/11/2013   • Fracture, cervical vertebra ([facility]) 1997    C4   • GERD (gastroesophageal reflux disease) August 2022    Medicated   • H/O complete eye exam 01/01/2016   • Hearing loss    • 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   • History of rheumatic fever    • Hyperlipidemia 1/2/2022    Resolved; now unmedicated   • Hypertension    • Hypothyroidism    • Infiltrating ductal carcinoma of left breast ([facility]) 05/05/2021    Left   • Kidney stone    • Mitral valve insufficiency    • PONV (postoperative nausea and vomiting)    • Stroke ([facility]) 12/2020    HX OF   • Stroke-like symptoms         Social History:    Social History     Socioeconomic History   • Marital status: Significant Other   • Number of  children: 0   Tobacco Use   • Smoking status: Former Smoker     Packs/day: 0.50     Years: 10.00     Pack years: 5.00     Types: Cigarettes, Cigarettes     Start date: 1997     Quit date: 2009     Years since quittin.7   • Smokeless tobacco: Never Used   • Tobacco comment: Off and on for 12 years   Vaping Use   • Vaping Use: Never used   Substance and Sexual Activity   • Alcohol use: Not Currently     Comment: RARELY   • Drug use: No   • Sexual activity: Yes     Partners: Female     Birth control/protection: None     Comment: Unnecessary       Family History:   Family History   Problem Relation Age of Onset   • Breast cancer Mother 53   • Pancreatic cancer Mother 68   • Stroke Father    • Hypertension Brother    • Colon polyps Maternal Aunt    • Melanoma Maternal Uncle    • Breast cancer Paternal Aunt 55   • Lung cancer Maternal Grandmother    • Colon cancer Maternal Grandfather    • Prostate cancer Maternal Grandfather    • Prostate cancer Paternal Grandfather    • Breast cancer Paternal Great-Grandmother 94   • Brain cancer Maternal Cousin 20   • Ovarian cancer Other         Paternal great aunt    • Breast cancer Other    • Colon polyps Maternal Aunt    • Malig Hyperthermia Neg Hx    • Crohn's disease Neg Hx    • Irritable bowel syndrome Neg Hx    • Ulcerative colitis Neg Hx        Surgical History:   Past Surgical History:   Procedure Laterality Date   • BREAST BIOPSY Left 2021    [facility]   • BREAST LUMPECTOMY     • BREAST LUMPECTOMY WITH SENTINEL NODE BIOPSY N/A 2021    Procedure: right port placement, Left SHAINA-guided, bracketed, partial mastectomy and sentinel lymph node biopsy Left breast needle-localized excisional biopsy;  Surgeon: Lashonda Euceda MD;  Location: [facility];  Service: General;  Laterality: N/A;   • BREAST SURGERY Bilateral 2021    Procedure: RIGHT BREAST REDUCTION MASTOPEXY LEFT BREAST ONCOPLASTIC CLOSURE;  Surgeon: Caridad Baker MD PhD;   Location: [facility];  Service: Plastics;  Laterality: Bilateral;   • NO PAST SURGERIES     • PAP SMEAR  2016         Current Facility-Administered Medications:   •  lactated ringers infusion, 30 mL/hr, Intravenous, Continuous PRN, Jayce, Lana G, APRN  •  sodium chloride 0.9 % flush 10 mL, 10 mL, Intravenous, PRN, Jayce, Lana G, APRN  •  sodium chloride 0.9 % flush 3 mL, 3 mL, Intravenous, Q12H, Jayce, Lana G, APRN    Allergies:   Allergies   Allergen Reactions   • Sulfa Antibiotics Rash        The following portions of the patient's history were reviewed by me and updated as appropriate: review of systems, allergies, current medications, past family history, past medical history, past social history, past surgical history and problem list.    There were no vitals filed for this visit.    PHYSICAL EXAM:    CONSTITUTIONAL:  today's vital signs reviewed by me  GASTROINTESTINAL: abdomen is soft nontender nondistended with normal active bowel sounds, no masses are appreciated    Assessment/ Plan  We will proceed today with EGD and colonoscopy.    Risks and benefits as well as alternatives to endoscopic evaluation were explained to the patient and they voiced understanding and wish to proceed.  These risks include but are not limited to the risk of bleeding, perforation, adverse reaction to sedation, and missed lesions.  The patient was given the opportunity to ask questions prior to the endoscopic procedure.

## 2022-10-04 NOTE — ANESTHESIA POSTPROCEDURE EVALUATION
Patient: Radha Astorga    Procedure Summary     Date: 10/04/22 Room / Location: [facility] / [facility]    Anesthesia Start: 0842 Anesthesia Stop: 0912    Procedures:       ESOPHAGOGASTRODUODENOSCOPY WITH COLD BIOPSIES (N/A )      COLONOSCOPY TO CECUM WITH COLD BIOPSIES POLYPECTOMY (N/A ) Diagnosis:       Nausea      Bloating      Encounter for screening for malignant neoplasm of colon      Family history of colon cancer      (Nausea [R11.0])      (Bloating [R14.0])      (Encounter for screening for malignant neoplasm of colon [Z12.11])      (Family history of colon cancer [Z80.0])    Surgeons: Marlon Hough MD Provider: Shravan Mendenhall MD    Anesthesia Type: MAC ASA Status: 3          Anesthesia Type: MAC    Vitals  No vitals data found for the desired time range.          Post Anesthesia Care and Evaluation    Patient location during evaluation: bedside  Patient participation: complete - patient participated  Level of consciousness: responsive to light touch, responsive to verbal stimuli and sleepy but conscious  Pain score: 0  Pain management: adequate    Airway patency: patent  Anesthetic complications: No anesthetic complications  PONV Status: none  Cardiovascular status: acceptable and hemodynamically stable  Respiratory status: acceptable  Hydration status: acceptable

## 2022-10-05 LAB
LAB AP CASE REPORT: NORMAL
LAB AP CLINICAL INFORMATION: NORMAL
PATH REPORT.FINAL DX SPEC: NORMAL
PATH REPORT.GROSS SPEC: NORMAL

## 2022-10-12 ENCOUNTER — TELEPHONE (OUTPATIENT)
Dept: ONCOLOGY | Facility: CLINIC | Age: 42
End: 2022-10-12

## 2022-10-12 NOTE — TELEPHONE ENCOUNTER
Caller: CHELY    Relationship to patient: SELF    Best call back number: [phone]    Patient is needing: TO R/S 10- PORT FLUSH AND F/U APPT WITHIN THE FOLLOWING WEEK.

## 2022-10-19 ENCOUNTER — APPOINTMENT (OUTPATIENT)
Dept: ONCOLOGY | Facility: HOSPITAL | Age: 42
End: 2022-10-19

## 2022-10-19 DIAGNOSIS — F90.0 ATTENTION DEFICIT HYPERACTIVITY DISORDER (ADHD), PREDOMINANTLY INATTENTIVE TYPE: ICD-10-CM

## 2022-10-19 DIAGNOSIS — F90.0 ATTENTION DEFICIT HYPERACTIVITY DISORDER (ADHD), PREDOMINANTLY INATTENTIVE TYPE: Primary | ICD-10-CM

## 2022-10-26 ENCOUNTER — INFUSION (OUTPATIENT)
Dept: ONCOLOGY | Facility: HOSPITAL | Age: 42
End: 2022-10-26

## 2022-10-26 DIAGNOSIS — Z45.2 ENCOUNTER FOR ADJUSTMENT OR MANAGEMENT OF VASCULAR ACCESS DEVICE: Primary | ICD-10-CM

## 2022-10-26 PROCEDURE — 96523 IRRIG DRUG DELIVERY DEVICE: CPT

## 2022-10-26 PROCEDURE — 25010000002 HEPARIN LOCK FLUSH PER 10 UNITS: Performed by: INTERNAL MEDICINE

## 2022-10-26 RX ORDER — HEPARIN SODIUM (PORCINE) LOCK FLUSH IV SOLN 100 UNIT/ML 100 UNIT/ML
500 SOLUTION INTRAVENOUS AS NEEDED
Status: CANCELLED | OUTPATIENT
Start: 2022-10-26

## 2022-10-26 RX ORDER — HEPARIN SODIUM (PORCINE) LOCK FLUSH IV SOLN 100 UNIT/ML 100 UNIT/ML
500 SOLUTION INTRAVENOUS AS NEEDED
Status: DISCONTINUED | OUTPATIENT
Start: 2022-10-26 | End: 2022-10-26 | Stop reason: HOSPADM

## 2022-10-26 RX ADMIN — Medication 500 UNITS: at 14:06

## 2022-11-12 DIAGNOSIS — F90.0 ATTENTION DEFICIT HYPERACTIVITY DISORDER (ADHD), PREDOMINANTLY INATTENTIVE TYPE: ICD-10-CM

## 2022-11-17 ENCOUNTER — HOSPITAL ENCOUNTER (OUTPATIENT)
Dept: OCCUPATIONAL THERAPY | Facility: HOSPITAL | Age: 42
Setting detail: THERAPIES SERIES
Discharge: HOME | End: 2022-11-17

## 2022-11-17 DIAGNOSIS — N64.89 BREAST EDEMA: ICD-10-CM

## 2022-11-17 DIAGNOSIS — I97.2 POST-MASTECTOMY LYMPHEDEMA SYNDROME: Primary | ICD-10-CM

## 2022-11-17 DIAGNOSIS — C50.812 MALIGNANT NEOPLASM OF OVERLAPPING SITES OF LEFT BREAST IN FEMALE, ESTROGEN RECEPTOR NEGATIVE: ICD-10-CM

## 2022-11-17 DIAGNOSIS — Z17.1 MALIGNANT NEOPLASM OF OVERLAPPING SITES OF LEFT BREAST IN FEMALE, ESTROGEN RECEPTOR NEGATIVE: ICD-10-CM

## 2022-11-17 PROCEDURE — 93702 BIS XTRACELL FLUID ANALYSIS: CPT

## 2022-11-17 PROCEDURE — 97535 SELF CARE MNGMENT TRAINING: CPT

## 2022-11-17 NOTE — THERAPY PROGRESS REPORT/RE-CERT
Outpatient Occupational Therapy Lymphedema Progress Note  [facility]     Patient Name: Radha Astorga  : 1980  MRN: [id number]  Today's Date: 2022      Visit Date: 2022    Patient Active Problem List   Diagnosis   • Family history of breast cancer   • Hypothyroidism   • Major depressive disorder, recurrent episode, moderate with anxious distress ([facility])   • Attention deficit hyperactivity disorder (ADHD), predominantly inattentive type   • Factor 5 Leiden mutation, heterozygous ([facility])   • Kidney mass   • Malignant neoplasm of overlapping sites of left breast in female, estrogen receptor negative ([facility])   • High risk medication use   • Essential hypertension   • Rheumatic mitral valve disease   • Encounter for adjustment or management of vascular access device   • Iron deficiency anemia   • History of stroke   • B12 deficiency   • Nausea   • Bloating   • Family history of colon cancer   • Breast edema        Past Medical History:   Diagnosis Date   • Acute stress reaction 2014   • ADD (attention deficit disorder)    • ADHD (attention deficit hyperactivity disorder)    • Anxiety and depression    • CTS (carpal tunnel syndrome)    • Factor 5 Leiden mutation, heterozygous ([facility]) 2021   • Family history of breast cancer 2013   • Fracture, cervical vertebra ([facility]) 1997    C4   • GERD (gastroesophageal reflux disease) 2022    Medicated   • H/O complete eye exam 2016   • Hearing loss    • 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   • History of rheumatic fever    • Hyperlipidemia 2022    Resolved; now unmedicated   • Hypertension    • Hypothyroidism    • Infiltrating ductal carcinoma of left breast ([facility]) 2021    Left   • Kidney stone    • Mitral valve insufficiency    • PONV (postoperative nausea and vomiting)    • Stroke ([facility]) 2020    HX OF   • Stroke-like symptoms         Past Surgical History:   Procedure Laterality Date   • BREAST BIOPSY Left  05/05/2021    [facility]   • BREAST LUMPECTOMY     • BREAST LUMPECTOMY WITH SENTINEL NODE BIOPSY N/A 07/01/2021    Procedure: right port placement, Left SHAINA-guided, bracketed, partial mastectomy and sentinel lymph node biopsy Left breast needle-localized excisional biopsy;  Surgeon: Lashonda Euceda MD;  Location: [facility];  Service: General;  Laterality: N/A;   • BREAST SURGERY Bilateral 07/01/2021    Procedure: RIGHT BREAST REDUCTION MASTOPEXY LEFT BREAST ONCOPLASTIC CLOSURE;  Surgeon: Caridad Baker MD PhD;  Location: [facility] OR;  Service: Plastics;  Laterality: Bilateral;   • COLONOSCOPY N/A 10/4/2022    Procedure: COLONOSCOPY TO CECUM WITH COLD BIOPSIES POLYPECTOMY;  Surgeon: Marlon Hough MD;  Location: [facility] MAIN OR;  Service: Gastroenterology;  Laterality: N/A;  POLYP   • ENDOSCOPY N/A 10/4/2022    Procedure: ESOPHAGOGASTRODUODENOSCOPY WITH COLD BIOPSIES;  Surgeon: Marlon Hough MD;  Location: [facility] MAIN OR;  Service: Gastroenterology;  Laterality: N/A;  NORMAL   • NO PAST SURGERIES     • PAP SMEAR  2016         Visit Dx:      ICD-10-CM ICD-9-CM   1. 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 Breast edema  N64.89 611.89   3. Malignant neoplasm of overlapping sites of left breast in female, estrogen receptor negative (HCC)  C50.812 174.8    Z17.1 V86.1        Lymphedema     Row Name 11/17/22 1600             Subjective Pain    Able to rate subjective pain? yes  -RE      Pre-Treatment Pain Level 0  -RE      Post-Treatment Pain Level 0  -RE         Subjective Comments    Subjective Comments breast edema is better in AM after use of swell spot. It worsens as the day progresses. It is not changing significantly.  -RE         L-Dex Bioimpedence Screening    L-Dex Measurement Extremity LUE  -RE      L-Dex Patient Position Standing  -RE      L-Dex UE Dominate Side Right  -RE      L-Dex UE At Risk Side Left  -RE      L-Dex UE Pre Surgical Value No  -RE      L-Dex UE Score 0.7   -RE      L-Dex UE Baseline Score 3.5  -RE      L-Dex UE Value Change -2.8  -RE      L-Dex UE Comment WNL  -RE            User Key  (r) = Recorded By, (t) = Taken By, (c) = Cosigned By    Initials Name Provider Type    Julia Rowe OTR Occupational Therapist                         OT Assessment/Plan     Row Name 11/17/22 1700          OT Assessment    Impairments Edema;Impaired lymphatic circulation  -RE     Assessment Comments Patient returns for bioimpedance reassessment.  Her last measurement was 3 months ago.  L-Dex value today is .7 which is WNL and indicates a reversal of her UE lymphedema symptoms. She continues with mild breast edema which is not worse but no better either. I recommended some different compression products.  I recommended follow up in 3 months.  -RE     Please refer to paper survey for additional self-reported information No  -RE     OT Diagnosis at risk for lymphedema; breast lymphedema  -RE     OT Rehab Potential Good  -RE     Patient/caregiver participated in establishment of treatment plan and goals Yes  -RE     Patient would benefit from skilled therapy intervention Yes  -RE        OT Plan    OT Frequency Other (comment)  -RE     Predicted Duration of Therapy Intervention (OT) bioimpedance every 3 months  -RE     Planned CPT's? OT SELF CARE/MGMT/TRAIN 15 MIN: 29616;OT BIS XTRACELL FLUID ANALYSIS: 60955  -RE     OT Plan Comments follow up in 3 months  -RE           User Key  (r) = Recorded By, (t) = Taken By, (c) = Cosigned By    Initials Name Provider Type    Julia Rowe OTR Occupational Therapist              The patient had a 3 month SOZO measurement which I reviewed today. The score is WNL, see in EMR. Bioimpedance spectroscopy helps identify the onset of lymphedema in an arm or leg before patients experience noticeable swelling. Research has shown that the early detection of lymphedema using L-Dex combined with treatment can reduce progression to chronic lymphedema by 95%  in breast cancer patients. Whenever possible, patients are tested for baseline L-Dex score before cancer treatment begins and then are reassessed during regular follow-up visits using the SOZO device. Otherwise, this can be started postoperatively and continued during regular follow-up visits. If the patient's L-Dex score increases above normal levels, that is a sign that lymphedema is developing and a referral is made to occupational therapy for further evaluation and early compression treatment. Lymphedema assessment with the SOZO L-Dex score is recommended to be done every 3 months for the first 3 years and then every 6 months for years 4 and 5 followed by annually afterwards.         OT Goals     Row Name 11/17/22 1700          OT Short Term Goals    STG 1 Pt will demonstrate understanding of use of compression sleeve for edema prevention, exercise and air travel.   -RE     STG 1 Progress Met  -RE     STG 2 Patient will demonstrate proper awareness of “What is Lymphedema?” and “Healthy Habits” for improved prevention, management, care of symptoms and ease of transition to self-care of condition.   -RE     STG 2 Progress Met  -RE     STG 3 Patient independent and compliant with initial home exercise program focused on range of motion,and Flexibility.  -RE     STG 3 Progress Met  -RE     STG 4 Patient to demonstrate increased left shoulder flexion to 140 to improve functional UE use and to restore pre operative AROM per patient perception.  -RE     STG 4 Progress Met  -RE     STG 5 Patient to demonstrate increased left shoulder abduction to 140 to improve functional UE use and to restore preoperative AROM per patient perception.  -RE     STG 5 Progress Met  -RE        Long Term Goals    LTG Date to Achieve 02/17/23  -RE     LTG 1 Patient to demonstrate increased left shoulder flexion to 160 to improve functional UE use and to restore pre operative AROM per patient perception.  -RE     LTG 1 Progress Met  -RE      LTG 2 Patient to demonstrate increased left shoulder abduction to 160 to improve functional UE use and to restore preoperative AROM per patient perception.  -RE     LTG 2 Progress Met  -RE     LTG 3 Patient will participate in bioimpedance scans every 3-6 months as a method of early detection of lymphedema to allow for early intervention.  -RE     LTG 3 Progress Met;Ongoing  -RE     LTG 4  Patient's bioimpedance score to remain below 6.5 for decreased risk of stage II lymphedema.  -RE     LTG 4 Progress Met;Ongoing  -RE     LTG 5 Patient will be independent with use and care of Tribute.  -RE     LTG 5 Progress Met  -RE        Time Calculation    OT Goal Re-Cert Due Date 02/17/23  -RE           User Key  (r) = Recorded By, (t) = Taken By, (c) = Cosigned By    Initials Name Provider Type    Julia Rowe OTR Occupational Therapist                Therapy Education  Education Details: Discussed patient's current L-Dex value of .7. We discussed a few product options to address her breast edema during the day.  For now she will try Komprex II with a thin Komprex overlay. Hopefully this will address edema in the medial breast w/o being visible through her clothes.  Given: Edema management, Symptoms/condition management  Program: New, Modified  How Provided: Verbal  Provided to: Patient  Level of Understanding: Teach back education performed, Verbalized  85184 - OT Self Care/Mgmt Minutes: 20                Time Calculation:   OT Start Time: 1330  OT Stop Time: 1400  OT Time Calculation (min): 30 min  Total Timed Code Minutes- OT: 20 minute(s)  Timed Charges  03661 - OT Self Care/Mgmt Minutes: 20  Untimed Charges  84501 - OT Bioimpedence Rx Minutes: 10  Total Minutes  Timed Charges Total Minutes: 20  Untimed Charges Total Minutes: 10   Total Minutes: 30     Therapy Charges for Today     Code Description Service Date Service Provider Modifiers Qty    [id number]  OT SELF CARE/MGMT/TRAIN EA 15 MIN 11/17/2022 Mariama  VI Dumont GO 1    [id number]  OT BIS XTRACELL FLUID ANALYSIS 11/17/2022 Julia Leslie OTR GO 1                      VI Ulloa  11/17/2022

## 2022-11-20 DIAGNOSIS — E03.9 ACQUIRED HYPOTHYROIDISM: ICD-10-CM

## 2022-11-21 DIAGNOSIS — E03.9 ACQUIRED HYPOTHYROIDISM: ICD-10-CM

## 2022-11-21 DIAGNOSIS — F90.0 ATTENTION DEFICIT HYPERACTIVITY DISORDER (ADHD), PREDOMINANTLY INATTENTIVE TYPE: ICD-10-CM

## 2022-11-21 RX ORDER — LEVOTHYROXINE SODIUM 0.12 MG/1
125 TABLET ORAL DAILY
Qty: 30 TABLET | Refills: 0 | Status: SHIPPED | OUTPATIENT
Start: 2022-11-21 | End: 2022-11-21

## 2022-11-21 RX ORDER — LEVOTHYROXINE SODIUM 0.12 MG/1
125 TABLET ORAL DAILY
Qty: 90 TABLET | Refills: 0 | Status: SHIPPED | OUTPATIENT
Start: 2022-11-21 | End: 2022-11-28 | Stop reason: SDUPTHER

## 2022-11-23 ENCOUNTER — TELEPHONE (OUTPATIENT)
Dept: FAMILY MEDICINE CLINIC | Facility: CLINIC | Age: 42
End: 2022-11-23

## 2022-11-23 DIAGNOSIS — F90.0 ATTENTION DEFICIT HYPERACTIVITY DISORDER (ADHD), PREDOMINANTLY INATTENTIVE TYPE: ICD-10-CM

## 2022-11-23 NOTE — TELEPHONE ENCOUNTER
Caller: Radha Astorga     Relationship: [unfilled]     Best call back number: [phone]    What is your medical concern? PATIENT IS OUT OF HER VYVANSE. WOULD LIKE TO KNOW IF DR ROSALES WILL REFILL THIS. SHE IS SCHEDULED TO COME IN ON 11/29/22 FOR MEDICATIONS.     How long has this issue been going on? 11/21/22    Is your provider already aware of this issue? YES    Have you been treated for this issue? N/A

## 2022-11-28 NOTE — PROGRESS NOTES
Subjective   Radha Astorga is a 42 y.o. female.     History of Present Illness     Chief Complaint:   Chief Complaint   Patient presents with   • Hypertension     MED REFILL / NO LABS / [facility] PER PT     • Hypothyroidism   • ADHD       Radha Astorga 42 y.o. female who presents today for Medical Management of the below listed issues. She  has a problem list of   Patient Active Problem List   Diagnosis   • Family history of breast cancer   • Hypothyroidism   • Major depressive disorder, recurrent episode, moderate with anxious distress ([facility]CC)   • Attention deficit hyperactivity disorder (ADHD), predominantly inattentive type   • Factor 5 Leiden mutation, heterozygous (HCC)   • Kidney mass   • Malignant neoplasm of overlapping sites of left breast in female, estrogen receptor negative (HCC)   • High risk medication use   • Essential hypertension   • Rheumatic mitral valve disease   • Encounter for adjustment or management of vascular access device   • Iron deficiency anemia   • History of stroke   • B12 deficiency   • Nausea   • Bloating   • Family history of colon cancer   • Breast edema   .  Since the last visit, She has overall felt well.  she has been compliant with   Current Outpatient Medications:   •  levothyroxine (SYNTHROID, LEVOTHROID) 125 MCG tablet, Take 1 tablet by mouth Daily., Disp: 90 tablet, Rfl: 1  •  lisdexamfetamine (Vyvanse) 60 MG capsule, Take 1 capsule by mouth Every Morning, Disp: 30 capsule, Rfl: 0  •  valsartan (DIOVAN) 160 MG tablet, Take 1 tablet by mouth Daily., Disp: 90 tablet, Rfl: 1  •  Cholecalciferol (D3) 50 MCG (2000 UT) tablet, Take 4,000 Units by mouth Daily., Disp: , Rfl:   •  famotidine (PEPCID) 20 MG tablet, TAKE 1 TABLET BY MOUTH DAILY, Disp: 30 tablet, Rfl: 3  •  lidocaine-prilocaine (EMLA) 2.5-2.5 % cream, Apply  topically to the appropriate area as directed As Needed for Mild Pain ., Disp: 1 each, Rfl: 2  •  LORazepam (ATIVAN) 0.5 MG tablet, Take 1 tablet by  "mouth Every 8 (Eight) Hours As Needed for Anxiety. for anxiety, Disp: 30 tablet, Rfl: 2  •  ondansetron ODT (ZOFRAN-ODT) 8 MG disintegrating tablet, Place 1 tablet on the tongue Every 8 (Eight) Hours As Needed for Nausea or Vomiting., Disp: 30 tablet, Rfl: 3  •  Polyethylene Glycol 3350 (MIRALAX PO), Take  by mouth As Needed., Disp: , Rfl:   •  traZODone (DESYREL) 50 MG tablet, Take 1 tablet by mouth Every Night. (Patient taking differently: Take 25 mg by mouth Every Night.), Disp: 30 tablet, Rfl: 11  •  vitamin B-12 (CYANOCOBALAMIN) 1000 MCG tablet, TAKE 1 TABLET BY MOUTH, Disp: 30 tablet, Rfl: 2  •  Xarelto 20 MG tablet, TAKE 1 TABLET BY MOUTH DAILY, Disp: 30 tablet, Rfl: 3.  She denies medication side effects.    All of the other chronic condition(s) listed above are stable w/o issues.    /80   Pulse 104   Temp 97.4 °F (36.3 °C) (Oral)   Resp 20   Ht 157.5 cm (62\")   Wt 69.4 kg (153 lb)   BMI 27.98 kg/m²     Results for orders placed or performed during the hospital encounter of 10/04/22   POC Pregnancy, Urine    Specimen: Urine   Result Value Ref Range    HCG, Urine, QL Negative Negative    Lot Number [id number]     Internal Positive Control Positive Positive, Passed    Internal Negative Control Negative Negative, Passed    Expiration Date 10-    Tissue Pathology Exam    Specimen: A: Small Intestine, Duodenum; Tissue    B: Gastric, Body; Tissue    C: Large Intestine, Rectum; Tissue   Result Value Ref Range    Case Report       Surgical Pathology Report                         Case: [id number]                                  Authorizing Provider:  Marlon Hough MD    Collected:           10/04/2022 08:51 AM          [facility]     Received:            10/04/2022 09:31 AM                                 [facility] MAIN OR                                                     Pathologist:           Lina Gordillo MD                             "                        Specimens:   1) - Small Intestine, Duodenum, DUODENUM BIOPSIES                                                   2) - Gastric, Body, RANDOM GASTRIC BIOPSIES                                                         3) - Large Intestine, Rectum, RECTAL POLYP                                                 Clinical Information       R/O CELIAC      Final Diagnosis       1. Duodenum, Biopsy:  Small bowel mucosa with             A. Normal intact villous surface.   B. No significant inflammation, no granulomas.   C: No viral inclusions or other organisms on routinely stained sections.    2. Stomach, Random Biopsies:   A. Benign gastric oxyntic mucosa without diagnostic abnormality.    3. Rectum, Biopsy:     A. Hyperplastic polyp.    [facility]      Gross Description       1. Small Intestine, Duodenum.  The specimen is received in formalin labeled with the patient's name and further designated 'duodenum   biopsy' are multiple small fragments of gray-tan tissue. The specimen is submitted for embedding as received.      2. Gastric, Body.  The specimen is received in formalin labeled with the patient's name and further designated 'random gastric   biopsy' are multiple small fragments of gray-tan tissue. The specimen is submitted for embedding as received.      3. Large Intestine, Rectum.  The specimen is received in formalin labeled with the patient's name and further designated 'rectal polyp biopsy' is a small fragment of gray-tan tissue. The specimen is submitted for embedding as received.                   The following portions of the patient's history were reviewed and updated as appropriate: allergies, current medications, past family history, past medical history, past social history, past surgical history, and problem list.    Review of Systems   Constitutional: Negative for activity change, chills and fever.   Respiratory: Negative for cough.    Cardiovascular: Negative for chest pain.    Psychiatric/Behavioral: Negative for dysphoric mood.       Objective   Physical Exam  Constitutional:       General: She is not in acute distress.     Appearance: She is well-developed.   Cardiovascular:      Rate and Rhythm: Normal rate and regular rhythm.   Pulmonary:      Effort: Pulmonary effort is normal.      Breath sounds: Normal breath sounds.   Neurological:      Mental Status: She is alert and oriented to person, place, and time.   Psychiatric:         Behavior: Behavior normal.         Thought Content: Thought content normal.       The patient has read and signed the [facility] Controlled Substance Contract.  I will continue to see patient for regular follow up appointments.  They are well controlled on their medication.  CHRISTOPHE has been reviewed by me and is updated every 3 months. The patient is aware of the potential for addiction and dependence.      Diagnoses and all orders for this visit:    1. Attention deficit hyperactivity disorder (ADHD), predominantly inattentive type (Primary)  Comments:  Script for Vyvanse sent last week.    2. Acquired hypothyroidism  -     levothyroxine (SYNTHROID, LEVOTHROID) 125 MCG tablet; Take 1 tablet by mouth Daily.  Dispense: 90 tablet; Refill: 1  -     TSH  -     T4, Free  -     T3    3. Essential hypertension  -     valsartan (DIOVAN) 160 MG tablet; Take 1 tablet by mouth Daily.  Dispense: 90 tablet; Refill: 1  -     Comprehensive metabolic panel  -     Lipid panel  -     CBC and Differential    4. Amenorrhea  -     [facility]

## 2022-11-29 ENCOUNTER — OFFICE VISIT (OUTPATIENT)
Dept: FAMILY MEDICINE CLINIC | Facility: CLINIC | Age: 42
End: 2022-11-29

## 2022-11-29 VITALS
SYSTOLIC BLOOD PRESSURE: 115 MMHG | RESPIRATION RATE: 20 BRPM | DIASTOLIC BLOOD PRESSURE: 80 MMHG | BODY MASS INDEX: 28.16 KG/M2 | HEIGHT: 62 IN | HEART RATE: 104 BPM | WEIGHT: 153 LBS | TEMPERATURE: 97.4 F

## 2022-11-29 DIAGNOSIS — F90.0 ATTENTION DEFICIT HYPERACTIVITY DISORDER (ADHD), PREDOMINANTLY INATTENTIVE TYPE: Primary | ICD-10-CM

## 2022-11-29 DIAGNOSIS — I10 ESSENTIAL HYPERTENSION: Chronic | ICD-10-CM

## 2022-11-29 DIAGNOSIS — E03.9 ACQUIRED HYPOTHYROIDISM: ICD-10-CM

## 2022-11-29 DIAGNOSIS — N91.2 AMENORRHEA: ICD-10-CM

## 2022-11-29 PROCEDURE — 99214 OFFICE O/P EST MOD 30 MIN: CPT | Performed by: FAMILY MEDICINE

## 2022-11-29 RX ORDER — VALSARTAN 160 MG/1
160 TABLET ORAL DAILY
Qty: 90 TABLET | Refills: 1 | Status: SHIPPED | OUTPATIENT
Start: 2022-11-29

## 2022-11-29 RX ORDER — LEVOTHYROXINE SODIUM 0.12 MG/1
125 TABLET ORAL DAILY
Qty: 90 TABLET | Refills: 1 | Status: SHIPPED | OUTPATIENT
Start: 2022-11-29

## 2022-11-30 DIAGNOSIS — Z86.73 HISTORY OF STROKE: ICD-10-CM

## 2022-11-30 DIAGNOSIS — C50.812 MALIGNANT NEOPLASM OF OVERLAPPING SITES OF LEFT BREAST IN FEMALE, ESTROGEN RECEPTOR NEGATIVE: ICD-10-CM

## 2022-11-30 DIAGNOSIS — Z17.1 MALIGNANT NEOPLASM OF OVERLAPPING SITES OF LEFT BREAST IN FEMALE, ESTROGEN RECEPTOR NEGATIVE: ICD-10-CM

## 2022-11-30 DIAGNOSIS — D68.51 FACTOR 5 LEIDEN MUTATION, HETEROZYGOUS: ICD-10-CM

## 2022-11-30 RX ORDER — LANOLIN ALCOHOL/MO/W.PET/CERES
CREAM (GRAM) TOPICAL
Qty: 30 TABLET | Refills: 2 | Status: SHIPPED | OUTPATIENT
Start: 2022-11-30 | End: 2023-01-24 | Stop reason: SDUPTHER

## 2022-11-30 RX ORDER — RIVAROXABAN 20 MG/1
TABLET, FILM COATED ORAL
Qty: 30 TABLET | Refills: 3 | Status: SHIPPED | OUTPATIENT
Start: 2022-11-30 | End: 2023-03-02

## 2022-11-30 RX ORDER — FAMOTIDINE 20 MG/1
20 TABLET, FILM COATED ORAL DAILY
Qty: 30 TABLET | Refills: 3 | Status: SHIPPED | OUTPATIENT
Start: 2022-11-30 | End: 2023-03-02

## 2022-12-14 ENCOUNTER — OFFICE VISIT (OUTPATIENT)
Dept: ONCOLOGY | Facility: CLINIC | Age: 42
End: 2022-12-14

## 2022-12-14 ENCOUNTER — INFUSION (OUTPATIENT)
Dept: ONCOLOGY | Facility: HOSPITAL | Age: 42
End: 2022-12-14

## 2022-12-14 VITALS
HEART RATE: 84 BPM | TEMPERATURE: 98 F | RESPIRATION RATE: 16 BRPM | OXYGEN SATURATION: 99 % | SYSTOLIC BLOOD PRESSURE: 109 MMHG | HEIGHT: 62 IN | BODY MASS INDEX: 28.05 KG/M2 | WEIGHT: 152.4 LBS | DIASTOLIC BLOOD PRESSURE: 75 MMHG

## 2022-12-14 DIAGNOSIS — Z17.1 MALIGNANT NEOPLASM OF OVERLAPPING SITES OF LEFT BREAST IN FEMALE, ESTROGEN RECEPTOR NEGATIVE: ICD-10-CM

## 2022-12-14 DIAGNOSIS — Z17.1 MALIGNANT NEOPLASM OF OVERLAPPING SITES OF LEFT BREAST IN FEMALE, ESTROGEN RECEPTOR NEGATIVE: Primary | ICD-10-CM

## 2022-12-14 DIAGNOSIS — C50.812 MALIGNANT NEOPLASM OF OVERLAPPING SITES OF LEFT BREAST IN FEMALE, ESTROGEN RECEPTOR NEGATIVE: ICD-10-CM

## 2022-12-14 DIAGNOSIS — Z79.899 HIGH RISK MEDICATION USE: ICD-10-CM

## 2022-12-14 DIAGNOSIS — Z45.2 ENCOUNTER FOR ADJUSTMENT OR MANAGEMENT OF VASCULAR ACCESS DEVICE: Primary | ICD-10-CM

## 2022-12-14 DIAGNOSIS — C50.812 MALIGNANT NEOPLASM OF OVERLAPPING SITES OF LEFT BREAST IN FEMALE, ESTROGEN RECEPTOR NEGATIVE: Primary | ICD-10-CM

## 2022-12-14 DIAGNOSIS — D68.51 FACTOR 5 LEIDEN MUTATION, HETEROZYGOUS: ICD-10-CM

## 2022-12-14 LAB
BASOPHILS # BLD AUTO: 0.05 10*3/MM3 (ref 0–0.2)
BASOPHILS NFR BLD AUTO: 0.8 % (ref 0–1.5)
DEPRECATED RDW RBC AUTO: 38.6 FL (ref 37–54)
EOSINOPHIL # BLD AUTO: 0.13 10*3/MM3 (ref 0–0.4)
EOSINOPHIL NFR BLD AUTO: 2.2 % (ref 0.3–6.2)
ERYTHROCYTE [DISTWIDTH] IN BLOOD BY AUTOMATED COUNT: 12 % (ref 12.3–15.4)
HCT VFR BLD AUTO: 38.5 % (ref 34–46.6)
HGB BLD-MCNC: 12.8 G/DL (ref 12–15.9)
IMM GRANULOCYTES # BLD AUTO: 0.01 10*3/MM3 (ref 0–0.05)
IMM GRANULOCYTES NFR BLD AUTO: 0.2 % (ref 0–0.5)
LYMPHOCYTES # BLD AUTO: 1.79 10*3/MM3 (ref 0.7–3.1)
LYMPHOCYTES NFR BLD AUTO: 30.3 % (ref 19.6–45.3)
MCH RBC QN AUTO: 29 PG (ref 26.6–33)
MCHC RBC AUTO-ENTMCNC: 33.2 G/DL (ref 31.5–35.7)
MCV RBC AUTO: 87.1 FL (ref 79–97)
MONOCYTES # BLD AUTO: 0.44 10*3/MM3 (ref 0.1–0.9)
MONOCYTES NFR BLD AUTO: 7.5 % (ref 5–12)
NEUTROPHILS NFR BLD AUTO: 3.48 10*3/MM3 (ref 1.7–7)
NEUTROPHILS NFR BLD AUTO: 59 % (ref 42.7–76)
NRBC BLD AUTO-RTO: 0 /100 WBC (ref 0–0.2)
PLATELET # BLD AUTO: 254 10*3/MM3 (ref 140–450)
PMV BLD AUTO: 9.7 FL (ref 6–12)
RBC # BLD AUTO: 4.42 10*6/MM3 (ref 3.77–5.28)
WBC NRBC COR # BLD: 5.9 10*3/MM3 (ref 3.4–10.8)

## 2022-12-14 PROCEDURE — 84443 ASSAY THYROID STIM HORMONE: CPT | Performed by: FAMILY MEDICINE

## 2022-12-14 PROCEDURE — 99214 OFFICE O/P EST MOD 30 MIN: CPT | Performed by: NURSE PRACTITIONER

## 2022-12-14 PROCEDURE — 80053 COMPREHEN METABOLIC PANEL: CPT | Performed by: FAMILY MEDICINE

## 2022-12-14 PROCEDURE — 83001 ASSAY OF GONADOTROPIN (FSH): CPT | Performed by: FAMILY MEDICINE

## 2022-12-14 PROCEDURE — 80061 LIPID PANEL: CPT | Performed by: FAMILY MEDICINE

## 2022-12-14 PROCEDURE — 84439 ASSAY OF FREE THYROXINE: CPT | Performed by: FAMILY MEDICINE

## 2022-12-14 PROCEDURE — 96523 IRRIG DRUG DELIVERY DEVICE: CPT

## 2022-12-14 PROCEDURE — 25010000002 HEPARIN LOCK FLUSH PER 10 UNITS: Performed by: INTERNAL MEDICINE

## 2022-12-14 PROCEDURE — 83002 ASSAY OF GONADOTROPIN (LH): CPT | Performed by: FAMILY MEDICINE

## 2022-12-14 PROCEDURE — 84480 ASSAY TRIIODOTHYRONINE (T3): CPT | Performed by: FAMILY MEDICINE

## 2022-12-14 PROCEDURE — 85025 COMPLETE CBC W/AUTO DIFF WBC: CPT | Performed by: INTERNAL MEDICINE

## 2022-12-14 RX ORDER — HEPARIN SODIUM (PORCINE) LOCK FLUSH IV SOLN 100 UNIT/ML 100 UNIT/ML
500 SOLUTION INTRAVENOUS AS NEEDED
Status: DISCONTINUED | OUTPATIENT
Start: 2022-12-14 | End: 2022-12-14 | Stop reason: HOSPADM

## 2022-12-14 RX ORDER — HEPARIN SODIUM (PORCINE) LOCK FLUSH IV SOLN 100 UNIT/ML 100 UNIT/ML
500 SOLUTION INTRAVENOUS AS NEEDED
OUTPATIENT
Start: 2022-12-14

## 2022-12-14 RX ADMIN — Medication 500 UNITS: at 12:40

## 2022-12-14 NOTE — PROGRESS NOTES
Subjective     REASON FOR follow-up:    1.  Heterozygous state for factor V Leiden    2.  New onset stroke on aspirin by neurology    3.  Hypercholesterolemia    4.  Hypercoagulable work-up done.  Antithrombin 109% protein S activity 85% protein S antigen 107%, protein C activity 85%.  Anticardiolipin antibody IgM 24%, antibeta-2 glycoprotein antibody negative, lupus anticoagulant not detected, prothrombin gene mutation negative.    5.  Screening mammogram showed abnormality in the left breast 6 o'clock position, 8 mm on ultrasound, ultrasound-guided left breast biopsy, 6 cm from the nipple showed evidence of invasive ductal carcinoma poorly differentiated grade 3, [place] score of 8 out of 9 ER negative, WV negative, HER-2/ese 3+ positive.    6.  CT scan February 24, 2021 showed focal area of fatty infiltration of the liver.  1 cm hypoattenuating cortical lesion in the upper pole of the right kidney.  Similarly nodule in the upper pole of the left kidney is 1.5 cm.  Further evaluation by ultrasound suggested  · Ultrasound March 1, 2021 shows solid lesion in the upper pole of the right kidney.  In the left kidney along the midpole of the left kidney is a nodule which is 16 x 16 x 14 mm which is thought to be a cyst.  A 6-month follow-up CT suggested    6.  S/p left partial mastectomy with sentinel lymph node biopsy.  Tumor was present at 5:00, invasive ductal carcinoma, [place] score of 8, grade 3, greatest dimension was 12 mm x 8 x 4 mm.  Single focus of invasive carcinoma present.  There was extensive DCIS which is 30 mm x 8 x 2 mm, grade 3, no evidence of lymphovascular space invasion or dermal lymphatic invasion.  Margins were clear.  4 lymph nodes were negative.  It is a p T1cp N0, ER negative WV negative HER-2/ese 3+ with Ki-67 of 50%.  · [employer] genetic test negative for 47 genes    7.recently initiated Xarelto at loading dose of 15 mg twice a day x3 weeks to then be switched to 20 mg daily per  protocol.  She is doing this because of Mediport in place and known factor V Leiden heterozygosity.        History of Present Illness   Patient is a 42-year-old female with the above-mentioned history who is here today for follow-up visit.  She did have an EGD and colonoscopy 10/4/2022.  They did find a polyp however no other findings.  The patient does report she continues to have intermittent nausea about once per week.  At times she has to take Zofran.  She denies vomiting.    She continues on Xarelto 20 mg daily and denies bleeding issues.    She denies any new palpable breast mass or nodularity.  Her last mammogram was 4/4/2022 which was without evidence for malignancy.    Patient does still have a port in place, but would like to get the referral to have it removed now.      Oncologic history:  Patient is here for follow-up of her mammogram.  Patient had screening mammogram April 2, 2021:  NAD a focal asymmetry was present in the posterior one third retroareolar region of the left breast.  Further spot compression images and left breast ultrasound was suggested.  Right breast was negative    April 9, 2021: Left diagnostic mammogram showed an area of focal asymmetry in the posterior one third region aspect of the left breast her breast    Ultrasound showed an irregular 0.8 cm lesion in the left breast at the 6 o'clock position of the order of 6 cm from the nipple.  Ultrasound-guided left breast biopsy was recommended.    April 26, 2021: Ultrasound-guided biopsy of the left breast 6 o'clock position, 6 cm from the nipple showed    Invasive ductal carcinoma, poorly differentiated with a [place] score grade 3 with a score of 8 out of 9 measuring at least 7 mm.  No definitive ductal carcinoma is in situ is identified.  ER 1% negative  RI 1% negative   HER-2/ees 3+ positive    There was no evidence of lymphadenopathy either in the mammogram of the ultrasound.  We suggested an MRI of the breast.  Patient has  strong family history of breast cancer      May 7, 2021: MRI of bilateral breast reviewed.  My interpretation is that there is area of enhancement in the 6 o'clock position of the left breast this is the biopsy-proven malignancy.  There is additional area of linear enhancement anteriorly and laterally measuring up to 1.2 cm this is approximately 5.6 cm from the nipple.  In addition there is a enhancement 2 to 3 mm in the anterolateral aspect of the lateral aspect of the left breast which is 4.6 cm from the nipple.  There is also mildly prominent posterior lymph node in the mid axilla which measures 1 cm with cortical thickening.  Findings are consistent with multifocal disease.  No contralateral disease or internal mammary adenopathy identified    May 20, 2021: Genetic test, [facility] genetic test shows 47 genes negative    May 21, 2021: I reviewed the biopsy of the lymph node in the left axilla which shows reactive lymph node negative for any carcinoma or lymphoma    June 2, 2021:Final Diagnosis  1. Left Breast, 5:00 o'clock, MRI-guided Biopsies for Linear Enhancement: INVASIVE MAMMARY CARCINOMA, NO  SPECIAL TYPE (INVASIVE DUCTAL CARCINOMA).  A. Largest contiguous focus in a core measures 4 mm.  B. No in-situ component identified.  C. Brisk lymphocytic response noted (see Comment).  D. No definitive lymphovascular nor perineural invasion identified.  2. Left Breast, 2:00 o'clock, MRI-guided Biopsy for Enhancement:  A. Benign breast parenchyma with radial scar and micropapillomas.  B. Usual ductal hyperplasia and columnar cell hyperplasia.  C. No atypical hyperplasia, in-situ nor invasive carcinoma identified    ER, PA, HER-2 new and Ki-67 pending on this new biopsy      Patient has been seen by Dr. Lashonda Euceda with plans of doing surgery on July first 2021    Once patient undergoes surgery lumpectomy with sentinel lymph node biopsy we will give further recommendation about treatment options.  Given that patient  has multifocal disease and both of the lesions 2 lesions are 1.2 cm each, with it being a HER-2 positive tumor on the previous biopsy at 6:00 Dr. Euceda and myself discussed and patient preferred to undergo port placement at the same time of surgery.    Patient had [employer] genetic test which was negative.    She has completed surgery July 1, 2021.  She underwent left partial mastectomy with left axillary sentinel lymph node biopsy and right port placement.  Clinically she was thought to have a high-grade multifocal invasive ductal carcinoma ER/ME negative, ME positive.  The lesions require preoperative localization.  The multifocal cancer was bracketed with 2 savvy markers and the radial scar was localized with a needle.  Because of the volume of tissue that will be excised related to the breast volume the case was done in conjunction with Dr. Zavala for oncoplastic closure.    She is 2 weeks from surgery.  She is healing up reasonably well.    On review of her pathology she had left partial mastectomy with sentinel lymph node biopsy.  Tumor was present at 5:00, invasive ductal carcinoma, [place] score of 8, grade 3, greatest dimension was 12 mm x 8 x 4 mm.  Single focus of invasive carcinoma present.  There was extensive DCIS which is 30 mm x 8 x 2 mm, grade 3, no evidence of lymphovascular space invasion or dermal lymphatic invasion.  Margins were clear.  4 lymph nodes were negative.  It is a p T1cp N0, ER negative ME negative HER-2/ees 3+ with Ki-67 of 50%.    Patient is here to discuss further options of treatment.  Given small tumor T1c N0, she is a good candidate for weekly Taxol Herceptin.    Given that patient has heterozygous state for factor V Leiden and currently has port placed we will plan to start Eliquis 2.5 mg p.o. twice daily.  I have left a message for Dr. Craft in neurology to call me to discuss if patient needs to continue aspirin or we can hold off since be starting Eliquis.      Genetic  test negative    August 4, 2021: Weekly Taxol Herceptin x12 weeks followed by Herceptin x1 year.  Cycle 1 today  Cycle 12 Taxol Herceptin October 10, 2021      Hematologic history:  patient is a 40-year-old female who presented end of December with numbness and tingling in her left upper extremity and left face.  She went to see Dr. Goodman who then got concerned and referred to neurology.  She was referred to Dr. Freedman and talk to Dr. Thompson neurology for evaluation.  Patient underwent ultrasound of the carotids which did not show significant stenosis of the carotids.  She underwent 2D echocardiogram which showed a good ejection fraction of 64% with mild mitral valve prolapse and mild mitral stenosis.  She had a 24-hour Holter which was negative.  She then had also an MRI of the brain February 3, 2021 which showed areas of hyperintensity involving the subcortical white matter of the right frontal lobe superior laterally and posteriorly with the largest area measuring 8 9 mm.  There is also enhancement present.  The findings are consistent with a subacute infarct.  They could not differentiate if there is any underlying neoplastic process but a short-term follow-up was suggested in order to follow-up.  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    Patient currently got started on aspirin and factor V Leiden was obtained by neurology because patient's paternal aunt had factor V Leiden mutation and she was heterozygous.  Patient's testing also showed that she was heterozygous.    Patient states her numbness and tingling is resolved completely on the left arm and face it just lasted for a 24 hours.  She was here referred here for neurology to see if there is any other cause for her underlying hypercoagulable state.  She has not had a complete hypercoagulable work-up.  Also discussed with her that an underlying malignancy  could cause a hypercoagulable state and we would need to do a CT scan to rule that out.    Patient's mother has had breast cancer in her 50s but had pancreatic cancer at 68 and  from that.  Patient's dad had stroke at 63.  But he is alive at 74.  She has 1 brother who is 40 in good health.  Paternal aunt had breast cancer in her 40s.  Paternal grandfather had colon cancer in his late 80s.  Maternal uncle had throat cancer and another maternal uncle had skin cancer with metastasis to the lung.  And maternal grandmother had breast cancer.    Patient was to have mammogram done last year but because of COVID-19 it got postponed and she has not had it done yet.    Patient used to be a smoker half pack per day for 7 years but quit 10 years ago.  She has high cholesterol for which she is on treatment.    Past Medical History:   Diagnosis Date   • Acute stress reaction 2014   • ADD (attention deficit disorder)    • ADHD (attention deficit hyperactivity disorder)    • Anxiety and depression    • CTS (carpal tunnel syndrome)    • Factor 5 Leiden mutation, heterozygous ([facility]) 2021   • Family history of breast cancer 2013   • Fracture, cervical vertebra ([facility]) 1997    C4   • GERD (gastroesophageal reflux disease) 2022    Medicated   • H/O complete eye exam 2016   • Hearing loss    • 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   • History of rheumatic fever    • Hyperlipidemia 2022    Resolved; now unmedicated   • Hypertension    • Hypothyroidism    • Infiltrating ductal carcinoma of left breast ([facility]) 2021    Left   • Kidney stone    • Mitral valve insufficiency    • PONV (postoperative nausea and vomiting)    • Stroke ([facility]) 2020    HX OF   • Stroke-like symptoms         Past Surgical History:   Procedure Laterality Date   • BREAST BIOPSY Left 2021    IDC   • BREAST LUMPECTOMY     • BREAST LUMPECTOMY WITH SENTINEL NODE BIOPSY N/A 2021    Procedure: right port  placement, Left SHAINA-guided, bracketed, partial mastectomy and sentinel lymph node biopsy Left breast needle-localized excisional biopsy;  Surgeon: Lashonda Euceda MD;  Location: [facility] MAIN OR;  Service: General;  Laterality: N/A;   • BREAST SURGERY Bilateral 07/01/2021    Procedure: RIGHT BREAST REDUCTION MASTOPEXY LEFT BREAST ONCOPLASTIC CLOSURE;  Surgeon: Caridad Baker MD PhD;  Location: [facility] MAIN OR;  Service: Plastics;  Laterality: Bilateral;   • COLONOSCOPY N/A 10/4/2022    Procedure: COLONOSCOPY TO CECUM WITH COLD BIOPSIES POLYPECTOMY;  Surgeon: Marlon Hough MD;  [place] [facility];  Service: Gastroenterology;  Laterality: N/A;  POLYP   • ENDOSCOPY N/A 10/4/2022    Procedure: ESOPHAGOGASTRODUODENOSCOPY WITH COLD BIOPSIES;  Surgeon: Marlon Hough MD;  [place] [facility] [place];  Service: Gastroenterology;  Laterality: N/A;  NORMAL   • NO PAST SURGERIES     • PAP SMEAR  2016        Current Outpatient Medications on File Prior to Visit   Medication Sig Dispense Refill   • Cholecalciferol (D3) 50 MCG (2000 UT) tablet Take 4,000 Units by mouth Daily.     • famotidine (PEPCID) 20 MG tablet TAKE 1 TABLET BY MOUTH DAILY 30 tablet 3   • levothyroxine (SYNTHROID, LEVOTHROID) 125 MCG tablet Take 1 tablet by mouth Daily. 90 tablet 1   • lidocaine-prilocaine (EMLA) 2.5-2.5 % cream Apply  topically to the appropriate area as directed As Needed for Mild Pain . 1 each 2   • lisdexamfetamine (Vyvanse) 60 MG capsule Take 1 capsule by mouth Every Morning 30 capsule 0   • LORazepam (ATIVAN) 0.5 MG tablet Take 1 tablet by mouth Every 8 (Eight) Hours As Needed for Anxiety. for anxiety 30 tablet 2   • ondansetron ODT (ZOFRAN-ODT) 8 MG disintegrating tablet Place 1 tablet on the tongue Every 8 (Eight) Hours As Needed for Nausea or Vomiting. 30 tablet 3   • Polyethylene Glycol 3350 (MIRALAX PO) Take  by mouth As Needed.     • traZODone (DESYREL) 50 MG tablet Take 1 tablet by mouth Every Night.  (Patient taking differently: Take 25 mg by mouth Every Night.) 30 tablet 11   • valsartan (DIOVAN) 160 MG tablet Take 1 tablet by mouth Daily. 90 tablet 1   • vitamin B-12 (CYANOCOBALAMIN) 1000 MCG tablet TAKE 1 TABLET BY MOUTH EVERY DAY 30 tablet 2   • Xarelto 20 MG tablet TAKE 1 TABLET BY MOUTH DAILY 30 tablet 3     Current Facility-Administered Medications on File Prior to Visit   Medication Dose Route Frequency Provider Last Rate Last Admin   • [DISCONTINUED] heparin injection 500 Units  500 Units Intravenous PRN Neena Beavers MD   500 Units at 22 1240        ALLERGIES:    Allergies   Allergen Reactions   • Sulfa Antibiotics Rash        Social History     Socioeconomic History   • Marital status: Significant Other   • Number of children: 0   Tobacco Use   • Smoking status: Former     Packs/day: 0.50     Years: 10.00     Pack years: 5.00     Types: Cigarettes     Start date: 1997     Quit date: 2009     Years since quittin.9   • Smokeless tobacco: Never   • Tobacco comments:     Off and on for 12 years   Vaping Use   • Vaping Use: Never used   Substance and Sexual Activity   • Alcohol use: Not Currently     Comment: RARELY   • Drug use: No   • Sexual activity: Yes     Partners: Female     Birth control/protection: None     Comment: Unnecessary        Family History   Problem Relation Age of Onset   • Breast cancer Mother 53   • Pancreatic cancer Mother 68   • Stroke Father    • Hypertension Brother    • Colon polyps Maternal Aunt    • Melanoma Maternal Uncle    • Breast cancer Paternal Aunt 55   • Lung cancer Maternal Grandmother    • Colon cancer Maternal Grandfather    • Prostate cancer Maternal Grandfather    • Prostate cancer Paternal Grandfather    • Breast cancer Paternal Great-Grandmother 94   • Brain cancer Maternal Cousin 20   • Ovarian cancer Other         Paternal great aunt    • Breast cancer Other    • Colon polyps Maternal Aunt    • Malig Hyperthermia Neg Hx    • Crohn's  "disease Neg Hx    • Irritable bowel syndrome Neg Hx    • Ulcerative colitis Neg Hx       Family  history: Mother had breast cancer at age 57.  Maternal great aunt had breast cancer and paternal great aunt had breast cancer in her 40s.  Patient's mother had pancreatic cancer as well.  Paternal grandfather had prostate cancer.    Review of Systems   As per HPI      Objective     Vitals:    12/14/22 1257   BP: 109/75   Pulse: 84   Resp: 16   Temp: 98 °F (36.7 °C)   TempSrc: Temporal   SpO2: 99%   Weight: 69.1 kg (152 lb 6.4 oz)   Height: 157 cm (61.81\")   PainSc: 0-No pain     Current Status 12/14/2022   ECOG score 0     Physical Exam  Vitals reviewed.   Constitutional:       General: She is not in acute distress.     Appearance: Normal appearance. She is well-developed.   HENT:      Head: Normocephalic and atraumatic.   Eyes:      Pupils: Pupils are equal, round, and reactive to light.   Cardiovascular:      Rate and Rhythm: Normal rate and regular rhythm.      Heart sounds: Normal heart sounds. No murmur heard.  Pulmonary:      Effort: Pulmonary effort is normal. No respiratory distress.      Breath sounds: Normal breath sounds. No wheezing, rhonchi or rales.   Abdominal:      General: Bowel sounds are normal. There is no distension.      Palpations: Abdomen is soft.   Musculoskeletal:         General: Normal range of motion.      Cervical back: Normal range of motion.   Lymphadenopathy:      Cervical: No cervical adenopathy.      Upper Body:      Right upper body: No supraclavicular or axillary adenopathy.      Left upper body: No supraclavicular or axillary adenopathy.   Skin:     General: Skin is warm and dry.      Findings: No rash.   Neurological:      Mental Status: She is alert and oriented to person, place, and time.       RECENT LABS:  Results from last 7 days   Lab Units 12/14/22  1255   WBC 10*3/mm3 5.90   NEUTROS ABS 10*3/mm3 3.48   HEMOGLOBIN g/dL 12.8   HEMATOCRIT % 38.5   PLATELETS 10*3/mm3 254       "       Assessment & Plan       * [id number], ER negative NV negative HER-2/ese 3+ with Ki-67 of 50%.  S/p left partial mastectomy with sentinel lymph node biopsy.  Tumor was present at 5:00, invasive ductal carcinoma, [place] score of 8, grade 3, greatest dimension was 12 mm x 8 x 4 mm.  Single focus of invasive carcinoma present.  There was extensive DCIS which is 30 mm x 8 x 2 mm, grade 3, no evidence of lymphovascular space invasion or dermal lymphatic invasion.  Margins were clear.  4 lymph nodes were negative.  It is a p T1cp N0, ER negative NV negative HER-2/ese 3+ with Ki-67 of 50%.  · Patient is s/p port placement  · Discussed in length with patient that given a small tumor she is eligible for weekly Taxol Herceptin.  Weekly Taxol x12 weeks along with weekly Herceptin followed by 1 year of Herceptin.  · Discussed patient in the breast cancer conference  · 2D echo done which showed ejection fraction of 61-65% and strain pattern of -20.8.  · Patient also seen by Dr. Virk cardiology and Dr. Virk is planning to repeat echocardiogram in 3 months after initiation of therapy.  · 8/4/2021 initiating weekly Taxol Herceptin  · 9/8/2021, proceed with week #6 Taxol and Herceptin.  Patient continues to tolerate treatment well. No signs of peripheral neuropathy.  · Her next echocardiogram due November 4, 2021, She follows up with cardiology Dr. Camron Virk.  · 9/28/2021, proceed with week #9 taxol/herceptin.  She does feel her nausea has worsened after her last cycle of chemotherapy.  She prefers the disintegrating Zofran, this will be called to her pharmacy today.  · Patient is here to take cycle 11 of weekly Taxol with worsening fatigue and worsening rash and overall dysphoric mood.  She also cannot sleep and her quality life is worsening.  At the present time we will plan to give 1 more cycle of Taxol following which she will be referred to radiation.  · Cycle 12 Taxol Herceptin October 20, 2021  · Completed radiation  November 2021.  · February 23, 2022: Reviewed echocardiogram with normal ejection fraction and strain pattern.  Patient seen by Dr. Virk and laquita to continue Herceptin  · 3/16/2022 here for continued Herceptin maintenance  · For 4/20/2022 screening bilateral mammogram was negative  · April 27 2022: Currently tolerating Herceptin.  Last dose of Herceptin will be June 18, 2022.  Reviewed mammogram results from April 4, 2022 which is negative  · May 18 2022: Patient has 2 more cycles of Herceptin after today.  Reviewed echo with ejection fraction 57% stable  · June 29, 2022: Last dose of Herceptin today.  No further issues.  · Followed with observation    *  Factor V Leiden heterozygosity with right frontal stroke and patient presented in December 2020 with left-sided weakness tingling and numbness and also numbness in the face.  · Was referred to neurology and cardiology by Dr. Goodman  · Ultrasound of carotid does not show significant stenosis  · 24 Holter is negative  · 2D echocardiogram shows ejection fraction of 64% with mild mitral regurg and mitral stenosis  · Neurology obtain factor V Leiden given that patient's paternal aunt had's history of factor V Leiden and that was heterozygous for factor V Leiden  · Continue aspirin as per neurology.  Also patient on medications for her high cholesterol started after stroke currently asymptomatic  · Hypercoagulable work-up done which is negative except heterozygous state for factor V Leiden.  · Complete stroke work-up has been negative and since this is arterial stroke and she was not on aspirin prior to that and her symptoms have resolved I agree with continuing aspirin alone along with high cholesterol medications.  · MR venogram done on May 10, 2021 is negative.  Patient has been referred to the stroke neurologist Dr. Johnson  · Patient diagnosed with breast cancer with Mediport placed.  Per discussion with neurology, patient initiated on prophylactic Xarelto and  aspirin discontinued.  · Patient had one nosebleed which lasted 10 minutes but with pinching the nose it helped.  But she is switching to 20 mg daily from tomorrow.  We discussed about placing ice and notifying us if her nosebleeds are significant.  But it resolved.  It was likely secondary to dry air  · 2021, patient continues to experience nosebleeds.  Reports these occur when her nose itches, and after scratching her nose it begins to bleed.  She feels this may be related to dry air, so I have asked her to start using saline nasal spray to help moisten her nose.  If she experiences nosebleeds that do not stop, I asked her to notify our office.  · Tolerating anticoagulation with Xarelto.  No bleeding issues with Xarelto  · Patient still has the port and hence will need to continue Xarelto  · Patient will require port removal but wants to wait till Dr. Euceda comes back prior to port removal  · 2022 she continues on Xarelto 20 mg daily denies bleeding issues.    * Family history of cancer: Patient's mother had breast cancer at age 50 and pancreatic cancer at age 68 and  from pancreatic cancer.  Patient's maternal grandmother had breast cancer in her 50s.  Her maternal uncle had skin cancer which went to the lung and another maternal uncle had throat cancer.  Maternal aunt had call colon polyps.  Patient's paternal aunt had breast cancer in her 40s.  And paternal grandfather with colon cancer in his 80s.  Paternal aunt also had factor V Leiden.  · Genetic testing is negative    * Solid nodule in the right kidney on ultrasound, will schedule follow-up with urology  · Patient was to follow-up with Dr. Shultz  · Will need records from urology  · Will discuss with patient at her next appointment about follow-up with urology  · Patient was seen by Dr. Becerra and apparently no follow-up was needed for the nodules in the kidney.  · We will get records from Dr. Becerra's office    *  Elevated BMI, encourage  "diet and exercise.  Patient is improving her diet and exercise after having had the stroke  · Patient has lost weight and she is trying to lose weight    *  Reflux secondary to chemotherapy.  Patient has been requiring frequent Tums.  Recommended she begin Pepcid 20 mg daily.    · Stable, continue Pepcid 20 mg daily.    *Constipation likely secondary to ferrous sulfate.  Oral iron discontinued  · Continues to have constipation, seen by GI who currently is going to do colonoscopy in October.   · EGD/colonoscopy 10/4/2022 by Dr. Hough  ·     *Headache likely related to body ache because of Taxol  · 9/29/2021, patient discusses concern about worsening headaches.  Describing them as \"glass breaking\".  Started about 2 weeks ago, and progressively worsened.  Occurring 3-4 times daily now, and lasting less than 1 minute with each episode.  This occurs in the same place, right upper skull.  Denies vision changes or dizziness.  Will order stat MRI of the brain for further evaluation.  The patient follows with a neurologist, and is going to notify patient help with neuropathy as well of her symptoms and the plan for MRI.  · MRI of the brain was done 10/1/2021 with no evidence of restricted diffusion to indicate acute infarction.  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    *Elevated liver function tests, total bilirubin still normal AST ALT slightly elevated.  Patient did take Tylenol but not too much.  No dose adjustments required at the fingers time.  · Liver function tests normalized    *Iron deficiency anemia, patient's percent saturation still low at 9% s/p full dose of IV Venofer.  · 9/29/2021, patient will receive dose #5 Venofer today.  Hemoglobin today 10.1.  · 12/22/2021, hemoglobin today 12.7.  · 3/16/2022 hemoglobin remains normal at 12.9.  · Patient scheduled for colonoscopy end of July 2022 "   · Hemoglobin 12/14/2022 is 12.8.    *Insomnia  · 9/29/2021, patient reports difficulty sleeping.  She is experiencing this every night, not just the nights of treatment when she receives IV dexamethasone.  She has attempted taking Benadryl, however felt groggy following this.  She is going to attempt melatonin to see if this improves her sleep.  · Worsening, will try gabapentin which will help with neuropathy as well  · Patient took gabapentin for a few nights and had issues with  headache.  She is also very concerned about other possible side effects that she discontinued the medication.  She is tried melatonin in the past without results.  We discussed trying Benadryl versus other prescription medication.  She is going to try Benadryl first.    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 Euceda for port removal.  · Patient is already scheduled for her mammogram in April 2023.  · Return for follow-up visit in 4 months with Dr. Beavers with repeat CBC/CMP.  · Continue Xarelto 20 mg daily.  · Call/ return sooner should she develop any new concerns or problems.          Yumiko Dunne, APRN  12/14/22       Dr. Maxine Haynes

## 2022-12-15 LAB
ALBUMIN SERPL-MCNC: 4.4 G/DL (ref 3.8–4.8)
ALBUMIN/GLOB SERPL: 2 {RATIO} (ref 1.2–2.2)
ALP SERPL-CCNC: 87 IU/L (ref 44–121)
ALT SERPL-CCNC: 15 IU/L (ref 0–32)
AST SERPL-CCNC: 13 IU/L (ref 0–40)
BILIRUB SERPL-MCNC: 0.2 MG/DL (ref 0–1.2)
BUN SERPL-MCNC: 13 MG/DL (ref 6–24)
BUN/CREAT SERPL: 18 (ref 9–23)
CALCIUM SERPL-MCNC: 9.4 MG/DL (ref 8.7–10.2)
CHLORIDE SERPL-SCNC: 105 MMOL/L (ref 96–106)
CHOLEST SERPL-MCNC: 224 MG/DL (ref 100–199)
CO2 SERPL-SCNC: 24 MMOL/L (ref 20–29)
CREAT SERPL-MCNC: 0.72 MG/DL (ref 0.57–1)
EGFRCR SERPLBLD CKD-EPI 2021: 106 ML/MIN/1.73
FSH SERPL-ACNC: 51.8 MIU/ML
GLOBULIN SER CALC-MCNC: 2.2 G/DL (ref 1.5–4.5)
GLUCOSE SERPL-MCNC: 134 MG/DL (ref 70–99)
HDLC SERPL-MCNC: 47 MG/DL
LDLC SERPL CALC-MCNC: 150 MG/DL (ref 0–99)
LH SERPL-ACNC: 15.8 MIU/ML
POTASSIUM SERPL-SCNC: 4 MMOL/L (ref 3.5–5.2)
PROT SERPL-MCNC: 6.6 G/DL (ref 6–8.5)
SODIUM SERPL-SCNC: 141 MMOL/L (ref 134–144)
T3 SERPL-MCNC: 107 NG/DL (ref 71–180)
T4 FREE SERPL-MCNC: 1.73 NG/DL (ref 0.82–1.77)
TRIGL SERPL-MCNC: 148 MG/DL (ref 0–149)
TSH SERPL DL<=0.005 MIU/L-ACNC: 0.48 UIU/ML (ref 0.45–4.5)
VLDLC SERPL CALC-MCNC: 27 MG/DL (ref 5–40)

## 2022-12-20 DIAGNOSIS — G47.00 INSOMNIA, UNSPECIFIED TYPE: ICD-10-CM

## 2022-12-20 DIAGNOSIS — F90.0 ATTENTION DEFICIT HYPERACTIVITY DISORDER (ADHD), PREDOMINANTLY INATTENTIVE TYPE: ICD-10-CM

## 2022-12-20 RX ORDER — TRAZODONE HYDROCHLORIDE 50 MG/1
50 TABLET ORAL NIGHTLY
Qty: 30 TABLET | Refills: 11 | Status: SHIPPED | OUTPATIENT
Start: 2022-12-20 | End: 2023-02-28

## 2023-01-01 NOTE — TELEPHONE ENCOUNTER
Caller: PT    Relationship to patient: SELF    Best call back number: [phone] OK TO LEAVE  MSG     Chief complaint: INFUSION APPTS    Type of visit: INFUSIONS    Requested date: WEEKLY    Additional notes: PT WOULD LIKE ALL WEEKLY INFUSION APPTS TO BE ON Wednesday's AROUND 2PM INSTEAD OF 10:45 AM AND PT WOULD ALSO LIKE TO KNOW IF SHE IS GOING TO HAVE LABS EVERY WEEK AS WELL OR IF THERE IS ANYTHING ELSE GOING TO BE HAPPENING BEFORE HER INFUSION APPTS.         
No

## 2023-01-19 ENCOUNTER — TELEPHONE (OUTPATIENT)
Dept: ONCOLOGY | Facility: CLINIC | Age: 43
End: 2023-01-19
Payer: COMMERCIAL

## 2023-01-19 NOTE — TELEPHONE ENCOUNTER
Caller: Radha Astorga    Relationship: Self    Best call back number: [phone]    What is the best time to reach you: ANYTIME    Who are you requesting to speak with (clinical staff, provider,  specific staff member): SCHEDULING    What was the call regarding: PT WOULD LIKE TO R/S HER 4/20 APPT TO A SOONER DATE. HER MAMMO IS 4/7. SHE CANNOT DO 4/18 BUT CAN DO ANY OTHER DAY. CAN LEAVE DETAILED VM WITH DATE/TIME OF APPT IF NO ANSWER.     Do you require a callback: YES

## 2023-01-19 NOTE — TELEPHONE ENCOUNTER
Spoke with pt needed to r/s apt due to scheduling conflict. R/s pt for Dr BERNAL follow up. Pt confirmed new date and time for apt.

## 2023-01-24 DIAGNOSIS — F90.0 ATTENTION DEFICIT HYPERACTIVITY DISORDER (ADHD), PREDOMINANTLY INATTENTIVE TYPE: ICD-10-CM

## 2023-01-25 RX ORDER — LANOLIN ALCOHOL/MO/W.PET/CERES
1000 CREAM (GRAM) TOPICAL DAILY
Qty: 30 TABLET | Refills: 2 | Status: SHIPPED | OUTPATIENT
Start: 2023-01-25 | End: 2023-03-31 | Stop reason: SDUPTHER

## 2023-02-16 ENCOUNTER — HOSPITAL ENCOUNTER (OUTPATIENT)
Dept: OCCUPATIONAL THERAPY | Facility: HOSPITAL | Age: 43
Setting detail: THERAPIES SERIES
Discharge: HOME | End: 2023-02-16
Payer: COMMERCIAL

## 2023-02-16 DIAGNOSIS — C50.812 MALIGNANT NEOPLASM OF OVERLAPPING SITES OF LEFT BREAST IN FEMALE, ESTROGEN RECEPTOR NEGATIVE: ICD-10-CM

## 2023-02-16 DIAGNOSIS — N64.89 BREAST EDEMA: ICD-10-CM

## 2023-02-16 DIAGNOSIS — Z17.1 MALIGNANT NEOPLASM OF OVERLAPPING SITES OF LEFT BREAST IN FEMALE, ESTROGEN RECEPTOR NEGATIVE: ICD-10-CM

## 2023-02-16 DIAGNOSIS — I97.2 POST-MASTECTOMY LYMPHEDEMA SYNDROME: Primary | ICD-10-CM

## 2023-02-16 PROCEDURE — 93702 BIS XTRACELL FLUID ANALYSIS: CPT

## 2023-02-16 PROCEDURE — 97535 SELF CARE MNGMENT TRAINING: CPT

## 2023-02-16 NOTE — THERAPY PROGRESS REPORT/RE-CERT
Outpatient Occupational Therapy Lymphedema Progress Note  [facility]     Patient Name: Radha Astorga  : 1980  MRN: [id number]  Today's Date: 2023      Visit Date: 2023    Patient Active Problem List   Diagnosis   • Family history of breast cancer   • Hypothyroidism   • Major depressive disorder, recurrent episode, moderate with anxious distress ([facility])   • Attention deficit hyperactivity disorder (ADHD), predominantly inattentive type   • Factor 5 Leiden mutation, heterozygous ([facility])   • Kidney mass   • Malignant neoplasm of overlapping sites of left breast in female, estrogen receptor negative ([facility])   • High risk medication use   • Essential hypertension   • Rheumatic mitral valve disease   • Encounter for adjustment or management of vascular access device   • Iron deficiency anemia   • History of stroke   • B12 deficiency   • Nausea   • Bloating   • Family history of colon cancer   • Breast edema        Past Medical History:   Diagnosis Date   • Acute stress reaction 2014   • ADD (attention deficit disorder)    • ADHD (attention deficit hyperactivity disorder)    • Anxiety and depression    • CTS (carpal tunnel syndrome)    • Factor 5 Leiden mutation, heterozygous ([facility]) 2021   • Family history of breast cancer 2013   • Fracture, cervical vertebra ([facility]) 1997    C4   • GERD (gastroesophageal reflux disease) 2022    Medicated   • H/O complete eye exam 2016   • Hearing loss    • 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   • History of rheumatic fever    • Hyperlipidemia 2022    Resolved; now unmedicated   • Hypertension    • Hypothyroidism    • Infiltrating ductal carcinoma of left breast ([facility]) 2021    Left   • Kidney stone    • Mitral valve insufficiency    • PONV (postoperative nausea and vomiting)    • Stroke ([facility]) 2020    HX OF   • Stroke-like symptoms         Past Surgical History:   Procedure Laterality Date   • BREAST BIOPSY Left  05/05/2021    [facility]   • BREAST LUMPECTOMY     • BREAST LUMPECTOMY WITH SENTINEL NODE BIOPSY N/A 07/01/2021    Procedure: right port placement, Left SHAINA-guided, bracketed, partial mastectomy and sentinel lymph node biopsy Left breast needle-localized excisional biopsy;  Surgeon: Lashonda Euceda MD;  Location: [facility];  Service: General;  Laterality: N/A;   • BREAST SURGERY Bilateral 07/01/2021    Procedure: RIGHT BREAST REDUCTION MASTOPEXY LEFT BREAST ONCOPLASTIC CLOSURE;  Surgeon: Caridad Baker MD PhD;  Location: [facility];  Service: Plastics;  Laterality: Bilateral;   • COLONOSCOPY N/A 10/4/2022    Procedure: COLONOSCOPY TO CECUM WITH COLD BIOPSIES POLYPECTOMY;  Surgeon: Marlon Hough MD;  [place] [facility] [place]  Service: Gastroenterology;  Laterality: N/A;  POLYP   • ENDOSCOPY N/A 10/4/2022    Procedure: ESOPHAGOGASTRODUODENOSCOPY WITH COLD BIOPSIES;  Surgeon: Marlon Hough MD;  [place] [facility];  Service: Gastroenterology;  Laterality: N/A;  NORMAL   • NO PAST SURGERIES     • PAP SMEAR  2016         Visit Dx:      ICD-10-CM ICD-9-CM   1. 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 Breast edema  N64.89 611.89   3. Malignant neoplasm of overlapping sites of left breast in female, estrogen receptor negative ([facility])  C50.812 174.8    Z17.1 V86.1        Lymphedema     Row Name 02/16/23 1300             Subjective Pain    Able to rate subjective pain? yes  -RE      Pre-Treatment Pain Level 0  -RE      Post-Treatment Pain Level 0  -RE         L-Dex Bioimpedence Screening    L-Dex Measurement Extremity LUE  -RE      L-Dex Patient Position Standing  -RE      L-Dex UE Dominate Side Right  -RE      L-Dex UE At Risk Side Left  -RE      L-Dex UE Pre Surgical Value No  -RE      L-Dex UE Score -0.7  -RE      L-Dex UE Baseline Score 3.5  -RE      L-Dex UE Value Change -4.2  -RE      L-Dex UE Comment WNL  -RE            User Key  (r) = Recorded By, (t) = Taken By, (c) =  Cosigned By    Initials Name Provider Type    RE Julia Leslie, OTR Occupational Therapist                 The patient had a 3 month SOZO measurement which I reviewed today. The score is WNL, see in EMR. Bioimpedance spectroscopy helps identify the onset of lymphedema in an arm or leg before patients experience noticeable swelling. Research has shown that the early detection of lymphedema using L-Dex combined with treatment can reduce progression to chronic lymphedema by 95% in breast cancer patients. Whenever possible, patients are tested for baseline L-Dex score before cancer treatment begins and then are reassessed during regular follow-up visits using the SOZO device. Otherwise, this can be started postoperatively and continued during regular follow-up visits. If the patient's L-Dex score increases above normal levels, that is a sign that lymphedema is developing and a referral is made to occupational therapy for further evaluation and early compression treatment. Lymphedema assessment with the SOZO L-Dex score is recommended to be done every 3 months for the first 3 years and then every 6 months for years 4 and 5 followed by annually afterwards.         OT Assessment/Plan     Row Name 02/16/23 1708          OT Assessment    Impairments Edema;Impaired lymphatic circulation  -RE     Assessment Comments Patient returns for bioimpedance reassessment.  Her last measurement was 3 months ago.  L-Dex value today is-.7 which is WNL and decreased from her previous. She does continue with mild edema in the wrist and forearm and breast. This is stable. She has no new c/o. Today we reviewed symptoms to monitor for which would indicate the need for further evaluation.  I recommended follow up in 3 months.  -RE     Please refer to paper survey for additional self-reported information No  -RE     OT Diagnosis post lumpectomy lymphedema  -RE     OT Rehab Potential Good  -RE     Patient/caregiver participated in establishment of  treatment plan and goals Yes  -RE     Patient would benefit from skilled therapy intervention Yes  -RE        OT Plan    OT Frequency Other (comment)  -RE     Predicted Duration of Therapy Intervention (OT) bioimpedance every 3 months  -RE     Planned CPT's? OT SELF CARE/MGMT/TRAIN 15 MIN: 84647;OT BIS XTRACELL FLUID ANALYSIS: 12904  -RE     Planned Therapy Interventions (Optional Details) patient/family education;other (see comments)  bioimpedance  -RE     OT Plan Comments follow up in 3 months  -RE           User Key  (r) = Recorded By, (t) = Taken By, (c) = Cosigned By    Initials Name Provider Type    RE Julia Leslie, OTR Occupational Therapist                       OT Goals     Row Name 02/16/23 1300          OT Short Term Goals    STG 1 Pt will demonstrate understanding of use of compression sleeve for edema prevention, exercise and air travel.   -RE     STG 1 Progress Met  -RE     STG 2 Patient will demonstrate proper awareness of “What is Lymphedema?” and “Healthy Habits” for improved prevention, management, care of symptoms and ease of transition to self-care of condition.   -RE     STG 2 Progress Met  -RE     STG 3 Patient independent and compliant with initial home exercise program focused on range of motion,and Flexibility.  -RE     STG 3 Progress Met  -RE     STG 4 Patient to demonstrate increased left shoulder flexion to 140 to improve functional UE use and to restore pre operative AROM per patient perception.  -RE     STG 4 Progress Met  -RE     STG 5 Patient to demonstrate increased left shoulder abduction to 140 to improve functional UE use and to restore preoperative AROM per patient perception.  -RE     STG 5 Progress Met  -RE        Long Term Goals    LTG Date to Achieve 05/16/23  -RE     LTG 1 Patient to demonstrate increased left shoulder flexion to 160 to improve functional UE use and to restore pre operative AROM per patient perception.  -RE     LTG 1 Progress Met  -RE     LTG 2 Patient to  demonstrate increased left shoulder abduction to 160 to improve functional UE use and to restore preoperative AROM per patient perception.  -RE     LTG 2 Progress Met  -RE     LTG 3 Patient will participate in bioimpedance scans every 3-6 months as a method of early detection of lymphedema to allow for early intervention.  -RE     LTG 3 Progress Met;Ongoing  -RE     LTG 4  Patient's bioimpedance score to remain below 6.5 for decreased risk of stage II lymphedema.  -RE     LTG 4 Progress Met;Ongoing  -RE     LTG 5 Patient will be independent with use and care of [employer].  -RE     LTG 5 Progress Met  -RE        Time Calculation    OT Goal Re-Cert Due Date 05/16/23  -RE           User Key  (r) = Recorded By, (t) = Taken By, (c) = Cosigned By    Initials Name Provider Type    Julia Rowe OTR Occupational Therapist                Therapy Education  04002 - OT Self Care/Mgmt Minutes: 12                Time Calculation:   OT Start Time: 1325  OT Stop Time: 1347  OT Time Calculation (min): 22 min  Total Timed Code Minutes- OT: 12 minute(s)  Timed Charges  99800 - OT Self Care/Mgmt Minutes: 12  Untimed Charges  02471 - OT Bioimpedence Rx Minutes: 10  Total Minutes  Timed Charges Total Minutes: 12  Untimed Charges Total Minutes: 10   Total Minutes: 22     Therapy Charges for Today     Code Description Service Date Service Provider Modifiers Qty    43192640758 HC OT SELF CARE/MGMT/TRAIN EA 15 MIN 2/16/2023 Julia Leslie OTR GO 1    35126781297 HC OT BIS XTRACELL FLUID ANALYSIS 2/16/2023 Julia Leslie OTR GO 1                      VI Ulloa  2/16/2023

## 2023-02-17 NOTE — PROGRESS NOTES
[facility]     Referring Provider: Neena Beavers MD     Chief complaint: Routine follow up breast caner      HPI:   5/21/21: Saw Dr. Euceda  Ms. Radha Asotrga is a 40 yo woman, seen at the request of Dr. Neena Beavers, for a new diagnosis of left breast cancer. This was initially detected as an imaging abnormality on routine screening. Her work-up is detailed in the oncologic history below. Prior to the biopsy, she denies any breast lumps, pain, skin changes, or nipple discharge. She has a past history of a benign left breast percutaneous biopsy in 2013. She has a past history of a stroke in December 2020 without any residual neuro deficits. Cardiac work-up was negative, however hypercoagulable work-up revealed a factor V Leiden mutation. She has a family history of breast cancer in her mother (diagnosed at age 53) and a paternal aunt (diagnosed at age 55), as well as a paternal great aunt and her paternal great grandmother. Her paternal great aunt also had ovarian cancer and her mother had pancreatic cancer. She underwent expanded panel genetic testing at Dr. Beavers's office and she was negative for mutation.    6/16/21: Saw Dr. Euceda  At her last visit, she underwent left axillary lymph node biopsy, which thankfully returned as benign. She underwent left breast MR-guided biopsy x2. The lesion at 5:00, near the primary malignancy, returned as a second foci of cancer, and the lesion at 2:00, returned as a radial scar (see pathology report details below). I have already briefly discussed these results with her over the phone and the plan still is to proceed with breast conservation. She has an appointment scheduled with plastic surgery next week.    7/15/21: Saw Dr. Euceda   She underwent port placement, left partial mastectomy and excisional biopsy and SLNB with oncoplastic closure and right reduction for symmetry on 7/1/21. See surgery & pathology details below in oncologic history. She is  "complaining of a full sensation and some pain in her left armpit.    10/20/21 Saw Dr. Euceda  She returns today for scheduled follow-up. Her last dose of Taxol/Herceptin is later today. She has seen Dr. Byrd and plans on starting radiation sometime in November. Unfortunately she has been dealing with left arm lymphedema for a few months. She has been seeing Julia for this and undergoing bandaging. She also is wearing a compression sleeve and gauntlet at home. She denies any new breast related complaints and is very happy with her cosmetic result.    8/18/2022   Returns for routine follow up today.  Completed radiation November 2021.  Completed Cycle 12 Taxol Herceptin Oct 2021  Completed Herceptin June 2022  Saw Dr. Beavers 8/10/22  \"Plan:   · Patient awaiting colonoscopy as she has had intermittent constipation which is new for her and is scheduled in October  · She had port flush today  · Discussed about port removal but she wants to wait till her colonoscopy is completed and subsequently wants to get the port removed  · Reviewed screening mammogram from April 2022 which is negative  · Patient is going to see Dr. Lashonda Euceda next week who can then schedule her for port removal after colonoscopy  · Encourage patient to exercise, increase fiber in diet and use prune juice to help with her constipation  · Follow-up with me in 3 months with labs with port flush in 6 weeks in 3 months\"  She had bioimpedence yesterday \"L-Dex value today is 1.7 which is WNL\"  Today she has complaints of left breast edema.  She states she is wearing her sports bras and she is continuing with breast massage.  The edema is on the lower outer portion of the left breast.    2/20/2023 Interval History  Presenting to the office today for routine follow-up.  Last saw oncology in December 2022 no changes were made to the treatment plan.  She is needing to schedule a time to get port removed. She is doing well without any new complaints.  " Seeing PT every 4-6 weeks and bio impedence score decreasing every time she goes.  She is wearing her sleeve at night.        Oncology/Hematology History Overview Note   12/11/2019, Screening MMG with [facility] ( [facility]):  Scattered fibroglandular density. There are no findings to suggest malignancy.  BI-RADS 1: Negative.     Malignant neoplasm of overlapping sites of left breast in female, estrogen receptor negative (HCC)   4/1/2021 Initial Diagnosis    Malignant neoplasm of overlapping sites of left breast in female, estrogen receptor negative (CMS/HCC)     4/2/2021 Imaging    Screening MMG with [facility] ( [place]):  Scattered fibroglandular densities. In the posterior one-third of the left breast projecting in a retroareolar location on the CC images there is an area of focal asymmetry. I see no suspicious calcifications or areas of architectural distortion in either breast. There is no evidence for skin thickening or nipple retraction.  BI-RADS 0: Incomplete.     4/9/2021 Imaging    Left Diagnostic MMG with [facility] & Left Breast US ( [facility]):  MMG:  With additional imaging there is persistence of the area of focal asymmetry in the posterior one-third inferior aspect of the left breast.  US:  At the 6 o'clock position on the order of 6 cm from the nipple there is a 0.8 cm irregular hypoechoic lesion. Mild posterior acoustic shadowing is noted.  BI-RADS 4: Suspicious.     4/26/2021 Biopsy    Left Breast, US-Guided Biopsy ( [facility]):    1. Left Breast, 6:00, 6 cm FN, U/S-Guided Core Needle Biopsy for a Mass:                 A. INVASIVE DUCTAL CARCINOMA, Poorly differentiated; [place] Histologic Grade III/III (tubule score = 3, nuclear score = 2, mitoses score = 3), measuring at least 7 mm.               B. No definitive ductal carcinoma in situ identified.   C. Negative for lymphovascular space invasion.    ER negative (<1%)  WV negative (<1%)  HER2 positive (IHC 3+)  Ki-67 55%     5/5/2021 Genetic Testing    [employer] Common  Hereditary Cancers Panel (47 genes):    Negative     5/7/2021 Biopsy    Bilateral Breast MRI ([facility]):  There is an amorphous area of enhancement seen within the 6:00 position of the left breast, posterior depth measuring approximately 1.2 cm (series 9 image 91). Susceptibility artifact is present within this region consistent with recently newly diagnosed biopsy-proven malignancy. There is an additional area of more linear enhancement seen just anteriorly and laterally measuring up to 1.2 cm (series 9 image 91). This is seen approximately 5.6 cm from the nipple. Mixed washout kinetics are present. There is an additional punctate area of enhancement measuring only approximately 2 to 3 mm seen within the anterior lateral aspect of the left breast seen approximately 4.6 cm from the nipple (series 9 image 79) also demonstrating mixed washout  kinetics. No additional areas of enhancement identified. There is questionable mildly prominent lymph nodes present within the left axillary region (series 9 image 34 and series 9 image 18) with mild cortical thickening present with benign-appearing fatty juan miguel present. This is seen both within the high and mid to low axilla. A mildly prominent posterior lymph node is also present within the mid axilla measuring up to 1 cm with cortical thickening present (series 9 image 27). Limited evaluation of the intrathoracic contents symmetric no acute process. A marker seen at the junction of the left breast and the left chest wall inferiorly (series 4 image 135) with no abnormalities  identified within this region. No abnormal fluid collections identified.  BI-RADS 6: Known malignancy.     5/21/2021 Biopsy    Left Axilla, [facility]  1.  Lymph Node, Left Axilla, Core Biopsy:                 A.  Fragments of reactive lymph node; negative for lymphoma and carcinoma.      6/2/2021 Biopsy    Left Breast, MR-Guided Biopsy x 2 ( [facility]):    1. Left Breast, 5:00 o'clock,  MRI-guided Biopsies for Linear Enhancement:   INVASIVE MAMMARY CARCINOMA, NO SPECIAL TYPE (INVASIVE DUCTAL CARCINOMA).               A. Largest contiguous focus in a core measures 4 mm.               B. No in-situ component identified.                C. Brisk lymphocytic response noted (see Comment).               D. No definitive lymphovascular nor perineural invasion identified.  -Bowtie clip. Biopsy clips marking the 2 sites of biopsy-proven malignancy are  by 2.5 cm (bowtie clip and posteriorly located U-shaped clip).     ER negative (0%)  TN negative (0%)  Her2+ (IHC 3+)  Ki-67 50%    2.  Left Breast, 2:00 o'clock, MRI-guided Biopsy for Enhancement:                A. Benign breast parenchyma with radial scar and micropapillomas.               B. Usual ductal hyperplasia and columnar cell hyperplasia.               C. No atypical hyperplasia, in-situ nor invasive carcinoma identified.  -U-shaped clip. Concordant.     7/1/2021 Surgery    Port placement, left SHAINA-guided, bracketed, partial mastectomy and left breast needle-localized excisional biopsy and sentinel lymph node biopsy with oncoplastic closure and right reduction for symmetry    1. Left Breast, Partial Mastectomy:               A. Invasive ductal carcinoma:                            1. Invasive carcinoma measures 12 mm x 8 mm x 4 mm.                            2. Overall [place] grade III (tubular score = 3, nuclear score = 2, mitotic score = 3).                                                 3. No definitive lymphovascular invasion identified.               B. Associated scattered ductal carcinoma in situ (DCIS):                            1. DCIS spans an area estimated at  30 mm x 8 mm x 2 mm.                            2. High grade solid and comedo DCIS.                            3. Rare microcalcification present in DCIS.                  C. All margins are negative for invasive carcinoma.                    Carcinoma measures 6  mm from the closest (Inferior) margin of excision.                     All other margins measure at least 8 mm from invasive carcinoma including:                            Anterior margin = 18 mm                            Posterior margin = 24 mm                            Superior margin = 8 mm                            Inferior margin = 6 mm                             Lateral margin = 12 mm                            Medial margin = 20 mm                  D. All margins are negative for in ductal situ carcinoma (DCIS).                    DCIS measures 1.5 mm from the closest (Superior) margin of excision.                    All other margins  measure at least 2.5 mm from DCIS including:                            Anterior margin = 18  mm                            Posterior margin = 15  mm                            Superior margin = 1.5  mm                            Inferior margin =  6 mm                             Lateral margin = 3  mm                            Medial margin = 2.5 mm                  E.  Multiple biopsy site changes are identified (x2) and multiple metallic clips (x4) retrieved.               F.  No Pagetoid involvement of skin by malignancy identified.               G. Non-neoplastic breast tissue with fibrocystic change, sclerosing adenosis, columnar cell change, micropapilloma and focal fibroadenomatoid change. Rare microcalcification present in benign breast tissue.               H. Previous Biomarkers: Estrogen receptors: Negative, Progesterone receptors: Negative,                    HER/2-ese: Positive (score 3+) and Ki-67 = 50% (see [id number]).       2.  Left Breast, Additional Superior, Medial and Inferior Margins:                 A.  No in situ nor infiltrating carcinoma identified.               B.  New margins are negative for malignancy by an additional 15 mm.      3.  Left Breast at 2  o'clock, Needle Localization, Excisional Biopsy:                A.  Benign breast tissue  with changes suggesting radial scar with usual ductal hyperplasia,                      sclerosing adenosis and fibrocystic change.                 B.  No in situ nor infiltrating carcinoma identified.               C.  Biopsy site changes are identified and metallic clip retrieved.                D.  All margins are viable and negative for neoplasm/malignancy.                       4.  Left Breast, True Medial and Inferior Margins:                 A.  No in situ nor infiltrating carcinoma identified.                B.  New margins are negative for malignancy by an additional 20 mm.     5.  Left Breast, True Superior and Lateral Margins:                A.  No in situ nor infiltrating carcinoma identified.                B.  New margins are negative for malignancy by an additional 40 mm.     6.  Left Breast, True Inferior Margin:                A.  No in situ nor infiltrating carcinoma identified.                B.  Benign skin and breast tissue.  C.  New margin is negative for malignancy by an additional 15 mm.     7.  Right Breast Tissue, Augmentation:                 A.  Benign skin and breast tissue (312 grams).                B.  No in situ nor infiltrating carcinoma identified.      8.  Left Axilla, [place] Lymph Node #1 (Hot, Blue, Count 5,500).                A.  Three lymph nodes negative for malignancy by routine staining (0/3).   B.  Includes one (largest) lymph node with focal fibrosis suggesting previous biopsy effect.      9.  Left Axilla, [place] Lymph Node, #2 (Hot, Not Blue, Count 900):                A.  One lymph node negative for metastatic carcinoma by routine staining (0/1).     10.  Left Breast, Superior Pole:                 A.  No in situ nor infiltrating carcinoma identified.                B.  Superior margin is negative for malignancy by an additional 20 mm.     8/4/2021 - 10/20/2021 Chemotherapy    OP BREAST PACLitaxel / Trastuzumab-anns (Weekly X 12)     9/1/2021 - 10/13/2021  Chemotherapy    OP SUPPORTIVE Iron Sucrose (Venofer)     11/2/2021 - 12/1/2021 Radiation    Radiation OncologyTreatment Course:  Radha Astorga received 5256 cGy in 21 fractions to LEFT breast with tumor bed boost.     11/10/2021 -  Chemotherapy    OP BREAST Trastuzumab-anns Q21D (maintenance)     4/4/2022 Imaging       EXAMINATION: Bilateral digital mammography with R2 computer aided  detection and bilateral digital Tomosynthesis     FINDINGS: Bilateral digital CC and MLO mammographic and digital  Tomosynthesis images were obtained. Comparison is made to prior studies  dated 4/2/2021 and 4/9/2021 . Scattered fibroglandular densities are  seen throughout both breasts. There has been interval bilateral breast  surgery. No suspicious calcifications or new or dominant masses are  appreciated. Surgical clips are seen in the left axilla and the hilum of  a Mediport catheter projects over the right axilla. There is no evidence  for axillary lymphadenopathy or nipple retraction.     IMPRESSION:  1. There is no evidence for malignancy or significant change in either  breast. Routine followup mammography is recommended.     BI-RADS category 2: Benign.         Review of Systems:  See interval history.       Medications:    Current Outpatient Medications:   •  Cholecalciferol (D3) 50 MCG (2000 UT) tablet, Take 4,000 Units by mouth Daily., Disp: , Rfl:   •  famotidine (PEPCID) 20 MG tablet, TAKE 1 TABLET BY MOUTH DAILY, Disp: 30 tablet, Rfl: 3  •  levothyroxine (SYNTHROID, LEVOTHROID) 125 MCG tablet, Take 1 tablet by mouth Daily., Disp: 90 tablet, Rfl: 1  •  lidocaine-prilocaine (EMLA) 2.5-2.5 % cream, Apply  topically to the appropriate area as directed As Needed for Mild Pain ., Disp: 1 each, Rfl: 2  •  lisdexamfetamine (Vyvanse) 60 MG capsule, Take 1 capsule by mouth Every Morning, Disp: 30 capsule, Rfl: 0  •  LORazepam (ATIVAN) 0.5 MG tablet, Take 1 tablet by mouth Every 8 (Eight) Hours As Needed for Anxiety. for  anxiety, Disp: 30 tablet, Rfl: 2  •  ondansetron ODT (ZOFRAN-ODT) 8 MG disintegrating tablet, Place 1 tablet on the tongue Every 8 (Eight) Hours As Needed for Nausea or Vomiting., Disp: 30 tablet, Rfl: 3  •  traZODone (DESYREL) 50 MG tablet, Take 1 tablet by mouth Every Night., Disp: 30 tablet, Rfl: 11  •  valsartan (DIOVAN) 160 MG tablet, Take 1 tablet by mouth Daily., Disp: 90 tablet, Rfl: 1  •  vitamin B-12 (CYANOCOBALAMIN) 1000 MCG tablet, Take 1 tablet by mouth Daily., Disp: 30 tablet, Rfl: 2  •  Xarelto 20 MG tablet, TAKE 1 TABLET BY MOUTH DAILY, Disp: 30 tablet, Rfl: 3  •  Polyethylene Glycol 3350 (MIRALAX PO), Take  by mouth As Needed., Disp: , Rfl:       Allergies   Allergen Reactions   • Sulfa Antibiotics Rash       Family History   Problem Relation Age of Onset   • Breast cancer Mother 53   • Pancreatic cancer Mother 68   • Stroke Father    • Hypertension Brother    • Colon polyps Maternal Aunt    • Melanoma Maternal Uncle    • Breast cancer Paternal Aunt 55   • Lung cancer Maternal Grandmother    • Colon cancer Maternal Grandfather    • Prostate cancer Maternal Grandfather    • Prostate cancer Paternal Grandfather    • Breast cancer Paternal Great-Grandmother 94   • Brain cancer Maternal Cousin 20   • Ovarian cancer Other         Paternal great aunt    • Breast cancer Other    • Colon polyps Maternal Aunt    • Malig Hyperthermia Neg Hx    • Crohn's disease Neg Hx    • Irritable bowel syndrome Neg Hx    • Ulcerative colitis Neg Hx        PHYSICAL EXAMINATION:   Vitals:    02/20/23 0759   BP: 118/78   Pulse: 89   SpO2: 97%     ECOG 0 - Asymptomatic  General: NAD, well appearing  Psych: a&o x 3, normal mood and affect  Eyes: EOMI, no scleral icterus  ENMT: neck supple without masses or thyromegaly, mucus membranes moist  MSK: normal gait, normal ROM in bilateral shoulders  Lymph nodes: Well-healed left axillary scar; no cervical, supraclavicular or axillary lymphadenopathy  Breast:   Right: Sp mastopexy  with well-healed scars. Scars are soft. There is some thickened tissue on either side of the T junction (tissue mobilized for reconstruction). No discrete masses or nipple abnormalities.  Left: Sp mastopexy with well-healed scars. Scars are soft. There is some thickened tissue on either side of the T junction (tissue mobilized for reconstruction). No discrete masses or nipple abnormalities. Edema noted 5:00 to 8:00 with the worse area at 7-8:00      Assessment:  42 y.o. F with a diagnosis of left breast: High grade, invasive ductal carcinoma, ER/[place] negative, Her2 positive. She underwent port placement, left partial mastectomy and excisional biopsy and SLNB with oncoplastic closure and right reduction for symmetry on 7/1/21, pT1cN0, anatomic stage IA, prognostic stage IA. She completed Taxol/Herceptin on 10/20/21.    Discussion:  1. We discussed lymphedema along with prevention, recognition, and treatment that is recommended.  The positive response to lymphedema prevention and reduction including weight reduction, regular exercise, and recognition and treatment of symptoms at the onset were discussed.   2. Massage left massage upward and outward daily to help with fluid movement    Plan:  -Continue follow-up with Dr. Beavers.  -Continue follow-up with Dr. Byrd.  -Continue follow-up with OT/LE clinic.  -exam in august and then will go to yearly and schedule with mammo in april  -mammo 4/2023, call with results  - left breast massage for edema  -rto if edema gets worse or any other complaints  -port removal to be scheduled  -She was instructed to call sooner with any questions, concerns or changes on BSE.    CLAUDINE Noriega      CC:  MD Fadi Ibanez MD Dana Tisdale, APRN

## 2023-02-20 ENCOUNTER — OFFICE VISIT (OUTPATIENT)
Dept: SURGERY | Facility: CLINIC | Age: 43
End: 2023-02-20
Payer: COMMERCIAL

## 2023-02-20 ENCOUNTER — TELEPHONE (OUTPATIENT)
Dept: SURGERY | Facility: CLINIC | Age: 43
End: 2023-02-20
Payer: COMMERCIAL

## 2023-02-20 ENCOUNTER — PREP FOR SURGERY (OUTPATIENT)
Dept: OTHER | Facility: HOSPITAL | Age: 43
End: 2023-02-20

## 2023-02-20 VITALS
WEIGHT: 153 LBS | OXYGEN SATURATION: 97 % | DIASTOLIC BLOOD PRESSURE: 78 MMHG | BODY MASS INDEX: 28.16 KG/M2 | SYSTOLIC BLOOD PRESSURE: 118 MMHG | HEIGHT: 62 IN | HEART RATE: 89 BPM

## 2023-02-20 DIAGNOSIS — Z17.1 MALIGNANT NEOPLASM OF OVERLAPPING SITES OF LEFT BREAST IN FEMALE, ESTROGEN RECEPTOR NEGATIVE: Primary | ICD-10-CM

## 2023-02-20 DIAGNOSIS — C50.812 MALIGNANT NEOPLASM OF OVERLAPPING SITES OF LEFT BREAST IN FEMALE, ESTROGEN RECEPTOR NEGATIVE: Primary | ICD-10-CM

## 2023-02-20 PROCEDURE — 99214 OFFICE O/P EST MOD 30 MIN: CPT | Performed by: NURSE PRACTITIONER

## 2023-02-20 NOTE — TELEPHONE ENCOUNTER
Scheduled follow up with Ac 8/2023, patient already scheduled for her mammogram.     Patient needs to be scheduled for port removal with Dr. Euceda. Consent signed.

## 2023-02-21 ENCOUNTER — TELEPHONE (OUTPATIENT)
Dept: SURGERY | Facility: CLINIC | Age: 43
End: 2023-02-21
Payer: COMMERCIAL

## 2023-02-21 NOTE — TELEPHONE ENCOUNTER
Called patient to schedule port removal. Had to leave a voicemail, will schedule once I have spoken to the patient. SD

## 2023-02-22 ENCOUNTER — TELEPHONE (OUTPATIENT)
Dept: SURGERY | Facility: CLINIC | Age: 43
End: 2023-02-22
Payer: COMMERCIAL

## 2023-02-23 DIAGNOSIS — F90.0 ATTENTION DEFICIT HYPERACTIVITY DISORDER (ADHD), PREDOMINANTLY INATTENTIVE TYPE: ICD-10-CM

## 2023-02-23 DIAGNOSIS — F43.21 GRIEF REACTION: Chronic | ICD-10-CM

## 2023-02-24 RX ORDER — LORAZEPAM 0.5 MG/1
0.5 TABLET ORAL EVERY 8 HOURS PRN
Qty: 30 TABLET | Refills: 2 | OUTPATIENT
Start: 2023-02-24

## 2023-02-28 ENCOUNTER — TELEMEDICINE (OUTPATIENT)
Dept: FAMILY MEDICINE CLINIC | Facility: CLINIC | Age: 43
End: 2023-02-28
Payer: COMMERCIAL

## 2023-02-28 ENCOUNTER — TELEPHONE (OUTPATIENT)
Dept: SURGERY | Facility: CLINIC | Age: 43
End: 2023-02-28
Payer: COMMERCIAL

## 2023-02-28 VITALS — HEIGHT: 62 IN | BODY MASS INDEX: 28.16 KG/M2

## 2023-02-28 DIAGNOSIS — F43.21 GRIEF REACTION: Chronic | ICD-10-CM

## 2023-02-28 DIAGNOSIS — F90.0 ATTENTION DEFICIT HYPERACTIVITY DISORDER (ADHD), PREDOMINANTLY INATTENTIVE TYPE: Primary | Chronic | ICD-10-CM

## 2023-02-28 PROCEDURE — 99214 OFFICE O/P EST MOD 30 MIN: CPT | Performed by: FAMILY MEDICINE

## 2023-02-28 RX ORDER — LORAZEPAM 0.5 MG/1
0.5 TABLET ORAL EVERY 8 HOURS PRN
Qty: 30 TABLET | Refills: 2 | Status: SHIPPED | OUTPATIENT
Start: 2023-02-28

## 2023-02-28 NOTE — PROGRESS NOTES
Subjective   Radha Astorga is a 42 y.o. female.     CC: Video Visit for Medical Management    History of Present Illness     Chief Complaint:   Chief Complaint   Patient presents with   • ADHD     Med refill due  - video visit       Radha Astorga 42 y.o. female who presents today for Medical Management of the below listed issues. She  has a problem list of   Patient Active Problem List   Diagnosis   • Family history of breast cancer   • Hypothyroidism   • Major depressive disorder, recurrent episode, moderate with anxious distress (HCC)   • Attention deficit hyperactivity disorder (ADHD), predominantly inattentive type   • Factor 5 Leiden mutation, heterozygous (HCC)   • Kidney mass   • Malignant neoplasm of overlapping sites of left breast in female, estrogen receptor negative (HCC)   • High risk medication use   • Essential hypertension   • Rheumatic mitral valve disease   • Encounter for adjustment or management of vascular access device   • Iron deficiency anemia   • History of stroke   • B12 deficiency   • Nausea   • Bloating   • Family history of colon cancer   • Breast edema   .  Since the last visit, She has overall felt well.  she has been compliant with   Current Outpatient Medications:   •  lisdexamfetamine (Vyvanse) 60 MG capsule, Take 1 capsule by mouth Every Morning, Disp: 30 capsule, Rfl: 0  •  LORazepam (ATIVAN) 0.5 MG tablet, Take 1 tablet by mouth Every 8 (Eight) Hours As Needed for Anxiety. for anxiety, Disp: 30 tablet, Rfl: 2  •  Cholecalciferol (D3) 50 MCG (2000 UT) tablet, Take 4,000 Units by mouth Daily., Disp: , Rfl:   •  famotidine (PEPCID) 20 MG tablet, TAKE 1 TABLET BY MOUTH DAILY, Disp: 30 tablet, Rfl: 3  •  levothyroxine (SYNTHROID, LEVOTHROID) 125 MCG tablet, Take 1 tablet by mouth Daily., Disp: 90 tablet, Rfl: 1  •  lidocaine-prilocaine (EMLA) 2.5-2.5 % cream, Apply  topically to the appropriate area as directed As Needed for Mild Pain ., Disp: 1 each, Rfl: 2  •  ondansetron  "ODT (ZOFRAN-ODT) 8 MG disintegrating tablet, Place 1 tablet on the tongue Every 8 (Eight) Hours As Needed for Nausea or Vomiting., Disp: 30 tablet, Rfl: 3  •  valsartan (DIOVAN) 160 MG tablet, Take 1 tablet by mouth Daily., Disp: 90 tablet, Rfl: 1  •  vitamin B-12 (CYANOCOBALAMIN) 1000 MCG tablet, Take 1 tablet by mouth Daily., Disp: 30 tablet, Rfl: 2  •  Xarelto 20 MG tablet, TAKE 1 TABLET BY MOUTH DAILY, Disp: 30 tablet, Rfl: 3.  She denies medication side effects.    All of the other chronic condition(s) listed above are stable w/o issues.    Ht 157 cm (61.81\")   LMP  (LMP Unknown)   BMI 28.16 kg/m²     Results for orders placed or performed in visit on 12/14/22   CBC Auto Differential    Specimen: Blood   Result Value Ref Range    WBC 5.90 3.40 - 10.80 10*3/mm3    RBC 4.42 3.77 - 5.28 10*6/mm3    Hemoglobin 12.8 12.0 - 15.9 g/dL    Hematocrit 38.5 34.0 - 46.6 %    MCV 87.1 79.0 - 97.0 fL    MCH 29.0 26.6 - 33.0 pg    MCHC 33.2 31.5 - 35.7 g/dL    RDW 12.0 (L) 12.3 - 15.4 %    RDW-SD 38.6 37.0 - 54.0 fl    MPV 9.7 6.0 - 12.0 fL    Platelets 254 140 - 450 10*3/mm3    Neutrophil % 59.0 42.7 - 76.0 %    Lymphocyte % 30.3 19.6 - 45.3 %    Monocyte % 7.5 5.0 - 12.0 %    Eosinophil % 2.2 0.3 - 6.2 %    Basophil % 0.8 0.0 - 1.5 %    Immature Grans % 0.2 0.0 - 0.5 %    Neutrophils, Absolute 3.48 1.70 - 7.00 10*3/mm3    Lymphocytes, Absolute 1.79 0.70 - 3.10 10*3/mm3    Monocytes, Absolute 0.44 0.10 - 0.90 10*3/mm3    Eosinophils, Absolute 0.13 0.00 - 0.40 10*3/mm3    Basophils, Absolute 0.05 0.00 - 0.20 10*3/mm3    Immature Grans, Absolute 0.01 0.00 - 0.05 10*3/mm3    nRBC 0.0 0.0 - 0.2 /100 WBC             The following portions of the patient's history were reviewed and updated as appropriate: allergies, current medications, past family history, past medical history, past social history, past surgical history, and problem list.    Review of Systems   Constitutional: Negative for activity change, chills and fever. "   Respiratory: Negative for cough.    Cardiovascular: Negative for chest pain.   Psychiatric/Behavioral: Negative for dysphoric mood.       Objective   Physical Exam  Constitutional:       General: She is not in acute distress.     Appearance: She is well-developed.   Pulmonary:      Effort: Pulmonary effort is normal.   Neurological:      Mental Status: She is alert and oriented to person, place, and time.   Psychiatric:         Behavior: Behavior normal.         Thought Content: Thought content normal.             Diagnoses and all orders for this visit:    1. Attention deficit hyperactivity disorder (ADHD), predominantly inattentive type (Primary)  -     lisdexamfetamine (Vyvanse) 60 MG capsule; Take 1 capsule by mouth Every Morning  Dispense: 30 capsule; Refill: 0    2. Grief reaction  -     LORazepam (ATIVAN) 0.5 MG tablet; Take 1 tablet by mouth Every 8 (Eight) Hours As Needed for Anxiety. for anxiety  Dispense: 30 tablet; Refill: 2    Pt only uses 1 Ativan every 10-14 days and reports it's very helpful when needed, thus will refill today.     Spent  8   minutes with chart and interview and consent for this encounter given by the patient.  You have chosen to receive care through a telehealth visit.  Do you consent to use a video/audio connection for your medical care today? Yes  Patient was in their residence when this visit took place and I was in my medical office.

## 2023-02-28 NOTE — TELEPHONE ENCOUNTER
Called left 3rd VM    Called patient ER contact, Norma Hodge, left VM      Trying to schedule port removal

## 2023-03-02 DIAGNOSIS — Z17.1 MALIGNANT NEOPLASM OF OVERLAPPING SITES OF LEFT BREAST IN FEMALE, ESTROGEN RECEPTOR NEGATIVE: ICD-10-CM

## 2023-03-02 DIAGNOSIS — D68.51 FACTOR 5 LEIDEN MUTATION, HETEROZYGOUS: ICD-10-CM

## 2023-03-02 DIAGNOSIS — Z86.73 HISTORY OF STROKE: ICD-10-CM

## 2023-03-02 DIAGNOSIS — C50.812 MALIGNANT NEOPLASM OF OVERLAPPING SITES OF LEFT BREAST IN FEMALE, ESTROGEN RECEPTOR NEGATIVE: ICD-10-CM

## 2023-03-02 RX ORDER — RIVAROXABAN 20 MG/1
TABLET, FILM COATED ORAL
Qty: 30 TABLET | Refills: 3 | Status: SHIPPED | OUTPATIENT
Start: 2023-03-02 | End: 2023-03-31 | Stop reason: SDUPTHER

## 2023-03-02 RX ORDER — FAMOTIDINE 20 MG/1
20 TABLET, FILM COATED ORAL DAILY
Qty: 30 TABLET | Refills: 3 | Status: SHIPPED | OUTPATIENT
Start: 2023-03-02 | End: 2023-03-31 | Stop reason: SDUPTHER

## 2023-03-20 ENCOUNTER — PRE-ADMISSION TESTING (OUTPATIENT)
Dept: PREADMISSION TESTING | Facility: HOSPITAL | Age: 43
End: 2023-03-20
Payer: COMMERCIAL

## 2023-03-20 VITALS
HEIGHT: 62 IN | SYSTOLIC BLOOD PRESSURE: 122 MMHG | TEMPERATURE: 97.8 F | WEIGHT: 153 LBS | BODY MASS INDEX: 28.16 KG/M2 | RESPIRATION RATE: 16 BRPM | OXYGEN SATURATION: 100 % | DIASTOLIC BLOOD PRESSURE: 83 MMHG

## 2023-03-20 LAB
ANION GAP SERPL CALCULATED.3IONS-SCNC: 10.7 MMOL/L (ref 5–15)
BUN SERPL-MCNC: 10 MG/DL (ref 6–20)
BUN/CREAT SERPL: 12.7 (ref 7–25)
CALCIUM SPEC-SCNC: 10.2 MG/DL (ref 8.6–10.5)
CHLORIDE SERPL-SCNC: 102 MMOL/L (ref 98–107)
CO2 SERPL-SCNC: 26.3 MMOL/L (ref 22–29)
CREAT SERPL-MCNC: 0.79 MG/DL (ref 0.57–1)
DEPRECATED RDW RBC AUTO: 39 FL (ref 37–54)
EGFRCR SERPLBLD CKD-EPI 2021: 94.7 ML/MIN/1.73
ERYTHROCYTE [DISTWIDTH] IN BLOOD BY AUTOMATED COUNT: 12.1 % (ref 12.3–15.4)
GLUCOSE SERPL-MCNC: 108 MG/DL (ref 65–99)
HCT VFR BLD AUTO: 40.8 % (ref 34–46.6)
HGB BLD-MCNC: 13.6 G/DL (ref 12–15.9)
MCH RBC QN AUTO: 29.1 PG (ref 26.6–33)
MCHC RBC AUTO-ENTMCNC: 33.3 G/DL (ref 31.5–35.7)
MCV RBC AUTO: 87.2 FL (ref 79–97)
PLATELET # BLD AUTO: 272 10*3/MM3 (ref 140–450)
PMV BLD AUTO: 9.7 FL (ref 6–12)
POTASSIUM SERPL-SCNC: 4.3 MMOL/L (ref 3.5–5.2)
RBC # BLD AUTO: 4.68 10*6/MM3 (ref 3.77–5.28)
SODIUM SERPL-SCNC: 139 MMOL/L (ref 136–145)
WBC NRBC COR # BLD: 6.55 10*3/MM3 (ref 3.4–10.8)

## 2023-03-20 PROCEDURE — 85027 COMPLETE CBC AUTOMATED: CPT

## 2023-03-20 PROCEDURE — 36415 COLL VENOUS BLD VENIPUNCTURE: CPT

## 2023-03-20 PROCEDURE — 80048 BASIC METABOLIC PNL TOTAL CA: CPT

## 2023-03-20 RX ORDER — ATORVASTATIN CALCIUM 10 MG/1
10 TABLET, FILM COATED ORAL DAILY
COMMUNITY

## 2023-03-20 NOTE — DISCHARGE INSTRUCTIONS
Take the following medications the morning of surgery:  LEVOTHYROXINE, FAMOTIDINE    Arrive to hospital on your day of surgery at  6:00 AM.    If you are on prescription narcotic pain medication to control your pain you may also take that medication the morning of surgery.    General Instructions:  Do not eat solid food after midnight the night before surgery.  You may drink clear liquids day of surgery but must stop at least one hour before your hospital arrival time.  It is beneficial for you to have a clear drink that contains carbohydrates the day of surgery.  We suggest a 12 to 20 ounce bottle of Gatorade or Powerade for non-diabetic patients or a 12 to 20 ounce bottle of G2 or Powerade Zero for diabetic patients. (Pediatric patients, are not advised to drink a 12 to 20 ounce carbohydrate drink)    Clear liquids are liquids you can see through.  Nothing red in color.     Plain water                               Sports drinks  Sodas                                   Gelatin (Jell-O)  Fruit juices without pulp such as white grape juice and apple juice  Popsicles that contain no fruit or yogurt  Tea or coffee (no cream or milk added)  Gatorade / Powerade  G2 / Powerade Zero    Infants may have breast milk up to four hours before surgery.  Infants drinking formula may drink formula up to six hours before surgery.   Patients who avoid smoking, chewing tobacco and alcohol for 4 weeks prior to surgery have a reduced risk of post-operative complications.  Quit smoking as many days before surgery as you can.  Do not smoke, use chewing tobacco or drink alcohol the day of surgery.   If applicable bring your C-PAP/ BI-PAP machine.  Bring any papers given to you in the doctor’s office.  Wear clean comfortable clothes.  Do not wear contact lenses, false eyelashes or make-up.  Bring a case for your glasses.   Bring crutches or walker if applicable.  Remove all piercings.  Leave jewelry and any other valuables at home.  Hair  extensions with metal clips must be removed prior to surgery.  The Pre-Admission Testing nurse will instruct you to bring medications if unable to obtain an accurate list in Pre-Admission Testing.        If you were given a blood bank ID arm band remember to bring it with you the day of surgery.    Preventing a Surgical Site Infection:  For 2 to 3 days before surgery, avoid shaving with a razor because the razor can irritate skin and make it easier to develop an infection.    Any areas of open skin can increase the risk of a post-operative wound infection by allowing bacteria to enter and travel throughout the body.  Notify your surgeon if you have any skin wounds / rashes even if it is not near the expected surgical site.  The area will need assessed to determine if surgery should be delayed until it is healed.  The night prior to surgery shower using a fresh bar of anti-bacterial soap (such as Dial) and clean washcloth.  Sleep in a clean bed with clean clothing.  Do not allow pets to sleep with you.  Shower on the morning of surgery using a fresh bar of anti-bacterial soap (such as Dial) and clean washcloth.  Dry with a clean towel and dress in clean clothing.  Ask your surgeon if you will be receiving antibiotics prior to surgery.  Make sure you, your family, and all healthcare providers clean their hands with soap and water or an alcohol based hand  before caring for you or your wound.    Day of surgery:  Your arrival time is approximately two hours before your scheduled surgery time.  Upon arrival, a Pre-op nurse and Anesthesiologist will review your health history, obtain vital signs, and answer questions you may have.  The only belongings needed at this time will be a list of your home medications and if applicable your C-PAP/BI-PAP machine.  A Pre-op nurse will start an IV and you may receive medication in preparation for surgery, including something to help you relax.     Please be aware that  surgery does come with discomfort.  We want to make every effort to control your discomfort so please discuss any uncontrolled symptoms with your nurse.   Your doctor will most likely have prescribed pain medications.      If you are going home after surgery you will receive individualized written care instructions before being discharged.  A responsible adult must drive you to and from the hospital on the day of your surgery and stay with you for 24 hours.  Discharge prescriptions can be filled by the hospital pharmacy during regular pharmacy hours.  If you are having surgery late in the day/evening your prescription may be e-prescribed to your pharmacy.  Please verify your pharmacy hours or chose a 24 hour pharmacy to avoid not having access to your prescription because your pharmacy has closed for the day.    If you are staying overnight following surgery, you will be transported to your hospital room following the recovery period.  [facility] has all private rooms.    If you have any questions please call Pre-Admission Testing at [phone].  Deductibles and co-payments are collected on the day of service. Please be prepared to pay the required co-pay, deductible or deposit on the day of service as defined by your plan.    Call your surgeon immediately if you experience any of the following symptoms:  Sore Throat  Shortness of Breath or difficulty breathing  Cough  Chills  Body soreness or muscle pain  Headache  Fever  New loss of taste or smell  Do not arrive for your surgery ill.  Your procedure will need to be rescheduled to another time.  You will need to call your physician before the day of surgery to avoid any unnecessary exposure to hospital staff as well as other patients.

## 2023-03-27 ENCOUNTER — HOSPITAL ENCOUNTER (OUTPATIENT)
Facility: HOSPITAL | Age: 43
Setting detail: HOSPITAL OUTPATIENT SURGERY
Discharge: HOME | End: 2023-03-27
Attending: SURGERY | Admitting: SURGERY
Payer: COMMERCIAL

## 2023-03-27 ENCOUNTER — ANESTHESIA (OUTPATIENT)
Dept: PERIOP | Facility: HOSPITAL | Age: 43
End: 2023-03-27
Payer: COMMERCIAL

## 2023-03-27 ENCOUNTER — ANESTHESIA EVENT (OUTPATIENT)
Dept: PERIOP | Facility: HOSPITAL | Age: 43
End: 2023-03-27
Payer: COMMERCIAL

## 2023-03-27 VITALS
SYSTOLIC BLOOD PRESSURE: 105 MMHG | HEART RATE: 79 BPM | TEMPERATURE: 98.6 F | OXYGEN SATURATION: 100 % | WEIGHT: 153 LBS | RESPIRATION RATE: 16 BRPM | HEIGHT: 62 IN | DIASTOLIC BLOOD PRESSURE: 62 MMHG | BODY MASS INDEX: 28.16 KG/M2

## 2023-03-27 PROCEDURE — 25010000002 PROPOFOL 10 MG/ML EMULSION: Performed by: NURSE ANESTHETIST, CERTIFIED REGISTERED

## 2023-03-27 PROCEDURE — 25010000002 MIDAZOLAM PER 1 MG: Performed by: ANESTHESIOLOGY

## 2023-03-27 RX ORDER — LABETALOL HYDROCHLORIDE 5 MG/ML
5 INJECTION, SOLUTION INTRAVENOUS
Status: DISCONTINUED | OUTPATIENT
Start: 2023-03-27 | End: 2023-03-27 | Stop reason: HOSPADM

## 2023-03-27 RX ORDER — HYDRALAZINE HYDROCHLORIDE 20 MG/ML
5 INJECTION INTRAMUSCULAR; INTRAVENOUS
Status: DISCONTINUED | OUTPATIENT
Start: 2023-03-27 | End: 2023-03-27 | Stop reason: HOSPADM

## 2023-03-27 RX ORDER — IPRATROPIUM BROMIDE AND ALBUTEROL SULFATE 2.5; .5 MG/3ML; MG/3ML
3 SOLUTION RESPIRATORY (INHALATION) ONCE AS NEEDED
Status: DISCONTINUED | OUTPATIENT
Start: 2023-03-27 | End: 2023-03-27 | Stop reason: HOSPADM

## 2023-03-27 RX ORDER — SODIUM CHLORIDE 0.9 % (FLUSH) 0.9 %
3 SYRINGE (ML) INJECTION EVERY 12 HOURS SCHEDULED
Status: DISCONTINUED | OUTPATIENT
Start: 2023-03-27 | End: 2023-03-27 | Stop reason: HOSPADM

## 2023-03-27 RX ORDER — DROPERIDOL 2.5 MG/ML
0.62 INJECTION, SOLUTION INTRAMUSCULAR; INTRAVENOUS
Status: DISCONTINUED | OUTPATIENT
Start: 2023-03-27 | End: 2023-03-27 | Stop reason: HOSPADM

## 2023-03-27 RX ORDER — NALOXONE HCL 0.4 MG/ML
0.2 VIAL (ML) INJECTION AS NEEDED
Status: DISCONTINUED | OUTPATIENT
Start: 2023-03-27 | End: 2023-03-27 | Stop reason: HOSPADM

## 2023-03-27 RX ORDER — LIDOCAINE HYDROCHLORIDE 20 MG/ML
INJECTION, SOLUTION INFILTRATION; PERINEURAL AS NEEDED
Status: DISCONTINUED | OUTPATIENT
Start: 2023-03-27 | End: 2023-03-27 | Stop reason: SURG

## 2023-03-27 RX ORDER — LIDOCAINE HYDROCHLORIDE 10 MG/ML
0.5 INJECTION, SOLUTION EPIDURAL; INFILTRATION; INTRACAUDAL; PERINEURAL ONCE AS NEEDED
Status: DISCONTINUED | OUTPATIENT
Start: 2023-03-27 | End: 2023-03-27 | Stop reason: HOSPADM

## 2023-03-27 RX ORDER — MAGNESIUM HYDROXIDE 1200 MG/15ML
LIQUID ORAL AS NEEDED
Status: DISCONTINUED | OUTPATIENT
Start: 2023-03-27 | End: 2023-03-27 | Stop reason: HOSPADM

## 2023-03-27 RX ORDER — SODIUM CHLORIDE, SODIUM LACTATE, POTASSIUM CHLORIDE, CALCIUM CHLORIDE 600; 310; 30; 20 MG/100ML; MG/100ML; MG/100ML; MG/100ML
9 INJECTION, SOLUTION INTRAVENOUS CONTINUOUS
Status: DISCONTINUED | OUTPATIENT
Start: 2023-03-27 | End: 2023-03-27 | Stop reason: HOSPADM

## 2023-03-27 RX ORDER — HYDROCODONE BITARTRATE AND ACETAMINOPHEN 7.5; 325 MG/1; MG/1
1 TABLET ORAL EVERY 4 HOURS PRN
Status: DISCONTINUED | OUTPATIENT
Start: 2023-03-27 | End: 2023-03-27 | Stop reason: HOSPADM

## 2023-03-27 RX ORDER — DIPHENHYDRAMINE HYDROCHLORIDE 50 MG/ML
12.5 INJECTION INTRAMUSCULAR; INTRAVENOUS
Status: DISCONTINUED | OUTPATIENT
Start: 2023-03-27 | End: 2023-03-27 | Stop reason: HOSPADM

## 2023-03-27 RX ORDER — PROMETHAZINE HYDROCHLORIDE 25 MG/1
25 SUPPOSITORY RECTAL ONCE AS NEEDED
Status: DISCONTINUED | OUTPATIENT
Start: 2023-03-27 | End: 2023-03-27 | Stop reason: HOSPADM

## 2023-03-27 RX ORDER — EPHEDRINE SULFATE 50 MG/ML
5 INJECTION, SOLUTION INTRAVENOUS ONCE AS NEEDED
Status: DISCONTINUED | OUTPATIENT
Start: 2023-03-27 | End: 2023-03-27 | Stop reason: HOSPADM

## 2023-03-27 RX ORDER — FAMOTIDINE 10 MG/ML
20 INJECTION, SOLUTION INTRAVENOUS ONCE
Status: COMPLETED | OUTPATIENT
Start: 2023-03-27 | End: 2023-03-27

## 2023-03-27 RX ORDER — MIDAZOLAM HYDROCHLORIDE 1 MG/ML
1 INJECTION INTRAMUSCULAR; INTRAVENOUS
Status: DISCONTINUED | OUTPATIENT
Start: 2023-03-27 | End: 2023-03-27 | Stop reason: HOSPADM

## 2023-03-27 RX ORDER — ONDANSETRON 2 MG/ML
4 INJECTION INTRAMUSCULAR; INTRAVENOUS ONCE AS NEEDED
Status: DISCONTINUED | OUTPATIENT
Start: 2023-03-27 | End: 2023-03-27 | Stop reason: HOSPADM

## 2023-03-27 RX ORDER — FLUMAZENIL 0.1 MG/ML
0.2 INJECTION INTRAVENOUS AS NEEDED
Status: DISCONTINUED | OUTPATIENT
Start: 2023-03-27 | End: 2023-03-27 | Stop reason: HOSPADM

## 2023-03-27 RX ORDER — FENTANYL CITRATE 50 UG/ML
50 INJECTION, SOLUTION INTRAMUSCULAR; INTRAVENOUS
Status: DISCONTINUED | OUTPATIENT
Start: 2023-03-27 | End: 2023-03-27 | Stop reason: HOSPADM

## 2023-03-27 RX ORDER — FENTANYL CITRATE 50 UG/ML
25 INJECTION, SOLUTION INTRAMUSCULAR; INTRAVENOUS
Status: DISCONTINUED | OUTPATIENT
Start: 2023-03-27 | End: 2023-03-27 | Stop reason: HOSPADM

## 2023-03-27 RX ORDER — PROPOFOL 10 MG/ML
VIAL (ML) INTRAVENOUS AS NEEDED
Status: DISCONTINUED | OUTPATIENT
Start: 2023-03-27 | End: 2023-03-27 | Stop reason: SURG

## 2023-03-27 RX ORDER — HYDROMORPHONE HYDROCHLORIDE 1 MG/ML
0.25 INJECTION, SOLUTION INTRAMUSCULAR; INTRAVENOUS; SUBCUTANEOUS
Status: DISCONTINUED | OUTPATIENT
Start: 2023-03-27 | End: 2023-03-27 | Stop reason: HOSPADM

## 2023-03-27 RX ORDER — PROMETHAZINE HYDROCHLORIDE 25 MG/1
25 TABLET ORAL ONCE AS NEEDED
Status: DISCONTINUED | OUTPATIENT
Start: 2023-03-27 | End: 2023-03-27 | Stop reason: HOSPADM

## 2023-03-27 RX ORDER — HYDROCODONE BITARTRATE AND ACETAMINOPHEN 5; 325 MG/1; MG/1
1 TABLET ORAL ONCE AS NEEDED
Status: DISCONTINUED | OUTPATIENT
Start: 2023-03-27 | End: 2023-03-27 | Stop reason: HOSPADM

## 2023-03-27 RX ORDER — SODIUM CHLORIDE 0.9 % (FLUSH) 0.9 %
3-10 SYRINGE (ML) INJECTION AS NEEDED
Status: DISCONTINUED | OUTPATIENT
Start: 2023-03-27 | End: 2023-03-27 | Stop reason: HOSPADM

## 2023-03-27 RX ADMIN — MIDAZOLAM 1 MG: 1 INJECTION INTRAMUSCULAR; INTRAVENOUS at 07:05

## 2023-03-27 RX ADMIN — SODIUM CHLORIDE, POTASSIUM CHLORIDE, SODIUM LACTATE AND CALCIUM CHLORIDE 9 ML/HR: 600; 310; 30; 20 INJECTION, SOLUTION INTRAVENOUS at 07:05

## 2023-03-27 RX ADMIN — PROPOFOL 100 MG: 10 INJECTION, EMULSION INTRAVENOUS at 08:20

## 2023-03-27 RX ADMIN — PROPOFOL 50 MG: 10 INJECTION, EMULSION INTRAVENOUS at 08:26

## 2023-03-27 RX ADMIN — LIDOCAINE HYDROCHLORIDE 100 MG: 20 INJECTION, SOLUTION INFILTRATION; PERINEURAL at 08:06

## 2023-03-27 RX ADMIN — PROPOFOL 100 MG: 10 INJECTION, EMULSION INTRAVENOUS at 08:06

## 2023-03-27 RX ADMIN — FAMOTIDINE 20 MG: 10 INJECTION, SOLUTION INTRAVENOUS at 07:05

## 2023-03-27 RX ADMIN — PROPOFOL 100 MCG/KG/MIN: 10 INJECTION, EMULSION INTRAVENOUS at 08:06

## 2023-03-27 NOTE — ANESTHESIA POSTPROCEDURE EVALUATION
Patient: Radha Astorga    Procedure Summary     Date: 03/27/23 Room / Location: [facility] OR [address] [place] [address] MAIN OR    Anesthesia Start: 0757 Anesthesia Stop: 0851    Procedure: REMOVAL VENOUS ACCESS DEVICE Diagnosis:       Malignant neoplasm of overlapping sites of left breast in female, estrogen receptor negative (HCC)      (Malignant neoplasm of overlapping sites of left breast in female, estrogen receptor negative (HCC) [C50.812, Z17.1])    Surgeons: Lashonda Euceda MD Provider: Dank Mayfield MD    Anesthesia Type: MAC ASA Status: 3          Anesthesia Type: MAC    Vitals  Vitals Value Taken Time   /64 03/27/23 0848   Temp     Pulse 78 03/27/23 0858   Resp     SpO2 100 % 03/27/23 0858   Vitals shown include unvalidated device data.        Post Anesthesia Care and Evaluation    Patient location during evaluation: PACU  Patient participation: complete - patient participated  Level of consciousness: awake and alert  Pain management: adequate    Airway patency: patent  Anesthetic complications: No anesthetic complications    Cardiovascular status: acceptable  Respiratory status: acceptable  Hydration status: acceptable    Comments: --------------------            03/27/23               0848     --------------------   BP:       116/64     Pulse:      95       Resp:       16       Temp:                SpO2:      99%      --------------------

## 2023-03-27 NOTE — ANESTHESIA PREPROCEDURE EVALUATION
Anesthesia Evaluation     Patient summary reviewed and Nursing notes reviewed   history of anesthetic complications: PONV  NPO Solid Status: > 8 hours  NPO Liquid Status: > 4 hours           Airway   Mallampati: II  Neck ROM: full  No difficulty expected  Dental - normal exam     Pulmonary     breath sounds clear to auscultation  (+) a smoker Former,   Cardiovascular     Rhythm: regular    (+) hypertension, valvular problems/murmurs murmur and MR, hyperlipidemia,       Neuro/Psych  (+) CVA, numbness, psychiatric history ADHD, Depression and ADD,    GI/Hepatic/Renal/Endo    (+)  GERD,  renal disease, thyroid problem hypothyroidism    Musculoskeletal     Abdominal    Substance History      OB/GYN          Other      history of cancer      Other Comment: Factor V leiden                  Anesthesia Plan    ASA 3     MAC     intravenous induction     Anesthetic plan, risks, benefits, and alternatives have been provided, discussed and informed consent has been obtained with: patient.        CODE STATUS:

## 2023-03-27 NOTE — OP NOTE
Operative Report    Patient Name:  Radha Astorga  YOB: 1980    Date of Surgery:  3/27/2023    Pre-op Diagnosis:   Malignant neoplasm of overlapping sites of left breast in female, estrogen receptor negative (HCC) [C50.812, Z17.1]       Post-Op Diagnosis:  Malignant neoplasm of overlapping sites of left breast in female, estrogen receptor negative (HCC) [C50.812, Z17.1]    Procedure:  REMOVAL VENOUS ACCESS DEVICE      Staff:  Surgeon(s):  Lashonda Euceda MD       Anesthesia: Monitored Anesthesia Care    Estimated Blood Loss: 2 mL    Implants:    Nothing was implanted during the procedure    Specimen:          None        Findings: Port and catheter were intact.     Complications: None     Indications: The patient is a 42 y.o. F with a diagnosis of left breast: High grade, invasive ductal carcinoma, ER/MD negative, Her2 positive. She underwent port placement, left partial mastectomy and excisional biopsy and SLNB with oncoplastic closure and right reduction for symmetry on 7/1/21, pT1cN0, anatomic stage IA, prognostic stage IA. She completed Taxol/Herceptin on 10/20/21.  She completed adjuvant Herceptin in 6/2022. She presents today for port removal. The risks and benefits were discussed preoperatively and she understood and agreed to proceed.     Description of Procedure:  The patient was identified in the preoperative area and brought to the operating room and placed supine on the table. Patient verification was performed and anesthesia was induced. The anterior chest and neck were prepped and draped in standard sterile fashion. A time out was performed and all were in agreement.    Local anesthesia was injected into the skin and subcutaneous tissue along the prior incision, towards the port pocket and towards the neck. The prior chest wall incision was excised. Cautery was used to divide down to the port and open the capsule around the port. The previously placed Prolene sutures were cut  and removed. The port was then grasped with a clamp and lifted out of the wound. A 3-0 Vicryl pursestring suture was placed around the catheter tract, but not tied.    The patient was then placed in trendelenburg position. Port and catheter were removed while pressure was held at the neck insertion site and the pursestring stitch was simultaneously tied. Pressure was held for 5 minutes. The wound was irrigated and hemostasis was acheived with electrocautery. Additional local anesthetic was infiltrated into the wound. The incision was closed with interrupted 3-0 Vicryl deep dermal sutures and a running subcuticular 4-0 Monocryl suture and covered with Dermabond. Counts were correct at the end of the case. The patient was awakened from anesthesia and taken to the PACU in stable condition.    Lashonda Euceda MD     Date: 3/27/2023  Time: 10:56 EDT

## 2023-03-27 NOTE — ADDENDUM NOTE
Addendum  created 03/27/23 1817 by Dank Mayfield MD    Attestation recorded in Intraprocedure, Intraprocedure Attestations filed

## 2023-03-27 NOTE — H&P
BREAST SURGERY HISTORY & PHYSICAL        Chief complaint:  Breast cancer sp completion of adjuvant therapy     HPI:   5/21/21: Saw Dr. Euceda  Ms. Radha Astorga is a 40 yo woman, seen at the request of Dr. Neena Beavers, for a new diagnosis of left breast cancer. This was initially detected as an imaging abnormality on routine screening. Her work-up is detailed in the oncologic history below. Prior to the biopsy, she denies any breast lumps, pain, skin changes, or nipple discharge. She has a past history of a benign left breast percutaneous biopsy in 2013. She has a past history of a stroke in December 2020 without any residual neuro deficits. Cardiac work-up was negative, however hypercoagulable work-up revealed a factor V Leiden mutation. She has a family history of breast cancer in her mother (diagnosed at age 53) and a paternal aunt (diagnosed at age 55), as well as a paternal great aunt and her paternal great grandmother. Her paternal great aunt also had ovarian cancer and her mother had pancreatic cancer. She underwent expanded panel genetic testing at Dr. Beavers's office and she was negative for mutation.     6/16/21: Saw Dr. Euceda  At her last visit, she underwent left axillary lymph node biopsy, which thankfully returned as benign. She underwent left breast MR-guided biopsy x2. The lesion at 5:00, near the primary malignancy, returned as a second foci of cancer, and the lesion at 2:00, returned as a radial scar (see pathology report details below). I have already briefly discussed these results with her over the phone and the plan still is to proceed with breast conservation. She has an appointment scheduled with plastic surgery next week.     7/15/21: Saw Dr. Euceda   She underwent port placement, left partial mastectomy and excisional biopsy and SLNB with oncoplastic closure and right reduction for symmetry on 7/1/21. See surgery & pathology details below in oncologic history. She is complaining  "of a full sensation and some pain in her left armpit.     10/20/21 Saw Dr. Euceda  She returns today for scheduled follow-up. Her last dose of Taxol/Herceptin is later today. She has seen Dr. Byrd and plans on starting radiation sometime in November. Unfortunately she has been dealing with left arm lymphedema for a few months. She has been seeing Julia for this and undergoing bandaging. She also is wearing a compression sleeve and gauntlet at home. She denies any new breast related complaints and is very happy with her cosmetic result.     8/18/2022   Returns for routine follow up today.  Completed radiation November 2021.  Completed Cycle 12 Taxol Herceptin Oct 2021  Completed Herceptin June 2022  Saw Dr. Beavers 8/10/22  \"Plan:   • Patient awaiting colonoscopy as she has had intermittent constipation which is new for her and is scheduled in October  • She had port flush today  • Discussed about port removal but she wants to wait till her colonoscopy is completed and subsequently wants to get the port removed  • Reviewed screening mammogram from April 2022 which is negative  • Patient is going to see Dr. Lashonda Euceda next week who can then schedule her for port removal after colonoscopy  • Encourage patient to exercise, increase fiber in diet and use prune juice to help with her constipation  • Follow-up with me in 3 months with labs with port flush in 6 weeks in 3 months\"  She had bioimpedence yesterday \"L-Dex value today is 1.7 which is WNL\"  Today she has complaints of left breast edema.  She states she is wearing her sports bras and she is continuing with breast massage.  The edema is on the lower outer portion of the left breast.     2/20/2023 Interval History  Presenting to the office today for routine follow-up.  Last saw oncology in December 2022 no changes were made to the treatment plan.  She is needing to schedule a time to get port removed. She is doing well without any new complaints.  Seeing PT " every 4-6 weeks and bio impedence score decreasing every time she goes.  She is wearing her sleeve at night.      3/2/2023 Interval History  She presents today for port removal.            Oncology/Hematology History Overview Note    12/11/2019, Screening MMG with [facility] ( [facility]):  Scattered fibroglandular density. There are no findings to suggest malignancy.  BI-RADS 1: Negative.      Malignant neoplasm of overlapping sites of left breast in female, estrogen receptor negative (HCC)    4/1/2021 Initial Diagnosis      Malignant neoplasm of overlapping sites of left breast in female, estrogen receptor negative (CMS/HCC)       4/2/2021 Imaging      Screening MMG with [facility] ( [place]):  Scattered fibroglandular densities. In the posterior one-third of the left breast projecting in a retroareolar location on the CC images there is an area of focal asymmetry. I see no suspicious calcifications or areas of architectural distortion in either breast. There is no evidence for skin thickening or nipple retraction.  BI-RADS 0: Incomplete.       4/9/2021 Imaging      Left Diagnostic MMG with [facility] & Left Breast US ( [facility]):  MMG:  With additional imaging there is persistence of the area of focal asymmetry in the posterior one-third inferior aspect of the left breast.  US:  At the 6 o'clock position on the order of 6 cm from the nipple there is a 0.8 cm irregular hypoechoic lesion. Mild posterior acoustic shadowing is noted.  BI-RADS 4: Suspicious.       4/26/2021 Biopsy      Left Breast, US-Guided Biopsy ( [facility]):     1. Left Breast, 6:00, 6 cm FN, U/S-Guided Core Needle Biopsy for a Mass:                 A. INVASIVE DUCTAL CARCINOMA, Poorly differentiated; Nelson Histologic Grade III/III (tubule score = 3, nuclear score = 2, mitoses score = 3), measuring at least 7 mm.               B. No definitive ductal carcinoma in situ identified.   C. Negative for lymphovascular space invasion.     ER negative (<1%)  IL negative  (<1%)  HER2 positive (IHC 3+)  Ki-67 55%       5/5/2021 Genetic Testing      [employer] Common Hereditary Cancers Panel (47 genes):     Negative       5/7/2021 Biopsy      Bilateral Breast MRI ([facility]):  There is an amorphous area of enhancement seen within the 6:00 position of the left breast, posterior depth measuring approximately 1.2 cm (series 9 image 91). Susceptibility artifact is present within this region consistent with recently newly diagnosed biopsy-proven malignancy. There is an additional area of more linear enhancement seen just anteriorly and laterally measuring up to 1.2 cm (series 9 image 91). This is seen approximately 5.6 cm from the nipple. Mixed washout kinetics are present. There is an additional punctate area of enhancement measuring only approximately 2 to 3 mm seen within the anterior lateral aspect of the left breast seen approximately 4.6 cm from the nipple (series 9 image 79) also demonstrating mixed washout  kinetics. No additional areas of enhancement identified. There is questionable mildly prominent lymph nodes present within the left axillary region (series 9 image 34 and series 9 image 18) with mild cortical thickening present with benign-appearing fatty juan miguel present. This is seen both within the high and mid to low axilla. A mildly prominent posterior lymph node is also present within the mid axilla measuring up to 1 cm with cortical thickening present (series 9 image 27). Limited evaluation of the intrathoracic contents symmetric no acute process. A marker seen at the junction of the left breast and the left chest wall inferiorly (series 4 image 135) with no abnormalities  identified within this region. No abnormal fluid collections identified.  BI-RADS 6: Known malignancy.       5/21/2021 Biopsy      Left Axilla[facility]  1.  Lymph Node, Left Axilla, Core Biopsy:                 A.  Fragments of reactive lymph node; negative for lymphoma and carcinoma.         6/2/2021 Biopsy      Left Breast, MR-Guided Biopsy x 2 ( [facility]):     1. Left Breast, 5:00 o'clock, MRI-guided Biopsies for Linear Enhancement:   INVASIVE MAMMARY CARCINOMA, NO SPECIAL TYPE (INVASIVE DUCTAL CARCINOMA).               A. Largest contiguous focus in a core measures 4 mm.               B. No in-situ component identified.                C. Brisk lymphocytic response noted (see Comment).               D. No definitive lymphovascular nor perineural invasion identified.  -Bowtie clip. Biopsy clips marking the 2 sites of biopsy-proven malignancy are  by 2.5 cm (bowtie clip and posteriorly located U-shaped clip).     ER negative (0%)  [place] negative (0%)  Her2+ (IHC 3+)  Ki-67 50%     2.  Left Breast, 2:00 o'clock, MRI-guided Biopsy for Enhancement:                A. Benign breast parenchyma with radial scar and micropapillomas.               B. Usual ductal hyperplasia and columnar cell hyperplasia.               C. No atypical hyperplasia, in-situ nor invasive carcinoma identified.  -U-shaped clip. Concordant.       7/1/2021 Surgery      Port placement, left SHAINA-guided, bracketed, partial mastectomy and left breast needle-localized excisional biopsy and sentinel lymph node biopsy with oncoplastic closure and right reduction for symmetry     1. Left Breast, Partial Mastectomy:               A. Invasive ductal carcinoma:                            1. Invasive carcinoma measures 12 mm x 8 mm x 4 mm.                            2. Overall Oziel grade III (tubular score = 3, nuclear score = 2, mitotic score = 3).                                                 3. No definitive lymphovascular invasion identified.               B. Associated scattered ductal carcinoma in situ (DCIS):                            1. DCIS spans an area estimated at  30 mm x 8 mm x 2 mm.                            2. High grade solid and comedo DCIS.                            3. Rare microcalcification present in  DCIS.                  C. All margins are negative for invasive carcinoma.                    Carcinoma measures 6 mm from the closest (Inferior) margin of excision.                     All other margins measure at least 8 mm from invasive carcinoma including:                            Anterior margin = 18 mm                            Posterior margin = 24 mm                            Superior margin = 8 mm                            Inferior margin = 6 mm                             Lateral margin = 12 mm                            Medial margin = 20 mm                  D. All margins are negative for in ductal situ carcinoma (DCIS).                    DCIS measures 1.5 mm from the closest (Superior) margin of excision.                    All other margins  measure at least 2.5 mm from DCIS including:                            Anterior margin = 18  mm                            Posterior margin = 15  mm                            Superior margin = 1.5  mm                            Inferior margin =  6 mm                             Lateral margin = 3  mm                            Medial margin = 2.5 mm                  E.  Multiple biopsy site changes are identified (x2) and multiple metallic clips (x4) retrieved.               F.  No Pagetoid involvement of skin by malignancy identified.               G. Non-neoplastic breast tissue with fibrocystic change, sclerosing adenosis, columnar cell change, micropapilloma and focal fibroadenomatoid change. Rare microcalcification present in benign breast tissue.               H. Previous Biomarkers: Estrogen receptors: Negative, Progesterone receptors: Negative,                    HER/2-ese: Positive (score 3+) and Ki-67 = 50% (see [id number]).        2.  Left Breast, Additional Superior, Medial and Inferior Margins:                 A.  No in situ nor infiltrating carcinoma identified.               B.  New margins are negative for malignancy by an additional 15 mm.       3.  Left Breast at 2  o'clock, Needle Localization, Excisional Biopsy:                A.  Benign breast tissue with changes suggesting radial scar with usual ductal hyperplasia,                      sclerosing adenosis and fibrocystic change.                 B.  No in situ nor infiltrating carcinoma identified.               C.  Biopsy site changes are identified and metallic clip retrieved.                D.  All margins are viable and negative for neoplasm/malignancy.                       4.  Left Breast, True Medial and Inferior Margins:                 A.  No in situ nor infiltrating carcinoma identified.                B.  New margins are negative for malignancy by an additional 20 mm.     5.  Left Breast, True Superior and Lateral Margins:                A.  No in situ nor infiltrating carcinoma identified.                B.  New margins are negative for malignancy by an additional 40 mm.     6.  Left Breast, True Inferior Margin:                A.  No in situ nor infiltrating carcinoma identified.                B.  Benign skin and breast tissue.  C.  New margin is negative for malignancy by an additional 15 mm.     7.  Right Breast Tissue, Augmentation:                 A.  Benign skin and breast tissue (312 grams).                B.  No in situ nor infiltrating carcinoma identified.      8.  Left Axilla, [place] Lymph Node #1 (Hot, Blue, Count 5,500).                A.  Three lymph nodes negative for malignancy by routine staining (0/3).   B.  Includes one (largest) lymph node with focal fibrosis suggesting previous biopsy effect.      9.  Left Axilla, [place] Lymph Node, #2 (Hot, Not Blue, Count 900):                A.  One lymph node negative for metastatic carcinoma by routine staining (0/1).     10.  Left Breast, Superior Pole:                 A.  No in situ nor infiltrating carcinoma identified.                B.  Superior margin is negative for malignancy by an additional 20 mm.       8/4/2021  - 10/20/2021 Chemotherapy      OP BREAST PACLitaxel / Trastuzumab-anns (Weekly X 12)       9/1/2021 - 10/13/2021 Chemotherapy      OP SUPPORTIVE Iron Sucrose (Venofer)       11/2/2021 - 12/1/2021 Radiation      Radiation OncologyTreatment Course:  Radha Astorga received 5256 cGy in 21 fractions to LEFT breast with tumor bed boost.       11/10/2021 -  Chemotherapy      OP BREAST Trastuzumab-anns Q21D (maintenance)       4/4/2022 Imaging         EXAMINATION: Bilateral digital mammography with R2 computer aided  detection and bilateral digital Tomosynthesis     FINDINGS: Bilateral digital CC and MLO mammographic and digital  Tomosynthesis images were obtained. Comparison is made to prior studies  dated 4/2/2021 and 4/9/2021 . Scattered fibroglandular densities are  seen throughout both breasts. There has been interval bilateral breast  surgery. No suspicious calcifications or new or dominant masses are  appreciated. Surgical clips are seen in the left axilla and the hilum of  a Mediport catheter projects over the right axilla. There is no evidence  for axillary lymphadenopathy or nipple retraction.     IMPRESSION:  1. There is no evidence for malignancy or significant change in either  breast. Routine followup mammography is recommended.     BI-RADS category 2: Benign.             Review of Systems:  See interval history.         Medications:     Current Outpatient Medications:   •  Cholecalciferol (D3) 50 MCG (2000 UT) tablet, Take 4,000 Units by mouth Daily., Disp: , Rfl:   •  famotidine (PEPCID) 20 MG tablet, TAKE 1 TABLET BY MOUTH DAILY, Disp: 30 tablet, Rfl: 3  •  levothyroxine (SYNTHROID, LEVOTHROID) 125 MCG tablet, Take 1 tablet by mouth Daily., Disp: 90 tablet, Rfl: 1  •  lidocaine-prilocaine (EMLA) 2.5-2.5 % cream, Apply  topically to the appropriate area as directed As Needed for Mild Pain ., Disp: 1 each, Rfl: 2  •  lisdexamfetamine (Vyvanse) 60 MG capsule, Take 1 capsule by mouth Every Morning, Disp: 30  "capsule, Rfl: 0  •  LORazepam (ATIVAN) 0.5 MG tablet, Take 1 tablet by mouth Every 8 (Eight) Hours As Needed for Anxiety. for anxiety, Disp: 30 tablet, Rfl: 2  •  ondansetron ODT (ZOFRAN-ODT) 8 MG disintegrating tablet, Place 1 tablet on the tongue Every 8 (Eight) Hours As Needed for Nausea or Vomiting., Disp: 30 tablet, Rfl: 3  •  traZODone (DESYREL) 50 MG tablet, Take 1 tablet by mouth Every Night., Disp: 30 tablet, Rfl: 11  •  valsartan (DIOVAN) 160 MG tablet, Take 1 tablet by mouth Daily., Disp: 90 tablet, Rfl: 1  •  vitamin B-12 (CYANOCOBALAMIN) 1000 MCG tablet, Take 1 tablet by mouth Daily., Disp: 30 tablet, Rfl: 2  •  Xarelto 20 MG tablet, TAKE 1 TABLET BY MOUTH DAILY, Disp: 30 tablet, Rfl: 3  •  Polyethylene Glycol 3350 (MIRALAX PO), Take  by mouth As Needed., Disp: , Rfl:         Allergies   Allergen Reactions   • Sulfa Antibiotics Rash               Family History   Problem Relation Age of Onset   • Breast cancer Mother 53   • Pancreatic cancer Mother 68   • Stroke Father     • Hypertension Brother     • Colon polyps Maternal Aunt     • Melanoma Maternal Uncle     • Breast cancer Paternal Aunt 55   • Lung cancer Maternal Grandmother     • Colon cancer Maternal Grandfather     • Prostate cancer Maternal Grandfather     • Prostate cancer Paternal Grandfather     • Breast cancer Paternal Great-Grandmother 94   • Brain cancer Maternal Cousin 20   • Ovarian cancer Other           Paternal great aunt    • Breast cancer Other     • Colon polyps Maternal Aunt     • Malig Hyperthermia Neg Hx     • Crohn's disease Neg Hx     • Irritable bowel syndrome Neg Hx     • Ulcerative colitis Neg Hx           PHYSICAL EXAMINATION:   /73 (BP Location: Right arm, Patient Position: Lying)   Pulse 84   Temp 98.6 °F (37 °C) (Oral)   Resp 18   Ht 157.5 cm (62\")   Wt 69.4 kg (153 lb)   SpO2 99%   BMI 27.98 kg/m²   ECOG 0 - Asymptomatic  General: NAD, well appearing  Psych: a&o x 3, normal mood and affect  Eyes: EOMI, no " scleral icterus  ENMT: neck supple without masses or thyromegaly, mucus membranes moist  MSK: normal gait, normal ROM in bilateral shoulders  Lymph nodes: Well-healed left axillary scar; no cervical, supraclavicular or axillary lymphadenopathy  Breast:   Right: Sp mastopexy with well-healed scars. Scars are soft. There is some thickened tissue on either side of the T junction (tissue mobilized for reconstruction). No discrete masses or nipple abnormalities.  Left: Sp mastopexy with well-healed scars. Scars are soft. There is some thickened tissue on either side of the T junction (tissue mobilized for reconstruction). No discrete masses or nipple abnormalities. Edema noted 5:00 to 8:00 with the worse area at 7-8:00        Assessment:  42 y.o. F with a diagnosis of left breast: High grade, invasive ductal carcinoma, ER/IL negative, Her2 positive. She underwent port placement, left partial mastectomy and excisional biopsy and SLNB with oncoplastic closure and right reduction for symmetry on 7/1/21, pT1cN0, anatomic stage IA, prognostic stage IA. She completed Taxol/Herceptin on 10/20/21.  She completed adjuvant Herceptin in 6/2022.     Plan:  Port removal.       Lashonda Euceda MD    Copied text in this note has been reviewed and is accurate as of 03/27/23.

## 2023-03-31 DIAGNOSIS — Z17.1 MALIGNANT NEOPLASM OF OVERLAPPING SITES OF LEFT BREAST IN FEMALE, ESTROGEN RECEPTOR NEGATIVE: ICD-10-CM

## 2023-03-31 DIAGNOSIS — C50.812 MALIGNANT NEOPLASM OF OVERLAPPING SITES OF LEFT BREAST IN FEMALE, ESTROGEN RECEPTOR NEGATIVE: ICD-10-CM

## 2023-03-31 DIAGNOSIS — F90.0 ATTENTION DEFICIT HYPERACTIVITY DISORDER (ADHD), PREDOMINANTLY INATTENTIVE TYPE: Chronic | ICD-10-CM

## 2023-03-31 DIAGNOSIS — Z86.73 HISTORY OF STROKE: ICD-10-CM

## 2023-03-31 DIAGNOSIS — D68.51 FACTOR 5 LEIDEN MUTATION, HETEROZYGOUS: ICD-10-CM

## 2023-04-03 RX ORDER — FAMOTIDINE 20 MG/1
20 TABLET, FILM COATED ORAL DAILY
Qty: 30 TABLET | Refills: 3 | Status: SHIPPED | OUTPATIENT
Start: 2023-04-03

## 2023-04-03 RX ORDER — LANOLIN ALCOHOL/MO/W.PET/CERES
1000 CREAM (GRAM) TOPICAL DAILY
Qty: 30 TABLET | Refills: 2 | Status: SHIPPED | OUTPATIENT
Start: 2023-04-03

## 2023-04-07 ENCOUNTER — HOSPITAL ENCOUNTER (OUTPATIENT)
Dept: MAMMOGRAPHY | Facility: HOSPITAL | Age: 43
Discharge: HOME | End: 2023-04-07
Admitting: NURSE PRACTITIONER
Payer: COMMERCIAL

## 2023-04-07 DIAGNOSIS — Z80.3 FAMILY HISTORY OF BREAST CANCER: ICD-10-CM

## 2023-04-07 PROCEDURE — 77063 BREAST TOMOSYNTHESIS BI: CPT

## 2023-04-07 PROCEDURE — 77067 SCR MAMMO BI INCL CAD: CPT

## 2023-04-10 ENCOUNTER — TELEPHONE (OUTPATIENT)
Dept: SURGERY | Facility: CLINIC | Age: 43
End: 2023-04-10
Payer: COMMERCIAL

## 2023-04-10 NOTE — TELEPHONE ENCOUNTER
LM to inform patient.    ----- Message from CLAUDINE Noriega sent at 4/10/2023  8:36 AM EDT -----  Please call pt and let her know that her mammo is good. BIRADS 2. She has a follow up appointment with me in August already set up.   ----- Message -----  From: [employer], Rad Results [facility] In  Sent: 4/7/2023   8:54 AM EDT  To: CLAUDINE Noriega

## 2023-04-19 DIAGNOSIS — F43.21 GRIEF REACTION: Chronic | ICD-10-CM

## 2023-04-19 RX ORDER — LORAZEPAM 0.5 MG/1
0.5 TABLET ORAL EVERY 8 HOURS PRN
Qty: 30 TABLET | Refills: 2 | OUTPATIENT
Start: 2023-04-19

## 2023-04-25 ENCOUNTER — LAB (OUTPATIENT)
Dept: LAB | Facility: HOSPITAL | Age: 43
End: 2023-04-25
Payer: COMMERCIAL

## 2023-04-25 ENCOUNTER — OFFICE VISIT (OUTPATIENT)
Dept: ONCOLOGY | Facility: CLINIC | Age: 43
End: 2023-04-25
Payer: COMMERCIAL

## 2023-04-25 VITALS
HEIGHT: 62 IN | OXYGEN SATURATION: 99 % | SYSTOLIC BLOOD PRESSURE: 120 MMHG | BODY MASS INDEX: 28.93 KG/M2 | HEART RATE: 93 BPM | WEIGHT: 157.2 LBS | TEMPERATURE: 98.4 F | DIASTOLIC BLOOD PRESSURE: 83 MMHG

## 2023-04-25 DIAGNOSIS — D68.51 FACTOR 5 LEIDEN MUTATION, HETEROZYGOUS: Primary | ICD-10-CM

## 2023-04-25 DIAGNOSIS — Z17.1 MALIGNANT NEOPLASM OF OVERLAPPING SITES OF LEFT BREAST IN FEMALE, ESTROGEN RECEPTOR NEGATIVE: ICD-10-CM

## 2023-04-25 DIAGNOSIS — C50.812 MALIGNANT NEOPLASM OF OVERLAPPING SITES OF LEFT BREAST IN FEMALE, ESTROGEN RECEPTOR NEGATIVE: ICD-10-CM

## 2023-04-25 LAB
ALBUMIN SERPL-MCNC: 4.4 G/DL (ref 3.5–5.2)
ALBUMIN/GLOB SERPL: 1.7 G/DL
ALP SERPL-CCNC: 97 U/L (ref 39–117)
ALT SERPL W P-5'-P-CCNC: 21 U/L (ref 1–33)
ANION GAP SERPL CALCULATED.3IONS-SCNC: 10.8 MMOL/L (ref 5–15)
AST SERPL-CCNC: 15 U/L (ref 1–32)
BASOPHILS # BLD AUTO: 0.04 10*3/MM3 (ref 0–0.2)
BASOPHILS NFR BLD AUTO: 0.5 % (ref 0–1.5)
BILIRUB SERPL-MCNC: 0.2 MG/DL (ref 0–1.2)
BUN SERPL-MCNC: 11 MG/DL (ref 6–20)
BUN/CREAT SERPL: 14.9 (ref 7–25)
CALCIUM SPEC-SCNC: 9.6 MG/DL (ref 8.6–10.5)
CHLORIDE SERPL-SCNC: 105 MMOL/L (ref 98–107)
CO2 SERPL-SCNC: 24.2 MMOL/L (ref 22–29)
CREAT SERPL-MCNC: 0.74 MG/DL (ref 0.57–1)
DEPRECATED RDW RBC AUTO: 39.9 FL (ref 37–54)
EGFRCR SERPLBLD CKD-EPI 2021: 101.8 ML/MIN/1.73
EOSINOPHIL # BLD AUTO: 0.1 10*3/MM3 (ref 0–0.4)
EOSINOPHIL NFR BLD AUTO: 1.2 % (ref 0.3–6.2)
ERYTHROCYTE [DISTWIDTH] IN BLOOD BY AUTOMATED COUNT: 12.3 % (ref 12.3–15.4)
GLOBULIN UR ELPH-MCNC: 2.6 GM/DL
GLUCOSE SERPL-MCNC: 88 MG/DL (ref 65–99)
HCT VFR BLD AUTO: 41.5 % (ref 34–46.6)
HGB BLD-MCNC: 13.6 G/DL (ref 12–15.9)
IMM GRANULOCYTES # BLD AUTO: 0.02 10*3/MM3 (ref 0–0.05)
IMM GRANULOCYTES NFR BLD AUTO: 0.2 % (ref 0–0.5)
LYMPHOCYTES # BLD AUTO: 2 10*3/MM3 (ref 0.7–3.1)
LYMPHOCYTES NFR BLD AUTO: 23.9 % (ref 19.6–45.3)
MCH RBC QN AUTO: 28.9 PG (ref 26.6–33)
MCHC RBC AUTO-ENTMCNC: 32.8 G/DL (ref 31.5–35.7)
MCV RBC AUTO: 88.3 FL (ref 79–97)
MONOCYTES # BLD AUTO: 0.56 10*3/MM3 (ref 0.1–0.9)
MONOCYTES NFR BLD AUTO: 6.7 % (ref 5–12)
NEUTROPHILS NFR BLD AUTO: 5.64 10*3/MM3 (ref 1.7–7)
NEUTROPHILS NFR BLD AUTO: 67.5 % (ref 42.7–76)
NRBC BLD AUTO-RTO: 0 /100 WBC (ref 0–0.2)
PLATELET # BLD AUTO: 284 10*3/MM3 (ref 140–450)
PMV BLD AUTO: 9.5 FL (ref 6–12)
POTASSIUM SERPL-SCNC: 4.1 MMOL/L (ref 3.5–5.2)
PROT SERPL-MCNC: 7 G/DL (ref 6–8.5)
RBC # BLD AUTO: 4.7 10*6/MM3 (ref 3.77–5.28)
SODIUM SERPL-SCNC: 140 MMOL/L (ref 136–145)
WBC NRBC COR # BLD: 8.36 10*3/MM3 (ref 3.4–10.8)

## 2023-04-25 PROCEDURE — 80053 COMPREHEN METABOLIC PANEL: CPT | Performed by: INTERNAL MEDICINE

## 2023-04-25 PROCEDURE — 99214 OFFICE O/P EST MOD 30 MIN: CPT | Performed by: INTERNAL MEDICINE

## 2023-04-25 PROCEDURE — 85025 COMPLETE CBC W/AUTO DIFF WBC: CPT

## 2023-04-25 NOTE — PROGRESS NOTES
Subjective     REASON FOR follow-up:    1.  Heterozygous state for factor V Leiden    2.  New onset stroke on aspirin by neurology    3.  Hypercholesterolemia    4.  Hypercoagulable work-up done.  Antithrombin 109% protein S activity 85% protein S antigen 107%, protein C activity 85%.  Anticardiolipin antibody IgM 24%, antibeta-2 glycoprotein antibody negative, lupus anticoagulant not detected, prothrombin gene mutation negative.    5.  Screening mammogram showed abnormality in the left breast 6 o'clock position, 8 mm on ultrasound, ultrasound-guided left breast biopsy, 6 cm from the nipple showed evidence of invasive ductal carcinoma poorly differentiated grade 3, Turlock score of 8 out of 9 ER negative, MT negative, HER-2/ese 3+ positive.    6.  CT scan February 24, 2021 showed focal area of fatty infiltration of the liver.  1 cm hypoattenuating cortical lesion in the upper pole of the right kidney.  Similarly nodule in the upper pole of the left kidney is 1.5 cm.  Further evaluation by ultrasound suggested  · Ultrasound March 1, 2021 shows solid lesion in the upper pole of the right kidney.  In the left kidney along the midpole of the left kidney is a nodule which is 16 x 16 x 14 mm which is thought to be a cyst.  A 6-month follow-up CT suggested    6.  S/p left partial mastectomy with sentinel lymph node biopsy.  Tumor was present at 5:00, invasive ductal carcinoma, Turlock score of 8, grade 3, greatest dimension was 12 mm x 8 x 4 mm.  Single focus of invasive carcinoma present.  There was extensive DCIS which is 30 mm x 8 x 2 mm, grade 3, no evidence of lymphovascular space invasion or dermal lymphatic invasion.  Margins were clear.  4 lymph nodes were negative.  It is a p T1cp N0, ER negative MT negative HER-2/ese 3+ with Ki-67 of 50%.  · [employer] genetic test negative for 47 genes    7.recently initiated Xarelto at loading dose of 15 mg twice a day x3 weeks to then be switched to 20 mg daily per  protocol.  She is doing this because of Mediport in place and known factor V Leiden heterozygosity.        History of Present Illness   Patient is a 42-year-old female with the above-mentioned history who is here today for follow-up visit.  She did have an EGD and colonoscopy 10/4/2022.  They did find a polyp however no other findings.  The patient does report she continues to have intermittent nausea about once per week.  At times she has to take Zofran.  She denies vomiting.    She continues on Xarelto 20 mg daily and denies bleeding issues.    She denies any new palpable breast mass or nodularity.  Her last mammogram was 4/4/2022 which was without evidence for malignancy.    Patient does still have a port in place, but would like to get the referral to have it removed now.    Interval history:     Patient is doing well without any complaints.  Patient is s/p port removal as of March 27, 2023.  Reviewed mammogram from April 7, 2023 which is negative.  Patient is exercising.  She is still on the Xarelto.    Patient was thought to be menopausal with LH and FSH being elevated.  However she had a menstrual period 3 weeks prior to being seen today which lasted 4 days.  I have wondering whether chemo put her into menopause and now she is coming out of it.  We will refer her to gynecologist as she does not have a gynecologist.  Given that she is on Xarelto we will also keep a watch by having the nurse practitioner see her in 6 weeks to make sure she does not have excessive bleeding    Oncologic history:  Patient is here for follow-up of her mammogram.  Patient had screening mammogram April 2, 2021:  NAD a focal asymmetry was present in the posterior one third retroareolar region of the left breast.  Further spot compression images and left breast ultrasound was suggested.  Right breast was negative    April 9, 2021: Left diagnostic mammogram showed an area of focal asymmetry in the posterior one third region aspect of  the left breast her breast    Ultrasound showed an irregular 0.8 cm lesion in the left breast at the 6 o'clock position of the order of 6 cm from the nipple.  Ultrasound-guided left breast biopsy was recommended.    April 26, 2021: Ultrasound-guided biopsy of the left breast 6 o'clock position, 6 cm from the nipple showed    Invasive ductal carcinoma, poorly differentiated with a Oziel score grade 3 with a score of 8 out of 9 measuring at least 7 mm.  No definitive ductal carcinoma is in situ is identified.  ER 1% negative  KY 1% negative   HER-2/ese 3+ positive    There was no evidence of lymphadenopathy either in the mammogram of the ultrasound.  We suggested an MRI of the breast.  Patient has strong family history of breast cancer      May 7, 2021: MRI of bilateral breast reviewed.  My interpretation is that there is area of enhancement in the 6 o'clock position of the left breast this is the biopsy-proven malignancy.  There is additional area of linear enhancement anteriorly and laterally measuring up to 1.2 cm this is approximately 5.6 cm from the nipple.  In addition there is a enhancement 2 to 3 mm in the anterolateral aspect of the lateral aspect of the left breast which is 4.6 cm from the nipple.  There is also mildly prominent posterior lymph node in the mid axilla which measures 1 cm with cortical thickening.  Findings are consistent with multifocal disease.  No contralateral disease or internal mammary adenopathy identified    May 20, 2021: Genetic test, [employer] genetic test shows 47 genes negative    May 21, 2021: I reviewed the biopsy of the lymph node in the left axilla which shows reactive lymph node negative for any carcinoma or lymphoma    June 2, 2021:Final Diagnosis  1. Left Breast, 5:00 o'clock, MRI-guided Biopsies for Linear Enhancement: INVASIVE MAMMARY CARCINOMA, NO  SPECIAL TYPE (INVASIVE DUCTAL CARCINOMA).  A. Largest contiguous focus in a core measures 4 mm.  B. No in-situ component  identified.  C. Brisk lymphocytic response noted (see Comment).  D. No definitive lymphovascular nor perineural invasion identified.  2. Left Breast, 2:00 o'clock, MRI-guided Biopsy for Enhancement:  A. Benign breast parenchyma with radial scar and micropapillomas.  B. Usual ductal hyperplasia and columnar cell hyperplasia.  C. No atypical hyperplasia, in-situ nor invasive carcinoma identified    ER, VT, HER-2 new and Ki-67 pending on this new biopsy      Patient has been seen by Dr. Lashonda Euceda with plans of doing surgery on July first 2021    Once patient undergoes surgery lumpectomy with sentinel lymph node biopsy we will give further recommendation about treatment options.  Given that patient has multifocal disease and both of the lesions 2 lesions are 1.2 cm each, with it being a HER-2 positive tumor on the previous biopsy at 6:00 Dr. Euceda and myself discussed and patient preferred to undergo port placement at the same time of surgery.    Patient had [email or web address] genetic test which was negative.    She has completed surgery July 1, 2021.  She underwent left partial mastectomy with left axillary sentinel lymph node biopsy and right port placement.  Clinically she was thought to have a high-grade multifocal invasive ductal carcinoma ER/VT negative, VT positive.  The lesions require preoperative localization.  The multifocal cancer was bracketed with 2 savvy markers and the radial scar was localized with a needle.  Because of the volume of tissue that will be excised related to the breast volume the case was done in conjunction with Dr. Zavala for oncoplastic closure.    She is 2 weeks from surgery.  She is healing up reasonably well.    On review of her pathology she had left partial mastectomy with sentinel lymph node biopsy.  Tumor was present at 5:00, invasive ductal carcinoma, Oziel score of 8, grade 3, greatest dimension was 12 mm x 8 x 4 mm.  Single focus of invasive carcinoma present.  There was  extensive DCIS which is 30 mm x 8 x 2 mm, grade 3, no evidence of lymphovascular space invasion or dermal lymphatic invasion.  Margins were clear.  4 lymph nodes were negative.  It is a p T1cp N0, ER negative KS negative HER-2/ese 3+ with Ki-67 of 50%.    Patient is here to discuss further options of treatment.  Given small tumor T1c N0, she is a good candidate for weekly Taxol Herceptin.    Given that patient has heterozygous state for factor V Leiden and currently has port placed we will plan to start Eliquis 2.5 mg p.o. twice daily.  I have left a message for Dr. Craft in neurology to call me to discuss if patient needs to continue aspirin or we can hold off since be starting Eliquis.      Genetic test negative    August 4, 2021: Weekly Taxol Herceptin x12 weeks followed by Herceptin x1 year.  Cycle 1 today  Cycle 12 Taxol Herceptin October 10, 2021      Hematologic history:  patient is a 40-year-old female who presented end of December with numbness and tingling in her left upper extremity and left face.  She went to see Dr. Goodman who then got concerned and referred to neurology.  She was referred to Dr. Freedman and talk to Dr. Thopmson neurology for evaluation.  Patient underwent ultrasound of the carotids which did not show significant stenosis of the carotids.  She underwent 2D echocardiogram which showed a good ejection fraction of 64% with mild mitral valve prolapse and mild mitral stenosis.  She had a 24-hour Holter which was negative.  She then had also an MRI of the brain February 3, 2021 which showed areas of hyperintensity involving the subcortical white matter of the right frontal lobe superior laterally and posteriorly with the largest area measuring 8 9 mm.  There is also enhancement present.  The findings are consistent with a subacute infarct.  They could not differentiate if there is any underlying neoplastic process but a short-term follow-up was suggested in order to follow-up.  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    Patient currently got started on aspirin and factor V Leiden was obtained by neurology because patient's paternal aunt had factor V Leiden mutation and she was heterozygous.  Patient's testing also showed that she was heterozygous.    Patient states her numbness and tingling is resolved completely on the left arm and face it just lasted for a 24 hours.  She was here referred here for neurology to see if there is any other cause for her underlying hypercoagulable state.  She has not had a complete hypercoagulable work-up.  Also discussed with her that an underlying malignancy could cause a hypercoagulable state and we would need to do a CT scan to rule that out.    Patient's mother has had breast cancer in her 50s but had pancreatic cancer at 68 and  from that.  Patient's dad had stroke at 63.  But he is alive at 74.  She has 1 brother who is 40 in good health.  Paternal aunt had breast cancer in her 40s.  Paternal grandfather had colon cancer in his late 80s.  Maternal uncle had throat cancer and another maternal uncle had skin cancer with metastasis to the lung.  And maternal grandmother had breast cancer.    Patient was to have mammogram done last year but because of COVID-19 it got postponed and she has not had it done yet.    Patient used to be a smoker half pack per day for 7 years but quit 10 years ago.  She has high cholesterol for which she is on treatment.    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   • History of rheumatic fever    • Hyperlipidemia 1/2/2022    Resolved; now unmedicated   • Hypertension    • Hypothyroidism    • Infiltrating ductal carcinoma of left breast 05/05/2021    LEFT, HAD LUMPECTOMY, CHEMO/RADIATION   • Kidney stone    • Mitral regurgitation    • Mitral valve insufficiency    • On anticoagulant therapy    • PONV (postoperative nausea and vomiting)    • Stroke 12/2020    HX OF, NO RESIDUAL   • Stroke-like symptoms         Past Surgical History:   Procedure Laterality Date   • BREAST BIOPSY Left 05/05/2021    IDC   • BREAST LUMPECTOMY     • BREAST LUMPECTOMY WITH SENTINEL NODE BIOPSY N/A 07/01/2021    Procedure: right port placement, Left SHAINA-guided, bracketed, partial mastectomy and sentinel lymph node biopsy Left breast needle-localized excisional biopsy;  Surgeon: Lashonda Euceda MD;  Location: [facility];  Service: General;  Laterality: N/A;   • BREAST SURGERY Bilateral 07/01/2021    Procedure: RIGHT BREAST REDUCTION MASTOPEXY LEFT BREAST ONCOPLASTIC CLOSURE;  Surgeon: Caridad Baker MD PhD;  Location: [facility];  Service: Plastics;  Laterality: Bilateral;   • COLONOSCOPY N/A 10/04/2022    Procedure: COLONOSCOPY TO CECUM WITH COLD BIOPSIES POLYPECTOMY;  Surgeon: Marlon Hough MD;  [place] [facility] [place];  Service: Gastroenterology;  Laterality: N/A;  POLYP   • ENDOSCOPY N/A 10/04/2022    Procedure: ESOPHAGOGASTRODUODENOSCOPY WITH COLD BIOPSIES;  Surgeon: Marlon Hough MD;  [place] [facility] [place] [facility];  Service: Gastroenterology;  Laterality: N/A;  NORMAL   • PAP SMEAR  2016   • VENOUS ACCESS DEVICE (PORT) REMOVAL N/A 3/27/2023    Procedure: REMOVAL VENOUS ACCESS DEVICE;  Surgeon: Lashonda Euceda MD;  Location: [facility] OR;  Service: General;  Laterality: N/A;        Current Outpatient Medications on File Prior to Visit   Medication Sig Dispense Refill   • atorvastatin  (LIPITOR) 10 MG tablet Take 1 tablet by mouth Daily.     • Cholecalciferol (D3) 50 MCG ( UT) tablet Take 2 tablets by mouth 2 (Two) Times a Week.     • famotidine (PEPCID) 20 MG tablet Take 1 tablet by mouth Daily. 30 tablet 3   • levothyroxine (SYNTHROID, LEVOTHROID) 125 MCG tablet Take 1 tablet by mouth Daily. 90 tablet 1   • LORazepam (ATIVAN) 0.5 MG tablet Take 1 tablet by mouth Every 8 (Eight) Hours As Needed for Anxiety. for anxiety 30 tablet 2   • ondansetron ODT (ZOFRAN-ODT) 8 MG disintegrating tablet Place 1 tablet on the tongue Every 8 (Eight) Hours As Needed for Nausea or Vomiting. 30 tablet 3   • rivaroxaban (Xarelto) 20 MG tablet Take 1 tablet by mouth Daily. Indications: Resume on Wednesday, 3/29/2023. 30 tablet 3   • valsartan (DIOVAN) 160 MG tablet Take 1 tablet by mouth Daily. 90 tablet 1     No current facility-administered medications on file prior to visit.        ALLERGIES:    Allergies   Allergen Reactions   • Sulfa Antibiotics Rash        Social History     Socioeconomic History   • Marital status: Single   • Number of children: 0   Tobacco Use   • Smoking status: Former     Packs/day: 0.50     Years: 10.00     Pack years: 5.00     Types: Cigarettes     Start date: 1997     Quit date: 2009     Years since quittin.3   • Smokeless tobacco: Never   • Tobacco comments:     Off and on for 12 years   Vaping Use   • Vaping Use: Never used   Substance and Sexual Activity   • Alcohol use: Not Currently     Comment: RARELY   • Drug use: No   • Sexual activity: Yes     Partners: Female     Birth control/protection: None     Comment: Unnecessary        Family History   Problem Relation Age of Onset   • Breast cancer Mother 53   • Pancreatic cancer Mother 68   • Stroke Father    • Hypertension Brother    • Colon polyps Maternal Aunt    • Melanoma Maternal Uncle    • Breast cancer Paternal Aunt 55   • Lung cancer Maternal Grandmother    • Colon cancer Maternal Grandfather    • Prostate  "cancer Maternal Grandfather    • Prostate cancer Paternal Grandfather    • Breast cancer Paternal Great-Grandmother 94   • Brain cancer Maternal Cousin 20   • Ovarian cancer Other         Paternal great aunt    • Breast cancer Other    • Colon polyps Maternal Aunt    • Malig Hyperthermia Neg Hx    • Crohn's disease Neg Hx    • Irritable bowel syndrome Neg Hx    • Ulcerative colitis Neg Hx       Family  history: Mother had breast cancer at age 57.  Maternal great aunt had breast cancer and paternal great aunt had breast cancer in her 40s.  Patient's mother had pancreatic cancer as well.  Paternal grandfather had prostate cancer.    Review of Systems   As per HPI      Objective     Vitals:    04/25/23 1646   BP: 120/83   Pulse: 93   Temp: 98.4 °F (36.9 °C)   TempSrc: Temporal   SpO2: 99%   Weight: 71.3 kg (157 lb 3.2 oz)   Height: 157.5 cm (62.01\")   PainSc: 0-No pain         4/25/2023     4:45 PM   Current Status   ECOG score 0     Physical exam    Patient screened positive for depression based on a     CONSTITUTIONAL:  Vital signs reviewed.  No distress, looks comfortable.  RESPIRATORY:  Normal respiratory effort.  Lungs clear to auscultation bilaterally.  BREAST: Right breast: No skin changes, no evidence of breast mass, no nipple discharge, no evidence of any right axillary adenopathy or right supraclavicular adenopathy  Left breast: No evidence of any skin changes, no evidence of any left breast mass and no evidence of left nipple discharge as well as no left axillary adenopathy or left supraclavicular adenopathy.  CARDIOVASCULAR:  Normal S1, S2.  No murmurs rubs or gallops.  No significant lower extremity edema.  GASTROINTESTINAL: Abdomen appears unremarkable.  Nontender.  No hepatomegaly.  No splenomegaly.  LYMPHATIC:  No cervical, supraclavicular, axillary lymphadenopathy.  SKIN:  Warm.  No rashes.  PSYCHIATRIC:  Normal judgment and insight.  Normal mood and affect.  RECENT LABS:              Assessment & Plan "       * [id number], ER negative GA negative HER-2/ese 3+ with Ki-67 of 50%.  S/p left partial mastectomy with sentinel lymph node biopsy.  Tumor was present at 5:00, invasive ductal carcinoma, Oziel score of 8, grade 3, greatest dimension was 12 mm x 8 x 4 mm.  Single focus of invasive carcinoma present.  There was extensive DCIS which is 30 mm x 8 x 2 mm, grade 3, no evidence of lymphovascular space invasion or dermal lymphatic invasion.  Margins were clear.  4 lymph nodes were negative.  It is a p T1cp N0, ER negative GA negative HER-2/ese 3+ with Ki-67 of 50%.  · Patient is s/p port placement  · Discussed in length with patient that given a small tumor she is eligible for weekly Taxol Herceptin.  Weekly Taxol x12 weeks along with weekly Herceptin followed by 1 year of Herceptin.  · Discussed patient in the breast cancer conference  · 2D echo done which showed ejection fraction of 61-65% and strain pattern of -20.8.  · Patient also seen by Dr. Virk cardiology and Dr. Virk is planning to repeat echocardiogram in 3 months after initiation of therapy.  · 8/4/2021 initiating weekly Taxol Herceptin  · 9/8/2021, proceed with week #6 Taxol and Herceptin.  Patient continues to tolerate treatment well. No signs of peripheral neuropathy.  · Her next echocardiogram due November 4, 2021, She follows up with cardiology Dr. Camron Virk.  · 9/28/2021, proceed with week #9 taxol/herceptin.  She does feel her nausea has worsened after her last cycle of chemotherapy.  She prefers the disintegrating Zofran, this will be called to her pharmacy today.  · Patient is here to take cycle 11 of weekly Taxol with worsening fatigue and worsening rash and overall dysphoric mood.  She also cannot sleep and her quality life is worsening.  At the present time we will plan to give 1 more cycle of Taxol following which she will be referred to radiation.  · Cycle 12 Taxol Herceptin October 20, 2021  · Completed radiation November 2021.  · February  23, 2022: Reviewed echocardiogram with normal ejection fraction and strain pattern.  Patient seen by Dr. Virk and laquita to continue Herceptin  · 3/16/2022 here for continued Herceptin maintenance  · For 4/20/2022 screening bilateral mammogram was negative  · April 27 2022: Currently tolerating Herceptin.  Last dose of Herceptin will be June 18, 2022.  Reviewed mammogram results from April 4, 2022 which is negative  · May 18 2022: Patient has 2 more cycles of Herceptin after today.  Reviewed echo with ejection fraction 57% stable  · June 29, 2022: Last dose of Herceptin today.  No further issues.  · April 25, 2023: Patient is doing well without any complaints.  S/p port removal March 27, 2023 by Dr. Euceda.  Patient had 1 episode of menstrual period lasting for 4 days.  She was thought to be menopausal by her LH and FSH but given that she has had menstrual period's back it is possible that she is not menopausal.  Will refer to gynecology.    *  Factor V Leiden heterozygosity with right frontal stroke and patient presented in December 2020 with left-sided weakness tingling and numbness and also numbness in the face.  · Was referred to neurology and cardiology by Dr. Goodman  · Ultrasound of carotid does not show significant stenosis  · 24 Holter is negative  · 2D echocardiogram shows ejection fraction of 64% with mild mitral regurg and mitral stenosis  · Neurology obtain factor V Leiden given that patient's paternal aunt had's history of factor V Leiden and that was heterozygous for factor V Leiden  · Continue aspirin as per neurology.  Also patient on medications for her high cholesterol started after stroke currently asymptomatic  · Hypercoagulable work-up done which is negative except heterozygous state for factor V Leiden.  · Complete stroke work-up has been negative and since this is arterial stroke and she was not on aspirin prior to that and her symptoms have resolved I agree with continuing aspirin alone along  with high cholesterol medications.  · MR venogram done on May 10, 2021 is negative.  Patient has been referred to the stroke neurologist Dr. Johnson  · Patient diagnosed with breast cancer with Mediport placed.  Per discussion with neurology, patient initiated on prophylactic Xarelto and aspirin discontinued.  · Patient had one nosebleed which lasted 10 minutes but with pinching the nose it helped.  But she is switching to 20 mg daily from tomorrow.  We discussed about placing ice and notifying us if her nosebleeds are significant.  But it resolved.  It was likely secondary to dry air  · 2021, patient continues to experience nosebleeds.  Reports these occur when her nose itches, and after scratching her nose it begins to bleed.  She feels this may be related to dry air, so I have asked her to start using saline nasal spray to help moisten her nose.  If she experiences nosebleeds that do not stop, I asked her to notify our office.  · Tolerating anticoagulation with Xarelto.  No bleeding issues with Xarelto  · Patient still has the port and hence will need to continue Xarelto  · Patient will require port removal but wants to wait till Dr. Euceda comes back prior to port removal  · 2022 she continues on Xarelto 20 mg daily denies bleeding issues.  · 2023: Given that she had 1 episode of menstrual period we will follow her up in 6 weeks to make sure she is not having any further menstrual period's or heavier bleeding  · Given that her port is removed we will discontinue Xarelto and restart her aspirin per neurology for her stroke Radha       * Family history of cancer: Patient's mother had breast cancer at age 50 and pancreatic cancer at age 68 and  from pancreatic cancer.  Patient's maternal grandmother had breast cancer in her 50s.  Her maternal uncle had skin cancer which went to the lung and another maternal uncle had throat cancer.  Maternal aunt had call colon polyps.  Patient's paternal  "aunt had breast cancer in her 40s.  And paternal grandfather with colon cancer in his 80s.  Paternal aunt also had factor V Leiden.  · Genetic testing is negative    * Solid nodule in the right kidney on ultrasound, will schedule follow-up with urology  · Patient was to follow-up with Dr. Shultz  · Will need records from urology  · Will discuss with patient at her next appointment about follow-up with urology  · Patient was seen by Dr. Becerra and apparently no follow-up was needed for the nodules in the kidney.  · April 25, 2023: We will get records from Dr. Becerra's office      *  Elevated BMI, encourage diet and exercise.  Patient is improving her diet and exercise after having had the stroke  · Patient has lost weight and she is trying to lose weight    *  Reflux secondary to chemotherapy.  Patient has been requiring frequent Tums.  Recommended she begin Pepcid 20 mg daily.    · Stable, continue Pepcid 20 mg daily.    *Constipation likely secondary to ferrous sulfate.  Oral iron discontinued  · Continues to have constipation, seen by GI who currently is going to do colonoscopy in October.   · EGD/colonoscopy 10/4/2022 by Dr. Hough  ·     *Headache likely related to body ache because of Taxol  · 9/29/2021, patient discusses concern about worsening headaches.  Describing them as \"glass breaking\".  Started about 2 weeks ago, and progressively worsened.  Occurring 3-4 times daily now, and lasting less than 1 minute with each episode.  This occurs in the same place, right upper skull.  Denies vision changes or dizziness.  Will order stat MRI of the brain for further evaluation.  The patient follows with a neurologist, and is going to notify patient help with neuropathy as well of her symptoms and the plan for MRI.  · MRI of the brain was done 10/1/2021 with no evidence of restricted diffusion to indicate acute infarction.  No evidence of abnormal enhancement, redemonstration of a developmental venous anomaly " involving the right head of the caudate nucleus unchanged from prior study.    *Elevated liver function tests, total bilirubin still normal AST ALT slightly elevated.  Patient did take Tylenol but not too much.  No dose adjustments required at the fingers time.  · Liver function tests normalized    *Iron deficiency anemia, patient's percent saturation still low at 9% s/p full dose of IV Venofer.  · 9/29/2021, patient will receive dose #5 Venofer today.  Hemoglobin today 10.1.  · 12/22/2021, hemoglobin today 12.7.  · 3/16/2022 hemoglobin remains normal at 12.9.  · Patient scheduled for colonoscopy end of July 2022   · Hemoglobin 12/14/2022 is 12.8.    *Insomnia  · 9/29/2021, patient reports difficulty sleeping.  She is experiencing this every night, not just the nights of treatment when she receives IV dexamethasone.  She has attempted taking Benadryl, however felt groggy following this.  She is going to attempt melatonin to see if this improves her sleep.  · Worsening, will try gabapentin which will help with neuropathy as well  · Patient took gabapentin for a few nights and had issues with  headache.  She is also very concerned about other possible side effects that she discontinued the medication.  She is tried melatonin in the past without results.  We discussed trying Benadryl versus other prescription medication.  She is going to try Benadryl first.    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 Becerra's office.  Patient tells me she saw them a year ago and there was no concern regarding her kidneys as we were following her for kidney nodules  · Will refer to gynecology Dr. Moeller's office as patient was thought to be menopausal per her LH and FSH and now having bleeding.  Likely that the effect of her chemo is gone and she has back to having  menstrual period's however she will require to follow-up with gynecology  · Follow-up with nurse practitioner in 6 weeks to follow-up on any further menstrual period's  · Follow-up with me in 4 months with labs    I spent 30 total minutes, face-to-face, caring for Radha today. Greater than 50% of this time involved counseling and/or coordination of care as documented within this note.      Neena Beavers MD  4/25/2023     Dr. Maxine Haynes

## 2023-05-05 DIAGNOSIS — F90.0 ATTENTION DEFICIT HYPERACTIVITY DISORDER (ADHD), PREDOMINANTLY INATTENTIVE TYPE: Chronic | ICD-10-CM

## 2023-05-05 RX ORDER — LANOLIN ALCOHOL/MO/W.PET/CERES
1000 CREAM (GRAM) TOPICAL DAILY
Qty: 30 TABLET | Refills: 2 | Status: SHIPPED | OUTPATIENT
Start: 2023-05-05

## 2023-05-05 RX ORDER — ATORVASTATIN CALCIUM 10 MG/1
10 TABLET, FILM COATED ORAL DAILY
Qty: 90 TABLET | OUTPATIENT
Start: 2023-05-05

## 2023-05-05 NOTE — TELEPHONE ENCOUNTER
Rx Refill Note  Requested Prescriptions     Pending Prescriptions Disp Refills   • lisdexamfetamine (Vyvanse) 60 MG capsule 30 capsule 0     Sig: Take 1 capsule by mouth Every Morning     Refused Prescriptions Disp Refills   • atorvastatin (LIPITOR) 10 MG tablet 90 tablet      Sig: Take 1 tablet by mouth Daily.     Refused By: SARA NUNEZ     Reason for Refusal: Patient should contact provider first      Last office visit with prescribing clinician: 11/29/2022   Last telemedicine visit with prescribing clinician: 02/28/2023  Next office visit with prescribing clinician: Visit date not found                         Sara Nunez MA  05/05/23, 07:44 EDT

## 2023-05-18 ENCOUNTER — HOSPITAL ENCOUNTER (OUTPATIENT)
Dept: OCCUPATIONAL THERAPY | Facility: HOSPITAL | Age: 43
Setting detail: THERAPIES SERIES
Discharge: HOME | End: 2023-05-18
Payer: COMMERCIAL

## 2023-05-18 DIAGNOSIS — I97.2 POST-MASTECTOMY LYMPHEDEMA SYNDROME: Primary | ICD-10-CM

## 2023-05-18 DIAGNOSIS — Z17.1 MALIGNANT NEOPLASM OF OVERLAPPING SITES OF LEFT BREAST IN FEMALE, ESTROGEN RECEPTOR NEGATIVE: ICD-10-CM

## 2023-05-18 DIAGNOSIS — C50.812 MALIGNANT NEOPLASM OF OVERLAPPING SITES OF LEFT BREAST IN FEMALE, ESTROGEN RECEPTOR NEGATIVE: ICD-10-CM

## 2023-05-18 PROCEDURE — 97535 SELF CARE MNGMENT TRAINING: CPT

## 2023-05-18 PROCEDURE — 93702 BIS XTRACELL FLUID ANALYSIS: CPT

## 2023-05-18 NOTE — THERAPY PROGRESS REPORT/RE-CERT
Outpatient Occupational Therapy Lymphedema Progress Note  [facility]     Patient Name: Radha Astorga  : 1980  MRN: [id number]  Today's Date: 2023      Visit Date: 2023    Patient Active Problem List   Diagnosis   • Family history of breast cancer   • Hypothyroidism   • Major depressive disorder, recurrent episode, moderate with anxious distress   • Attention deficit hyperactivity disorder (ADHD), predominantly inattentive type   • Factor 5 Leiden mutation, heterozygous   • Kidney mass   • Malignant neoplasm of overlapping sites of left breast in female, estrogen receptor negative (HCC)   • High risk medication use   • Essential hypertension   • Rheumatic mitral valve disease   • Encounter for adjustment or management of vascular access device   • Iron deficiency anemia   • History of stroke   • B12 deficiency   • Nausea   • Bloating   • Family history of colon cancer   • Breast edema        Past Medical History:   Diagnosis Date   • Acute stress reaction 2014   • ADD (attention deficit disorder)    • Anxiety and depression    • CTS (carpal tunnel syndrome)    • Factor 5 Leiden mutation, heterozygous 2021   • Family history of breast cancer 2013   • Fracture, cervical vertebra 1997    C4   • GERD (gastroesophageal reflux disease) 2022    Medicated   • H/O complete eye exam 2016   • 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   • History of rheumatic fever    • Hyperlipidemia 2022    Resolved; now unmedicated   • Hypertension    • Hypothyroidism    • Infiltrating ductal carcinoma of left breast 2021    LEFT, HAD LUMPECTOMY, CHEMO/RADIATION   • Kidney stone    • Mitral regurgitation    • Mitral valve insufficiency    • On anticoagulant therapy    • PONV (postoperative nausea and vomiting)    • Stroke 2020    HX OF, NO RESIDUAL   • Stroke-like symptoms         Past Surgical History:   Procedure Laterality Date   • BREAST BIOPSY Left  05/05/2021    ID[facility]   • BREAST LUMPECTOMY     • BREAST LUMPECTOMY WITH SENTINEL NODE BIOPSY N/A 07/01/2021    Procedure: right port placement, Left SHAINA-guided, bracketed, partial mastectomy and sentinel lymph node biopsy Left breast needle-localized excisional biopsy;  Surgeon: Lashonda Euceda MD;  Location: [facility];  Service: General;  Laterality: N/A;   • BREAST SURGERY Bilateral 07/01/2021    Procedure: RIGHT BREAST REDUCTION MASTOPEXY LEFT BREAST ONCOPLASTIC CLOSURE;  Surgeon: Caridad Baker MD PhD;  Location: [facility];  Service: Plastics;  Laterality: Bilateral;   • COLONOSCOPY N/A 10/04/2022    Procedure: COLONOSCOPY TO CECUM WITH COLD BIOPSIES POLYPECTOMY;  Surgeon: Marlon Hough MD;  Location: [facility] [place] MAIN OR;  Service: Gastroenterology;  Laterality: N/A;  POLYP   • ENDOSCOPY N/A 10/04/2022    Procedure: ESOPHAGOGASTRODUODENOSCOPY WITH COLD BIOPSIES;  Surgeon: Marlon Hough MD;  Location: [facility] [place] MAIN OR;  Service: Gastroenterology;  Laterality: N/A;  NORMAL   • PAP SMEAR  2016   • VENOUS ACCESS DEVICE (PORT) REMOVAL N/A 3/27/2023    Procedure: REMOVAL VENOUS ACCESS DEVICE;  Surgeon: Lashonda Euceda MD;  Location: [facility];  Service: General;  Laterality: N/A;         Visit Dx:      ICD-10-CM ICD-9-CM   1. 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 Malignant neoplasm of overlapping sites of left breast in female, estrogen receptor negative  C50.812 174.8    Z17.1 V86.1        Lymphedema     Row Name 05/18/23 1300             Subjective Pain    Able to rate subjective pain? yes  -RE      Pre-Treatment Pain Level 0  -RE      Post-Treatment Pain Level 0  -RE      Subjective Pain Comment Patient does report cramping or a feeling of tightness that occurs under or on the lateral side of the breast when she reaches down to the floor.  This resolves when she stands up.  -RE         Subjective Comments    Subjective Comments She does not feel that her  Flexitouch is fitting properly.  I recommended that she contact them for troubleshooting.  -RE         L-Dex Bioimpedence Screening    L-Dex Measurement Extremity LUE  -RE      L-Dex Patient Position Standing  -RE      L-Dex UE Dominate Side Right  -RE      L-Dex UE At Risk Side Left  -RE      L-Dex UE Pre Surgical Value No  -RE      L-Dex UE Score -1  -RE      L-Dex UE Baseline Score 3.5  -RE      L-Dex UE Value Change -4.5  -RE      L-Dex UE Comment WNL  arrived wearing sleeve.  -RE            User Key  (r) = Recorded By, (t) = Taken By, (c) = Cosigned By    Initials Name Provider Type    Julia Rowe OTR Occupational Therapist               The patient had a 3 month SOZO measurement which I reviewed today. The score is WNL, see in EMR. Bioimpedance spectroscopy helps identify the onset of lymphedema in an arm or leg before patients experience noticeable swelling. Research has shown that the early detection of lymphedema using L-Dex combined with treatment can reduce progression to chronic lymphedema by 95% in breast cancer patients. Whenever possible, patients are tested for baseline L-Dex score before cancer treatment begins and then are reassessed during regular follow-up visits using the SOZO device. Otherwise, this can be started postoperatively and continued during regular follow-up visits. If the patient's L-Dex score increases above normal levels, that is a sign that lymphedema is developing and a referral is made to occupational therapy for further evaluation and early compression treatment. Lymphedema assessment with the SOZO L-Dex score is recommended to be done every 3 months for the first 3 years and then every 6 months for years 4 and 5 followed by annually afterwards.           OT Assessment/Plan     Row Name 05/18/23 1436          OT Assessment    Impairments Impaired lymphatic circulation  -RE     Assessment Comments Patient returns for bioimpedance reassessment.  Her last measurement was 3  months ago.  L-Dex value today is -1 which is within normal limits.  She complains of some pinching or pulling along her breast and ribs when she reaches down.  It does resolve when she stands up her surgeon is aware of the issue.  Other than that issue she reports she is doing very well she continues to wear compression day and night.  She consistently exercises and uses her Flexitouch.  I recommended follow-up in 3 months.  -RE     Please refer to paper survey for additional self-reported information No  -RE     OT Diagnosis Postmastectomy lymphedema  -RE     OT Rehab Potential Good  -RE     Patient/caregiver participated in establishment of treatment plan and goals Yes  -RE     Patient would benefit from skilled therapy intervention Yes  -RE        OT Plan    OT Frequency Other (comment)  -RE     Predicted Duration of Therapy Intervention (OT) Bioimpedance every 3 months  -RE     Planned CPT's? OT SELF CARE/MGMT/TRAIN 15 MIN: 41249;OT BIS XTRACELL FLUID ANALYSIS: 87704  -RE     OT Plan Comments Follow-up in 3 months  -RE           User Key  (r) = Recorded By, (t) = Taken By, (c) = Cosigned By    Initials Name Provider Type    RE Julia Leslie, OTR Occupational Therapist                       OT Goals     Row Name 05/18/23 1300          OT Short Term Goals    STG 1 Pt will demonstrate understanding of use of compression sleeve for edema prevention, exercise and air travel.   -RE     STG 1 Progress Met  -RE     STG 2 Patient will demonstrate proper awareness of “What is Lymphedema?” and “Healthy Habits” for improved prevention, management, care of symptoms and ease of transition to self-care of condition.   -RE     STG 2 Progress Met  -RE     STG 3 Patient independent and compliant with initial home exercise program focused on range of motion,and Flexibility.  -RE     STG 3 Progress Met  -RE     STG 4 Patient to demonstrate increased left shoulder flexion to 140 to improve functional UE use and to restore pre  operative AROM per patient perception.  -RE     STG 4 Progress Met  -RE     STG 5 Patient to demonstrate increased left shoulder abduction to 140 to improve functional UE use and to restore preoperative AROM per patient perception.  -RE     STG 5 Progress Met  -RE        Long Term Goals    LTG Date to Achieve 08/18/23  -RE     LTG 1 Patient to demonstrate increased left shoulder flexion to 160 to improve functional UE use and to restore pre operative AROM per patient perception.  -RE     LTG 1 Progress Met  -RE     LTG 2 Patient to demonstrate increased left shoulder abduction to 160 to improve functional UE use and to restore preoperative AROM per patient perception.  -RE     LTG 2 Progress Met  -RE     LTG 3 Patient will participate in bioimpedance scans every 3-6 months as a method of early detection of lymphedema to allow for early intervention.  -RE     LTG 3 Progress Met;Ongoing  -RE     LTG 4  Patient's bioimpedance score to remain below 6.5 for decreased risk of stage II lymphedema.  -RE     LTG 4 Progress Met;Ongoing  -RE     LTG 5 Patient will be independent with use and care of T[employer].  -RE     LTG 5 Progress Met  -RE        Time Calculation    OT Goal Re-Cert Due Date 08/18/23  -RE           User Key  (r) = Recorded By, (t) = Taken By, (c) = Cosigned By    Initials Name Provider Type    Julia Rowe OTR Occupational Therapist                Therapy Education  Education Details: We discussed patient's current L-Dex value of -1 and I recommended continued daily sleeve wear and compression at night.  We discussed possible causes for the pinching or cramping sensation that she has along her ribs and breast.  It is possible that it is intercostal muscle spasms or scar tissue.  I recommended she try massaging the intercostal area and stretching her ribs.  Given: Edema management, Symptoms/condition management  Program: New, Reinforced  How Provided: Verbal, Demonstration  Provided to: Patient  Level of  Understanding: Teach back education performed, Verbalized  11435 - OT Self Care/Mgmt Minutes: 10                Time Calculation:   OT Start Time: 1310  OT Stop Time: 1330  OT Time Calculation (min): 20 min  Total Timed Code Minutes- OT: 10 minute(s)  Timed Charges  19709 - OT Self Care/Mgmt Minutes: 10  Untimed Charges  53467 - OT Bioimpedence Rx Minutes: 10  Total Minutes  Timed Charges Total Minutes: 10  Untimed Charges Total Minutes: 10   Total Minutes: 20     Therapy Charges for Today     Code Description Service Date Service Provider Modifiers Qty    00899484503 HC OT BIS XTRACELL FLUID ANALYSIS 5/18/2023 Julia Leslie OTR GO 1    [id number] HC OT SELF CARE/MGMT/TRAIN EA 15 MIN 5/18/2023 Julia Leslie OTR GO 1                      VI Ulloa  5/18/2023

## 2023-06-06 ENCOUNTER — OFFICE VISIT (OUTPATIENT)
Dept: ONCOLOGY | Facility: CLINIC | Age: 43
End: 2023-06-06
Payer: COMMERCIAL

## 2023-06-06 ENCOUNTER — LAB (OUTPATIENT)
Dept: LAB | Facility: HOSPITAL | Age: 43
End: 2023-06-06
Payer: COMMERCIAL

## 2023-06-06 VITALS
SYSTOLIC BLOOD PRESSURE: 121 MMHG | TEMPERATURE: 98.7 F | RESPIRATION RATE: 16 BRPM | OXYGEN SATURATION: 99 % | HEIGHT: 62 IN | DIASTOLIC BLOOD PRESSURE: 81 MMHG | BODY MASS INDEX: 28.65 KG/M2 | HEART RATE: 91 BPM | WEIGHT: 155.7 LBS

## 2023-06-06 DIAGNOSIS — Z17.1 MALIGNANT NEOPLASM OF OVERLAPPING SITES OF LEFT BREAST IN FEMALE, ESTROGEN RECEPTOR NEGATIVE: ICD-10-CM

## 2023-06-06 DIAGNOSIS — D68.51 FACTOR 5 LEIDEN MUTATION, HETEROZYGOUS: ICD-10-CM

## 2023-06-06 DIAGNOSIS — C50.812 MALIGNANT NEOPLASM OF OVERLAPPING SITES OF LEFT BREAST IN FEMALE, ESTROGEN RECEPTOR NEGATIVE: ICD-10-CM

## 2023-06-06 DIAGNOSIS — C50.812 MALIGNANT NEOPLASM OF OVERLAPPING SITES OF LEFT BREAST IN FEMALE, ESTROGEN RECEPTOR NEGATIVE: Primary | ICD-10-CM

## 2023-06-06 DIAGNOSIS — Z17.1 MALIGNANT NEOPLASM OF OVERLAPPING SITES OF LEFT BREAST IN FEMALE, ESTROGEN RECEPTOR NEGATIVE: Primary | ICD-10-CM

## 2023-06-06 LAB
ALBUMIN SERPL-MCNC: 4.5 G/DL (ref 3.5–5.2)
ALBUMIN/GLOB SERPL: 1.6 G/DL (ref 1.1–2.4)
ALP SERPL-CCNC: 78 U/L (ref 38–116)
ALT SERPL W P-5'-P-CCNC: 16 U/L (ref 0–33)
ANION GAP SERPL CALCULATED.3IONS-SCNC: 10.3 MMOL/L (ref 5–15)
AST SERPL-CCNC: 17 U/L (ref 0–32)
BASOPHILS # BLD AUTO: 0.06 10*3/MM3 (ref 0–0.2)
BASOPHILS NFR BLD AUTO: 1.2 % (ref 0–1.5)
BILIRUB SERPL-MCNC: 0.3 MG/DL (ref 0.2–1.2)
BUN SERPL-MCNC: 14 MG/DL (ref 6–20)
BUN/CREAT SERPL: 18.2 (ref 7.3–30)
CALCIUM SPEC-SCNC: 9.6 MG/DL (ref 8.5–10.2)
CHLORIDE SERPL-SCNC: 104 MMOL/L (ref 98–107)
CO2 SERPL-SCNC: 24.7 MMOL/L (ref 22–29)
CREAT SERPL-MCNC: 0.77 MG/DL (ref 0.6–1.1)
DEPRECATED RDW RBC AUTO: 41.2 FL (ref 37–54)
EGFRCR SERPLBLD CKD-EPI 2021: 97.1 ML/MIN/1.73
EOSINOPHIL # BLD AUTO: 0.19 10*3/MM3 (ref 0–0.4)
EOSINOPHIL NFR BLD AUTO: 3.8 % (ref 0.3–6.2)
ERYTHROCYTE [DISTWIDTH] IN BLOOD BY AUTOMATED COUNT: 12.4 % (ref 12.3–15.4)
GLOBULIN UR ELPH-MCNC: 2.8 GM/DL (ref 1.8–3.5)
GLUCOSE SERPL-MCNC: 111 MG/DL (ref 74–124)
HCT VFR BLD AUTO: 42.5 % (ref 34–46.6)
HGB BLD-MCNC: 13.8 G/DL (ref 12–15.9)
IMM GRANULOCYTES # BLD AUTO: 0 10*3/MM3 (ref 0–0.05)
IMM GRANULOCYTES NFR BLD AUTO: 0 % (ref 0–0.5)
LYMPHOCYTES # BLD AUTO: 1.66 10*3/MM3 (ref 0.7–3.1)
LYMPHOCYTES NFR BLD AUTO: 33.5 % (ref 19.6–45.3)
MCH RBC QN AUTO: 29.2 PG (ref 26.6–33)
MCHC RBC AUTO-ENTMCNC: 32.5 G/DL (ref 31.5–35.7)
MCV RBC AUTO: 90 FL (ref 79–97)
MONOCYTES # BLD AUTO: 0.35 10*3/MM3 (ref 0.1–0.9)
MONOCYTES NFR BLD AUTO: 7.1 % (ref 5–12)
NEUTROPHILS NFR BLD AUTO: 2.69 10*3/MM3 (ref 1.7–7)
NEUTROPHILS NFR BLD AUTO: 54.4 % (ref 42.7–76)
NRBC BLD AUTO-RTO: 0 /100 WBC (ref 0–0.2)
PLATELET # BLD AUTO: 300 10*3/MM3 (ref 140–450)
PMV BLD AUTO: 9.7 FL (ref 6–12)
POTASSIUM SERPL-SCNC: 4.6 MMOL/L (ref 3.5–4.7)
PROT SERPL-MCNC: 7.3 G/DL (ref 6.3–8)
RBC # BLD AUTO: 4.72 10*6/MM3 (ref 3.77–5.28)
SODIUM SERPL-SCNC: 139 MMOL/L (ref 134–145)
WBC NRBC COR # BLD: 4.95 10*3/MM3 (ref 3.4–10.8)

## 2023-06-06 PROCEDURE — 85025 COMPLETE CBC W/AUTO DIFF WBC: CPT

## 2023-06-06 PROCEDURE — 80053 COMPREHEN METABOLIC PANEL: CPT

## 2023-06-06 PROCEDURE — 99214 OFFICE O/P EST MOD 30 MIN: CPT | Performed by: NURSE PRACTITIONER

## 2023-06-06 PROCEDURE — 36415 COLL VENOUS BLD VENIPUNCTURE: CPT

## 2023-06-06 NOTE — PROGRESS NOTES
Subjective     REASON FOR follow-up:    1.  Heterozygous state for factor V Leiden    2.  New onset stroke on aspirin by neurology    3.  Hypercholesterolemia    4.  Hypercoagulable work-up done.  Antithrombin 109% protein S activity 85% protein S antigen 107%, protein C activity 85%.  Anticardiolipin antibody IgM 24%, antibeta-2 glycoprotein antibody negative, lupus anticoagulant not detected, prothrombin gene mutation negative.    5.  Screening mammogram showed abnormality in the left breast 6 o'clock position, 8 mm on ultrasound, ultrasound-guided left breast biopsy, 6 cm from the nipple showed evidence of invasive ductal carcinoma poorly differentiated grade 3, Stephen[place] score of 8 out of 9 ER negative, OR negative, HER-2/ese 3+ positive.    6.  CT scan February 24, 2021 showed focal area of fatty infiltration of the liver.  1 cm hypoattenuating cortical lesion in the upper pole of the right kidney.  Similarly nodule in the upper pole of the left kidney is 1.5 cm.  Further evaluation by ultrasound suggested  Ultrasound March 1, 2021 shows solid lesion in the upper pole of the right kidney.  In the left kidney along the midpole of the left kidney is a nodule which is 16 x 16 x 14 mm which is thought to be a cyst.  A 6-month follow-up CT suggested    6.  S/p left partial mastectomy with sentinel lymph node biopsy.  Tumor was present at 5:00, invasive ductal carcinoma, Oziel score of 8, grade 3, greatest dimension was 12 mm x 8 x 4 mm.  Single focus of invasive carcinoma present.  There was extensive DCIS which is 30 mm x 8 x 2 mm, grade 3, no evidence of lymphovascular space invasion or dermal lymphatic invasion.  Margins were clear.  4 lymph nodes were negative.  It is a p T1cp N0, ER negative OR negative HER-2/ese 3+ with Ki-67 of 50%.  [employer] genetic test negative for 47 genes    7.recently initiated Xarelto at loading dose of 15 mg twice a day x3 weeks to then be switched to 20 mg daily per protocol.   She is doing this because of Mediport in place and known factor V Leiden heterozygosity.        History of Present Illness   Patient is a 42-year-old female with the above-mentioned history who is here today for follow-up visit.  She did have an EGD and colonoscopy 10/4/2022.  They did find a polyp however no other findings.  The patient does report she continues to have intermittent nausea about once per week.  At times she has to take Zofran.  She denies vomiting.    She continues on Xarelto 20 mg daily and denies bleeding issues.    She denies any new palpable breast mass or nodularity.  Her last mammogram was 4/4/2022 which was without evidence for malignancy.    Patient does still have a port in place, but would like to get the referral to have it removed now.    Interval history:   Returns the office today for 6-week follow-up.  Overall she reports she is doing well.  She continues to follow-up with the lymphedema clinic and notes improvement in her left upper extremity.  She continues to remain active and is exercising.  She remains on Xarelto.    Patient did follow-up with gynecologist and they did confirm that she is not menopausal.  They believe her chemotherapy temporarily caused a menopausal state.  He does report she has had 2 cycles 1 in April and 1 in May.  She does report they were heavier than she has remembered but overall denies any concerns or complications.  No other concerns noted at this time.    Oncologic history:  Patient is here for follow-up of her mammogram.  Patient had screening mammogram April 2, 2021:  NAD a focal asymmetry was present in the posterior one third retroareolar region of the left breast.  Further spot compression images and left breast ultrasound was suggested.  Right breast was negative    April 9, 2021: Left diagnostic mammogram showed an area of focal asymmetry in the posterior one third region aspect of the left breast her breast    Ultrasound showed an irregular 0.8  cm lesion in the left breast at the 6 o'clock position of the order of 6 cm from the nipple.  Ultrasound-guided left breast biopsy was recommended.    April 26, 2021: Ultrasound-guided biopsy of the left breast 6 o'clock position, 6 cm from the nipple showed    Invasive ductal carcinoma, poorly differentiated with a Garwood score grade 3 with a score of 8 out of 9 measuring at least 7 mm.  No definitive ductal carcinoma is in situ is identified.  ER 1% negative  IN 1% negative   HER-2/ese 3+ positive    There was no evidence of lymphadenopathy either in the mammogram of the ultrasound.  We suggested an MRI of the breast.  Patient has strong family history of breast cancer      May 7, 2021: MRI of bilateral breast reviewed.  My interpretation is that there is area of enhancement in the 6 o'clock position of the left breast this is the biopsy-proven malignancy.  There is additional area of linear enhancement anteriorly and laterally measuring up to 1.2 cm this is approximately 5.6 cm from the nipple.  In addition there is a enhancement 2 to 3 mm in the anterolateral aspect of the lateral aspect of the left breast which is 4.6 cm from the nipple.  There is also mildly prominent posterior lymph node in the mid axilla which measures 1 cm with cortical thickening.  Findings are consistent with multifocal disease.  No contralateral disease or internal mammary adenopathy identified    May 20, 2021: Genetic test, [employer] genetic test shows 47 genes negative    May 21, 2021: I reviewed the biopsy of the lymph node in the left axilla which shows reactive lymph node negative for any carcinoma or lymphoma    June 2, 2021:Final Diagnosis  1. Left Breast, 5:00 o'clock, MRI-guided Biopsies for Linear Enhancement: INVASIVE MAMMARY CARCINOMA, NO  SPECIAL TYPE (INVASIVE DUCTAL CARCINOMA).  A. Largest contiguous focus in a core measures 4 mm.  B. No in-situ component identified.  C. Brisk lymphocytic response noted (see  Comment).  D. No definitive lymphovascular nor perineural invasion identified.  2. Left Breast, 2:00 o'clock, MRI-guided Biopsy for Enhancement:  A. Benign breast parenchyma with radial scar and micropapillomas.  B. Usual ductal hyperplasia and columnar cell hyperplasia.  C. No atypical hyperplasia, in-situ nor invasive carcinoma identified    ER, IL, HER-2 new and Ki-67 pending on this new biopsy      Patient has been seen by Dr. Lashonda Euceda with plans of doing surgery on July first 2021    Once patient undergoes surgery lumpectomy with sentinel lymph node biopsy we will give further recommendation about treatment options.  Given that patient has multifocal disease and both of the lesions 2 lesions are 1.2 cm each, with it being a HER-2 positive tumor on the previous biopsy at 6:00 Dr. Euceda and myself discussed and patient preferred to undergo port placement at the same time of surgery.    Patient had [employer] genetic test which was negative.    She has completed surgery July 1, 2021.  She underwent left partial mastectomy with left axillary sentinel lymph node biopsy and right port placement.  Clinically she was thought to have a high-grade multifocal invasive ductal carcinoma ER/IL negative, IL positive.  The lesions require preoperative localization.  The multifocal cancer was bracketed with 2 savvy markers and the radial scar was localized with a needle.  Because of the volume of tissue that will be excised related to the breast volume the case was done in conjunction with Dr. Zavala for oncoplastic closure.    She is 2 weeks from surgery.  She is healing up reasonably well.    On review of her pathology she had left partial mastectomy with sentinel lymph node biopsy.  Tumor was present at 5:00, invasive ductal carcinoma, Oziel score of 8, grade 3, greatest dimension was 12 mm x 8 x 4 mm.  Single focus of invasive carcinoma present.  There was extensive DCIS which is 30 mm x 8 x 2 mm, grade 3, no  evidence of lymphovascular space invasion or dermal lymphatic invasion.  Margins were clear.  4 lymph nodes were negative.  It is a p T1cp N0, ER negative ME negative HER-2/ese 3+ with Ki-67 of 50%.    Patient is here to discuss further options of treatment.  Given small tumor T1c N0, she is a good candidate for weekly Taxol Herceptin.    Given that patient has heterozygous state for factor V Leiden and currently has port placed we will plan to start Eliquis 2.5 mg p.o. twice daily.  I have left a message for Dr. Craft in neurology to call me to discuss if patient needs to continue aspirin or we can hold off since be starting Eliquis.      Genetic test negative    August 4, 2021: Weekly Taxol Herceptin x12 weeks followed by Herceptin x1 year.  Cycle 1 today  Cycle 12 Taxol Herceptin October 10, 2021      Hematologic history:  patient is a 40-year-old female who presented end of December with numbness and tingling in her left upper extremity and left face.  She went to see Dr. Goodman who then got concerned and referred to neurology.  She was referred to Dr. Freedman and talk to Dr. Thompson neurology for evaluation.  Patient underwent ultrasound of the carotids which did not show significant stenosis of the carotids.  She underwent 2D echocardiogram which showed a good ejection fraction of 64% with mild mitral valve prolapse and mild mitral stenosis.  She had a 24-hour Holter which was negative.  She then had also an MRI of the brain February 3, 2021 which showed areas of hyperintensity involving the subcortical white matter of the right frontal lobe superior laterally and posteriorly with the largest area measuring 8 9 mm.  There is also enhancement present.  The findings are consistent with a subacute infarct.  They could not differentiate if there is any underlying neoplastic process but a short-term follow-up was suggested in order to follow-up.  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    Patient currently got started on aspirin and factor V Leiden was obtained by neurology because patient's paternal aunt had factor V Leiden mutation and she was heterozygous.  Patient's testing also showed that she was heterozygous.    Patient states her numbness and tingling is resolved completely on the left arm and face it just lasted for a 24 hours.  She was here referred here for neurology to see if there is any other cause for her underlying hypercoagulable state.  She has not had a complete hypercoagulable work-up.  Also discussed with her that an underlying malignancy could cause a hypercoagulable state and we would need to do a CT scan to rule that out.    Patient's mother has had breast cancer in her 50s but had pancreatic cancer at 68 and  from that.  Patient's dad had stroke at 63.  But he is alive at 74.  She has 1 brother who is 40 in good health.  Paternal aunt had breast cancer in her 40s.  Paternal grandfather had colon cancer in his late 80s.  Maternal uncle had throat cancer and another maternal uncle had skin cancer with metastasis to the lung.  And maternal grandmother had breast cancer.    Patient was to have mammogram done last year but because of COVID-19 it got postponed and she has not had it done yet.    Patient used to be a smoker half pack per day for 7 years but quit 10 years ago.  She has high cholesterol for which she is on treatment.    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     History of rheumatic fever     Hyperlipidemia 1/2/2022    Resolved; now unmedicated    Hypertension     Hypothyroidism     Infiltrating ductal carcinoma of left breast 05/05/2021    LEFT, HAD LUMPECTOMY, CHEMO/RADIATION    Kidney stone     Mitral regurgitation     Mitral valve insufficiency     On anticoagulant therapy     PONV (postoperative nausea and vomiting)     Stroke 12/2020    HX OF, NO RESIDUAL    Stroke-like symptoms         Past Surgical History:   Procedure Laterality Date    BREAST BIOPSY Left 05/05/2021    IDC    BREAST LUMPECTOMY      BREAST LUMPECTOMY WITH SENTINEL NODE BIOPSY N/A 07/01/2021    Procedure: right port placement, Left SHAINA-guided, bracketed, partial mastectomy and sentinel lymph node biopsy Left breast needle-localized excisional biopsy;  Surgeon: Lashonda Euceda MD;  Location: [facility];  Service: General;  Laterality: N/A;    BREAST SURGERY Bilateral 07/01/2021    Procedure: RIGHT BREAST REDUCTION MASTOPEXY LEFT BREAST ONCOPLASTIC CLOSURE;  Surgeon: Caridad Baker MD PhD;  Location: [facility];  Service: Plastics;  Laterality: Bilateral;    COLONOSCOPY N/A 10/04/2022    Procedure: COLONOSCOPY TO CECUM WITH COLD BIOPSIES POLYPECTOMY;  Surgeon: Marlon Hough MD;  Location: [facility] MAIN OR;  Service: Gastroenterology;  Laterality: N/A;  POLYP    ENDOSCOPY N/A 10/04/2022    Procedure: ESOPHAGOGASTRODUODENOSCOPY WITH COLD BIOPSIES;  Surgeon: Marlon Hough MD;  Location: [facility] MAIN OR;  Service: Gastroenterology;  Laterality: N/A;  NORMAL    PAP SMEAR  2016    VENOUS ACCESS DEVICE (PORT) REMOVAL N/A 3/27/2023    Procedure: REMOVAL VENOUS ACCESS DEVICE;  Surgeon: Lashonda Euceda MD;  Location: [facility] MAIN OR;  Service: General;  Laterality: N/A;        Current Outpatient Medications on File Prior to Visit   Medication Sig Dispense Refill    atorvastatin (LIPITOR) 10 MG tablet Take 1 tablet by mouth Daily.      Cholecalciferol (D3) 50 MCG (2000 UT)  tablet Take 2 tablets by mouth 2 (Two) Times a Week.      famotidine (PEPCID) 20 MG tablet Take 1 tablet by mouth Daily. 30 tablet 3    levothyroxine (SYNTHROID, LEVOTHROID) 125 MCG tablet Take 1 tablet by mouth Daily. 90 tablet 1    lisdexamfetamine (Vyvanse) 60 MG capsule Take 1 capsule by mouth Every Morning 30 capsule 0    LORazepam (ATIVAN) 0.5 MG tablet Take 1 tablet by mouth Every 8 (Eight) Hours As Needed for Anxiety. for anxiety 30 tablet 2    ondansetron ODT (ZOFRAN-ODT) 8 MG disintegrating tablet Place 1 tablet on the tongue Every 8 (Eight) Hours As Needed for Nausea or Vomiting. 30 tablet 3    valsartan (DIOVAN) 160 MG tablet Take 1 tablet by mouth Daily. 90 tablet 1    vitamin B-12 (CYANOCOBALAMIN) 1000 MCG tablet Take 1 tablet by mouth Daily. 30 tablet 2    rivaroxaban (Xarelto) 20 MG tablet Take 1 tablet by mouth Daily. Indications: Resume on Wednesday, 3/29/2023. 30 tablet 3     No current facility-administered medications on file prior to visit.        ALLERGIES:    Allergies   Allergen Reactions    Sulfa Antibiotics Rash        Social History     Socioeconomic History    Marital status: Single    Number of children: 0   Tobacco Use    Smoking status: Former     Packs/day: 0.50     Years: 10.00     Pack years: 5.00     Types: Cigarettes     Start date: 1997     Quit date: 2009     Years since quittin.4    Smokeless tobacco: Never    Tobacco comments:     Off and on for 12 years   Vaping Use    Vaping Use: Never used   Substance and Sexual Activity    Alcohol use: Not Currently     Comment: RARELY    Drug use: No    Sexual activity: Yes     Partners: Female     Birth control/protection: None     Comment: Unnecessary        Family History   Problem Relation Age of Onset    Breast cancer Mother 53    Pancreatic cancer Mother 68    Stroke Father     Hypertension Brother     Colon polyps Maternal Aunt     Melanoma Maternal Uncle     Breast cancer Paternal Aunt 55    Lung cancer Maternal  "Grandmother     Colon cancer Maternal Grandfather     Prostate cancer Maternal Grandfather     Prostate cancer Paternal Grandfather     Breast cancer Paternal Great-Grandmother 94    Brain cancer Maternal Cousin 20    Ovarian cancer Other         Paternal great aunt     Breast cancer Other     Colon polyps Maternal Aunt     Malig Hyperthermia Neg Hx     Crohn's disease Neg Hx     Irritable bowel syndrome Neg Hx     Ulcerative colitis Neg Hx       Family  history: Mother had breast cancer at age 57.  Maternal great aunt had breast cancer and paternal great aunt had breast cancer in her 40s.  Patient's mother had pancreatic cancer as well.  Paternal grandfather had prostate cancer.    Review of Systems   As per HPI      Objective     Vitals:    06/06/23 0950   BP: 121/81   Pulse: 91   Resp: 16   Temp: 98.7 °F (37.1 °C)   TempSrc: Temporal   SpO2: 99%   Weight: 70.6 kg (155 lb 11.2 oz)   Height: 157.5 cm (62.01\")   PainSc: 0-No pain         6/6/2023     9:52 AM   Current Status   ECOG score 0     Physical exam    CONSTITUTIONAL:  Vital signs reviewed.  No distress, looks comfortable.  RESPIRATORY:  Normal respiratory effort.  Lungs clear to auscultation bilaterally.  BREAST: Right breast: No skin changes, no evidence of breast mass, no nipple discharge, no evidence of any right axillary adenopathy or right supraclavicular adenopathy  Left breast: No evidence of any skin changes, no evidence of any left breast mass and no evidence of left nipple discharge as well as no left axillary adenopathy or left supraclavicular adenopathy.  CARDIOVASCULAR:  Normal S1, S2.  No murmurs rubs or gallops.  No significant lower extremity edema.  GASTROINTESTINAL: Abdomen appears unremarkable.  Nontender.  No hepatomegaly.  No splenomegaly.  LYMPHATIC:  No cervical, supraclavicular, axillary lymphadenopathy.  SKIN:  Warm.  No rashes.  PSYCHIATRIC:  Normal judgment and insight.  Normal mood and affect.    Physical exam preformed and " reviewed. No changes noted from previous exam. Ayanna Jaquez, APRN ; 06/06/2023     RECENT LABS:  Results from last 7 days   Lab Units 06/06/23  0940   WBC 10*3/mm3 4.95   NEUTROS ABS 10*3/mm3 2.69   HEMOGLOBIN g/dL 13.8   HEMATOCRIT % 42.5   PLATELETS 10*3/mm3 300     Results from last 7 days   Lab Units 06/06/23  0940   SODIUM mmol/L 139   POTASSIUM mmol/L 4.6   CHLORIDE mmol/L 104   CO2 mmol/L 24.7   BUN mg/dL 14   CREATININE mg/dL 0.77   CALCIUM mg/dL 9.6   ALBUMIN g/dL 4.5   BILIRUBIN mg/dL 0.3   ALK PHOS U/L 78   ALT (SGPT) U/L 16   AST (SGOT) U/L 17   GLUCOSE mg/dL 111         Assessment & Plan       * [id number], ER negative MN negative HER-2/ese 3+ with Ki-67 of 50%.  S/p left partial mastectomy with sentinel lymph node biopsy.  Tumor was present at 5:00, invasive ductal carcinoma, Mill[place] score of 8, grade 3, greatest dimension was 12 mm x 8 x 4 mm.  Single focus of invasive carcinoma present.  There was extensive DCIS which is 30 mm x 8 x 2 mm, grade 3, no evidence of lymphovascular space invasion or dermal lymphatic invasion.  Margins were clear.  4 lymph nodes were negative.  It is a p T1cp N0, ER negative MN negative HER-2/ese 3+ with Ki-67 of 50%.  Patient is s/p port placement  Discussed in length with patient that given a small tumor she is eligible for weekly Taxol Herceptin.  Weekly Taxol x12 weeks along with weekly Herceptin followed by 1 year of Herceptin.  Discussed patient in the breast cancer conference  2D echo done which showed ejection fraction of 61-65% and strain pattern of -20.8.  Patient also seen by Dr. Virk cardiology and Dr. Virk is planning to repeat echocardiogram in 3 months after initiation of therapy.  8/4/2021 initiating weekly Taxol Herceptin  9/8/2021, proceed with week #6 Taxol and Herceptin.  Patient continues to tolerate treatment well. No signs of peripheral neuropathy.  Her next echocardiogram due November 4, 2021, She follows up with cardiology Dr. Lyon  [place].  9/28/2021, proceed with week #9 taxol/herceptin.  She does feel her nausea has worsened after her last cycle of chemotherapy.  She prefers the disintegrating Zofran, this will be called to her pharmacy today.  Patient is here to take cycle 11 of weekly Taxol with worsening fatigue and worsening rash and overall dysphoric mood.  She also cannot sleep and her quality life is worsening.  At the present time we will plan to give 1 more cycle of Taxol following which she will be referred to radiation.  Cycle 12 Taxol Herceptin October 20, 2021  Completed radiation November 2021.  February 23, 2022: Reviewed echocardiogram with normal ejection fraction and strain pattern.  Patient seen by Dr. Virk and laqutia to continue Herceptin  3/16/2022 here for continued Herceptin maintenance  For 4/20/2022 screening bilateral mammogram was negative  April 27 2022: Currently tolerating Herceptin.  Last dose of Herceptin will be June 18, 2022.  Reviewed mammogram results from April 4, 2022 which is negative  May 18 2022: Patient has 2 more cycles of Herceptin after today.  Reviewed echo with ejection fraction 57% stable  June 29, 2022: Last dose of Herceptin today.  No further issues.  April 25, 2023: Patient is doing well without any complaints.  S/p port removal March 27, 2023 by Dr. Euceda.  Patient had 1 episode of menstrual period lasting for 4 days.  She was thought to be menopausal by her LH and FSH but given that she has had menstrual period's back it is possible that she is not menopausal.  Will refer to gynecology.  6/6/2023: Patient continues to report she is doing well.  He has followed up with her gynecologist and her menstrual periods have returned.  CBC reviewed and hemoglobin remains normal.    *  Factor V Leiden heterozygosity with right frontal stroke and patient presented in December 2020 with left-sided weakness tingling and numbness and also numbness in the face.  Was referred to neurology and cardiology by  Dr. Goodman  Ultrasound of carotid does not show significant stenosis  24 Holter is negative  2D echocardiogram shows ejection fraction of 64% with mild mitral regurg and mitral stenosis  Neurology obtain factor V Leiden given that patient's paternal aunt had's history of factor V Leiden and that was heterozygous for factor V Leiden  Continue aspirin as per neurology.  Also patient on medications for her high cholesterol started after stroke currently asymptomatic  Hypercoagulable work-up done which is negative except heterozygous state for factor V Leiden.  Complete stroke work-up has been negative and since this is arterial stroke and she was not on aspirin prior to that and her symptoms have resolved I agree with continuing aspirin alone along with high cholesterol medications.  MR venogram done on May 10, 2021 is negative.  Patient has been referred to the stroke neurologist Dr. Johnson  Patient diagnosed with breast cancer with Mediport placed.  Per discussion with neurology, patient initiated on prophylactic Xarelto and aspirin discontinued.  Patient had one nosebleed which lasted 10 minutes but with pinching the nose it helped.  But she is switching to 20 mg daily from tomorrow.  We discussed about placing ice and notifying us if her nosebleeds are significant.  But it resolved.  It was likely secondary to dry air  9/7/2021, patient continues to experience nosebleeds.  Reports these occur when her nose itches, and after scratching her nose it begins to bleed.  She feels this may be related to dry air, so I have asked her to start using saline nasal spray to help moisten her nose.  If she experiences nosebleeds that do not stop, I asked her to notify our office.  Tolerating anticoagulation with Xarelto.  No bleeding issues with Xarelto  Patient still has the port and hence will need to continue Xarelto  Patient will require port removal but wants to wait till Dr. Euceda comes back prior to port  removal  2022 she continues on Xarelto 20 mg daily denies bleeding issues.  2023: Given that she had 1 episode of menstrual period we will follow her up in 6 weeks to make sure she is not having any further menstrual period's or heavier bleeding  Given that her port is removed we will discontinue Xarelto and restart her aspirin per neurology for her stroke Radha       * Family history of cancer: Patient's mother had breast cancer at age 50 and pancreatic cancer at age 68 and  from pancreatic cancer.  Patient's maternal grandmother had breast cancer in her 50s.  Her maternal uncle had skin cancer which went to the lung and another maternal uncle had throat cancer.  Maternal aunt had call colon polyps.  Patient's paternal aunt had breast cancer in her 40s.  And paternal grandfather with colon cancer in his 80s.  Paternal aunt also had factor V Leiden.  Genetic testing is negative    * Solid nodule in the right kidney on ultrasound, will schedule follow-up with urology  Patient was to follow-up with Dr. Shultz  Will need records from urology  Will discuss with patient at her next appointment about follow-up with urology  Patient was seen by Dr. Becerra and apparently no follow-up was needed for the nodules in the kidney.  2023: We will get records from Dr. Becerra's office    *  Elevated BMI, encourage diet and exercise.  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  Patient has been requiring frequent Tums.  Recommended she begin Pepcid 20 mg daily.    Stable, continue Pepcid 20 mg daily.    *Constipation likely secondary to ferrous sulfate.  Oral iron discontinued  Continues to have constipation, seen by GI who currently is going to do colonoscopy in October.   EGD/colonoscopy 10/4/2022 by Dr. Hough    *Headache likely related to body ache because of  "Taxol  9/29/2021, patient discusses concern about worsening headaches.  Describing them as \"glass breaking\".  Started about 2 weeks ago, and progressively worsened.  Occurring 3-4 times daily now, and lasting less than 1 minute with each episode.  This occurs in the same place, right upper skull.  Denies vision changes or dizziness.  Will order stat MRI of the brain for further evaluation.  The patient follows with a neurologist, and is going to notify patient help with neuropathy as well of her symptoms and the plan for MRI.  MRI of the brain was done 10/1/2021 with no evidence of restricted diffusion to indicate acute infarction.  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    *Elevated liver function tests, total bilirubin still normal AST ALT slightly elevated.  Patient did take Tylenol but not too much.  No dose adjustments required at the fingers time.  Liver function tests normalized  6/6/2023: Liver enzymes remain normal    *Iron deficiency anemia, patient's percent saturation still low at 9% s/p full dose of IV Venofer.  9/29/2021, patient will receive dose #5 Venofer today.  Hemoglobin today 10.1.  12/22/2021, hemoglobin today 12.7.  3/16/2022 hemoglobin remains normal at 12.9.  Patient scheduled for colonoscopy end of July 2022   Hemoglobin 12/14/2022 is 12.8.  6/6/2023: Hemoglobin 13.8    *Insomnia  9/29/2021, patient reports difficulty sleeping.  She is experiencing this every night, not just the nights of treatment when she receives IV dexamethasone.  She has attempted taking Benadryl, however felt groggy following this.  She is going to attempt melatonin to see if this improves her sleep.  Worsening, will try gabapentin which will help with neuropathy as well  Patient took gabapentin for a few nights and had issues with  headache.  She is also very concerned about other possible side " effects that she discontinued the medication.  She is tried melatonin in the past without results.  We discussed trying Benadryl versus other prescription medication.  She is going to try Benadryl first.    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 Becerra' office in regards to kidney nodules.  Patient follow-up scheduled for 8/29/2023 with Dr. Beavers and labs      Ayanna Jaquez, APRN  4/25/2023     Dr. Maxine Haynes

## 2023-06-08 DIAGNOSIS — F90.0 ATTENTION DEFICIT HYPERACTIVITY DISORDER (ADHD), PREDOMINANTLY INATTENTIVE TYPE: Chronic | ICD-10-CM

## 2023-06-16 ENCOUNTER — TELEPHONE (OUTPATIENT)
Dept: FAMILY MEDICINE CLINIC | Facility: CLINIC | Age: 43
End: 2023-06-16

## 2023-06-16 NOTE — TELEPHONE ENCOUNTER
Caller: Radha Astorga    Relationship to patient: Self    Best call back number: [phone]    Chief complaint: MED REFILLS    Type of visit: TELEHEALTH VISIT WITH DR. ROSALES    Requested date: ASAP    If rescheduling, when is the original appointment: N/A    Additional notes: THE PATIENT IS CURRENTLY OUT OF HER MEDICATION. THE PATIENT IS ON CONTROLLED SUBSTANCES AND WILL NEED DR. ROSALES TO APPROVE THESE.

## 2023-08-14 NOTE — PROGRESS NOTES
[facility]     Referring Provider: Neena Beavers MD     Chief complaint: Routine follow up breast caner      HPI:   5/21/21: Saw Dr. Euceda  Ms. Radha Astorga is a 42 yo woman, seen at the request of Dr. Neena Beavers, for a new diagnosis of left breast cancer. This was initially detected as an imaging abnormality on routine screening. Her work-up is detailed in the oncologic history below. Prior to the biopsy, she denies any breast lumps, pain, skin changes, or nipple discharge. She has a past history of a benign left breast percutaneous biopsy in 2013. She has a past history of a stroke in December 2020 without any residual neuro deficits. Cardiac work-up was negative, however hypercoagulable work-up revealed a factor V Leiden mutation. She has a family history of breast cancer in her mother (diagnosed at age 53) and a paternal aunt (diagnosed at age 55), as well as a paternal great aunt and her paternal great grandmother. Her paternal great aunt also had ovarian cancer and her mother had pancreatic cancer. She underwent expanded panel genetic testing at Dr. Beavers's office and she was negative for mutation.    6/16/21: Saw Dr. Euceda  At her last visit, she underwent left axillary lymph node biopsy, which thankfully returned as benign. She underwent left breast MR-guided biopsy x2. The lesion at 5:00, near the primary malignancy, returned as a second foci of cancer, and the lesion at 2:00, returned as a radial scar (see pathology report details below). I have already briefly discussed these results with her over the phone and the plan still is to proceed with breast conservation. She has an appointment scheduled with plastic surgery next week.    7/15/21: Saw Dr. Euceda   She underwent port placement, left partial mastectomy and excisional biopsy and SLNB with oncoplastic closure and right reduction for symmetry on 7/1/21. See surgery & pathology details below in oncologic history. She is  "complaining of a full sensation and some pain in her left armpit.    10/20/21 Saw Dr. Euceda  She returns today for scheduled follow-up. Her last dose of Taxol/Herceptin is later today. She has seen Dr. Byrd and plans on starting radiation sometime in November. Unfortunately she has been dealing with left arm lymphedema for a few months. She has been seeing Julia for this and undergoing bandaging. She also is wearing a compression sleeve and gauntlet at home. She denies any new breast related complaints and is very happy with her cosmetic result.    8/18/2022   Returns for routine follow up today.  Completed radiation November 2021.  Completed Cycle 12 Taxol Herceptin Oct 2021  Completed Herceptin June 2022  Saw Dr. Beavers 8/10/22  \"Plan:   Patient awaiting colonoscopy as she has had intermittent constipation which is new for her and is scheduled in October  She had port flush today  Discussed about port removal but she wants to wait till her colonoscopy is completed and subsequently wants to get the port removed  Reviewed screening mammogram from April 2022 which is negative  Patient is going to see Dr. Lashonda Euceda next week who can then schedule her for port removal after colonoscopy  Encourage patient to exercise, increase fiber in diet and use prune juice to help with her constipation  Follow-up with me in 3 months with labs with port flush in 6 weeks in 3 months\"  She had bioimpedence yesterday \"L-Dex value today is 1.7 which is WNL\"  Today she has complaints of left breast edema.  She states she is wearing her sports bras and she is continuing with breast massage.  The edema is on the lower outer portion of the left breast.    2/20/2023  Presenting to the office today for routine follow-up.  Last saw oncology in December 2022 no changes were made to the treatment plan.  She is needing to schedule a time to get port removed. She is doing well without any new complaints.  Seeing PT every 4-6 weeks and " bio impedence score decreasing every time she goes.  She is wearing her sleeve at night.      8/21/2023 interval history  Patient presenting to the office today for routine follow-up.  She had a screening mammogram on 4/7/2023 which resulted as BI-RADS 2.  She last saw her oncologist in June without any changes to the treatment plan.  She has no new breast complaints today.  Port removed in March.      Oncology/Hematology History Overview Note   12/11/2019, Screening MMG with [facility] ( [facility]):  Scattered fibroglandular density. There are no findings to suggest malignancy.  BI-RADS 1: Negative.     Malignant neoplasm of overlapping sites of left breast in female, estrogen receptor negative   4/1/2021 Initial Diagnosis    Malignant neoplasm of overlapping sites of left breast in female, estrogen receptor negative (CMS/HCC)     4/2/2021 Imaging    Screening MMG with [facility] ( [facility]):  Scattered fibroglandular densities. In the posterior one-third of the left breast projecting in a retroareolar location on the CC images there is an area of focal asymmetry. I see no suspicious calcifications or areas of architectural distortion in either breast. There is no evidence for skin thickening or nipple retraction.  BI-RADS 0: Incomplete.     4/9/2021 Imaging    Left Diagnostic MMG with [facility] & Left Breast US ( [facility]):  MMG:  With additional imaging there is persistence of the area of focal asymmetry in the posterior one-third inferior aspect of the left breast.  US:  At the 6 o'clock position on the order of 6 cm from the nipple there is a 0.8 cm irregular hypoechoic lesion. Mild posterior acoustic shadowing is noted.  BI-RADS 4: Suspicious.     4/26/2021 Biopsy    Left Breast, US-Guided Biopsy ( [facility]):    1. Left Breast, 6:00, 6 cm FN, U/S-Guided Core Needle Biopsy for a Mass:                 A. INVASIVE DUCTAL CARCINOMA, Poorly differentiated; Spencer Histologic Grade III/III (tubule score = 3, nuclear score = 2, mitoses  score = 3), measuring at least 7 mm.               B. No definitive ductal carcinoma in situ identified.   C. Negative for lymphovascular space invasion.    ER negative (<1%)  VA negative (<1%)  HER2 positive (IHC 3+)  Ki-67 55%     5/5/2021 Genetic Testing    [employer] Common Hereditary Cancers Panel (47 genes):    Negative     5/7/2021 Biopsy    Bilateral Breast MRI ([facility]):  There is an amorphous area of enhancement seen within the 6:00 position of the left breast, posterior depth measuring approximately 1.2 cm (series 9 image 91). Susceptibility artifact is present within this region consistent with recently newly diagnosed biopsy-proven malignancy. There is an additional area of more linear enhancement seen just anteriorly and laterally measuring up to 1.2 cm (series 9 image 91). This is seen approximately 5.6 cm from the nipple. Mixed washout kinetics are present. There is an additional punctate area of enhancement measuring only approximately 2 to 3 mm seen within the anterior lateral aspect of the left breast seen approximately 4.6 cm from the nipple (series 9 image 79) also demonstrating mixed washout  kinetics. No additional areas of enhancement identified. There is questionable mildly prominent lymph nodes present within the left axillary region (series 9 image 34 and series 9 image 18) with mild cortical thickening present with benign-appearing fatty juan miguel present. This is seen both within the high and mid to low axilla. A mildly prominent posterior lymph node is also present within the mid axilla measuring up to 1 cm with cortical thickening present (series 9 image 27). Limited evaluation of the intrathoracic contents symmetric no acute process. A marker seen at the junction of the left breast and the left chest wall inferiorly (series 4 image 135) with no abnormalities  identified within this region. No abnormal fluid collections identified.  BI-RADS 6: Known malignancy.     5/21/2021 Biopsy    Left  Axilla, US-Guided Biopsy ( [facility]):  1.  Lymph Node, Left Axilla, Core Biopsy:                 A.  Fragments of reactive lymph node; negative for lymphoma and carcinoma.      6/2/2021 Biopsy    Left Breast, MR-Guided Biopsy x 2 ( [facility]):    1. Left Breast, 5:00 o'clock, MRI-guided Biopsies for Linear Enhancement:   INVASIVE MAMMARY CARCINOMA, NO SPECIAL TYPE (INVASIVE DUCTAL CARCINOMA).               A. Largest contiguous focus in a core measures 4 mm.               B. No in-situ component identified.                C. Brisk lymphocytic response noted (see Comment).               D. No definitive lymphovascular nor perineural invasion identified.  -Bowtie clip. Biopsy clips marking the 2 sites of biopsy-proven malignancy are  by 2.5 cm (bowtie clip and posteriorly located U-shaped clip).     ER negative (0%)  [place] negative (0%)  Her2+ (IHC 3+)  Ki-67 50%    2.  Left Breast, 2:00 o'clock, MRI-guided Biopsy for Enhancement:                A. Benign breast parenchyma with radial scar and micropapillomas.               B. Usual ductal hyperplasia and columnar cell hyperplasia.               C. No atypical hyperplasia, in-situ nor invasive carcinoma identified.  -U-shaped clip. Concordant.     7/1/2021 Surgery    Port placement, left SHAINA-guided, bracketed, partial mastectomy and left breast needle-localized excisional biopsy and sentinel lymph node biopsy with oncoplastic closure and right reduction for symmetry    1. Left Breast, Partial Mastectomy:               A. Invasive ductal carcinoma:                            1. Invasive carcinoma measures 12 mm x 8 mm x 4 mm.                            2. Overall [place] grade III (tubular score = 3, nuclear score = 2, mitotic score = 3).                                                 3. No definitive lymphovascular invasion identified.               B. Associated scattered ductal carcinoma in situ (DCIS):                            1. DCIS spans an area estimated  at  30 mm x 8 mm x 2 mm.                            2. High grade solid and comedo DCIS.                            3. Rare microcalcification present in DCIS.                  C. All margins are negative for invasive carcinoma.                    Carcinoma measures 6 mm from the closest (Inferior) margin of excision.                     All other margins measure at least 8 mm from invasive carcinoma including:                            Anterior margin = 18 mm                            Posterior margin = 24 mm                            Superior margin = 8 mm                            Inferior margin = 6 mm                             Lateral margin = 12 mm                            Medial margin = 20 mm                  D. All margins are negative for in ductal situ carcinoma (DCIS).                    DCIS measures 1.5 mm from the closest (Superior) margin of excision.                    All other margins  measure at least 2.5 mm from DCIS including:                            Anterior margin = 18  mm                            Posterior margin = 15  mm                            Superior margin = 1.5  mm                            Inferior margin =  6 mm                             Lateral margin = 3  mm                            Medial margin = 2.5 mm                  E.  Multiple biopsy site changes are identified (x2) and multiple metallic clips (x4) retrieved.               F.  No Pagetoid involvement of skin by malignancy identified.               G. Non-neoplastic breast tissue with fibrocystic change, sclerosing adenosis, columnar cell change, micropapilloma and focal fibroadenomatoid change. Rare microcalcification present in benign breast tissue.               H. Previous Biomarkers: Estrogen receptors: Negative, Progesterone receptors: Negative,                    HER/2-ese: Positive (score 3+) and Ki-67 = 50% (see [id number]).       2.  Left Breast, Additional Superior, Medial and Inferior  Margins:                 A.  No in situ nor infiltrating carcinoma identified.               B.  New margins are negative for malignancy by an additional 15 mm.      3.  Left Breast at 2  o'clock, Needle Localization, Excisional Biopsy:                A.  Benign breast tissue with changes suggesting radial scar with usual ductal hyperplasia,                      sclerosing adenosis and fibrocystic change.                 B.  No in situ nor infiltrating carcinoma identified.               C.  Biopsy site changes are identified and metallic clip retrieved.                D.  All margins are viable and negative for neoplasm/malignancy.                       4.  Left Breast, True Medial and Inferior Margins:                 A.  No in situ nor infiltrating carcinoma identified.                B.  New margins are negative for malignancy by an additional 20 mm.     5.  Left Breast, True Superior and Lateral Margins:                A.  No in situ nor infiltrating carcinoma identified.                B.  New margins are negative for malignancy by an additional 40 mm.     6.  Left Breast, True Inferior Margin:                A.  No in situ nor infiltrating carcinoma identified.                B.  Benign skin and breast tissue.  C.  New margin is negative for malignancy by an additional 15 mm.     7.  Right Breast Tissue, Augmentation:                 A.  Benign skin and breast tissue (312 grams).                B.  No in situ nor infiltrating carcinoma identified.      8.  Left Axilla, [place] Lymph Node #1 (Hot, Blue, Count 5,500).                A.  Three lymph nodes negative for malignancy by routine staining (0/3).   B.  Includes one (largest) lymph node with focal fibrosis suggesting previous biopsy effect.      9.  Left Axilla, [place] Lymph Node, #2 (Hot, Not Blue, Count 900):                A.  One lymph node negative for metastatic carcinoma by routine staining (0/1).     10.  Left Breast, Superior Pole:                  A.  No in situ nor infiltrating carcinoma identified.                B.  Superior margin is negative for malignancy by an additional 20 mm.     8/4/2021 - 10/20/2021 Chemotherapy    OP BREAST PACLitaxel / Trastuzumab-anns (Weekly X 12)       9/1/2021 - 10/13/2021 Chemotherapy    OP SUPPORTIVE Iron Sucrose (Venofer)       11/2/2021 - 12/1/2021 Radiation    Radiation OncologyTreatment Course:  Radha Astorga received 5256 cGy in 21 fractions to LEFT breast with tumor bed boost.     11/10/2021 -  Chemotherapy    OP BREAST Trastuzumab-anns Q21D (maintenance)       4/4/2022 Imaging       EXAMINATION: Bilateral digital mammography with R2 computer aided  detection and bilateral digital Tomosynthesis     FINDINGS: Bilateral digital CC and MLO mammographic and digital  Tomosynthesis images were obtained. Comparison is made to prior studies  dated 4/2/2021 and 4/9/2021 . Scattered fibroglandular densities are  seen throughout both breasts. There has been interval bilateral breast  surgery. No suspicious calcifications or new or dominant masses are  appreciated. Surgical clips are seen in the left axilla and the hilum of  a Mediport catheter projects over the right axilla. There is no evidence  for axillary lymphadenopathy or nipple retraction.     IMPRESSION:  1. There is no evidence for malignancy or significant change in either  breast. Routine followup mammography is recommended.     BI-RADS category 2: Benign.     4/7/2023 Imaging    Bilateral screening mamm[facility] at [facility]  FINDINGS:  There are scattered areas of fibroglandular density.     Benign-appearing postsurgical changes are identified in both breasts.  There are no suspicious masses, calcifications, or areas of  architectural distortion.        IMPRESSION/RECOMMENDATION(S):  No mammographic evidence of malignancy. Recommend annual screening  mammogram in one year.     BI-RADS Category 2: Benign          Review of Systems:  See interval history.        Medications:    Current Outpatient Medications:     amphetamine-dextroamphetamine (Adderall) 20 MG tablet, Take 1 tablet by mouth 2 (Two) Times a Day., Disp: 60 tablet, Rfl: 0    atorvastatin (LIPITOR) 10 MG tablet, Take 1 tablet by mouth Daily., Disp: , Rfl:     Cholecalciferol (D3) 50 MCG (2000 UT) tablet, Take 2 tablets by mouth 2 (Two) Times a Week., Disp: , Rfl:     famotidine (PEPCID) 20 MG tablet, Take 1 tablet by mouth Daily., Disp: 30 tablet, Rfl: 3    levothyroxine (SYNTHROID, LEVOTHROID) 125 MCG tablet, Take 1 tablet by mouth Daily., Disp: 90 tablet, Rfl: 1    lisdexamfetamine (Vyvanse) 60 MG capsule, Take 1 capsule by mouth Every Morning, Disp: 30 capsule, Rfl: 0    LORazepam (ATIVAN) 0.5 MG tablet, Take 1 tablet by mouth Every 8 (Eight) Hours As Needed for Anxiety. for anxiety, Disp: 30 tablet, Rfl: 2    methocarbamol (ROBAXIN) 500 MG tablet, Take 1 tablet by mouth 3 (Three) Times a Day As Needed for Muscle Spasms for up to 5 days., Disp: 14 tablet, Rfl: 0    methylPREDNISolone (MEDROL) 4 MG dose pack, Take as directed on package instructions., Disp: 21 tablet, Rfl: 0    ondansetron ODT (ZOFRAN-ODT) 4 MG disintegrating tablet, Place 1 tablet on the tongue Every 6 (Six) Hours As Needed for Nausea or Vomiting for up to 5 days., Disp: 12 tablet, Rfl: 0    valsartan (DIOVAN) 160 MG tablet, Take 1 tablet by mouth Daily., Disp: 90 tablet, Rfl: 1    vitamin B-12 (CYANOCOBALAMIN) 1000 MCG tablet, Take 1 tablet by mouth Daily., Disp: 30 tablet, Rfl: 2      Allergies   Allergen Reactions    Sulfa Antibiotics Rash       Family History   Problem Relation Age of Onset    Breast cancer Mother 53    Pancreatic cancer Mother 68    Stroke Father     Hypertension Brother     Colon polyps Maternal Aunt     Melanoma Maternal Uncle     Breast cancer Paternal Aunt 55    Lung cancer Maternal Grandmother     Colon cancer Maternal Grandfather     Prostate cancer Maternal Grandfather     Prostate cancer Paternal  Grandfather     Breast cancer Paternal Great-Grandmother 94    Brain cancer Maternal Cousin 20    Ovarian cancer Other         Paternal great aunt     Breast cancer Other     Colon polyps Maternal Aunt     Malig Hyperthermia Neg Hx     Crohn's disease Neg Hx     Irritable bowel syndrome Neg Hx     Ulcerative colitis Neg Hx        PHYSICAL EXAMINATION:   Vitals:    08/21/23 0926   BP: 124/78       ECOG 0 - Asymptomatic  General: NAD, well appearing  Psych: a&o x 3, normal mood and affect  Eyes: EOMI, no scleral icterus  ENMT: neck supple without masses or thyromegaly, mucus membranes moist  MSK: normal gait, normal ROM in bilateral shoulders  Lymph nodes: Well-healed left axillary scar; no cervical, supraclavicular or axillary lymphadenopathy  Breast:   Right: Sp mastopexy with well-healed scars. Scars are soft. There is some thickened tissue on either side of the T junction (tissue mobilized for reconstruction). No discrete masses or nipple abnormalities.  Left: Sp mastopexy with well-healed scars. Scars are soft. There is some thickened tissue on either side of the T junction (tissue mobilized for reconstruction). No discrete masses or nipple abnormalities. Edema noted 5:00 to 8:00 with the worse area at 7-8:00      Assessment:  43 y.o. F with a diagnosis of left breast: High grade, invasive ductal carcinoma, ER/ID negative, Her2 positive. She underwent port placement, left partial mastectomy and excisional biopsy and SLNB with oncoplastic closure and right reduction for symmetry on 7/1/21, pT1cN0, anatomic stage IA, prognostic stage IA. She completed Taxol/Herceptin on 10/20/21.    Discussion:  1. We discussed lymphedema along with prevention, recognition, and treatment that is recommended.  The positive response to lymphedema prevention and reduction including weight reduction, regular exercise, and recognition and treatment of symptoms at the onset were discussed.   2. Massage left massage upward and outward  daily to help with fluid movement    Plan:  -Continue follow-up with Dr. Beavers.  -Continue follow-up with Dr. Byrd.  -Continue follow-up with OT/LE clinic.  -screening mammo and exam in April 2024  -She was instructed to call sooner with any questions, concerns or changes on BSE.    CLAUDINE Noriega      CC:  MD Fadi Ibanez MD Dana Tisdale, APRN

## 2023-08-21 ENCOUNTER — OFFICE VISIT (OUTPATIENT)
Dept: SURGERY | Facility: CLINIC | Age: 43
End: 2023-08-21
Payer: COMMERCIAL

## 2023-08-21 VITALS
BODY MASS INDEX: 27.05 KG/M2 | WEIGHT: 147 LBS | DIASTOLIC BLOOD PRESSURE: 78 MMHG | HEIGHT: 62 IN | SYSTOLIC BLOOD PRESSURE: 124 MMHG

## 2023-08-21 DIAGNOSIS — Z12.31 ENCOUNTER FOR SCREENING MAMMOGRAM FOR MALIGNANT NEOPLASM OF BREAST: ICD-10-CM

## 2023-08-21 DIAGNOSIS — Z17.1 MALIGNANT NEOPLASM OF OVERLAPPING SITES OF LEFT BREAST IN FEMALE, ESTROGEN RECEPTOR NEGATIVE: Primary | ICD-10-CM

## 2023-08-21 DIAGNOSIS — C50.812 MALIGNANT NEOPLASM OF OVERLAPPING SITES OF LEFT BREAST IN FEMALE, ESTROGEN RECEPTOR NEGATIVE: Primary | ICD-10-CM

## 2023-08-21 PROCEDURE — 99213 OFFICE O/P EST LOW 20 MIN: CPT | Performed by: NURSE PRACTITIONER

## 2023-08-22 ENCOUNTER — TELEPHONE (OUTPATIENT)
Dept: SURGERY | Facility: CLINIC | Age: 43
End: 2023-08-22
Payer: COMMERCIAL

## 2023-08-22 ENCOUNTER — HOSPITAL ENCOUNTER (OUTPATIENT)
Dept: OCCUPATIONAL THERAPY | Facility: HOSPITAL | Age: 43
Setting detail: THERAPIES SERIES
Discharge: HOME | End: 2023-08-22
Payer: COMMERCIAL

## 2023-08-22 DIAGNOSIS — I97.2 POST-MASTECTOMY LYMPHEDEMA SYNDROME: Primary | ICD-10-CM

## 2023-08-22 DIAGNOSIS — C50.812 MALIGNANT NEOPLASM OF OVERLAPPING SITES OF LEFT BREAST IN FEMALE, ESTROGEN RECEPTOR NEGATIVE: ICD-10-CM

## 2023-08-22 DIAGNOSIS — N64.89 BREAST EDEMA: ICD-10-CM

## 2023-08-22 DIAGNOSIS — Z17.1 MALIGNANT NEOPLASM OF OVERLAPPING SITES OF LEFT BREAST IN FEMALE, ESTROGEN RECEPTOR NEGATIVE: ICD-10-CM

## 2023-08-22 PROCEDURE — 97535 SELF CARE MNGMENT TRAINING: CPT

## 2023-08-22 PROCEDURE — 93702 BIS XTRACELL FLUID ANALYSIS: CPT

## 2023-08-22 NOTE — THERAPY PROGRESS REPORT/RE-CERT
Outpatient Occupational Therapy Lymphedema Progress Note  [facility]     Patient Name: Radha Astorga  : 1980  MRN: [id number]  Today's Date: 2023      Visit Date: 2023    Patient Active Problem List   Diagnosis    Family history of breast cancer    Hypothyroidism    Major depressive disorder, recurrent episode, moderate with anxious distress    Attention deficit hyperactivity disorder (ADHD), predominantly inattentive type    Factor 5 Leiden mutation, heterozygous    Kidney mass    Malignant neoplasm of overlapping sites of left breast in female, estrogen receptor negative    High risk medication use    Essential hypertension    Rheumatic mitral valve disease    Encounter for adjustment or management of vascular access device    Iron deficiency anemia    History of stroke    B12 deficiency    Nausea    Bloating    Family history of colon cancer    Breast edema        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    History of rheumatic fever     Hyperlipidemia 2022    Resolved; now unmedicated    Hypertension     Hypothyroidism     Infiltrating ductal carcinoma of left breast 2021    LEFT, HAD LUMPECTOMY, CHEMO/RADIATION    Kidney stone     Mitral regurgitation     Mitral valve insufficiency     On anticoagulant therapy     PONV (postoperative nausea and vomiting)     Stroke 2020    HX OF, NO RESIDUAL    Stroke-like symptoms         Past Surgical History:   Procedure Laterality Date    BREAST BIOPSY Left 2021    IDC    BREAST LUMPECTOMY      BREAST  LUMPECTOMY WITH SENTINEL NODE BIOPSY N/A 07/01/2021    Procedure: right port placement, Left SHAINA-guided, bracketed, partial mastectomy and sentinel lymph node biopsy Left breast needle-localized excisional biopsy;  Surgeon: Lashonda Euceda MD;  Location: [facility];  Service: General;  Laterality: N/A;    BREAST SURGERY Bilateral 07/01/2021    Procedure: RIGHT BREAST REDUCTION MASTOPEXY LEFT BREAST ONCOPLASTIC CLOSURE;  Surgeon: Caridad Baker MD PhD;  Location: [facility];  Service: Plastics;  Laterality: Bilateral;    COLONOSCOPY N/A 10/04/2022    Procedure: COLONOSCOPY TO CECUM WITH COLD BIOPSIES POLYPECTOMY;  Surgeon: Marlon Hough MD;  Location: [facility] MAIN OR;  Service: Gastroenterology;  Laterality: N/A;  POLYP    ENDOSCOPY N/A 10/04/2022    Procedure: ESOPHAGOGASTRODUODENOSCOPY WITH COLD BIOPSIES;  Surgeon: Marlon Hough MD;  Location: [facility] MAIN OR;  Service: Gastroenterology;  Laterality: N/A;  NORMAL    PAP SMEAR  2016    VENOUS ACCESS DEVICE (PORT) REMOVAL N/A 3/27/2023    Procedure: REMOVAL VENOUS ACCESS DEVICE;  Surgeon: Lashonda Euceda MD;  Location: [facility];  Service: General;  Laterality: N/A;         Visit Dx:      ICD-10-CM ICD-9-CM   1. 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 Malignant neoplasm of overlapping sites of left breast in female, estrogen receptor negative  C50.812 174.8    Z17.1 V86.1   3. Breast edema  N64.89 611.89        Lymphedema       Row Name 08/22/23 1400             Subjective Pain    Able to rate subjective pain? yes  -RE      Pre-Treatment Pain Level 0  -RE      Post-Treatment Pain Level 0  -RE      Subjective Pain Comment Occasional sharp breast pain which is transitory when she bends over.  -RE         Subjective Comments    Subjective Comments Has not been wearing her sleeve as consistently as she has in the past.  She does feel that she has a little bit of swelling in the upper arm in the axillary area.  -RE          Lymphedema Measurements    Measurement Type(s) Quick Girth  -RE      Quick Girth Areas Upper extremities  -RE         RUE Quick Girth (cm)    Axilla 33 cm  -RE      Mid upper arm 30 cm  -RE      Elbow 23.5 cm  -RE      Wrist crease 14.7 cm  -RE      Met-heads 17.5 cm  -RE      RUE Quick Girth Total 118.7  -RE         Compression/Skin Care    Compression/Skin Care Comments Remeasured for compression sleeve.  Due to patient's wrist size and the size of her upper arm and off-the-shelf garment is not ideal.  Her measurements are out of the typical range for the size.  She would benefit from a custom sleeve and glove.  I will check with [employer] on insurance coverage.  -RE         L-Dex Bioimpedence Screening    L-Dex Measurement Extremity LUE  -RE      L-Dex Patient Position Standing  -RE      L-Dex UE Dominate Side Right  -RE      L-Dex UE At Risk Side Left  -RE      L-Dex UE Pre Surgical Value No  -RE      L-Dex UE Score 4.6  -RE      L-Dex UE Baseline Score 3.5  -RE      L-Dex UE Value Change 1.1  -RE      L-Dex UE Comment high nomal  -RE                User Key  (r) = Recorded By, (t) = Taken By, (c) = Cosigned By      Initials Name Provider Type    Julia Rowe OTR Occupational Therapist                             OT Assessment/Plan       Row Name 08/22/23 9558          OT Assessment    Impairments Impaired lymphatic circulation  -RE     Assessment Comments Patient returns for bioimpedance reassessment.  Her last measurement was 3 months ago.  L-Dex value today is 4.6 which is technically a high normal but is significantly increased from her previous measurement of -1.  This indicates significant increase in the fluid.  I recommended she resume use of her compression sleeve though she reports that it needs to be replaced.  It is over a-year-old.  Her measurements have changed and a custom product would be more appropriate due to her very small wrist size compared to her upper arm circumference.  We will  "check on insurance coverage for custom products.  -RE     OT Diagnosis Postmastectomy lymphedema  -RE     OT Rehab Potential Good  -RE     Patient/caregiver participated in establishment of treatment plan and goals Yes  -RE     Patient would benefit from skilled therapy intervention Yes  -RE        OT Plan    OT Frequency Other (comment)  -RE     Predicted Duration of Therapy Intervention (OT) Bioimpedance every 1 to 3 months  -RE     Planned CPT's? OT SELF CARE/MGMT/TRAIN 15 MIN: 18671;OT BIS XTRACELL FLUID ANALYSIS: 69134  -RE     OT Plan Comments Follow-up in 1 month due to the significant increase in her L-Dex value.  -RE               User Key  (r) = Recorded By, (t) = Taken By, (c) = Cosigned By      Initials Name Provider Type    Julia Rowe, OTR Occupational Therapist                           OT Goals       Row Name 08/22/23 1400          OT Short Term Goals    STG 1 Pt will demonstrate understanding of use of compression sleeve for edema prevention, exercise and air travel.   -RE     STG 1 Progress Met  -RE     STG 2 Patient will demonstrate proper awareness of "What is Lymphedema?" and "Healthy Habits" for improved prevention, management, care of symptoms and ease of transition to self-care of condition.   -RE     STG 2 Progress Met  -RE     STG 3 Patient independent and compliant with initial home exercise program focused on range of motion,and Flexibility.  -RE     STG 3 Progress Met  -RE     STG 4 Patient to demonstrate increased left shoulder flexion to 140 to improve functional UE use and to restore pre operative AROM per patient perception.  -RE     STG 4 Progress Met  -RE     STG 5 Patient to demonstrate increased left shoulder abduction to 140 to improve functional UE use and to restore preoperative AROM per patient perception.  -RE     STG 5 Progress Met  -RE        Long Term Goals    LTG Date to Achieve 11/22/23  -RE     LTG 1 Patient to demonstrate increased left shoulder flexion to 160 to " improve functional UE use and to restore pre operative AROM per patient perception.  -RE     LTG 1 Progress Met  -RE     LTG 2 Patient to demonstrate increased left shoulder abduction to 160 to improve functional UE use and to restore preoperative AROM per patient perception.  -RE     LTG 2 Progress Met  -RE     LTG 3 Patient will participate in bioimpedance scans every 1-3 months as a method of early detection of lymphedema to allow for early intervention.  -RE     LTG 3 Progress Met;Ongoing  -RE     LTG 4  Patient's bioimpedance score to remain below 6.5 for decreased risk of stage II lymphedema.  -RE     LTG 4 Progress Met;Ongoing  -RE     LTG 5 Patient will be independent with use and care of [employer].  -RE     LTG 5 Progress Met  -RE        Time Calculation    OT Goal Re-Cert Due Date 11/22/23  -RE               User Key  (r) = Recorded By, (t) = Taken By, (c) = Cosigned By      Initials Name Provider Type    Julia Rowe OTR Occupational Therapist                    Therapy Education  Education Details: Discussed patient's current L-Dex value of 4.6 and recommended increasing her sleeve use during the daytime and increase use of night compression as well.  We also discussed trying a different type of sports bra that could apply increased compression along the axillary area and also provide better control when she is rowing.  We discussed compression garments in off-the-shelf versus custom.  Her measurements do not fit well in an off-the-shelf product due to her small wrist size.  I will follow-up with [employer] regarding insurance benefits for custom versus off-the-shelf products.  Given: Edema management, Symptoms/condition management, Other (comment)  Program: Modified  How Provided: Verbal  Provided to: Patient  Level of Understanding: Teach back education performed, Verbalized  16178 - OT Self Care/Mgmt Minutes: 20                Time Calculation:   OT Start Time: 1430  OT Stop Time: 1500  OT Time  Calculation (min): 30 min  Total Timed Code Minutes- OT: 20 minute(s)  Timed Charges  88674 - OT Self Care/Mgmt Minutes: 20  Untimed Charges  89393 - OT Bioimpedence Rx Minutes: 10  Total Minutes  Timed Charges Total Minutes: 20  Untimed Charges Total Minutes: 10   Total Minutes: 30     Therapy Charges for Today       Code Description Service Date Service Provider Modifiers Qty    47186136482 HC OT SELF CARE/MGMT/TRAIN EA 15 MIN 8/22/2023 Julia Leslie OTR GO 1    [id number] HC OT BIS XTRACELL FLUID ANALYSIS 8/22/2023 Julia Leslie OTR GO 1                        VI Ulloa  8/22/2023

## 2023-08-23 RX ORDER — LANOLIN ALCOHOL/MO/W.PET/CERES
CREAM (GRAM) TOPICAL
Qty: 30 TABLET | Refills: 2 | Status: SHIPPED | OUTPATIENT
Start: 2023-08-23

## 2023-09-07 DIAGNOSIS — F90.0 ATTENTION DEFICIT HYPERACTIVITY DISORDER (ADHD), PREDOMINANTLY INATTENTIVE TYPE: Chronic | ICD-10-CM

## 2023-09-07 RX ORDER — LANOLIN ALCOHOL/MO/W.PET/CERES
1000 CREAM (GRAM) TOPICAL DAILY
Qty: 30 TABLET | Refills: 2 | Status: SHIPPED | OUTPATIENT
Start: 2023-09-07

## 2023-09-15 ENCOUNTER — OFFICE VISIT (OUTPATIENT)
Dept: ONCOLOGY | Facility: CLINIC | Age: 43
End: 2023-09-15
Payer: COMMERCIAL

## 2023-09-15 ENCOUNTER — LAB (OUTPATIENT)
Dept: LAB | Facility: HOSPITAL | Age: 43
End: 2023-09-15
Payer: COMMERCIAL

## 2023-09-15 VITALS
WEIGHT: 152.7 LBS | OXYGEN SATURATION: 98 % | HEART RATE: 76 BPM | DIASTOLIC BLOOD PRESSURE: 81 MMHG | SYSTOLIC BLOOD PRESSURE: 110 MMHG | HEIGHT: 62 IN | RESPIRATION RATE: 16 BRPM | BODY MASS INDEX: 28.1 KG/M2 | TEMPERATURE: 98.4 F

## 2023-09-15 DIAGNOSIS — C50.812 MALIGNANT NEOPLASM OF OVERLAPPING SITES OF LEFT BREAST IN FEMALE, ESTROGEN RECEPTOR NEGATIVE: ICD-10-CM

## 2023-09-15 DIAGNOSIS — Z17.1 MALIGNANT NEOPLASM OF OVERLAPPING SITES OF LEFT BREAST IN FEMALE, ESTROGEN RECEPTOR NEGATIVE: ICD-10-CM

## 2023-09-15 DIAGNOSIS — C50.812 MALIGNANT NEOPLASM OF OVERLAPPING SITES OF LEFT BREAST IN FEMALE, ESTROGEN RECEPTOR NEGATIVE: Primary | ICD-10-CM

## 2023-09-15 DIAGNOSIS — Z17.1 MALIGNANT NEOPLASM OF OVERLAPPING SITES OF LEFT BREAST IN FEMALE, ESTROGEN RECEPTOR NEGATIVE: Primary | ICD-10-CM

## 2023-09-15 LAB
ALBUMIN SERPL-MCNC: 4.3 G/DL (ref 3.5–5.2)
ALBUMIN/GLOB SERPL: 1.8 G/DL
ALP SERPL-CCNC: 79 U/L (ref 39–117)
ALT SERPL W P-5'-P-CCNC: 16 U/L (ref 1–33)
ANION GAP SERPL CALCULATED.3IONS-SCNC: 13 MMOL/L (ref 5–15)
AST SERPL-CCNC: 15 U/L (ref 1–32)
BASOPHILS # BLD AUTO: 0.1 10*3/MM3 (ref 0–0.2)
BASOPHILS NFR BLD AUTO: 1.3 % (ref 0–1.5)
BILIRUB SERPL-MCNC: 0.2 MG/DL (ref 0–1.2)
BUN SERPL-MCNC: 14 MG/DL (ref 6–20)
BUN/CREAT SERPL: 19.2 (ref 7–25)
CALCIUM SPEC-SCNC: 9.4 MG/DL (ref 8.6–10.5)
CHLORIDE SERPL-SCNC: 104 MMOL/L (ref 98–107)
CO2 SERPL-SCNC: 23 MMOL/L (ref 22–29)
CREAT SERPL-MCNC: 0.73 MG/DL (ref 0.6–1.1)
DEPRECATED RDW RBC AUTO: 41.4 FL (ref 37–54)
EGFRCR SERPLBLD CKD-EPI 2021: 103.5 ML/MIN/1.73
EOSINOPHIL # BLD AUTO: 0.17 10*3/MM3 (ref 0–0.4)
EOSINOPHIL NFR BLD AUTO: 2.2 % (ref 0.3–6.2)
ERYTHROCYTE [DISTWIDTH] IN BLOOD BY AUTOMATED COUNT: 12.7 % (ref 12.3–15.4)
FERRITIN SERPL-MCNC: 358 NG/ML (ref 13–150)
GLOBULIN UR ELPH-MCNC: 2.4 GM/DL
GLUCOSE SERPL-MCNC: 85 MG/DL (ref 65–99)
HCT VFR BLD AUTO: 40.2 % (ref 34–46.6)
HGB BLD-MCNC: 13.4 G/DL (ref 12–15.9)
IMM GRANULOCYTES # BLD AUTO: 0.03 10*3/MM3 (ref 0–0.05)
IMM GRANULOCYTES NFR BLD AUTO: 0.4 % (ref 0–0.5)
IRON 24H UR-MRATE: 63 MCG/DL (ref 37–145)
IRON SATN MFR SERPL: 17 % (ref 20–50)
LYMPHOCYTES # BLD AUTO: 2.65 10*3/MM3 (ref 0.7–3.1)
LYMPHOCYTES NFR BLD AUTO: 34.9 % (ref 19.6–45.3)
MCH RBC QN AUTO: 29.6 PG (ref 26.6–33)
MCHC RBC AUTO-ENTMCNC: 33.3 G/DL (ref 31.5–35.7)
MCV RBC AUTO: 88.9 FL (ref 79–97)
MONOCYTES # BLD AUTO: 0.7 10*3/MM3 (ref 0.1–0.9)
MONOCYTES NFR BLD AUTO: 9.2 % (ref 5–12)
NEUTROPHILS NFR BLD AUTO: 3.94 10*3/MM3 (ref 1.7–7)
NEUTROPHILS NFR BLD AUTO: 52 % (ref 42.7–76)
NRBC BLD AUTO-RTO: 0 /100 WBC (ref 0–0.2)
PLATELET # BLD AUTO: 357 10*3/MM3 (ref 140–450)
PMV BLD AUTO: 9 FL (ref 6–12)
POTASSIUM SERPL-SCNC: 4.2 MMOL/L (ref 3.5–5.2)
PROT SERPL-MCNC: 6.7 G/DL (ref 6–8.5)
RBC # BLD AUTO: 4.52 10*6/MM3 (ref 3.77–5.28)
SODIUM SERPL-SCNC: 140 MMOL/L (ref 136–145)
TIBC SERPL-MCNC: 361 MCG/DL (ref 298–536)
TRANSFERRIN SERPL-MCNC: 258 MG/DL (ref 200–360)
WBC NRBC COR # BLD: 7.59 10*3/MM3 (ref 3.4–10.8)

## 2023-09-15 PROCEDURE — 85025 COMPLETE CBC W/AUTO DIFF WBC: CPT

## 2023-09-15 PROCEDURE — 82728 ASSAY OF FERRITIN: CPT | Performed by: INTERNAL MEDICINE

## 2023-09-15 PROCEDURE — 84466 ASSAY OF TRANSFERRIN: CPT | Performed by: INTERNAL MEDICINE

## 2023-09-15 PROCEDURE — 83540 ASSAY OF IRON: CPT | Performed by: INTERNAL MEDICINE

## 2023-09-15 PROCEDURE — 99213 OFFICE O/P EST LOW 20 MIN: CPT | Performed by: INTERNAL MEDICINE

## 2023-09-15 PROCEDURE — 80053 COMPREHEN METABOLIC PANEL: CPT

## 2023-09-15 PROCEDURE — 36415 COLL VENOUS BLD VENIPUNCTURE: CPT

## 2023-09-15 NOTE — PROGRESS NOTES
Subjective     REASON FOR follow-up:    1.  Heterozygous state for factor V Leiden    2.  New onset stroke on aspirin by neurology    3.  Hypercholesterolemia    4.  Hypercoagulable work-up done.  Antithrombin 109% protein S activity 85% protein S antigen 107%, protein C activity 85%.  Anticardiolipin antibody IgM 24%, antibeta-2 glycoprotein antibody negative, lupus anticoagulant not detected, prothrombin gene mutation negative.    5.  Screening mammogram showed abnormality in the left breast 6 o'clock position, 8 mm on ultrasound, ultrasound-guided left breast biopsy, 6 cm from the nipple showed evidence of invasive ductal carcinoma poorly differentiated grade 3, Augusta score of 8 out of 9 ER negative, WY negative, HER-2/ese 3+ positive.    6.  CT scan February 24, 2021 showed focal area of fatty infiltration of the liver.  1 cm hypoattenuating cortical lesion in the upper pole of the right kidney.  Similarly nodule in the upper pole of the left kidney is 1.5 cm.  Further evaluation by ultrasound suggested  Ultrasound March 1, 2021 shows solid lesion in the upper pole of the right kidney.  In the left kidney along the midpole of the left kidney is a nodule which is 16 x 16 x 14 mm which is thought to be a cyst.  A 6-month follow-up CT suggested    6.  S/p left partial mastectomy with sentinel lymph node biopsy.  Tumor was present at 5:00, invasive ductal carcinoma, Oziel score of 8, grade 3, greatest dimension was 12 mm x 8 x 4 mm.  Single focus of invasive carcinoma present.  There was extensive DCIS which is 30 mm x 8 x 2 mm, grade 3, no evidence of lymphovascular space invasion or dermal lymphatic invasion.  Margins were clear.  4 lymph nodes were negative.  It is a p T1cp N0, ER negative WY negative HER-2/ese 3+ with Ki-67 of 50%.  [employer] genetic test negative for 47 genes    7.recently initiated Xarelto at loading dose of 15 mg twice a day x3 weeks to then be switched to 20 mg daily per protocol.   She is doing this because of Mediport in place and known factor V Leiden heterozygosity.        History of Present Illness   Patient is a 42-year-old female with the above-mentioned history who is here today for follow-up visit.  She did have an EGD and colonoscopy 10/4/2022.  They did find a polyp however no other findings.  The patient does report she continues to have intermittent nausea about once per week.  At times she has to take Zofran.  She denies vomiting.    She continues on Xarelto 20 mg daily and denies bleeding issues.    She denies any new palpable breast mass or nodularity.  Her last mammogram was 4/4/2022 which was without evidence for malignancy.    Patient does still have a port in place, but would like to get the referral to have it removed now.    Interval history:   Patient is doing well without any complaints.  Reviewed the results of the mammogram from April 2023 which is negative.  She is still working very well.        Oncologic history:  Patient is here for follow-up of her mammogram.  Patient had screening mammogram April 2, 2021:  NAD a focal asymmetry was present in the posterior one third retroareolar region of the left breast.  Further spot compression images and left breast ultrasound was suggested.  Right breast was negative    April 9, 2021: Left diagnostic mammogram showed an area of focal asymmetry in the posterior one third region aspect of the left breast her breast    Ultrasound showed an irregular 0.8 cm lesion in the left breast at the 6 o'clock position of the order of 6 cm from the nipple.  Ultrasound-guided left breast biopsy was recommended.    April 26, 2021: Ultrasound-guided biopsy of the left breast 6 o'clock position, 6 cm from the nipple showed    Invasive ductal carcinoma, poorly differentiated with a Oziel score grade 3 with a score of 8 out of 9 measuring at least 7 mm.  No definitive ductal carcinoma is in situ is identified.  ER 1% negative  AZ 1%  negative   HER-2/ese 3+ positive    There was no evidence of lymphadenopathy either in the mammogram of the ultrasound.  We suggested an MRI of the breast.  Patient has strong family history of breast cancer      May 7, 2021: MRI of bilateral breast reviewed.  My interpretation is that there is area of enhancement in the 6 o'clock position of the left breast this is the biopsy-proven malignancy.  There is additional area of linear enhancement anteriorly and laterally measuring up to 1.2 cm this is approximately 5.6 cm from the nipple.  In addition there is a enhancement 2 to 3 mm in the anterolateral aspect of the lateral aspect of the left breast which is 4.6 cm from the nipple.  There is also mildly prominent posterior lymph node in the mid axilla which measures 1 cm with cortical thickening.  Findings are consistent with multifocal disease.  No contralateral disease or internal mammary adenopathy identified    May 20, 2021: Genetic test, [email or web address] genetic test shows 47 genes negative    May 21, 2021: I reviewed the biopsy of the lymph node in the left axilla which shows reactive lymph node negative for any carcinoma or lymphoma    June 2, 2021:Final Diagnosis  1. Left Breast, 5:00 o'clock, MRI-guided Biopsies for Linear Enhancement: INVASIVE MAMMARY CARCINOMA, NO  SPECIAL TYPE (INVASIVE DUCTAL CARCINOMA).  A. Largest contiguous focus in a core measures 4 mm.  B. No in-situ component identified.  C. Brisk lymphocytic response noted (see Comment).  D. No definitive lymphovascular nor perineural invasion identified.  2. Left Breast, 2:00 o'clock, MRI-guided Biopsy for Enhancement:  A. Benign breast parenchyma with radial scar and micropapillomas.  B. Usual ductal hyperplasia and columnar cell hyperplasia.  C. No atypical hyperplasia, in-situ nor invasive carcinoma identified    ER, IL, HER-2 new and Ki-67 pending on this new biopsy      Patient has been seen by Dr. Lashonda Euceda with plans of doing surgery on July  first 2021    Once patient undergoes surgery lumpectomy with sentinel lymph node biopsy we will give further recommendation about treatment options.  Given that patient has multifocal disease and both of the lesions 2 lesions are 1.2 cm each, with it being a HER-2 positive tumor on the previous biopsy at 6:00 Dr. Euceda and myself discussed and patient preferred to undergo port placement at the same time of surgery.    Patient had [employer] genetic test which was negative.    She has completed surgery July 1, 2021.  She underwent left partial mastectomy with left axillary sentinel lymph node biopsy and right port placement.  Clinically she was thought to have a high-grade multifocal invasive ductal carcinoma ER/HI negative, HI positive.  The lesions require preoperative localization.  The multifocal cancer was bracketed with 2 savvy markers and the radial scar was localized with a needle.  Because of the volume of tissue that will be excised related to the breast volume the case was done in conjunction with Dr. Zavala for oncoplastic closure.    She is 2 weeks from surgery.  She is healing up reasonably well.    On review of her pathology she had left partial mastectomy with sentinel lymph node biopsy.  Tumor was present at 5:00, invasive ductal carcinoma, Oziel score of 8, grade 3, greatest dimension was 12 mm x 8 x 4 mm.  Single focus of invasive carcinoma present.  There was extensive DCIS which is 30 mm x 8 x 2 mm, grade 3, no evidence of lymphovascular space invasion or dermal lymphatic invasion.  Margins were clear.  4 lymph nodes were negative.  It is a p T1cp N0, ER negative HI negative HER-2/ese 3+ with Ki-67 of 50%.    Patient is here to discuss further options of treatment.  Given small tumor T1c N0, she is a good candidate for weekly Taxol Herceptin.    Given that patient has heterozygous state for factor V Leiden and currently has port placed we will plan to start Eliquis 2.5 mg p.o. twice daily.   I have left a message for Dr. Craft in neurology to call me to discuss if patient needs to continue aspirin or we can hold off since be starting Eliquis.      Genetic test negative    August 4, 2021: Weekly Taxol Herceptin x12 weeks followed by Herceptin x1 year.  Cycle 1 today  Cycle 12 Taxol Herceptin October 10, 2021      Hematologic history:  patient is a 40-year-old female who presented end of December with numbness and tingling in her left upper extremity and left face.  She went to see Dr. Goodman who then got concerned and referred to neurology.  She was referred to Dr. Freedman and talk to Dr. Thompson neurology for evaluation.  Patient underwent ultrasound of the carotids which did not show significant stenosis of the carotids.  She underwent 2D echocardiogram which showed a good ejection fraction of 64% with mild mitral valve prolapse and mild mitral stenosis.  She had a 24-hour Holter which was negative.  She then had also an MRI of the brain February 3, 2021 which showed areas of hyperintensity involving the subcortical white matter of the right frontal lobe superior laterally and posteriorly with the largest area measuring 8 9 mm.  There is also enhancement present.  The findings are consistent with a subacute infarct.  They could not differentiate if there is any underlying neoplastic process but a short-term follow-up was suggested in order to follow-up.  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    Patient currently got started on aspirin and factor V Leiden was obtained by neurology because patient's paternal aunt had factor V Leiden mutation and she was heterozygous.  Patient's testing also showed that she was heterozygous.    Patient states her numbness and tingling is resolved completely on the left arm and face it just lasted for a 24 hours.  She was here referred here for neurology to see if there  is any other cause for her underlying hypercoagulable state.  She has not had a complete hypercoagulable work-up.  Also discussed with her that an underlying malignancy could cause a hypercoagulable state and we would need to do a CT scan to rule that out.    Patient's mother has had breast cancer in her 50s but had pancreatic cancer at 68 and  from that.  Patient's dad had stroke at 63.  But he is alive at 74.  She has 1 brother who is 40 in good health.  Paternal aunt had breast cancer in her 40s.  Paternal grandfather had colon cancer in his late 80s.  Maternal uncle had throat cancer and another maternal uncle had skin cancer with metastasis to the lung.  And maternal grandmother had breast cancer.    Patient was to have mammogram done last year but because of COVID-19 it got postponed and she has not had it done yet.    Patient used to be a smoker half pack per day for 7 years but quit 10 years ago.  She has high cholesterol for which she is on treatment.    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    History of rheumatic fever     Hyperlipidemia 2022    Resolved; now unmedicated    Hypertension     Hypothyroidism     Infiltrating ductal carcinoma of left breast 2021    LEFT, HAD LUMPECTOMY, CHEMO/RADIATION    Kidney stone     Mitral regurgitation     Mitral valve insufficiency     On anticoagulant therapy     PONV (postoperative nausea and vomiting)     Stroke 2020    HX OF, NO RESIDUAL    Stroke-like symptoms         Past Surgical History:   Procedure Laterality Date     BREAST BIOPSY Left 05/05/2021    IDC    BREAST LUMPECTOMY      BREAST LUMPECTOMY WITH SENTINEL NODE BIOPSY N/A 07/01/2021    Procedure: right port placement, Left SHAINA-guided, bracketed, partial mastectomy and sentinel lymph node biopsy Left breast needle-localized excisional biopsy;  Surgeon: Lashonda Euceda MD;  Location: [facility];  Service: General;  Laterality: N/A;    BREAST SURGERY Bilateral 07/01/2021    Procedure: RIGHT BREAST REDUCTION MASTOPEXY LEFT BREAST ONCOPLASTIC CLOSURE;  Surgeon: Caridad Baker MD PhD;  Location: [facility] OR;  Service: Plastics;  Laterality: Bilateral;    COLONOSCOPY N/A 10/04/2022    Procedure: COLONOSCOPY TO CECUM WITH COLD BIOPSIES POLYPECTOMY;  Surgeon: Marlon Hough MD;  [place] [facility] [place];  Service: Gastroenterology;  Laterality: N/A;  POLYP    ENDOSCOPY N/A 10/04/2022    Procedure: ESOPHAGOGASTRODUODENOSCOPY WITH COLD BIOPSIES;  Surgeon: Marlon Hough MD;  [place] [facility] [place];  Service: Gastroenterology;  Laterality: N/A;  NORMAL    PAP SMEAR  2016    VENOUS ACCESS DEVICE (PORT) REMOVAL N/A 3/27/2023    Procedure: REMOVAL VENOUS ACCESS DEVICE;  Surgeon: Lashonda Euceda MD;  Location: [facility];  Service: General;  Laterality: N/A;        Current Outpatient Medications on File Prior to Visit   Medication Sig Dispense Refill    amphetamine-dextroamphetamine (Adderall) 20 MG tablet Take 1 tablet by mouth 2 (Two) Times a Day. 60 tablet 0    atorvastatin (LIPITOR) 10 MG tablet Take 1 tablet by mouth Daily.      Cholecalciferol (D3) 50 MCG (2000 UT) tablet Take 2 tablets by mouth 2 (Two) Times a Week.      famotidine (PEPCID) 20 MG tablet Take 1 tablet by mouth Daily. 30 tablet 3    levothyroxine (SYNTHROID, LEVOTHROID) 125 MCG tablet Take 1 tablet by mouth Daily. 90 tablet 1    lisdexamfetamine (Vyvanse) 60 MG capsule Take 1 capsule by mouth Every Morning 30 capsule 0    LORazepam (ATIVAN) 0.5 MG tablet Take 1 tablet by  mouth Every 8 (Eight) Hours As Needed for Anxiety. for anxiety 30 tablet 2    methylPREDNISolone (MEDROL) 4 MG dose pack Take as directed on package instructions. 21 tablet 0    valsartan (DIOVAN) 160 MG tablet Take 1 tablet by mouth Daily. 90 tablet 1    vitamin B-12 (CYANOCOBALAMIN) 1000 MCG tablet Take 1 tablet by mouth Daily. 30 tablet 2     No current facility-administered medications on file prior to visit.        ALLERGIES:    Allergies   Allergen Reactions    Sulfa Antibiotics Rash        Social History     Socioeconomic History    Marital status: Single    Number of children: 0   Tobacco Use    Smoking status: Former     Packs/day: 0.50     Years: 10.00     Pack years: 5.00     Types: Cigarettes     Start date: 1997     Quit date: 2009     Years since quittin.7    Smokeless tobacco: Never    Tobacco comments:     Off and on for 12 years   Vaping Use    Vaping Use: Never used   Substance and Sexual Activity    Alcohol use: Not Currently     Comment: RARELY    Drug use: No    Sexual activity: Yes     Partners: Female     Birth control/protection: None     Comment: Unnecessary        Family History   Problem Relation Age of Onset    Breast cancer Mother 53    Pancreatic cancer Mother 68    Stroke Father     Hypertension Brother     Colon polyps Maternal Aunt     Melanoma Maternal Uncle     Breast cancer Paternal Aunt 55    Lung cancer Maternal Grandmother     Colon cancer Maternal Grandfather     Prostate cancer Maternal Grandfather     Prostate cancer Paternal Grandfather     Breast cancer Paternal Great-Grandmother 94    Brain cancer Maternal Cousin 20    Ovarian cancer Other         Paternal great aunt     Breast cancer Other     Colon polyps Maternal Aunt     Malig Hyperthermia Neg Hx     Crohn's disease Neg Hx     Irritable bowel syndrome Neg Hx     Ulcerative colitis Neg Hx       Family  history: Mother had breast cancer at age 57.  Maternal great aunt had breast cancer and paternal great  "aunt had breast cancer in her 40s.  Patient's mother had pancreatic cancer as well.  Paternal grandfather had prostate cancer.    Review of Systems   Constitutional:  Negative for appetite change, chills, diaphoresis, fatigue, fever and unexpected weight change.   HENT:  Negative for hearing loss, sore throat and trouble swallowing.    Respiratory:  Negative for cough, chest tightness, shortness of breath and wheezing.    Cardiovascular:  Negative for chest pain, palpitations and leg swelling.   Gastrointestinal:  Negative for abdominal distention, abdominal pain, constipation, diarrhea, nausea and vomiting.   Genitourinary:  Negative for dysuria, frequency, hematuria and urgency.   Musculoskeletal:  Negative for joint swelling.        No muscle weakness.   Skin:  Negative for rash and wound.   Neurological:  Negative for seizures, syncope, speech difficulty, weakness, numbness and headaches.   Hematological:  Negative for adenopathy. Does not bruise/bleed easily.   Psychiatric/Behavioral:  Negative for behavioral problems, confusion and suicidal ideas.           Objective     Vitals:    09/15/23 1552   BP: 110/81   Pulse: 76   Resp: 16   Temp: 98.4 °F (36.9 °C)   TempSrc: Temporal   SpO2: 98%   Weight: 69.3 kg (152 lb 11.2 oz)   Height: 157.5 cm (62.01\")   PainSc: 0-No pain         9/15/2023     3:41 PM   Current Status   ECOG score 0     Physical exam    CONSTITUTIONAL:  Vital signs reviewed.  No distress, looks comfortable.  RESPIRATORY:  Normal respiratory effort.  Lungs clear to auscultation bilaterally.  BREAST: Right breast: No skin changes, no evidence of breast mass, no nipple discharge, no evidence of any right axillary adenopathy or right supraclavicular adenopathy  Left breast: No evidence of any skin changes, no evidence of any left breast mass and no evidence of left nipple discharge as well as no left axillary adenopathy or left supraclavicular adenopathy.  CARDIOVASCULAR:  Normal S1, S2.  No murmurs " rubs or gallops.  No significant lower extremity edema.  GASTROINTESTINAL: Abdomen appears unremarkable.  Nontender.  No hepatomegaly.  No splenomegaly.  LYMPHATIC:  No cervical, supraclavicular, axillary lymphadenopathy.  SKIN:  Warm.  No rashes.  PSYCHIATRIC:  Normal judgment and insight.  Normal mood and affect.    Physical exam preformed and reviewed. No changes noted from previous exam. Neena Beavers MD ; 06/06/2023     RECENT LABS:  Results from last 7 days   Lab Units 09/15/23  1541   WBC 10*3/mm3 7.59   NEUTROS ABS 10*3/mm3 3.94   HEMOGLOBIN g/dL 13.4   HEMATOCRIT % 40.2   PLATELETS 10*3/mm3 357     Results from last 7 days   Lab Units 09/15/23  1628 09/15/23  1541   SODIUM mmol/L  --  140   POTASSIUM mmol/L  --  4.2   CHLORIDE mmol/L  --  104   CO2 mmol/L  --  23.0   BUN mg/dL  --  14   CREATININE mg/dL  --  0.73   CALCIUM mg/dL  --  9.4   ALBUMIN g/dL  --  4.3   BILIRUBIN mg/dL  --  0.2   ALK PHOS U/L  --  79   ALT (SGPT) U/L  --  16   AST (SGOT) U/L  --  15   GLUCOSE mg/dL  --  85   FERRITIN ng/mL 358.00*  --    IRON mcg/dL 63  --    TIBC mcg/dL 361  --          Assessment & Plan       * [id number], ER negative IN negative HER-2/ese 3+ with Ki-67 of 50%.  S/p left partial mastectomy with sentinel lymph node biopsy.  Tumor was present at 5:00, invasive ductal carcinoma, Oziel score of 8, grade 3, greatest dimension was 12 mm x 8 x 4 mm.  Single focus of invasive carcinoma present.  There was extensive DCIS which is 30 mm x 8 x 2 mm, grade 3, no evidence of lymphovascular space invasion or dermal lymphatic invasion.  Margins were clear.  4 lymph nodes were negative.  It is a p T1cp N0, ER negative IN negative HER-2/ese 3+ with Ki-67 of 50%.  Patient is s/p port placement  Discussed in length with patient that given a small tumor she is eligible for weekly Taxol Herceptin.  Weekly Taxol x12 weeks along with weekly Herceptin followed by 1 year of Herceptin.  Discussed patient in the breast cancer  conference  2D echo done which showed ejection fraction of 61-65% and strain pattern of -20.8.  Patient also seen by Dr. Virk cardiology and Dr. Virk is planning to repeat echocardiogram in 3 months after initiation of therapy.  8/4/2021 initiating weekly Taxol Herceptin  9/8/2021, proceed with week #6 Taxol and Herceptin.  Patient continues to tolerate treatment well. No signs of peripheral neuropathy.  Her next echocardiogram due November 4, 2021, She follows up with cardiology Dr. Camron Virk.  9/28/2021, proceed with week #9 taxol/herceptin.  She does feel her nausea has worsened after her last cycle of chemotherapy.  She prefers the disintegrating Zofran, this will be called to her pharmacy today.  Patient is here to take cycle 11 of weekly Taxol with worsening fatigue and worsening rash and overall dysphoric mood.  She also cannot sleep and her quality life is worsening.  At the present time we will plan to give 1 more cycle of Taxol following which she will be referred to radiation.  Cycle 12 Taxol Herceptin October 20, 2021  Completed radiation November 2021.  February 23, 2022: Reviewed echocardiogram with normal ejection fraction and strain pattern.  Patient seen by Dr. Virk and okay to continue Herceptin  3/16/2022 here for continued Herceptin maintenance  For 4/20/2022 screening bilateral mammogram was negative  April 27 2022: Currently tolerating Herceptin.  Last dose of Herceptin will be June 18, 2022.  Reviewed mammogram results from April 4, 2022 which is negative  May 18 2022: Patient has 2 more cycles of Herceptin after today.  Reviewed echo with ejection fraction 57% stable  June 29, 2022: Last dose of Herceptin today.  No further issues.  April 25, 2023: Patient is doing well without any complaints.  S/p port removal March 27, 2023 by Dr. Euceda.  Patient had 1 episode of menstrual period lasting for 4 days.  She was thought to be menopausal by her LH and FSH but given that she has had menstrual  period's back it is possible that she is not menopausal.  Will refer to gynecology.  September 15, 2023.  Patient is currently doing well without any complaints.  No evidence of recurrence reviewed mammogram from April 2023 which is negative    *  Factor V Leiden heterozygosity with right frontal stroke and patient presented in December 2020 with left-sided weakness tingling and numbness and also numbness in the face.  Was referred to neurology and cardiology by Dr. Goodman  Ultrasound of carotid does not show significant stenosis  24 Holter is negative  2D echocardiogram shows ejection fraction of 64% with mild mitral regurg and mitral stenosis  Neurology obtain factor V Leiden given that patient's paternal aunt had's history of factor V Leiden and that was heterozygous for factor V Leiden  Continue aspirin as per neurology.  Also patient on medications for her high cholesterol started after stroke currently asymptomatic  Hypercoagulable work-up done which is negative except heterozygous state for factor V Leiden.  Complete stroke work-up has been negative and since this is arterial stroke and she was not on aspirin prior to that and her symptoms have resolved I agree with continuing aspirin alone along with high cholesterol medications.  MR venogram done on May 10, 2021 is negative.  Patient has been referred to the stroke neurologist Dr. Johnson  Patient diagnosed with breast cancer with Mediport placed.  Per discussion with neurology, patient initiated on prophylactic Xarelto and aspirin discontinued.  Patient had one nosebleed which lasted 10 minutes but with pinching the nose it helped.  But she is switching to 20 mg daily from tomorrow.  We discussed about placing ice and notifying us if her nosebleeds are significant.  But it resolved.  It was likely secondary to dry air  9/7/2021, patient continues to experience nosebleeds.  Reports these occur when her nose itches, and after scratching her nose it begins  to bleed.  She feels this may be related to dry air, so I have asked her to start using saline nasal spray to help moisten her nose.  If she experiences nosebleeds that do not stop, I asked her to notify our office.  Tolerating anticoagulation with Xarelto.  No bleeding issues with Xarelto  Patient still has the port and hence will need to continue Xarelto  Patient will require port removal but wants to wait till Dr. Euceda comes back prior to port removal  2022 she continues on Xarelto 20 mg daily denies bleeding issues.  2023: Given that she had 1 episode of menstrual period we will follow her up in 6 weeks to make sure she is not having any further menstrual period's or heavier bleeding  Given that her port is removed we will discontinue Xarelto and restart her aspirin per neurology for her stroke       * Family history of cancer: Patient's mother had breast cancer at age 50 and pancreatic cancer at age 68 and  from pancreatic cancer.  Patient's maternal grandmother had breast cancer in her 50s.  Her maternal uncle had skin cancer which went to the lung and another maternal uncle had throat cancer.  Maternal aunt had call colon polyps.  Patient's paternal aunt had breast cancer in her 40s.  And paternal grandfather with colon cancer in his 80s.  Paternal aunt also had factor V Leiden.  Genetic testing is negative    * Solid nodule in the right kidney on ultrasound, will schedule follow-up with urology  Patient was to follow-up with Dr. Shultz  Will need records from urology  Will discuss with patient at her next appointment about follow-up with urology  Patient was seen by Dr. Becerra and apparently no follow-up was needed for the nodules in the kidney.  2023: We will get records from Dr. Becerra's office    *  Elevated BMI, encourage diet and exercise.  Patient is improving her diet and exercise after having had the stroke  Patient has lost weight and she is trying to lose  "weight    *  Reflux secondary to chemotherapy.  Patient has been requiring frequent Tums.  Recommended she begin Pepcid 20 mg daily.    Stable, continue Pepcid 20 mg daily.    *Constipation likely secondary to ferrous sulfate.  Oral iron discontinued  Continues to have constipation, seen by GI who currently is going to do colonoscopy in October.   EGD/colonoscopy 10/4/2022 by Dr. Hough    *Headache likely related to body ache because of Taxol  9/29/2021, patient discusses concern about worsening headaches.  Describing them as \"glass breaking\".  Started about 2 weeks ago, and progressively worsened.  Occurring 3-4 times daily now, and lasting less than 1 minute with each episode.  This occurs in the same place, right upper skull.  Denies vision changes or dizziness.  Will order stat MRI of the brain for further evaluation.  The patient follows with a neurologist, and is going to notify patient help with neuropathy as well of her symptoms and the plan for MRI.  MRI of the brain was done 10/1/2021 with no evidence of restricted diffusion to indicate acute infarction.  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    *Elevated liver function tests, total bilirubin still normal AST ALT slightly elevated.  Patient did take Tylenol but not too much.  No dose adjustments required at the fingers time.  Liver function tests normalized  6/6/2023: Liver enzymes remain normal    *Iron deficiency anemia, patient's percent saturation still low at 9% s/p full dose of IV Venofer.  9/29/2021, patient will receive dose #5 Venofer today.  Hemoglobin today 10.1.  12/22/2021, hemoglobin today 12.7.  3/16/2022 hemoglobin remains normal at 12.9.  Patient scheduled for colonoscopy end of July 2022   Hemoglobin 12/14/2022 is 12.8.  6/6/2023: Hemoglobin 13.8  September 15, 2023: Iron saturation 17% with serum ferritin of " 358.    *Insomnia  9/29/2021, patient reports difficulty sleeping.  She is experiencing this every night, not just the nights of treatment when she receives IV dexamethasone.  She has attempted taking Benadryl, however felt groggy following this.  She is going to attempt melatonin to see if this improves her sleep.  Worsening, will try gabapentin which will help with neuropathy as well  Patient took gabapentin for a few nights and had issues with  headache.  She is also very concerned about other possible side effects that she discontinued the medication.  She is tried melatonin in the past without results.  We discussed trying Benadryl versus other prescription medication.  She is going to try Benadryl first.    *Neuropathy still present in the fingers, mild  6/6/2023: Neuropathy has improved    Plan:   Patient will continue her baby aspirin per neurology for history of stroke  Patient was followed by Dr. Becerra for kidney cyst.  Per patient they were not concerned about it  Genetic test in the past has been negative we will try to get records  Follow-up in 6 months with labs    MD Dr. Maxine Ibanez Dr., Dr., Dr.

## 2023-09-17 RX ORDER — LORAZEPAM 0.5 MG/1
0.5 TABLET ORAL EVERY 8 HOURS PRN
Qty: 30 TABLET | Refills: 2 | Status: CANCELLED | OUTPATIENT
Start: 2023-09-17

## 2023-09-17 NOTE — PROGRESS NOTES
Chief Complaint:   Chief Complaint   Patient presents with    ADHD     MED REFILL DUE  /; [facility]     Anxiety    Depression       Radha Astorga 43 y.o. female who presents today for Medical Management of the below listed issues. She  has a problem list of   Patient Active Problem List   Diagnosis    Family history of breast cancer    Hypothyroidism    Major depressive disorder, recurrent episode, moderate with anxious distress    Attention deficit hyperactivity disorder (ADHD), predominantly inattentive type    Factor 5 Leiden mutation, heterozygous    Kidney mass    Malignant neoplasm of overlapping sites of left breast in female, estrogen receptor negative    High risk medication use    Essential hypertension    Rheumatic mitral valve disease    Encounter for adjustment or management of vascular access device    Iron deficiency anemia    History of stroke    B12 deficiency    Nausea    Bloating    Family history of colon cancer    Breast edema   .  Since the last visit, She has overall felt well.  she has been compliant with   Current Outpatient Medications:     amphetamine-dextroamphetamine (Adderall) 20 MG tablet, Take 1 tablet by mouth 2 (Two) Times a Day., Disp: 60 tablet, Rfl: 0    lisdexamfetamine (Vyvanse) 60 MG capsule, Take 1 capsule by mouth Every Morning, Disp: 30 capsule, Rfl: 0    LORazepam (ATIVAN) 0.5 MG tablet, Take 1 tablet by mouth Every 8 (Eight) Hours As Needed for Anxiety. for anxiety, Disp: 30 tablet, Rfl: 2    albuterol sulfate  (90 Base) MCG/ACT inhaler, Inhale 2 puffs Every 4 (Four) Hours As Needed for Wheezing., Disp: 8 g, Rfl: 11    atorvastatin (LIPITOR) 10 MG tablet, Take 1 tablet by mouth Daily. (Patient not taking: Reported on 9/18/2023), Disp: , Rfl:     Cholecalciferol (D3) 50 MCG (2000 UT) tablet, Take 2 tablets by mouth 2 (Two) Times a Week., Disp: , Rfl:     famotidine (PEPCID) 20 MG tablet, Take 1 tablet by mouth Daily., Disp: 30 tablet, Rfl: 3     "levothyroxine (SYNTHROID, LEVOTHROID) 125 MCG tablet, Take 1 tablet by mouth Daily., Disp: 90 tablet, Rfl: 1    methylPREDNISolone (MEDROL) 4 MG dose pack, Take as directed on package instructions. (Patient not taking: Reported on 9/18/2023), Disp: 21 tablet, Rfl: 0    valsartan (DIOVAN) 160 MG tablet, Take 1 tablet by mouth Daily., Disp: 90 tablet, Rfl: 1    vitamin B-12 (CYANOCOBALAMIN) 1000 MCG tablet, Take 1 tablet by mouth Daily., Disp: 30 tablet, Rfl: 2.  She denies medication side effects.    All of the other chronic condition(s) listed above are stable w/o issues.    /76   Pulse 86   Temp 97.7 °F (36.5 °C) (Oral)   Resp 16   Ht 157.5 cm (62.01\")   Wt 69.9 kg (154 lb)   SpO2 96%   BMI 28.16 kg/m²     Results for orders placed or performed in visit on 09/15/23   Ferritin    Specimen: Blood   Result Value Ref Range    Ferritin 358.00 (H) 13.00 - 150.00 ng/mL   Iron Profile    Specimen: Blood   Result Value Ref Range    Iron 63 37 - 145 mcg/dL    Iron Saturation (TSAT) 17 (L) 20 - 50 %    Transferrin 258 200 - 360 mg/dL    TIBC 361 298 - 536 mcg/dL             The following portions of the patient's history were reviewed and updated as appropriate: allergies, current medications, past family history, past medical history, past social history, past surgical history, and problem list.    Review of Systems   Constitutional:  Negative for activity change, chills and fever.   Respiratory:  Negative for cough.    Cardiovascular:  Negative for chest pain.   Psychiatric/Behavioral:  Negative for dysphoric mood.      Objective            Physical Exam  Constitutional:       General: She is not in acute distress.     Appearance: She is well-developed.   Cardiovascular:      Rate and Rhythm: Normal rate and regular rhythm.   Pulmonary:      Effort: Pulmonary effort is normal.      Breath sounds: Normal breath sounds.   Neurological:      Mental Status: She is alert and oriented to person, place, and time. "   Psychiatric:         Behavior: Behavior normal.         Thought Content: Thought content normal.           Diagnoses and all orders for this visit:    1. Attention deficit hyperactivity disorder (ADHD), predominantly inattentive type (Primary)  -     amphetamine-dextroamphetamine (Adderall) 20 MG tablet; Take 1 tablet by mouth 2 (Two) Times a Day.  Dispense: 60 tablet; Refill: 0  -     lisdexamfetamine (Vyvanse) 60 MG capsule; Take 1 capsule by mouth Every Morning  Dispense: 30 capsule; Refill: 0    Other orders  -     albuterol sulfate  (90 Base) MCG/ACT inhaler; Inhale 2 puffs Every 4 (Four) Hours As Needed for Wheezing.  Dispense: 8 g; Refill: 11

## 2023-09-18 ENCOUNTER — OFFICE VISIT (OUTPATIENT)
Dept: FAMILY MEDICINE CLINIC | Facility: CLINIC | Age: 43
End: 2023-09-18
Payer: COMMERCIAL

## 2023-09-18 VITALS
HEART RATE: 86 BPM | WEIGHT: 154 LBS | OXYGEN SATURATION: 96 % | BODY MASS INDEX: 28.34 KG/M2 | HEIGHT: 62 IN | TEMPERATURE: 97.7 F | SYSTOLIC BLOOD PRESSURE: 113 MMHG | RESPIRATION RATE: 16 BRPM | DIASTOLIC BLOOD PRESSURE: 76 MMHG

## 2023-09-18 DIAGNOSIS — F90.0 ATTENTION DEFICIT HYPERACTIVITY DISORDER (ADHD), PREDOMINANTLY INATTENTIVE TYPE: Primary | Chronic | ICD-10-CM

## 2023-09-18 PROCEDURE — 99213 OFFICE O/P EST LOW 20 MIN: CPT | Performed by: FAMILY MEDICINE

## 2023-09-18 RX ORDER — DEXTROAMPHETAMINE SACCHARATE, AMPHETAMINE ASPARTATE, DEXTROAMPHETAMINE SULFATE AND AMPHETAMINE SULFATE 5; 5; 5; 5 MG/1; MG/1; MG/1; MG/1
20 TABLET ORAL 2 TIMES DAILY
Qty: 60 TABLET | Refills: 0 | Status: SHIPPED | OUTPATIENT
Start: 2023-09-18

## 2023-09-18 RX ORDER — ALBUTEROL SULFATE 90 UG/1
2 AEROSOL, METERED RESPIRATORY (INHALATION) EVERY 4 HOURS PRN
Qty: 8 G | Refills: 11 | Status: SHIPPED | OUTPATIENT
Start: 2023-09-18

## 2023-09-26 ENCOUNTER — HOSPITAL ENCOUNTER (OUTPATIENT)
Dept: OCCUPATIONAL THERAPY | Facility: HOSPITAL | Age: 43
Setting detail: THERAPIES SERIES
Discharge: HOME | End: 2023-09-26
Payer: COMMERCIAL

## 2023-09-26 DIAGNOSIS — I97.2 POST-MASTECTOMY LYMPHEDEMA SYNDROME: Primary | ICD-10-CM

## 2023-09-26 DIAGNOSIS — C50.812 MALIGNANT NEOPLASM OF OVERLAPPING SITES OF LEFT BREAST IN FEMALE, ESTROGEN RECEPTOR NEGATIVE: ICD-10-CM

## 2023-09-26 DIAGNOSIS — Z17.1 MALIGNANT NEOPLASM OF OVERLAPPING SITES OF LEFT BREAST IN FEMALE, ESTROGEN RECEPTOR NEGATIVE: ICD-10-CM

## 2023-09-26 PROCEDURE — 93702 BIS XTRACELL FLUID ANALYSIS: CPT

## 2023-09-26 PROCEDURE — 97535 SELF CARE MNGMENT TRAINING: CPT

## 2023-09-26 NOTE — THERAPY PROGRESS REPORT/RE-CERT
Outpatient Occupational Therapy Lymphedema Progress Note  [facility]     Patient Name: Radha Astorga  : 1980  MRN: [id number]  Today's Date: 2023      Visit Date: 2023    Patient Active Problem List   Diagnosis    Family history of breast cancer    Hypothyroidism    Major depressive disorder, recurrent episode, moderate with anxious distress    Attention deficit hyperactivity disorder (ADHD), predominantly inattentive type    Factor 5 Leiden mutation, heterozygous    Kidney mass    Malignant neoplasm of overlapping sites of left breast in female, estrogen receptor negative    High risk medication use    Essential hypertension    Rheumatic mitral valve disease    Encounter for adjustment or management of vascular access device    Iron deficiency anemia    History of stroke    B12 deficiency    Nausea    Bloating    Family history of colon cancer    Breast edema        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    History of rheumatic fever     Hyperlipidemia 2022    Resolved; now unmedicated    Hypertension     Hypothyroidism     Infiltrating ductal carcinoma of left breast 2021    LEFT, HAD LUMPECTOMY, CHEMO/RADIATION    Kidney stone     Mitral regurgitation     Mitral valve insufficiency     On anticoagulant therapy     PONV (postoperative nausea and vomiting)     Stroke 2020    HX OF, NO RESIDUAL    Stroke-like symptoms         Past Surgical History:   Procedure Laterality Date    BREAST BIOPSY Left 2021    IDC    BREAST LUMPECTOMY      BREAST  LUMPECTOMY WITH SENTINEL NODE BIOPSY N/A 07/01/2021    Procedure: right port placement, Left SHAINA-guided, bracketed, partial mastectomy and sentinel lymph node biopsy Left breast needle-localized excisional biopsy;  Surgeon: Lashonda Euceda MD;  Location: [facility];  Service: General;  Laterality: N/A;    BREAST SURGERY Bilateral 07/01/2021    Procedure: RIGHT BREAST REDUCTION MASTOPEXY LEFT BREAST ONCOPLASTIC CLOSURE;  Surgeon: Caridad Baker MD PhD;  Location: [facility];  Service: Plastics;  Laterality: Bilateral;    COLONOSCOPY N/A 10/04/2022    Procedure: COLONOSCOPY TO CECUM WITH COLD BIOPSIES POLYPECTOMY;  Surgeon: Marlon Hough MD;  [place] [facility];  Service: Gastroenterology;  Laterality: N/A;  POLYP    ENDOSCOPY N/A 10/04/2022    Procedure: ESOPHAGOGASTRODUODENOSCOPY WITH COLD BIOPSIES;  Surgeon: Marlon Hough MD;  [place]: [facility];  Service: Gastroenterology;  Laterality: N/A;  NORMAL    PAP SMEAR  2016    VENOUS ACCESS DEVICE (PORT) REMOVAL N/A 3/27/2023    Procedure: REMOVAL VENOUS ACCESS DEVICE;  Surgeon: Lashonda Euceda MD;  Location: [facility];  Service: General;  Laterality: N/A;         Visit Dx:      ICD-10-CM ICD-9-CM   1. 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 Malignant neoplasm of overlapping sites of left breast in female, estrogen receptor negative  C50.812 174.8    Z17.1 V86.1        Lymphedema       Row Name 09/26/23 1300             Subjective Pain    Able to rate subjective pain? yes  -RE      Pre-Treatment Pain Level 0  -RE      Post-Treatment Pain Level 0  -RE         L-Dex Bioimpedence Screening    L-Dex Measurement Extremity LUE  -RE      L-Dex Patient Position Standing  -RE      L-Dex UE Dominate Side Right  -RE      L-Dex UE At Risk Side Left  -RE      L-Dex UE Pre Surgical Value No  -RE      L-Dex UE Score 1.1  -RE      L-Dex UE Baseline Score 3.5  -RE      L-Dex UE Value Change -2.4  -RE      L-Dex UE  Comment WNL  -RE                User Key  (r) = Recorded By, (t) = Taken By, (c) = Cosigned By      Initials Name Provider Type    RE Julia Leslie OTR Occupational Therapist                  The patient had a 1 month SOZO measurement which I reviewed today. The score is WNL  see scanned to EMR. Bioimpedance spectroscopy helps identify the   onset of lymphedema in an arm or leg before patients experience noticeable swelling. Research has   shown that 92% of patients with early detection of lymphedema using L-Dex combined with   intervention do not progress to chronic lymphedema through three years. Additionally, as of March 2023, the NCCN Guidelines® for Survivorship recommend proactive screening for lymphedema using   bioimpedance spectroscopy. Whenever possible, patients are tested for baseline L-Dex score before   cancer treatment begins and then are reassessed during regular follow-up visits using the SOZO device.   Otherwise, this can be started postoperatively and continued during regular follow-up visits. If the   patient’s L-Dex score increases above normal levels, that is a sign that lymphedema is developing and a   referral is made to occupational therapy for further evaluation and early compression treatment.   Lymphedema assessment with the SOZO L-Dex score is recommended to be done every 3 months for   the first 3 years and then every 6 months for years 4 and 5 followed by annually afterwards           OT Assessment/Plan       Row Name 09/26/23 1718          OT Assessment    Impairments Impaired lymphatic circulation  -RE     Assessment Comments Patient returns for bioimpedance reassessment.  Because her measurement 1 month ago demonstrated an increase patient returns today for bioimpedance.  Her L-Dex value today was 1.1 which is an improvement.  This value is now within normal limits.  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  She will have an improved fit with a custom compression product and they will provide improved containment for her lymphedema.  I will follow-up for bioimpedance in 3 months.  She knows she can call for an appointment if she has any issues with the fit of her custom compression garment  -RE     OT Diagnosis Postmastectomy lymphedema  -RE     OT Rehab Potential Good  -RE     Patient/caregiver participated in establishment of treatment plan and goals Yes  -RE     Patient would benefit from skilled therapy intervention Yes  -RE        OT Plan    OT Frequency Other (comment)  -RE     Predicted Duration of Therapy Intervention (OT) Follow-up for bioimpedance in 3 months.  Call for an appointment as needed regarding the fit of the compression garment.  -RE     Planned CPT's? OT SELF CARE/MGMT/TRAIN 15 MIN: 14959;OT BIS XTRACELL FLUID ANALYSIS: 21479  -RE     OT Plan Comments Follow-up in 3 months  -RE               User Key  (r) = Recorded By, (t) = Taken By, (c) = Cosigned By      Initials Name Provider Type    RE Julia Leslie, OTR Occupational Therapist                           OT Goals       Row Name 09/26/23 1300          OT Short Term Goals    STG 1 Pt will demonstrate understanding of use of compression sleeve for edema prevention, exercise and air travel.   -RE     STG 1 Progress Met  -RE     STG 2 Patient will demonstrate proper awareness of “What is Lymphedema?” and “Healthy Habits” for improved prevention, management, care of symptoms and ease of transition to self-care of condition.   -RE     STG 2 Progress Met  -RE     STG 3 Patient independent and compliant with initial home exercise program focused on range of motion,and Flexibility.  -RE     STG 3 Progress Met  -RE     STG 4 Patient to demonstrate increased left shoulder flexion to 140 to improve functional UE use and to restore pre operative AROM per patient perception.  -RE     STG 4 Progress Met  -RE     STG  5 Patient to demonstrate increased left shoulder abduction to 140 to improve functional UE use and to restore preoperative AROM per patient perception.  -RE     STG 5 Progress Met  -RE        Long Term Goals    LTG Date to Achieve 11/22/23  -RE     LTG 1 Patient to demonstrate increased left shoulder flexion to 160 to improve functional UE use and to restore pre operative AROM per patient perception.  -RE     LTG 1 Progress Met  -RE     LTG 2 Patient to demonstrate increased left shoulder abduction to 160 to improve functional UE use and to restore preoperative AROM per patient perception.  -RE     LTG 2 Progress Met  -RE     LTG 3 Patient will participate in bioimpedance scans every 1-3 months as a method of early detection of lymphedema to allow for early intervention.  -RE     LTG 3 Progress Met;Ongoing  -RE     LTG 4  Patient's bioimpedance score to remain below 6.5 for decreased risk of stage II lymphedema.  -RE     LTG 4 Progress Met;Ongoing  -RE     LTG 5 Patient will be independent with use and care of [employer].  -RE     LTG 5 Progress Met  -RE        Time Calculation    OT Goal Re-Cert Due Date 11/22/23  -RE               User Key  (r) = Recorded By, (t) = Taken By, (c) = Cosigned By      Initials Name Provider Type    Julia Rowe OTR Occupational Therapist                    Therapy Education  Education Details: Discussed patient's current L-Dex value of 1.1 and recommended compression.  We discussed the use of a custom compression garment particularly when patient is performing heavy upper extremity exercise i.e. rowing.  Patient does have coverage for a custom sleeve.  She will be measured for this tomorrow.  We also discussed adding a silicone band to the top of her compression sleeve and a liner on the inside of the elbow to prevent irritation.  Given: Edema management, Symptoms/condition management  Program: New, Reinforced  How Provided: Verbal  Provided to: Patient  Level of Understanding:  Teach back education performed, Verbalized  95986 - OT Self Care/Mgmt Minutes: 15                Time Calculation:   OT Start Time: 1330  OT Stop Time: 1355  OT Time Calculation (min): 25 min  Total Timed Code Minutes- OT: 15 minute(s)  Timed Charges  56150 - OT Self Care/Mgmt Minutes: 15  Untimed Charges  37687 - OT Bioimpedence Rx Minutes: 10  Total Minutes  Timed Charges Total Minutes: 15  Untimed Charges Total Minutes: 10   Total Minutes: 25     Therapy Charges for Today       Code Description Service Date Service Provider Modifiers Qty    81707138263 HC OT SELF CARE/MGMT/TRAIN EA 15 MIN 9/26/2023 Julia Leslie OTR GO 1    [id number] HC OT BIS XTRACELL FLUID ANALYSIS 9/26/2023 Julia Leslie OTR GO 1            Dictated utilizing Dragon dictation:  Much of this encounter note is an electronic transcription/translation of spoken language to printed text. The electronic translation of spoken language may permit erroneous, or at times, nonsensical words or phrases to be inadvertently transcribed; Although I have reviewed the note for such errors, some may still exist.            VI Ulloa  9/26/2023

## 2023-10-17 DIAGNOSIS — F90.0 ATTENTION DEFICIT HYPERACTIVITY DISORDER (ADHD), PREDOMINANTLY INATTENTIVE TYPE: Chronic | ICD-10-CM

## 2023-10-18 RX ORDER — LISDEXAMFETAMINE DIMESYLATE CAPSULES 60 MG/1
60 CAPSULE ORAL EVERY MORNING
Qty: 30 CAPSULE | Refills: 0 | Status: SHIPPED | OUTPATIENT
Start: 2023-10-18

## 2023-11-27 RX ORDER — FAMOTIDINE 20 MG/1
20 TABLET, FILM COATED ORAL DAILY
Qty: 90 TABLET | Refills: 1 | Status: SHIPPED | OUTPATIENT
Start: 2023-11-27

## 2023-12-16 RX ORDER — LORAZEPAM 0.5 MG/1
0.5 TABLET ORAL EVERY 8 HOURS PRN
Qty: 30 TABLET | Refills: 2 | Status: CANCELLED | OUTPATIENT
Start: 2023-12-16

## 2023-12-16 RX ORDER — DEXTROAMPHETAMINE SACCHARATE, AMPHETAMINE ASPARTATE, DEXTROAMPHETAMINE SULFATE AND AMPHETAMINE SULFATE 5; 5; 5; 5 MG/1; MG/1; MG/1; MG/1
20 TABLET ORAL 2 TIMES DAILY
Qty: 60 TABLET | Refills: 0 | Status: CANCELLED | OUTPATIENT
Start: 2023-12-16

## 2023-12-16 NOTE — PROGRESS NOTES
Chief Complaint:   Chief Complaint   Patient presents with    ADHD     Csa updated today     Anxiety    Depression    Hypertension       Radha Astorga 43 y.o. female who presents today for Medical Management of the below listed issues. She  has a problem list of   Patient Active Problem List   Diagnosis    Family history of breast cancer    Hypothyroidism    Major depressive disorder, recurrent episode, moderate with anxious distress    Attention deficit hyperactivity disorder (ADHD), predominantly inattentive type    Factor 5 Leiden mutation, heterozygous    Kidney mass    Malignant neoplasm of overlapping sites of left breast in female, estrogen receptor negative    High risk medication use    Essential hypertension    Rheumatic mitral valve disease    Encounter for adjustment or management of vascular access device    Iron deficiency anemia    History of stroke    B12 deficiency    Nausea    Bloating    Family history of colon cancer    Breast edema   .  Since the last visit, She has overall felt well.  she has been compliant with   Current Outpatient Medications:     famotidine (PEPCID) 20 MG tablet, Take 1 tablet by mouth Daily., Disp: 90 tablet, Rfl: 1    levothyroxine (SYNTHROID, LEVOTHROID) 125 MCG tablet, Take 1 tablet by mouth Daily., Disp: 90 tablet, Rfl: 1    lisdexamfetamine (Vyvanse) 60 MG capsule, Take 1 capsule by mouth Every Morning, Disp: 30 capsule, Rfl: 0    LORazepam (ATIVAN) 0.5 MG tablet, Take 1 tablet by mouth Every 8 (Eight) Hours As Needed for Anxiety. for anxiety, Disp: 30 tablet, Rfl: 2    valsartan (DIOVAN) 160 MG tablet, Take 1 tablet by mouth Daily., Disp: 90 tablet, Rfl: 1    albuterol sulfate  (90 Base) MCG/ACT inhaler, Inhale 2 puffs Every 4 (Four) Hours As Needed for Wheezing., Disp: 8 g, Rfl: 11    Cholecalciferol (D3) 50 MCG (2000 UT) tablet, Take 2 tablets by mouth 2 (Two) Times a Week., Disp: , Rfl:     vitamin B-12 (CYANOCOBALAMIN) 1000 MCG tablet, Take 1 tablet  "by mouth Daily., Disp: 30 tablet, Rfl: 2.  She denies medication side effects.    All of the other chronic condition(s) listed above are stable w/o issues.    /81   Pulse 82   Temp 97.5 °F (36.4 °C) (Oral)   Resp 16   Ht 157.5 cm (62.01\")   Wt 68 kg (150 lb)   SpO2 99%   BMI 27.43 kg/m²     Results for orders placed or performed in visit on 09/15/23   Ferritin    Specimen: Blood   Result Value Ref Range    Ferritin 358.00 (H) 13.00 - 150.00 ng/mL   Iron Profile    Specimen: Blood   Result Value Ref Range    Iron 63 37 - 145 mcg/dL    Iron Saturation (TSAT) 17 (L) 20 - 50 %    Transferrin 258 200 - 360 mg/dL    TIBC 361 298 - 536 mcg/dL             The following portions of the patient's history were reviewed and updated as appropriate: allergies, current medications, past family history, past medical history, past social history, past surgical history, and problem list.    Review of Systems   Constitutional:  Negative for activity change, chills and fever.   Respiratory:  Negative for cough.    Cardiovascular:  Negative for chest pain.   Psychiatric/Behavioral:  Negative for dysphoric mood.        Objective            Physical Exam  Constitutional:       General: She is not in acute distress.     Appearance: She is well-developed.   Cardiovascular:      Rate and Rhythm: Normal rate and regular rhythm.   Pulmonary:      Effort: Pulmonary effort is normal.      Breath sounds: Normal breath sounds.   Neurological:      Mental Status: She is alert and oriented to person, place, and time.   Psychiatric:         Behavior: Behavior normal.         Thought Content: Thought content normal.             Diagnoses and all orders for this visit:    1. Attention deficit hyperactivity disorder (ADHD), predominantly inattentive type (Primary)  -     lisdexamfetamine (Vyvanse) 60 MG capsule; Take 1 capsule by mouth Every Morning  Dispense: 30 capsule; Refill: 0    2. Essential hypertension  -     valsartan (DIOVAN) 160 " MG tablet; Take 1 tablet by mouth Daily.  Dispense: 90 tablet; Refill: 1  -     Lipid Panel    3. Acquired hypothyroidism  -     levothyroxine (SYNTHROID, LEVOTHROID) 125 MCG tablet; Take 1 tablet by mouth Daily.  Dispense: 90 tablet; Refill: 1  -     T3  -     T4, Free  -     TSH    4. Pure hypercholesterolemia  -     Lipid Panel

## 2023-12-18 ENCOUNTER — OFFICE VISIT (OUTPATIENT)
Dept: FAMILY MEDICINE CLINIC | Facility: CLINIC | Age: 43
End: 2023-12-18
Payer: COMMERCIAL

## 2023-12-18 ENCOUNTER — HOSPITAL ENCOUNTER (OUTPATIENT)
Dept: OCCUPATIONAL THERAPY | Facility: HOSPITAL | Age: 43
Discharge: HOME | End: 2023-12-18
Admitting: INTERNAL MEDICINE
Payer: COMMERCIAL

## 2023-12-18 VITALS
SYSTOLIC BLOOD PRESSURE: 114 MMHG | DIASTOLIC BLOOD PRESSURE: 81 MMHG | HEIGHT: 62 IN | TEMPERATURE: 97.5 F | RESPIRATION RATE: 16 BRPM | BODY MASS INDEX: 27.6 KG/M2 | WEIGHT: 150 LBS | HEART RATE: 82 BPM | OXYGEN SATURATION: 99 %

## 2023-12-18 DIAGNOSIS — I10 ESSENTIAL HYPERTENSION: Chronic | ICD-10-CM

## 2023-12-18 DIAGNOSIS — I97.2 POST-MASTECTOMY LYMPHEDEMA SYNDROME: Primary | ICD-10-CM

## 2023-12-18 DIAGNOSIS — Z17.1 MALIGNANT NEOPLASM OF OVERLAPPING SITES OF LEFT BREAST IN FEMALE, ESTROGEN RECEPTOR NEGATIVE: ICD-10-CM

## 2023-12-18 DIAGNOSIS — F90.0 ATTENTION DEFICIT HYPERACTIVITY DISORDER (ADHD), PREDOMINANTLY INATTENTIVE TYPE: Primary | Chronic | ICD-10-CM

## 2023-12-18 DIAGNOSIS — C50.812 MALIGNANT NEOPLASM OF OVERLAPPING SITES OF LEFT BREAST IN FEMALE, ESTROGEN RECEPTOR NEGATIVE: ICD-10-CM

## 2023-12-18 DIAGNOSIS — E78.00 PURE HYPERCHOLESTEROLEMIA: ICD-10-CM

## 2023-12-18 DIAGNOSIS — E03.9 ACQUIRED HYPOTHYROIDISM: Chronic | ICD-10-CM

## 2023-12-18 PROCEDURE — 93702 BIS XTRACELL FLUID ANALYSIS: CPT

## 2023-12-18 PROCEDURE — 99214 OFFICE O/P EST MOD 30 MIN: CPT | Performed by: FAMILY MEDICINE

## 2023-12-18 PROCEDURE — 97535 SELF CARE MNGMENT TRAINING: CPT

## 2023-12-18 RX ORDER — LISDEXAMFETAMINE DIMESYLATE CAPSULES 60 MG/1
60 CAPSULE ORAL EVERY MORNING
Qty: 30 CAPSULE | Refills: 0 | Status: SHIPPED | OUTPATIENT
Start: 2023-12-18

## 2023-12-18 RX ORDER — LEVOTHYROXINE SODIUM 0.12 MG/1
125 TABLET ORAL DAILY
Qty: 90 TABLET | Refills: 1 | Status: SHIPPED | OUTPATIENT
Start: 2023-12-18

## 2023-12-18 RX ORDER — VALSARTAN 160 MG/1
160 TABLET ORAL DAILY
Qty: 90 TABLET | Refills: 1 | Status: SHIPPED | OUTPATIENT
Start: 2023-12-18

## 2023-12-18 NOTE — THERAPY PROGRESS REPORT/RE-CERT
Outpatient Occupational Therapy Lymphedema Progress Note  [facility]     Patient Name: Radha Astorga  : 1980  MRN: [id number]  Today's Date: 2023      Visit Date: 2023    Patient Active Problem List   Diagnosis    Family history of breast cancer    Hypothyroidism    Major depressive disorder, recurrent episode, moderate with anxious distress    Attention deficit hyperactivity disorder (ADHD), predominantly inattentive type    Factor 5 Leiden mutation, heterozygous    Kidney mass    Malignant neoplasm of overlapping sites of left breast in female, estrogen receptor negative    High risk medication use    Essential hypertension    Rheumatic mitral valve disease    Encounter for adjustment or management of vascular access device    Iron deficiency anemia    History of stroke    B12 deficiency    Nausea    Bloating    Family history of colon cancer    Breast edema        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    History of rheumatic fever     Hyperlipidemia 2022    Resolved; now unmedicated    Hypertension     Hypothyroidism     Infiltrating ductal carcinoma of left breast 2021    LEFT, HAD LUMPECTOMY, CHEMO/RADIATION    Kidney stone     Mitral regurgitation     Mitral valve insufficiency     On anticoagulant therapy     PONV (postoperative nausea and vomiting)     Stroke 2020    HX OF, NO RESIDUAL    Stroke-like symptoms         Past Surgical History:   Procedure Laterality  Date    BREAST BIOPSY Left 05/05/2021    IDC    BREAST LUMPECTOMY      BREAST LUMPECTOMY WITH SENTINEL NODE BIOPSY N/A 07/01/2021    Procedure: right port placement, Left SHAINA-guided, bracketed, partial mastectomy and sentinel lymph node biopsy Left breast needle-localized excisional biopsy;  Surgeon: Lashonda Euceda MD;  Location: [facility];  Service: General;  Laterality: N/A;    BREAST SURGERY Bilateral 07/01/2021    Procedure: RIGHT BREAST REDUCTION MASTOPEXY LEFT BREAST ONCOPLASTIC CLOSURE;  Surgeon: Caridad Baker MD PhD;  Location: [facility] OR;  Service: Plastics;  Laterality: Bilateral;    COLONOSCOPY N/A 10/04/2022    Procedure: COLONOSCOPY TO CECUM WITH COLD BIOPSIES POLYPECTOMY;  Surgeon: Marlon Hough MD;  [place] [facility];  Service: Gastroenterology;  Laterality: N/A;  POLYP    ENDOSCOPY N/A 10/04/2022    Procedure: ESOPHAGOGASTRODUODENOSCOPY WITH COLD BIOPSIES;  Surgeon: Marlon Hough MD;  [place] [facility];  Service: Gastroenterology;  Laterality: N/A;  NORMAL    PAP SMEAR  2016    VENOUS ACCESS DEVICE (PORT) REMOVAL N/A 3/27/2023    Procedure: REMOVAL VENOUS ACCESS DEVICE;  Surgeon: Lashonda Euceda MD;  Location: [facility];  Service: General;  Laterality: N/A;         Visit Dx:      ICD-10-CM ICD-9-CM   1. 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 Malignant neoplasm of overlapping sites of left breast in female, estrogen receptor negative  C50.812 174.8    Z17.1 V86.1        Lymphedema       Row Name 12/18/23 1300             Subjective Pain    Able to rate subjective pain? yes  -RE      Pre-Treatment Pain Level 0  -RE      Post-Treatment Pain Level 0  -RE         Subjective    Subjective Comments Patient reports that she really likes her new custom compression garment.  She does still have some irritation on the inside of the elbow.  -RE         L-Dex Bioimpedence Screening    L-Dex Measurement Extremity LUE  -RE      L-Dex  Patient Position Standing  -RE      L-Dex UE Dominate Side Right  -RE      L-Dex UE At Risk Side Left  -RE      L-Dex UE Pre Surgical Value No  -RE      L-Dex UE Score -0.9  -RE      L-Dex UE Baseline Score 3.5  -RE      L-Dex UE Value Change -4.4  -RE      L-Dex UE Comment WNL  -RE                User Key  (r) = Recorded By, (t) = Taken By, (c) = Cosigned By      Initials Name Provider Type    Julia Rowe OTR Occupational Therapist                The patient had a 3-month SOZO measurement which I reviewed today. The score is WNL  see scanned to EMR. Bioimpedance spectroscopy helps identify the   onset of lymphedema in an arm or leg before patients experience noticeable swelling. Research has   shown that 92% of patients with early detection of lymphedema using L-Dex combined with   intervention do not progress to chronic lymphedema through three years. Additionally, as of March 2023, the NCCN Guidelines® for Survivorship recommend proactive screening for lymphedema using   bioimpedance spectroscopy. Whenever possible, patients are tested for baseline L-Dex score before   cancer treatment begins and then are reassessed during regular follow-up visits using the SOZO device.   Otherwise, this can be started postoperatively and continued during regular follow-up visits. If the   patient’s L-Dex score increases above normal levels, that is a sign that lymphedema is developing and a   referral is made to occupational therapy for further evaluation and early compression treatment.   Lymphedema assessment with the SOZO L-Dex score is recommended to be done every 3 months for   the first 3 years and then every 6 months for years 4 and 5 followed by annually afterwards             OT Assessment/Plan       Row Name 12/18/23 1457          OT Assessment    Impairments Impaired lymphatic circulation  -RE     Assessment Comments Patient returns for bioimpedance reassessment.  Her last measurement was 3 months ago.  L-Dex  value today is -.9 which is within normal limits and further improved from her measurement 3 months ago.  She  reports some irritation on the inside of her elbow from the sleeve and I recommended cornstarch or possibly A LPS lotion.  I recommended follow-up in 3 months.  -RE     OT Diagnosis At risk for lymphedema  -RE     OT Rehab Potential Good  -RE     Patient/caregiver participated in establishment of treatment plan and goals Yes  -RE     Patient would benefit from skilled therapy intervention Yes  -RE        OT Plan    OT Frequency Other (comment)  -RE     Predicted Duration of Therapy Intervention (OT) Bioimpedance every 3 months  -RE     Planned CPT's? OT SELF CARE/MGMT/TRAIN 15 MIN: 65202;OT BIS XTRACELL FLUID ANALYSIS: 68055  -RE     OT Plan Comments Follow-up in 3 months  -RE               User Key  (r) = Recorded By, (t) = Taken By, (c) = Cosigned By      Initials Name Provider Type    RE Julia Leslie, OTR Occupational Therapist                           OT Goals       Row Name 12/18/23 1300          OT Short Term Goals    STG 1 Pt will demonstrate understanding of use of compression sleeve for edema prevention, exercise and air travel.   -RE     STG 1 Progress Met  -RE     STG 2 Patient will demonstrate proper awareness of “What is Lymphedema?” and “Healthy Habits” for improved prevention, management, care of symptoms and ease of transition to self-care of condition.   -RE     STG 2 Progress Met  -RE     STG 3 Patient independent and compliant with initial home exercise program focused on range of motion,and Flexibility.  -RE     STG 3 Progress Met  -RE     STG 4 Patient to demonstrate increased left shoulder flexion to 140 to improve functional UE use and to restore pre operative AROM per patient perception.  -RE     STG 4 Progress Met  -RE     STG 5 Patient to demonstrate increased left shoulder abduction to 140 to improve functional UE use and to restore preoperative AROM per patient perception.  -RE      STG 5 Progress Met  -RE        Long Term Goals    LTG Date to Achieve 03/18/24  -RE     LTG 1 Patient to demonstrate increased left shoulder flexion to 160 to improve functional UE use and to restore pre operative AROM per patient perception.  -RE     LTG 1 Progress Met  -RE     LTG 2 Patient to demonstrate increased left shoulder abduction to 160 to improve functional UE use and to restore preoperative AROM per patient perception.  -RE     LTG 2 Progress Met  -RE     LTG 3 Patient will participate in bioimpedance scans every 1-3 months as a method of early detection of lymphedema to allow for early intervention.  -RE     LTG 3 Progress Met;Ongoing  -RE     LTG 4  Patient's bioimpedance score to remain below 6.5 for decreased risk of stage II lymphedema.  -RE     LTG 4 Progress Met;Ongoing  -RE     LTG 5 Patient will be independent with use and care of Tribute.  -RE     LTG 5 Progress Met  -RE        Time Calculation    OT Goal Re-Cert Due Date 03/18/24  -RE               User Key  (r) = Recorded By, (t) = Taken By, (c) = Cosigned By      Initials Name Provider Type    Julia Rowe OTR Occupational Therapist                    Therapy Education  Education Details: Discussed patient's current L-Dex value of -.9.  Patient's value today is further decreased from her measurement 3 months ago.  We did discuss that some of her issues may be related to exercise.  Patient rows in the spring and the summer and her L-Dex values have increased typically during this time.  We discussed that use of the custom compression product may limit that increased this year.  Given: Symptoms/condition management  Program: Reinforced  How Provided: Verbal  Provided to: Patient  Level of Understanding: Teach back education performed, Verbalized  71324 - OT Self Care/Mgmt Minutes: 10                Time Calculation:   OT Start Time: 1325  OT Stop Time: 1340  OT Time Calculation (min): 15 min  Total Timed Code Minutes- OT: 10  minute(s)  Timed Charges  76130 - OT Self Care/Mgmt Minutes: 10  Untimed Charges  76726 - OT Bioimpedence Rx Minutes: 5  Total Minutes  Timed Charges Total Minutes: 10  Untimed Charges Total Minutes: 5   Total Minutes: 15     Therapy Charges for Today       Code Description Service Date Service Provider Modifiers Qty    93000641478 HC OT SELF CARE/MGMT/TRAIN EA 15 MIN 12/18/2023 Julia Leslie OTR GO 1    60775883214 HC OT BIS XTRACELL FLUID ANALYSIS 12/18/2023 Julia Leslie OTR GO 1            Dictated utilizing Dragon dictation:  Much of this encounter note is an electronic transcription/translation of spoken language to printed text. The electronic translation of spoken language may permit erroneous, or at times, nonsensical words or phrases to be inadvertently transcribed; Although I have reviewed the note for such errors, some may still exist.            VI Ulloa  12/18/2023

## 2023-12-22 DIAGNOSIS — F90.0 ATTENTION DEFICIT HYPERACTIVITY DISORDER (ADHD), PREDOMINANTLY INATTENTIVE TYPE: Chronic | ICD-10-CM

## 2023-12-26 RX ORDER — LISDEXAMFETAMINE DIMESYLATE CAPSULES 60 MG/1
60 CAPSULE ORAL EVERY MORNING
Qty: 30 CAPSULE | Refills: 0 | Status: SHIPPED | OUTPATIENT
Start: 2023-12-26

## 2024-01-18 DIAGNOSIS — F90.0 ATTENTION DEFICIT HYPERACTIVITY DISORDER (ADHD), PREDOMINANTLY INATTENTIVE TYPE: Chronic | ICD-10-CM

## 2024-01-18 RX ORDER — LISDEXAMFETAMINE DIMESYLATE CAPSULES 60 MG/1
60 CAPSULE ORAL EVERY MORNING
Qty: 30 CAPSULE | Refills: 0 | Status: SHIPPED | OUTPATIENT
Start: 2024-01-18

## 2024-02-22 DIAGNOSIS — E03.9 ACQUIRED HYPOTHYROIDISM: Chronic | ICD-10-CM

## 2024-02-22 LAB
CHOLEST SERPL-MCNC: 230 MG/DL (ref 100–199)
HDLC SERPL-MCNC: 48 MG/DL
LDLC SERPL CALC-MCNC: 158 MG/DL (ref 0–99)
T3 SERPL-MCNC: 117 NG/DL (ref 71–180)
T4 FREE SERPL-MCNC: 2.02 NG/DL (ref 0.82–1.77)
TRIGL SERPL-MCNC: 132 MG/DL (ref 0–149)
TSH SERPL DL<=0.005 MIU/L-ACNC: 0.11 UIU/ML (ref 0.45–4.5)
VLDLC SERPL CALC-MCNC: 24 MG/DL (ref 5–40)

## 2024-02-22 RX ORDER — LEVOTHYROXINE SODIUM 112 UG/1
112 TABLET ORAL DAILY
Qty: 90 TABLET | Refills: 1 | Status: SHIPPED | OUTPATIENT
Start: 2024-02-22

## 2024-02-29 DIAGNOSIS — F90.0 ATTENTION DEFICIT HYPERACTIVITY DISORDER (ADHD), PREDOMINANTLY INATTENTIVE TYPE: Chronic | ICD-10-CM

## 2024-02-29 DIAGNOSIS — F43.21 GRIEF REACTION: Chronic | ICD-10-CM

## 2024-02-29 RX ORDER — LORAZEPAM 0.5 MG/1
0.5 TABLET ORAL EVERY 8 HOURS PRN
Qty: 30 TABLET | Refills: 0 | Status: SHIPPED | OUTPATIENT
Start: 2024-02-29

## 2024-02-29 RX ORDER — LANOLIN ALCOHOL/MO/W.PET/CERES
1000 CREAM (GRAM) TOPICAL DAILY
Qty: 30 TABLET | Refills: 2 | Status: SHIPPED | OUTPATIENT
Start: 2024-02-29

## 2024-02-29 RX ORDER — LISDEXAMFETAMINE DIMESYLATE CAPSULES 60 MG/1
60 CAPSULE ORAL EVERY MORNING
Qty: 30 CAPSULE | Refills: 0 | Status: SHIPPED | OUTPATIENT
Start: 2024-02-29

## 2024-03-17 NOTE — PROGRESS NOTES
Chief Complaint:   Chief Complaint   Patient presents with    adhd    Anxiety    Depression       Radha Astorga 43 y.o. female who presents today for Medical Management of the below listed issues. She  has a problem list of   Patient Active Problem List   Diagnosis    Family history of breast cancer    Hypothyroidism    Major depressive disorder, recurrent episode, moderate with anxious distress    Attention deficit hyperactivity disorder (ADHD), predominantly inattentive type    Factor 5 Leiden mutation, heterozygous    Kidney mass    Malignant neoplasm of overlapping sites of left breast in female, estrogen receptor negative    High risk medication use    Essential hypertension    Rheumatic mitral valve disease    Encounter for adjustment or management of vascular access device    Iron deficiency anemia    History of stroke    B12 deficiency    Nausea    Bloating    Family history of colon cancer    Breast edema   .  Since the last visit, She has overall felt well.  she has been compliant with   Current Outpatient Medications:     famotidine (PEPCID) 20 MG tablet, Take 1 tablet by mouth Daily., Disp: 90 tablet, Rfl: 1    lisdexamfetamine (Vyvanse) 60 MG capsule, Take 1 capsule by mouth Every Morning, Disp: 30 capsule, Rfl: 0    LORazepam (ATIVAN) 0.5 MG tablet, Take 1 tablet by mouth Every 8 (Eight) Hours As Needed for Anxiety. for anxiety, Disp: 30 tablet, Rfl: 2    albuterol sulfate  (90 Base) MCG/ACT inhaler, Inhale 2 puffs Every 4 (Four) Hours As Needed for Wheezing., Disp: 8 g, Rfl: 11    Cholecalciferol (D3) 50 MCG (2000 UT) tablet, Take 2 tablets by mouth 2 (Two) Times a Week., Disp: , Rfl:     levothyroxine (SYNTHROID, LEVOTHROID) 112 MCG tablet, Take 1 tablet by mouth Daily., Disp: 90 tablet, Rfl: 1    valsartan (DIOVAN) 160 MG tablet, Take 1 tablet by mouth Daily., Disp: 90 tablet, Rfl: 1    vitamin B-12 (CYANOCOBALAMIN) 1000 MCG tablet, Take 1 tablet by mouth Daily., Disp: 30 tablet, Rfl:  "2.  She denies medication side effects.    All of the other chronic condition(s) listed above are stable w/o issues.    /83   Pulse 77   Temp 97.8 °F (36.6 °C) (Oral)   Resp 16   Ht 157.5 cm (62.01\")   Wt 68.4 kg (150 lb 12.8 oz)   SpO2 97%   BMI 27.57 kg/m²     Results for orders placed or performed in visit on 12/18/23   T3    Specimen: Blood   Result Value Ref Range    T3, Total 117 71 - 180 ng/dL   T4, Free    Specimen: Blood   Result Value Ref Range    Free T4 2.02 (H) 0.82 - 1.77 ng/dL   TSH    Specimen: Blood   Result Value Ref Range    TSH 0.107 (L) 0.450 - 4.500 uIU/mL   Lipid Panel    Specimen: Blood   Result Value Ref Range    Total Cholesterol 230 (H) 100 - 199 mg/dL    Triglycerides 132 0 - 149 mg/dL    HDL Cholesterol 48 >39 mg/dL    VLDL Cholesterol Wallace 24 5 - 40 mg/dL    LDL Chol Calc (NIH) 158 (H) 0 - 99 mg/dL             The following portions of the patient's history were reviewed and updated as appropriate: allergies, current medications, past family history, past medical history, past social history, past surgical history, and problem list.    Review of Systems   Constitutional:  Negative for activity change, chills and fever.   Respiratory:  Negative for cough.    Cardiovascular:  Negative for chest pain.   Psychiatric/Behavioral:  Negative for dysphoric mood.        Objective             Physical Exam  Constitutional:       General: She is not in acute distress.     Appearance: She is well-developed.   Cardiovascular:      Rate and Rhythm: Normal rate and regular rhythm.   Pulmonary:      Effort: Pulmonary effort is normal.      Breath sounds: Normal breath sounds.   Neurological:      Mental Status: She is alert and oriented to person, place, and time.   Psychiatric:         Behavior: Behavior normal.         Thought Content: Thought content normal.             Diagnoses and all orders for this visit:    1. Attention deficit hyperactivity disorder (ADHD), predominantly " inattentive type (Primary)  -     lisdexamfetamine (Vyvanse) 60 MG capsule; Take 1 capsule by mouth Every Morning  Dispense: 30 capsule; Refill: 0    2. Grief reaction  -     LORazepam (ATIVAN) 0.5 MG tablet; Take 1 tablet by mouth Every 8 (Eight) Hours As Needed for Anxiety. for anxiety  Dispense: 30 tablet; Refill: 2    3. Medication management  -     ToxAssure Flex 15, Ur -

## 2024-03-19 ENCOUNTER — OFFICE VISIT (OUTPATIENT)
Dept: FAMILY MEDICINE CLINIC | Facility: CLINIC | Age: 44
End: 2024-03-19
Payer: COMMERCIAL

## 2024-03-19 ENCOUNTER — OFFICE VISIT (OUTPATIENT)
Dept: ONCOLOGY | Facility: CLINIC | Age: 44
End: 2024-03-19
Payer: COMMERCIAL

## 2024-03-19 ENCOUNTER — LAB (OUTPATIENT)
Dept: LAB | Facility: HOSPITAL | Age: 44
End: 2024-03-19
Payer: COMMERCIAL

## 2024-03-19 VITALS
WEIGHT: 150.9 LBS | HEART RATE: 75 BPM | SYSTOLIC BLOOD PRESSURE: 130 MMHG | RESPIRATION RATE: 18 BRPM | HEIGHT: 62 IN | BODY MASS INDEX: 27.77 KG/M2 | OXYGEN SATURATION: 99 % | TEMPERATURE: 97.5 F | DIASTOLIC BLOOD PRESSURE: 76 MMHG

## 2024-03-19 VITALS
WEIGHT: 150.8 LBS | BODY MASS INDEX: 27.75 KG/M2 | SYSTOLIC BLOOD PRESSURE: 124 MMHG | TEMPERATURE: 97.8 F | RESPIRATION RATE: 16 BRPM | OXYGEN SATURATION: 97 % | HEART RATE: 77 BPM | DIASTOLIC BLOOD PRESSURE: 83 MMHG | HEIGHT: 62 IN

## 2024-03-19 DIAGNOSIS — Z17.1 MALIGNANT NEOPLASM OF OVERLAPPING SITES OF LEFT BREAST IN FEMALE, ESTROGEN RECEPTOR NEGATIVE: Primary | ICD-10-CM

## 2024-03-19 DIAGNOSIS — F90.0 ATTENTION DEFICIT HYPERACTIVITY DISORDER (ADHD), PREDOMINANTLY INATTENTIVE TYPE: Primary | Chronic | ICD-10-CM

## 2024-03-19 DIAGNOSIS — F43.21 GRIEF REACTION: Chronic | ICD-10-CM

## 2024-03-19 DIAGNOSIS — Z79.899 MEDICATION MANAGEMENT: ICD-10-CM

## 2024-03-19 DIAGNOSIS — Z17.1 MALIGNANT NEOPLASM OF OVERLAPPING SITES OF LEFT BREAST IN FEMALE, ESTROGEN RECEPTOR NEGATIVE: ICD-10-CM

## 2024-03-19 DIAGNOSIS — C50.812 MALIGNANT NEOPLASM OF OVERLAPPING SITES OF LEFT BREAST IN FEMALE, ESTROGEN RECEPTOR NEGATIVE: Primary | ICD-10-CM

## 2024-03-19 DIAGNOSIS — C50.812 MALIGNANT NEOPLASM OF OVERLAPPING SITES OF LEFT BREAST IN FEMALE, ESTROGEN RECEPTOR NEGATIVE: ICD-10-CM

## 2024-03-19 LAB
ALBUMIN SERPL-MCNC: 4.2 G/DL (ref 3.5–5.2)
ALBUMIN/GLOB SERPL: 1.6 G/DL
ALP SERPL-CCNC: 69 U/L (ref 39–117)
ALT SERPL W P-5'-P-CCNC: 10 U/L (ref 1–33)
ANION GAP SERPL CALCULATED.3IONS-SCNC: 7.4 MMOL/L (ref 5–15)
AST SERPL-CCNC: 23 U/L (ref 1–32)
BASOPHILS # BLD AUTO: 0.07 10*3/MM3 (ref 0–0.2)
BASOPHILS NFR BLD AUTO: 0.9 % (ref 0–1.5)
BILIRUB SERPL-MCNC: 0.4 MG/DL (ref 0–1.2)
BUN SERPL-MCNC: 11 MG/DL (ref 6–20)
BUN/CREAT SERPL: 13.6 (ref 7–25)
CALCIUM SPEC-SCNC: 9.3 MG/DL (ref 8.6–10.5)
CHLORIDE SERPL-SCNC: 106 MMOL/L (ref 98–107)
CO2 SERPL-SCNC: 24.6 MMOL/L (ref 22–29)
CREAT SERPL-MCNC: 0.81 MG/DL (ref 0.57–1)
DEPRECATED RDW RBC AUTO: 39 FL (ref 37–54)
EGFRCR SERPLBLD CKD-EPI 2021: 91.4 ML/MIN/1.73
EOSINOPHIL # BLD AUTO: 0.08 10*3/MM3 (ref 0–0.4)
EOSINOPHIL NFR BLD AUTO: 1.1 % (ref 0.3–6.2)
ERYTHROCYTE [DISTWIDTH] IN BLOOD BY AUTOMATED COUNT: 12.1 % (ref 12.3–15.4)
GLOBULIN UR ELPH-MCNC: 2.6 GM/DL
GLUCOSE SERPL-MCNC: 89 MG/DL (ref 65–99)
HCT VFR BLD AUTO: 39.9 % (ref 34–46.6)
HGB BLD-MCNC: 13.6 G/DL (ref 12–15.9)
IMM GRANULOCYTES # BLD AUTO: 0.01 10*3/MM3 (ref 0–0.05)
IMM GRANULOCYTES NFR BLD AUTO: 0.1 % (ref 0–0.5)
LYMPHOCYTES # BLD AUTO: 2.33 10*3/MM3 (ref 0.7–3.1)
LYMPHOCYTES NFR BLD AUTO: 31.2 % (ref 19.6–45.3)
MCH RBC QN AUTO: 29.8 PG (ref 26.6–33)
MCHC RBC AUTO-ENTMCNC: 34.1 G/DL (ref 31.5–35.7)
MCV RBC AUTO: 87.3 FL (ref 79–97)
MONOCYTES # BLD AUTO: 0.53 10*3/MM3 (ref 0.1–0.9)
MONOCYTES NFR BLD AUTO: 7.1 % (ref 5–12)
NEUTROPHILS NFR BLD AUTO: 4.44 10*3/MM3 (ref 1.7–7)
NEUTROPHILS NFR BLD AUTO: 59.6 % (ref 42.7–76)
NRBC BLD AUTO-RTO: 0 /100 WBC (ref 0–0.2)
PLATELET # BLD AUTO: 293 10*3/MM3 (ref 140–450)
PMV BLD AUTO: 9.6 FL (ref 6–12)
POTASSIUM SERPL-SCNC: 4.1 MMOL/L (ref 3.5–5.2)
PROT SERPL-MCNC: 6.8 G/DL (ref 6–8.5)
RBC # BLD AUTO: 4.57 10*6/MM3 (ref 3.77–5.28)
SODIUM SERPL-SCNC: 138 MMOL/L (ref 136–145)
WBC NRBC COR # BLD AUTO: 7.46 10*3/MM3 (ref 3.4–10.8)

## 2024-03-19 PROCEDURE — 85025 COMPLETE CBC W/AUTO DIFF WBC: CPT

## 2024-03-19 PROCEDURE — 36415 COLL VENOUS BLD VENIPUNCTURE: CPT

## 2024-03-19 PROCEDURE — 99214 OFFICE O/P EST MOD 30 MIN: CPT | Performed by: INTERNAL MEDICINE

## 2024-03-19 PROCEDURE — 80053 COMPREHEN METABOLIC PANEL: CPT

## 2024-03-19 PROCEDURE — 99214 OFFICE O/P EST MOD 30 MIN: CPT | Performed by: FAMILY MEDICINE

## 2024-03-19 RX ORDER — LORAZEPAM 0.5 MG/1
0.5 TABLET ORAL EVERY 8 HOURS PRN
Qty: 30 TABLET | Refills: 2 | Status: SHIPPED | OUTPATIENT
Start: 2024-03-19

## 2024-03-19 RX ORDER — LISDEXAMFETAMINE DIMESYLATE CAPSULES 60 MG/1
60 CAPSULE ORAL EVERY MORNING
Qty: 30 CAPSULE | Refills: 0 | Status: SHIPPED | OUTPATIENT
Start: 2024-03-19

## 2024-03-19 NOTE — PROGRESS NOTES
Subjective     REASON FOR follow-up:    1.  Heterozygous state for factor V Leiden    2.  New onset stroke on aspirin by neurology    3.  Hypercholesterolemia    4.  Hypercoagulable work-up done.  Antithrombin 109% protein S activity 85% protein S antigen 107%, protein C activity 85%.  Anticardiolipin antibody IgM 24%, antibeta-2 glycoprotein antibody negative, lupus anticoagulant not detected, prothrombin gene mutation negative.    5.  Screening mammogram showed abnormality in the left breast 6 o'clock position, 8 mm on ultrasound, ultrasound-guided left breast biopsy, 6 cm from the nipple showed evidence of invasive ductal carcinoma poorly differentiated grade 3, [place] score of 8 out of 9 ER negative, MA negative, HER-2/ese 3+ positive.    6.  CT scan February 24, 2021 showed focal area of fatty infiltration of the liver.  1 cm hypoattenuating cortical lesion in the upper pole of the right kidney.  Similarly nodule in the upper pole of the left kidney is 1.5 cm.  Further evaluation by ultrasound suggested  Ultrasound March 1, 2021 shows solid lesion in the upper pole of the right kidney.  In the left kidney along the midpole of the left kidney is a nodule which is 16 x 16 x 14 mm which is thought to be a cyst.  A 6-month follow-up CT suggested    6.  S/p left partial mastectomy with sentinel lymph node biopsy.  Tumor was present at 5:00, invasive ductal carcinoma, Oziel score of 8, grade 3, greatest dimension was 12 mm x 8 x 4 mm.  Single focus of invasive carcinoma present.  There was extensive DCIS which is 30 mm x 8 x 2 mm, grade 3, no evidence of lymphovascular space invasion or dermal lymphatic invasion.  Margins were clear.  4 lymph nodes were negative.  It is a p T1cp N0, ER negative MA negative HER-2/ese 3+ with Ki-67 of 50%.  [employer] genetic test negative for 47 genes    7.recently initiated Xarelto at loading dose of 15 mg twice a day x3 weeks to then be switched to 20 mg daily per protocol.   She is doing this because of Mediport in place and known factor V Leiden heterozygosity.        History of Present Illness   Patient is a 42-year-old female with the above-mentioned history who is here today for follow-up visit.  She did have an EGD and colonoscopy 10/4/2022.  They did find a polyp however no other findings.  The patient does report she continues to have intermittent nausea about once per week.  At times she has to take Zofran.  She denies vomiting.    She continues on Xarelto 20 mg daily and denies bleeding issues.    She denies any new palpable breast mass or nodularity.  Her last mammogram was 4/4/2022 which was without evidence for malignancy.    Patient does still have a port in place, but would like to get the referral to have it removed now.    Interval history:   Patient is doing well without any complaints.  Last screening mammogram was April 2023 which was negative and she is scheduled to receive in April 2024 already.  She states she lost her job and is trying to find a new job.  Other than that she is stable.  Encouraged her to do monthly self breast exam as she has not been doing it.  She was diagnosed initially in April 2021 and she is 3 years into it.      Oncologic history:  Patient is here for follow-up of her mammogram.  Patient had screening mammogram April 2, 2021:  NAD a focal asymmetry was present in the posterior one third retroareolar region of the left breast.  Further spot compression images and left breast ultrasound was suggested.  Right breast was negative    April 9, 2021: Left diagnostic mammogram showed an area of focal asymmetry in the posterior one third region aspect of the left breast her breast    Ultrasound showed an irregular 0.8 cm lesion in the left breast at the 6 o'clock position of the order of 6 cm from the nipple.  Ultrasound-guided left breast biopsy was recommended.    April 26, 2021: Ultrasound-guided biopsy of the left breast 6 o'clock position, 6 cm  from the nipple showed    Invasive ductal carcinoma, poorly differentiated with a Wittman score grade 3 with a score of 8 out of 9 measuring at least 7 mm.  No definitive ductal carcinoma is in situ is identified.  ER 1% negative  AK 1% negative   HER-2/ese 3+ positive    There was no evidence of lymphadenopathy either in the mammogram of the ultrasound.  We suggested an MRI of the breast.  Patient has strong family history of breast cancer      May 7, 2021: MRI of bilateral breast reviewed.  My interpretation is that there is area of enhancement in the 6 o'clock position of the left breast this is the biopsy-proven malignancy.  There is additional area of linear enhancement anteriorly and laterally measuring up to 1.2 cm this is approximately 5.6 cm from the nipple.  In addition there is a enhancement 2 to 3 mm in the anterolateral aspect of the lateral aspect of the left breast which is 4.6 cm from the nipple.  There is also mildly prominent posterior lymph node in the mid axilla which measures 1 cm with cortical thickening.  Findings are consistent with multifocal disease.  No contralateral disease or internal mammary adenopathy identified    May 20, 2021: Genetic test, [employer] genetic test shows 47 genes negative    May 21, 2021: I reviewed the biopsy of the lymph node in the left axilla which shows reactive lymph node negative for any carcinoma or lymphoma    June 2, 2021:Final Diagnosis  1. Left Breast, 5:00 o'clock, MRI-guided Biopsies for Linear Enhancement: INVASIVE MAMMARY CARCINOMA, NO  SPECIAL TYPE (INVASIVE DUCTAL CARCINOMA).  A. Largest contiguous focus in a core measures 4 mm.  B. No in-situ component identified.  C. Brisk lymphocytic response noted (see Comment).  D. No definitive lymphovascular nor perineural invasion identified.  2. Left Breast, 2:00 o'clock, MRI-guided Biopsy for Enhancement:  A. Benign breast parenchyma with radial scar and micropapillomas.  B. Usual ductal hyperplasia and  columnar cell hyperplasia.  C. No atypical hyperplasia, in-situ nor invasive carcinoma identified    ER, LA, HER-2 new and Ki-67 pending on this new biopsy      Patient has been seen by Dr. Lashonda Euceda with plans of doing surgery on July first 2021    Once patient undergoes surgery lumpectomy with sentinel lymph node biopsy we will give further recommendation about treatment options.  Given that patient has multifocal disease and both of the lesions 2 lesions are 1.2 cm each, with it being a HER-2 positive tumor on the previous biopsy at 6:00 Dr. Euceda and myself discussed and patient preferred to undergo port placement at the same time of surgery.    Patient had [employer] genetic test which was negative.    She has completed surgery July 1, 2021.  She underwent left partial mastectomy with left axillary sentinel lymph node biopsy and right port placement.  Clinically she was thought to have a high-grade multifocal invasive ductal carcinoma ER/LA negative, LA positive.  The lesions require preoperative localization.  The multifocal cancer was bracketed with 2 savvy markers and the radial scar was localized with a needle.  Because of the volume of tissue that will be excised related to the breast volume the case was done in conjunction with Dr. Zavala for oncoplastic closure.    She is 2 weeks from surgery.  She is healing up reasonably well.    On review of her pathology she had left partial mastectomy with sentinel lymph node biopsy.  Tumor was present at 5:00, invasive ductal carcinoma, Oziel score of 8, grade 3, greatest dimension was 12 mm x 8 x 4 mm.  Single focus of invasive carcinoma present.  There was extensive DCIS which is 30 mm x 8 x 2 mm, grade 3, no evidence of lymphovascular space invasion or dermal lymphatic invasion.  Margins were clear.  4 lymph nodes were negative.  It is a p T1cp N0, ER negative LA negative HER-2/ese 3+ with Ki-67 of 50%.    Patient is here to discuss further options  of treatment.  Given small tumor T1c N0, she is a good candidate for weekly Taxol Herceptin.    Given that patient has heterozygous state for factor V Leiden and currently has port placed we will plan to start Eliquis 2.5 mg p.o. twice daily.  I have left a message for Dr. Craft in neurology to call me to discuss if patient needs to continue aspirin or we can hold off since be starting Eliquis.      Genetic test negative    August 4, 2021: Weekly Taxol Herceptin x12 weeks followed by Herceptin x1 year.  Cycle 1 today  Cycle 12 Taxol Herceptin October 10, 2021      Hematologic history:  patient is a 40-year-old female who presented end of December with numbness and tingling in her left upper extremity and left face.  She went to see Dr. Goodman who then got concerned and referred to neurology.  She was referred to Dr. Freedman and talk to Dr. Thompson neurology for evaluation.  Patient underwent ultrasound of the carotids which did not show significant stenosis of the carotids.  She underwent 2D echocardiogram which showed a good ejection fraction of 64% with mild mitral valve prolapse and mild mitral stenosis.  She had a 24-hour Holter which was negative.  She then had also an MRI of the brain February 3, 2021 which showed areas of hyperintensity involving the subcortical white matter of the right frontal lobe superior laterally and posteriorly with the largest area measuring 8 9 mm.  There is also enhancement present.  The findings are consistent with a subacute infarct.  They could not differentiate if there is any underlying neoplastic process but a short-term follow-up was suggested in order to follow-up.  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    Patient currently got started on aspirin and factor V Leiden was obtained by neurology because patient's paternal aunt had factor V Leiden mutation and she was  heterozygous.  Patient's testing also showed that she was heterozygous.    Patient states her numbness and tingling is resolved completely on the left arm and face it just lasted for a 24 hours.  She was here referred here for neurology to see if there is any other cause for her underlying hypercoagulable state.  She has not had a complete hypercoagulable work-up.  Also discussed with her that an underlying malignancy could cause a hypercoagulable state and we would need to do a CT scan to rule that out.    Patient's mother has had breast cancer in her 50s but had pancreatic cancer at 68 and  from that.  Patient's dad had stroke at 63.  But he is alive at 74.  She has 1 brother who is 40 in good health.  Paternal aunt had breast cancer in her 40s.  Paternal grandfather had colon cancer in his late 80s.  Maternal uncle had throat cancer and another maternal uncle had skin cancer with metastasis to the lung.  And maternal grandmother had breast cancer.    Patient was to have mammogram done last year but because of COVID-19 it got postponed and she has not had it done yet.    Patient used to be a smoker half pack per day for 7 years but quit 10 years ago.  She has high cholesterol for which she is on treatment.    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    History of rheumatic fever     Hyperlipidemia 2022    Resolved; now unmedicated    Hypertension     Hypothyroidism     Infiltrating ductal carcinoma of  left breast 05/05/2021    LEFT, HAD LUMPECTOMY, CHEMO/RADIATION    Kidney stone     Mitral regurgitation     Mitral valve insufficiency     On anticoagulant therapy     PONV (postoperative nausea and vomiting)     Stroke 12/2020    HX OF, NO RESIDUAL    Stroke-like symptoms         Past Surgical History:   Procedure Laterality Date    BREAST BIOPSY Left 05/05/2021    IDC    BREAST LUMPECTOMY      BREAST LUMPECTOMY WITH SENTINEL NODE BIOPSY N/A 07/01/2021    Procedure: right port placement, Left SHAINA-guided, bracketed, partial mastectomy and sentinel lymph node biopsy Left breast needle-localized excisional biopsy;  Surgeon: Lashonda Euceda MD;  Location: [facility];  Service: General;  Laterality: N/A;    BREAST SURGERY Bilateral 07/01/2021    Procedure: RIGHT BREAST REDUCTION MASTOPEXY LEFT BREAST ONCOPLASTIC CLOSURE;  Surgeon: Caridad Baker MD PhD;  Location: [facility];  Service: Plastics;  Laterality: Bilateral;    COLONOSCOPY N/A 10/04/2022    Procedure: COLONOSCOPY TO CECUM WITH COLD BIOPSIES POLYPECTOMY;  Surgeon: Marlon Hough MD;  Location: [facility] [place] [facility]IN OR;  Service: Gastroenterology;  Laterality: N/A;  POLYP    ENDOSCOPY N/A 10/04/2022    Procedure: ESOPHAGOGASTRODUODENOSCOPY WITH COLD BIOPSIES;  Surgeon: Marlon Hough MD;  Location: [facility] [place] [facility]IN OR;  Service: Gastroenterology;  Laterality: N/A;  NORMAL    PAP SMEAR  2016    VENOUS ACCESS DEVICE (PORT) REMOVAL N/A 3/27/2023    Procedure: REMOVAL VENOUS ACCESS DEVICE;  Surgeon: Lashonda Euceda MD;  Location: [facility];  Service: General;  Laterality: N/A;        Current Outpatient Medications on File Prior to Visit   Medication Sig Dispense Refill    albuterol sulfate  (90 Base) MCG/ACT inhaler Inhale 2 puffs Every 4 (Four) Hours As Needed for Wheezing. 8 g 11    Cholecalciferol (D3) 50 MCG (2000 UT) tablet Take 2 tablets by mouth 2 (Two) Times a Week.      famotidine (PEPCID) 20 MG tablet Take 1 tablet by  mouth Daily. 90 tablet 1    levothyroxine (SYNTHROID, LEVOTHROID) 112 MCG tablet Take 1 tablet by mouth Daily. 90 tablet 1    valsartan (DIOVAN) 160 MG tablet Take 1 tablet by mouth Daily. 90 tablet 1    vitamin B-12 (CYANOCOBALAMIN) 1000 MCG tablet Take 1 tablet by mouth Daily. 30 tablet 2     No current facility-administered medications on file prior to visit.        ALLERGIES:    Allergies   Allergen Reactions    Sulfa Antibiotics Rash        Social History     Socioeconomic History    Marital status: Significant Other    Number of children: 0   Tobacco Use    Smoking status: Former     Current packs/day: 0.00     Average packs/day: 0.5 packs/day for 12.0 years (6.0 ttl pk-yrs)     Types: Cigarettes     Start date: 1/1/1997     Quit date: 1/1/2009     Years since quitting: 15.2    Smokeless tobacco: Never    Tobacco comments:     Off and on for 12 years   Vaping Use    Vaping status: Never Used   Substance and Sexual Activity    Alcohol use: Not Currently     Comment: RARELY    Drug use: No    Sexual activity: Yes     Partners: Female     Birth control/protection: None     Comment: Unnecessary        Family History   Problem Relation Age of Onset    Breast cancer Mother 53    Pancreatic cancer Mother 68    Stroke Father     Hypertension Brother     Colon polyps Maternal Aunt     Melanoma Maternal Uncle     Breast cancer Paternal Aunt 55    Lung cancer Maternal Grandmother     Colon cancer Maternal Grandfather     Prostate cancer Maternal Grandfather     Prostate cancer Paternal Grandfather     Breast cancer Paternal Great-Grandmother 94    Brain cancer Maternal Cousin 20    Ovarian cancer Other         Paternal great aunt     Breast cancer Other     Colon polyps Maternal Aunt     Malig Hyperthermia Neg Hx     Crohn's disease Neg Hx     Irritable bowel syndrome Neg Hx     Ulcerative colitis Neg Hx       Family  history: Mother had breast cancer at age 57.  Maternal great aunt had breast cancer and paternal great  "aunt had breast cancer in her 40s.  Patient's mother had pancreatic cancer as well.  Paternal grandfather had prostate cancer.    Review of Systems   Constitutional:  Negative for appetite change, chills, diaphoresis, fatigue, fever and unexpected weight change.   HENT:  Negative for hearing loss, sore throat and trouble swallowing.    Respiratory:  Negative for cough, chest tightness, shortness of breath and wheezing.    Cardiovascular:  Negative for chest pain, palpitations and leg swelling.   Gastrointestinal:  Negative for abdominal distention, abdominal pain, constipation, diarrhea, nausea and vomiting.   Genitourinary:  Negative for dysuria, frequency, hematuria and urgency.   Musculoskeletal:  Negative for joint swelling.        No muscle weakness.   Skin:  Negative for rash and wound.   Neurological:  Negative for seizures, syncope, speech difficulty, weakness, numbness and headaches.   Hematological:  Negative for adenopathy. Does not bruise/bleed easily.   Psychiatric/Behavioral:  Negative for behavioral problems, confusion and suicidal ideas.       I have reviewed and confirmed the accuracy of the ROS as documented by the MA/LPN/RN Neena Beavers MD        Objective     Vitals:    03/19/24 1528   BP: 130/76   Pulse: 75   Resp: 18   Temp: 97.5 °F (36.4 °C)   TempSrc: Temporal   SpO2: 99%   Weight: 68.4 kg (150 lb 14.4 oz)   Height: 157.5 cm (62.01\")   PainSc: 0-No pain         3/19/2024     3:24 PM   Current Status   ECOG score 0     Physical exam    CONSTITUTIONAL:  Vital signs reviewed.  No distress, looks comfortable.  RESPIRATORY:  Normal respiratory effort.  Lungs clear to auscultation bilaterally.  BREAST: Right breast: No skin changes, no evidence of breast mass, no nipple discharge, no evidence of any right axillary adenopathy or right supraclavicular adenopathy  Left breast: No evidence of any skin changes, no evidence of any left breast mass and no evidence of left nipple discharge as well as no " left axillary adenopathy or left supraclavicular adenopathy.  CARDIOVASCULAR:  Normal S1, S2.  No murmurs rubs or gallops.  No significant lower extremity edema.  GASTROINTESTINAL: Abdomen appears unremarkable.  Nontender.  No hepatomegaly.  No splenomegaly.  LYMPHATIC:  No cervical, supraclavicular, axillary lymphadenopathy.  SKIN:  Warm.  No rashes.  PSYCHIATRIC:  Normal judgment and insight.  Normal mood and affect.    Physical exam preformed and reviewed. No changes noted from previous exam. Neena Beavers MD ;     RECENT LABS:  Results from last 7 days   Lab Units 03/19/24  1502   WBC 10*3/mm3 7.46   NEUTROS ABS 10*3/mm3 4.44   HEMOGLOBIN g/dL 13.6   HEMATOCRIT % 39.9   PLATELETS 10*3/mm3 293     Results from last 7 days   Lab Units 03/19/24  1502   SODIUM mmol/L 138   POTASSIUM mmol/L 4.1   CHLORIDE mmol/L 106   CO2 mmol/L 24.6   BUN mg/dL 11   CREATININE mg/dL 0.81   CALCIUM mg/dL 9.3   ALBUMIN g/dL 4.2   BILIRUBIN mg/dL 0.4   ALK PHOS U/L 69   ALT (SGPT) U/L 10   AST (SGOT) U/L 23   GLUCOSE mg/dL 89         Assessment & Plan       * [id number], ER negative SC negative HER-2/ese 3+ with Ki-67 of 50%.  S/p left partial mastectomy with sentinel lymph node biopsy.  Tumor was present at 5:00, invasive ductal carcinoma, [place] score of 8, grade 3, greatest dimension was 12 mm x 8 x 4 mm.  Single focus of invasive carcinoma present.  There was extensive DCIS which is 30 mm x 8 x 2 mm, grade 3, no evidence of lymphovascular space invasion or dermal lymphatic invasion.  Margins were clear.  4 lymph nodes were negative.  It is a p T1cp N0, ER negative SC negative HER-2/ese 3+ with Ki-67 of 50%.  Patient is s/p port placement  Discussed in length with patient that given a small tumor she is eligible for weekly Taxol Herceptin.  Weekly Taxol x12 weeks along with weekly Herceptin followed by 1 year of Herceptin.  Discussed patient in the breast cancer conference  2D echo done which showed ejection fraction of 61-65%  and strain pattern of -20.8.  Patient also seen by Dr. Virk cardiology and Dr. Virk is planning to repeat echocardiogram in 3 months after initiation of therapy.  8/4/2021 initiating weekly Taxol Herceptin  9/8/2021, proceed with week #6 Taxol and Herceptin.  Patient continues to tolerate treatment well. No signs of peripheral neuropathy.  Her next echocardiogram due November 4, 2021, She follows up with cardiology Dr. Camron Virk.  9/28/2021, proceed with week #9 taxol/herceptin.  She does feel her nausea has worsened after her last cycle of chemotherapy.  She prefers the disintegrating Zofran, this will be called to her pharmacy today.  Patient is here to take cycle 11 of weekly Taxol with worsening fatigue and worsening rash and overall dysphoric mood.  She also cannot sleep and her quality life is worsening.  At the present time we will plan to give 1 more cycle of Taxol following which she will be referred to radiation.  Cycle 12 Taxol Herceptin October 20, 2021  Completed radiation November 2021.  February 23, 2022: Reviewed echocardiogram with normal ejection fraction and strain pattern.  Patient seen by Dr. Virk and laquita to continue Herceptin  3/16/2022 here for continued Herceptin maintenance  For 4/20/2022 screening bilateral mammogram was negative  April 27 2022: Currently tolerating Herceptin.  Last dose of Herceptin will be June 18, 2022.  Reviewed mammogram results from April 4, 2022 which is negative  May 18 2022: Patient has 2 more cycles of Herceptin after today.  Reviewed echo with ejection fraction 57% stable  June 29, 2022: Last dose of Herceptin today.  No further issues.  March 19, 2024: Patient doing well followed with observation given she was ER/OH negative.  Reviewed screening mammogram from April 2023 which was negative.  She is scheduled already for April 2024    *  Factor V Leiden heterozygosity with right frontal stroke and patient presented in December 2020 with left-sided weakness  tingling and numbness and also numbness in the face.  Was referred to neurology and cardiology by Dr. Goodman  Ultrasound of carotid does not show significant stenosis  24 Holter is negative  2D echocardiogram shows ejection fraction of 64% with mild mitral regurg and mitral stenosis  Neurology obtain factor V Leiden given that patient's paternal aunt had's history of factor V Leiden and that was heterozygous for factor V Leiden  Continue aspirin as per neurology.  Also patient on medications for her high cholesterol started after stroke currently asymptomatic  Hypercoagulable work-up done which is negative except heterozygous state for factor V Leiden.  Complete stroke work-up has been negative and since this is arterial stroke and she was not on aspirin prior to that and her symptoms have resolved I agree with continuing aspirin alone along with high cholesterol medications.  MR venogram done on May 10, 2021 is negative.  Patient has been referred to the stroke neurologist Dr. Johnson  Patient diagnosed with breast cancer with Mediport placed.  Per discussion with neurology, patient initiated on prophylactic Xarelto and aspirin discontinued.  Patient had one nosebleed which lasted 10 minutes but with pinching the nose it helped.  But she is switching to 20 mg daily from tomorrow.  We discussed about placing ice and notifying us if her nosebleeds are significant.  But it resolved.  It was likely secondary to dry air  9/7/2021, patient continues to experience nosebleeds.  Reports these occur when her nose itches, and after scratching her nose it begins to bleed.  She feels this may be related to dry air, so I have asked her to start using saline nasal spray to help moisten her nose.  If she experiences nosebleeds that do not stop, I asked her to notify our office.  Tolerating anticoagulation with Xarelto.  No bleeding issues with Xarelto  Patient still has the port and hence will need to continue Xarelto  Patient  will require port removal but wants to wait till Dr. Euceda comes back prior to port removal  2022 she continues on Xarelto 20 mg daily denies bleeding issues.  2023: Given that she had 1 episode of menstrual period we will follow her up in 6 weeks to make sure she is not having any further menstrual period's or heavier bleeding  Given that her port is removed we will discontinue Xarelto and restart her aspirin per neurology for her stroke       * Family history of cancer: Patient's mother had breast cancer at age 50 and pancreatic cancer at age 68 and  from pancreatic cancer.  Patient's maternal grandmother had breast cancer in her 50s.  Her maternal uncle had skin cancer which went to the lung and another maternal uncle had throat cancer.  Maternal aunt had call colon polyps.  Patient's paternal aunt had breast cancer in her 40s.  And paternal grandfather with colon cancer in his 80s.  Paternal aunt also had factor V Leiden.  Genetic testing is negative    * Solid nodule in the right kidney on ultrasound, will schedule follow-up with urology  Patient was to follow-up with Dr. Shultz  Will need records from urology  Will discuss with patient at her next appointment about follow-up with urology  Patient was seen by Dr. Becerra and apparently no follow-up was needed for the nodules in the kidney.  2023: We will get records from Dr. Becerra's office    *  Elevated BMI, encourage diet and exercise.  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  Patient has been requiring frequent Tums.  Recommended she begin Pepcid 20 mg daily.    Stable, continue Pepcid 20 mg daily.    *Constipation likely secondary to ferrous sulfate.  Oral iron discontinued  Continues to have constipation, seen by GI who currently is going to do colonoscopy in October.   EGD/colonoscopy 10/4/2022  "by Dr. Hough    *Headache likely related to body ache because of Taxol  9/29/2021, patient discusses concern about worsening headaches.  Describing them as \"glass breaking\".  Started about 2 weeks ago, and progressively worsened.  Occurring 3-4 times daily now, and lasting less than 1 minute with each episode.  This occurs in the same place, right upper skull.  Denies vision changes or dizziness.  Will order stat MRI of the brain for further evaluation.  The patient follows with a neurologist, and is going to notify patient help with neuropathy as well of her symptoms and the plan for MRI.  MRI of the brain was done 10/1/2021 with no evidence of restricted diffusion to indicate acute infarction.  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    *Elevated liver function tests, total bilirubin still normal AST ALT slightly elevated.  Patient did take Tylenol but not too much.  No dose adjustments required at the fingers time.  Liver function tests normalized  6/6/2023: Liver enzymes remain normal    *Iron deficiency anemia, patient's percent saturation still low at 9% s/p full dose of IV Venofer.  9/29/2021, patient will receive dose #5 Venofer today.  Hemoglobin today 10.1.  12/22/2021, hemoglobin today 12.7.  3/16/2022 hemoglobin remains normal at 12.9.  Patient scheduled for colonoscopy end of July 2022   Hemoglobin 12/14/2022 is 12.8.  6/6/2023: Hemoglobin 13.8  September 15, 2023: Iron saturation 17% with serum ferritin of 358.    *Insomnia  9/29/2021, patient reports difficulty sleeping.  She is experiencing this every night, not just the nights of treatment when she receives IV dexamethasone.  She has attempted taking Benadryl, however felt groggy following this.  She is going to attempt melatonin to see if this improves her sleep.  Worsening, will try gabapentin which will help with neuropathy " as well  Patient took gabapentin for a few nights and had issues with  headache.  She is also very concerned about other possible side effects that she discontinued the medication.  She is tried melatonin in the past without results.  We discussed trying Benadryl versus other prescription medication.  She is going to try Benadryl first.    *Neuropathy still present in the fingers, mild  6/6/2023: Neuropathy has improved    Plan:   Patient will continue her baby aspirin per neurology for history of stroke  Currently she is followed with observation she is ER/NC negative  Patient was followed by Dr. Becerra for kidney cyst.  Per patient they were not concerned about it  Genetic test in the past has been negative  Screening mammogram April 2023 negative  Patient is already scheduled for screening mammogram April 2024  Follow-up with me in 6 months with labs      MD Dr. Maxine Ibanez Dr., Dr., Dr.

## 2024-03-21 ENCOUNTER — PRIOR AUTHORIZATION (OUTPATIENT)
Dept: FAMILY MEDICINE CLINIC | Facility: CLINIC | Age: 44
End: 2024-03-21
Payer: COMMERCIAL

## 2024-03-21 NOTE — TELEPHONE ENCOUNTER
CHELY GAMING Key: [id number] - PA Case ID: [id number] - Rx #: [id number] help? Call us at [phone]  Status  Sent to [facility]  Drug  Lisdexamfetamine Dimesylate 60MG capsules  / PRIOR AUTH PENDING

## 2024-04-10 ENCOUNTER — TELEPHONE (OUTPATIENT)
Dept: SURGERY | Facility: CLINIC | Age: 44
End: 2024-04-10
Payer: COMMERCIAL

## 2024-04-10 NOTE — TELEPHONE ENCOUNTER
Lvm for pt to let her know that I rescheduled her appt with asya to after her rescheduled mammo on 04/30    She will see asya 369/93 @ 733

## 2024-05-01 ENCOUNTER — HOSPITAL ENCOUNTER (OUTPATIENT)
Dept: MAMMOGRAPHY | Facility: HOSPITAL | Age: 44
Discharge: HOME | End: 2024-05-01
Admitting: NURSE PRACTITIONER
Payer: COMMERCIAL

## 2024-05-01 DIAGNOSIS — Z12.31 ENCOUNTER FOR SCREENING MAMMOGRAM FOR MALIGNANT NEOPLASM OF BREAST: ICD-10-CM

## 2024-05-01 PROCEDURE — 77067 SCR MAMMO BI INCL CAD: CPT

## 2024-05-01 PROCEDURE — 77063 BREAST TOMOSYNTHESIS BI: CPT

## 2024-05-07 ENCOUNTER — OFFICE VISIT (OUTPATIENT)
Dept: SURGERY | Facility: CLINIC | Age: 44
End: 2024-05-07
Payer: COMMERCIAL

## 2024-05-07 VITALS
DIASTOLIC BLOOD PRESSURE: 82 MMHG | BODY MASS INDEX: 27.23 KG/M2 | HEART RATE: 99 BPM | SYSTOLIC BLOOD PRESSURE: 122 MMHG | WEIGHT: 148 LBS | HEIGHT: 62 IN | OXYGEN SATURATION: 97 %

## 2024-05-07 DIAGNOSIS — Z17.1 MALIGNANT NEOPLASM OF OVERLAPPING SITES OF LEFT BREAST IN FEMALE, ESTROGEN RECEPTOR NEGATIVE: Primary | ICD-10-CM

## 2024-05-07 DIAGNOSIS — Z12.31 ENCOUNTER FOR SCREENING MAMMOGRAM FOR MALIGNANT NEOPLASM OF BREAST: ICD-10-CM

## 2024-05-07 DIAGNOSIS — C50.812 MALIGNANT NEOPLASM OF OVERLAPPING SITES OF LEFT BREAST IN FEMALE, ESTROGEN RECEPTOR NEGATIVE: Primary | ICD-10-CM

## 2024-05-07 PROCEDURE — 99213 OFFICE O/P EST LOW 20 MIN: CPT | Performed by: NURSE PRACTITIONER

## 2024-05-12 DIAGNOSIS — F90.0 ATTENTION DEFICIT HYPERACTIVITY DISORDER (ADHD), PREDOMINANTLY INATTENTIVE TYPE: Chronic | ICD-10-CM

## 2024-05-14 ENCOUNTER — PATIENT MESSAGE (OUTPATIENT)
Dept: FAMILY MEDICINE CLINIC | Facility: CLINIC | Age: 44
End: 2024-05-14
Payer: COMMERCIAL

## 2024-05-14 DIAGNOSIS — F90.0 ATTENTION DEFICIT HYPERACTIVITY DISORDER (ADHD), PREDOMINANTLY INATTENTIVE TYPE: Chronic | ICD-10-CM

## 2024-05-14 RX ORDER — CHOLECALCIFEROL (VITAMIN D3) 50 MCG
4000 TABLET ORAL 2 TIMES WEEKLY
Qty: 30 EACH | OUTPATIENT
Start: 2024-05-16

## 2024-05-14 RX ORDER — LISDEXAMFETAMINE DIMESYLATE 60 MG/1
60 CAPSULE ORAL EVERY MORNING
Qty: 30 CAPSULE | Refills: 0 | OUTPATIENT
Start: 2024-05-14

## 2024-05-14 NOTE — TELEPHONE ENCOUNTER
I do not give her a prescription for the vitamin D per the chart.  Please remove that and send the rest back to me for renewal.

## 2024-05-16 RX ORDER — LISDEXAMFETAMINE DIMESYLATE 60 MG/1
60 CAPSULE ORAL EVERY MORNING
Qty: 30 CAPSULE | Refills: 0 | Status: SHIPPED | OUTPATIENT
Start: 2024-05-16

## 2024-05-16 NOTE — TELEPHONE ENCOUNTER
From: Radha Astorga  To: Fadi Goodman  Sent: 5/14/2024 7:17 AM EDT  Subject: Vyvanse refill    Hello. I’m out of vyvanse and need the prescription refilled, please.

## 2024-05-28 ENCOUNTER — PRIOR AUTHORIZATION (OUTPATIENT)
Dept: FAMILY MEDICINE CLINIC | Facility: CLINIC | Age: 44
End: 2024-05-28
Payer: COMMERCIAL

## 2024-05-28 NOTE — TELEPHONE ENCOUNTER
PRIOR AUTH PENDING FOR GENERIC VYVANSE 60 MGS   CHELY GAMING Key: [id number] - PA Case ID: [id number] help? Call us at [phone]  Status  Sent to [facility]  Drug  Vyvanse 60MG capsules

## 2024-06-11 DIAGNOSIS — F90.0 ATTENTION DEFICIT HYPERACTIVITY DISORDER (ADHD), PREDOMINANTLY INATTENTIVE TYPE: Chronic | ICD-10-CM

## 2024-06-11 DIAGNOSIS — E03.9 ACQUIRED HYPOTHYROIDISM: Chronic | ICD-10-CM

## 2024-06-11 RX ORDER — LEVOTHYROXINE SODIUM 112 UG/1
112 TABLET ORAL DAILY
Qty: 90 TABLET | Refills: 1 | Status: CANCELLED | OUTPATIENT
Start: 2024-06-11

## 2024-06-11 RX ORDER — LISDEXAMFETAMINE DIMESYLATE 60 MG/1
60 CAPSULE ORAL EVERY MORNING
Qty: 30 CAPSULE | Refills: 0 | Status: SHIPPED | OUTPATIENT
Start: 2024-06-11 | End: 2024-06-12 | Stop reason: SDUPTHER

## 2024-06-11 NOTE — TELEPHONE ENCOUNTER
LAST OV  03/19/2024 DR ROSALES     Return in about 3 months (around 6/19/2024) for Recheck.   PT SHOULD HAVE REFILLS ON FILE FOR LEVOTHYROXINE

## 2024-06-12 DIAGNOSIS — F90.0 ATTENTION DEFICIT HYPERACTIVITY DISORDER (ADHD), PREDOMINANTLY INATTENTIVE TYPE: Chronic | ICD-10-CM

## 2024-06-12 RX ORDER — LISDEXAMFETAMINE DIMESYLATE 60 MG/1
60 CAPSULE ORAL EVERY MORNING
Qty: 30 CAPSULE | Refills: 0 | Status: SHIPPED | OUTPATIENT
Start: 2024-06-12

## 2024-06-12 NOTE — TELEPHONE ENCOUNTER
----- Message from [facility] [employer] sent at 6/12/2024  7:20 AM EDT -----  Regarding: Vyvanse  Contact: [phone]  Hello. I need my Vyvanse refilled please.

## 2024-06-23 RX ORDER — LORAZEPAM 0.5 MG/1
0.5 TABLET ORAL EVERY 8 HOURS PRN
Qty: 30 TABLET | Refills: 2 | Status: CANCELLED | OUTPATIENT
Start: 2024-06-23

## 2024-06-23 NOTE — PROGRESS NOTES
Chief Complaint:   Chief Complaint   Patient presents with    Hypertension     Med refill due     Anxiety    Depression    skin lesion     LEFT LOWER LEG RIGHT SIDE OF CALF   X  6 MONTHS         Radha Astorga 44 y.o. female who presents today for Medical Management of the below listed issues. She  has a problem list of   Patient Active Problem List   Diagnosis    Family history of breast cancer    Hypothyroidism    Major depressive disorder, recurrent episode, moderate with anxious distress    Attention deficit hyperactivity disorder (ADHD), predominantly inattentive type    Factor 5 Leiden mutation, heterozygous    Kidney mass    Malignant neoplasm of overlapping sites of left breast in female, estrogen receptor negative    High risk medication use    Essential hypertension    Rheumatic mitral valve disease    Encounter for adjustment or management of vascular access device    Iron deficiency anemia    History of stroke    B12 deficiency    Nausea    Bloating    Family history of colon cancer    Breast edema   .  Since the last visit, She has overall felt well, although has had a spot on her face as well as her lower left lower leg, that she would like to have looked at.  She reports the spot on the leg has been there for some time, but became a little bit irritated and itchy for a while, although currently back to normal.  Patient also having some increased irritability on the current dose of Vyvanse.  she has been compliant with   Current Outpatient Medications:     levothyroxine (SYNTHROID, LEVOTHROID) 112 MCG tablet, Take 1 tablet by mouth Daily., Disp: 90 tablet, Rfl: 1    LORazepam (ATIVAN) 0.5 MG tablet, Take 1 tablet by mouth Every 8 (Eight) Hours As Needed for Anxiety. for anxiety, Disp: 30 tablet, Rfl: 2    valsartan (DIOVAN) 160 MG tablet, Take 1 tablet by mouth Daily., Disp: 90 tablet, Rfl: 1    albuterol sulfate  (90 Base) MCG/ACT inhaler, Inhale 2 puffs Every 4 (Four) Hours As Needed for  "Wheezing., Disp: 8 g, Rfl: 11    Cholecalciferol (D3) 50 MCG (2000 UT) tablet, Take 2 tablets by mouth 2 (Two) Times a Week., Disp: , Rfl:     famotidine (PEPCID) 20 MG tablet, Take 1 tablet by mouth Daily., Disp: 90 tablet, Rfl: 1    lisdexamfetamine (Vyvanse) 50 MG capsule, Take 1 capsule by mouth Every Morning, Disp: 30 capsule, Rfl: 0    vitamin B-12 (CYANOCOBALAMIN) 1000 MCG tablet, Take 1 tablet by mouth Daily., Disp: 30 tablet, Rfl: 2.  She denies medication side effects.    All of the other chronic condition(s) listed above are stable w/o issues.    /76   Pulse 106   Temp 97.8 °F (36.6 °C) (Oral)   Resp 20   Ht 157.5 cm (62.01\")   Wt 68 kg (150 lb)   SpO2 98%   BMI 27.43 kg/m²     Results for orders placed or performed in visit on 03/19/24   Comprehensive Metabolic Panel    Specimen: Blood   Result Value Ref Range    Glucose 89 65 - 99 mg/dL    BUN 11 6 - 20 mg/dL    Creatinine 0.81 0.57 - 1.00 mg/dL    Sodium 138 136 - 145 mmol/L    Potassium 4.1 3.5 - 5.2 mmol/L    Chloride 106 98 - 107 mmol/L    CO2 24.6 22.0 - 29.0 mmol/L    Calcium 9.3 8.6 - 10.5 mg/dL    Total Protein 6.8 6.0 - 8.5 g/dL    Albumin 4.2 3.5 - 5.2 g/dL    ALT (SGPT) 10 1 - 33 U/L    AST (SGOT) 23 1 - 32 U/L    Alkaline Phosphatase 69 39 - 117 U/L    Total Bilirubin 0.4 0.0 - 1.2 mg/dL    Globulin 2.6 gm/dL    A/G Ratio 1.6 g/dL    BUN/Creatinine Ratio 13.6 7.0 - 25.0    Anion Gap 7.4 5.0 - 15.0 mmol/L    eGFR 91.4 >60.0 mL/min/1.73   CBC Auto Differential    Specimen: Blood   Result Value Ref Range    WBC 7.46 3.40 - 10.80 10*3/mm3    RBC 4.57 3.77 - 5.28 10*6/mm3    Hemoglobin 13.6 12.0 - 15.9 g/dL    Hematocrit 39.9 34.0 - 46.6 %    MCV 87.3 79.0 - 97.0 fL    MCH 29.8 26.6 - 33.0 pg    MCHC 34.1 31.5 - 35.7 g/dL    RDW 12.1 (L) 12.3 - 15.4 %    RDW-SD 39.0 37.0 - 54.0 fl    MPV 9.6 6.0 - 12.0 fL    Platelets 293 140 - 450 10*3/mm3    Neutrophil % 59.6 42.7 - 76.0 %    Lymphocyte % 31.2 19.6 - 45.3 %    Monocyte % 7.1 5.0 - " 12.0 %    Eosinophil % 1.1 0.3 - 6.2 %    Basophil % 0.9 0.0 - 1.5 %    Immature Grans % 0.1 0.0 - 0.5 %    Neutrophils, Absolute 4.44 1.70 - 7.00 10*3/mm3    Lymphocytes, Absolute 2.33 0.70 - 3.10 10*3/mm3    Monocytes, Absolute 0.53 0.10 - 0.90 10*3/mm3    Eosinophils, Absolute 0.08 0.00 - 0.40 10*3/mm3    Basophils, Absolute 0.07 0.00 - 0.20 10*3/mm3    Immature Grans, Absolute 0.01 0.00 - 0.05 10*3/mm3    nRBC 0.0 0.0 - 0.2 /100 WBC             The following portions of the patient's history were reviewed and updated as appropriate: allergies, current medications, past family history, past medical history, past social history, past surgical history, and problem list.    Review of Systems   Constitutional:  Negative for activity change, chills and fever.   Respiratory:  Negative for cough.    Cardiovascular:  Negative for chest pain.   Psychiatric/Behavioral:  Negative for dysphoric mood.        Objective             Physical Exam  Vitals and nursing note reviewed.   Constitutional:       General: She is not in acute distress.     Appearance: She is well-developed.   Cardiovascular:      Rate and Rhythm: Normal rate and regular rhythm.   Pulmonary:      Effort: Pulmonary effort is normal.      Breath sounds: Normal breath sounds.   Neurological:      Mental Status: She is alert and oriented to person, place, and time.   Psychiatric:         Behavior: Behavior normal.         Thought Content: Thought content normal.     Pt to complete her UDS quickly.                Diagnoses and all orders for this visit:    1. Attention deficit hyperactivity disorder (ADHD), predominantly inattentive type (Primary)  Comments:  med adjusted due to overstimulation  Orders:  -     lisdexamfetamine (Vyvanse) 50 MG capsule; Take 1 capsule by mouth Every Morning  Dispense: 30 capsule; Refill: 0    2. Essential hypertension  -     valsartan (DIOVAN) 160 MG tablet; Take 1 tablet by mouth Daily.  Dispense: 90 tablet; Refill: 1    3.  Acquired hypothyroidism  -     levothyroxine (SYNTHROID, LEVOTHROID) 112 MCG tablet; Take 1 tablet by mouth Daily.  Dispense: 90 tablet; Refill: 1    4. Solar lentigo    Nature of lesions discussed.  Offered dermatology referral, patient declines at this time.  Will continue to monitor.

## 2024-06-24 ENCOUNTER — OFFICE VISIT (OUTPATIENT)
Dept: FAMILY MEDICINE CLINIC | Facility: CLINIC | Age: 44
End: 2024-06-24
Payer: COMMERCIAL

## 2024-06-24 VITALS
OXYGEN SATURATION: 98 % | DIASTOLIC BLOOD PRESSURE: 76 MMHG | SYSTOLIC BLOOD PRESSURE: 116 MMHG | WEIGHT: 150 LBS | TEMPERATURE: 97.8 F | HEIGHT: 62 IN | BODY MASS INDEX: 27.6 KG/M2 | HEART RATE: 106 BPM | RESPIRATION RATE: 20 BRPM

## 2024-06-24 DIAGNOSIS — L81.4 SOLAR LENTIGO: ICD-10-CM

## 2024-06-24 DIAGNOSIS — E03.9 ACQUIRED HYPOTHYROIDISM: Chronic | ICD-10-CM

## 2024-06-24 DIAGNOSIS — I10 ESSENTIAL HYPERTENSION: Chronic | ICD-10-CM

## 2024-06-24 DIAGNOSIS — F90.0 ATTENTION DEFICIT HYPERACTIVITY DISORDER (ADHD), PREDOMINANTLY INATTENTIVE TYPE: Primary | Chronic | ICD-10-CM

## 2024-06-24 PROCEDURE — 99214 OFFICE O/P EST MOD 30 MIN: CPT | Performed by: FAMILY MEDICINE

## 2024-06-24 RX ORDER — LEVOTHYROXINE SODIUM 112 UG/1
112 TABLET ORAL DAILY
Qty: 90 TABLET | Refills: 1 | Status: SHIPPED | OUTPATIENT
Start: 2024-06-24

## 2024-06-24 RX ORDER — LISDEXAMFETAMINE DIMESYLATE 50 MG/1
50 CAPSULE ORAL EVERY MORNING
Qty: 30 CAPSULE | Refills: 0 | Status: SHIPPED | OUTPATIENT
Start: 2024-06-24

## 2024-06-24 RX ORDER — VALSARTAN 160 MG/1
160 TABLET ORAL DAILY
Qty: 90 TABLET | Refills: 1 | Status: SHIPPED | OUTPATIENT
Start: 2024-06-24

## 2024-07-10 LAB
1OH-MIDAZOLAM UR QL SCN: NOT DETECTED NG/MG CREAT
6MAM UR QL SCN: NEGATIVE NG/ML
7AMINOCLONAZEPAM/CREAT UR: NOT DETECTED NG/MG CREAT
A-OH ALPRAZ/CREAT UR: NOT DETECTED NG/MG CREAT
A-OH-TRIAZOLAM/CREAT UR CFM: NOT DETECTED NG/MG CREAT
ALPRAZ/CREAT UR CFM: NOT DETECTED NG/MG CREAT
AMPHET UR CFM-MCNC: 8536 NG/MG CREAT
AMPHETAMINES UR QL SCN: ABNORMAL NG/ML
AMPHETAMINES UR QL: NORMAL
BARBITURATES UR QL SCN: NEGATIVE NG/ML
BENZODIAZ SCN METH UR: NEGATIVE
BUPRENORPHINE UR QL SCN: NEGATIVE
BUPRENORPHINE/CREAT UR: NOT DETECTED NG/MG CREAT
CANNABINOIDS UR QL SCN: NEGATIVE NG/ML
CLONAZEPAM/CREAT UR CFM: NOT DETECTED NG/MG CREAT
COCAINE+BZE UR QL SCN: NEGATIVE NG/ML
CREAT UR-MCNC: 14 MG/DL
DESALKYLFLURAZ/CREAT UR: NOT DETECTED NG/MG CREAT
DIAZEPAM/CREAT UR: NOT DETECTED NG/MG CREAT
ETHANOL UR QL SCN: NEGATIVE G/DL
FENTANYL CTO UR SCN-MCNC: NEGATIVE NG/ML
FENTANYL/CREAT UR: NOT DETECTED NG/MG CREAT
FLUNITRAZEPAM UR QL SCN: NOT DETECTED NG/MG CREAT
LORAZEPAM/CREAT UR: NOT DETECTED NG/MG CREAT
MDA UR CFM-MCNC: NOT DETECTED NG/MG CREAT
MDMA UR CFM-MCNC: NOT DETECTED NG/MG CREAT
METHADONE UR QL SCN: NEGATIVE NG/ML
METHADONE+METAB UR QL SCN: NEGATIVE NG/ML
METHAMPHET UR CFM-MCNC: NOT DETECTED NG/MG CREAT
MIDAZOLAM/CREAT UR CFM: NOT DETECTED NG/MG CREAT
NORBUPRENORPHINE/CREAT UR: NOT DETECTED NG/MG CREAT
NORDIAZEPAM/CREAT UR: NOT DETECTED NG/MG CREAT
NORFENTANYL/CREAT UR: NOT DETECTED NG/MG CREAT
NORFLUNITRAZEPAM UR-MCNC: NOT DETECTED NG/MG CREAT
OPIATES UR SCN-MCNC: NEGATIVE NG/ML
OXAZEPAM/CREAT UR: NOT DETECTED NG/MG CREAT
OXYCODONE CTO UR SCN-MCNC: NEGATIVE NG/ML
PCP UR QL SCN: NEGATIVE NG/ML
PRESCRIBED MEDICATIONS: ABNORMAL
TAPENTADOL CTO UR SCN-MCNC: NEGATIVE NG/ML
TEMAZEPAM/CREAT UR: NOT DETECTED NG/MG CREAT
TRAMADOL UR QL SCN: NEGATIVE NG/ML

## 2024-07-22 RX ORDER — LANOLIN ALCOHOL/MO/W.PET/CERES
1000 CREAM (GRAM) TOPICAL DAILY
Qty: 30 TABLET | Refills: 2 | Status: SHIPPED | OUTPATIENT
Start: 2024-07-22

## 2024-08-05 ENCOUNTER — TELEPHONE (OUTPATIENT)
Dept: ONCOLOGY | Facility: CLINIC | Age: 44
End: 2024-08-05
Payer: COMMERCIAL

## 2024-08-05 NOTE — TELEPHONE ENCOUNTER
Caller: Radha Astorga    Relationship to patient: Self    Best call back number: [phone]    Chief complaint: CONFLICT OF SCHEDULE, NEEDING TO RESCHEDULE     Type of visit: LAB AND FOLLOW UP 1    Requested date:  AFTERNOON OR MORNING APPOINTMENT TIME IF AVAILABLE     08/19 THROUGH 08/23    If rescheduling, when is the original appointment: 08/13

## 2024-08-06 NOTE — TELEPHONE ENCOUNTER
Spoke with pt and rescheduled appt due to appt conflict. Pt confirmed new date and time for appt.

## 2024-08-20 DIAGNOSIS — F90.0 ATTENTION DEFICIT HYPERACTIVITY DISORDER (ADHD), PREDOMINANTLY INATTENTIVE TYPE: Chronic | ICD-10-CM

## 2024-08-20 DIAGNOSIS — E03.9 ACQUIRED HYPOTHYROIDISM: Chronic | ICD-10-CM

## 2024-08-21 RX ORDER — CHOLECALCIFEROL (VITAMIN D3) 50 MCG
4000 TABLET ORAL 2 TIMES WEEKLY
Qty: 48 EACH | Refills: 1 | Status: SHIPPED | OUTPATIENT
Start: 2024-08-22

## 2024-08-21 RX ORDER — LISDEXAMFETAMINE DIMESYLATE 50 MG/1
50 CAPSULE ORAL EVERY MORNING
Qty: 30 CAPSULE | Refills: 0 | Status: SHIPPED | OUTPATIENT
Start: 2024-08-21

## 2024-08-21 RX ORDER — LEVOTHYROXINE SODIUM 112 UG/1
112 TABLET ORAL DAILY
Qty: 90 TABLET | Refills: 0 | Status: SHIPPED | OUTPATIENT
Start: 2024-08-21

## 2024-08-21 RX ORDER — LANOLIN ALCOHOL/MO/W.PET/CERES
1000 CREAM (GRAM) TOPICAL DAILY
Qty: 30 TABLET | Refills: 2 | Status: SHIPPED | OUTPATIENT
Start: 2024-08-21

## 2024-08-29 ENCOUNTER — TELEMEDICINE (OUTPATIENT)
Dept: ONCOLOGY | Facility: CLINIC | Age: 44
End: 2024-08-29
Payer: COMMERCIAL

## 2024-08-29 DIAGNOSIS — C50.812 MALIGNANT NEOPLASM OF OVERLAPPING SITES OF LEFT BREAST IN FEMALE, ESTROGEN RECEPTOR NEGATIVE: Primary | ICD-10-CM

## 2024-08-29 DIAGNOSIS — Z17.1 MALIGNANT NEOPLASM OF OVERLAPPING SITES OF LEFT BREAST IN FEMALE, ESTROGEN RECEPTOR NEGATIVE: Primary | ICD-10-CM

## 2024-08-29 PROCEDURE — 99213 OFFICE O/P EST LOW 20 MIN: CPT | Performed by: INTERNAL MEDICINE

## 2024-09-01 NOTE — PROGRESS NOTES
Subjective   You have chosen to receive care through a telehealth visit.  Do you consent to use a video/audio connection for your medical care today? Yes    Patient is at home and I am in the office at [facility].         REASON FOR follow-up:    1.  Heterozygous state for factor V Leiden    2.  New onset stroke on aspirin by neurology    3.  Hypercholesterolemia    4.  Hypercoagulable work-up done.  Antithrombin 109% protein S activity 85% protein S antigen 107%, protein C activity 85%.  Anticardiolipin antibody IgM 24%, antibeta-2 glycoprotein antibody negative, lupus anticoagulant not detected, prothrombin gene mutation negative.    5.  Screening mammogram showed abnormality in the left breast 6 o'clock position, 8 mm on ultrasound, ultrasound-guided left breast biopsy, 6 cm from the nipple showed evidence of invasive ductal carcinoma poorly differentiated grade 3, Oziel score of 8 out of 9 ER negative, ME negative, HER-2/ese 3+ positive.    6.  CT scan February 24, 2021 showed focal area of fatty infiltration of the liver.  1 cm hypoattenuating cortical lesion in the upper pole of the right kidney.  Similarly nodule in the upper pole of the left kidney is 1.5 cm.  Further evaluation by ultrasound suggested  Ultrasound March 1, 2021 shows solid lesion in the upper pole of the right kidney.  In the left kidney along the midpole of the left kidney is a nodule which is 16 x 16 x 14 mm which is thought to be a cyst.  A 6-month follow-up CT suggested    6.  S/p left partial mastectomy with sentinel lymph node biopsy.  Tumor was present at 5:00, invasive ductal carcinoma, [place] score of 8, grade 3, greatest dimension was 12 mm x 8 x 4 mm.  Single focus of invasive carcinoma present.  There was extensive DCIS which is 30 mm x 8 x 2 mm, grade 3, no evidence of lymphovascular space invasion or dermal lymphatic invasion.  Margins were clear.  4 lymph nodes were negative.  It is a p T1cp N0, ER negative  OK negative HER-2/ese 3+ with Ki-67 of 50%.  [employer] genetic test negative for 47 genes    7.recently initiated Xarelto at loading dose of 15 mg twice a day x3 weeks to then be switched to 20 mg daily per protocol.  She is doing this because of Mediport in place and known factor V Leiden heterozygosity.        History of Present Illness   Patient is a 42-year-old female with the above-mentioned history who is here today for follow-up visit.  She did have an EGD and colonoscopy 10/4/2022.  They did find a polyp however no other findings.  The patient does report she continues to have intermittent nausea about once per week.  At times she has to take Zofran.  She denies vomiting.    She continues on Xarelto 20 mg daily and denies bleeding issues.    She denies any new palpable breast mass or nodularity.  Her last mammogram was 4/4/2022 which was without evidence for malignancy.    Patient does still have a port in place, but would like to get the referral to have it removed now.    Interval history:   Patient is doing well without any complaints.  Last screening mammogram was April 2023 which was negative and she is scheduled to receive in April 2024 already.  She states she lost her job and is trying to find a new job.  Other than that she is stable.  Encouraged her to do monthly self breast exam as she has not been doing it.  She was diagnosed initially in April 2021 and she is 3 years into it.    August 29, 2024: Patient denies any complaints.  She is due for mammogram May 2025 and follows up with Ac [facility]ouser after that APRN and surgery.  Patient had T1c ER negative OK negative HER2 positive breast cancer for which she completed all treatment in October 2021 and completed radiation November 2021.  She follows up with cardio oncology.  Her last dose of Herceptin was June 29, 2022.  She is 2 years after completion of treatment.  And followed with observation    She is just on aspirin a day for her right frontal  stroke and factor V Leiden heterozygosity.  She was on Xarelto for about as long as the port was present which was then discontinued at port removal      Oncologic history:  Patient is here for follow-up of her mammogram.  Patient had screening mammogram April 2, 2021:  NAD a focal asymmetry was present in the posterior one third retroareolar region of the left breast.  Further spot compression images and left breast ultrasound was suggested.  Right breast was negative    April 9, 2021: Left diagnostic mammogram showed an area of focal asymmetry in the posterior one third region aspect of the left breast her breast    Ultrasound showed an irregular 0.8 cm lesion in the left breast at the 6 o'clock position of the order of 6 cm from the nipple.  Ultrasound-guided left breast biopsy was recommended.    April 26, 2021: Ultrasound-guided biopsy of the left breast 6 o'clock position, 6 cm from the nipple showed    Invasive ductal carcinoma, poorly differentiated with a [place] score grade 3 with a score of 8 out of 9 measuring at least 7 mm.  No definitive ductal carcinoma is in situ is identified.  ER 1% negative  MD 1% negative   HER-2/ese 3+ positive    There was no evidence of lymphadenopathy either in the mammogram of the ultrasound.  We suggested an MRI of the breast.  Patient has strong family history of breast cancer      May 7, 2021: MRI of bilateral breast reviewed.  My interpretation is that there is area of enhancement in the 6 o'clock position of the left breast this is the biopsy-proven malignancy.  There is additional area of linear enhancement anteriorly and laterally measuring up to 1.2 cm this is approximately 5.6 cm from the nipple.  In addition there is a enhancement 2 to 3 mm in the anterolateral aspect of the lateral aspect of the left breast which is 4.6 cm from the nipple.  There is also mildly prominent posterior lymph node in the mid axilla which measures 1 cm with cortical thickening.   Findings are consistent with multifocal disease.  No contralateral disease or internal mammary adenopathy identified    May 20, 2021: Genetic test, [employer] genetic test shows 47 genes negative    May 21, 2021: I reviewed the biopsy of the lymph node in the left axilla which shows reactive lymph node negative for any carcinoma or lymphoma    June 2, 2021:Final Diagnosis  1. Left Breast, 5:00 o'clock, MRI-guided Biopsies for Linear Enhancement: INVASIVE MAMMARY CARCINOMA, NO  SPECIAL TYPE (INVASIVE DUCTAL CARCINOMA).  A. Largest contiguous focus in a core measures 4 mm.  B. No in-situ component identified.  C. Brisk lymphocytic response noted (see Comment).  D. No definitive lymphovascular nor perineural invasion identified.  2. Left Breast, 2:00 o'clock, MRI-guided Biopsy for Enhancement:  A. Benign breast parenchyma with radial scar and micropapillomas.  B. Usual ductal hyperplasia and columnar cell hyperplasia.  C. No atypical hyperplasia, in-situ nor invasive carcinoma identified    ER, IN, HER-2 new and Ki-67 pending on this new biopsy      Patient has been seen by Dr. Lashonda Euceda with plans of doing surgery on July first 2021    Once patient undergoes surgery lumpectomy with sentinel lymph node biopsy we will give further recommendation about treatment options.  Given that patient has multifocal disease and both of the lesions 2 lesions are 1.2 cm each, with it being a HER-2 positive tumor on the previous biopsy at 6:00 Dr. Euceda and myself discussed and patient preferred to undergo port placement at the same time of surgery.    Patient had [employer] genetic test which was negative.    She has completed surgery July 1, 2021.  She underwent left partial mastectomy with left axillary sentinel lymph node biopsy and right port placement.  Clinically she was thought to have a high-grade multifocal invasive ductal carcinoma ER/IN negative, IN positive.  The lesions require preoperative localization.  The  multifocal cancer was bracketed with 2 savvy markers and the radial scar was localized with a needle.  Because of the volume of tissue that will be excised related to the breast volume the case was done in conjunction with Dr. Zavala for oncoplastic closure.    She is 2 weeks from surgery.  She is healing up reasonably well.    On review of her pathology she had left partial mastectomy with sentinel lymph node biopsy.  Tumor was present at 5:00, invasive ductal carcinoma, Oziel score of 8, grade 3, greatest dimension was 12 mm x 8 x 4 mm.  Single focus of invasive carcinoma present.  There was extensive DCIS which is 30 mm x 8 x 2 mm, grade 3, no evidence of lymphovascular space invasion or dermal lymphatic invasion.  Margins were clear.  4 lymph nodes were negative.  It is a p T1cp N0, ER negative [place] negative HER-2/ese 3+ with Ki-67 of 50%.    Patient is here to discuss further options of treatment.  Given small tumor T1c N0, she is a good candidate for weekly Taxol Herceptin.    Given that patient has heterozygous state for factor V Leiden and currently has port placed we will plan to start Eliquis 2.5 mg p.o. twice daily.  I have left a message for Dr. Craft in neurology to call me to discuss if patient needs to continue aspirin or we can hold off since be starting Eliquis.      Genetic test negative    August 4, 2021: Weekly Taxol Herceptin x12 weeks followed by Herceptin x1 year.  Cycle 1 today  Cycle 12 Taxol Herceptin October 10, 2021      Hematologic history:  patient is a 40-year-old female who presented end of December with numbness and tingling in her left upper extremity and left face.  She went to see Dr. Goodman who then got concerned and referred to neurology.  She was referred to Dr. Freedman and talk to Dr. Thompson neurology for evaluation.  Patient underwent ultrasound of the carotids which did not show significant stenosis of the carotids.  She underwent 2D echocardiogram which showed a good  ejection fraction of 64% with mild mitral valve prolapse and mild mitral stenosis.  She had a 24-hour Holter which was negative.  She then had also an MRI of the brain February 3, 2021 which showed areas of hyperintensity involving the subcortical white matter of the right frontal lobe superior laterally and posteriorly with the largest area measuring 8 9 mm.  There is also enhancement present.  The findings are consistent with a subacute infarct.  They could not differentiate if there is any underlying neoplastic process but a short-term follow-up was suggested in order to follow-up.  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    Patient currently got started on aspirin and factor V Leiden was obtained by neurology because patient's paternal aunt had factor V Leiden mutation and she was heterozygous.  Patient's testing also showed that she was heterozygous.    Patient states her numbness and tingling is resolved completely on the left arm and face it just lasted for a 24 hours.  She was here referred here for neurology to see if there is any other cause for her underlying hypercoagulable state.  She has not had a complete hypercoagulable work-up.  Also discussed with her that an underlying malignancy could cause a hypercoagulable state and we would need to do a CT scan to rule that out.    Patient's mother has had breast cancer in her 50s but had pancreatic cancer at 68 and  from that.  Patient's dad had stroke at 63.  But he is alive at 74.  She has 1 brother who is 40 in good health.  Paternal aunt had breast cancer in her 40s.  Paternal grandfather had colon cancer in his late 80s.  Maternal uncle had throat cancer and another maternal uncle had skin cancer with metastasis to the lung.  And maternal grandmother had breast cancer.    Patient was to have mammogram done last year but because of COVID-19 it got  postponed and she has not had it done yet.    Patient used to be a smoker half pack per day for 7 years but quit 10 years ago.  She has high cholesterol for which she is on treatment.    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    History of rheumatic fever     Hyperlipidemia 1/2/2022    Resolved; now unmedicated    Hypertension     Hypothyroidism     Infiltrating ductal carcinoma of left breast 05/05/2021    LEFT, HAD LUMPECTOMY, CHEMO/RADIATION    Kidney stone     Mitral regurgitation     Mitral valve insufficiency     On anticoagulant therapy     PONV (postoperative nausea and vomiting)     Stroke 12/2020    HX OF, NO RESIDUAL    Stroke-like symptoms         Past Surgical History:   Procedure Laterality Date    BREAST BIOPSY Left 05/05/2021    IDC    BREAST LUMPECTOMY      BREAST LUMPECTOMY WITH SENTINEL NODE BIOPSY N/A 07/01/2021    Procedure: right port placement, Left SHAINA-guided, bracketed, partial mastectomy and sentinel lymph node biopsy Left breast needle-localized excisional biopsy;  Surgeon: Lashonda Euceda MD;  Location: [facility]  Service: General;  Laterality: N/A;    BREAST SURGERY Bilateral 07/01/2021    Procedure: RIGHT BREAST REDUCTION MASTOPEXY LEFT BREAST ONCOPLASTIC CLOSURE;  Surgeon: Caridad Baker MD PhD;  Location: [facility]  Service: Plastics;  Laterality: Bilateral;    COLONOSCOPY N/A 10/04/2022    Procedure: COLONOSCOPY TO CECUM WITH COLD BIOPSIES POLYPECTOMY;   Surgeon: Marlon Hough MD;  [place] [facility];  Service: Gastroenterology;  Laterality: N/A;  POLYP    ENDOSCOPY N/A 10/04/2022    Procedure: ESOPHAGOGASTRODUODENOSCOPY WITH COLD BIOPSIES;  Surgeon: Marlon Hough MD;  [place] [facility];  Service: Gastroenterology;  Laterality: N/A;  NORMAL    PAP SMEAR  2016    VENOUS ACCESS DEVICE (PORT) REMOVAL N/A 3/27/2023    Procedure: REMOVAL VENOUS ACCESS DEVICE;  Surgeon: Lashonda Euceda MD;  Location: [facility];  Service: General;  Laterality: N/A;        Current Outpatient Medications on File Prior to Visit   Medication Sig Dispense Refill    albuterol sulfate  (90 Base) MCG/ACT inhaler Inhale 2 puffs Every 4 (Four) Hours As Needed for Wheezing. 8 g 11    Cholecalciferol (D3) 50 MCG (2000 UT) tablet Take 2 tablets by mouth 2 (Two) Times a Week. 48 each 1    famotidine (PEPCID) 20 MG tablet Take 1 tablet by mouth Daily. 90 tablet 1    levothyroxine (SYNTHROID, LEVOTHROID) 112 MCG tablet Take 1 tablet by mouth Daily. 90 tablet 0    lisdexamfetamine (Vyvanse) 50 MG capsule Take 1 capsule by mouth Every Morning 30 capsule 0    LORazepam (ATIVAN) 0.5 MG tablet Take 1 tablet by mouth Every 8 (Eight) Hours As Needed for Anxiety. for anxiety 30 tablet 2    valsartan (DIOVAN) 160 MG tablet Take 1 tablet by mouth Daily. 90 tablet 1    vitamin B-12 (CYANOCOBALAMIN) 1000 MCG tablet Take 1 tablet by mouth Daily. 30 tablet 2     No current facility-administered medications on file prior to visit.        ALLERGIES:    Allergies   Allergen Reactions    Sulfa Antibiotics Rash        Social History     Socioeconomic History    Marital status: Significant Other    Number of children: 0   Tobacco Use    Smoking status: Former     Current packs/day: 0.00     Average packs/day: 0.5 packs/day for 12.0 years (6.0 ttl pk-yrs)     Types: Cigarettes     Start date: 1/1/1997     Quit date: 1/1/2009     Years since quitting: 15.6     Passive exposure: Never     Smokeless tobacco: Never    Tobacco comments:     Off and on for 12 years   Vaping Use    Vaping status: Never Used   Substance and Sexual Activity    Alcohol use: Not Currently     Comment: RARELY    Drug use: No    Sexual activity: Yes     Partners: Female     Birth control/protection: None     Comment: Unnecessary        Family History   Problem Relation Age of Onset    Breast cancer Mother 53    Pancreatic cancer Mother 68    Stroke Father     Hypertension Brother     Colon polyps Maternal Aunt     Melanoma Maternal Uncle     Breast cancer Paternal Aunt 55    Lung cancer Maternal Grandmother     Colon cancer Maternal Grandfather     Prostate cancer Maternal Grandfather     Prostate cancer Paternal Grandfather     Breast cancer Paternal Great-Grandmother 94    Brain cancer Maternal Cousin 20    Ovarian cancer Other         Paternal great aunt     Breast cancer Other     Colon polyps Maternal Aunt     Malig Hyperthermia Neg Hx     Crohn's disease Neg Hx     Irritable bowel syndrome Neg Hx     Ulcerative colitis Neg Hx       Family  history: Mother had breast cancer at age 57.  Maternal great aunt had breast cancer and paternal great aunt had breast cancer in her 40s.  Patient's mother had pancreatic cancer as well.  Paternal grandfather had prostate cancer.    Review of Systems   Constitutional:  Negative for appetite change, chills, diaphoresis, fatigue, fever and unexpected weight change.   HENT:  Negative for hearing loss, sore throat and trouble swallowing.    Respiratory:  Negative for cough, chest tightness, shortness of breath and wheezing.    Cardiovascular:  Negative for chest pain, palpitations and leg swelling.   Gastrointestinal:  Negative for abdominal distention, abdominal pain, constipation, diarrhea, nausea and vomiting.   Genitourinary:  Negative for dysuria, frequency, hematuria and urgency.   Musculoskeletal:  Negative for joint swelling.        No muscle weakness.   Skin:  Negative for rash  and wound.   Neurological:  Negative for seizures, syncope, speech difficulty, weakness, numbness and headaches.   Hematological:  Negative for adenopathy. Does not bruise/bleed easily.   Psychiatric/Behavioral:  Negative for behavioral problems, confusion and suicidal ideas.       I have reviewed and confirmed the accuracy of the ROS as documented by the MA/LPN/RN Neena Beavers MD        Objective     There were no vitals filed for this visit.        3/19/2024     3:24 PM   Current Status   ECOG score 0     Physical exam    Physical exam not done as this is a video visit.  Patient is alert and oriented x 3 she is due for mammogram May 2025    RECENT LABS:                  Assessment & Plan       * [id number], ER negative [place] negative HER-2/ese 3+ with Ki-67 of 50%.  S/p left partial mastectomy with sentinel lymph node biopsy.  Tumor was present at 5:00, invasive ductal carcinoma, Oziel score of 8, grade 3, greatest dimension was 12 mm x 8 x 4 mm.  Single focus of invasive carcinoma present.  There was extensive DCIS which is 30 mm x 8 x 2 mm, grade 3, no evidence of lymphovascular space invasion or dermal lymphatic invasion.  Margins were clear.  4 lymph nodes were negative.  It is a p T1cp N0, ER negative [place] negative HER-2/ese 3+ with Ki-67 of 50%.  Patient is s/p port placement  Discussed in length with patient that given a small tumor she is eligible for weekly Taxol Herceptin.  Weekly Taxol x12 weeks along with weekly Herceptin followed by 1 year of Herceptin.  Discussed patient in the breast cancer conference  2D echo done which showed ejection fraction of 61-65% and strain pattern of -20.8.  Patient also seen by Dr. Virk cardiology and Dr. Virk is planning to repeat echocardiogram in 3 months after initiation of therapy.  8/4/2021 initiating weekly Taxol Herceptin  9/8/2021, proceed with week #6 Taxol and Herceptin.  Patient continues to tolerate treatment well. No signs of peripheral neuropathy.  Her next  echocardiogram due November 4, 2021, She follows up with cardiology Dr. Camron Virk.  9/28/2021, proceed with week #9 taxol/herceptin.  She does feel her nausea has worsened after her last cycle of chemotherapy.  She prefers the disintegrating Zofran, this will be called to her pharmacy today.  Patient is here to take cycle 11 of weekly Taxol with worsening fatigue and worsening rash and overall dysphoric mood.  She also cannot sleep and her quality life is worsening.  At the present time we will plan to give 1 more cycle of Taxol following which she will be referred to radiation.  Cycle 12 Taxol Herceptin October 20, 2021  Completed radiation November 2021.  February 23, 2022: Reviewed echocardiogram with normal ejection fraction and strain pattern.  Patient seen by Dr. Virk and laquita to continue Herceptin  3/16/2022 here for continued Herceptin maintenance  For 4/20/2022 screening bilateral mammogram was negative  April 27 2022: Currently tolerating Herceptin.  Last dose of Herceptin will be June 18, 2022.  Reviewed mammogram results from April 4, 2022 which is negative  May 18 2022: Patient has 2 more cycles of Herceptin after today.  Reviewed echo with ejection fraction 57% stable  June 29, 2022: Last dose of Herceptin today.  No further issues.  March 19, 2024: Patient doing well followed with observation given she was ER/VA negative.  Reviewed screening mammogram from April 2023 which was negative.  She is scheduled already for April 2024 August 29, 2024: Patient currently without any complaints.  Reviewed mammogram from May 1, 2024 which is negative.    *  Factor V Leiden heterozygosity with right frontal stroke and patient presented in December 2020 with left-sided weakness tingling and numbness and also numbness in the face.  Was referred to neurology and cardiology by Dr. Goodman  Ultrasound of carotid does not show significant stenosis  24 Holter is negative  2D echocardiogram shows ejection fraction of  64% with mild mitral regurg and mitral stenosis  Neurology obtain factor V Leiden given that patient's paternal aunt had's history of factor V Leiden and that was heterozygous for factor V Leiden  Continue aspirin as per neurology.  Also patient on medications for her high cholesterol started after stroke currently asymptomatic  Hypercoagulable work-up done which is negative except heterozygous state for factor V Leiden.  Complete stroke work-up has been negative and since this is arterial stroke and she was not on aspirin prior to that and her symptoms have resolved I agree with continuing aspirin alone along with high cholesterol medications.  MR venogram done on May 10, 2021 is negative.  Patient has been referred to the stroke neurologist Dr. Johnson  Patient diagnosed with breast cancer with Mediport placed.  Per discussion with neurology, patient initiated on prophylactic Xarelto and aspirin discontinued.  Patient had one nosebleed which lasted 10 minutes but with pinching the nose it helped.  But she is switching to 20 mg daily from tomorrow.  We discussed about placing ice and notifying us if her nosebleeds are significant.  But it resolved.  It was likely secondary to dry air  9/7/2021, patient continues to experience nosebleeds.  Reports these occur when her nose itches, and after scratching her nose it begins to bleed.  She feels this may be related to dry air, so I have asked her to start using saline nasal spray to help moisten her nose.  If she experiences nosebleeds that do not stop, I asked her to notify our office.  Tolerating anticoagulation with Xarelto.  No bleeding issues with Xarelto  Patient still has the port and hence will need to continue Xarelto  Patient will require port removal but wants to wait till Dr. Euceda comes back prior to port removal  12/14/2022 she continues on Xarelto 20 mg daily denies bleeding issues.  April 25, 2023: Given that she had 1 episode of menstrual period we  "will follow her up in 6 weeks to make sure she is not having any further menstrual period's or heavier bleeding  Given that her port is removed we will discontinue Xarelto and restart her aspirin per neurology for her stroke       * Family history of cancer: Patient's mother had breast cancer at age 50 and pancreatic cancer at age 68 and  from pancreatic cancer.  Patient's maternal grandmother had breast cancer in her 50s.  Her maternal uncle had skin cancer which went to the lung and another maternal uncle had throat cancer.  Maternal aunt had call colon polyps.  Patient's paternal aunt had breast cancer in her 40s.  And paternal grandfather with colon cancer in his 80s.  Paternal aunt also had factor V Leiden.  Genetic testing is negative    * Solid nodule in the right kidney on ultrasound, will schedule follow-up with urology  Patient was to follow-up with Dr. Shultz  Will need records from urology  Will discuss with patient at her next appointment about follow-up with urology  Patient was seen by Dr. Becerra and apparently no follow-up was needed for the nodules in the kidney.  2023: We will get records from Dr. Becerra's office    *  Elevated BMI, encourage diet and exercise.  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  Patient has been requiring frequent Tums.  Recommended she begin Pepcid 20 mg daily.    Stable, continue Pepcid 20 mg daily.    *Constipation likely secondary to ferrous sulfate.  Oral iron discontinued  Continues to have constipation, seen by GI who currently is going to do colonoscopy in October.   EGD/colonoscopy 10/4/2022 by Dr. Hough    *Headache likely related to body ache because of Taxol  2021, patient discusses concern about worsening headaches.  Describing them as \"glass breaking\".  Started about 2 weeks ago, and progressively worsened.  " Occurring 3-4 times daily now, and lasting less than 1 minute with each episode.  This occurs in the same place, right upper skull.  Denies vision changes or dizziness.  Will order stat MRI of the brain for further evaluation.  The patient follows with a neurologist, and is going to notify patient help with neuropathy as well of her symptoms and the plan for MRI.  MRI of the brain was done 10/1/2021 with no evidence of restricted diffusion to indicate acute infarction.  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    *Elevated liver function tests, total bilirubin still normal AST ALT slightly elevated.  Patient did take Tylenol but not too much.  No dose adjustments required at the fingers time.  Liver function tests normalized  6/6/2023: Liver enzymes remain normal    *Iron deficiency anemia, patient's percent saturation still low at 9% s/p full dose of IV Venofer.  9/29/2021, patient will receive dose #5 Venofer today.  Hemoglobin today 10.1.  12/22/2021, hemoglobin today 12.7.  3/16/2022 hemoglobin remains normal at 12.9.  Patient scheduled for colonoscopy end of July 2022   Hemoglobin 12/14/2022 is 12.8.  6/6/2023: Hemoglobin 13.8  September 15, 2023: Iron saturation 17% with serum ferritin of 358.    *Insomnia  9/29/2021, patient reports difficulty sleeping.  She is experiencing this every night, not just the nights of treatment when she receives IV dexamethasone.  She has attempted taking Benadryl, however felt groggy following this.  She is going to attempt melatonin to see if this improves her sleep.  Worsening, will try gabapentin which will help with neuropathy as well  Patient took gabapentin for a few nights and had issues with  headache.  She is also very concerned about other possible side effects that she discontinued the medication.  She is tried melatonin in the past without  results.  We discussed trying Benadryl versus other prescription medication.  She is going to try Benadryl first.    *Neuropathy still present in the fingers, mild  6/6/2023: Neuropathy has improved    Plan:   Patient will continue her baby aspirin per neurology for history of stroke  Currently she is followed with observation she is ER/AK negative  Patient was followed by Dr. Becerra for kidney cyst.  Per patient they were not concerned about it  Genetic test in the past has been negative  Screening mammogram 2024 is negative   patient is already scheduled for screening mammogram 2025   follow-up with Dr. Sanchez in 4 months with labs    I spent 20 total minutes, face-to-face, caring for Radha today. Greater than 50% of this time involved counseling and/or coordination of care as documented within this note.    MD Dr. Maxine Ibanez Dr., Dr., Dr.

## 2024-09-03 ENCOUNTER — TELEPHONE (OUTPATIENT)
Dept: ONCOLOGY | Facility: CLINIC | Age: 44
End: 2024-09-03
Payer: COMMERCIAL

## 2024-09-03 NOTE — TELEPHONE ENCOUNTER
Call to Radha and confirmed that she did not ever connect with Dr. Beavers for video visit on 8/29/24, and let her know it will be taken care of so she is not charged.  Let her know we will get her moved up on the schedule to see Dr. Sanchez sooner.  Patient verbalized understanding and appreciation for the call.

## 2024-09-25 DIAGNOSIS — F90.0 ATTENTION DEFICIT HYPERACTIVITY DISORDER (ADHD), PREDOMINANTLY INATTENTIVE TYPE: Chronic | ICD-10-CM

## 2024-09-25 RX ORDER — LISDEXAMFETAMINE DIMESYLATE 50 MG/1
50 CAPSULE ORAL EVERY MORNING
Qty: 30 CAPSULE | Refills: 0 | Status: CANCELLED | OUTPATIENT
Start: 2024-09-25

## 2024-09-25 RX ORDER — LISDEXAMFETAMINE DIMESYLATE 50 MG/1
50 CAPSULE ORAL EVERY MORNING
Qty: 30 CAPSULE | Refills: 0 | Status: SHIPPED | OUTPATIENT
Start: 2024-09-25

## 2024-10-22 DIAGNOSIS — F90.0 ATTENTION DEFICIT HYPERACTIVITY DISORDER (ADHD), PREDOMINANTLY INATTENTIVE TYPE: Chronic | ICD-10-CM

## 2024-10-22 RX ORDER — LISDEXAMFETAMINE DIMESYLATE 50 MG/1
50 CAPSULE ORAL EVERY MORNING
Qty: 30 CAPSULE | Refills: 0 | OUTPATIENT
Start: 2024-10-22

## 2024-10-22 NOTE — TELEPHONE ENCOUNTER
Need repeat pap     Payal Tapia MA   6/3/2022 10:13 AM EDT Back to Top        Lvm for patient to send me a message about recollecting specimen.  No appt needed, she just needs to let me know a day and time she can come by.    Tarsha Belle, APRN   6/1/2022  8:24 AM EDT         Not enough cells in sample.  Have her stop back in and we will recollect       
Less than or equal to 5 Contractions in 30 minutes

## 2024-10-23 RX ORDER — LANOLIN ALCOHOL/MO/W.PET/CERES
1000 CREAM (GRAM) TOPICAL DAILY
Qty: 30 TABLET | Refills: 2 | OUTPATIENT
Start: 2024-10-23

## 2024-11-01 NOTE — PROGRESS NOTES
Chief Complaint   Patient presents with    Follow-up     11/4/2024, Interval history:  Patient new to me.  Previous Dr. Beavers patient.  She is here for follow-up.  Doing well.  She recently had mammogram and may denies any concerns or complaints 2024.  She follows with Melisa H[facility]er, [facility] who ordered some mammograms.  Next mammogram was ordered in May 2025.    Oncologic history:  Patient is here for follow-up of her mammogram.  Patient had screening mammogram April 2, 2021:  NAD a focal asymmetry was present in the posterior one third retroareolar region of the left breast.  Further spot compression images and left breast ultrasound was suggested.  Right breast was negative    April 9, 2021: Left diagnostic mammogram showed an area of focal asymmetry in the posterior one third region aspect of the left breast her breast    Ultrasound showed an irregular 0.8 cm lesion in the left breast at the 6 o'clock position of the order of 6 cm from the nipple.  Ultrasound-guided left breast biopsy was recommended.    April 26, 2021: Ultrasound-guided biopsy of the left breast 6 o'clock position, 6 cm from the nipple showed    Invasive ductal carcinoma, poorly differentiated with a [place] score grade 3 with a score of 8 out of 9 measuring at least 7 mm.  No definitive ductal carcinoma is in situ is identified.  ER 1% negative  UT 1% negative   HER-2/ese 3+ positive    There was no evidence of lymphadenopathy either in the mammogram of the ultrasound.  We suggested an MRI of the breast.  Patient has strong family history of breast cancer      May 7, 2021: MRI of bilateral breast reviewed.  My interpretation is that there is area of enhancement in the 6 o'clock position of the left breast this is the biopsy-proven malignancy.  There is additional area of linear enhancement anteriorly and laterally measuring up to 1.2 cm this is approximately 5.6 cm from the nipple.  In addition there is a enhancement 2 to  3 mm in the anterolateral aspect of the lateral aspect of the left breast which is 4.6 cm from the nipple.  There is also mildly prominent posterior lymph node in the mid axilla which measures 1 cm with cortical thickening.  Findings are consistent with multifocal disease.  No contralateral disease or internal mammary adenopathy identified    May 20, 2021: Genetic test, [facility] genetic test shows 47 genes negative    May 21, 2021: I reviewed the biopsy of the lymph node in the left axilla which shows reactive lymph node negative for any carcinoma or lymphoma    June 2, 2021:Final Diagnosis  1. Left Breast, 5:00 o'clock, MRI-guided Biopsies for Linear Enhancement: INVASIVE MAMMARY CARCINOMA, NO  SPECIAL TYPE (INVASIVE DUCTAL CARCINOMA).  A. Largest contiguous focus in a core measures 4 mm.  B. No in-situ component identified.  C. Brisk lymphocytic response noted (see Comment).  D. No definitive lymphovascular nor perineural invasion identified.  2. Left Breast, 2:00 o'clock, MRI-guided Biopsy for Enhancement:  A. Benign breast parenchyma with radial scar and micropapillomas.  B. Usual ductal hyperplasia and columnar cell hyperplasia.  C. No atypical hyperplasia, in-situ nor invasive carcinoma identified    ER, NH, HER-2 new and Ki-67 pending on this new biopsy      Patient has been seen by Dr. Lashonda Euceda with plans of doing surgery on July first 2021    Once patient undergoes surgery lumpectomy with sentinel lymph node biopsy we will give further recommendation about treatment options.  Given that patient has multifocal disease and both of the lesions 2 lesions are 1.2 cm each, with it being a HER-2 positive tumor on the previous biopsy at 6:00 Dr. Euceda and myself discussed and patient preferred to undergo port placement at the same time of surgery.    Patient had [facility] genetic test which was negative.    She has completed surgery July 1, 2021.  She underwent left partial mastectomy with left axillary  sentinel lymph node biopsy and right port placement.  Clinically she was thought to have a high-grade multifocal invasive ductal carcinoma ER/MN negative, MN positive.  The lesions require preoperative localization.  The multifocal cancer was bracketed with 2 savvy markers and the radial scar was localized with a needle.  Because of the volume of tissue that will be excised related to the breast volume the case was done in conjunction with Dr. Zavala for oncoplastic closure.    She is 2 weeks from surgery.  She is healing up reasonably well.    On review of her pathology she had left partial mastectomy with sentinel lymph node biopsy.  Tumor was present at 5:00, invasive ductal carcinoma, Oziel score of 8, grade 3, greatest dimension was 12 mm x 8 x 4 mm.  Single focus of invasive carcinoma present.  There was extensive DCIS which is 30 mm x 8 x 2 mm, grade 3, no evidence of lymphovascular space invasion or dermal lymphatic invasion.  Margins were clear.  4 lymph nodes were negative.  It is a p T1cp N0, ER negative MN negative HER-2/ese 3+ with Ki-67 of 50%.    Patient is here to discuss further options of treatment.  Given small tumor T1c N0, she is a good candidate for weekly Taxol Herceptin.    Given that patient has heterozygous state for factor V Leiden and currently has port placed we will plan to start Eliquis 2.5 mg p.o. twice daily.  I have left a message for Dr. Craft in neurology to call me to discuss if patient needs to continue aspirin or we can hold off since be starting Eliquis.      Genetic test negative    August 4, 2021: Weekly Taxol Herceptin x12 weeks followed by Herceptin x1 year.  Cycle 1 today  Cycle 12 Taxol Herceptin October 10, 2021      Hematologic history:  patient is a 40-year-old female who presented end of December with numbness and tingling in her left upper extremity and left face.  She went to see Dr. Goodman who then got concerned and referred to neurology.  She was  referred to Dr. Freedman and talk to Dr. Thompson neurology for evaluation.  Patient underwent ultrasound of the carotids which did not show significant stenosis of the carotids.  She underwent 2D echocardiogram which showed a good ejection fraction of 64% with mild mitral valve prolapse and mild mitral stenosis.  She had a 24-hour Holter which was negative.  She then had also an MRI of the brain February 3, 2021 which showed areas of hyperintensity involving the subcortical white matter of the right frontal lobe superior laterally and posteriorly with the largest area measuring 8 9 mm.  There is also enhancement present.  The findings are consistent with a subacute infarct.  They could not differentiate if there is any underlying neoplastic process but a short-term follow-up was suggested in order to follow-up.  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    Patient currently got started on aspirin and factor V Leiden was obtained by neurology because patient's paternal aunt had factor V Leiden mutation and she was heterozygous.  Patient's testing also showed that she was heterozygous.    Patient states her numbness and tingling is resolved completely on the left arm and face it just lasted for a 24 hours.  She was here referred here for neurology to see if there is any other cause for her underlying hypercoagulable state.  She has not had a complete hypercoagulable work-up.  Also discussed with her that an underlying malignancy could cause a hypercoagulable state and we would need to do a CT scan to rule that out.    Patient's mother has had breast cancer in her 50s but had pancreatic cancer at 68 and  from that.  Patient's dad had stroke at 63.  But he is alive at 74.  She has 1 brother who is 40 in good health.  Paternal aunt had breast cancer in her 40s.  Paternal grandfather had colon cancer in his late 80s.   "Maternal uncle had throat cancer and another maternal uncle had skin cancer with metastasis to the lung.  And maternal grandmother had breast cancer.    Patient was to have mammogram done last year but because of COVID-19 it got postponed and she has not had it done yet.    Patient used to be a smoker half pack per day for 7 years but quit 10 years ago.  She has high cholesterol for which she is on treatment.         Current Outpatient Medications on File Prior to Visit   Medication Sig Dispense Refill    albuterol sulfate  (90 Base) MCG/ACT inhaler Inhale 2 puffs Every 4 (Four) Hours As Needed for Wheezing. 8 g 11    Cholecalciferol (D3) 50 MCG (2000 UT) tablet Take 2 tablets by mouth 2 (Two) Times a Week. 48 each 1    famotidine (PEPCID) 20 MG tablet Take 1 tablet by mouth Daily. 90 tablet 1    levothyroxine (SYNTHROID, LEVOTHROID) 112 MCG tablet Take 1 tablet by mouth Daily. 90 tablet 0    lisdexamfetamine (Vyvanse) 50 MG capsule Take 1 capsule by mouth Every Morning 30 capsule 0    LORazepam (ATIVAN) 0.5 MG tablet Take 1 tablet by mouth Every 8 (Eight) Hours As Needed for Anxiety. for anxiety 30 tablet 2    Omega 3 340 MG capsule delayed-release Take  by mouth.      valsartan (DIOVAN) 160 MG tablet Take 1 tablet by mouth Daily. 90 tablet 1    vitamin B-12 (CYANOCOBALAMIN) 1000 MCG tablet Take 1 tablet by mouth Daily. 30 tablet 2     No current facility-administered medications on file prior to visit.        ALLERGIES:    Allergies   Allergen Reactions    Sulfa Antibiotics Rash             I have reviewed Past Medical, Surgical History, Social History, Meds and Allergies.     REVIEW OF SYSTEMS:  See interval History            Objective     Vitals:    11/04/24 1352   BP: 111/78   Pulse: 89   Resp: 16   Temp: 98.7 °F (37.1 °C)   TempSrc: Oral   SpO2: 98%   Weight: 67.6 kg (149 lb)   Height: 157.5 cm (62.01\")   PainSc: 0-No pain           11/4/2024     1:47 PM   Current Status   ECOG score 0     Physical " Exam  Constitutional:       Appearance: Normal appearance.   HENT:      Head: Normocephalic and atraumatic.      Mouth/Throat:      Mouth: Mucous membranes are moist.      Pharynx: Oropharynx is clear.   Eyes:      Extraocular Movements: Extraocular movements intact.      Pupils: Pupils are equal, round, and reactive to light.   Cardiovascular:      Rate and Rhythm: Normal rate and regular rhythm.   Pulmonary:      Effort: Pulmonary effort is normal.      Breath sounds: Normal breath sounds.   Chest:       Abdominal:      General: Bowel sounds are normal.      Palpations: Abdomen is soft.   Musculoskeletal:      Cervical back: Normal range of motion and neck supple.   Neurological:      General: No focal deficit present.      Mental Status: She is alert and oriented to person, place, and time.   Psychiatric:         Mood and Affect: Mood normal.         Behavior: Behavior normal.       RECENT LABS:  CBC:      Lab 11/04/24  1322   WBC 7.63   HEMOGLOBIN 13.3   HEMATOCRIT 41.4   PLATELETS 277   NEUTROS ABS 4.69   IMMATURE GRANS (ABS) 0.02   LYMPHS ABS 2.19   MONOS ABS 0.59   EOS ABS 0.08   MCV 87.9       Assessment & Plan      * pT1c pN0 left breast invasive ductal carcinoma, HER2 positive   July 2021: S/p left partial mastectomy with sentinel lymph node biopsy.  Tumor was present at 5:00, invasive ductal carcinoma, Oziel score of 8, grade 3, greatest dimension was 12 mm x 8 x 4 mm.  Single focus of invasive carcinoma present.  There was extensive DCIS which is 30 mm x 8 x 2 mm, grade 3, no evidence of lymphovascular space invasion or dermal lymphatic invasion.  Margins were clear.  4 lymph nodes were negative.  It is a p T1cp N0, ER negative DC negative HER-2/ese 3+ with Ki-67 of 50%.  8/4/2021-October 20, 2021: Status post 12 cycles of weekly Taxol Herceptin  November 2021: Completed radiation   June 29, 2022: Completed 1 year of Herceptin   5/1/2024 bilateral screening [id number] 1  11/4/2024: Patient  established care with me.  No clinical signs of recurrence.  Doing well.    *  Factor V Leiden heterozygosity with right frontal stroke and patient presented in 2020 with left-sided weakness tingling and numbness and also numbness in the face.  Ultrasound of carotid does not show significant stenosis  24 Holter is negative  2D echocardiogram shows ejection fraction of 64% with mild mitral regurg and mitral stenosis  Neurology obtain factor V Leiden given that patient's paternal aunt had's history of factor V Leiden and that was heterozygous for factor V Leiden  Continue aspirin as per neurology.  Also patient on medications for her high cholesterol started after stroke currently asymptomatic  Hypercoagulable work-up done which is negative except heterozygous state for factor V Leiden.  Complete stroke work-up has been negative and since this is arterial stroke and she was not on aspirin prior to that and her symptoms have resolved Dr. Beavers agreed with continuing aspirin alone along with high cholesterol medications.  MR venogram done on May 10, 2021 is negative.     * Family history of cancer: Patient's mother had breast cancer at age 50 and pancreatic cancer at age 68 and  from pancreatic cancer.  Patient's maternal grandmother had breast cancer in her 50s.  Her maternal uncle had skin cancer which went to the lung and another maternal uncle had throat cancer.  Maternal aunt had call colon polyps.  Patient's paternal aunt had breast cancer in her 40s.  And paternal grandfather with colon cancer in his 80s.  Paternal aunt also had factor V Leiden.  Genetic testing is negative    Plan:   Patient will continue her baby aspirin per neurology for history of stroke  Currently she is followed with observation she is ER/OH negative  She is scheduled for annual mammogram on 6 May 2025, and follow-up visit with Ac Houser on 13 May 2025  Follow-up with me in 6 months with labs      Emily Sanchez,  MD  Patient new to me.  Previous Dr. Beavers patient    I spent 35 total minutes, face-to-face, caring for Rdaha today. Greater than 50% of this time involved counseling and/or coordination of care as documented within this note.

## 2024-11-02 NOTE — PROGRESS NOTES
Chief Complaint:   Chief Complaint   Patient presents with    ADHD    Hypertension    Hypothyroidism    Anxiety    Depression       Radha Astorga 44 y.o. female who presents today for Medical Management of the below listed issues. She  has a problem list of   Patient Active Problem List   Diagnosis    Family history of breast cancer    Hypothyroidism    Major depressive disorder, recurrent episode, moderate with anxious distress    Attention deficit hyperactivity disorder (ADHD), predominantly inattentive type    Factor 5 Leiden mutation, heterozygous    Kidney mass    Malignant neoplasm of overlapping sites of left breast in female, estrogen receptor negative    High risk medication use    Essential hypertension    Rheumatic mitral valve disease    Encounter for adjustment or management of vascular access device    Iron deficiency anemia    History of stroke    B12 deficiency    Nausea    Bloating    Family history of colon cancer    Breast edema   .  Since the last visit, She has overall felt well.  she has been compliant with   Current Outpatient Medications:     albuterol sulfate  (90 Base) MCG/ACT inhaler, Inhale 2 puffs Every 4 (Four) Hours As Needed for Wheezing., Disp: 8 g, Rfl: 11    levothyroxine (SYNTHROID, LEVOTHROID) 112 MCG tablet, Take 1 tablet by mouth Daily., Disp: 90 tablet, Rfl: 1    LORazepam (ATIVAN) 0.5 MG tablet, Take 1 tablet by mouth Every 8 (Eight) Hours As Needed for Anxiety. for anxiety, Disp: 30 tablet, Rfl: 2    valsartan (DIOVAN) 160 MG tablet, Take 1 tablet by mouth Daily., Disp: 90 tablet, Rfl: 1    Cholecalciferol (D3) 50 MCG (2000 UT) tablet, Take 2 tablets by mouth 2 (Two) Times a Week., Disp: 48 each, Rfl: 1    famotidine (PEPCID) 20 MG tablet, Take 1 tablet by mouth Daily., Disp: 90 tablet, Rfl: 1    lisdexamfetamine (Vyvanse) 60 MG capsule, Take 1 capsule by mouth Every Morning, Disp: 30 capsule, Rfl: 0    Omega 3 340 MG capsule delayed-release, Take  by mouth.,  "Disp: , Rfl:     vitamin B-12 (CYANOCOBALAMIN) 1000 MCG tablet, Take 1 tablet by mouth Daily., Disp: 30 tablet, Rfl: 2.  She denies medication side effects.    All of the other chronic condition(s) listed above are stable w/o issues.    /72   Pulse 108   Temp 97.6 °F (36.4 °C) (Oral)   Resp 20   Ht 157.5 cm (62.01\")   Wt 68 kg (150 lb)   SpO2 98%   BMI 27.43 kg/m²     Results for orders placed or performed in visit on 11/04/24   Comprehensive Metabolic Panel    Collection Time: 11/04/24  1:22 PM    Specimen: Blood   Result Value Ref Range    Glucose 97 65 - 99 mg/dL    BUN 14 6 - 20 mg/dL    Creatinine 0.74 0.57 - 1.00 mg/dL    Sodium 139 136 - 145 mmol/L    Potassium 4.8 3.5 - 5.2 mmol/L    Chloride 102 98 - 107 mmol/L    CO2 24.4 22.0 - 29.0 mmol/L    Calcium 9.3 8.6 - 10.5 mg/dL    Total Protein 7.3 6.0 - 8.5 g/dL    Albumin 4.2 3.5 - 5.2 g/dL    ALT (SGPT) 19 1 - 33 U/L    AST (SGOT) 17 1 - 32 U/L    Alkaline Phosphatase 63 39 - 117 U/L    Total Bilirubin 0.2 0.0 - 1.2 mg/dL    Globulin 3.1 gm/dL    A/G Ratio 1.4 g/dL    BUN/Creatinine Ratio 18.9 7.0 - 25.0    Anion Gap 12.6 5.0 - 15.0 mmol/L    eGFR 101.2 >60.0 mL/min/1.73   CBC Auto Differential    Collection Time: 11/04/24  1:22 PM    Specimen: Blood   Result Value Ref Range    WBC 7.63 3.40 - 10.80 10*3/mm3    RBC 4.71 3.77 - 5.28 10*6/mm3    Hemoglobin 13.3 12.0 - 15.9 g/dL    Hematocrit 41.4 34.0 - 46.6 %    MCV 87.9 79.0 - 97.0 fL    MCH 28.2 26.6 - 33.0 pg    MCHC 32.1 31.5 - 35.7 g/dL    RDW 12.1 (L) 12.3 - 15.4 %    RDW-SD 39.3 37.0 - 54.0 fl    MPV 9.1 6.0 - 12.0 fL    Platelets 277 140 - 450 10*3/mm3    Neutrophil % 61.5 42.7 - 76.0 %    Lymphocyte % 28.7 19.6 - 45.3 %    Monocyte % 7.7 5.0 - 12.0 %    Eosinophil % 1.0 0.3 - 6.2 %    Basophil % 0.8 0.0 - 1.5 %    Immature Grans % 0.3 0.0 - 0.5 %    Neutrophils, Absolute 4.69 1.70 - 7.00 10*3/mm3    Lymphocytes, Absolute 2.19 0.70 - 3.10 10*3/mm3    Monocytes, Absolute 0.59 0.10 - 0.90 " 10*3/mm3    Eosinophils, Absolute 0.08 0.00 - 0.40 10*3/mm3    Basophils, Absolute 0.06 0.00 - 0.20 10*3/mm3    Immature Grans, Absolute 0.02 0.00 - 0.05 10*3/mm3    nRBC 0.0 0.0 - 0.2 /100 WBC             The following portions of the patient's history were reviewed and updated as appropriate: allergies, current medications, past family history, past medical history, past social history, past surgical history, and problem list.    Review of Systems   Constitutional:  Negative for activity change, chills and fever.   Respiratory:  Negative for cough.    Cardiovascular:  Negative for chest pain.   Psychiatric/Behavioral:  Negative for dysphoric mood.        Objective             Physical Exam  Vitals and nursing note reviewed.   Constitutional:       General: She is not in acute distress.     Appearance: She is well-developed.   Cardiovascular:      Rate and Rhythm: Normal rate and regular rhythm.   Pulmonary:      Effort: Pulmonary effort is normal.      Breath sounds: Normal breath sounds.   Neurological:      Mental Status: She is alert and oriented to person, place, and time.   Psychiatric:         Behavior: Behavior normal.         Thought Content: Thought content normal.             Diagnoses and all orders for this visit:    1. Essential hypertension (Primary)  -     valsartan (DIOVAN) 160 MG tablet; Take 1 tablet by mouth Daily.  Dispense: 90 tablet; Refill: 1    2. Attention deficit hyperactivity disorder (ADHD), predominantly inattentive type  -     lisdexamfetamine (Vyvanse) 60 MG capsule; Take 1 capsule by mouth Every Morning  Dispense: 30 capsule; Refill: 0    3. Grief reaction  -     LORazepam (ATIVAN) 0.5 MG tablet; Take 1 tablet by mouth Every 8 (Eight) Hours As Needed for Anxiety. for anxiety  Dispense: 30 tablet; Refill: 2    4. Acquired hypothyroidism  -     levothyroxine (SYNTHROID, LEVOTHROID) 112 MCG tablet; Take 1 tablet by mouth Daily.  Dispense: 90 tablet; Refill: 1

## 2024-11-04 ENCOUNTER — OFFICE VISIT (OUTPATIENT)
Dept: FAMILY MEDICINE CLINIC | Facility: CLINIC | Age: 44
End: 2024-11-04
Payer: COMMERCIAL

## 2024-11-04 ENCOUNTER — LAB (OUTPATIENT)
Dept: LAB | Facility: HOSPITAL | Age: 44
End: 2024-11-04
Payer: COMMERCIAL

## 2024-11-04 ENCOUNTER — OFFICE VISIT (OUTPATIENT)
Dept: ONCOLOGY | Facility: CLINIC | Age: 44
End: 2024-11-04
Payer: COMMERCIAL

## 2024-11-04 VITALS
TEMPERATURE: 98.7 F | BODY MASS INDEX: 27.42 KG/M2 | HEIGHT: 62 IN | SYSTOLIC BLOOD PRESSURE: 111 MMHG | WEIGHT: 149 LBS | DIASTOLIC BLOOD PRESSURE: 78 MMHG | OXYGEN SATURATION: 98 % | HEART RATE: 89 BPM | RESPIRATION RATE: 16 BRPM

## 2024-11-04 VITALS
DIASTOLIC BLOOD PRESSURE: 72 MMHG | SYSTOLIC BLOOD PRESSURE: 112 MMHG | RESPIRATION RATE: 20 BRPM | HEIGHT: 62 IN | WEIGHT: 150 LBS | TEMPERATURE: 97.6 F | OXYGEN SATURATION: 98 % | BODY MASS INDEX: 27.6 KG/M2 | HEART RATE: 108 BPM

## 2024-11-04 DIAGNOSIS — F43.21 GRIEF REACTION: Chronic | ICD-10-CM

## 2024-11-04 DIAGNOSIS — C50.812 MALIGNANT NEOPLASM OF OVERLAPPING SITES OF LEFT BREAST IN FEMALE, ESTROGEN RECEPTOR NEGATIVE: ICD-10-CM

## 2024-11-04 DIAGNOSIS — C50.812 MALIGNANT NEOPLASM OF OVERLAPPING SITES OF LEFT BREAST IN FEMALE, ESTROGEN RECEPTOR NEGATIVE: Primary | ICD-10-CM

## 2024-11-04 DIAGNOSIS — F90.0 ATTENTION DEFICIT HYPERACTIVITY DISORDER (ADHD), PREDOMINANTLY INATTENTIVE TYPE: Chronic | ICD-10-CM

## 2024-11-04 DIAGNOSIS — I10 ESSENTIAL HYPERTENSION: Primary | Chronic | ICD-10-CM

## 2024-11-04 DIAGNOSIS — Z17.1 MALIGNANT NEOPLASM OF OVERLAPPING SITES OF LEFT BREAST IN FEMALE, ESTROGEN RECEPTOR NEGATIVE: Primary | ICD-10-CM

## 2024-11-04 DIAGNOSIS — Z17.1 MALIGNANT NEOPLASM OF OVERLAPPING SITES OF LEFT BREAST IN FEMALE, ESTROGEN RECEPTOR NEGATIVE: ICD-10-CM

## 2024-11-04 DIAGNOSIS — E03.9 ACQUIRED HYPOTHYROIDISM: Chronic | ICD-10-CM

## 2024-11-04 LAB
ALBUMIN SERPL-MCNC: 4.2 G/DL (ref 3.5–5.2)
ALBUMIN/GLOB SERPL: 1.4 G/DL
ALP SERPL-CCNC: 63 U/L (ref 39–117)
ALT SERPL W P-5'-P-CCNC: 19 U/L (ref 1–33)
ANION GAP SERPL CALCULATED.3IONS-SCNC: 12.6 MMOL/L (ref 5–15)
AST SERPL-CCNC: 17 U/L (ref 1–32)
BASOPHILS # BLD AUTO: 0.06 10*3/MM3 (ref 0–0.2)
BASOPHILS NFR BLD AUTO: 0.8 % (ref 0–1.5)
BILIRUB SERPL-MCNC: 0.2 MG/DL (ref 0–1.2)
BUN SERPL-MCNC: 14 MG/DL (ref 6–20)
BUN/CREAT SERPL: 18.9 (ref 7–25)
CALCIUM SPEC-SCNC: 9.3 MG/DL (ref 8.6–10.5)
CHLORIDE SERPL-SCNC: 102 MMOL/L (ref 98–107)
CO2 SERPL-SCNC: 24.4 MMOL/L (ref 22–29)
CREAT SERPL-MCNC: 0.74 MG/DL (ref 0.57–1)
DEPRECATED RDW RBC AUTO: 39.3 FL (ref 37–54)
EGFRCR SERPLBLD CKD-EPI 2021: 101.2 ML/MIN/1.73
EOSINOPHIL # BLD AUTO: 0.08 10*3/MM3 (ref 0–0.4)
EOSINOPHIL NFR BLD AUTO: 1 % (ref 0.3–6.2)
ERYTHROCYTE [DISTWIDTH] IN BLOOD BY AUTOMATED COUNT: 12.1 % (ref 12.3–15.4)
GLOBULIN UR ELPH-MCNC: 3.1 GM/DL
GLUCOSE SERPL-MCNC: 97 MG/DL (ref 65–99)
HCT VFR BLD AUTO: 41.4 % (ref 34–46.6)
HGB BLD-MCNC: 13.3 G/DL (ref 12–15.9)
IMM GRANULOCYTES # BLD AUTO: 0.02 10*3/MM3 (ref 0–0.05)
IMM GRANULOCYTES NFR BLD AUTO: 0.3 % (ref 0–0.5)
LYMPHOCYTES # BLD AUTO: 2.19 10*3/MM3 (ref 0.7–3.1)
LYMPHOCYTES NFR BLD AUTO: 28.7 % (ref 19.6–45.3)
MCH RBC QN AUTO: 28.2 PG (ref 26.6–33)
MCHC RBC AUTO-ENTMCNC: 32.1 G/DL (ref 31.5–35.7)
MCV RBC AUTO: 87.9 FL (ref 79–97)
MONOCYTES # BLD AUTO: 0.59 10*3/MM3 (ref 0.1–0.9)
MONOCYTES NFR BLD AUTO: 7.7 % (ref 5–12)
NEUTROPHILS NFR BLD AUTO: 4.69 10*3/MM3 (ref 1.7–7)
NEUTROPHILS NFR BLD AUTO: 61.5 % (ref 42.7–76)
NRBC BLD AUTO-RTO: 0 /100 WBC (ref 0–0.2)
PLATELET # BLD AUTO: 277 10*3/MM3 (ref 140–450)
PMV BLD AUTO: 9.1 FL (ref 6–12)
POTASSIUM SERPL-SCNC: 4.8 MMOL/L (ref 3.5–5.2)
PROT SERPL-MCNC: 7.3 G/DL (ref 6–8.5)
RBC # BLD AUTO: 4.71 10*6/MM3 (ref 3.77–5.28)
SODIUM SERPL-SCNC: 139 MMOL/L (ref 136–145)
WBC NRBC COR # BLD AUTO: 7.63 10*3/MM3 (ref 3.4–10.8)

## 2024-11-04 PROCEDURE — 85025 COMPLETE CBC W/AUTO DIFF WBC: CPT

## 2024-11-04 PROCEDURE — 36415 COLL VENOUS BLD VENIPUNCTURE: CPT

## 2024-11-04 PROCEDURE — 80053 COMPREHEN METABOLIC PANEL: CPT

## 2024-11-04 PROCEDURE — 99214 OFFICE O/P EST MOD 30 MIN: CPT | Performed by: FAMILY MEDICINE

## 2024-11-04 RX ORDER — LEVOTHYROXINE SODIUM 112 UG/1
112 TABLET ORAL DAILY
Qty: 90 TABLET | Refills: 1 | Status: SHIPPED | OUTPATIENT
Start: 2024-11-04

## 2024-11-04 RX ORDER — VALSARTAN 160 MG/1
160 TABLET ORAL DAILY
Qty: 90 TABLET | Refills: 1 | Status: SHIPPED | OUTPATIENT
Start: 2024-11-04

## 2024-11-04 RX ORDER — LORAZEPAM 0.5 MG/1
0.5 TABLET ORAL EVERY 8 HOURS PRN
Qty: 30 TABLET | Refills: 2 | Status: SHIPPED | OUTPATIENT
Start: 2024-11-04

## 2024-11-04 RX ORDER — LISDEXAMFETAMINE DIMESYLATE 60 MG/1
60 CAPSULE ORAL EVERY MORNING
Qty: 30 CAPSULE | Refills: 0 | Status: SHIPPED | OUTPATIENT
Start: 2024-11-04

## 2024-11-04 RX ORDER — LISDEXAMFETAMINE DIMESYLATE 50 MG/1
50 CAPSULE ORAL EVERY MORNING
Qty: 30 CAPSULE | Refills: 0 | Status: CANCELLED | OUTPATIENT
Start: 2024-11-04

## 2024-11-04 RX ORDER — ALBUTEROL SULFATE 90 UG/1
2 INHALANT RESPIRATORY (INHALATION) EVERY 4 HOURS PRN
Qty: 8 G | Refills: 11 | Status: CANCELLED | OUTPATIENT
Start: 2024-11-04

## 2024-12-02 DIAGNOSIS — F90.0 ATTENTION DEFICIT HYPERACTIVITY DISORDER (ADHD), PREDOMINANTLY INATTENTIVE TYPE: Chronic | ICD-10-CM

## 2024-12-03 RX ORDER — LANOLIN ALCOHOL/MO/W.PET/CERES
1000 CREAM (GRAM) TOPICAL DAILY
Qty: 30 TABLET | Refills: 5 | Status: SHIPPED | OUTPATIENT
Start: 2024-12-03

## 2024-12-03 RX ORDER — LISDEXAMFETAMINE DIMESYLATE 60 MG/1
60 CAPSULE ORAL EVERY MORNING
Qty: 30 CAPSULE | Refills: 0 | Status: SHIPPED | OUTPATIENT
Start: 2024-12-03

## 2024-12-03 RX ORDER — CHOLECALCIFEROL (VITAMIN D3) 50 MCG
4000 TABLET ORAL 2 TIMES WEEKLY
Qty: 48 EACH | Refills: 1 | Status: SHIPPED | OUTPATIENT
Start: 2024-12-05

## 2024-12-05 ENCOUNTER — TELEPHONE (OUTPATIENT)
Dept: FAMILY MEDICINE CLINIC | Facility: CLINIC | Age: 44
End: 2024-12-05

## 2024-12-05 NOTE — TELEPHONE ENCOUNTER
Caller: Radha Astorga    Relationship: Self    Best call back number: [phone]     What is the best time to reach you:      Who are you requesting to speak with (clinical staff, provider,  specific staff member):  CLINICAL STAFF     Do you know the name of the person who called:      What was the call regarding: PATIENT STATED THAT SHE IS ON HER WAY TO THE OFFICE TO  PAPERWORK. PATIENT WILL MAKE IT 5 MIN BEFORE CLOSING.  PLEASE CALL PATIENT FOR FURTHER QUESTIONS.    Is it okay if the provider responds through MyChart:

## 2025-01-06 DIAGNOSIS — F90.0 ATTENTION DEFICIT HYPERACTIVITY DISORDER (ADHD), PREDOMINANTLY INATTENTIVE TYPE: Chronic | ICD-10-CM

## 2025-01-07 RX ORDER — LISDEXAMFETAMINE DIMESYLATE 60 MG/1
60 CAPSULE ORAL EVERY MORNING
Qty: 30 CAPSULE | Refills: 0 | Status: SHIPPED | OUTPATIENT
Start: 2025-01-07

## 2025-01-13 NOTE — PROGRESS NOTES
Subjective     REASON FOR follow-up:    1.  Heterozygous state for factor V Leiden    2.  New onset stroke on aspirin by neurology    3.  Hypercholesterolemia    4.  Hypercoagulable work-up done.  Antithrombin 109% protein S activity 85% protein S antigen 107%, protein C activity 85%.  Anticardiolipin antibody IgM 24%, antibeta-2 glycoprotein antibody negative, lupus anticoagulant not detected, prothrombin gene mutation negative.    5.  Screening mammogram showed abnormality in the left breast 6 o'clock position, 8 mm on ultrasound, ultrasound-guided left breast biopsy, 6 cm from the nipple showed evidence of invasive ductal carcinoma poorly differentiated grade 3, [place] score of 8 out of 9 ER negative, HI negative, HER-2/ese 3+ positive.    6.  CT scan February 24, 2021 showed focal area of fatty infiltration of the liver.  1 cm hypoattenuating cortical lesion in the upper pole of the right kidney.  Similarly nodule in the upper pole of the left kidney is 1.5 cm.  Further evaluation by ultrasound suggested  · Ultrasound March 1, 2021 shows solid lesion in the upper pole of the right kidney.  In the left kidney along the midpole of the left kidney is a nodule which is 16 x 16 x 14 mm which is thought to be a cyst.  A 6-month follow-up CT suggested    6.  S/p left partial mastectomy with sentinel lymph node biopsy.  Tumor was present at 5:00, invasive ductal carcinoma, Brazil score of 8, grade 3, greatest dimension was 12 mm x 8 x 4 mm.  Single focus of invasive carcinoma present.  There was extensive DCIS which is 30 mm x 8 x 2 mm, grade 3, no evidence of lymphovascular space invasion or dermal lymphatic invasion.  Margins were clear.  4 lymph nodes were negative.  It is a p T1cp N0, ER negative HI negative HER-2/ese 3+ with Ki-67 of 50%.  · [employer] genetic test negative for 47 genes    7.recently initiated Xarelto at loading dose of 15 mg twice a day x3 weeks to then be switched to 20 mg daily per  protocol.  She is doing this because of Mediport in place and known factor V Leiden heterozygosity.        History of Present Illness   Patient is a 41 y.o. female with pT1cp N0 ER negative ND negative HER-2 positive left breast cancer s/p lumpectomy patient is on weekly Taxol Herceptin.    Patient completed 12 weekly treatments of Taxol along with Herceptin and currently is here for every 3 week Herceptin  She has started radiation and has received 6 treatments so far and tolerating well.  Her fatigue has improved.  She underwent an echocardiogram which shows a normal ejection fraction.    Oncologic history:  Patient is here for follow-up of her mammogram.  Patient had screening mammogram April 2, 2021:  NAD a focal asymmetry was present in the posterior one third retroareolar region of the left breast.  Further spot compression images and left breast ultrasound was suggested.  Right breast was negative    April 9, 2021: Left diagnostic mammogram showed an area of focal asymmetry in the posterior one third region aspect of the left breast her breast    Ultrasound showed an irregular 0.8 cm lesion in the left breast at the 6 o'clock position of the order of 6 cm from the nipple.  Ultrasound-guided left breast biopsy was recommended.    April 26, 2021: Ultrasound-guided biopsy of the left breast 6 o'clock position, 6 cm from the nipple showed    Invasive ductal carcinoma, poorly differentiated with a Oziel score grade 3 with a score of 8 out of 9 measuring at least 7 mm.  No definitive ductal carcinoma is in situ is identified.  ER 1% negative  ND 1% negative   HER-2/ese 3+ positive    There was no evidence of lymphadenopathy either in the mammogram of the ultrasound.  We suggested an MRI of the breast.  Patient has strong family history of breast cancer      May 7, 2021: MRI of bilateral breast reviewed.  My interpretation is that there is area of enhancement in the 6 o'clock position of the left breast this is  the biopsy-proven malignancy.  There is additional area of linear enhancement anteriorly and laterally measuring up to 1.2 cm this is approximately 5.6 cm from the nipple.  In addition there is a enhancement 2 to 3 mm in the anterolateral aspect of the lateral aspect of the left breast which is 4.6 cm from the nipple.  There is also mildly prominent posterior lymph node in the mid axilla which measures 1 cm with cortical thickening.  Findings are consistent with multifocal disease.  No contralateral disease or internal mammary adenopathy identified    May 20, 2021: Genetic test, [employer] genetic test shows 47 genes negative    May 21, 2021: I reviewed the biopsy of the lymph node in the left axilla which shows reactive lymph node negative for any carcinoma or lymphoma    June 2, 2021:Final Diagnosis  1. Left Breast, 5:00 o'clock, MRI-guided Biopsies for Linear Enhancement: INVASIVE MAMMARY CARCINOMA, NO  SPECIAL TYPE (INVASIVE DUCTAL CARCINOMA).  A. Largest contiguous focus in a core measures 4 mm.  B. No in-situ component identified.  C. Brisk lymphocytic response noted (see Comment).  D. No definitive lymphovascular nor perineural invasion identified.  2. Left Breast, 2:00 o'clock, MRI-guided Biopsy for Enhancement:  A. Benign breast parenchyma with radial scar and micropapillomas.  B. Usual ductal hyperplasia and columnar cell hyperplasia.  C. No atypical hyperplasia, in-situ nor invasive carcinoma identified    ER, AK, HER-2 new and Ki-67 pending on this new biopsy      Patient has been seen by Dr. Lashonda Euceda with plans of doing surgery on July first 2021    Once patient undergoes surgery lumpectomy with sentinel lymph node biopsy we will give further recommendation about treatment options.  Given that patient has multifocal disease and both of the lesions 2 lesions are 1.2 cm each, with it being a HER-2 positive tumor on the previous biopsy at 6:00 Dr. Euceda and myself discussed and patient preferred to  undergo port placement at the same time of surgery.    Patient had [employer] genetic test which was negative.    She has completed surgery July 1, 2021.  She underwent left partial mastectomy with left axillary sentinel lymph node biopsy and right port placement.  Clinically she was thought to have a high-grade multifocal invasive ductal carcinoma ER/WY negative, WY positive.  The lesions require preoperative localization.  The multifocal cancer was bracketed with 2 savvy markers and the radial scar was localized with a needle.  Because of the volume of tissue that will be excised related to the breast volume the case was done in conjunction with Dr. Zavala for oncoplastic closure.    She is 2 weeks from surgery.  She is healing up reasonably well.    On review of her pathology she had left partial mastectomy with sentinel lymph node biopsy.  Tumor was present at 5:00, invasive ductal carcinoma, Oziel score of 8, grade 3, greatest dimension was 12 mm x 8 x 4 mm.  Single focus of invasive carcinoma present.  There was extensive DCIS which is 30 mm x 8 x 2 mm, grade 3, no evidence of lymphovascular space invasion or dermal lymphatic invasion.  Margins were clear.  4 lymph nodes were negative.  It is a p T1cp N0, ER negative WY negative HER-2/ese 3+ with Ki-67 of 50%.    Patient is here to discuss further options of treatment.  Given small tumor T1c N0, she is a good candidate for weekly Taxol Herceptin.    Given that patient has heterozygous state for factor V Leiden and currently has port placed we will plan to start Eliquis 2.5 mg p.o. twice daily.  I have left a message for Dr. Craft in neurology to call me to discuss if patient needs to continue aspirin or we can hold off since be starting Eliquis.      Genetic test negative    August 4, 2021: Weekly Taxol Herceptin x12 weeks followed by Herceptin x1 year.  Cycle 1 today    Hematologic history:  patient is a 40-year-old female who presented end of December  with numbness and tingling in her left upper extremity and left face.  She went to see Dr. Goodman who then got concerned and referred to neurology.  She was referred to Dr. Freedman and talk to Dr. Thompson neurology for evaluation.  Patient underwent ultrasound of the carotids which did not show significant stenosis of the carotids.  She underwent 2D echocardiogram which showed a good ejection fraction of 64% with mild mitral valve prolapse and mild mitral stenosis.  She had a 24-hour Holter which was negative.  She then had also an MRI of the brain February 3, 2021 which showed areas of hyperintensity involving the subcortical white matter of the right frontal lobe superior laterally and posteriorly with the largest area measuring 8 9 mm.  There is also enhancement present.  The findings are consistent with a subacute infarct.  They could not differentiate if there is any underlying neoplastic process but a short-term follow-up was suggested in order to follow-up.  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    Patient currently got started on aspirin and factor V Leiden was obtained by neurology because patient's paternal aunt had factor V Leiden mutation and she was heterozygous.  Patient's testing also showed that she was heterozygous.    Patient states her numbness and tingling is resolved completely on the left arm and face it just lasted for a 24 hours.  She was here referred here for neurology to see if there is any other cause for her underlying hypercoagulable state.  She has not had a complete hypercoagulable work-up.  Also discussed with her that an underlying malignancy could cause a hypercoagulable state and we would need to do a CT scan to rule that out.    Patient's mother has had breast cancer in her 50s but had pancreatic cancer at 68 and  from that.  Patient's dad had stroke at 63.  But he is alive  at 74.  She has 1 brother who is 40 in good health.  Paternal aunt had breast cancer in her 40s.  Paternal grandfather had colon cancer in his late 80s.  Maternal uncle had throat cancer and another maternal uncle had skin cancer with metastasis to the lung.  And maternal grandmother had breast cancer.    Patient was to have mammogram done last year but because of COVID-19 it got postponed and she has not had it done yet.    Patient used to be a smoker half pack per day for 7 years but quit 10 years ago.  She has high cholesterol for which she is on treatment.    Past Medical History:   Diagnosis Date   • Acute stress reaction 11/17/2014   • ADD (attention deficit disorder)    • ADHD (attention deficit hyperactivity disorder)    • Anxiety and depression    • CTS (carpal tunnel syndrome)    • Factor 5 Leiden mutation, heterozygous ([facility]) 2/17/2021   • Family history of breast cancer 03/11/2013   • Fracture, cervical vertebra ([facility]) 1997    C4   • H/O complete eye exam 01/01/2016   • Hearing loss    • 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   • History of rheumatic fever    • Hypertension    • Hypothyroidism    • Infiltrating ductal carcinoma of left breast ([facility]) 5/5/2021    Left   • Kidney stone    • Mitral valve insufficiency    • PONV (postoperative nausea and vomiting)    • Stroke ([facility]) 12/2020    HX OF   • Stroke-like symptoms         Past Surgical History:   Procedure Laterality Date   • BREAST BIOPSY Left 05/05/2021    IDC   • BREAST LUMPECTOMY WITH SENTINEL NODE BIOPSY N/A 7/1/2021    Procedure: right port placement, Left SHAINA-guided, bracketed, partial mastectomy and sentinel lymph node biopsy Left breast needle-localized excisional biopsy;  Surgeon: Lashonda Euceda MD;  Location: [facility];  Service: General;  Laterality: N/A;   • BREAST SURGERY Bilateral 7/1/2021    Procedure: RIGHT BREAST REDUCTION MASTOPEXY LEFT BREAST ONCOPLASTIC CLOSURE;  Surgeon: Caridad Baker MD PhD;   Location: [facility] MAIN OR;  Service: Plastics;  Laterality: Bilateral;   • NO PAST SURGERIES     • PAP SMEAR  2016        Current Outpatient Medications on File Prior to Visit   Medication Sig Dispense Refill   • atorvastatin (Lipitor) 10 MG tablet Take 1 tablet by mouth Daily. 30 tablet 11   • famotidine (PEPCID) 20 MG tablet Take 1 tablet by mouth Daily. 30 tablet 3   • levothyroxine (SYNTHROID, LEVOTHROID) 137 MCG tablet      • lidocaine-prilocaine (EMLA) 2.5-2.5 % cream Apply  topically to the appropriate area as directed As Needed for Mild Pain . 1 each 2   • LORazepam (Ativan) 0.5 MG tablet Take 1 tablet by mouth Every 8 (Eight) Hours As Needed for Anxiety. 30 tablet 0   • ondansetron ODT (ZOFRAN-ODT) 8 MG disintegrating tablet Place 1 tablet on the tongue Every 8 (Eight) Hours As Needed for Nausea or Vomiting. 30 tablet 3   • rivaroxaban (XARELTO) 20 MG tablet Take 1 tablet by mouth Daily. 30 tablet 3   • valsartan (DIOVAN) 160 MG tablet Take 1 tablet by mouth Daily. 30 tablet 5   • [DISCONTINUED] lisdexamfetamine (Vyvanse) 50 MG capsule Take 1 capsule by mouth Every Morning 30 capsule 0     Current Facility-Administered Medications on File Prior to Visit   Medication Dose Route Frequency Provider Last Rate Last Admin   • heparin injection 500 Units  500 Units Intravenous PRN Neena Beavers MD   500 Units at 218   • sodium chloride 0.9 % flush 10 mL  10 mL Intravenous PRN Neena Beavers MD   10 mL at 218        ALLERGIES:    Allergies   Allergen Reactions   • Sulfa Antibiotics Rash        Social History     Socioeconomic History   • Marital status: Significant Other   • Number of children: 0   Tobacco Use   • Smoking status: Former Smoker     Packs/day: 1.00     Years: 10.00     Pack years: 10.00     Types: Cigarettes     Start date:      Quit date:      Years since quittin.8   • Smokeless tobacco: Never Used   Vaping Use   • Vaping Use: Never used   Substance and Sexual  Activity   • Alcohol use: Yes     Comment: RARELY   • Drug use: No   • Sexual activity: Defer     Partners: Male        Family History   Problem Relation Age of Onset   • Breast cancer Mother 53   • Pancreatic cancer Mother 68   • Lung cancer Maternal Grandmother    • Stroke Father    • Breast cancer Paternal Aunt 55   • Prostate cancer Maternal Grandfather    • Prostate cancer Paternal Grandfather    • Brain cancer Maternal Cousin 20   • Melanoma Maternal Uncle    • Ovarian cancer Other         Paternal great aunt    • Breast cancer Other    • Breast cancer Paternal Great-Grandmother 94   • Hypertension Brother    • Malig Hyperthermia Neg Hx       Family  history: Mother had breast cancer at age 57.  Maternal great aunt had breast cancer and paternal great aunt had breast cancer in her 40s.  Patient's mother had pancreatic cancer as well.  Paternal grandfather had prostate cancer.  Review of Systems   Constitutional: Positive for fatigue (Improved). Negative for appetite change, chills, diaphoresis, fever and unexpected weight change.   HENT: Negative for hearing loss, sore throat and trouble swallowing.    Respiratory: Negative for cough, chest tightness, shortness of breath and wheezing.    Cardiovascular: Negative for chest pain, palpitations and leg swelling.   Gastrointestinal: Negative for abdominal distention, abdominal pain, constipation, diarrhea and vomiting.   Genitourinary: Negative for dysuria, frequency, hematuria and urgency.   Musculoskeletal: Negative for joint swelling.        No muscle weakness.   Skin: Positive for rash (Bilat hand/left elbow-Improved). Negative for wound.   Neurological: Positive for numbness (Bilat hand-Unchanged). Negative for seizures, syncope, speech difficulty and weakness.   Hematological: Negative for adenopathy. Does not bruise/bleed easily.   Psychiatric/Behavioral: Positive for dysphoric mood (stable Improved) and sleep disturbance (Unchanged). Negative for behavioral  "problems, confusion and suicidal ideas.   All other systems reviewed and are negative.     I have reviewed and confirmed the accuracy of the ROS as documented by the MA/LPN/RN Neena Beavers MD    Objective     Vitals:    11/10/21 0859   BP: 137/92   Pulse: 102   Resp: 16   Temp: 97.1 °F (36.2 °C)   TempSrc: Temporal   SpO2: 98%   Weight: 66.5 kg (146 lb 8 oz)   Height: 157.5 cm (62.01\")   PainSc: 0-No pain     Current Status 11/10/2021   ECOG score 0   Physical exam      CONSTITUTIONAL:  Vital signs reviewed.  No distress, looks comfortable.  EYES:  Conjunctivae and lids unremarkable.  PERRLA  EARS,NOSE,MOUTH,THROAT:  Ears and nose appear unremarkable.  Lips, teeth, gums appear unremarkable.  RESPIRATORY:  Normal respiratory effort.  Lungs clear to auscultation bilaterally.  CARDIOVASCULAR:  Normal S1, S2.  No murmurs rubs or gallops.  No significant lower extremity edema.  GASTROINTESTINAL: Abdomen appears unremarkable.  Nontender.  No hepatomegaly.  No splenomegaly.  LYMPHATIC:  No cervical, supraclavicular, axillary lymphadenopathy.  SKIN:  Warm.  No rashes.  PSYCHIATRIC:  Normal judgment and insight.  Normal mood and affect.        RECENT LABS:  Results from last 7 days   Lab Units 11/10/21  0916   WBC 10*3/mm3 7.92   NEUTROS ABS 10*3/mm3 5.31   HEMOGLOBIN g/dL 11.9*   HEMATOCRIT % 36.9   PLATELETS 10*3/mm3 284     Results from last 7 days   Lab Units 11/10/21  0916   SODIUM mmol/L 140   POTASSIUM mmol/L 4.1   CHLORIDE mmol/L 104   CO2 mmol/L 25.4   BUN mg/dL 16   CREATININE mg/dL 0.70   CALCIUM mg/dL 9.9   ALBUMIN g/dL 4.20   BILIRUBIN mg/dL 0.3   ALK PHOS U/L 105   ALT (SGPT) U/L 122*   AST (SGOT) U/L 61*   GLUCOSE mg/dL 109*         Assessment/Plan       * [id number], ER negative SC negative HER-2/ese 3+ with Ki-67 of 50%.  S/p left partial mastectomy with sentinel lymph node biopsy.  Tumor was present at 5:00, invasive ductal carcinoma, [place] score of 8, grade 3, greatest dimension was 12 mm x 8 x 4 mm.  " Single focus of invasive carcinoma present.  There was extensive DCIS which is 30 mm x 8 x 2 mm, grade 3, no evidence of lymphovascular space invasion or dermal lymphatic invasion.  Margins were clear.  4 lymph nodes were negative.  It is a p T1cp N0, ER negative UT negative HER-2/ese 3+ with Ki-67 of 50%.  · Patient is s/p port placement  · Discussed in length with patient that given a small tumor she is eligible for weekly Taxol Herceptin.  Weekly Taxol x12 weeks along with weekly Herceptin followed by 1 year of Herceptin.  · Discussed patient in the breast cancer conference  · 2D echo done which showed ejection fraction of 61-65% and strain pattern of -20.8.  · Patient also seen by Dr. Virk cardiology and Dr. Virk is planning to repeat echocardiogram in 3 months after initiation of therapy.  · 8/4/2021 initiating weekly Taxol Herceptin  · 9/8/2021, proceed with week #6 Taxol and Herceptin.  Patient continues to tolerate treatment well. No signs of peripheral neuropathy.  · Her next echocardiogram due November 4, 2021, She follows up with cardiology Dr. Camron Virk.  · 9/28/2021, proceed with week #9 taxol/herceptin.  She does feel her nausea has worsened after her last cycle of chemotherapy.  She prefers the disintegrating Zofran, this will be called to her pharmacy today.  · Patient is here to take cycle 11 of weekly Taxol with worsening fatigue and worsening rash and overall dysphoric mood.  She also cannot sleep and her quality life is worsening.  At the present time we will plan to give 1 more cycle of Taxol following which she will be referred to radiation.  She already has an appointment with radiation physician on October 26.  · Patient start radiation.  Echocardiogram normal.  Patient completed 12 weeks of weekly Taxol Herceptin and now will need to start every 3 week Herceptin    *  Factor V Leiden heterozygosity with right frontal stroke and patient presented in December 2020 with left-sided weakness  tingling and numbness and also numbness in the face.  · Was referred to neurology and cardiology by Dr. Goodman  · Ultrasound of carotid does not show significant stenosis  · 24 Holter is negative  · 2D echocardiogram shows ejection fraction of 64% with mild mitral regurg and mitral stenosis  · Neurology obtain factor V Leiden given that patient's paternal aunt had's history of factor V Leiden and that was heterozygous for factor V Leiden  · Continue aspirin as per neurology.  Also patient on medications for her high cholesterol started after stroke currently asymptomatic  · Hypercoagulable work-up done which is negative except heterozygous state for factor V Leiden.  · Complete stroke work-up has been negative and since this is arterial stroke and she was not on aspirin prior to that and her symptoms have resolved I agree with continuing aspirin alone along with high cholesterol medications.  · MR venogram done on May 10, 2021 is negative.  Patient has been referred to the stroke neurologist Dr. Johnson  · Patient diagnosed with breast cancer with Mediport placed.  Per discussion with neurology, patient initiated on prophylactic Xarelto and aspirin discontinued.  · Patient had one nosebleed which lasted 10 minutes but with pinching the nose it helped.  But she is switching to 20 mg daily from tomorrow.  We discussed about placing ice and notifying us if her nosebleeds are significant.  But it resolved.  It was likely secondary to dry air  · 9/7/2021, patient continues to experience nosebleeds.  Reports these occur when her nose itches, and after scratching her nose it begins to bleed.  She feels this may be related to dry air, so I have asked her to start using saline nasal spray to help moisten her nose.  If she experiences nosebleeds that do not stop, I asked her to notify our office.  · Tolerating anticoagulation with Xarelto      * Family history of cancer: Patient's mother had breast cancer at age 50 and  "pancreatic cancer at age 68 and  from pancreatic cancer.  Patient's maternal grandmother had breast cancer in her 50s.  Her maternal uncle had skin cancer which went to the lung and another maternal uncle had throat cancer.  Maternal aunt had call colon polyps.  Patient's paternal aunt had breast cancer in her 40s.  And paternal grandfather with colon cancer in his 80s.  Paternal aunt also had factor V Leiden.  · Genetic testing is negative    * Solid nodule in the right kidney on ultrasound, will schedule follow-up with urology  · Patient was to follow-up with Dr. Shultz  · Will need records from urology    *  Elevated BMI, encourage diet and exercise.  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  Patient has been requiring frequent Tums.  Recommended she begin Pepcid 20 mg daily.    · Stable, continue Pepcid 20 mg daily.    *Constipation likely secondary to ferrous sulfate.  Patient was iron deficient.  · We will switch to IV Venofer.    *Headache likely related to body ache because of Taxol  · 2021, patient discusses concern about worsening headaches.  Describing them as \"glass breaking\".  Started about 2 weeks ago, and progressively worsened.  Occurring 3-4 times daily now, and lasting less than 1 minute with each episode.  This occurs in the same place, right upper skull.  Denies vision changes or dizziness.  Will order stat MRI of the brain for further evaluation.  The patient follows with a neurologist, and is going to notify them of her symptoms and the plan for MRI.    *Elevated liver function tests, total bilirubin still normal AST ALT slightly elevated.  Patient did take Tylenol but not too much.  No dose adjustments required at the present time.    *Iron deficiency anemia, patient's percent saturation still low at 9% s/p full dose of IV Venofer.  · 2021, patient will receive dose #5 Venofer today.  Hemoglobin today " 10.1.    *Insomnia  · 9/29/2021, patient reports difficulty sleeping.  She is experiencing this every night, not just the nights of treatment when she receives IV dexamethasone.  She has attempted taking Benadryl, however felt groggy following this.  She is going to attempt melatonin to see if this improves her sleep.    Plan   · Patient completed 12 weeks of Taxol with Herceptin and now is on every 3 week Herceptin  · Check echo cardiogram and is normal  · Currently receiving radiation  · We will give Herceptin today  · Follow-up in 3 weeks and 6 weeks with nurse practitioner and Herceptin and follow-up with me in 9 weeks and Herceptin      This patient is on drug therapy requiring intensive monitoring for toxicity.     I spent 40 total minutes, face-to-face, caring for Radha today.  Greater than 50% of this time involved counseling and/or coordination of care as documented within this note.    MD Dr. Maxine Agarwal Dr., Dr., Dr.      [employer]

## 2025-03-03 NOTE — PROGRESS NOTES
Chief Complaint:   Chief Complaint   Patient presents with    Hypertension     [facility]   No labs   Med refill     ADHD    Anxiety    Depression       Radha Astorga 44 y.o. female who presents today for Medical Management of the below listed issues. She  has a problem list of   Patient Active Problem List   Diagnosis    Family history of breast cancer    Hypothyroidism    Major depressive disorder, recurrent episode, moderate with anxious distress    Attention deficit hyperactivity disorder (ADHD), predominantly inattentive type    Factor 5 Leiden mutation, heterozygous    Kidney mass    Malignant neoplasm of overlapping sites of left breast in female, estrogen receptor negative    High risk medication use    Essential hypertension    Rheumatic mitral valve disease    Encounter for adjustment or management of vascular access device    Iron deficiency anemia    History of stroke    B12 deficiency    Nausea    Bloating    Family history of colon cancer    Breast edema   .  Since the last visit, She has overall felt well.  she has been compliant with   Current Outpatient Medications:     levothyroxine (SYNTHROID, LEVOTHROID) 112 MCG tablet, Take 1 tablet by mouth Daily., Disp: 90 tablet, Rfl: 1    lisdexamfetamine (Vyvanse) 60 MG capsule, Take 1 capsule by mouth Every Morning, Disp: 30 capsule, Rfl: 0    LORazepam (ATIVAN) 0.5 MG tablet, Take 1 tablet by mouth Every 8 (Eight) Hours As Needed for Anxiety. for anxiety, Disp: 30 tablet, Rfl: 2    valsartan (DIOVAN) 160 MG tablet, Take 1 tablet by mouth Daily., Disp: 90 tablet, Rfl: 1    albuterol sulfate  (90 Base) MCG/ACT inhaler, Inhale 2 puffs Every 4 (Four) Hours As Needed for Wheezing., Disp: 8 g, Rfl: 11    Cholecalciferol (D3) 50 MCG (2000 UT) tablet, Take 2 tablets by mouth 2 (Two) Times a Week., Disp: 48 each, Rfl: 1    famotidine (PEPCID) 20 MG tablet, Take 1 tablet by mouth Daily., Disp: 90 tablet, Rfl: 1    Omega 3 340 MG capsule delayed-release,  "Take  by mouth., Disp: , Rfl:     vitamin B-12 (CYANOCOBALAMIN) 1000 MCG tablet, Take 1 tablet by mouth Daily., Disp: 30 tablet, Rfl: 5.  She denies medication side effects.    All of the other chronic condition(s) listed above are stable w/o issues.    /72   Pulse 102   Temp 97.5 °F (36.4 °C) (Oral)   Resp 20   Ht 157.5 cm (62.01\")   Wt 70.3 kg (155 lb)   SpO2 99%   BMI 28.34 kg/m²     Results for orders placed or performed in visit on 11/04/24   Comprehensive Metabolic Panel    Collection Time: 11/04/24  1:22 PM    Specimen: Blood   Result Value Ref Range    Glucose 97 65 - 99 mg/dL    BUN 14 6 - 20 mg/dL    Creatinine 0.74 0.57 - 1.00 mg/dL    Sodium 139 136 - 145 mmol/L    Potassium 4.8 3.5 - 5.2 mmol/L    Chloride 102 98 - 107 mmol/L    CO2 24.4 22.0 - 29.0 mmol/L    Calcium 9.3 8.6 - 10.5 mg/dL    Total Protein 7.3 6.0 - 8.5 g/dL    Albumin 4.2 3.5 - 5.2 g/dL    ALT (SGPT) 19 1 - 33 U/L    AST (SGOT) 17 1 - 32 U/L    Alkaline Phosphatase 63 39 - 117 U/L    Total Bilirubin 0.2 0.0 - 1.2 mg/dL    Globulin 3.1 gm/dL    A/G Ratio 1.4 g/dL    BUN/Creatinine Ratio 18.9 7.0 - 25.0    Anion Gap 12.6 5.0 - 15.0 mmol/L    eGFR 101.2 >60.0 mL/min/1.73   CBC Auto Differential    Collection Time: 11/04/24  1:22 PM    Specimen: Blood   Result Value Ref Range    WBC 7.63 3.40 - 10.80 10*3/mm3    RBC 4.71 3.77 - 5.28 10*6/mm3    Hemoglobin 13.3 12.0 - 15.9 g/dL    Hematocrit 41.4 34.0 - 46.6 %    MCV 87.9 79.0 - 97.0 fL    MCH 28.2 26.6 - 33.0 pg    MCHC 32.1 31.5 - 35.7 g/dL    RDW 12.1 (L) 12.3 - 15.4 %    RDW-SD 39.3 37.0 - 54.0 fl    MPV 9.1 6.0 - 12.0 fL    Platelets 277 140 - 450 10*3/mm3    Neutrophil % 61.5 42.7 - 76.0 %    Lymphocyte % 28.7 19.6 - 45.3 %    Monocyte % 7.7 5.0 - 12.0 %    Eosinophil % 1.0 0.3 - 6.2 %    Basophil % 0.8 0.0 - 1.5 %    Immature Grans % 0.3 0.0 - 0.5 %    Neutrophils, Absolute 4.69 1.70 - 7.00 10*3/mm3    Lymphocytes, Absolute 2.19 0.70 - 3.10 10*3/mm3    Monocytes, Absolute " 0.59 0.10 - 0.90 10*3/mm3    Eosinophils, Absolute 0.08 0.00 - 0.40 10*3/mm3    Basophils, Absolute 0.06 0.00 - 0.20 10*3/mm3    Immature Grans, Absolute 0.02 0.00 - 0.05 10*3/mm3    nRBC 0.0 0.0 - 0.2 /100 WBC             The following portions of the patient's history were reviewed and updated as appropriate: allergies, current medications, past family history, past medical history, past social history, past surgical history, and problem list.    Review of Systems   Constitutional:  Negative for activity change, chills and fever.   Respiratory:  Negative for cough.    Cardiovascular:  Negative for chest pain.   Psychiatric/Behavioral:  Negative for dysphoric mood.        Objective             Physical Exam  Vitals and nursing note reviewed.   Constitutional:       General: She is not in acute distress.     Appearance: She is well-developed.   Cardiovascular:      Rate and Rhythm: Normal rate and regular rhythm.   Pulmonary:      Effort: Pulmonary effort is normal.      Breath sounds: Normal breath sounds.   Neurological:      Mental Status: She is alert and oriented to person, place, and time.   Psychiatric:         Behavior: Behavior normal.         Thought Content: Thought content normal.             Diagnoses and all orders for this visit:    1. Attention deficit hyperactivity disorder (ADHD), predominantly inattentive type (Primary)  -     lisdexamfetamine (Vyvanse) 60 MG capsule; Take 1 capsule by mouth Every Morning  Dispense: 30 capsule; Refill: 0    2. Grief reaction  -     LORazepam (ATIVAN) 0.5 MG tablet; Take 1 tablet by mouth Every 8 (Eight) Hours As Needed for Anxiety. for anxiety  Dispense: 30 tablet; Refill: 2    3. Acquired hypothyroidism  -     levothyroxine (SYNTHROID, LEVOTHROID) 112 MCG tablet; Take 1 tablet by mouth Daily.  Dispense: 90 tablet; Refill: 1  -     TSH  -     T4, Free    4. Essential hypertension  -     valsartan (DIOVAN) 160 MG tablet; Take 1 tablet by mouth Daily.  Dispense: 90  tablet; Refill: 1  -     Comprehensive metabolic panel  -     Lipid panel  -     CBC and Differential    5. Medication management  -     ToxAssure Flex 15, Ur -

## 2025-03-04 ENCOUNTER — OFFICE VISIT (OUTPATIENT)
Dept: FAMILY MEDICINE CLINIC | Facility: CLINIC | Age: 45
End: 2025-03-04
Payer: COMMERCIAL

## 2025-03-04 VITALS
DIASTOLIC BLOOD PRESSURE: 72 MMHG | OXYGEN SATURATION: 99 % | HEART RATE: 102 BPM | HEIGHT: 62 IN | WEIGHT: 155 LBS | SYSTOLIC BLOOD PRESSURE: 110 MMHG | RESPIRATION RATE: 20 BRPM | TEMPERATURE: 97.5 F | BODY MASS INDEX: 28.52 KG/M2

## 2025-03-04 DIAGNOSIS — F43.21 GRIEF REACTION: Chronic | ICD-10-CM

## 2025-03-04 DIAGNOSIS — E03.9 ACQUIRED HYPOTHYROIDISM: Chronic | ICD-10-CM

## 2025-03-04 DIAGNOSIS — Z79.899 MEDICATION MANAGEMENT: ICD-10-CM

## 2025-03-04 DIAGNOSIS — F90.0 ATTENTION DEFICIT HYPERACTIVITY DISORDER (ADHD), PREDOMINANTLY INATTENTIVE TYPE: Primary | Chronic | ICD-10-CM

## 2025-03-04 DIAGNOSIS — I10 ESSENTIAL HYPERTENSION: Chronic | ICD-10-CM

## 2025-03-04 PROCEDURE — 99214 OFFICE O/P EST MOD 30 MIN: CPT | Performed by: FAMILY MEDICINE

## 2025-03-04 RX ORDER — LISDEXAMFETAMINE DIMESYLATE 60 MG/1
60 CAPSULE ORAL EVERY MORNING
Qty: 30 CAPSULE | Refills: 0 | Status: SHIPPED | OUTPATIENT
Start: 2025-03-04

## 2025-03-04 RX ORDER — VALSARTAN 160 MG/1
160 TABLET ORAL DAILY
Qty: 90 TABLET | Refills: 1 | Status: SHIPPED | OUTPATIENT
Start: 2025-03-04

## 2025-03-04 RX ORDER — LEVOTHYROXINE SODIUM 112 UG/1
112 TABLET ORAL DAILY
Qty: 90 TABLET | Refills: 1 | Status: SHIPPED | OUTPATIENT
Start: 2025-03-04

## 2025-03-04 RX ORDER — LORAZEPAM 0.5 MG/1
0.5 TABLET ORAL EVERY 8 HOURS PRN
Qty: 30 TABLET | Refills: 2 | Status: SHIPPED | OUTPATIENT
Start: 2025-03-04

## 2025-04-01 LAB
1OH-MIDAZOLAM UR QL SCN: NOT DETECTED NG/MG CREAT
6MAM UR QL SCN: NEGATIVE NG/ML
7AMINOCLONAZEPAM/CREAT UR: NOT DETECTED NG/MG CREAT
A-OH ALPRAZ/CREAT UR: NOT DETECTED NG/MG CREAT
A-OH-TRIAZOLAM/CREAT UR CFM: NOT DETECTED NG/MG CREAT
ALBUMIN SERPL-MCNC: 4.2 G/DL (ref 3.9–4.9)
ALP SERPL-CCNC: 69 IU/L (ref 44–121)
ALPRAZ/CREAT UR CFM: NOT DETECTED NG/MG CREAT
ALT SERPL-CCNC: 16 IU/L (ref 0–32)
AMPHET UR CFM-MCNC: 5794 NG/MG CREAT
AMPHETAMINES UR QL SCN: NORMAL NG/ML
AMPHETAMINES UR QL: NORMAL
AST SERPL-CCNC: 17 IU/L (ref 0–40)
BARBITURATES UR QL SCN: NEGATIVE NG/ML
BASOPHILS # BLD AUTO: 0.1 X10E3/UL (ref 0–0.2)
BASOPHILS NFR BLD AUTO: 1 %
BENZODIAZ SCN METH UR: NEGATIVE
BILIRUB SERPL-MCNC: 0.2 MG/DL (ref 0–1.2)
BUN SERPL-MCNC: 10 MG/DL (ref 6–24)
BUN/CREAT SERPL: 13 (ref 9–23)
BUPRENORPHINE UR QL SCN: NEGATIVE
BUPRENORPHINE/CREAT UR: NOT DETECTED NG/MG CREAT
CALCIUM SERPL-MCNC: 9.5 MG/DL (ref 8.7–10.2)
CANNABINOIDS UR QL SCN: NEGATIVE NG/ML
CHLORIDE SERPL-SCNC: 101 MMOL/L (ref 96–106)
CHOLEST SERPL-MCNC: 211 MG/DL (ref 100–199)
CLONAZEPAM/CREAT UR CFM: NOT DETECTED NG/MG CREAT
CO2 SERPL-SCNC: 25 MMOL/L (ref 20–29)
COCAINE+BZE UR QL SCN: NEGATIVE NG/ML
CREAT SERPL-MCNC: 0.75 MG/DL (ref 0.57–1)
CREAT UR-MCNC: 111 MG/DL
DESALKYLFLURAZ/CREAT UR: NOT DETECTED NG/MG CREAT
DIAZEPAM/CREAT UR: NOT DETECTED NG/MG CREAT
EGFRCR SERPLBLD CKD-EPI 2021: 99 ML/MIN/1.73
EOSINOPHIL # BLD AUTO: 0.2 X10E3/UL (ref 0–0.4)
EOSINOPHIL NFR BLD AUTO: 2 %
ERYTHROCYTE [DISTWIDTH] IN BLOOD BY AUTOMATED COUNT: 12.4 % (ref 11.7–15.4)
ETHANOL UR QL SCN: NEGATIVE G/DL
FENTANYL CTO UR SCN-MCNC: NEGATIVE NG/ML
FENTANYL/CREAT UR: NOT DETECTED NG/MG CREAT
FLUNITRAZEPAM UR QL SCN: NOT DETECTED NG/MG CREAT
GLOBULIN SER CALC-MCNC: 2.2 G/DL (ref 1.5–4.5)
GLUCOSE SERPL-MCNC: 99 MG/DL (ref 70–99)
HCT VFR BLD AUTO: 42.3 % (ref 34–46.6)
HDLC SERPL-MCNC: 45 MG/DL
HGB BLD-MCNC: 13.6 G/DL (ref 11.1–15.9)
IMM GRANULOCYTES # BLD AUTO: 0 X10E3/UL (ref 0–0.1)
IMM GRANULOCYTES NFR BLD AUTO: 0 %
LDLC SERPL CALC-MCNC: 141 MG/DL (ref 0–99)
LORAZEPAM/CREAT UR: NOT DETECTED NG/MG CREAT
LYMPHOCYTES # BLD AUTO: 3.2 X10E3/UL (ref 0.7–3.1)
LYMPHOCYTES NFR BLD AUTO: 39 %
MCH RBC QN AUTO: 28.3 PG (ref 26.6–33)
MCHC RBC AUTO-ENTMCNC: 32.2 G/DL (ref 31.5–35.7)
MCV RBC AUTO: 88 FL (ref 79–97)
MDA UR CFM-MCNC: NOT DETECTED NG/MG CREAT
MDMA UR CFM-MCNC: NOT DETECTED NG/MG CREAT
METHADONE UR QL SCN: NEGATIVE NG/ML
METHADONE+METAB UR QL SCN: NEGATIVE NG/ML
METHAMPHET UR CFM-MCNC: NOT DETECTED NG/MG CREAT
MIDAZOLAM/CREAT UR CFM: NOT DETECTED NG/MG CREAT
MONOCYTES # BLD AUTO: 0.8 X10E3/UL (ref 0.1–0.9)
MONOCYTES NFR BLD AUTO: 10 %
NEUTROPHILS # BLD AUTO: 3.9 X10E3/UL (ref 1.4–7)
NEUTROPHILS NFR BLD AUTO: 48 %
NORBUPRENORPHINE/CREAT UR: NOT DETECTED NG/MG CREAT
NORDIAZEPAM/CREAT UR: NOT DETECTED NG/MG CREAT
NORFENTANYL/CREAT UR: NOT DETECTED NG/MG CREAT
NORFLUNITRAZEPAM UR-MCNC: NOT DETECTED NG/MG CREAT
OPIATES UR SCN-MCNC: NEGATIVE NG/ML
OXAZEPAM/CREAT UR: NOT DETECTED NG/MG CREAT
OXYCODONE CTO UR SCN-MCNC: NEGATIVE NG/ML
PCP UR QL SCN: NEGATIVE NG/ML
PLATELET # BLD AUTO: 319 X10E3/UL (ref 150–450)
POTASSIUM SERPL-SCNC: 4.3 MMOL/L (ref 3.5–5.2)
PRESCRIBED MEDICATIONS: NORMAL
PROT SERPL-MCNC: 6.4 G/DL (ref 6–8.5)
RBC # BLD AUTO: 4.8 X10E6/UL (ref 3.77–5.28)
SODIUM SERPL-SCNC: 139 MMOL/L (ref 134–144)
T4 FREE SERPL-MCNC: 1.92 NG/DL (ref 0.82–1.77)
TAPENTADOL CTO UR SCN-MCNC: NEGATIVE NG/ML
TEMAZEPAM/CREAT UR: NOT DETECTED NG/MG CREAT
TRAMADOL UR QL SCN: NEGATIVE NG/ML
TRIGL SERPL-MCNC: 137 MG/DL (ref 0–149)
TSH SERPL DL<=0.005 MIU/L-ACNC: 1.02 UIU/ML (ref 0.45–4.5)
VLDLC SERPL CALC-MCNC: 25 MG/DL (ref 5–40)
WBC # BLD AUTO: 8.1 X10E3/UL (ref 3.4–10.8)

## 2025-04-06 DIAGNOSIS — F90.0 ATTENTION DEFICIT HYPERACTIVITY DISORDER (ADHD), PREDOMINANTLY INATTENTIVE TYPE: Chronic | ICD-10-CM

## 2025-04-06 RX ORDER — LISDEXAMFETAMINE DIMESYLATE 60 MG/1
60 CAPSULE ORAL EVERY MORNING
Qty: 30 CAPSULE | Refills: 0 | Status: SHIPPED | OUTPATIENT
Start: 2025-04-06

## 2025-04-06 RX ORDER — ALBUTEROL SULFATE 90 UG/1
2 INHALANT RESPIRATORY (INHALATION) EVERY 4 HOURS PRN
Qty: 8 G | Refills: 11 | Status: SHIPPED | OUTPATIENT
Start: 2025-04-06

## 2025-04-06 RX ORDER — LISDEXAMFETAMINE DIMESYLATE 60 MG/1
60 CAPSULE ORAL EVERY MORNING
Qty: 30 CAPSULE | Refills: 0 | Status: CANCELLED | OUTPATIENT
Start: 2025-04-06

## 2025-04-07 RX ORDER — ALBUTEROL SULFATE 90 UG/1
2 INHALANT RESPIRATORY (INHALATION) EVERY 4 HOURS PRN
Qty: 8.5 G | OUTPATIENT
Start: 2025-04-07

## 2025-04-07 RX ORDER — LANOLIN ALCOHOL/MO/W.PET/CERES
1000 CREAM (GRAM) TOPICAL DAILY
Qty: 30 TABLET | Refills: 5 | Status: SHIPPED | OUTPATIENT
Start: 2025-04-07

## 2025-05-02 DIAGNOSIS — F90.0 ATTENTION DEFICIT HYPERACTIVITY DISORDER (ADHD), PREDOMINANTLY INATTENTIVE TYPE: Chronic | ICD-10-CM

## 2025-05-02 RX ORDER — LISDEXAMFETAMINE DIMESYLATE 60 MG/1
60 CAPSULE ORAL EVERY MORNING
Qty: 30 CAPSULE | Refills: 0 | Status: SHIPPED | OUTPATIENT
Start: 2025-05-02

## 2025-05-02 RX ORDER — LISDEXAMFETAMINE DIMESYLATE 60 MG/1
60 CAPSULE ORAL EVERY MORNING
Qty: 30 CAPSULE | Refills: 0 | Status: CANCELLED | OUTPATIENT
Start: 2025-05-02

## 2025-05-02 RX ORDER — LANOLIN ALCOHOL/MO/W.PET/CERES
1000 CREAM (GRAM) TOPICAL DAILY
Qty: 30 TABLET | Refills: 5 | Status: SHIPPED | OUTPATIENT
Start: 2025-05-02

## 2025-05-06 ENCOUNTER — HOSPITAL ENCOUNTER (OUTPATIENT)
Dept: MAMMOGRAPHY | Facility: HOSPITAL | Age: 45
Discharge: HOME | End: 2025-05-06
Admitting: NURSE PRACTITIONER
Payer: COMMERCIAL

## 2025-05-06 DIAGNOSIS — Z12.31 ENCOUNTER FOR SCREENING MAMMOGRAM FOR MALIGNANT NEOPLASM OF BREAST: ICD-10-CM

## 2025-05-06 PROCEDURE — 77067 SCR MAMMO BI INCL CAD: CPT

## 2025-05-06 PROCEDURE — 77063 BREAST TOMOSYNTHESIS BI: CPT

## 2025-05-11 RX ORDER — CHOLECALCIFEROL (VITAMIN D3) 50 MCG
TABLET ORAL
Qty: 48 TABLET | Refills: 1 | Status: SHIPPED | OUTPATIENT
Start: 2025-05-11

## 2025-05-12 NOTE — PROGRESS NOTES
[facility]     Referring Provider: No ref. provider found     Chief complaint: Routine follow up breast caner      HPI:   5/21/21: Saw Dr. Euceda  Ms. Radha Astorga is a 42 yo woman, seen at the request of Dr. Neena Beavers, for a new diagnosis of left breast cancer. This was initially detected as an imaging abnormality on routine screening. Her work-up is detailed in the oncologic history below. Prior to the biopsy, she denies any breast lumps, pain, skin changes, or nipple discharge. She has a past history of a benign left breast percutaneous biopsy in 2013. She has a past history of a stroke in December 2020 without any residual neuro deficits. Cardiac work-up was negative, however hypercoagulable work-up revealed a factor V Leiden mutation. She has a family history of breast cancer in her mother (diagnosed at age 53) and a paternal aunt (diagnosed at age 55), as well as a paternal great aunt and her paternal great grandmother. Her paternal great aunt also had ovarian cancer and her mother had pancreatic cancer. She underwent expanded panel genetic testing at Dr. Beavers's office and she was negative for mutation.    6/16/21: Saw Dr. Euceda  At her last visit, she underwent left axillary lymph node biopsy, which thankfully returned as benign. She underwent left breast MR-guided biopsy x2. The lesion at 5:00, near the primary malignancy, returned as a second foci of cancer, and the lesion at 2:00, returned as a radial scar (see pathology report details below). I have already briefly discussed these results with her over the phone and the plan still is to proceed with breast conservation. She has an appointment scheduled with plastic surgery next week.    7/15/21: Saw Dr. Euceda   She underwent port placement, left partial mastectomy and excisional biopsy and SLNB with oncoplastic closure and right reduction for symmetry on 7/1/21. See surgery & pathology details below in oncologic history. She is  "complaining of a full sensation and some pain in her left armpit.    10/20/21 Saw Dr. Euceda  She returns today for scheduled follow-up. Her last dose of Taxol/Herceptin is later today. She has seen Dr. Byrd and plans on starting radiation sometime in November. Unfortunately she has been dealing with left arm lymphedema for a few months. She has been seeing Julia for this and undergoing bandaging. She also is wearing a compression sleeve and gauntlet at home. She denies any new breast related complaints and is very happy with her cosmetic result.    8/18/2022   Returns for routine follow up today.  Completed radiation November 2021.  Completed Cycle 12 Taxol Herceptin Oct 2021  Completed Herceptin June 2022  Saw Dr. Beavers 8/10/22    \"Plan:   Patient awaiting colonoscopy as she has had intermittent constipation which is new for her and is scheduled in October  She had port flush today  Discussed about port removal but she wants to wait till her colonoscopy is completed and subsequently wants to get the port removed  Reviewed screening mammogram from April 2022 which is negative  Patient is going to see Dr. Lashonda Euceda next week who can then schedule her for port removal after colonoscopy  Encourage patient to exercise, increase fiber in diet and use prune juice to help with her constipation  Follow-up with me in 3 months with labs with port flush in 6 weeks in 3 months\"  She had bioimpedence yesterday \"L-Dex value today is 1.7 which is WNL\"  Today she has complaints of left breast edema.  She states she is wearing her sports bras and she is continuing with breast massage.  The edema is on the lower outer portion of the left breast.    2/20/2023  Presenting to the office today for routine follow-up.  Last saw oncology in December 2022 no changes were made to the treatment plan.  She is needing to schedule a time to get port removed. She is doing well without any new complaints.  Seeing PT every 4-6 weeks and " bio impedence score decreasing every time she goes.  She is wearing her sleeve at night.      8/21/2023   Patient presenting to the office today for routine follow-up.  She had a screening mammogram on 4/7/2023 which resulted as BI-RADS 2.  She last saw her oncologist in June without any changes to the treatment plan.  She has no new breast complaints today.  Port removed in March.    5/7/2024 interval history  Patient presenting to the office today for routine follow-up.  On 5/1/2024 she had a bilateral screening mammogram that resulted as BI-RADS 1.  She last saw her oncologist in March with no changes made to the treatment plan.  Currently not on any medications due to ER/NE negative.  She has no new breast complaints or concerns today.    5/13/2025 interval history  Patient presenting to the office today for routine follow-up.  On 5/6/2025 she had a bilateral screening mammogram that resulted as BI-RADS 2.  Last saw oncology in November with no changes made to the treatment plan.  Has no new issues today.    Oncology/Hematology History Overview Note   12/11/2019, Screening MMG with [facility] ( [facility]):  Scattered fibroglandular density. There are no findings to suggest malignancy.  BI-RADS 1: Negative.     Malignant neoplasm of overlapping sites of left breast in female, estrogen receptor negative   4/1/2021 Initial Diagnosis    Malignant neoplasm of overlapping sites of left breast in female, estrogen receptor negative (CMS/HCC)     4/2/2021 Imaging    Screening MMG with [facility] ( [place]):  Scattered fibroglandular densities. In the posterior one-third of the left breast projecting in a retroareolar location on the CC images there is an area of focal asymmetry. I see no suspicious calcifications or areas of architectural distortion in either breast. There is no evidence for skin thickening or nipple retraction.  BI-RADS 0: Incomplete.     4/9/2021 Imaging    Left Diagnostic MMG with [facility] & Left Breast US (  [facility]):  MMG:  With additional imaging there is persistence of the area of focal asymmetry in the posterior one-third inferior aspect of the left breast.  US:  At the 6 o'clock position on the order of 6 cm from the nipple there is a 0.8 cm irregular hypoechoic lesion. Mild posterior acoustic shadowing is noted.  BI-RADS 4: Suspicious.     4/26/2021 Biopsy    Left Breast[facility]    1. Left Breast, 6:00, 6 cm FN, U/S-Guided Core Needle Biopsy for a Mass:                 A. INVASIVE DUCTAL CARCINOMA, Poorly differentiated; [place] Histologic Grade III/III (tubule score = 3, nuclear score = 2, mitoses score = 3), measuring at least 7 mm.               B. No definitive ductal carcinoma in situ identified.   C. Negative for lymphovascular space invasion.    ER negative (<1%)  ME negative (<1%)  HER2 positive (IHC 3+)  Ki-67 55%     5/5/2021 Genetic Testing    [employer] Common Hereditary Cancers Panel (47 genes):    Negative     5/7/2021 Biopsy    Bilateral Breast MRI ([facility]):  There is an amorphous area of enhancement seen within the 6:00 position of the left breast, posterior depth measuring approximately 1.2 cm (series 9 image 91). Susceptibility artifact is present within this region consistent with recently newly diagnosed biopsy-proven malignancy. There is an additional area of more linear enhancement seen just anteriorly and laterally measuring up to 1.2 cm (series 9 image 91). This is seen approximately 5.6 cm from the nipple. Mixed washout kinetics are present. There is an additional punctate area of enhancement measuring only approximately 2 to 3 mm seen within the anterior lateral aspect of the left breast seen approximately 4.6 cm from the nipple (series 9 image 79) also demonstrating mixed washout  kinetics. No additional areas of enhancement identified. There is questionable mildly prominent lymph nodes present within the left axillary region (series 9 image 34 and series 9 image 18)  with mild cortical thickening present with benign-appearing fatty juan miguel present. This is seen both within the high and mid to low axilla. A mildly prominent posterior lymph node is also present within the mid axilla measuring up to 1 cm with cortical thickening present (series 9 image 27). Limited evaluation of the intrathoracic contents symmetric no acute process. A marker seen at the junction of the left breast and the left chest wall inferiorly (series 4 image 135) with no abnormalities  identified within this region. No abnormal fluid collections identified.  BI-RADS 6: Known malignancy.     5/21/2021 Biopsy    Left Axilla, US-Guided Biopsy ( [facility]):  1.  Lymph Node, Left Axilla, Core Biopsy:                 A.  Fragments of reactive lymph node; negative for lymphoma and carcinoma.      6/2/2021 Biopsy    Left Breast, MR-Guided Biopsy x 2 ( [facility]):    1. Left Breast, 5:00 o'clock, MRI-guided Biopsies for Linear Enhancement:   INVASIVE MAMMARY CARCINOMA, NO SPECIAL TYPE (INVASIVE DUCTAL CARCINOMA).               A. Largest contiguous focus in a core measures 4 mm.               B. No in-situ component identified.                C. Brisk lymphocytic response noted (see Comment).               D. No definitive lymphovascular nor perineural invasion identified.  -Bowtie clip. Biopsy clips marking the 2 sites of biopsy-proven malignancy are  by 2.5 cm (bowtie clip and posteriorly located U-shaped clip).     ER negative (0%)  CO negative (0%)  Her2+ (IHC 3+)  Ki-67 50%    2.  Left Breast, 2:00 o'clock, MRI-guided Biopsy for Enhancement:                A. Benign breast parenchyma with radial scar and micropapillomas.               B. Usual ductal hyperplasia and columnar cell hyperplasia.               C. No atypical hyperplasia, in-situ nor invasive carcinoma identified.  -U-shaped clip. Concordant.     7/1/2021 Surgery    Port placement, left SHAINA-guided, bracketed, partial mastectomy and left breast  needle-localized excisional biopsy and sentinel lymph node biopsy with oncoplastic closure and right reduction for symmetry    1. Left Breast, Partial Mastectomy:               A. Invasive ductal carcinoma:                            1. Invasive carcinoma measures 12 mm x 8 mm x 4 mm.                            2. Overall [place] grade III (tubular score = 3, nuclear score = 2, mitotic score = 3).                                                 3. No definitive lymphovascular invasion identified.               B. Associated scattered ductal carcinoma in situ (DCIS):                            1. DCIS spans an area estimated at  30 mm x 8 mm x 2 mm.                            2. High grade solid and comedo DCIS.                            3. Rare microcalcification present in DCIS.                  C. All margins are negative for invasive carcinoma.                    Carcinoma measures 6 mm from the closest (Inferior) margin of excision.                     All other margins measure at least 8 mm from invasive carcinoma including:                            Anterior margin = 18 mm                            Posterior margin = 24 mm                            Superior margin = 8 mm                            Inferior margin = 6 mm                             Lateral margin = 12 mm                            Medial margin = 20 mm                  D. All margins are negative for in ductal situ carcinoma (DCIS).                    DCIS measures 1.5 mm from the closest (Superior) margin of excision.                    All other margins  measure at least 2.5 mm from DCIS including:                            Anterior margin = 18  mm                            Posterior margin = 15  mm                            Superior margin = 1.5  mm                            Inferior margin =  6 mm                             Lateral margin = 3  mm                            Medial margin = 2.5 mm                  E.  Multiple  biopsy site changes are identified (x2) and multiple metallic clips (x4) retrieved.               F.  No Pagetoid involvement of skin by malignancy identified.               G. Non-neoplastic breast tissue with fibrocystic change, sclerosing adenosis, columnar cell change, micropapilloma and focal fibroadenomatoid change. Rare microcalcification present in benign breast tissue.               H. Previous Biomarkers: Estrogen receptors: Negative, Progesterone receptors: Negative,                    HER/2-ese: Positive (score 3+) and Ki-67 = 50% (see [id number]).       2.  Left Breast, Additional Superior, Medial and Inferior Margins:                 A.  No in situ nor infiltrating carcinoma identified.               B.  New margins are negative for malignancy by an additional 15 mm.      3.  Left Breast at 2  o'clock, Needle Localization, Excisional Biopsy:                A.  Benign breast tissue with changes suggesting radial scar with usual ductal hyperplasia,                      sclerosing adenosis and fibrocystic change.                 B.  No in situ nor infiltrating carcinoma identified.               C.  Biopsy site changes are identified and metallic clip retrieved.                D.  All margins are viable and negative for neoplasm/malignancy.                       4.  Left Breast, True Medial and Inferior Margins:                 A.  No in situ nor infiltrating carcinoma identified.                B.  New margins are negative for malignancy by an additional 20 mm.     5.  Left Breast, True Superior and Lateral Margins:                A.  No in situ nor infiltrating carcinoma identified.                B.  New margins are negative for malignancy by an additional 40 mm.     6.  Left Breast, True Inferior Margin:                A.  No in situ nor infiltrating carcinoma identified.                B.  Benign skin and breast tissue.  C.  New margin is negative for malignancy by an additional 15 mm.     7.  Right  Breast Tissue, Augmentation:                 A.  Benign skin and breast tissue (312 grams).                B.  No in situ nor infiltrating carcinoma identified.      8.  Left Axilla, [place] Lymph Node #1 (Hot, Blue, Count 5,500).                A.  Three lymph nodes negative for malignancy by routine staining (0/3).   B.  Includes one (largest) lymph node with focal fibrosis suggesting previous biopsy effect.      9.  Left Axilla, [place] Lymph Node, #2 (Hot, Not Blue, Count 900):                A.  One lymph node negative for metastatic carcinoma by routine staining (0/1).     10.  Left Breast, Superior Pole:                 A.  No in situ nor infiltrating carcinoma identified.                B.  Superior margin is negative for malignancy by an additional 20 mm.     8/4/2021 - 10/20/2021 Chemotherapy    OP BREAST PACLitaxel / Trastuzumab-anns (Weekly X 12)     9/1/2021 - 10/13/2021 Chemotherapy    OP SUPPORTIVE Iron Sucrose (Venofer)     11/2/2021 - 12/1/2021 Radiation    Radiation OncologyTreatment Course:  Radha Astorga received 5256 cGy in 21 fractions to LEFT breast with tumor bed boost.     11/10/2021 - 6/29/2022 Chemotherapy    OP BREAST Trastuzumab-anns Q21D (maintenance)     4/4/2022 Imaging       EXAMINATION: Bilateral digital mammography with R2 computer aided  detection and bilateral digital Tomosynthesis     FINDINGS: Bilateral digital CC and MLO mammographic and digital  Tomosynthesis images were obtained. Comparison is made to prior studies  dated 4/2/2021 and 4/9/2021 . Scattered fibroglandular densities are  seen throughout both breasts. There has been interval bilateral breast  surgery. No suspicious calcifications or new or dominant masses are  appreciated. Surgical clips are seen in the left axilla and the hilum of  a Mediport catheter projects over the right axilla. There is no evidence  for axillary lymphadenopathy or nipple retraction.     IMPRESSION:  1. There is no evidence for  malignancy or significant change in either  breast. Routine followup mammography is recommended.     BI-RADS category 2: Benign.     4/7/2023 Imaging    Bilateral screening mammogram at [facility]  FINDINGS:  There are scattered areas of fibroglandular density.     Benign-appearing postsurgical changes are identified in both breasts.  There are no suspicious masses, calcifications, or areas of  architectural distortion.        IMPRESSION/RECOMMENDATION(S):  No mammographic evidence of malignancy. Recommend annual screening  mammogram in one year.     BI-RADS Category 2: Benign      5/1/2024 Imaging    Bilateral screening mammogram at [facility]  FINDINGS: Bilateral digital CC and MLO mammographic and digital  Tomosynthesis images were obtained. Comparison is made to prior studies  dated 4/7/2023 and 4/4/2022. The parenchyma of both breasts is largely  fatty-replaced. I see no new or dominant masses, areas of architectural  distortion or skin thickening. There is no evidence for axillary  lymphadenopathy or nipple retraction.     IMPRESSION:  1. There is no evidence for malignancy or significant change in either  breast. Routine followup mammography is recommended.     BI-RADS category 1: Negative     5/6/2025 Imaging    Bilateral screening mammogram at [facility]  There are scattered areas of fibroglandular density.  Benign-appearing postsurgical changes. There are no suspicious masses,  calcifications, or areas of architectural distortion.  IMPRESSION/RECOMMENDATION(S):  No mammographic evidence of malignancy. Recommend annual screening  mammogram in one year.  BI-RADS Category 2         Review of Systems:  See interval history.       Medications:    Current Outpatient Medications:     albuterol sulfate  (90 Base) MCG/ACT inhaler, Inhale 2 puffs Every 4 (Four) Hours As Needed for Wheezing., Disp: 8 g, Rfl: 11    Cholecalciferol (Vitamin D3) 50 MCG (2000 UT) tablet, TAKE 2 TABLETS BY MOUTH TWICE WEEKLY, Disp: 48 tablet, Rfl: 1     famotidine (PEPCID) 20 MG tablet, Take 1 tablet by mouth Daily., Disp: 90 tablet, Rfl: 1    levothyroxine (SYNTHROID, LEVOTHROID) 112 MCG tablet, Take 1 tablet by mouth Daily., Disp: 90 tablet, Rfl: 1    lisdexamfetamine (Vyvanse) 60 MG capsule, Take 1 capsule by mouth Every Morning, Disp: 30 capsule, Rfl: 0    LORazepam (ATIVAN) 0.5 MG tablet, Take 1 tablet by mouth Every 8 (Eight) Hours As Needed for Anxiety. for anxiety, Disp: 30 tablet, Rfl: 2    Omega 3 340 MG capsule delayed-release, Take  by mouth., Disp: , Rfl:     valsartan (DIOVAN) 160 MG tablet, Take 1 tablet by mouth Daily., Disp: 90 tablet, Rfl: 1    vitamin B-12 (CYANOCOBALAMIN) 1000 MCG tablet, Take 1 tablet by mouth Daily., Disp: 30 tablet, Rfl: 5      Allergies   Allergen Reactions    Sulfa Antibiotics Rash       Family History   Problem Relation Age of Onset    Breast cancer Mother 53    Pancreatic cancer Mother 68    Cancer Mother         Pancreatic 2015; Breast 2001    Depression Mother     Early death Mother         2016 - 68 yrs old    Hearing loss Mother     Thyroid disease Mother         Hypothyroidism    Stroke Father         2014; 2024    Cancer Father         Skin (basal, squamous)    Depression Father     Diabetes Father         Current    Hearing loss Father     Hypertension Father     Vision loss Father         Diabetic Retinopathy/blank spots from stroke    Hypertension Brother     Colon polyps Maternal Aunt     Melanoma Maternal Uncle     Breast cancer Paternal Aunt 55    Lung cancer Maternal Grandmother     Cancer Maternal Grandmother         Lung    Colon cancer Maternal Grandfather     Prostate cancer Maternal Grandfather     Cancer Maternal Grandfather         Colon    Stroke Maternal Grandfather     Prostate cancer Paternal Grandfather     Arthritis Paternal Grandfather     Cancer Paternal Grandfather         Prostate    Hearing loss Paternal Grandfather     Breast cancer Paternal Great-Grandmother 94    Brain cancer Maternal  Cousin 20    Ovarian cancer Other         Paternal great aunt     Breast cancer Other     Colon polyps Maternal Aunt     Arthritis Paternal Grandmother         [place]    Cancer Maternal Uncle         Base of tongue, 2002    Cancer Paternal Aunt         Breast    Cancer Paternal Uncle         Melanoma 2000’s    Malig Hyperthermia Neg Hx     Crohn's disease Neg Hx     Irritable bowel syndrome Neg Hx     Ulcerative colitis Neg Hx        PHYSICAL EXAMINATION:   There were no vitals filed for this visit.      ECOG 0 - Asymptomatic  General: NAD, well appearing  Psych: a&o x 3, normal mood and affect  Eyes: EOMI, no scleral icterus  ENMT: neck supple without masses or thyromegaly, mucus membranes moist  MSK: normal gait, normal ROM in bilateral shoulders  Lymph nodes: Well-healed left axillary scar; no cervical, supraclavicular or axillary lymphadenopathy  Breast:   Right: Sp mastopexy with well-healed scars. Scars are soft. There is some thickened tissue on either side of the T junction (tissue mobilized for reconstruction). No discrete masses or nipple abnormalities.  Left: Sp mastopexy with well-healed scars. Scars are soft. There is some thickened tissue on either side of the T junction (tissue mobilized for reconstruction). No discrete masses or nipple abnormalities. Edema noted 5:00 to 8:00 with the worse area at 7-8:00      Assessment:  45 y.o. F with a diagnosis of left breast: High grade, invasive ductal carcinoma, ER/CA negative, Her2 positive. She underwent port placement, left partial mastectomy and excisional biopsy and SLNB with oncoplastic closure and right reduction for symmetry on 7/1/21, pT1cN0, anatomic stage IA, prognostic stage IA. She completed Taxol/Herceptin on 10/20/21.    Discussion:  1. We discussed lymphedema along with prevention, recognition, and treatment that is recommended.  The positive response to lymphedema prevention and reduction including weight reduction, regular exercise, and  recognition and treatment of symptoms at the onset were discussed.   2. Massage left massage upward and outward daily to help with fluid movement    Plan:  -Continue follow-up with Dr. Beavers.  -Continue follow-up with Dr. Byrd.  -Continue follow-up with OT/LE clinic.  -screening mammo and exam in 1 year  -She was instructed to call sooner with any questions, concerns or changes on BSE.    CLAUDINE Noriega      CC:  No ref. provider found  MD Tarsha Davis APRN

## 2025-05-12 NOTE — PROGRESS NOTES
Chief Complaint   Patient presents with    Follow-up     05/13/2025, Interval history:  She is here for follow-up.  She had her mammogram done earlier last week.  No new health issues since last clinic visit    Oncologic history:  Patient is here for follow-up of her mammogram.  Patient had screening mammogram April 2, 2021:  NAD a focal asymmetry was present in the posterior one third retroareolar region of the left breast.  Further spot compression images and left breast ultrasound was suggested.  Right breast was negative    April 9, 2021: Left diagnostic mammogram showed an area of focal asymmetry in the posterior one third region aspect of the left breast her breast    Ultrasound showed an irregular 0.8 cm lesion in the left breast at the 6 o'clock position of the order of 6 cm from the nipple.  Ultrasound-guided left breast biopsy was recommended.    April 26, 2021: Ultrasound-guided biopsy of the left breast 6 o'clock position, 6 cm from the nipple showed    Invasive ductal carcinoma, poorly differentiated with a Oziel score grade 3 with a score of 8 out of 9 measuring at least 7 mm.  No definitive ductal carcinoma is in situ is identified.  ER 1% negative  FL 1% negative   HER-2/ese 3+ positive    There was no evidence of lymphadenopathy either in the mammogram of the ultrasound.  We suggested an MRI of the breast.  Patient has strong family history of breast cancer      May 7, 2021: MRI of bilateral breast reviewed.  My interpretation is that there is area of enhancement in the 6 o'clock position of the left breast this is the biopsy-proven malignancy.  There is additional area of linear enhancement anteriorly and laterally measuring up to 1.2 cm this is approximately 5.6 cm from the nipple.  In addition there is a enhancement 2 to 3 mm in the anterolateral aspect of the lateral aspect of the left breast which is 4.6 cm from the nipple.  There is also mildly prominent posterior lymph node in the mid  axilla which measures 1 cm with cortical thickening.  Findings are consistent with multifocal disease.  No contralateral disease or internal mammary adenopathy identified    May 20, 2021: Genetic test, [facility] genetic test shows 47 genes negative    May 21, 2021: I reviewed the biopsy of the lymph node in the left axilla which shows reactive lymph node negative for any carcinoma or lymphoma    June 2, 2021:Final Diagnosis  1. Left Breast, 5:00 o'clock, MRI-guided Biopsies for Linear Enhancement: INVASIVE MAMMARY CARCINOMA, NO  SPECIAL TYPE (INVASIVE DUCTAL CARCINOMA).  A. Largest contiguous focus in a core measures 4 mm.  B. No in-situ component identified.  C. Brisk lymphocytic response noted (see Comment).  D. No definitive lymphovascular nor perineural invasion identified.  2. Left Breast, 2:00 o'clock, MRI-guided Biopsy for Enhancement:  A. Benign breast parenchyma with radial scar and micropapillomas.  B. Usual ductal hyperplasia and columnar cell hyperplasia.  C. No atypical hyperplasia, in-situ nor invasive carcinoma identified    ER, NE, HER-2 new and Ki-67 pending on this new biopsy      Patient has been seen by Dr. Lashonda Euceda with plans of doing surgery on July first 2021    Once patient undergoes surgery lumpectomy with sentinel lymph node biopsy we will give further recommendation about treatment options.  Given that patient has multifocal disease and both of the lesions 2 lesions are 1.2 cm each, with it being a HER-2 positive tumor on the previous biopsy at 6:00 Dr. Euceda and myself discussed and patient preferred to undergo port placement at the same time of surgery.    Patient had [facility] genetic test which was negative.    She has completed surgery July 1, 2021.  She underwent left partial mastectomy with left axillary sentinel lymph node biopsy and right port placement.  Clinically she was thought to have a high-grade multifocal invasive ductal carcinoma ER/NE negative, NE positive.  The  lesions require preoperative localization.  The multifocal cancer was bracketed with 2 savvy markers and the radial scar was localized with a needle.  Because of the volume of tissue that will be excised related to the breast volume the case was done in conjunction with Dr. Zavala for oncoplastic closure.    She is 2 weeks from surgery.  She is healing up reasonably well.    On review of her pathology she had left partial mastectomy with sentinel lymph node biopsy.  Tumor was present at 5:00, invasive ductal carcinoma, Oziel score of 8, grade 3, greatest dimension was 12 mm x 8 x 4 mm.  Single focus of invasive carcinoma present.  There was extensive DCIS which is 30 mm x 8 x 2 mm, grade 3, no evidence of lymphovascular space invasion or dermal lymphatic invasion.  Margins were clear.  4 lymph nodes were negative.  It is a p T1cp N0, ER negative AK negative HER-2/ese 3+ with Ki-67 of 50%.    Patient is here to discuss further options of treatment.  Given small tumor T1c N0, she is a good candidate for weekly Taxol Herceptin.    Given that patient has heterozygous state for factor V Leiden and currently has port placed we will plan to start Eliquis 2.5 mg p.o. twice daily.  I have left a message for Dr. Craft in neurology to call me to discuss if patient needs to continue aspirin or we can hold off since be starting Eliquis.      Genetic test negative    August 4, 2021: Weekly Taxol Herceptin x12 weeks followed by Herceptin x1 year.  Cycle 1 today  Cycle 12 Taxol Herceptin October 10, 2021      Hematologic history:  patient is a 40-year-old female who presented end of December with numbness and tingling in her left upper extremity and left face.  She went to see Dr. Goodman who then got concerned and referred to neurology.  She was referred to Dr. Freedman and talk to Dr. Thompson neurology for evaluation.  Patient underwent ultrasound of the carotids which did not show significant stenosis of the carotids.  She  underwent 2D echocardiogram which showed a good ejection fraction of 64% with mild mitral valve prolapse and mild mitral stenosis.  She had a 24-hour Holter which was negative.  She then had also an MRI of the brain February 3, 2021 which showed areas of hyperintensity involving the subcortical white matter of the right frontal lobe superior laterally and posteriorly with the largest area measuring 8 9 mm.  There is also enhancement present.  The findings are consistent with a subacute infarct.  They could not differentiate if there is any underlying neoplastic process but a short-term follow-up was suggested in order to follow-up.  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    Patient currently got started on aspirin and factor V Leiden was obtained by neurology because patient's paternal aunt had factor V Leiden mutation and she was heterozygous.  Patient's testing also showed that she was heterozygous.    Patient states her numbness and tingling is resolved completely on the left arm and face it just lasted for a 24 hours.  She was here referred here for neurology to see if there is any other cause for her underlying hypercoagulable state.  She has not had a complete hypercoagulable work-up.  Also discussed with her that an underlying malignancy could cause a hypercoagulable state and we would need to do a CT scan to rule that out.    Patient's mother has had breast cancer in her 50s but had pancreatic cancer at 68 and  from that.  Patient's dad had stroke at 63.  But he is alive at 74.  She has 1 brother who is 40 in good health.  Paternal aunt had breast cancer in her 40s.  Paternal grandfather had colon cancer in his late 80s.  Maternal uncle had throat cancer and another maternal uncle had skin cancer with metastasis to the lung.  And maternal grandmother had breast cancer.    Patient was to have mammogram  "done last year but because of COVID-19 it got postponed and she has not had it done yet.    Patient used to be a smoker half pack per day for 7 years but quit 10 years ago.  She has high cholesterol for which she is on treatment.         Current Outpatient Medications on File Prior to Visit   Medication Sig Dispense Refill    albuterol sulfate  (90 Base) MCG/ACT inhaler Inhale 2 puffs Every 4 (Four) Hours As Needed for Wheezing. 8 g 11    Cholecalciferol (Vitamin D3) 50 MCG (2000 UT) tablet TAKE 2 TABLETS BY MOUTH TWICE WEEKLY 48 tablet 1    levothyroxine (SYNTHROID, LEVOTHROID) 112 MCG tablet Take 1 tablet by mouth Daily. 90 tablet 1    lisdexamfetamine (Vyvanse) 60 MG capsule Take 1 capsule by mouth Every Morning 30 capsule 0    LORazepam (ATIVAN) 0.5 MG tablet Take 1 tablet by mouth Every 8 (Eight) Hours As Needed for Anxiety. for anxiety 30 tablet 2    Omega 3 340 MG capsule delayed-release Take  by mouth.      valsartan (DIOVAN) 160 MG tablet Take 1 tablet by mouth Daily. 90 tablet 1    vitamin B-12 (CYANOCOBALAMIN) 1000 MCG tablet Take 1 tablet by mouth Daily. 30 tablet 5    [DISCONTINUED] famotidine (PEPCID) 20 MG tablet Take 1 tablet by mouth Daily. 90 tablet 1     No current facility-administered medications on file prior to visit.        ALLERGIES:    Allergies   Allergen Reactions    Sulfa Antibiotics Rash             I have reviewed Past Medical, Surgical History, Social History, Meds and Allergies.     REVIEW OF SYSTEMS:  See interval History            Objective     Vitals:    05/13/25 1015   BP: 127/83   Pulse: 107   Temp: 98.2 °F (36.8 °C)   TempSrc: Oral   SpO2: 98%   Weight: 72.1 kg (159 lb)   Height: 157.5 cm (62.01\")   PainSc: 0-No pain             5/13/2025    10:16 AM   Current Status   ECOG score 0     Physical Exam  Constitutional:       Appearance: Normal appearance.   HENT:      Head: Normocephalic and atraumatic.      Mouth/Throat:      Mouth: Mucous membranes are moist.      " Pharynx: Oropharynx is clear.   Eyes:      Extraocular Movements: Extraocular movements intact.      Pupils: Pupils are equal, round, and reactive to light.   Cardiovascular:      Rate and Rhythm: Normal rate and regular rhythm.   Pulmonary:      Effort: Pulmonary effort is normal.      Breath sounds: Normal breath sounds.   Chest:       Abdominal:      General: Bowel sounds are normal.      Palpations: Abdomen is soft.   Musculoskeletal:      Cervical back: Normal range of motion and neck supple.   Neurological:      General: No focal deficit present.      Mental Status: She is alert and oriented to person, place, and time.   Psychiatric:         Mood and Affect: Mood normal.         Behavior: Behavior normal.     I have reexamined the patient and the results are consistent with the previously documented exam. Emily Sanchez MD     RECENT LABS:  CBC:      Lab 05/13/25  1008   WBC 8.14   HEMOGLOBIN 13.0   HEMATOCRIT 39.0   PLATELETS 249   NEUTROS ABS 4.77   IMMATURE GRANS (ABS) 0.05   LYMPHS ABS 2.36   MONOS ABS 0.72   EOS ABS 0.16   MCV 85.9         Assessment & Plan      * pT1c pN0 left breast invasive ductal carcinoma, HER2 positive   July 2021: S/p left partial mastectomy with sentinel lymph node biopsy.  Tumor was present at 5:00, invasive ductal carcinoma, Goodfield score of 8, grade 3, greatest dimension was 12 mm x 8 x 4 mm.  Single focus of invasive carcinoma present.  There was extensive DCIS which is 30 mm x 8 x 2 mm, grade 3, no evidence of lymphovascular space invasion or dermal lymphatic invasion.  Margins were clear.  4 lymph nodes were negative.  It is a p T1cp N0, ER negative MT negative HER-2/ese 3+ with Ki-67 of 50%.  8/4/2021-October 20, 2021: Status post 12 cycles of weekly Taxol Herceptin  November 2021: Completed radiation   June 29, 2022: Completed 1 year of Herceptin   5/1/2024 bilateral screening [id number] 1  11/4/2024: Patient established care with me.  No clinical signs of  recurrence.  Doing well.  2025: Mammogram from May 2025 BI-RADS 2.    *  Factor V Leiden heterozygosity with right frontal stroke and patient presented in 2020 with left-sided weakness tingling and numbness and also numbness in the face.  Ultrasound of carotid does not show significant stenosis  24 Holter is negative  2D echocardiogram shows ejection fraction of 64% with mild mitral regurg and mitral stenosis  Neurology obtain factor V Leiden given that patient's paternal aunt had's history of factor V Leiden and that was heterozygous for factor V Leiden  Continue aspirin as per neurology.  Also patient on medications for her high cholesterol started after stroke currently asymptomatic  Hypercoagulable work-up done which is negative except heterozygous state for factor V Leiden.  Complete stroke work-up has been negative and since this is arterial stroke and she was not on aspirin prior to that and her symptoms have resolved Dr. Beavers agreed with continuing aspirin alone along with high cholesterol medications.  MR venogram done on May 10, 2021 is negative.     * Family history of cancer: Patient's mother had breast cancer at age 50 and pancreatic cancer at age 68 and  from pancreatic cancer.  Patient's maternal grandmother had breast cancer in her 50s.  Her maternal uncle had skin cancer which went to the lung and another maternal uncle had throat cancer.  Maternal aunt had call colon polyps.  Patient's paternal aunt had breast cancer in her 40s.  And paternal grandfather with colon cancer in his 80s.  Paternal aunt also had factor V Leiden.  Genetic testing is negative    Plan:   Patient will continue her baby aspirin per neurology for history of stroke  Currently she is followed with observation she is ER/TN negative  Mammogram from May [id number] 2  Annual mammogram due in May 2026  Continue to follow with Ac Houser  Follow-up with MD in 1 year  with labs      Emily Sanchez MD

## 2025-05-13 ENCOUNTER — OFFICE VISIT (OUTPATIENT)
Dept: SURGERY | Facility: CLINIC | Age: 45
End: 2025-05-13
Payer: COMMERCIAL

## 2025-05-13 ENCOUNTER — OFFICE VISIT (OUTPATIENT)
Dept: ONCOLOGY | Facility: CLINIC | Age: 45
End: 2025-05-13
Payer: COMMERCIAL

## 2025-05-13 ENCOUNTER — LAB (OUTPATIENT)
Dept: LAB | Facility: HOSPITAL | Age: 45
End: 2025-05-13
Payer: COMMERCIAL

## 2025-05-13 VITALS
TEMPERATURE: 98.2 F | DIASTOLIC BLOOD PRESSURE: 83 MMHG | HEART RATE: 107 BPM | SYSTOLIC BLOOD PRESSURE: 127 MMHG | HEIGHT: 62 IN | BODY MASS INDEX: 29.26 KG/M2 | WEIGHT: 159 LBS | OXYGEN SATURATION: 98 %

## 2025-05-13 VITALS
BODY MASS INDEX: 28.52 KG/M2 | HEIGHT: 62 IN | OXYGEN SATURATION: 99 % | SYSTOLIC BLOOD PRESSURE: 110 MMHG | WEIGHT: 155 LBS | HEART RATE: 104 BPM | DIASTOLIC BLOOD PRESSURE: 68 MMHG

## 2025-05-13 DIAGNOSIS — Z17.1 MALIGNANT NEOPLASM OF OVERLAPPING SITES OF LEFT BREAST IN FEMALE, ESTROGEN RECEPTOR NEGATIVE: Primary | ICD-10-CM

## 2025-05-13 DIAGNOSIS — Z12.31 ENCOUNTER FOR SCREENING MAMMOGRAM FOR MALIGNANT NEOPLASM OF BREAST: ICD-10-CM

## 2025-05-13 DIAGNOSIS — C50.812 MALIGNANT NEOPLASM OF OVERLAPPING SITES OF LEFT BREAST IN FEMALE, ESTROGEN RECEPTOR NEGATIVE: ICD-10-CM

## 2025-05-13 DIAGNOSIS — Z17.1 MALIGNANT NEOPLASM OF OVERLAPPING SITES OF LEFT BREAST IN FEMALE, ESTROGEN RECEPTOR NEGATIVE: ICD-10-CM

## 2025-05-13 DIAGNOSIS — C50.812 MALIGNANT NEOPLASM OF OVERLAPPING SITES OF LEFT BREAST IN FEMALE, ESTROGEN RECEPTOR NEGATIVE: Primary | ICD-10-CM

## 2025-05-13 LAB
BASOPHILS # BLD AUTO: 0.08 10*3/MM3 (ref 0–0.2)
BASOPHILS NFR BLD AUTO: 1 % (ref 0–1.5)
DEPRECATED RDW RBC AUTO: 39.1 FL (ref 37–54)
EOSINOPHIL # BLD AUTO: 0.16 10*3/MM3 (ref 0–0.4)
EOSINOPHIL NFR BLD AUTO: 2 % (ref 0.3–6.2)
ERYTHROCYTE [DISTWIDTH] IN BLOOD BY AUTOMATED COUNT: 12.5 % (ref 12.3–15.4)
HCT VFR BLD AUTO: 39 % (ref 34–46.6)
HGB BLD-MCNC: 13 G/DL (ref 12–15.9)
IMM GRANULOCYTES # BLD AUTO: 0.05 10*3/MM3 (ref 0–0.05)
IMM GRANULOCYTES NFR BLD AUTO: 0.6 % (ref 0–0.5)
LYMPHOCYTES # BLD AUTO: 2.36 10*3/MM3 (ref 0.7–3.1)
LYMPHOCYTES NFR BLD AUTO: 29 % (ref 19.6–45.3)
MCH RBC QN AUTO: 28.6 PG (ref 26.6–33)
MCHC RBC AUTO-ENTMCNC: 33.3 G/DL (ref 31.5–35.7)
MCV RBC AUTO: 85.9 FL (ref 79–97)
MONOCYTES # BLD AUTO: 0.72 10*3/MM3 (ref 0.1–0.9)
MONOCYTES NFR BLD AUTO: 8.8 % (ref 5–12)
NEUTROPHILS NFR BLD AUTO: 4.77 10*3/MM3 (ref 1.7–7)
NEUTROPHILS NFR BLD AUTO: 58.6 % (ref 42.7–76)
NRBC BLD AUTO-RTO: 0 /100 WBC (ref 0–0.2)
PLATELET # BLD AUTO: 249 10*3/MM3 (ref 140–450)
PMV BLD AUTO: 9.8 FL (ref 6–12)
RBC # BLD AUTO: 4.54 10*6/MM3 (ref 3.77–5.28)
WBC NRBC COR # BLD AUTO: 8.14 10*3/MM3 (ref 3.4–10.8)

## 2025-05-13 PROCEDURE — 85025 COMPLETE CBC W/AUTO DIFF WBC: CPT

## 2025-05-13 PROCEDURE — 99213 OFFICE O/P EST LOW 20 MIN: CPT | Performed by: STUDENT IN AN ORGANIZED HEALTH CARE EDUCATION/TRAINING PROGRAM

## 2025-05-13 PROCEDURE — 36415 COLL VENOUS BLD VENIPUNCTURE: CPT

## 2025-05-15 DIAGNOSIS — E03.9 ACQUIRED HYPOTHYROIDISM: Chronic | ICD-10-CM

## 2025-05-16 RX ORDER — LEVOTHYROXINE SODIUM 112 UG/1
112 TABLET ORAL DAILY
Qty: 90 TABLET | Refills: 1 | OUTPATIENT
Start: 2025-05-16

## 2025-05-28 RX ORDER — LORAZEPAM 0.5 MG/1
0.5 TABLET ORAL EVERY 8 HOURS PRN
Qty: 30 TABLET | Refills: 2 | Status: CANCELLED | OUTPATIENT
Start: 2025-05-28

## 2025-06-01 NOTE — PROGRESS NOTES
Chief Complaint:   Chief Complaint   Patient presents with    ADHD     Csa updated today   Copy to scan in epic        Radha Astorga 45 y.o. female who presents today for Medical Management of the below listed issues. She  has a problem list of   Patient Active Problem List   Diagnosis    Family history of breast cancer    Hypothyroidism    Major depressive disorder, recurrent episode, moderate with anxious distress    Attention deficit hyperactivity disorder (ADHD), predominantly inattentive type    Factor 5 Leiden mutation, heterozygous    Kidney mass    Malignant neoplasm of overlapping sites of left breast in female, estrogen receptor negative    High risk medication use    Essential hypertension    Rheumatic mitral valve disease    Encounter for adjustment or management of vascular access device    Iron deficiency anemia    History of stroke    B12 deficiency    Nausea    Bloating    Family history of colon cancer    Breast edema   .  Since the last visit, She has overall felt well.  she has been compliant with   Current Outpatient Medications:     lisdexamfetamine (Vyvanse) 60 MG capsule, Take 1 capsule by mouth Every Morning, Disp: 30 capsule, Rfl: 0    LORazepam (ATIVAN) 0.5 MG tablet, Take 1 tablet by mouth Every 8 (Eight) Hours As Needed for Anxiety. for anxiety, Disp: 30 tablet, Rfl: 2    albuterol sulfate  (90 Base) MCG/ACT inhaler, Inhale 2 puffs Every 4 (Four) Hours As Needed for Wheezing., Disp: 8 g, Rfl: 11    Cholecalciferol (Vitamin D3) 50 MCG (2000 UT) tablet, TAKE 2 TABLETS BY MOUTH TWICE WEEKLY, Disp: 48 tablet, Rfl: 1    levothyroxine (SYNTHROID, LEVOTHROID) 112 MCG tablet, Take 1 tablet by mouth Daily., Disp: 90 tablet, Rfl: 1    Omega 3 340 MG capsule delayed-release, Take  by mouth., Disp: , Rfl:     valsartan (DIOVAN) 160 MG tablet, Take 1 tablet by mouth Daily., Disp: 90 tablet, Rfl: 1    vitamin B-12 (CYANOCOBALAMIN) 1000 MCG tablet, Take 1 tablet by mouth Daily., Disp: 30  "tablet, Rfl: 5.  She denies medication side effects.    All of the other chronic condition(s) listed above are stable w/o issues.    /82   Pulse 93   Temp 98.1 °F (36.7 °C) (Oral)   Resp 18   Ht 157.5 cm (62.01\")   Wt 71.2 kg (157 lb)   SpO2 99%   BMI 28.71 kg/m²     Results for orders placed or performed in visit on 05/13/25   CBC Auto Differential    Collection Time: 05/13/25 10:08 AM    Specimen: Blood   Result Value Ref Range    WBC 8.14 3.40 - 10.80 10*3/mm3    RBC 4.54 3.77 - 5.28 10*6/mm3    Hemoglobin 13.0 12.0 - 15.9 g/dL    Hematocrit 39.0 34.0 - 46.6 %    MCV 85.9 79.0 - 97.0 fL    MCH 28.6 26.6 - 33.0 pg    MCHC 33.3 31.5 - 35.7 g/dL    RDW 12.5 12.3 - 15.4 %    RDW-SD 39.1 37.0 - 54.0 fl    MPV 9.8 6.0 - 12.0 fL    Platelets 249 140 - 450 10*3/mm3    Neutrophil % 58.6 42.7 - 76.0 %    Lymphocyte % 29.0 19.6 - 45.3 %    Monocyte % 8.8 5.0 - 12.0 %    Eosinophil % 2.0 0.3 - 6.2 %    Basophil % 1.0 0.0 - 1.5 %    Immature Grans % 0.6 (H) 0.0 - 0.5 %    Neutrophils, Absolute 4.77 1.70 - 7.00 10*3/mm3    Lymphocytes, Absolute 2.36 0.70 - 3.10 10*3/mm3    Monocytes, Absolute 0.72 0.10 - 0.90 10*3/mm3    Eosinophils, Absolute 0.16 0.00 - 0.40 10*3/mm3    Basophils, Absolute 0.08 0.00 - 0.20 10*3/mm3    Immature Grans, Absolute 0.05 0.00 - 0.05 10*3/mm3    nRBC 0.0 0.0 - 0.2 /100 WBC             The following portions of the patient's history were reviewed and updated as appropriate: allergies, current medications, past family history, past medical history, past social history, past surgical history, and problem list.    Review of Systems   Constitutional:  Negative for activity change, chills and fever.   Respiratory:  Negative for cough.    Cardiovascular:  Negative for chest pain.   Psychiatric/Behavioral:  Negative for dysphoric mood.        Objective             Physical Exam  Vitals and nursing note reviewed.   Constitutional:       General: She is not in acute distress.     Appearance: She " is well-developed.   Cardiovascular:      Rate and Rhythm: Normal rate and regular rhythm.   Pulmonary:      Effort: Pulmonary effort is normal.      Breath sounds: Normal breath sounds.   Neurological:      Mental Status: She is alert and oriented to person, place, and time.   Psychiatric:         Behavior: Behavior normal.         Thought Content: Thought content normal.             Diagnoses and all orders for this visit:    1. Attention deficit hyperactivity disorder (ADHD), predominantly inattentive type (Primary)  -     lisdexamfetamine (Vyvanse) 60 MG capsule; Take 1 capsule by mouth Every Morning  Dispense: 30 capsule; Refill: 0

## 2025-06-02 ENCOUNTER — OFFICE VISIT (OUTPATIENT)
Dept: FAMILY MEDICINE CLINIC | Facility: CLINIC | Age: 45
End: 2025-06-02
Payer: COMMERCIAL

## 2025-06-02 VITALS
DIASTOLIC BLOOD PRESSURE: 82 MMHG | OXYGEN SATURATION: 99 % | BODY MASS INDEX: 28.89 KG/M2 | WEIGHT: 157 LBS | SYSTOLIC BLOOD PRESSURE: 128 MMHG | RESPIRATION RATE: 18 BRPM | HEART RATE: 93 BPM | TEMPERATURE: 98.1 F | HEIGHT: 62 IN

## 2025-06-02 DIAGNOSIS — F90.0 ATTENTION DEFICIT HYPERACTIVITY DISORDER (ADHD), PREDOMINANTLY INATTENTIVE TYPE: Primary | Chronic | ICD-10-CM

## 2025-06-02 PROCEDURE — 99213 OFFICE O/P EST LOW 20 MIN: CPT | Performed by: FAMILY MEDICINE

## 2025-06-02 RX ORDER — LISDEXAMFETAMINE DIMESYLATE 60 MG/1
60 CAPSULE ORAL EVERY MORNING
Qty: 30 CAPSULE | Refills: 0 | Status: SHIPPED | OUTPATIENT
Start: 2025-06-02

## 2025-06-11 DIAGNOSIS — F90.0 ATTENTION DEFICIT HYPERACTIVITY DISORDER (ADHD), PREDOMINANTLY INATTENTIVE TYPE: Chronic | ICD-10-CM

## 2025-06-11 RX ORDER — LISDEXAMFETAMINE DIMESYLATE 60 MG/1
60 CAPSULE ORAL EVERY MORNING
Qty: 30 CAPSULE | Refills: 0 | OUTPATIENT
Start: 2025-06-11

## 2025-06-11 RX ORDER — LANOLIN ALCOHOL/MO/W.PET/CERES
1000 CREAM (GRAM) TOPICAL DAILY
Qty: 30 TABLET | Refills: 5 | Status: SHIPPED | OUTPATIENT
Start: 2025-06-11

## 2025-06-12 DIAGNOSIS — I10 ESSENTIAL HYPERTENSION: Chronic | ICD-10-CM

## 2025-06-12 RX ORDER — VALSARTAN 160 MG/1
160 TABLET ORAL DAILY
Qty: 90 TABLET | Refills: 1 | OUTPATIENT
Start: 2025-06-12

## 2025-06-12 NOTE — TELEPHONE ENCOUNTER
Rx Refill Note  Requested Prescriptions     Pending Prescriptions Disp Refills    valsartan (DIOVAN) 160 MG tablet [Pharmacy Med Name: VALSARTAN 160MG TABLETS] 90 tablet 1     Sig: TAKE 1 TABLET BY MOUTH DAILY      Has refill    Last office visit with prescribing clinician: 6/2/2025   Last telemedicine visit with prescribing clinician: Visit date not found   Next office visit with prescribing clinician: Visit date not found       NANCY Colin(R)  06/12/25, 13:40 EDT

## 2025-07-08 DIAGNOSIS — F90.0 ATTENTION DEFICIT HYPERACTIVITY DISORDER (ADHD), PREDOMINANTLY INATTENTIVE TYPE: Chronic | ICD-10-CM

## 2025-07-09 RX ORDER — LISDEXAMFETAMINE DIMESYLATE 60 MG/1
60 CAPSULE ORAL EVERY MORNING
Qty: 30 CAPSULE | Refills: 0 | Status: SHIPPED | OUTPATIENT
Start: 2025-07-09

## 2025-07-09 RX ORDER — ALBUTEROL SULFATE 90 UG/1
2 INHALANT RESPIRATORY (INHALATION) EVERY 4 HOURS PRN
Qty: 8 G | Refills: 11 | Status: SHIPPED | OUTPATIENT
Start: 2025-07-09

## 2025-08-05 DIAGNOSIS — F90.0 ATTENTION DEFICIT HYPERACTIVITY DISORDER (ADHD), PREDOMINANTLY INATTENTIVE TYPE: Chronic | ICD-10-CM

## 2025-08-06 RX ORDER — LISDEXAMFETAMINE DIMESYLATE 60 MG/1
60 CAPSULE ORAL EVERY MORNING
Qty: 30 CAPSULE | Refills: 0 | Status: SHIPPED | OUTPATIENT
Start: 2025-08-06

## (undated) DEVICE — NDL HYPO ECLPS SFTY 22G 1 1/2IN

## (undated) DEVICE — KT ORCA ORCAPOD DISP STRL

## (undated) DEVICE — HANDPIECE, SINGLE-USE, SAVI SCOUT®: Brand: SAVI SCOUT®

## (undated) DEVICE — PENCL E/S ULTRAVAC TELESCP NOSE HOLSTR 10FT

## (undated) DEVICE — CVR TRANSD CIV FLX TPR 11.9 TO 3.8X61CM

## (undated) DEVICE — GLV SURG SENSICARE PI MIC PF SZ6.5 LF STRL

## (undated) DEVICE — SPNG LAP 18X18IN LF STRL PK/5

## (undated) DEVICE — VIAL FORMLN CAP 10PCT 20ML

## (undated) DEVICE — LOU MINOR PROCEDURE: Brand: MEDLINE INDUSTRIES, INC.

## (undated) DEVICE — Device

## (undated) DEVICE — RUBBERBAND LF STRL PK/2

## (undated) DEVICE — 3M™ IOBAN™ 2 ANTIMICROBIAL INCISE DRAPE 6650EZ: Brand: IOBAN™ 2

## (undated) DEVICE — CANN NASL CO2 TRULINK W/O2 A/

## (undated) DEVICE — BANDAGE,GAUZE,BULKEE II,4.5"X4.1YD,STRL: Brand: MEDLINE

## (undated) DEVICE — ELECTRD BLD EZ CLN MOD XLNG 2.75IN

## (undated) DEVICE — SUT MNCRYL PLS ANTIB UD 4/0 PS2 18IN

## (undated) DEVICE — GLV SURG SENSICARE POLYISPRN W/ALOE PF LF 6.5 GRN STRL

## (undated) DEVICE — MSK ENDO PORT O2 POM ELITE CURAPLEX A/

## (undated) DEVICE — EXTRCT STPL SKIN STRL BX/12

## (undated) DEVICE — LEGGINGS, PAIR, 31X48, STERILE: Brand: MEDLINE

## (undated) DEVICE — SUT ETHLN 3/0 PS1 18IN 1663H

## (undated) DEVICE — PATIENT RETURN ELECTRODE, SINGLE-USE, CONTACT QUALITY MONITORING, ADULT, WITH 9FT CORD, FOR PATIENTS WEIGING OVER 33LBS. (15KG): Brand: MEGADYNE

## (undated) DEVICE — TBG PENCL TELESCP MEGADYNE SMOKE EVAC 10FT

## (undated) DEVICE — INTENDED FOR TISSUE SEPARATION, AND OTHER PROCEDURES THAT REQUIRE A SHARP SURGICAL BLADE TO PUNCTURE OR CUT.: Brand: BARD-PARKER ® STAINLESS STEEL BLADES

## (undated) DEVICE — COVER,LIGHT HANDLE,FLX,1/PK: Brand: MEDLINE INDUSTRIES, INC.

## (undated) DEVICE — MARKR SKIN W/RULR AND LBL

## (undated) DEVICE — BITEBLOCK OMNI BLOC

## (undated) DEVICE — SYR LL TP 10ML STRL

## (undated) DEVICE — STCKNT IMPERV 9X36IN STRL

## (undated) DEVICE — TRAP FLD MINIVAC MEGADYNE 100ML

## (undated) DEVICE — PK UNIV COMPL 40

## (undated) DEVICE — GOWN,SIRUS,NON REINFRCD,LARGE,SET IN SL: Brand: MEDLINE

## (undated) DEVICE — ADHS SKIN SURG TISS VISC PREMIERPRO EXOFIN HI/VISC FAST/DRY

## (undated) DEVICE — ANTIBACTERIAL UNDYED BRAIDED (POLYGLACTIN 910), SYNTHETIC ABSORBABLE SUTURE: Brand: COATED VICRYL

## (undated) DEVICE — DRAIN JACKSON PRATT ROUND 15FR: Brand: CARDINAL HEALTH

## (undated) DEVICE — JACKSON-PRATT 100CC BULB RESERVOIR: Brand: CARDINAL HEALTH

## (undated) DEVICE — SINGLE-USE BIOPSY FORCEPS: Brand: RADIAL JAW 4

## (undated) DEVICE — APPL CHLORAPREP HI/LITE 26ML ORNG

## (undated) DEVICE — GAUZE,SPONGE,4"X4",16PLY,XRAY,STRL,LF: Brand: MEDLINE

## (undated) DEVICE — ADHS SKIN PREMIERPRO EXOFIN TOPICAL HI/VISC .5ML

## (undated) DEVICE — GOWN ,SIRUS,NONREINFORCED 3XL: Brand: MEDLINE

## (undated) DEVICE — TOWEL,OR,DSP,ST,BLUE,STD,4/PK,20PK/CS: Brand: MEDLINE

## (undated) DEVICE — PAD,ABDOMINAL,8"X10",ST,LF: Brand: MEDLINE

## (undated) DEVICE — GOWN ISOL W/THUMB UNIV BLU BX/15

## (undated) DEVICE — STPLR SKIN VISISTAT WD 35CT

## (undated) DEVICE — NDL HYPO PRECISIONGLIDE REG 25G 1 1/2

## (undated) DEVICE — FLEX ADVANTAGE 1500CC: Brand: FLEX ADVANTAGE

## (undated) DEVICE — KT INK TISS MARGINMARKER STD 6COLOR

## (undated) DEVICE — TOTAL TRAY, 16FR 10ML SIL FOLEY, URN: Brand: MEDLINE